# Patient Record
Sex: FEMALE | Race: WHITE | NOT HISPANIC OR LATINO
[De-identification: names, ages, dates, MRNs, and addresses within clinical notes are randomized per-mention and may not be internally consistent; named-entity substitution may affect disease eponyms.]

---

## 2017-01-09 ENCOUNTER — RESULT REVIEW (OUTPATIENT)
Age: 77
End: 2017-01-09

## 2017-01-10 ENCOUNTER — OUTPATIENT (OUTPATIENT)
Dept: OUTPATIENT SERVICES | Facility: HOSPITAL | Age: 77
LOS: 1 days | End: 2017-01-10
Payer: MEDICARE

## 2017-01-10 ENCOUNTER — APPOINTMENT (OUTPATIENT)
Dept: CT IMAGING | Facility: IMAGING CENTER | Age: 77
End: 2017-01-10

## 2017-01-10 ENCOUNTER — APPOINTMENT (OUTPATIENT)
Dept: HEMATOLOGY ONCOLOGY | Facility: CLINIC | Age: 77
End: 2017-01-10

## 2017-01-10 ENCOUNTER — OUTPATIENT (OUTPATIENT)
Dept: OUTPATIENT SERVICES | Facility: HOSPITAL | Age: 77
LOS: 1 days | Discharge: HOME | End: 2017-01-10

## 2017-01-10 ENCOUNTER — LABORATORY RESULT (OUTPATIENT)
Age: 77
End: 2017-01-10

## 2017-01-10 DIAGNOSIS — Z96.652 PRESENCE OF LEFT ARTIFICIAL KNEE JOINT: Chronic | ICD-10-CM

## 2017-01-10 DIAGNOSIS — Z98.89 OTHER SPECIFIED POSTPROCEDURAL STATES: Chronic | ICD-10-CM

## 2017-01-10 DIAGNOSIS — C17.0 MALIGNANT NEOPLASM OF DUODENUM: ICD-10-CM

## 2017-01-10 LAB
HCT VFR BLD CALC: 39.2 % — SIGNIFICANT CHANGE UP (ref 34.5–45)
HGB BLD-MCNC: 13.4 G/DL — SIGNIFICANT CHANGE UP (ref 11.5–15.5)
MCHC RBC-ENTMCNC: 31.9 PG — SIGNIFICANT CHANGE UP (ref 27–34)
MCHC RBC-ENTMCNC: 34.1 GM/DL — SIGNIFICANT CHANGE UP (ref 32–36)
MCV RBC AUTO: 93.3 FL — SIGNIFICANT CHANGE UP (ref 80–100)
PLATELET # BLD AUTO: 282 K/UL — SIGNIFICANT CHANGE UP (ref 150–400)
RBC # BLD: 4.2 M/UL — SIGNIFICANT CHANGE UP (ref 3.8–5.2)
RBC # FLD: 12.7 % — SIGNIFICANT CHANGE UP (ref 10.3–14.5)
WBC # BLD: 6.6 K/UL — SIGNIFICANT CHANGE UP (ref 3.8–10.5)
WBC # FLD AUTO: 6.6 K/UL — SIGNIFICANT CHANGE UP (ref 3.8–10.5)

## 2017-01-10 PROCEDURE — 82565 ASSAY OF CREATININE: CPT

## 2017-01-10 PROCEDURE — 74177 CT ABD & PELVIS W/CONTRAST: CPT

## 2017-01-20 ENCOUNTER — FORM ENCOUNTER (OUTPATIENT)
Age: 77
End: 2017-01-20

## 2017-01-21 ENCOUNTER — OUTPATIENT (OUTPATIENT)
Dept: OUTPATIENT SERVICES | Facility: HOSPITAL | Age: 77
LOS: 1 days | End: 2017-01-21
Payer: MEDICARE

## 2017-01-21 ENCOUNTER — APPOINTMENT (OUTPATIENT)
Dept: CT IMAGING | Facility: IMAGING CENTER | Age: 77
End: 2017-01-21

## 2017-01-21 DIAGNOSIS — Z96.652 PRESENCE OF LEFT ARTIFICIAL KNEE JOINT: Chronic | ICD-10-CM

## 2017-01-21 DIAGNOSIS — I26.99 OTHER PULMONARY EMBOLISM WITHOUT ACUTE COR PULMONALE: ICD-10-CM

## 2017-01-21 DIAGNOSIS — Z98.89 OTHER SPECIFIED POSTPROCEDURAL STATES: Chronic | ICD-10-CM

## 2017-01-21 DIAGNOSIS — C17.9 MALIGNANT NEOPLASM OF SMALL INTESTINE, UNSPECIFIED: ICD-10-CM

## 2017-01-21 PROCEDURE — 71275 CT ANGIOGRAPHY CHEST: CPT

## 2017-01-23 ENCOUNTER — APPOINTMENT (OUTPATIENT)
Dept: HEMATOLOGY ONCOLOGY | Facility: CLINIC | Age: 77
End: 2017-01-23

## 2017-01-23 ENCOUNTER — APPOINTMENT (OUTPATIENT)
Dept: SURGICAL ONCOLOGY | Facility: CLINIC | Age: 77
End: 2017-01-23

## 2017-01-23 VITALS
SYSTOLIC BLOOD PRESSURE: 135 MMHG | DIASTOLIC BLOOD PRESSURE: 80 MMHG | WEIGHT: 185 LBS | BODY MASS INDEX: 34.93 KG/M2 | HEIGHT: 61 IN | HEART RATE: 66 BPM

## 2017-01-23 VITALS
TEMPERATURE: 97.8 F | HEART RATE: 66 BPM | DIASTOLIC BLOOD PRESSURE: 80 MMHG | OXYGEN SATURATION: 100 % | RESPIRATION RATE: 16 BRPM | BODY MASS INDEX: 34.99 KG/M2 | SYSTOLIC BLOOD PRESSURE: 135 MMHG | WEIGHT: 185.19 LBS

## 2017-01-23 DIAGNOSIS — Z97.3 PRESENCE OF SPECTACLES AND CONTACT LENSES: ICD-10-CM

## 2017-03-16 ENCOUNTER — APPOINTMENT (OUTPATIENT)
Dept: ULTRASOUND IMAGING | Facility: IMAGING CENTER | Age: 77
End: 2017-03-16

## 2017-03-16 ENCOUNTER — OUTPATIENT (OUTPATIENT)
Dept: OUTPATIENT SERVICES | Facility: HOSPITAL | Age: 77
LOS: 1 days | Discharge: HOME | End: 2017-03-16

## 2017-03-16 ENCOUNTER — APPOINTMENT (OUTPATIENT)
Dept: HEMATOLOGY ONCOLOGY | Facility: CLINIC | Age: 77
End: 2017-03-16

## 2017-03-16 ENCOUNTER — OUTPATIENT (OUTPATIENT)
Dept: OUTPATIENT SERVICES | Facility: HOSPITAL | Age: 77
LOS: 1 days | End: 2017-03-16
Payer: MEDICARE

## 2017-03-16 VITALS
SYSTOLIC BLOOD PRESSURE: 132 MMHG | HEART RATE: 64 BPM | OXYGEN SATURATION: 97 % | WEIGHT: 187.39 LBS | RESPIRATION RATE: 16 BRPM | TEMPERATURE: 98 F | BODY MASS INDEX: 35.41 KG/M2 | DIASTOLIC BLOOD PRESSURE: 80 MMHG

## 2017-03-16 DIAGNOSIS — R06.02 SHORTNESS OF BREATH: ICD-10-CM

## 2017-03-16 DIAGNOSIS — Z98.89 OTHER SPECIFIED POSTPROCEDURAL STATES: Chronic | ICD-10-CM

## 2017-03-16 DIAGNOSIS — Z96.652 PRESENCE OF LEFT ARTIFICIAL KNEE JOINT: Chronic | ICD-10-CM

## 2017-03-16 DIAGNOSIS — E78.00 PURE HYPERCHOLESTEROLEMIA, UNSPECIFIED: ICD-10-CM

## 2017-03-16 DIAGNOSIS — I82.409 ACUTE EMBOLISM AND THROMBOSIS OF UNSPECIFIED DEEP VEINS OF UNSPECIFIED LOWER EXTREMITY: ICD-10-CM

## 2017-03-16 DIAGNOSIS — C17.9 MALIGNANT NEOPLASM OF SMALL INTESTINE, UNSPECIFIED: ICD-10-CM

## 2017-03-16 DIAGNOSIS — C17.0 MALIGNANT NEOPLASM OF DUODENUM: ICD-10-CM

## 2017-03-16 PROCEDURE — 93970 EXTREMITY STUDY: CPT

## 2017-04-06 ENCOUNTER — OUTPATIENT (OUTPATIENT)
Dept: OUTPATIENT SERVICES | Facility: HOSPITAL | Age: 77
LOS: 1 days | End: 2017-04-06
Payer: MEDICARE

## 2017-04-06 ENCOUNTER — APPOINTMENT (OUTPATIENT)
Dept: CV DIAGNOSITCS | Facility: HOSPITAL | Age: 77
End: 2017-04-06

## 2017-04-06 DIAGNOSIS — C17.9 MALIGNANT NEOPLASM OF SMALL INTESTINE, UNSPECIFIED: ICD-10-CM

## 2017-04-06 DIAGNOSIS — Z98.89 OTHER SPECIFIED POSTPROCEDURAL STATES: Chronic | ICD-10-CM

## 2017-04-06 DIAGNOSIS — Z96.652 PRESENCE OF LEFT ARTIFICIAL KNEE JOINT: Chronic | ICD-10-CM

## 2017-04-06 PROCEDURE — 93306 TTE W/DOPPLER COMPLETE: CPT | Mod: 26

## 2017-04-06 PROCEDURE — 93306 TTE W/DOPPLER COMPLETE: CPT

## 2017-04-06 PROCEDURE — 93356 MYOCRD STRAIN IMG SPCKL TRCK: CPT

## 2017-04-06 PROCEDURE — 0399T: CPT

## 2017-04-20 ENCOUNTER — OUTPATIENT (OUTPATIENT)
Dept: OUTPATIENT SERVICES | Facility: HOSPITAL | Age: 77
LOS: 1 days | Discharge: HOME | End: 2017-04-20

## 2017-04-20 DIAGNOSIS — Z98.89 OTHER SPECIFIED POSTPROCEDURAL STATES: Chronic | ICD-10-CM

## 2017-04-20 DIAGNOSIS — C17.0 MALIGNANT NEOPLASM OF DUODENUM: ICD-10-CM

## 2017-04-20 DIAGNOSIS — Z96.652 PRESENCE OF LEFT ARTIFICIAL KNEE JOINT: Chronic | ICD-10-CM

## 2017-04-23 ENCOUNTER — RESULT REVIEW (OUTPATIENT)
Age: 77
End: 2017-04-23

## 2017-04-24 ENCOUNTER — APPOINTMENT (OUTPATIENT)
Dept: HEMATOLOGY ONCOLOGY | Facility: CLINIC | Age: 77
End: 2017-04-24

## 2017-04-24 VITALS
OXYGEN SATURATION: 99 % | BODY MASS INDEX: 36.03 KG/M2 | TEMPERATURE: 97.7 F | SYSTOLIC BLOOD PRESSURE: 160 MMHG | WEIGHT: 190.7 LBS | HEART RATE: 75 BPM | DIASTOLIC BLOOD PRESSURE: 90 MMHG | RESPIRATION RATE: 16 BRPM

## 2017-04-24 DIAGNOSIS — I82.409 ACUTE EMBOLISM AND THROMBOSIS OF UNSPECIFIED DEEP VEINS OF UNSPECIFIED LOWER EXTREMITY: ICD-10-CM

## 2017-04-24 LAB
BASOPHILS # BLD AUTO: 0 K/UL — SIGNIFICANT CHANGE UP (ref 0–0.2)
BASOPHILS NFR BLD AUTO: 0 % — SIGNIFICANT CHANGE UP (ref 0–2)
EOSINOPHIL # BLD AUTO: 0.1 K/UL — SIGNIFICANT CHANGE UP (ref 0–0.5)
EOSINOPHIL NFR BLD AUTO: 2 % — SIGNIFICANT CHANGE UP (ref 0–6)
HCT VFR BLD CALC: 36.9 % — SIGNIFICANT CHANGE UP (ref 34.5–45)
HGB BLD-MCNC: 13 G/DL — SIGNIFICANT CHANGE UP (ref 11.5–15.5)
LYMPHOCYTES # BLD AUTO: 1.7 K/UL — SIGNIFICANT CHANGE UP (ref 1–3.3)
LYMPHOCYTES # BLD AUTO: 23.9 % — SIGNIFICANT CHANGE UP (ref 13–44)
MCHC RBC-ENTMCNC: 32.4 PG — SIGNIFICANT CHANGE UP (ref 27–34)
MCHC RBC-ENTMCNC: 35.4 G/DL — SIGNIFICANT CHANGE UP (ref 32–36)
MCV RBC AUTO: 91.6 FL — SIGNIFICANT CHANGE UP (ref 80–100)
MONOCYTES # BLD AUTO: 0.5 K/UL — SIGNIFICANT CHANGE UP (ref 0–0.9)
MONOCYTES NFR BLD AUTO: 7.1 % — SIGNIFICANT CHANGE UP (ref 2–14)
NEUTROPHILS # BLD AUTO: 4.7 K/UL — SIGNIFICANT CHANGE UP (ref 1.8–7.4)
NEUTROPHILS NFR BLD AUTO: 66.9 % — SIGNIFICANT CHANGE UP (ref 43–77)
PLATELET # BLD AUTO: 222 K/UL — SIGNIFICANT CHANGE UP (ref 150–400)
RBC # BLD: 4.02 M/UL — SIGNIFICANT CHANGE UP (ref 3.8–5.2)
RBC # FLD: 12.5 % — SIGNIFICANT CHANGE UP (ref 10.3–14.5)
WBC # BLD: 7 K/UL — SIGNIFICANT CHANGE UP (ref 3.8–10.5)
WBC # FLD AUTO: 7 K/UL — SIGNIFICANT CHANGE UP (ref 3.8–10.5)

## 2017-05-15 ENCOUNTER — APPOINTMENT (OUTPATIENT)
Dept: SURGICAL ONCOLOGY | Facility: CLINIC | Age: 77
End: 2017-05-15
Payer: MEDICARE

## 2017-05-15 VITALS
HEIGHT: 61 IN | BODY MASS INDEX: 36.82 KG/M2 | SYSTOLIC BLOOD PRESSURE: 170 MMHG | DIASTOLIC BLOOD PRESSURE: 90 MMHG | WEIGHT: 195 LBS

## 2017-05-15 PROCEDURE — 99214 OFFICE O/P EST MOD 30 MIN: CPT

## 2017-06-26 ENCOUNTER — OTHER (OUTPATIENT)
Age: 77
End: 2017-06-26

## 2017-07-04 ENCOUNTER — FORM ENCOUNTER (OUTPATIENT)
Age: 77
End: 2017-07-04

## 2017-07-05 ENCOUNTER — APPOINTMENT (OUTPATIENT)
Dept: CT IMAGING | Facility: IMAGING CENTER | Age: 77
End: 2017-07-05
Payer: MEDICARE

## 2017-07-05 ENCOUNTER — OUTPATIENT (OUTPATIENT)
Dept: OUTPATIENT SERVICES | Facility: HOSPITAL | Age: 77
LOS: 1 days | End: 2017-07-05
Payer: MEDICARE

## 2017-07-05 DIAGNOSIS — Z98.89 OTHER SPECIFIED POSTPROCEDURAL STATES: Chronic | ICD-10-CM

## 2017-07-05 DIAGNOSIS — C17.9 MALIGNANT NEOPLASM OF SMALL INTESTINE, UNSPECIFIED: ICD-10-CM

## 2017-07-05 DIAGNOSIS — Z96.652 PRESENCE OF LEFT ARTIFICIAL KNEE JOINT: Chronic | ICD-10-CM

## 2017-07-05 PROCEDURE — 74177 CT ABD & PELVIS W/CONTRAST: CPT

## 2017-07-05 PROCEDURE — 82565 ASSAY OF CREATININE: CPT

## 2017-07-05 PROCEDURE — 74177 CT ABD & PELVIS W/CONTRAST: CPT | Mod: 26

## 2017-07-05 PROCEDURE — 71260 CT THORAX DX C+: CPT | Mod: 26

## 2017-07-05 PROCEDURE — 71260 CT THORAX DX C+: CPT

## 2017-07-10 ENCOUNTER — OUTPATIENT (OUTPATIENT)
Dept: OUTPATIENT SERVICES | Facility: HOSPITAL | Age: 77
LOS: 1 days | Discharge: HOME | End: 2017-07-10

## 2017-07-10 DIAGNOSIS — Z98.89 OTHER SPECIFIED POSTPROCEDURAL STATES: Chronic | ICD-10-CM

## 2017-07-10 DIAGNOSIS — Z96.652 PRESENCE OF LEFT ARTIFICIAL KNEE JOINT: Chronic | ICD-10-CM

## 2017-07-10 DIAGNOSIS — C17.0 MALIGNANT NEOPLASM OF DUODENUM: ICD-10-CM

## 2017-07-11 ENCOUNTER — RESULT REVIEW (OUTPATIENT)
Age: 77
End: 2017-07-11

## 2017-07-11 ENCOUNTER — APPOINTMENT (OUTPATIENT)
Dept: HEMATOLOGY ONCOLOGY | Facility: CLINIC | Age: 77
End: 2017-07-11

## 2017-07-11 VITALS
SYSTOLIC BLOOD PRESSURE: 158 MMHG | DIASTOLIC BLOOD PRESSURE: 88 MMHG | TEMPERATURE: 97.8 F | OXYGEN SATURATION: 97 % | RESPIRATION RATE: 16 BRPM | HEART RATE: 67 BPM

## 2017-07-11 LAB
BASOPHILS # BLD AUTO: 0 K/UL — SIGNIFICANT CHANGE UP (ref 0–0.2)
BASOPHILS NFR BLD AUTO: 0.7 % — SIGNIFICANT CHANGE UP (ref 0–2)
EOSINOPHIL # BLD AUTO: 0.2 K/UL — SIGNIFICANT CHANGE UP (ref 0–0.5)
EOSINOPHIL NFR BLD AUTO: 2.5 % — SIGNIFICANT CHANGE UP (ref 0–6)
HCT VFR BLD CALC: 39.1 % — SIGNIFICANT CHANGE UP (ref 34.5–45)
HGB BLD-MCNC: 13.8 G/DL — SIGNIFICANT CHANGE UP (ref 11.5–15.5)
LYMPHOCYTES # BLD AUTO: 2 K/UL — SIGNIFICANT CHANGE UP (ref 1–3.3)
LYMPHOCYTES # BLD AUTO: 32.1 % — SIGNIFICANT CHANGE UP (ref 13–44)
MCHC RBC-ENTMCNC: 33 PG — SIGNIFICANT CHANGE UP (ref 27–34)
MCHC RBC-ENTMCNC: 35.2 G/DL — SIGNIFICANT CHANGE UP (ref 32–36)
MCV RBC AUTO: 93.7 FL — SIGNIFICANT CHANGE UP (ref 80–100)
MONOCYTES # BLD AUTO: 0.6 K/UL — SIGNIFICANT CHANGE UP (ref 0–0.9)
MONOCYTES NFR BLD AUTO: 8.9 % — SIGNIFICANT CHANGE UP (ref 2–14)
NEUTROPHILS # BLD AUTO: 3.6 K/UL — SIGNIFICANT CHANGE UP (ref 1.8–7.4)
NEUTROPHILS NFR BLD AUTO: 55.9 % — SIGNIFICANT CHANGE UP (ref 43–77)
PLATELET # BLD AUTO: 239 K/UL — SIGNIFICANT CHANGE UP (ref 150–400)
RBC # BLD: 4.17 M/UL — SIGNIFICANT CHANGE UP (ref 3.8–5.2)
RBC # FLD: 12.5 % — SIGNIFICANT CHANGE UP (ref 10.3–14.5)
WBC # BLD: 6.4 K/UL — SIGNIFICANT CHANGE UP (ref 3.8–10.5)
WBC # FLD AUTO: 6.4 K/UL — SIGNIFICANT CHANGE UP (ref 3.8–10.5)

## 2017-09-11 ENCOUNTER — APPOINTMENT (OUTPATIENT)
Dept: SURGICAL ONCOLOGY | Facility: CLINIC | Age: 77
End: 2017-09-11
Payer: MEDICARE

## 2017-09-11 VITALS — DIASTOLIC BLOOD PRESSURE: 92 MMHG | SYSTOLIC BLOOD PRESSURE: 190 MMHG

## 2017-09-11 VITALS — OXYGEN SATURATION: 96 % | RESPIRATION RATE: 17 BRPM | HEART RATE: 77 BPM

## 2017-09-11 PROCEDURE — 99214 OFFICE O/P EST MOD 30 MIN: CPT

## 2017-12-28 ENCOUNTER — OUTPATIENT (OUTPATIENT)
Dept: OUTPATIENT SERVICES | Facility: HOSPITAL | Age: 77
LOS: 1 days | Discharge: HOME | End: 2017-12-28

## 2017-12-28 DIAGNOSIS — Z98.89 OTHER SPECIFIED POSTPROCEDURAL STATES: Chronic | ICD-10-CM

## 2017-12-28 DIAGNOSIS — C17.0 MALIGNANT NEOPLASM OF DUODENUM: ICD-10-CM

## 2017-12-28 DIAGNOSIS — E78.00 PURE HYPERCHOLESTEROLEMIA, UNSPECIFIED: ICD-10-CM

## 2017-12-28 DIAGNOSIS — Z96.652 PRESENCE OF LEFT ARTIFICIAL KNEE JOINT: Chronic | ICD-10-CM

## 2018-01-08 ENCOUNTER — APPOINTMENT (OUTPATIENT)
Dept: HEMATOLOGY ONCOLOGY | Facility: CLINIC | Age: 78
End: 2018-01-08
Payer: MEDICARE

## 2018-01-08 ENCOUNTER — RESULT REVIEW (OUTPATIENT)
Age: 78
End: 2018-01-08

## 2018-01-08 VITALS
BODY MASS INDEX: 37.7 KG/M2 | DIASTOLIC BLOOD PRESSURE: 82 MMHG | OXYGEN SATURATION: 99 % | RESPIRATION RATE: 16 BRPM | HEART RATE: 74 BPM | TEMPERATURE: 96.1 F | SYSTOLIC BLOOD PRESSURE: 158 MMHG | WEIGHT: 199.52 LBS

## 2018-01-08 PROCEDURE — 99214 OFFICE O/P EST MOD 30 MIN: CPT

## 2018-03-19 ENCOUNTER — APPOINTMENT (OUTPATIENT)
Dept: SURGICAL ONCOLOGY | Facility: CLINIC | Age: 78
End: 2018-03-19
Payer: MEDICARE

## 2018-03-19 VITALS
OXYGEN SATURATION: 97 % | SYSTOLIC BLOOD PRESSURE: 196 MMHG | HEART RATE: 76 BPM | BODY MASS INDEX: 38.68 KG/M2 | HEIGHT: 60 IN | DIASTOLIC BLOOD PRESSURE: 80 MMHG | WEIGHT: 197 LBS

## 2018-03-19 PROCEDURE — 99214 OFFICE O/P EST MOD 30 MIN: CPT

## 2018-03-29 ENCOUNTER — OUTPATIENT (OUTPATIENT)
Dept: OUTPATIENT SERVICES | Facility: HOSPITAL | Age: 78
LOS: 1 days | Discharge: HOME | End: 2018-03-29

## 2018-03-29 DIAGNOSIS — E78.00 PURE HYPERCHOLESTEROLEMIA, UNSPECIFIED: ICD-10-CM

## 2018-03-29 DIAGNOSIS — Z98.89 OTHER SPECIFIED POSTPROCEDURAL STATES: Chronic | ICD-10-CM

## 2018-03-29 DIAGNOSIS — C17.0 MALIGNANT NEOPLASM OF DUODENUM: ICD-10-CM

## 2018-03-29 DIAGNOSIS — Z96.652 PRESENCE OF LEFT ARTIFICIAL KNEE JOINT: Chronic | ICD-10-CM

## 2018-03-30 ENCOUNTER — OTHER (OUTPATIENT)
Age: 78
End: 2018-03-30

## 2018-04-02 ENCOUNTER — APPOINTMENT (OUTPATIENT)
Dept: CT IMAGING | Facility: IMAGING CENTER | Age: 78
End: 2018-04-02
Payer: MEDICARE

## 2018-04-02 ENCOUNTER — OUTPATIENT (OUTPATIENT)
Dept: OUTPATIENT SERVICES | Facility: HOSPITAL | Age: 78
LOS: 1 days | End: 2018-04-02
Payer: MEDICARE

## 2018-04-02 ENCOUNTER — APPOINTMENT (OUTPATIENT)
Dept: HEMATOLOGY ONCOLOGY | Facility: CLINIC | Age: 78
End: 2018-04-02

## 2018-04-02 ENCOUNTER — RESULT REVIEW (OUTPATIENT)
Age: 78
End: 2018-04-02

## 2018-04-02 DIAGNOSIS — Z98.89 OTHER SPECIFIED POSTPROCEDURAL STATES: Chronic | ICD-10-CM

## 2018-04-02 DIAGNOSIS — C17.9 MALIGNANT NEOPLASM OF SMALL INTESTINE, UNSPECIFIED: ICD-10-CM

## 2018-04-02 DIAGNOSIS — Z96.652 PRESENCE OF LEFT ARTIFICIAL KNEE JOINT: Chronic | ICD-10-CM

## 2018-04-02 LAB
HCT VFR BLD CALC: 35.6 % — SIGNIFICANT CHANGE UP (ref 34.5–45)
HGB BLD-MCNC: 12.5 G/DL — SIGNIFICANT CHANGE UP (ref 11.5–15.5)
MCHC RBC-ENTMCNC: 31.9 PG — SIGNIFICANT CHANGE UP (ref 27–34)
MCHC RBC-ENTMCNC: 35.2 G/DL — SIGNIFICANT CHANGE UP (ref 32–36)
MCV RBC AUTO: 90.5 FL — SIGNIFICANT CHANGE UP (ref 80–100)
PLATELET # BLD AUTO: 214 K/UL — SIGNIFICANT CHANGE UP (ref 150–400)
RBC # BLD: 3.93 M/UL — SIGNIFICANT CHANGE UP (ref 3.8–5.2)
RBC # FLD: 12.8 % — SIGNIFICANT CHANGE UP (ref 10.3–14.5)
WBC # BLD: 6.8 K/UL — SIGNIFICANT CHANGE UP (ref 3.8–10.5)
WBC # FLD AUTO: 6.8 K/UL — SIGNIFICANT CHANGE UP (ref 3.8–10.5)

## 2018-04-02 PROCEDURE — 74177 CT ABD & PELVIS W/CONTRAST: CPT

## 2018-04-02 PROCEDURE — 74177 CT ABD & PELVIS W/CONTRAST: CPT | Mod: 26

## 2018-04-02 PROCEDURE — 82565 ASSAY OF CREATININE: CPT

## 2018-04-03 LAB
ALBUMIN SERPL ELPH-MCNC: 4.2 G/DL
ALP BLD-CCNC: 78 U/L
ALT SERPL-CCNC: 20 U/L
ANION GAP SERPL CALC-SCNC: 11 MMOL/L
AST SERPL-CCNC: 21 U/L
BILIRUB SERPL-MCNC: 0.4 MG/DL
BUN SERPL-MCNC: 20 MG/DL
CALCIUM SERPL-MCNC: 9.6 MG/DL
CEA SERPL-MCNC: 1.4 NG/ML
CHLORIDE SERPL-SCNC: 102 MMOL/L
CO2 SERPL-SCNC: 27 MMOL/L
CREAT SERPL-MCNC: 0.59 MG/DL
GLUCOSE SERPL-MCNC: 85 MG/DL
POTASSIUM SERPL-SCNC: 4.5 MMOL/L
PROT SERPL-MCNC: 6.7 G/DL
SODIUM SERPL-SCNC: 140 MMOL/L

## 2018-04-10 ENCOUNTER — CHART COPY (OUTPATIENT)
Age: 78
End: 2018-04-10

## 2018-07-09 ENCOUNTER — OUTPATIENT (OUTPATIENT)
Dept: OUTPATIENT SERVICES | Facility: HOSPITAL | Age: 78
LOS: 1 days | End: 2018-07-09
Payer: MEDICARE

## 2018-07-09 ENCOUNTER — APPOINTMENT (OUTPATIENT)
Dept: CT IMAGING | Facility: IMAGING CENTER | Age: 78
End: 2018-07-09
Payer: MEDICARE

## 2018-07-09 DIAGNOSIS — Z98.89 OTHER SPECIFIED POSTPROCEDURAL STATES: Chronic | ICD-10-CM

## 2018-07-09 DIAGNOSIS — Z96.652 PRESENCE OF LEFT ARTIFICIAL KNEE JOINT: Chronic | ICD-10-CM

## 2018-07-09 DIAGNOSIS — C17.9 MALIGNANT NEOPLASM OF SMALL INTESTINE, UNSPECIFIED: ICD-10-CM

## 2018-07-09 PROCEDURE — 74177 CT ABD & PELVIS W/CONTRAST: CPT

## 2018-07-09 PROCEDURE — 74177 CT ABD & PELVIS W/CONTRAST: CPT | Mod: 26

## 2018-07-09 PROCEDURE — 82565 ASSAY OF CREATININE: CPT

## 2018-07-13 ENCOUNTER — OUTPATIENT (OUTPATIENT)
Dept: OUTPATIENT SERVICES | Facility: HOSPITAL | Age: 78
LOS: 1 days | Discharge: HOME | End: 2018-07-13

## 2018-07-13 DIAGNOSIS — Z98.89 OTHER SPECIFIED POSTPROCEDURAL STATES: Chronic | ICD-10-CM

## 2018-07-13 DIAGNOSIS — E78.00 PURE HYPERCHOLESTEROLEMIA, UNSPECIFIED: ICD-10-CM

## 2018-07-13 DIAGNOSIS — Z96.652 PRESENCE OF LEFT ARTIFICIAL KNEE JOINT: Chronic | ICD-10-CM

## 2018-07-13 DIAGNOSIS — C17.0 MALIGNANT NEOPLASM OF DUODENUM: ICD-10-CM

## 2018-07-17 ENCOUNTER — APPOINTMENT (OUTPATIENT)
Dept: HEMATOLOGY ONCOLOGY | Facility: CLINIC | Age: 78
End: 2018-07-17
Payer: MEDICARE

## 2018-07-17 ENCOUNTER — RESULT REVIEW (OUTPATIENT)
Age: 78
End: 2018-07-17

## 2018-07-17 ENCOUNTER — OTHER (OUTPATIENT)
Age: 78
End: 2018-07-17

## 2018-07-17 VITALS
BODY MASS INDEX: 37.63 KG/M2 | RESPIRATION RATE: 16 BRPM | TEMPERATURE: 97.4 F | DIASTOLIC BLOOD PRESSURE: 90 MMHG | SYSTOLIC BLOOD PRESSURE: 170 MMHG | HEART RATE: 76 BPM | OXYGEN SATURATION: 99 % | WEIGHT: 192.66 LBS

## 2018-07-17 LAB
BASOPHILS # BLD AUTO: 0 K/UL — SIGNIFICANT CHANGE UP (ref 0–0.2)
BASOPHILS NFR BLD AUTO: 0.2 % — SIGNIFICANT CHANGE UP (ref 0–2)
EOSINOPHIL # BLD AUTO: 0.1 K/UL — SIGNIFICANT CHANGE UP (ref 0–0.5)
EOSINOPHIL NFR BLD AUTO: 1.9 % — SIGNIFICANT CHANGE UP (ref 0–6)
HCT VFR BLD CALC: 38.1 % — SIGNIFICANT CHANGE UP (ref 34.5–45)
HGB BLD-MCNC: 13 G/DL — SIGNIFICANT CHANGE UP (ref 11.5–15.5)
LYMPHOCYTES # BLD AUTO: 1.6 K/UL — SIGNIFICANT CHANGE UP (ref 1–3.3)
LYMPHOCYTES # BLD AUTO: 26.4 % — SIGNIFICANT CHANGE UP (ref 13–44)
MCHC RBC-ENTMCNC: 31.4 PG — SIGNIFICANT CHANGE UP (ref 27–34)
MCHC RBC-ENTMCNC: 34.2 G/DL — SIGNIFICANT CHANGE UP (ref 32–36)
MCV RBC AUTO: 91.9 FL — SIGNIFICANT CHANGE UP (ref 80–100)
MONOCYTES # BLD AUTO: 0.5 K/UL — SIGNIFICANT CHANGE UP (ref 0–0.9)
MONOCYTES NFR BLD AUTO: 7.7 % — SIGNIFICANT CHANGE UP (ref 2–14)
NEUTROPHILS # BLD AUTO: 3.9 K/UL — SIGNIFICANT CHANGE UP (ref 1.8–7.4)
NEUTROPHILS NFR BLD AUTO: 63.8 % — SIGNIFICANT CHANGE UP (ref 43–77)
PLATELET # BLD AUTO: 238 K/UL — SIGNIFICANT CHANGE UP (ref 150–400)
RBC # BLD: 4.14 M/UL — SIGNIFICANT CHANGE UP (ref 3.8–5.2)
RBC # FLD: 12.8 % — SIGNIFICANT CHANGE UP (ref 10.3–14.5)
WBC # BLD: 6.1 K/UL — SIGNIFICANT CHANGE UP (ref 3.8–10.5)
WBC # FLD AUTO: 6.1 K/UL — SIGNIFICANT CHANGE UP (ref 3.8–10.5)

## 2018-07-17 PROCEDURE — 99214 OFFICE O/P EST MOD 30 MIN: CPT

## 2018-07-18 LAB
ALBUMIN SERPL ELPH-MCNC: 4.2 G/DL
ALP BLD-CCNC: 56 U/L
ALT SERPL-CCNC: 19 U/L
ANION GAP SERPL CALC-SCNC: 13 MMOL/L
AST SERPL-CCNC: 18 U/L
BILIRUB SERPL-MCNC: 0.4 MG/DL
BUN SERPL-MCNC: 18 MG/DL
CALCIUM SERPL-MCNC: 9.4 MG/DL
CEA SERPL-MCNC: 1.7 NG/ML
CHLORIDE SERPL-SCNC: 102 MMOL/L
CO2 SERPL-SCNC: 27 MMOL/L
CREAT SERPL-MCNC: 0.64 MG/DL
GLUCOSE SERPL-MCNC: 83 MG/DL
POTASSIUM SERPL-SCNC: 4.4 MMOL/L
PROT SERPL-MCNC: 6.8 G/DL
SODIUM SERPL-SCNC: 142 MMOL/L

## 2018-10-01 ENCOUNTER — APPOINTMENT (OUTPATIENT)
Dept: SURGICAL ONCOLOGY | Facility: CLINIC | Age: 78
End: 2018-10-01
Payer: MEDICARE

## 2018-10-01 VITALS
HEIGHT: 60 IN | WEIGHT: 198 LBS | SYSTOLIC BLOOD PRESSURE: 170 MMHG | BODY MASS INDEX: 38.87 KG/M2 | OXYGEN SATURATION: 96 % | RESPIRATION RATE: 16 BRPM | HEART RATE: 72 BPM | DIASTOLIC BLOOD PRESSURE: 82 MMHG

## 2018-10-01 PROCEDURE — 99214 OFFICE O/P EST MOD 30 MIN: CPT

## 2019-01-04 LAB
ALBUMIN SERPL ELPH-MCNC: 4.1 G/DL
ALBUMIN SERPL ELPH-MCNC: 4.5 G/DL
ALBUMIN SERPL ELPH-MCNC: 4.6 G/DL
ALBUMIN SERPL ELPH-MCNC: 4.6 G/DL
ALP BLD-CCNC: 69 U/L
ALP BLD-CCNC: 76 U/L
ALP BLD-CCNC: 81 U/L
ALP BLD-CCNC: 81 U/L
ALT SERPL-CCNC: 17 U/L
ALT SERPL-CCNC: 19 U/L
ALT SERPL-CCNC: 20 U/L
ALT SERPL-CCNC: 25 U/L
ANION GAP SERPL CALC-SCNC: 12 MMOL/L
ANION GAP SERPL CALC-SCNC: 14 MMOL/L
ANION GAP SERPL CALC-SCNC: 14 MMOL/L
ANION GAP SERPL CALC-SCNC: 16 MMOL/L
AST SERPL-CCNC: 18 U/L
AST SERPL-CCNC: 19 U/L
AST SERPL-CCNC: 20 U/L
AST SERPL-CCNC: 21 U/L
BILIRUB SERPL-MCNC: 0.3 MG/DL
BILIRUB SERPL-MCNC: 0.3 MG/DL
BILIRUB SERPL-MCNC: 0.4 MG/DL
BILIRUB SERPL-MCNC: 0.6 MG/DL
BUN SERPL-MCNC: 15 MG/DL
BUN SERPL-MCNC: 16 MG/DL
BUN SERPL-MCNC: 21 MG/DL
BUN SERPL-MCNC: 22 MG/DL
CALCIUM SERPL-MCNC: 10.1 MG/DL
CALCIUM SERPL-MCNC: 10.2 MG/DL
CALCIUM SERPL-MCNC: 9.5 MG/DL
CALCIUM SERPL-MCNC: 9.9 MG/DL
CEA SERPL-MCNC: 1.4 NG/ML
CEA SERPL-MCNC: 1.4 NG/ML
CEA SERPL-MCNC: 1.6 NG/ML
CEA SERPL-MCNC: 1.6 NG/ML
CHLORIDE SERPL-SCNC: 101 MMOL/L
CHLORIDE SERPL-SCNC: 102 MMOL/L
CO2 SERPL-SCNC: 28 MMOL/L
CO2 SERPL-SCNC: 29 MMOL/L
CO2 SERPL-SCNC: 29 MMOL/L
CO2 SERPL-SCNC: 30 MMOL/L
CREAT SERPL-MCNC: 0.58 MG/DL
CREAT SERPL-MCNC: 0.63 MG/DL
CREAT SERPL-MCNC: 0.69 MG/DL
CREAT SERPL-MCNC: 0.72 MG/DL
GLUCOSE SERPL-MCNC: 79 MG/DL
GLUCOSE SERPL-MCNC: 81 MG/DL
GLUCOSE SERPL-MCNC: 89 MG/DL
GLUCOSE SERPL-MCNC: 91 MG/DL
POTASSIUM SERPL-SCNC: 4.2 MMOL/L
POTASSIUM SERPL-SCNC: 4.4 MMOL/L
POTASSIUM SERPL-SCNC: 5 MMOL/L
POTASSIUM SERPL-SCNC: 5.1 MMOL/L
PROT SERPL-MCNC: 6.5 G/DL
PROT SERPL-MCNC: 7.1 G/DL
PROT SERPL-MCNC: 7.2 G/DL
PROT SERPL-MCNC: 7.2 G/DL
SODIUM SERPL-SCNC: 143 MMOL/L
SODIUM SERPL-SCNC: 144 MMOL/L
SODIUM SERPL-SCNC: 145 MMOL/L
SODIUM SERPL-SCNC: 145 MMOL/L

## 2019-01-10 ENCOUNTER — OUTPATIENT (OUTPATIENT)
Dept: OUTPATIENT SERVICES | Facility: HOSPITAL | Age: 79
LOS: 1 days | Discharge: HOME | End: 2019-01-10

## 2019-01-10 DIAGNOSIS — Z98.89 OTHER SPECIFIED POSTPROCEDURAL STATES: Chronic | ICD-10-CM

## 2019-01-10 DIAGNOSIS — Z96.652 PRESENCE OF LEFT ARTIFICIAL KNEE JOINT: Chronic | ICD-10-CM

## 2019-01-10 DIAGNOSIS — C17.0 MALIGNANT NEOPLASM OF DUODENUM: ICD-10-CM

## 2019-01-14 ENCOUNTER — APPOINTMENT (OUTPATIENT)
Dept: HEMATOLOGY ONCOLOGY | Facility: CLINIC | Age: 79
End: 2019-01-14
Payer: MEDICARE

## 2019-01-14 ENCOUNTER — RESULT REVIEW (OUTPATIENT)
Age: 79
End: 2019-01-14

## 2019-01-14 VITALS
OXYGEN SATURATION: 94 % | DIASTOLIC BLOOD PRESSURE: 85 MMHG | BODY MASS INDEX: 38.67 KG/M2 | SYSTOLIC BLOOD PRESSURE: 157 MMHG | TEMPERATURE: 98.1 F | WEIGHT: 198 LBS | HEART RATE: 68 BPM | RESPIRATION RATE: 16 BRPM

## 2019-01-14 LAB
ALBUMIN SERPL ELPH-MCNC: 4.4 G/DL
ALP BLD-CCNC: 77 U/L
ALT SERPL-CCNC: 15 U/L
ANION GAP SERPL CALC-SCNC: 11 MMOL/L
AST SERPL-CCNC: 19 U/L
BASOPHILS # BLD AUTO: 0 K/UL — SIGNIFICANT CHANGE UP (ref 0–0.2)
BASOPHILS NFR BLD AUTO: 0.2 % — SIGNIFICANT CHANGE UP (ref 0–2)
BILIRUB SERPL-MCNC: 0.4 MG/DL
BUN SERPL-MCNC: 21 MG/DL
CALCIUM SERPL-MCNC: 9.4 MG/DL
CEA SERPL-MCNC: 1.8 NG/ML
CHLORIDE SERPL-SCNC: 103 MMOL/L
CO2 SERPL-SCNC: 30 MMOL/L
CREAT SERPL-MCNC: 0.76 MG/DL
EOSINOPHIL # BLD AUTO: 0.2 K/UL — SIGNIFICANT CHANGE UP (ref 0–0.5)
EOSINOPHIL NFR BLD AUTO: 1.8 % — SIGNIFICANT CHANGE UP (ref 0–6)
GLUCOSE SERPL-MCNC: 104 MG/DL
HCT VFR BLD CALC: 39 % — SIGNIFICANT CHANGE UP (ref 34.5–45)
HGB BLD-MCNC: 13.1 G/DL — SIGNIFICANT CHANGE UP (ref 11.5–15.5)
LYMPHOCYTES # BLD AUTO: 2.1 K/UL — SIGNIFICANT CHANGE UP (ref 1–3.3)
LYMPHOCYTES # BLD AUTO: 22.6 % — SIGNIFICANT CHANGE UP (ref 13–44)
MCHC RBC-ENTMCNC: 30.5 PG — SIGNIFICANT CHANGE UP (ref 27–34)
MCHC RBC-ENTMCNC: 33.5 G/DL — SIGNIFICANT CHANGE UP (ref 32–36)
MCV RBC AUTO: 91 FL — SIGNIFICANT CHANGE UP (ref 80–100)
MONOCYTES # BLD AUTO: 0.8 K/UL — SIGNIFICANT CHANGE UP (ref 0–0.9)
MONOCYTES NFR BLD AUTO: 8.7 % — SIGNIFICANT CHANGE UP (ref 2–14)
NEUTROPHILS # BLD AUTO: 6.1 K/UL — SIGNIFICANT CHANGE UP (ref 1.8–7.4)
NEUTROPHILS NFR BLD AUTO: 66.8 % — SIGNIFICANT CHANGE UP (ref 43–77)
PLATELET # BLD AUTO: 285 K/UL — SIGNIFICANT CHANGE UP (ref 150–400)
POTASSIUM SERPL-SCNC: 4.8 MMOL/L
PROT SERPL-MCNC: 6.9 G/DL
RBC # BLD: 4.29 M/UL — SIGNIFICANT CHANGE UP (ref 3.8–5.2)
RBC # FLD: 12.9 % — SIGNIFICANT CHANGE UP (ref 10.3–14.5)
SODIUM SERPL-SCNC: 144 MMOL/L
WBC # BLD: 9.1 K/UL — SIGNIFICANT CHANGE UP (ref 3.8–10.5)
WBC # FLD AUTO: 9.1 K/UL — SIGNIFICANT CHANGE UP (ref 3.8–10.5)

## 2019-01-14 PROCEDURE — 99214 OFFICE O/P EST MOD 30 MIN: CPT

## 2019-01-14 NOTE — ASSESSMENT
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 Pt to continue on followup imaf=ging and last scan 3 months ago showed no recurrence. Pt had inc abdominal node which is receding. Pt to followup with surgery Dr. Rios.Pt to RTO in 6 months or sooner if needed.

## 2019-01-14 NOTE — REVIEW OF SYSTEMS
[Negative] : Allergic/Immunologic [FreeTextEntry6] : saw pulmonologist and takes spray and has SOB. [FreeTextEntry7] : has LUQ pressure pain  [FreeTextEntry8] : has treated UTI.

## 2019-01-14 NOTE — HISTORY OF PRESENT ILLNESS
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 History dates back in 2015 when she noted the onset of abdominal pain and gastric reflux symptoms. She also noted weight loss is however a 6 month. She was also found to be anemic and underwent upper endoscopy which revealed a large friable duodenal mass 3.8 cm. The patient underwent pancreatoduodenectomy on November 20. She subsequently healed left hand did undergo rehabilitation treatment. She is doing well but does have occasional abdominal discomfort and pain. [de-identified] : Pt had hematuria and pt went to ER. Pt diagnosed with UTI and pt antibiotic x 7 days. Pt still has burning and will repeat Urine culture off cephalexin.

## 2019-04-08 ENCOUNTER — APPOINTMENT (OUTPATIENT)
Dept: SURGICAL ONCOLOGY | Facility: CLINIC | Age: 79
End: 2019-04-08
Payer: MEDICARE

## 2019-04-08 VITALS
OXYGEN SATURATION: 96 % | HEART RATE: 80 BPM | WEIGHT: 193 LBS | DIASTOLIC BLOOD PRESSURE: 104 MMHG | SYSTOLIC BLOOD PRESSURE: 188 MMHG | BODY MASS INDEX: 37.89 KG/M2 | HEIGHT: 60 IN

## 2019-04-08 PROCEDURE — 99214 OFFICE O/P EST MOD 30 MIN: CPT

## 2019-04-08 NOTE — HISTORY OF PRESENT ILLNESS
[de-identified] : Yandy is an 78 year old female presents for a 6 month colon cancer follow up visit. \par \par Past Hx:\par Duodenal CA with positive nodes; s/p Whipple Procedure in 11/2015; completed Xeloda regimen in 7/2016 under the supervision of Dr. Ventura ([facility]).   \par \par CT Abdomen/Pelvis 4/2/18 - Postsurgical changes. Suggestion of early cirrhotic changes. Nonspecific perigastric and mesenteric lymph nodes which are more prominent as compared to prior study. PET or f/u CT in 3 months recommended.\par \par F/U CT A/P 7/9/18 - Interval decrease in size of previously noted small bowel mesenteric and perigastric lymph node. Unchanged hepatic lesions. No new hepatic lesions identified. 2.3 cm right adrenal nodule remains unchanged since 2015. \par \par Bloodwork 1/2019: \par CEA: 1.8 ng/mL\par LFTs WNL\par CBC WNL\par \par Endoscopy by Dr. Jha on 2/14/18 which was without any signs of carcinoma.  \par \par Patient states she had an endoscopy/colonoscopy with Dr. Velarde on 3/15/19. Will obtain the report. The patient states that 2 polyps were removed and there were no suspicious findings on endoscopy.  Patient states she was told she no longer requires colonoscopies however she should have yearly endoscopies. \par \par Today she is with c/o occasional right flank/right hip discomfort. Denies abdominal pain, nausea, vomiting, changes in bowel habits. Good appetite.  She currently has an URI and was recently started on antibiotics as per her PCP. \par \par Internist: Dr. SEBAS Vazquez ([place], [place])\par GI: Dr. Velarde

## 2019-04-08 NOTE — PHYSICAL EXAM
[Normal] : supple, no neck mass and thyroid not enlarged [Normal Supraclavicular Lymph Nodes] : normal supraclavicular lymph nodes [Normal Groin Lymph Nodes] : normal groin lymph nodes [Normal] : oriented to person, place and time, with appropriate affect [de-identified] : Healed abdominal incisions but no masses. [FreeTextEntry1] : Deferred

## 2019-04-08 NOTE — ASSESSMENT
[FreeTextEntry1] : IMP:\par JULIET - hx of duodenal carcinoma s/p Whipple 11/2015\par Decreasing perigastric and mesenteric adenopathy on CT\par \par \par Plan:\par RTO q 6 months\par Bloodwork and CT scans per Dr. Haskins\par Obtain EGD/Colonoscopy report from 3/15/19\par Yearly EGD\par \par \par \par \par \par

## 2019-07-11 ENCOUNTER — OUTPATIENT (OUTPATIENT)
Dept: OUTPATIENT SERVICES | Facility: HOSPITAL | Age: 79
LOS: 1 days | Discharge: HOME | End: 2019-07-11

## 2019-07-11 DIAGNOSIS — C17.0 MALIGNANT NEOPLASM OF DUODENUM: ICD-10-CM

## 2019-07-11 DIAGNOSIS — E78.00 PURE HYPERCHOLESTEROLEMIA, UNSPECIFIED: ICD-10-CM

## 2019-07-11 DIAGNOSIS — Z98.89 OTHER SPECIFIED POSTPROCEDURAL STATES: Chronic | ICD-10-CM

## 2019-07-11 DIAGNOSIS — Z96.652 PRESENCE OF LEFT ARTIFICIAL KNEE JOINT: Chronic | ICD-10-CM

## 2019-07-15 ENCOUNTER — RESULT REVIEW (OUTPATIENT)
Age: 79
End: 2019-07-15

## 2019-07-15 ENCOUNTER — APPOINTMENT (OUTPATIENT)
Dept: HEMATOLOGY ONCOLOGY | Facility: CLINIC | Age: 79
End: 2019-07-15
Payer: MEDICARE

## 2019-07-15 VITALS
TEMPERATURE: 98.1 F | SYSTOLIC BLOOD PRESSURE: 90 MMHG | RESPIRATION RATE: 14 BRPM | BODY MASS INDEX: 38.58 KG/M2 | HEART RATE: 68 BPM | DIASTOLIC BLOOD PRESSURE: 60 MMHG | OXYGEN SATURATION: 97 % | WEIGHT: 197.53 LBS

## 2019-07-15 LAB
BASOPHILS # BLD AUTO: 0 K/UL — SIGNIFICANT CHANGE UP (ref 0–0.2)
BASOPHILS NFR BLD AUTO: 0.1 % — SIGNIFICANT CHANGE UP (ref 0–2)
EOSINOPHIL # BLD AUTO: 0.1 K/UL — SIGNIFICANT CHANGE UP (ref 0–0.5)
EOSINOPHIL NFR BLD AUTO: 2 % — SIGNIFICANT CHANGE UP (ref 0–6)
HCT VFR BLD CALC: 41.7 % — SIGNIFICANT CHANGE UP (ref 34.5–45)
HGB BLD-MCNC: 13.4 G/DL — SIGNIFICANT CHANGE UP (ref 11.5–15.5)
LYMPHOCYTES # BLD AUTO: 1.6 K/UL — SIGNIFICANT CHANGE UP (ref 1–3.3)
LYMPHOCYTES # BLD AUTO: 21.5 % — SIGNIFICANT CHANGE UP (ref 13–44)
MCHC RBC-ENTMCNC: 29.9 PG — SIGNIFICANT CHANGE UP (ref 27–34)
MCHC RBC-ENTMCNC: 32.1 G/DL — SIGNIFICANT CHANGE UP (ref 32–36)
MCV RBC AUTO: 93.3 FL — SIGNIFICANT CHANGE UP (ref 80–100)
MONOCYTES # BLD AUTO: 0.7 K/UL — SIGNIFICANT CHANGE UP (ref 0–0.9)
MONOCYTES NFR BLD AUTO: 8.9 % — SIGNIFICANT CHANGE UP (ref 2–14)
NEUTROPHILS # BLD AUTO: 5 K/UL — SIGNIFICANT CHANGE UP (ref 1.8–7.4)
NEUTROPHILS NFR BLD AUTO: 67.5 % — SIGNIFICANT CHANGE UP (ref 43–77)
PLATELET # BLD AUTO: 238 K/UL — SIGNIFICANT CHANGE UP (ref 150–400)
RBC # BLD: 4.47 M/UL — SIGNIFICANT CHANGE UP (ref 3.8–5.2)
RBC # FLD: 14 % — SIGNIFICANT CHANGE UP (ref 10.3–14.5)
WBC # BLD: 7.5 K/UL — SIGNIFICANT CHANGE UP (ref 3.8–10.5)
WBC # FLD AUTO: 7.5 K/UL — SIGNIFICANT CHANGE UP (ref 3.8–10.5)

## 2019-07-15 PROCEDURE — 99214 OFFICE O/P EST MOD 30 MIN: CPT

## 2019-07-15 NOTE — ASSESSMENT
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 Pt to continue on followup imaf=ging and last scan 3 months ago showed no recurrence. Pt had inc abdominal node which is receding. Pt to followup with surgery Dr. Rios. Pt to RTO in 6 months or sooner if needed.

## 2019-07-15 NOTE — HISTORY OF PRESENT ILLNESS
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 History dates back in 2015 when she noted the onset of abdominal pain and gastric reflux symptoms. She also noted weight loss is however a 6 month. She was also found to be anemic and underwent upper endoscopy which revealed a large friable duodenal mass 3.8 cm. The patient underwent pancreatoduodenectomy on November 20. She subsequently healed left hand did undergo rehabilitation treatment. She is doing well but does have occasional abdominal discomfort and pain. [de-identified] : Patient is here for f/u. She feels okay except on an off discomfort to left upper abdomen. She had a repeat Endoscopy/colonoscopy on 03/15/2019 with one benign polyp removed. Denies any constipation, diarrhea, melena or bloody stools.

## 2019-07-17 LAB
ALBUMIN SERPL ELPH-MCNC: 4.4 G/DL
ALP BLD-CCNC: 72 U/L
ALT SERPL-CCNC: 15 U/L
ANION GAP SERPL CALC-SCNC: 17 MMOL/L
AST SERPL-CCNC: 16 U/L
BILIRUB SERPL-MCNC: 0.4 MG/DL
BUN SERPL-MCNC: 16 MG/DL
CALCIUM SERPL-MCNC: 9.6 MG/DL
CEA SERPL-MCNC: 1.7 NG/ML
CHLORIDE SERPL-SCNC: 104 MMOL/L
CO2 SERPL-SCNC: 24 MMOL/L
CREAT SERPL-MCNC: 0.65 MG/DL
GLUCOSE SERPL-MCNC: 93 MG/DL
POTASSIUM SERPL-SCNC: 4.9 MMOL/L
PROT SERPL-MCNC: 6.8 G/DL
SODIUM SERPL-SCNC: 145 MMOL/L

## 2019-07-31 ENCOUNTER — FORM ENCOUNTER (OUTPATIENT)
Age: 79
End: 2019-07-31

## 2019-08-01 ENCOUNTER — APPOINTMENT (OUTPATIENT)
Dept: MAMMOGRAPHY | Facility: IMAGING CENTER | Age: 79
End: 2019-08-01
Payer: MEDICARE

## 2019-08-01 ENCOUNTER — OUTPATIENT (OUTPATIENT)
Dept: OUTPATIENT SERVICES | Facility: HOSPITAL | Age: 79
LOS: 1 days | End: 2019-08-01
Payer: MEDICARE

## 2019-08-01 ENCOUNTER — APPOINTMENT (OUTPATIENT)
Dept: CT IMAGING | Facility: IMAGING CENTER | Age: 79
End: 2019-08-01
Payer: MEDICARE

## 2019-08-01 DIAGNOSIS — Z98.89 OTHER SPECIFIED POSTPROCEDURAL STATES: Chronic | ICD-10-CM

## 2019-08-01 DIAGNOSIS — C17.9 MALIGNANT NEOPLASM OF SMALL INTESTINE, UNSPECIFIED: ICD-10-CM

## 2019-08-01 DIAGNOSIS — Z96.652 PRESENCE OF LEFT ARTIFICIAL KNEE JOINT: Chronic | ICD-10-CM

## 2019-08-01 PROCEDURE — 77067 SCR MAMMO BI INCL CAD: CPT

## 2019-08-01 PROCEDURE — 74177 CT ABD & PELVIS W/CONTRAST: CPT | Mod: 26

## 2019-08-01 PROCEDURE — 77067 SCR MAMMO BI INCL CAD: CPT | Mod: 26

## 2019-08-01 PROCEDURE — 77063 BREAST TOMOSYNTHESIS BI: CPT | Mod: 26

## 2019-08-01 PROCEDURE — 71260 CT THORAX DX C+: CPT | Mod: 26

## 2019-08-01 PROCEDURE — 74177 CT ABD & PELVIS W/CONTRAST: CPT

## 2019-08-01 PROCEDURE — 71260 CT THORAX DX C+: CPT

## 2019-08-01 PROCEDURE — 77063 BREAST TOMOSYNTHESIS BI: CPT

## 2019-08-08 ENCOUNTER — FORM ENCOUNTER (OUTPATIENT)
Age: 79
End: 2019-08-08

## 2019-08-09 ENCOUNTER — OUTPATIENT (OUTPATIENT)
Dept: OUTPATIENT SERVICES | Facility: HOSPITAL | Age: 79
LOS: 1 days | End: 2019-08-09
Payer: MEDICARE

## 2019-08-09 ENCOUNTER — APPOINTMENT (OUTPATIENT)
Dept: ULTRASOUND IMAGING | Facility: IMAGING CENTER | Age: 79
End: 2019-08-09
Payer: MEDICARE

## 2019-08-09 DIAGNOSIS — Z98.89 OTHER SPECIFIED POSTPROCEDURAL STATES: Chronic | ICD-10-CM

## 2019-08-09 DIAGNOSIS — Z00.8 ENCOUNTER FOR OTHER GENERAL EXAMINATION: ICD-10-CM

## 2019-08-09 DIAGNOSIS — Z96.652 PRESENCE OF LEFT ARTIFICIAL KNEE JOINT: Chronic | ICD-10-CM

## 2019-08-09 PROCEDURE — 76642 ULTRASOUND BREAST LIMITED: CPT | Mod: 26,RT

## 2019-08-09 PROCEDURE — 76642 ULTRASOUND BREAST LIMITED: CPT

## 2019-10-28 ENCOUNTER — APPOINTMENT (OUTPATIENT)
Dept: SURGICAL ONCOLOGY | Facility: CLINIC | Age: 79
End: 2019-10-28
Payer: MEDICARE

## 2019-10-28 VITALS
SYSTOLIC BLOOD PRESSURE: 122 MMHG | TEMPERATURE: 98 F | HEART RATE: 68 BPM | RESPIRATION RATE: 16 BRPM | HEIGHT: 60 IN | DIASTOLIC BLOOD PRESSURE: 78 MMHG | BODY MASS INDEX: 38.68 KG/M2 | WEIGHT: 197 LBS

## 2019-10-28 PROCEDURE — 99214 OFFICE O/P EST MOD 30 MIN: CPT

## 2019-10-28 NOTE — PHYSICAL EXAM
[Normal] : supple, no neck mass and thyroid not enlarged [Normal Supraclavicular Lymph Nodes] : normal supraclavicular lymph nodes [Normal Groin Lymph Nodes] : normal groin lymph nodes [Normal] : oriented to person, place and time, with appropriate affect [de-identified] : Healed abdominal incisions but no masses. [FreeTextEntry1] : Deferred [de-identified] : swelling right lower leg worse than left

## 2019-10-28 NOTE — ASSESSMENT
[FreeTextEntry1] : IMP:\par JULIET - hx of duodenal carcinoma s/p Whipple 11/2015\par Stable mesenteric adenopathy on CT\par Swollen right leg\par \par \par Plan:\par RTO q 6 months\par Left humerous xray today\par Venous duplex now\par Bloodwork and CT scans per Dr. Haskins\par EGD 3/2020 (yearly EGD's)\par \par \par \par \par

## 2019-11-01 ENCOUNTER — FORM ENCOUNTER (OUTPATIENT)
Age: 79
End: 2019-11-01

## 2019-11-02 ENCOUNTER — APPOINTMENT (OUTPATIENT)
Dept: ULTRASOUND IMAGING | Facility: IMAGING CENTER | Age: 79
End: 2019-11-02
Payer: MEDICARE

## 2019-11-02 ENCOUNTER — OUTPATIENT (OUTPATIENT)
Dept: OUTPATIENT SERVICES | Facility: HOSPITAL | Age: 79
LOS: 1 days | End: 2019-11-02
Payer: MEDICARE

## 2019-11-02 ENCOUNTER — APPOINTMENT (OUTPATIENT)
Dept: RADIOLOGY | Facility: IMAGING CENTER | Age: 79
End: 2019-11-02
Payer: MEDICARE

## 2019-11-02 DIAGNOSIS — Z96.652 PRESENCE OF LEFT ARTIFICIAL KNEE JOINT: Chronic | ICD-10-CM

## 2019-11-02 DIAGNOSIS — Z98.89 OTHER SPECIFIED POSTPROCEDURAL STATES: Chronic | ICD-10-CM

## 2019-11-02 DIAGNOSIS — C17.9 MALIGNANT NEOPLASM OF SMALL INTESTINE, UNSPECIFIED: ICD-10-CM

## 2019-11-02 DIAGNOSIS — I82.409 ACUTE EMBOLISM AND THROMBOSIS OF UNSPECIFIED DEEP VEINS OF UNSPECIFIED LOWER EXTREMITY: ICD-10-CM

## 2019-11-02 PROCEDURE — 93970 EXTREMITY STUDY: CPT | Mod: 26

## 2019-11-02 PROCEDURE — 73060 X-RAY EXAM OF HUMERUS: CPT

## 2019-11-02 PROCEDURE — 93970 EXTREMITY STUDY: CPT

## 2019-11-02 PROCEDURE — 73060 X-RAY EXAM OF HUMERUS: CPT | Mod: 26,LT

## 2020-01-11 ENCOUNTER — OUTPATIENT (OUTPATIENT)
Dept: OUTPATIENT SERVICES | Facility: HOSPITAL | Age: 80
LOS: 1 days | Discharge: HOME | End: 2020-01-11

## 2020-01-11 DIAGNOSIS — Z98.89 OTHER SPECIFIED POSTPROCEDURAL STATES: Chronic | ICD-10-CM

## 2020-01-11 DIAGNOSIS — C17.0 MALIGNANT NEOPLASM OF DUODENUM: ICD-10-CM

## 2020-01-11 DIAGNOSIS — Z96.652 PRESENCE OF LEFT ARTIFICIAL KNEE JOINT: Chronic | ICD-10-CM

## 2020-01-11 DIAGNOSIS — E78.1 PURE HYPERGLYCERIDEMIA: ICD-10-CM

## 2020-01-21 ENCOUNTER — RESULT REVIEW (OUTPATIENT)
Age: 80
End: 2020-01-21

## 2020-01-21 ENCOUNTER — APPOINTMENT (OUTPATIENT)
Dept: HEMATOLOGY ONCOLOGY | Facility: CLINIC | Age: 80
End: 2020-01-21
Payer: MEDICARE

## 2020-01-21 VITALS
RESPIRATION RATE: 16 BRPM | SYSTOLIC BLOOD PRESSURE: 161 MMHG | BODY MASS INDEX: 38.83 KG/M2 | OXYGEN SATURATION: 95 % | WEIGHT: 198.83 LBS | HEART RATE: 64 BPM | TEMPERATURE: 97.9 F | DIASTOLIC BLOOD PRESSURE: 84 MMHG

## 2020-01-21 DIAGNOSIS — M62.89 OTHER SPECIFIED DISORDERS OF MUSCLE: ICD-10-CM

## 2020-01-21 LAB
BASOPHILS # BLD AUTO: 0 K/UL — SIGNIFICANT CHANGE UP (ref 0–0.2)
BASOPHILS NFR BLD AUTO: 0.5 % — SIGNIFICANT CHANGE UP (ref 0–2)
EOSINOPHIL # BLD AUTO: 0.1 K/UL — SIGNIFICANT CHANGE UP (ref 0–0.5)
EOSINOPHIL NFR BLD AUTO: 1.7 % — SIGNIFICANT CHANGE UP (ref 0–6)
HCT VFR BLD CALC: 40.2 % — SIGNIFICANT CHANGE UP (ref 34.5–45)
HGB BLD-MCNC: 13.6 G/DL — SIGNIFICANT CHANGE UP (ref 11.5–15.5)
LYMPHOCYTES # BLD AUTO: 2 K/UL — SIGNIFICANT CHANGE UP (ref 1–3.3)
LYMPHOCYTES # BLD AUTO: 23.5 % — SIGNIFICANT CHANGE UP (ref 13–44)
MCHC RBC-ENTMCNC: 31.4 PG — SIGNIFICANT CHANGE UP (ref 27–34)
MCHC RBC-ENTMCNC: 33.8 G/DL — SIGNIFICANT CHANGE UP (ref 32–36)
MCV RBC AUTO: 92.8 FL — SIGNIFICANT CHANGE UP (ref 80–100)
MONOCYTES # BLD AUTO: 0.7 K/UL — SIGNIFICANT CHANGE UP (ref 0–0.9)
MONOCYTES NFR BLD AUTO: 8.5 % — SIGNIFICANT CHANGE UP (ref 2–14)
NEUTROPHILS # BLD AUTO: 5.6 K/UL — SIGNIFICANT CHANGE UP (ref 1.8–7.4)
NEUTROPHILS NFR BLD AUTO: 65.8 % — SIGNIFICANT CHANGE UP (ref 43–77)
PLATELET # BLD AUTO: 261 K/UL — SIGNIFICANT CHANGE UP (ref 150–400)
RBC # BLD: 4.33 M/UL — SIGNIFICANT CHANGE UP (ref 3.8–5.2)
RBC # FLD: 12.8 % — SIGNIFICANT CHANGE UP (ref 10.3–14.5)
WBC # BLD: 8.5 K/UL — SIGNIFICANT CHANGE UP (ref 3.8–10.5)
WBC # FLD AUTO: 8.5 K/UL — SIGNIFICANT CHANGE UP (ref 3.8–10.5)

## 2020-01-21 PROCEDURE — 99214 OFFICE O/P EST MOD 30 MIN: CPT

## 2020-01-21 NOTE — PHYSICAL EXAM
[Restricted in physically strenuous activity but ambulatory and able to carry out work of a light or sedentary nature] : Status 1- Restricted in physically strenuous activity but ambulatory and able to carry out work of a light or sedentary nature, e.g., light house work, office work [Normal] : grossly intact [de-identified] : prominent left biceps muscle, ?cyst, lipoma

## 2020-01-21 NOTE — HISTORY OF PRESENT ILLNESS
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 History dates back in 2015 when she noted the onset of abdominal pain and gastric reflux symptoms. She also noted weight loss is however a 6 month. She was also found to be anemic and underwent upper endoscopy which revealed a large friable duodenal mass 3.8 cm. The patient underwent pancreatoduodenectomy on November 20. She subsequently healed well and  did undergo rehabilitation treatment. She is doing well but does have occasional abdominal discomfort and pain. [de-identified] : Since the last visit the pt has had LUQ abdomen on and off lasting 5 minutes described as tightening pain. Pt fell x 4 going upstairs and pt had electric stairs put in. pt has lumpin left forearm.

## 2020-01-21 NOTE — REVIEW OF SYSTEMS
[SOB on Exertion] : shortness of breath during exertion [Negative] : Allergic/Immunologic [FreeTextEntry6] : pt saw Pulmonary for SOB,pt on inhaler [FreeTextEntry7] : hand colonoscopyas endoscopy  [FreeTextEntry9] : has low back pain.

## 2020-01-26 LAB
ALBUMIN SERPL ELPH-MCNC: 4.2 G/DL
ALP BLD-CCNC: 87 U/L
ALT SERPL-CCNC: 19 U/L
ANION GAP SERPL CALC-SCNC: 13 MMOL/L
AST SERPL-CCNC: 20 U/L
BILIRUB SERPL-MCNC: 0.3 MG/DL
BUN SERPL-MCNC: 29 MG/DL
CALCIUM SERPL-MCNC: 9.8 MG/DL
CANCER AG19-9 SERPL-ACNC: 10 U/ML
CEA SERPL-MCNC: 1.8 NG/ML
CHLORIDE SERPL-SCNC: 100 MMOL/L
CO2 SERPL-SCNC: 29 MMOL/L
CREAT SERPL-MCNC: 0.8 MG/DL
GLUCOSE SERPL-MCNC: 91 MG/DL
POTASSIUM SERPL-SCNC: 4.5 MMOL/L
PROT SERPL-MCNC: 6.7 G/DL
SODIUM SERPL-SCNC: 142 MMOL/L

## 2020-01-30 ENCOUNTER — FORM ENCOUNTER (OUTPATIENT)
Age: 80
End: 2020-01-30

## 2020-01-31 ENCOUNTER — OUTPATIENT (OUTPATIENT)
Dept: OUTPATIENT SERVICES | Facility: HOSPITAL | Age: 80
LOS: 1 days | End: 2020-01-31
Payer: MEDICARE

## 2020-01-31 ENCOUNTER — APPOINTMENT (OUTPATIENT)
Dept: ULTRASOUND IMAGING | Facility: IMAGING CENTER | Age: 80
End: 2020-01-31
Payer: MEDICARE

## 2020-01-31 DIAGNOSIS — Z96.652 PRESENCE OF LEFT ARTIFICIAL KNEE JOINT: Chronic | ICD-10-CM

## 2020-01-31 DIAGNOSIS — C17.9 MALIGNANT NEOPLASM OF SMALL INTESTINE, UNSPECIFIED: ICD-10-CM

## 2020-01-31 DIAGNOSIS — Z98.89 OTHER SPECIFIED POSTPROCEDURAL STATES: Chronic | ICD-10-CM

## 2020-01-31 DIAGNOSIS — M62.89 OTHER SPECIFIED DISORDERS OF MUSCLE: ICD-10-CM

## 2020-01-31 PROCEDURE — 76882 US LMTD JT/FCL EVL NVASC XTR: CPT | Mod: 26,LT

## 2020-01-31 PROCEDURE — 76882 US LMTD JT/FCL EVL NVASC XTR: CPT

## 2020-05-04 ENCOUNTER — APPOINTMENT (OUTPATIENT)
Dept: SURGICAL ONCOLOGY | Facility: CLINIC | Age: 80
End: 2020-05-04

## 2020-07-16 ENCOUNTER — OUTPATIENT (OUTPATIENT)
Dept: OUTPATIENT SERVICES | Facility: HOSPITAL | Age: 80
LOS: 1 days | Discharge: HOME | End: 2020-07-16

## 2020-07-16 DIAGNOSIS — Z98.89 OTHER SPECIFIED POSTPROCEDURAL STATES: Chronic | ICD-10-CM

## 2020-07-16 DIAGNOSIS — Z96.652 PRESENCE OF LEFT ARTIFICIAL KNEE JOINT: Chronic | ICD-10-CM

## 2020-07-16 DIAGNOSIS — C17.0 MALIGNANT NEOPLASM OF DUODENUM: ICD-10-CM

## 2020-07-20 ENCOUNTER — APPOINTMENT (OUTPATIENT)
Dept: HEMATOLOGY ONCOLOGY | Facility: CLINIC | Age: 80
End: 2020-07-20

## 2020-07-21 ENCOUNTER — APPOINTMENT (OUTPATIENT)
Dept: HEMATOLOGY ONCOLOGY | Facility: CLINIC | Age: 80
End: 2020-07-21
Payer: MEDICARE

## 2020-07-21 PROCEDURE — 99442: CPT | Mod: 95

## 2020-07-21 NOTE — PHYSICAL EXAM
[Restricted in physically strenuous activity but ambulatory and able to carry out work of a light or sedentary nature] : Status 1- Restricted in physically strenuous activity but ambulatory and able to carry out work of a light or sedentary nature, e.g., light house work, office work [Normal] : affect appropriate [de-identified] : prominent left biceps muscle, ?cyst, lipoma

## 2020-07-21 NOTE — ASSESSMENT
[Palliative Care Plan] : not applicable at this time [Curative] : Goals of care discussed with patient: Curative [FreeTextEntry1] :  Pt will continue surveillance for small intestinal cancer. Pt is due to repeat Endoscopy with GI MD, f/u. Ordered repeat labs, f/u.  Ordered repeat CT scans due in Aug 2020, f/u. Pt to continue with Medical, GI and GYN examinations and followup. Pt to RTO in 6 months or sooner if needed.

## 2020-07-21 NOTE — HISTORY OF PRESENT ILLNESS
[Home] : at home, [unfilled] , at the time of the visit. 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 History dates back in 2015 when she noted the onset of abdominal pain and gastric reflux symptoms. She also noted weight loss is however a 6 month. She was also found to be anemic and underwent upper endoscopy which revealed a large friable duodenal mass 3.8 cm. The patient underwent pancreatoduodenectomy on November 20. She subsequently healed well and  did undergo rehabilitation treatment. She is doing well but does have occasional abdominal discomfort and pain. [de-identified] : Patient has agreed to telephone visit. She states that she feels fine. Denies any pain to stomach. She is due to repeat her endoscopy soon. Denies any weight loss, vomiting or nausea. Appetite is fine. The swelling to her left forearm has improved and soften. Pt has ankle swelling and she was given water pill by her PCP. A venous doppler of lower extremities was done and is negative for DVT. \par

## 2020-07-21 NOTE — REVIEW OF SYSTEMS
[SOB on Exertion] : shortness of breath during exertion [Negative] : Allergic/Immunologic [FreeTextEntry7] : hand colonoscopyas endoscopy  [FreeTextEntry9] : has low back pain. [FreeTextEntry6] : pt saw Pulmonary for SOB,pt on inhaler

## 2020-07-27 ENCOUNTER — APPOINTMENT (OUTPATIENT)
Dept: SURGICAL ONCOLOGY | Facility: CLINIC | Age: 80
End: 2020-07-27
Payer: MEDICARE

## 2020-07-27 VITALS
HEART RATE: 79 BPM | BODY MASS INDEX: 38.28 KG/M2 | OXYGEN SATURATION: 96 % | WEIGHT: 195 LBS | HEIGHT: 60 IN | RESPIRATION RATE: 16 BRPM

## 2020-07-27 PROCEDURE — 99214 OFFICE O/P EST MOD 30 MIN: CPT

## 2020-07-27 NOTE — ASSESSMENT
[FreeTextEntry1] : IMP:\par JULIET - hx of duodenal carcinoma s/p Whipple 11/2015\par CT C/A/P  8/2/19- No evidence of abdominopelvic disease\par \par Plan:\par RTO q 6 months\par Bloodwork and CT scans per Dr. Haskins\par EGD overdue from March 2020 (yearly EGD's)\par \par \par \par \par

## 2020-07-27 NOTE — HISTORY OF PRESENT ILLNESS
[de-identified] : Yandy is an 79 year old female presents for a duodenal  cancer follow up visit. \par \par Past Hx:\par Duodenal CA with positive nodes; s/p Whipple Procedure in 11/2015; completed Xeloda regimen in 7/2016 under the supervision of Dr. Ventura ([facility]).   \par \par F/U CT C/A/P 8/2/19- No evidence of abdominopelvic disease\par \par Bloodwork 1/2020: \par CEA: 1.8 ng/mL\par LFTs WNL\par CA 19-9: 10 U/mL\par \par Endoscopy by Dr. Jha on 2/14/18 which was without any signs of carcinoma.  \par \par Patient states she had an endoscopy/colonoscopy with Dr. Velarde on 3/15/19.  Patient states she was told she no longer requires colonoscopies however she should have yearly endoscopies. \par \par EGD 3/2019- Moderate gastritis, mild reflux esophagitis, s/p gastrojejunostomy, small hiatal hernia.  \par \par Denies abdominal pain, nausea, vomiting, changes in bowel habits. Good appetite. \par \par Internist: Dr. SEBAS Vazquez ([place], [place])\par GI: Dr. Velarde

## 2020-07-27 NOTE — PHYSICAL EXAM
[Normal] : supple, no neck mass and thyroid not enlarged [Normal Supraclavicular Lymph Nodes] : normal supraclavicular lymph nodes [Normal Groin Lymph Nodes] : normal groin lymph nodes [Normal] : oriented to person, place and time, with appropriate affect [de-identified] : Healed abdominal incisions but no masses. [FreeTextEntry1] : Deferred

## 2020-08-04 ENCOUNTER — OUTPATIENT (OUTPATIENT)
Dept: OUTPATIENT SERVICES | Facility: HOSPITAL | Age: 80
LOS: 1 days | End: 2020-08-04
Payer: MEDICARE

## 2020-08-04 ENCOUNTER — APPOINTMENT (OUTPATIENT)
Dept: CT IMAGING | Facility: IMAGING CENTER | Age: 80
End: 2020-08-04
Payer: MEDICARE

## 2020-08-04 ENCOUNTER — RESULT REVIEW (OUTPATIENT)
Age: 80
End: 2020-08-04

## 2020-08-04 ENCOUNTER — APPOINTMENT (OUTPATIENT)
Dept: HEMATOLOGY ONCOLOGY | Facility: CLINIC | Age: 80
End: 2020-08-04

## 2020-08-04 DIAGNOSIS — Z98.89 OTHER SPECIFIED POSTPROCEDURAL STATES: Chronic | ICD-10-CM

## 2020-08-04 DIAGNOSIS — Z96.652 PRESENCE OF LEFT ARTIFICIAL KNEE JOINT: Chronic | ICD-10-CM

## 2020-08-04 DIAGNOSIS — I71.2 THORACIC AORTIC ANEURYSM, WITHOUT RUPTURE: ICD-10-CM

## 2020-08-04 LAB
BASOPHILS # BLD AUTO: 0.02 K/UL — SIGNIFICANT CHANGE UP (ref 0–0.2)
BASOPHILS NFR BLD AUTO: 0.3 % — SIGNIFICANT CHANGE UP (ref 0–2)
EOSINOPHIL # BLD AUTO: 0.1 K/UL — SIGNIFICANT CHANGE UP (ref 0–0.5)
EOSINOPHIL NFR BLD AUTO: 1.5 % — SIGNIFICANT CHANGE UP (ref 0–6)
HCT VFR BLD CALC: 36.1 % — SIGNIFICANT CHANGE UP (ref 34.5–45)
HGB BLD-MCNC: 11.8 G/DL — SIGNIFICANT CHANGE UP (ref 11.5–15.5)
IMM GRANULOCYTES NFR BLD AUTO: 0.5 % — SIGNIFICANT CHANGE UP (ref 0–1.5)
LYMPHOCYTES # BLD AUTO: 1.26 K/UL — SIGNIFICANT CHANGE UP (ref 1–3.3)
LYMPHOCYTES # BLD AUTO: 19.4 % — SIGNIFICANT CHANGE UP (ref 13–44)
MCHC RBC-ENTMCNC: 28 PG — SIGNIFICANT CHANGE UP (ref 27–34)
MCHC RBC-ENTMCNC: 32.7 GM/DL — SIGNIFICANT CHANGE UP (ref 32–36)
MCV RBC AUTO: 85.5 FL — SIGNIFICANT CHANGE UP (ref 80–100)
MONOCYTES # BLD AUTO: 0.52 K/UL — SIGNIFICANT CHANGE UP (ref 0–0.9)
MONOCYTES NFR BLD AUTO: 8 % — SIGNIFICANT CHANGE UP (ref 2–14)
NEUTROPHILS # BLD AUTO: 4.56 K/UL — SIGNIFICANT CHANGE UP (ref 1.8–7.4)
NEUTROPHILS NFR BLD AUTO: 70.3 % — SIGNIFICANT CHANGE UP (ref 43–77)
NRBC # BLD: 0 /100 WBCS — SIGNIFICANT CHANGE UP (ref 0–0)
PLATELET # BLD AUTO: 216 K/UL — SIGNIFICANT CHANGE UP (ref 150–400)
RBC # BLD: 4.22 M/UL — SIGNIFICANT CHANGE UP (ref 3.8–5.2)
RBC # FLD: 17.3 % — HIGH (ref 10.3–14.5)
WBC # BLD: 6.49 K/UL — SIGNIFICANT CHANGE UP (ref 3.8–10.5)
WBC # FLD AUTO: 6.49 K/UL — SIGNIFICANT CHANGE UP (ref 3.8–10.5)

## 2020-08-04 PROCEDURE — 82565 ASSAY OF CREATININE: CPT

## 2020-08-04 PROCEDURE — 71260 CT THORAX DX C+: CPT | Mod: 26

## 2020-08-04 PROCEDURE — 74177 CT ABD & PELVIS W/CONTRAST: CPT | Mod: 26

## 2020-08-04 PROCEDURE — 71260 CT THORAX DX C+: CPT

## 2020-08-04 PROCEDURE — 74177 CT ABD & PELVIS W/CONTRAST: CPT

## 2020-08-11 LAB
ALBUMIN SERPL ELPH-MCNC: 4.3 G/DL
ALP BLD-CCNC: 84 U/L
ALT SERPL-CCNC: 14 U/L
ANION GAP SERPL CALC-SCNC: 13 MMOL/L
AST SERPL-CCNC: 17 U/L
BILIRUB SERPL-MCNC: 0.4 MG/DL
BUN SERPL-MCNC: 19 MG/DL
CALCIUM SERPL-MCNC: 9.8 MG/DL
CEA SERPL-MCNC: 1.8 NG/ML
CHLORIDE SERPL-SCNC: 101 MMOL/L
CO2 SERPL-SCNC: 29 MMOL/L
CREAT SERPL-MCNC: 0.69 MG/DL
GLUCOSE SERPL-MCNC: 100 MG/DL
MAGNESIUM SERPL-MCNC: 2.1 MG/DL
POTASSIUM SERPL-SCNC: 4.1 MMOL/L
PROT SERPL-MCNC: 6.6 G/DL
SODIUM SERPL-SCNC: 144 MMOL/L

## 2020-08-13 ENCOUNTER — APPOINTMENT (OUTPATIENT)
Dept: HEMATOLOGY ONCOLOGY | Facility: CLINIC | Age: 80
End: 2020-08-13
Payer: MEDICARE

## 2020-08-13 PROCEDURE — 99441: CPT | Mod: 95

## 2021-01-22 ENCOUNTER — OUTPATIENT (OUTPATIENT)
Dept: OUTPATIENT SERVICES | Facility: HOSPITAL | Age: 81
LOS: 1 days | Discharge: HOME | End: 2021-01-22

## 2021-01-22 DIAGNOSIS — Z98.89 OTHER SPECIFIED POSTPROCEDURAL STATES: Chronic | ICD-10-CM

## 2021-01-22 DIAGNOSIS — C17.0 MALIGNANT NEOPLASM OF DUODENUM: ICD-10-CM

## 2021-01-22 DIAGNOSIS — Z96.652 PRESENCE OF LEFT ARTIFICIAL KNEE JOINT: Chronic | ICD-10-CM

## 2021-01-25 ENCOUNTER — APPOINTMENT (OUTPATIENT)
Dept: HEMATOLOGY ONCOLOGY | Facility: CLINIC | Age: 81
End: 2021-01-25
Payer: MEDICARE

## 2021-01-25 PROCEDURE — 99443: CPT | Mod: 95

## 2021-01-25 NOTE — REASON FOR VISIT
[Home] : at home, [unfilled] , at the time of the visit. [Medical Office: ([facility])___] : at the medical office located in

## 2021-01-25 NOTE — HISTORY OF PRESENT ILLNESS
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 History dates back in 2015 when she noted the onset of abdominal pain and gastric reflux symptoms. She also noted weight loss is however a 6 month. She was also found to be anemic and underwent upper endoscopy which revealed a large friable duodenal mass 3.8 cm. The patient underwent pancreatoduodenectomy on November 20. She subsequently healed well and  did undergo rehabilitation treatment. She is doing well but does have occasional abdominal discomfort and pain. [de-identified] : Since the last visit the pt remains well and has no symptoms indicating recurrent disease.Pt has 1 week of abdominal pain and inc gas and now resolved.

## 2021-01-25 NOTE — REVIEW OF SYSTEMS
[Fatigue] : fatigue [Joint Stiffness] : joint stiffness [Negative] : Allergic/Immunologic [FreeTextEntry3] : s [FreeTextEntry4] : sinus congestion [FreeTextEntry7] : occ heartburn on therapy [FreeTextEntry8] : has dropped bladder and has urinary incontinence

## 2021-01-25 NOTE — ASSESSMENT
[FreeTextEntry1] : Pt to continue on surveoillance for her stage III SB ca a/p xeloda chemo. pt to do mammograms and sonogram Pt is 5 yrs since diagnosis and will do last Ct scan in 8/21. Pt to do /medical, GI, GYN followup and testing. Pt to RTo in 6 months or sooner if needed. [Curative] : Goals of care discussed with patient: Curative [Palliative Care Plan] : not applicable at this time

## 2021-02-22 ENCOUNTER — APPOINTMENT (OUTPATIENT)
Dept: HEMATOLOGY ONCOLOGY | Facility: CLINIC | Age: 81
End: 2021-02-22

## 2021-03-22 ENCOUNTER — OUTPATIENT (OUTPATIENT)
Dept: OUTPATIENT SERVICES | Facility: HOSPITAL | Age: 81
LOS: 1 days | Discharge: HOME | End: 2021-03-22

## 2021-03-22 DIAGNOSIS — C17.0 MALIGNANT NEOPLASM OF DUODENUM: ICD-10-CM

## 2021-03-22 DIAGNOSIS — Z98.89 OTHER SPECIFIED POSTPROCEDURAL STATES: Chronic | ICD-10-CM

## 2021-03-22 DIAGNOSIS — Z96.652 PRESENCE OF LEFT ARTIFICIAL KNEE JOINT: Chronic | ICD-10-CM

## 2021-03-26 ENCOUNTER — APPOINTMENT (OUTPATIENT)
Dept: HEMATOLOGY ONCOLOGY | Facility: CLINIC | Age: 81
End: 2021-03-26

## 2021-04-21 ENCOUNTER — RESULT REVIEW (OUTPATIENT)
Age: 81
End: 2021-04-21

## 2021-04-21 ENCOUNTER — APPOINTMENT (OUTPATIENT)
Dept: HEMATOLOGY ONCOLOGY | Facility: CLINIC | Age: 81
End: 2021-04-21
Payer: MEDICARE

## 2021-04-21 VITALS
OXYGEN SATURATION: 95 % | RESPIRATION RATE: 16 BRPM | DIASTOLIC BLOOD PRESSURE: 78 MMHG | WEIGHT: 191.8 LBS | HEART RATE: 76 BPM | BODY MASS INDEX: 37.66 KG/M2 | TEMPERATURE: 97.3 F | HEIGHT: 60 IN | SYSTOLIC BLOOD PRESSURE: 223 MMHG

## 2021-04-21 LAB
BASOPHILS # BLD AUTO: 0.02 K/UL — SIGNIFICANT CHANGE UP (ref 0–0.2)
BASOPHILS NFR BLD AUTO: 0.3 % — SIGNIFICANT CHANGE UP (ref 0–2)
EOSINOPHIL # BLD AUTO: 0.14 K/UL — SIGNIFICANT CHANGE UP (ref 0–0.5)
EOSINOPHIL NFR BLD AUTO: 1.9 % — SIGNIFICANT CHANGE UP (ref 0–6)
HCT VFR BLD CALC: 38.4 % — SIGNIFICANT CHANGE UP (ref 34.5–45)
HGB BLD-MCNC: 12.7 G/DL — SIGNIFICANT CHANGE UP (ref 11.5–15.5)
IMM GRANULOCYTES NFR BLD AUTO: 0.4 % — SIGNIFICANT CHANGE UP (ref 0–1.5)
LYMPHOCYTES # BLD AUTO: 1.55 K/UL — SIGNIFICANT CHANGE UP (ref 1–3.3)
LYMPHOCYTES # BLD AUTO: 21 % — SIGNIFICANT CHANGE UP (ref 13–44)
MCHC RBC-ENTMCNC: 30.7 PG — SIGNIFICANT CHANGE UP (ref 27–34)
MCHC RBC-ENTMCNC: 33.1 G/DL — SIGNIFICANT CHANGE UP (ref 32–36)
MCV RBC AUTO: 92.8 FL — SIGNIFICANT CHANGE UP (ref 80–100)
MONOCYTES # BLD AUTO: 0.57 K/UL — SIGNIFICANT CHANGE UP (ref 0–0.9)
MONOCYTES NFR BLD AUTO: 7.7 % — SIGNIFICANT CHANGE UP (ref 2–14)
NEUTROPHILS # BLD AUTO: 5.06 K/UL — SIGNIFICANT CHANGE UP (ref 1.8–7.4)
NEUTROPHILS NFR BLD AUTO: 68.7 % — SIGNIFICANT CHANGE UP (ref 43–77)
NRBC # BLD: 0 /100 WBCS — SIGNIFICANT CHANGE UP (ref 0–0)
PLATELET # BLD AUTO: 221 K/UL — SIGNIFICANT CHANGE UP (ref 150–400)
RBC # BLD: 4.14 M/UL — SIGNIFICANT CHANGE UP (ref 3.8–5.2)
RBC # FLD: 14 % — SIGNIFICANT CHANGE UP (ref 10.3–14.5)
WBC # BLD: 7.37 K/UL — SIGNIFICANT CHANGE UP (ref 3.8–10.5)
WBC # FLD AUTO: 7.37 K/UL — SIGNIFICANT CHANGE UP (ref 3.8–10.5)

## 2021-04-21 PROCEDURE — 99214 OFFICE O/P EST MOD 30 MIN: CPT

## 2021-04-21 NOTE — HISTORY OF PRESENT ILLNESS
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 History dates back in 2015 when she noted the onset of abdominal pain and gastric reflux symptoms. She also noted weight loss is however a 6 month. She was also found to be anemic and underwent upper endoscopy which revealed a large friable duodenal mass 3.8 cm. The patient underwent pancreatoduodenectomy on November 20. She subsequently healed well and  did undergo rehabilitation treatment. She is doing well but does have occasional abdominal discomfort and pain. [de-identified] : Since the last visit the pt has developed upper abdominal pain with no nausea or emesis. Pt is 6 yrs since rx

## 2021-04-21 NOTE — ASSESSMENT
[Curative] : Goals of care discussed with patient: Curative [Palliative Care Plan] : not applicable at this time [FreeTextEntry1] : Pt to do CT abdomen pelvis for  upper abdominal pain r/ recurrence vs ulcer. Pt to see GI for upper endoscopy. Pt to do doppler for right leg DVT and has inc leg swelling. Pt to continue Medical followup and . Pt to RTO in year or soner if needed.

## 2021-04-21 NOTE — PHYSICAL EXAM
[Restricted in physically strenuous activity but ambulatory and able to carry out work of a light or sedentary nature] : Status 1- Restricted in physically strenuous activity but ambulatory and able to carry out work of a light or sedentary nature, e.g., light house work, office work [Normal] : affect appropriate [de-identified] : has 3 cm left bicep lipoma post US

## 2021-04-26 LAB
ALBUMIN SERPL ELPH-MCNC: 4.5 G/DL
ALP BLD-CCNC: 68 U/L
ALT SERPL-CCNC: 13 U/L
ANION GAP SERPL CALC-SCNC: 9 MMOL/L
AST SERPL-CCNC: 16 U/L
BILIRUB SERPL-MCNC: 0.4 MG/DL
BUN SERPL-MCNC: 20 MG/DL
CALCIUM SERPL-MCNC: 10.2 MG/DL
CANCER AG19-9 SERPL-ACNC: 7 U/ML
CEA SERPL-MCNC: 1.6 NG/ML
CHLORIDE SERPL-SCNC: 101 MMOL/L
CO2 SERPL-SCNC: 32 MMOL/L
CREAT SERPL-MCNC: 0.78 MG/DL
GLUCOSE SERPL-MCNC: 97 MG/DL
POTASSIUM SERPL-SCNC: 5.3 MMOL/L
PROT SERPL-MCNC: 7 G/DL
SODIUM SERPL-SCNC: 141 MMOL/L

## 2021-04-28 ENCOUNTER — RESULT REVIEW (OUTPATIENT)
Age: 81
End: 2021-04-28

## 2021-04-30 ENCOUNTER — RESULT REVIEW (OUTPATIENT)
Age: 81
End: 2021-04-30

## 2021-04-30 ENCOUNTER — OUTPATIENT (OUTPATIENT)
Dept: OUTPATIENT SERVICES | Facility: HOSPITAL | Age: 81
LOS: 1 days | End: 2021-04-30
Payer: MEDICARE

## 2021-04-30 ENCOUNTER — APPOINTMENT (OUTPATIENT)
Dept: MAMMOGRAPHY | Facility: IMAGING CENTER | Age: 81
End: 2021-04-30
Payer: MEDICARE

## 2021-04-30 ENCOUNTER — APPOINTMENT (OUTPATIENT)
Dept: ULTRASOUND IMAGING | Facility: IMAGING CENTER | Age: 81
End: 2021-04-30
Payer: MEDICARE

## 2021-04-30 DIAGNOSIS — Z96.652 PRESENCE OF LEFT ARTIFICIAL KNEE JOINT: Chronic | ICD-10-CM

## 2021-04-30 DIAGNOSIS — Z98.89 OTHER SPECIFIED POSTPROCEDURAL STATES: Chronic | ICD-10-CM

## 2021-04-30 DIAGNOSIS — Z00.8 ENCOUNTER FOR OTHER GENERAL EXAMINATION: ICD-10-CM

## 2021-04-30 PROCEDURE — 77063 BREAST TOMOSYNTHESIS BI: CPT | Mod: 26

## 2021-04-30 PROCEDURE — 76641 ULTRASOUND BREAST COMPLETE: CPT

## 2021-04-30 PROCEDURE — 77067 SCR MAMMO BI INCL CAD: CPT | Mod: 26

## 2021-04-30 PROCEDURE — 77067 SCR MAMMO BI INCL CAD: CPT

## 2021-04-30 PROCEDURE — 76641 ULTRASOUND BREAST COMPLETE: CPT | Mod: 26,50

## 2021-04-30 PROCEDURE — 77063 BREAST TOMOSYNTHESIS BI: CPT

## 2021-05-03 ENCOUNTER — OUTPATIENT (OUTPATIENT)
Dept: OUTPATIENT SERVICES | Facility: HOSPITAL | Age: 81
LOS: 1 days | End: 2021-05-03
Payer: MEDICARE

## 2021-05-03 ENCOUNTER — APPOINTMENT (OUTPATIENT)
Dept: ULTRASOUND IMAGING | Facility: IMAGING CENTER | Age: 81
End: 2021-05-03
Payer: MEDICARE

## 2021-05-03 ENCOUNTER — APPOINTMENT (OUTPATIENT)
Dept: CT IMAGING | Facility: IMAGING CENTER | Age: 81
End: 2021-05-03
Payer: MEDICARE

## 2021-05-03 DIAGNOSIS — C17.9 MALIGNANT NEOPLASM OF SMALL INTESTINE, UNSPECIFIED: ICD-10-CM

## 2021-05-03 DIAGNOSIS — Z96.652 PRESENCE OF LEFT ARTIFICIAL KNEE JOINT: Chronic | ICD-10-CM

## 2021-05-03 DIAGNOSIS — I82.409 ACUTE EMBOLISM AND THROMBOSIS OF UNSPECIFIED DEEP VEINS OF UNSPECIFIED LOWER EXTREMITY: ICD-10-CM

## 2021-05-03 DIAGNOSIS — Z98.89 OTHER SPECIFIED POSTPROCEDURAL STATES: Chronic | ICD-10-CM

## 2021-05-03 PROCEDURE — 76882 US LMTD JT/FCL EVL NVASC XTR: CPT | Mod: 26,LT

## 2021-05-03 PROCEDURE — G1004: CPT

## 2021-05-03 PROCEDURE — 93971 EXTREMITY STUDY: CPT | Mod: 26,RT

## 2021-05-03 PROCEDURE — 76882 US LMTD JT/FCL EVL NVASC XTR: CPT

## 2021-05-03 PROCEDURE — 74177 CT ABD & PELVIS W/CONTRAST: CPT

## 2021-05-03 PROCEDURE — 74177 CT ABD & PELVIS W/CONTRAST: CPT | Mod: 26,MG

## 2021-05-03 PROCEDURE — 93971 EXTREMITY STUDY: CPT

## 2021-05-05 ENCOUNTER — OUTPATIENT (OUTPATIENT)
Dept: OUTPATIENT SERVICES | Facility: HOSPITAL | Age: 81
LOS: 1 days | End: 2021-05-05
Payer: MEDICARE

## 2021-05-05 ENCOUNTER — RESULT REVIEW (OUTPATIENT)
Age: 81
End: 2021-05-05

## 2021-05-05 ENCOUNTER — APPOINTMENT (OUTPATIENT)
Dept: MAMMOGRAPHY | Facility: IMAGING CENTER | Age: 81
End: 2021-05-05
Payer: MEDICARE

## 2021-05-05 ENCOUNTER — APPOINTMENT (OUTPATIENT)
Dept: MAMMOGRAPHY | Facility: IMAGING CENTER | Age: 81
End: 2021-05-05

## 2021-05-05 DIAGNOSIS — Z96.652 PRESENCE OF LEFT ARTIFICIAL KNEE JOINT: Chronic | ICD-10-CM

## 2021-05-05 DIAGNOSIS — Z98.89 OTHER SPECIFIED POSTPROCEDURAL STATES: Chronic | ICD-10-CM

## 2021-05-05 DIAGNOSIS — Z00.8 ENCOUNTER FOR OTHER GENERAL EXAMINATION: ICD-10-CM

## 2021-05-05 PROCEDURE — 88305 TISSUE EXAM BY PATHOLOGIST: CPT | Mod: 26

## 2021-05-05 PROCEDURE — 88305 TISSUE EXAM BY PATHOLOGIST: CPT

## 2021-05-05 PROCEDURE — 19081 BX BREAST 1ST LESION STRTCTC: CPT | Mod: LT

## 2021-05-05 PROCEDURE — 77065 DX MAMMO INCL CAD UNI: CPT | Mod: 26,LT

## 2021-05-05 PROCEDURE — 77065 DX MAMMO INCL CAD UNI: CPT | Mod: 26,LT,77

## 2021-05-05 PROCEDURE — G0279: CPT | Mod: 26

## 2021-05-05 PROCEDURE — G0279: CPT

## 2021-05-05 PROCEDURE — 77065 DX MAMMO INCL CAD UNI: CPT

## 2021-05-05 PROCEDURE — 19081 BX BREAST 1ST LESION STRTCTC: CPT

## 2021-07-15 ENCOUNTER — OUTPATIENT (OUTPATIENT)
Dept: OUTPATIENT SERVICES | Facility: HOSPITAL | Age: 81
LOS: 1 days | Discharge: HOME | End: 2021-07-15

## 2021-07-15 DIAGNOSIS — Z96.652 PRESENCE OF LEFT ARTIFICIAL KNEE JOINT: Chronic | ICD-10-CM

## 2021-07-15 DIAGNOSIS — C17.0 MALIGNANT NEOPLASM OF DUODENUM: ICD-10-CM

## 2021-07-15 DIAGNOSIS — Z98.89 OTHER SPECIFIED POSTPROCEDURAL STATES: Chronic | ICD-10-CM

## 2021-07-16 ENCOUNTER — OUTPATIENT (OUTPATIENT)
Dept: OUTPATIENT SERVICES | Facility: HOSPITAL | Age: 81
LOS: 1 days | Discharge: HOME | End: 2021-07-16

## 2021-07-16 DIAGNOSIS — Z98.89 OTHER SPECIFIED POSTPROCEDURAL STATES: Chronic | ICD-10-CM

## 2021-07-16 DIAGNOSIS — C17.0 MALIGNANT NEOPLASM OF DUODENUM: ICD-10-CM

## 2021-07-16 DIAGNOSIS — Z96.652 PRESENCE OF LEFT ARTIFICIAL KNEE JOINT: Chronic | ICD-10-CM

## 2021-07-19 ENCOUNTER — APPOINTMENT (OUTPATIENT)
Dept: HEMATOLOGY ONCOLOGY | Facility: CLINIC | Age: 81
End: 2021-07-19

## 2021-08-04 ENCOUNTER — APPOINTMENT (OUTPATIENT)
Dept: PULMONOLOGY | Facility: CLINIC | Age: 81
End: 2021-08-04
Payer: MEDICARE

## 2021-08-04 ENCOUNTER — APPOINTMENT (OUTPATIENT)
Dept: PULMONOLOGY | Facility: CLINIC | Age: 81
End: 2021-08-04

## 2021-08-04 DIAGNOSIS — U07.1 COVID-19: ICD-10-CM

## 2021-08-04 DIAGNOSIS — C17.9 MALIGNANT NEOPLASM OF SMALL INTESTINE, UNSPECIFIED: ICD-10-CM

## 2021-08-04 DIAGNOSIS — I82.409 ACUTE EMBOLISM AND THROMBOSIS OF UNSPECIFIED DEEP VEINS OF UNSPECIFIED LOWER EXTREMITY: ICD-10-CM

## 2021-08-04 PROCEDURE — 99213 OFFICE O/P EST LOW 20 MIN: CPT | Mod: CS,95

## 2021-08-04 NOTE — ASSESSMENT
[FreeTextEntry1] : In summary I had a lengthy discussion with both daughters regarding the care of their mother at the nursing facility.  The facility is obtaining a monoclonal antibody for the patient.  I have stressed that there is no need to transfer the patient to a hospital facility at this time unless their mother becomes hypoxic or with worsening respiratory distress.  Fortunately in view of the patient's underlying pulmonary emboli and debilitated state with subsequent need for CPAP at night she is though at high risk for rapid cardiac and respiratory decompensation.  The patient will follow-up after she is discharged from her rehab facility

## 2021-08-04 NOTE — REASON FOR VISIT
[Follow-Up - From Hospitalization] : a follow-up visit after a recent hospitalization [Pulmonary Embolism] : pulmonary embolism [Shortness of Breath] : shortness of breath

## 2021-08-04 NOTE — HISTORY OF PRESENT ILLNESS
[TextBox_4] : Patient is an 80-year-old female with past medical history significant for adenocarcinoma of the small intestine, hypertension, deep vein thrombosis, bilateral pulmonary emboli who was recently discharged from the hospital on CPAP and is currently residing in a rehab facility.  While in the facility the patient converted to Covid positivity.  A telemedicine follow-up was set up between myself and her 2 daughters.  The patient apparently is confused at the rehab facility but is afebrile and not hypoxic

## 2021-09-08 ENCOUNTER — APPOINTMENT (OUTPATIENT)
Dept: PULMONOLOGY | Facility: CLINIC | Age: 81
End: 2021-09-08
Payer: MEDICARE

## 2021-09-08 VITALS
RESPIRATION RATE: 14 BRPM | DIASTOLIC BLOOD PRESSURE: 63 MMHG | HEIGHT: 60 IN | SYSTOLIC BLOOD PRESSURE: 139 MMHG | HEART RATE: 36 BPM | BODY MASS INDEX: 36.12 KG/M2 | OXYGEN SATURATION: 95 % | TEMPERATURE: 97.5 F | WEIGHT: 184 LBS

## 2021-09-08 DIAGNOSIS — R06.02 SHORTNESS OF BREATH: ICD-10-CM

## 2021-09-08 DIAGNOSIS — I26.99 OTHER PULMONARY EMBOLISM W/OUT ACUTE COR PULMONALE: ICD-10-CM

## 2021-09-08 DIAGNOSIS — Z80.0 FAMILY HISTORY OF MALIGNANT NEOPLASM OF DIGESTIVE ORGANS: ICD-10-CM

## 2021-09-08 DIAGNOSIS — G47.30 SLEEP APNEA, UNSPECIFIED: ICD-10-CM

## 2021-09-08 DIAGNOSIS — Z87.891 PERSONAL HISTORY OF NICOTINE DEPENDENCE: ICD-10-CM

## 2021-09-08 DIAGNOSIS — Z92.3 PERSONAL HISTORY OF IRRADIATION: ICD-10-CM

## 2021-09-08 DIAGNOSIS — C17.0 MALIGNANT NEOPLASM OF DUODENUM: ICD-10-CM

## 2021-09-08 DIAGNOSIS — Z82.49 FAMILY HISTORY OF ISCHEMIC HEART DISEASE AND OTHER DISEASES OF THE CIRCULATORY SYSTEM: ICD-10-CM

## 2021-09-08 DIAGNOSIS — U09.9 POST COVID-19 CONDITION, UNSPECIFIED: ICD-10-CM

## 2021-09-08 PROCEDURE — 99214 OFFICE O/P EST MOD 30 MIN: CPT

## 2021-09-08 NOTE — HISTORY OF PRESENT ILLNESS
[Former] : former [Never] : never [TextBox_4] : Patient is an 80-year-old female with past medical history significant for obstructive sleep apnea, history of COVID-19 pneumonia, bilateral pulmonary emboli currently on anticoagulation who presents today for follow-up. The patient complains of occasional cough and fatigue. She also complains of occasional dizziness. She currently denies fevers chills chest pain weight loss or hemoptysis

## 2021-09-08 NOTE — REASON FOR VISIT
[Follow-Up] : a follow-up visit [Sleep Apnea] : sleep apnea [Cough] : cough [Pulmonary Embolism] : pulmonary embolism [Shortness of Breath] : shortness of breath [Spouse] : spouse [Family Member] : family member

## 2021-09-08 NOTE — ASSESSMENT
[FreeTextEntry1] : In summary the patient is an 80-year-old female with multiple medical problems including history of pulmonary emboli currently on anticoagulation, obstructive sleep apnea, that is post COVID-19 who presents today for follow-up. Patient's physical exam is significant for good air entry bilaterally. I had a lengthy discussion with the patient and her family. A home sleep monitor has been ordered. The patient is instructed to continue her current therapy and to follow-up in 3 months

## 2021-10-05 ENCOUNTER — APPOINTMENT (OUTPATIENT)
Dept: PULMONOLOGY | Facility: CLINIC | Age: 81
End: 2021-10-05

## 2021-10-06 PROBLEM — U09.9 POST-COVID-19 SYNDROME: Status: ACTIVE | Noted: 2021-09-08

## 2022-05-22 ENCOUNTER — INPATIENT (INPATIENT)
Facility: HOSPITAL | Age: 82
LOS: 8 days | Discharge: HOME | DRG: 177 | End: 2022-05-31
Attending: INTERNAL MEDICINE | Admitting: INTERNAL MEDICINE
Payer: MEDICARE

## 2022-05-22 VITALS
HEART RATE: 80 BPM | OXYGEN SATURATION: 99 % | WEIGHT: 175.05 LBS | SYSTOLIC BLOOD PRESSURE: 201 MMHG | DIASTOLIC BLOOD PRESSURE: 143 MMHG | RESPIRATION RATE: 18 BRPM | HEIGHT: 60 IN

## 2022-05-22 DIAGNOSIS — J96.01 ACUTE RESPIRATORY FAILURE WITH HYPOXIA: ICD-10-CM

## 2022-05-22 DIAGNOSIS — Z98.89 OTHER SPECIFIED POSTPROCEDURAL STATES: Chronic | ICD-10-CM

## 2022-05-22 DIAGNOSIS — Z96.652 PRESENCE OF LEFT ARTIFICIAL KNEE JOINT: Chronic | ICD-10-CM

## 2022-05-22 LAB
ALBUMIN SERPL ELPH-MCNC: 3 G/DL — LOW (ref 3.3–5)
ALBUMIN SERPL ELPH-MCNC: 3.5 G/DL — SIGNIFICANT CHANGE UP (ref 3.3–5)
ALP SERPL-CCNC: 39 U/L — LOW (ref 40–120)
ALP SERPL-CCNC: 59 U/L — SIGNIFICANT CHANGE UP (ref 40–120)
ALT FLD-CCNC: 104 U/L — HIGH (ref 12–78)
ALT FLD-CCNC: 39 U/L — SIGNIFICANT CHANGE UP (ref 12–78)
ANION GAP SERPL CALC-SCNC: 10 MMOL/L — SIGNIFICANT CHANGE UP (ref 5–17)
APPEARANCE UR: CLEAR — SIGNIFICANT CHANGE UP
APTT BLD: 27.7 SEC — SIGNIFICANT CHANGE UP (ref 27.5–35.5)
AST SERPL-CCNC: 146 U/L — HIGH (ref 15–37)
AST SERPL-CCNC: 64 U/L — HIGH (ref 15–37)
BACTERIA # UR AUTO: NEGATIVE — SIGNIFICANT CHANGE UP
BASE EXCESS BLDA CALC-SCNC: 6.8 MMOL/L — HIGH (ref -2–3)
BASE EXCESS BLDV CALC-SCNC: -2.2 MMOL/L — LOW (ref -2–3)
BASOPHILS # BLD AUTO: 0.03 K/UL — SIGNIFICANT CHANGE UP (ref 0–0.2)
BASOPHILS NFR BLD AUTO: 0.2 % — SIGNIFICANT CHANGE UP (ref 0–2)
BILIRUB DIRECT SERPL-MCNC: 0.2 MG/DL — SIGNIFICANT CHANGE UP (ref 0–0.3)
BILIRUB INDIRECT FLD-MCNC: 0.3 MG/DL — SIGNIFICANT CHANGE UP (ref 0.2–1)
BILIRUB SERPL-MCNC: 0.5 MG/DL — SIGNIFICANT CHANGE UP (ref 0.2–1.2)
BILIRUB SERPL-MCNC: 0.5 MG/DL — SIGNIFICANT CHANGE UP (ref 0.2–1.2)
BILIRUB UR-MCNC: NEGATIVE — SIGNIFICANT CHANGE UP
BLOOD GAS COMMENTS ARTERIAL: SIGNIFICANT CHANGE UP
BLOOD GAS COMMENTS, VENOUS: SIGNIFICANT CHANGE UP
BUN SERPL-MCNC: 32 MG/DL — HIGH (ref 7–23)
CALCIUM SERPL-MCNC: 8.9 MG/DL — SIGNIFICANT CHANGE UP (ref 8.5–10.1)
CHLORIDE SERPL-SCNC: 102 MMOL/L — SIGNIFICANT CHANGE UP (ref 96–108)
CK SERPL-CCNC: 50 U/L — SIGNIFICANT CHANGE UP (ref 26–192)
CO2 SERPL-SCNC: 26 MMOL/L — SIGNIFICANT CHANGE UP (ref 22–31)
COLOR SPEC: YELLOW — SIGNIFICANT CHANGE UP
CREAT SERPL-MCNC: 1.3 MG/DL — SIGNIFICANT CHANGE UP (ref 0.5–1.3)
CREAT SERPL-MCNC: 1.5 MG/DL — HIGH (ref 0.5–1.3)
DIFF PNL FLD: ABNORMAL
EGFR: 34 ML/MIN/1.73M2 — LOW
EGFR: 41 ML/MIN/1.73M2 — LOW
EOSINOPHIL # BLD AUTO: 0.11 K/UL — SIGNIFICANT CHANGE UP (ref 0–0.5)
EOSINOPHIL NFR BLD AUTO: 0.8 % — SIGNIFICANT CHANGE UP (ref 0–6)
EPI CELLS # UR: SIGNIFICANT CHANGE UP
GAS PNL BLDA: SIGNIFICANT CHANGE UP
GAS PNL BLDV: SIGNIFICANT CHANGE UP
GLUCOSE SERPL-MCNC: 182 MG/DL — HIGH (ref 70–99)
GLUCOSE UR QL: NEGATIVE — SIGNIFICANT CHANGE UP
HCO3 BLDA-SCNC: 31 MMOL/L — HIGH (ref 21–28)
HCO3 BLDV-SCNC: 26 MMOL/L — SIGNIFICANT CHANGE UP (ref 22–29)
HCT VFR BLD CALC: 35.7 % — SIGNIFICANT CHANGE UP (ref 34.5–45)
HGB BLD-MCNC: 11 G/DL — LOW (ref 11.5–15.5)
HOROWITZ INDEX BLDA+IHG-RTO: 90 — SIGNIFICANT CHANGE UP
HYALINE CASTS # UR AUTO: NEGATIVE — SIGNIFICANT CHANGE UP
IMM GRANULOCYTES NFR BLD AUTO: 0.8 % — SIGNIFICANT CHANGE UP (ref 0–1.5)
INR BLD: 1.23 RATIO — HIGH (ref 0.88–1.16)
INR BLD: 1.4 RATIO — HIGH (ref 0.88–1.16)
KETONES UR-MCNC: NEGATIVE — SIGNIFICANT CHANGE UP
LACTATE SERPL-SCNC: 2.5 MMOL/L — HIGH (ref 0.7–2)
LACTATE SERPL-SCNC: 3.3 MMOL/L — HIGH (ref 0.7–2)
LEUKOCYTE ESTERASE UR-ACNC: ABNORMAL
LYMPHOCYTES # BLD AUTO: 24.7 % — SIGNIFICANT CHANGE UP (ref 13–44)
LYMPHOCYTES # BLD AUTO: 3.39 K/UL — HIGH (ref 1–3.3)
MCHC RBC-ENTMCNC: 30.8 GM/DL — LOW (ref 32–36)
MCHC RBC-ENTMCNC: 31.2 PG — SIGNIFICANT CHANGE UP (ref 27–34)
MCV RBC AUTO: 101.1 FL — HIGH (ref 80–100)
MONOCYTES # BLD AUTO: 0.81 K/UL — SIGNIFICANT CHANGE UP (ref 0–0.9)
MONOCYTES NFR BLD AUTO: 5.9 % — SIGNIFICANT CHANGE UP (ref 2–14)
NEUTROPHILS # BLD AUTO: 9.3 K/UL — HIGH (ref 1.8–7.4)
NEUTROPHILS NFR BLD AUTO: 67.6 % — SIGNIFICANT CHANGE UP (ref 43–77)
NITRITE UR-MCNC: NEGATIVE — SIGNIFICANT CHANGE UP
NRBC # BLD: 0 /100 WBCS — SIGNIFICANT CHANGE UP (ref 0–0)
NT-PROBNP SERPL-SCNC: 3328 PG/ML — HIGH (ref 0–450)
PCO2 BLDA: 43 MMHG — HIGH (ref 32–35)
PCO2 BLDV: 72 MMHG — HIGH (ref 39–42)
PH BLDA: 7.46 — HIGH (ref 7.35–7.45)
PH BLDV: 7.17 — LOW (ref 7.32–7.43)
PH UR: 5 — SIGNIFICANT CHANGE UP (ref 5–8)
PLATELET # BLD AUTO: 308 K/UL — SIGNIFICANT CHANGE UP (ref 150–400)
PO2 BLDA: 235 MMHG — HIGH (ref 83–108)
PO2 BLDV: 69 MMHG — HIGH (ref 25–45)
POTASSIUM SERPL-MCNC: 4.2 MMOL/L — SIGNIFICANT CHANGE UP (ref 3.5–5.3)
POTASSIUM SERPL-SCNC: 4.2 MMOL/L — SIGNIFICANT CHANGE UP (ref 3.5–5.3)
PROT SERPL-MCNC: 6.3 G/DL — SIGNIFICANT CHANGE UP (ref 6–8.3)
PROT SERPL-MCNC: 7.7 G/DL — SIGNIFICANT CHANGE UP (ref 6–8.3)
PROT UR-MCNC: 30 MG/DL
PROTHROM AB SERPL-ACNC: 14.4 SEC — HIGH (ref 10.5–13.4)
PROTHROM AB SERPL-ACNC: 16.4 SEC — HIGH (ref 10.5–13.4)
RAPID RVP RESULT: DETECTED
RBC # BLD: 3.53 M/UL — LOW (ref 3.8–5.2)
RBC # FLD: 14.7 % — HIGH (ref 10.3–14.5)
RBC CASTS # UR COMP ASSIST: SIGNIFICANT CHANGE UP /HPF (ref 0–4)
SAO2 % BLDA: 98.4 % — HIGH (ref 94–98)
SAO2 % BLDV: 90 % — HIGH (ref 67–88)
SARS-COV-2 RNA SPEC QL NAA+PROBE: DETECTED
SODIUM SERPL-SCNC: 138 MMOL/L — SIGNIFICANT CHANGE UP (ref 135–145)
SP GR SPEC: 1.01 — SIGNIFICANT CHANGE UP (ref 1.01–1.02)
TROPONIN I, HIGH SENSITIVITY RESULT: 138.8 NG/L — HIGH
TROPONIN I, HIGH SENSITIVITY RESULT: 21.2 NG/L — SIGNIFICANT CHANGE UP
UROBILINOGEN FLD QL: NEGATIVE — SIGNIFICANT CHANGE UP
WBC # BLD: 13.75 K/UL — HIGH (ref 3.8–10.5)
WBC # FLD AUTO: 13.75 K/UL — HIGH (ref 3.8–10.5)
WBC UR QL: SIGNIFICANT CHANGE UP

## 2022-05-22 PROCEDURE — 93010 ELECTROCARDIOGRAM REPORT: CPT

## 2022-05-22 PROCEDURE — 99285 EMERGENCY DEPT VISIT HI MDM: CPT | Mod: CS

## 2022-05-22 RX ORDER — TIOTROPIUM BROMIDE 18 UG/1
1 CAPSULE ORAL; RESPIRATORY (INHALATION) DAILY
Refills: 0 | Status: DISCONTINUED | OUTPATIENT
Start: 2022-05-22 | End: 2022-05-31

## 2022-05-22 RX ORDER — CARVEDILOL PHOSPHATE 80 MG/1
25 CAPSULE, EXTENDED RELEASE ORAL
Refills: 0 | Status: DISCONTINUED | OUTPATIENT
Start: 2022-05-22 | End: 2022-05-22

## 2022-05-22 RX ORDER — LOSARTAN POTASSIUM 100 MG/1
100 TABLET, FILM COATED ORAL DAILY
Refills: 0 | Status: DISCONTINUED | OUTPATIENT
Start: 2022-05-22 | End: 2022-05-28

## 2022-05-22 RX ORDER — AMIODARONE HYDROCHLORIDE 400 MG/1
200 TABLET ORAL DAILY
Refills: 0 | Status: DISCONTINUED | OUTPATIENT
Start: 2022-05-22 | End: 2022-05-31

## 2022-05-22 RX ORDER — CARVEDILOL PHOSPHATE 80 MG/1
25 CAPSULE, EXTENDED RELEASE ORAL
Refills: 0 | Status: DISCONTINUED | OUTPATIENT
Start: 2022-05-22 | End: 2022-05-31

## 2022-05-22 RX ORDER — FENOFIBRATE,MICRONIZED 130 MG
145 CAPSULE ORAL DAILY
Refills: 0 | Status: DISCONTINUED | OUTPATIENT
Start: 2022-05-22 | End: 2022-05-31

## 2022-05-22 RX ORDER — APIXABAN 2.5 MG/1
2.5 TABLET, FILM COATED ORAL EVERY 12 HOURS
Refills: 0 | Status: DISCONTINUED | OUTPATIENT
Start: 2022-05-22 | End: 2022-05-23

## 2022-05-22 RX ORDER — REMDESIVIR 5 MG/ML
200 INJECTION INTRAVENOUS EVERY 24 HOURS
Refills: 0 | Status: COMPLETED | OUTPATIENT
Start: 2022-05-22 | End: 2022-05-22

## 2022-05-22 RX ORDER — ACETAMINOPHEN 500 MG
650 TABLET ORAL ONCE
Refills: 0 | Status: COMPLETED | OUTPATIENT
Start: 2022-05-22 | End: 2022-05-22

## 2022-05-22 RX ORDER — LANOLIN ALCOHOL/MO/W.PET/CERES
5 CREAM (GRAM) TOPICAL AT BEDTIME
Refills: 0 | Status: DISCONTINUED | OUTPATIENT
Start: 2022-05-22 | End: 2022-05-31

## 2022-05-22 RX ORDER — DEXAMETHASONE 0.5 MG/5ML
6 ELIXIR ORAL DAILY
Refills: 0 | Status: DISCONTINUED | OUTPATIENT
Start: 2022-05-22 | End: 2022-05-28

## 2022-05-22 RX ORDER — NITROGLYCERIN 6.5 MG
0.4 CAPSULE, EXTENDED RELEASE ORAL ONCE
Refills: 0 | Status: COMPLETED | OUTPATIENT
Start: 2022-05-22 | End: 2022-05-22

## 2022-05-22 RX ORDER — VANCOMYCIN HCL 1 G
1000 VIAL (EA) INTRAVENOUS ONCE
Refills: 0 | Status: COMPLETED | OUTPATIENT
Start: 2022-05-22 | End: 2022-05-22

## 2022-05-22 RX ORDER — REMDESIVIR 5 MG/ML
INJECTION INTRAVENOUS
Refills: 0 | Status: COMPLETED | OUTPATIENT
Start: 2022-05-22 | End: 2022-05-26

## 2022-05-22 RX ORDER — FUROSEMIDE 40 MG
60 TABLET ORAL ONCE
Refills: 0 | Status: COMPLETED | OUTPATIENT
Start: 2022-05-22 | End: 2022-05-22

## 2022-05-22 RX ORDER — PIPERACILLIN AND TAZOBACTAM 4; .5 G/20ML; G/20ML
3.38 INJECTION, POWDER, LYOPHILIZED, FOR SOLUTION INTRAVENOUS ONCE
Refills: 0 | Status: COMPLETED | OUTPATIENT
Start: 2022-05-22 | End: 2022-05-22

## 2022-05-22 RX ORDER — SODIUM CHLORIDE 9 MG/ML
1000 INJECTION, SOLUTION INTRAVENOUS
Refills: 0 | Status: DISCONTINUED | OUTPATIENT
Start: 2022-05-22 | End: 2022-05-23

## 2022-05-22 RX ORDER — FERROUS SULFATE 325(65) MG
325 TABLET ORAL DAILY
Refills: 0 | Status: DISCONTINUED | OUTPATIENT
Start: 2022-05-22 | End: 2022-05-31

## 2022-05-22 RX ORDER — PIPERACILLIN AND TAZOBACTAM 4; .5 G/20ML; G/20ML
3.38 INJECTION, POWDER, LYOPHILIZED, FOR SOLUTION INTRAVENOUS EVERY 8 HOURS
Refills: 0 | Status: COMPLETED | OUTPATIENT
Start: 2022-05-22 | End: 2022-05-23

## 2022-05-22 RX ORDER — ACETAMINOPHEN 500 MG
650 TABLET ORAL EVERY 6 HOURS
Refills: 0 | Status: DISCONTINUED | OUTPATIENT
Start: 2022-05-22 | End: 2022-05-31

## 2022-05-22 RX ORDER — REMDESIVIR 5 MG/ML
100 INJECTION INTRAVENOUS EVERY 24 HOURS
Refills: 0 | Status: COMPLETED | OUTPATIENT
Start: 2022-05-23 | End: 2022-05-26

## 2022-05-22 RX ADMIN — SODIUM CHLORIDE 60 MILLILITER(S): 9 INJECTION, SOLUTION INTRAVENOUS at 17:50

## 2022-05-22 RX ADMIN — PIPERACILLIN AND TAZOBACTAM 25 GRAM(S): 4; .5 INJECTION, POWDER, LYOPHILIZED, FOR SOLUTION INTRAVENOUS at 23:00

## 2022-05-22 RX ADMIN — PIPERACILLIN AND TAZOBACTAM 3.38 GRAM(S): 4; .5 INJECTION, POWDER, LYOPHILIZED, FOR SOLUTION INTRAVENOUS at 10:30

## 2022-05-22 RX ADMIN — Medication 650 MILLIGRAM(S): at 16:15

## 2022-05-22 RX ADMIN — PIPERACILLIN AND TAZOBACTAM 200 GRAM(S): 4; .5 INJECTION, POWDER, LYOPHILIZED, FOR SOLUTION INTRAVENOUS at 09:55

## 2022-05-22 RX ADMIN — CARVEDILOL PHOSPHATE 25 MILLIGRAM(S): 80 CAPSULE, EXTENDED RELEASE ORAL at 17:50

## 2022-05-22 RX ADMIN — REMDESIVIR 500 MILLIGRAM(S): 5 INJECTION INTRAVENOUS at 23:37

## 2022-05-22 RX ADMIN — Medication 5 MILLIGRAM(S): at 23:00

## 2022-05-22 RX ADMIN — Medication 250 MILLIGRAM(S): at 10:43

## 2022-05-22 RX ADMIN — Medication 60 MILLIGRAM(S): at 09:55

## 2022-05-22 RX ADMIN — Medication 0.4 MILLIGRAM(S): at 09:46

## 2022-05-22 RX ADMIN — Medication 650 MILLIGRAM(S): at 09:55

## 2022-05-22 RX ADMIN — SODIUM CHLORIDE 60 MILLILITER(S): 9 INJECTION, SOLUTION INTRAVENOUS at 23:00

## 2022-05-22 RX ADMIN — APIXABAN 2.5 MILLIGRAM(S): 2.5 TABLET, FILM COATED ORAL at 17:49

## 2022-05-22 NOTE — CONSULT NOTE ADULT - SUBJECTIVE AND OBJECTIVE BOX
[facility] [place]     Patient is a 81y old  Female who presents with a chief complaint of     HPI:      HPI:    81y Female for Cardiology Consult    PAST MEDICAL & SURGICAL HISTORY:      FAMILY HISTORY:      SOCIAL HISTORY:   Alcohol:  Smoking:    Allergies    fluoxetine (Unknown)  NIFEdipine (Unknown)    Intolerances        MEDICATIONS  (STANDING):  aMIOdarone    Tablet 200 milliGRAM(s) Oral daily  apixaban 2.5 milliGRAM(s) Oral every 12 hours  carvedilol Oral Tab/Cap - Peds 25 milliGRAM(s) Oral two times a day  fenofibrate Tablet 145 milliGRAM(s) Oral daily  ferrous    sulfate 325 milliGRAM(s) Oral daily  lactated ringers. 1000 milliLiter(s) (60 mL/Hr) IV Continuous <Continuous>  losartan 100 milliGRAM(s) Oral daily  melatonin 5 milliGRAM(s) Oral at bedtime  tiotropium 18 MICROgram(s) Capsule 1 Capsule(s) Inhalation daily    MEDICATIONS  (PRN):  acetaminophen     Tablet .. 650 milliGRAM(s) Oral every 6 hours PRN Temp greater or equal to 38C (100.4F)      REVIEW OF SYSTEMS:  CONSTITUTIONAL: No fever, weight loss, chills, shakes, or fat  RESPIRATORY: No cough, wheezing, hemoptysis, or shortness of breath  CARDIOVASCULAR: No chest pain, dyspnea, palpitations, dizziness, syncope, paroxysmal nocturnal dyspnea, orthopnea, or arm or leg swelling  GASTROINTESTINAL: No abdominal  or epigastric pain, nausea, vomiting, hematemesis, diarrhea, constipation, melena or bright red bloo  NEUROLOGICAL: No headaches, memory loss, slurred speech, limb weakness, loss of strength, numbness, or tremors  SKIN: No itching, burning, rashes, or lesions  ENDOCRINE: No heat or cold intolerance, or hair loss  MUSCULOSKELETAL: No joint pain or swelling, muscle, back, or extremity pain  HEME/LYMPH: No easy bruising or bleeding gums  ALLERY AND IMMUNOLOGIC: No hives or rash.    Vital Signs Last 24 Hrs  T(C): 38.4 (22 May 2022 09:43), Max: 38.4 (22 May 2022 09:43)  T(F): 101.1 (22 May 2022 09:43), Max: 101.1 (22 May 2022 09:43)  HR: 80 (22 May 2022 13:13) (66 - 84)  BP: 179/84 (22 May 2022 10:16) (179/84 - 204/96)  BP(mean): --  RR: 29 (22 May 2022 10:16) (18 - 32)  SpO2: 100% (22 May 2022 13:13) (96% - 100%)    PHYSICAL EXAM:  HEAD:  Atraumatic, Normocephalic  EYES: EOMI, PERRLA, conjunctiva and sclera clear  NECK: Supple and normal thyroid.  No JVD or carotid bruit.   HEART: S1, S2 regular , 1/6 soft ejection systolic murmur in mitral area , no thrill and no gallops .  PULMONARY: Bilateral vesicular breathing , few scattered ronchi and few scattered rales are present .  ABDOMEN: Soft nontender and positive bowl sounds   EXTREMITIES:  No clubbing, cyanosis, or pedal  edema  NEUROLOGICAL: AAOX3 , no focal deficit .    LABS:                        11.0   13.75 )-----------( 308      ( 22 May 2022 09:43 )             35.7         138  |  102  |  32<H>  ----------------------------<  182<H>  4.2   |  26  |  1.50<H>    Ca    8.9      22 May 2022 09:43    TPro  7.7  /  Alb  3.5  /  TBili  0.5  /  DBili  x   /  AST  64<H>  /  ALT  39  /  AlkPhos  59      CARDIAC MARKERS ( 22 May 2022 09:43 )  x     / x     / 50 U/L / x     / x          PT/INR - ( 22 May 2022 09:43 )   PT: 14.4 sec;   INR: 1.23 ratio         PTT - ( 22 May 2022 09:43 )  PTT:27.7 sec  Urinalysis Basic - ( 22 May 2022 11:13 )    Color: Yellow / Appearance: Clear / S.010 / pH: x  Gluc: x / Ketone: Negative  / Bili: Negative / Urobili: Negative   Blood: x / Protein: 30 mg/dL / Nitrite: Negative   Leuk Esterase: Small / RBC: 0-2 /HPF / WBC 3-5   Sq Epi: x / Non Sq Epi: Occasional / Bacteria: Negative      BNPSerum Pro-Brain Natriuretic Peptide: 3328 pg/mL ( @ 09:43)        EKG:  ECHO:  IMAGING:    Assessment and Plan :     Will continue to follow during hospital course and give further recommendations as needed . Thanks for your referral . if any questions please contact me at office ([phone] [phone])  MIKAL ROSE MD [place] [address]  [address]  OFFICE : [phone]  CELL : [phone]  CARDIOLOGY CONSULT :   Patient is a 81y old  Female who presents with a chief complaint of   Chief Complaint: shortness of breath.    · Chief Complaint: The patient is a 81y Female complaining of SOB  · Unable to Obtain: Severe Illness/Injury  · HPI Objective Statement: 80 yo F w/ hx HTN, CHF, COPD, afib on eliquis p/w found with SOB since ~ 7am today at [facility]. NO known pain. EMS was called and found pt cyanotic and O2 56% on RA. EMS states 82% on 100% nrb. Pt on CPAP by EMS with sats in mid 90s. No known fever/chills. PT reported been off lasix over past couple days. No other acute co or changes.    HIV:     HPI:      HPI:    81y Female for Cardiology Consult    PAST MEDICAL & SURGICAL HISTORY:      FAMILY HISTORY:      SOCIAL HISTORY:   Alcohol:  Smoking:    Allergies    fluoxetine (Unknown)  NIFEdipine (Unknown)    Intolerances        MEDICATIONS  (STANDING):  aMIOdarone    Tablet 200 milliGRAM(s) Oral daily  apixaban 2.5 milliGRAM(s) Oral every 12 hours  carvedilol Oral Tab/Cap - Peds 25 milliGRAM(s) Oral two times a day  fenofibrate Tablet 145 milliGRAM(s) Oral daily  ferrous    sulfate 325 milliGRAM(s) Oral daily  lactated ringers. 1000 milliLiter(s) (60 mL/Hr) IV Continuous <Continuous>  losartan 100 milliGRAM(s) Oral daily  melatonin 5 milliGRAM(s) Oral at bedtime  tiotropium 18 MICROgram(s) Capsule 1 Capsule(s) Inhalation daily    MEDICATIONS  (PRN):  acetaminophen     Tablet .. 650 milliGRAM(s) Oral every 6 hours PRN Temp greater or equal to 38C (100.4F)      REVIEW OF SYSTEMS:  CONSTITUTIONAL: No fever, weight loss, chills, shakes, or fat  RESPIRATORY: No cough, wheezing, hemoptysis, or shortness of breath  CARDIOVASCULAR: No chest pain, dyspnea, palpitations, dizziness, syncope, paroxysmal nocturnal dyspnea, orthopnea, or arm or leg swelling  GASTROINTESTINAL: No abdominal  or epigastric pain, nausea, vomiting, hematemesis, diarrhea, constipation, melena or bright red bloo  NEUROLOGICAL: No headaches, memory loss, slurred speech, limb weakness, loss of strength, numbness, or tremors  SKIN: No itching, burning, rashes, or lesions  ENDOCRINE: No heat or cold intolerance, or hair loss  MUSCULOSKELETAL: No joint pain or swelling, muscle, back, or extremity pain  HEME/LYMPH: No easy bruising or bleeding gums  ALLERY AND IMMUNOLOGIC: No hives or rash.    Vital Signs Last 24 Hrs  T(C): 38.4 (22 May 2022 09:43), Max: 38.4 (22 May 2022 09:43)  T(F): 101.1 (22 May 2022 09:43), Max: 101.1 (22 May 2022 09:43)  HR: 80 (22 May 2022 13:13) (66 - 84)  BP: 179/84 (22 May 2022 10:16) (179/84 - 204/96)  BP(mean): --  RR: 29 (22 May 2022 10:16) (18 - 32)  SpO2: 100% (22 May 2022 13:13) (96% - 100%)    PHYSICAL EXAM:  HEAD:  Atraumatic, Normocephalic  EYES: EOMI, PERRLA, conjunctiva and sclera clear  NECK: Supple and normal thyroid.  No JVD or carotid bruit.   HEART: S1, S2 regular , 1/6 soft ejection systolic murmur in mitral area , no thrill and no gallops .  PULMONARY: Bilateral vesicular breathing , few scattered ronchi and few scattered rales are present .  ABDOMEN: Soft nontender and positive bowl sounds   EXTREMITIES:  No clubbing, cyanosis, or pedal  edema  NEUROLOGICAL: AAOX3 , no focal deficit .    LABS:                        11.0   13.75 )-----------( 308      ( 22 May 2022 09:43 )             35.7     05-    138  |  102  |  32<H>  ----------------------------<  182<H>  4.2   |  26  |  1.50<H>    Ca    8.9      22 May 2022 09:43    TPro  7.7  /  Alb  3.5  /  TBili  0.5  /  DBili  x   /  AST  64<H>  /  ALT  39  /  AlkPhos  59      CARDIAC MARKERS ( 22 May 2022 09:43 )  x     / x     / 50 U/L / x     / x          PT/INR - ( 22 May 2022 09:43 )   PT: 14.4 sec;   INR: 1.23 ratio         PTT - ( 22 May 2022 09:43 )  PTT:27.7 sec  Urinalysis Basic - ( 22 May 2022 11:13 )    Color: Yellow / Appearance: Clear / S.010 / pH: x  Gluc: x / Ketone: Negative  / Bili: Negative / Urobili: Negative   Blood: x / Protein: 30 mg/dL / Nitrite: Negative   Leuk Esterase: Small / RBC: 0-2 /HPF / WBC 3-5   Sq Epi: x / Non Sq Epi: Occasional / Bacteria: Negative      BNPSerum Pro-Brain Natriuretic Peptide: 3328 pg/mL ( @ 09:43)        Assessment and Plan :   full consult dictated .  81 years old female with H/O CHF , hypertension , paroxysmal atrial fibrillation has SOB cough and wheezing and on Bi-pap and has pneumonia and also COVID positive and also has CHF . Continue I/V antibiotics and also will add I/V Lasix . Continue coreg and Eliquis .  Will continue to follow during hospital course and give further recommendations as needed . Thanks for your referral . if any questions please contact me at office ([phone] cell [phone])  MIKAL ROSE MD [place] [address]  [address]  OFFICE : [phone]  CELL : [phone]  CARDIOLOGY CONSULT :   Patient is a 81y old  Female who presents with a chief complaint of   Chief Complaint: shortness of breath.    · Chief Complaint: The patient is a 81y Female complaining of SOB  · Unable to Obtain: Severe Illness/Injury  · HPI Objective Statement: 82 yo F w/ hx HTN, CHF, COPD, afib on eliquis p/w found with SOB since ~ 7am today at [facility]. NO known pain. EMS was called and found pt cyanotic and O2 56% on RA. EMS states 82% on 100% nrb. Pt on CPAP by EMS with sats in mid 90s. No known fever/chills. PT reported been off lasix over past couple days. No other acute co or changes.    HIV:     HPI:      HPI:    81y Female for Cardiology Consult    PAST MEDICAL & SURGICAL HISTORY:      FAMILY HISTORY:      SOCIAL HISTORY:   Alcohol:  Smoking:    Allergies    fluoxetine (Unknown)  NIFEdipine (Unknown)    Intolerances        MEDICATIONS  (STANDING):  aMIOdarone    Tablet 200 milliGRAM(s) Oral daily  apixaban 2.5 milliGRAM(s) Oral every 12 hours  carvedilol Oral Tab/Cap - Peds 25 milliGRAM(s) Oral two times a day  fenofibrate Tablet 145 milliGRAM(s) Oral daily  ferrous    sulfate 325 milliGRAM(s) Oral daily  lactated ringers. 1000 milliLiter(s) (60 mL/Hr) IV Continuous <Continuous>  losartan 100 milliGRAM(s) Oral daily  melatonin 5 milliGRAM(s) Oral at bedtime  tiotropium 18 MICROgram(s) Capsule 1 Capsule(s) Inhalation daily    MEDICATIONS  (PRN):  acetaminophen     Tablet .. 650 milliGRAM(s) Oral every 6 hours PRN Temp greater or equal to 38C (100.4F)      REVIEW OF SYSTEMS:  CONSTITUTIONAL: No fever, weight loss, chills, shakes, or fat  RESPIRATORY: No cough, wheezing, hemoptysis, or shortness of breath  CARDIOVASCULAR: No chest pain, dyspnea, palpitations, dizziness, syncope, paroxysmal nocturnal dyspnea, orthopnea, or arm or leg swelling  GASTROINTESTINAL: No abdominal  or epigastric pain, nausea, vomiting, hematemesis, diarrhea, constipation, melena or bright red bloo  NEUROLOGICAL: No headaches, memory loss, slurred speech, limb weakness, loss of strength, numbness, or tremors  SKIN: No itching, burning, rashes, or lesions  ENDOCRINE: No heat or cold intolerance, or hair loss  MUSCULOSKELETAL: No joint pain or swelling, muscle, back, or extremity pain  HEME/LYMPH: No easy bruising or bleeding gums  ALLERY AND IMMUNOLOGIC: No hives or rash.    Vital Signs Last 24 Hrs  T(C): 38.4 (22 May 2022 09:43), Max: 38.4 (22 May 2022 09:43)  T(F): 101.1 (22 May 2022 09:43), Max: 101.1 (22 May 2022 09:43)  HR: 80 (22 May 2022 13:13) (66 - 84)  BP: 179/84 (22 May 2022 10:16) (179/84 - 204/96)  BP(mean): --  RR: 29 (22 May 2022 10:16) (18 - 32)  SpO2: 100% (22 May 2022 13:13) (96% - 100%)    PHYSICAL EXAM:  HEAD:  Atraumatic, Normocephalic  EYES: EOMI, PERRLA, conjunctiva and sclera clear  NECK: Supple and normal thyroid.  No JVD or carotid bruit.   HEART: S1, S2 regular , 1/6 soft ejection systolic murmur in mitral area , no thrill and no gallops .  PULMONARY: Bilateral vesicular breathing , few scattered ronchi and few scattered rales are present .  ABDOMEN: Soft nontender and positive bowl sounds   EXTREMITIES:  No clubbing, cyanosis, or pedal  edema  NEUROLOGICAL: AAOX3 , no focal deficit .    LABS:                        11.0   13.75 )-----------( 308      ( 22 May 2022 09:43 )             35.7     05-    138  |  102  |  32<H>  ----------------------------<  182<H>  4.2   |  26  |  1.50<H>    Ca    8.9      22 May 2022 09:43    TPro  7.7  /  Alb  3.5  /  TBili  0.5  /  DBili  x   /  AST  64<H>  /  ALT  39  /  AlkPhos  59      CARDIAC MARKERS ( 22 May 2022 09:43 )  x     / x     / 50 U/L / x     / x          PT/INR - ( 22 May 2022 09:43 )   PT: 14.4 sec;   INR: 1.23 ratio         PTT - ( 22 May 2022 09:43 )  PTT:27.7 sec  Urinalysis Basic - ( 22 May 2022 11:13 )    Color: Yellow / Appearance: Clear / S.010 / pH: x  Gluc: x / Ketone: Negative  / Bili: Negative / Urobili: Negative   Blood: x / Protein: 30 mg/dL / Nitrite: Negative   Leuk Esterase: Small / RBC: 0-2 /HPF / WBC 3-5   Sq Epi: x / Non Sq Epi: Occasional / Bacteria: Negative      BNPSerum Pro-Brain Natriuretic Peptide: 3328 pg/mL ( @ 09:43)        Assessment and Plan :   full consult dictated .  81 years old female with H/O CHF , hypertension , paroxysmal atrial fibrillation has SOB cough and wheezing and on Bi-pap and has pneumonia and also COVID positive and also has CHF . Continue I/V antibiotics and also will add I/V Lasix . Continue coreg and Eliquis . Elevated Troponin I are most likely secondary to demand ischemia and not ACS or NON-ST [facility] as primary event . However will repeat Troponin I and see trending of Troponin I levels .  Will continue to follow during hospital course and give further recommendations as needed . Thanks for your referral . if any questions please contact me at office ([phone] cell [phone])

## 2022-05-22 NOTE — ED ADULT NURSE NOTE - OBJECTIVE STATEMENT
Presents to ER with SOB, has been off her diuretics as she recently had a stress test.   Immediately placed on BIPAP, Presents to ER with SOB, has been off her diuretics as she recently had a stress test.   Immediately placed on BIPAP,  Pt has a large wound to right outer leg. Daughter at bedside stating she received wound from [facility].  Skin otherwise intact.

## 2022-05-22 NOTE — CONSULT NOTE ADULT - SUBJECTIVE AND OBJECTIVE BOX
Patient is a 81y old  Female who presents with a chief complaint of SOB (22 May 2022 16:06)      The patient is an 81 year old female with a PMH of COVID 19 in 2011, CHF, RLE ulcer, CKD and CHF who was brought to the ED from [facility] for SOB and hypoxia. She was vaccinated with 3 doses of Moderna vaccine in the past but did not get the second booster dose.       PAST MEDICAL & SURGICAL HISTORY:      Allergies    fluoxetine (Unknown)  NIFEdipine (Unknown)    Intolerances        REVIEW OF SYSTEMS:  All systems below were reviewed and are negative [  ]  HEENT:  ID:  Pulmonary:  Cardiac:  GI:  Renal:  Musculoskeletal:  All other systems above were reviewed and are negative   [  ]    HOME MEDICATIONS:    MEDICATIONS  (STANDING):  aMIOdarone    Tablet 200 milliGRAM(s) Oral daily  apixaban 2.5 milliGRAM(s) Oral every 12 hours  carvedilol 25 milliGRAM(s) Oral two times a day  dexAMETHasone  Injectable 6 milliGRAM(s) IV Push daily  fenofibrate Tablet 145 milliGRAM(s) Oral daily  ferrous    sulfate 325 milliGRAM(s) Oral daily  lactated ringers. 1000 milliLiter(s) (60 mL/Hr) IV Continuous <Continuous>  losartan 100 milliGRAM(s) Oral daily  melatonin 5 milliGRAM(s) Oral at bedtime  remdesivir  IVPB   IV Intermittent   tiotropium 18 MICROgram(s) Capsule 1 Capsule(s) Inhalation daily    MEDICATIONS  (PRN):  acetaminophen     Tablet .. 650 milliGRAM(s) Oral every 6 hours PRN Temp greater or equal to 38C (100.4F)      Vital Signs Last 24 Hrs  T(C): 37.7 (22 May 2022 18:22), Max: 38.4 (22 May 2022 09:43)  T(F): 99.9 (22 May 2022 18:22), Max: 101.1 (22 May 2022 09:43)  HR: 69 (22 May 2022 18:22) (60 - 84)  BP: 160/66 (22 May 2022 18:22) (154/77 - 204/96)  BP(mean): --  RR: 25 (22 May 2022 18:22) (18 - 32)  SpO2: 100% (22 May 2022 18:22) (96% - 100%)    PHYSICAL EXAM:  HEENT: NC/AT  Neck: Soft  Lungs: Coarse Bs bilaterally, on BIBAP  Heart: RRR, no murmurs.  Abdomen: Soft, no tenderness.   Genital/ Rectal: No hale   Extremities: RLE ulcer with surrounding moderate erythema and swelling,   Neurologic: Awake  Vascular:    I&O's Summary      LABORATORY:                          11.0   13.75 )-----------( 308      ( 22 May 2022 09:43 )             35.7           05-22    138  |  102  |  32<H>  ----------------------------<  182<H>  4.2   |  26  |  1.50<H>    Ca    8.9      22 May 2022 09:43    TPro  7.7  /  Alb  3.5  /  TBili  0.5  /  DBili  x   /  AST  64<H>  /  ALT  39  /  AlkPhos  59  05-22      Rapid Respiratory Viral Panel Result        05-22 @ 10:04  Rapid RVP Detected  Coronovirus --  Adenovirus --  Bordetella Pertussis --  Chlamydia Pneumonia --  Entero/Rhinovirus--  HKU1 Coronovirus --  HMPV Coronovirus --  Influenza A --  Influenza AH1 --  Influenza AH1 2009 --  Influenza AH3 --  Influenza B --  Mycoplasma Pneumoniae --  NL63 Coronovirus --  OC43 Coronovirus --  Parainfluenza 1 --  Parainfluenza 2 --  Parainfluenza 3 --  Parainfluenza 4 --  Resp Syncytial Virus --      LABORATORY:    CBC Full  -  ( 22 May 2022 09:43 )  WBC Count : 13.75 K/uL  RBC Count : 3.53 M/uL  Hemoglobin : 11.0 g/dL  Hematocrit : 35.7 %  Platelet Count - Automated : 308 K/uL  Mean Cell Volume : 101.1 fl  Mean Cell Hemoglobin : 31.2 pg  Mean Cell Hemoglobin Concentration : 30.8 gm/dL  Auto Neutrophil # : 9.30 K/uL  Auto Lymphocyte # : 3.39 K/uL  Auto Monocyte # : 0.81 K/uL  Auto Eosinophil # : 0.11 K/uL  Auto Basophil # : 0.03 K/uL  Auto Neutrophil % : 67.6 %  Auto Lymphocyte % : 24.7 %  Auto Monocyte % : 5.9 %  Auto Eosinophil % : 0.8 %  Auto Basophil % : 0.2 %          05-22    138  |  102  |  32<H>  ----------------------------<  182<H>  4.2   |  26  |  1.50<H>    Ca    8.9      22 May 2022 09:43    TPro  7.7  /  Alb  3.5  /  TBili  0.5  /  DBili  x   /  AST  64<H>  /  ALT  39  /  AlkPhos  59  05-22      Assessment and Plan:    1. Pneumonia of lower lobes: aspiration vs COVID 19 pneumonia  2. Respiratory failure with hypoxia.  3. RLE cellulitis  4, RLE chronic ulcer.  5. CKD.    . The patient has respiratory failure and will need treatments for COVID 19 pneumonia.   . Add IV Dexamethasone 6 mg iv daily  . Add REmdesivir for 5 days.Monitor LFTS and renal function.  . Add IV Zosyn for possible aspiration pneumonia and RLE cellulitis.  . NPO. SLP evaluation for dysphagia.    Thank you                                   Patient is a 81y old  Female who presents with a chief complaint of SOB (22 May 2022 16:06)      The patient is an 81 year old female with a PMH of COVID 19 in 2011, CHF, RLE ulcer, CKD, COPDm, Afib on Eliquis and CHF who was brought to the ED from [facility] for SOB and hypoxia. She was vaccinated with 3 doses of Moderna vaccine in the past but did not get the second booster dose. In the ED she was hypoxic with O2 sat 50% and was placed on BIBAP. CXR was suggestive of CHF and RLL infiltrate. COVID 19 PCR were positive. The patient had RLE ulcer for many months and she was treated by wound care specialists. She was om oral antibiotics for redness of RLE for a few days with no improvements. The patient reportedly did not take her oral diuretics for a few days. She was afebrile in the ED but had high WBC of 13K. She was given IV Vancomycin and Zosyn.         PAST MEDICAL & SURGICAL HISTORY: As in HPI.     Allergies    fluoxetine (Unknown)  NIFEdipine (Unknown)    Intolerances        REVIEW OF SYSTEMS:  No vomiting or diarrhea  No hematuria.      HOME MEDICATIONS:    MEDICATIONS  (STANDING):  aMIOdarone    Tablet 200 milliGRAM(s) Oral daily  apixaban 2.5 milliGRAM(s) Oral every 12 hours  carvedilol 25 milliGRAM(s) Oral two times a day  dexAMETHasone  Injectable 6 milliGRAM(s) IV Push daily  fenofibrate Tablet 145 milliGRAM(s) Oral daily  ferrous    sulfate 325 milliGRAM(s) Oral daily  lactated ringers. 1000 milliLiter(s) (60 mL/Hr) IV Continuous <Continuous>  losartan 100 milliGRAM(s) Oral daily  melatonin 5 milliGRAM(s) Oral at bedtime  remdesivir  IVPB   IV Intermittent   tiotropium 18 MICROgram(s) Capsule 1 Capsule(s) Inhalation daily    MEDICATIONS  (PRN):  acetaminophen     Tablet .. 650 milliGRAM(s) Oral every 6 hours PRN Temp greater or equal to 38C (100.4F)      Vital Signs Last 24 Hrs  T(C): 37.7 (22 May 2022 18:22), Max: 38.4 (22 May 2022 09:43)  T(F): 99.9 (22 May 2022 18:22), Max: 101.1 (22 May 2022 09:43)  HR: 69 (22 May 2022 18:22) (60 - 84)  BP: 160/66 (22 May 2022 18:22) (154/77 - 204/96)  BP(mean): --  RR: 25 (22 May 2022 18:22) (18 - 32)  SpO2: 100% (22 May 2022 18:22) (96% - 100%)    PHYSICAL EXAM:  HEENT: NC/AT  Neck: Soft  Lungs: Coarse Bs bilaterally, on BIBAP  Heart: RRR, no murmurs.  Abdomen: Soft, no tenderness.   Genital/ Rectal: No hale   Extremities: RLE ulcer with surrounding moderate erythema and swelling,   Neurologic: Awake  Vascular:    I&O's Summary      LABORATORY:                          11.0   13.75 )-----------( 308      ( 22 May 2022 09:43 )             35.7           05-22    138  |  102  |  32<H>  ----------------------------<  182<H>  4.2   |  26  |  1.50<H>    Ca    8.9      22 May 2022 09:43    TPro  7.7  /  Alb  3.5  /  TBili  0.5  /  DBili  x   /  AST  64<H>  /  ALT  39  /  AlkPhos  59  05-22      Rapid Respiratory Viral Panel Result        05-22 @ 10:04  Rapid RVP Detected  Coronovirus --  Adenovirus --  Bordetella Pertussis --  Chlamydia Pneumonia --  Entero/Rhinovirus--  HKU1 Coronovirus --  HMPV Coronovirus --  Influenza A --  Influenza AH1 --  Influenza AH1 2009 --  Influenza AH3 --  Influenza B --  Mycoplasma Pneumoniae --  NL63 Coronovirus --  OC43 Coronovirus --  Parainfluenza 1 --  Parainfluenza 2 --  Parainfluenza 3 --  Parainfluenza 4 --  Resp Syncytial Virus --      LABORATORY:    CBC Full  -  ( 22 May 2022 09:43 )  WBC Count : 13.75 K/uL  RBC Count : 3.53 M/uL  Hemoglobin : 11.0 g/dL  Hematocrit : 35.7 %  Platelet Count - Automated : 308 K/uL  Mean Cell Volume : 101.1 fl  Mean Cell Hemoglobin : 31.2 pg  Mean Cell Hemoglobin Concentration : 30.8 gm/dL  Auto Neutrophil # : 9.30 K/uL  Auto Lymphocyte # : 3.39 K/uL  Auto Monocyte # : 0.81 K/uL  Auto Eosinophil # : 0.11 K/uL  Auto Basophil # : 0.03 K/uL  Auto Neutrophil % : 67.6 %  Auto Lymphocyte % : 24.7 %  Auto Monocyte % : 5.9 %  Auto Eosinophil % : 0.8 %  Auto Basophil % : 0.2 %          05-22    138  |  102  |  32<H>  ----------------------------<  182<H>  4.2   |  26  |  1.50<H>    Ca    8.9      22 May 2022 09:43    TPro  7.7  /  Alb  3.5  /  TBili  0.5  /  DBili  x   /  AST  64<H>  /  ALT  39  /  AlkPhos  59  05-22      Assessment and Plan:    1. Pneumonia of lower lobes: aspiration vs COVID 19 pneumonia  2. Respiratory failure with hypoxia.  3. RLE cellulitis  4, RLE chronic ulcer.  5. CKD.    . The patient has respiratory failure and will need treatments for COVID 19 pneumonia.   . Add IV Dexamethasone 6 mg iv daily  . Add Remdesivir for 5 days.Monitor LFTS and renal function.  . Add IV Zosyn for possible aspiration pneumonia and RLE cellulitis.  . NPO. SLP evaluation for dysphagia.    Thank you

## 2022-05-22 NOTE — H&P ADULT - ASSESSMENT
The patient is an 81 year old female with a PMH of COVID 19 in 2011, CHF, RLE ulcer, CKD and CHF who was brought to the ED from [facility] for SOB and hypoxia. She was vaccinated with 3 doses of Moderna vaccine in the past but did not get the second booster dose.    w/ hx HTN, CHF, COPD, afib on eliquis p/w found with SOB since ~ 7am today at [facility]. NO known pain. EMS was called and found pt cyanotic and O2 56% on RA. EMS states 82% on 100% nrb. Pt on CPAP by EMS with sats in mid 90s. No known fever/chills. PT reported been off lasix over past couple days. No other acute co or changes    < from: Xray Chest 1 View- PORTABLE-Urgent (05.22.22 @ 09:54) >    ACC: [id number] EXAM:  XR CHEST PORTABLE URGENT 1V                          PROCEDURE DATE:  05/22/2022          INTERPRETATION:  CLINICAL INDICATION: 81 years  Female with SOB, CHF.    COMPARISON: None    The patient's chin obscures the left lung apex.    AP view of the chest demonstrates a large right lower lobe infiltrate.   There is mild pulmonary vascular congestion. There is no pleural   effusion. There is no pneumothorax.        Admit to telemetry unit for monitoring,send 3 sets of cardiac ensymes to rule out coronary event,obtain ECHO to evaluate LVEF,cardiology consult,continue current managmnet,O2 supply,anticoagulation plan as per cardiology consult    The heart is normal in size. Left-sided bipolar pacemaker. There is no   mediastinal or hilar mass.    Mild thoracic degenerative changes and osteopenia are present.    IMPRESSION:    Large right lower lobe infiltrates superimposed on pulmonary vascular   congestion. Pacemaker.    --- End of Report ---            PAUL MONROY MD; Attending Radiologist    < end of copied text >  < from: 12 Lead ECG (05.22.22 @ 09:28) >    Ventricular Rate 82 BPM    Atrial Rate 82 BPM    P-R Interval 208 ms    QRS Duration 162 ms    Q-T Interval 424 ms    QTC Calculation(Bazett) 495 ms    P Axis 77 degrees    R Axis 93 degrees    T Axis 24 degrees    Diagnosis Line Atrial-sensed ventricular-paced rhythm  Abnormal ECG  No previous ECGs available  Confirmed by dea Lopez ([id number]) on 5/22/2022 3:22:34 PM    < end of copied text >  sitivity (05.22.22 @ 13:48)   Troponin I, High Sensitivity Result: 138.8: Serial measurements of high sensitivity troponin I (hs TnI) showing rapid   upward or downward changes suggest acute myocardial injury. Please note   that a sustained elevation of hsTnI can be caused by renal disease,   chronic heart failure, sepsis, pulmonary embolism and other clinical   conditions. ng/L       Historical Values  Troponin I, High Sensitivity (05.22.22 @ 13:48)   Troponin I, High Sensitivity Result: 138.8: Serial measurements of high sensitivity troponin I (hs TnI) showing rapid   upward or downward changes suggest acute myocardial injury. Please note   that a sustained elevation of hsTnI can be caused by renal disease,   chronic heart failure, sepsis, pulmonary embolism and other clinical   conditions. ng/L   Troponin I, High Sensitivity (05.22.22 @ 09:43)   Troponin I, High Sensitivity Result: 21.2: Serial measurements of high sensitivity troponin I (hs TnI) showing rapid   upward or downward changes suggest acute myocardial injury. Please note   that a sustained elevation of hsTnI can be caused by renal disease,   chronic heart failure, sepsis, pulmonary embolism and other clinical   conditions. ng/L     80 yo F w/ hx HTN, CHF, COPD, afib on eliquis p/w found with SOB since ~ [facility]. NO known pain. EMS was called and found pt cyanotic and O2 56% on RA. EMS states 82% on 100% nrb. Pt on CPAP by EMS with sats in mid 90s. No known fever/chills. PT reported been off lasix over past couple days. No other acute co or changes Admitted  to telemetry unit for monitoring , send 3 sets of cardiac enzymes to rule out acute coronary event, obtain ECHO to evaluate LVEF, cardiology consult  ,continue current management, O2 supply, anticoagulation plan as per cardiology consult Admit to monitored unit for cardiac monitoring, obtain echo to evaluate LVEF, intravenous diuresis as per card consult , monitor ins/outs, monitor renal profile and electrolytes closely ,send 3 sets of enzymes, O2 supply, serial chest xrays, monitor weights and oral intake of fluids, nutritionist consult Palliative care consult requested ,to discuss advance directives and complete MOLST       [facility], RLE ulcer, CKD and CHF who was brought to the ED from [facility] for SOB and hypoxia. She was vaccinated with 3 doses of Moderna vaccine in the past but did not get the second booster dose.    w/ hx HTN, CHF, COPD, afib on eliquis p/w found with SOB since ~ 7am today at SNF. NO known pain. EMS was called and found pt cyanotic and O2 56% on RA. EMS states 82% on 100% nrb. Pt on CPAP by EMS with sats in mid 90s. No known fever/chills. PT reported been off lasix over past couple days. No other acute co or changes    < from: Xray Chest 1 View- PORTABLE-Urgent (05.22.22 @ 09:54) >    ACC: [id number] EXAM:  XR CHEST PORTABLE URGENT 1V                          PROCEDURE DATE:  05/22/2022          INTERPRETATION:  CLINICAL INDICATION: 81 years  Female with SOB, CHF.    COMPARISON: None    The patient's chin obscures the left lung apex.    AP view of the chest demonstrates a large right lower lobe infiltrate.   There is mild pulmonary vascular congestion. There is no pleural   effusion. There is no pneumothorax.        Admit to telemetry unit for monitoring,send 3 sets of cardiac ensymes to rule out coronary event,obtain ECHO to evaluate LVEF,cardiology consult,continue current managmnet,O2 supply,anticoagulation plan as per cardiology consult    The heart is normal in size. Left-sided bipolar pacemaker. There is no   mediastinal or hilar mass.    Mild thoracic degenerative changes and osteopenia are present.    IMPRESSION:    Large right lower lobe infiltrates superimposed on pulmonary vascular   congestion. Pacemaker.    --- End of Report ---            PAUL MONROY MD; Attending Radiologist    < end of copied text >  < from: 12 Lead ECG (05.22.22 @ 09:28) >    Ventricular Rate 82 BPM    Atrial Rate 82 BPM    P-R Interval 208 ms    QRS Duration 162 ms    Q-T Interval 424 ms    QTC Calculation(Bazett) 495 ms    P Axis 77 degrees    R Axis 93 degrees    T Axis 24 degrees    Diagnosis Line Atrial-sensed ventricular-paced rhythm  Abnormal ECG  No previous ECGs available  Confirmed by dea Lopez ([id number]) on 5/22/2022 3:22:34 PM    < end of copied text >  sitivity (05.22.22 @ 13:48)   Troponin I, High Sensitivity Result: 138.8: Serial measurements of high sensitivity troponin I (hs TnI) showing rapid   upward or downward changes suggest acute myocardial injury. Please note   that a sustained elevation of hsTnI can be caused by renal disease,   chronic heart failure, sepsis, pulmonary embolism and other clinical   conditions. ng/L       Historical Values  Troponin I, High Sensitivity (05.22.22 @ 13:48)   Troponin I, High Sensitivity Result: 138.8: Serial measurements of high sensitivity troponin I (hs TnI) showing rapid   upward or downward changes suggest acute myocardial injury. Please note   that a sustained elevation of hsTnI can be caused by renal disease,   chronic heart failure, sepsis, pulmonary embolism and other clinical   conditions. ng/L   Troponin I, High Sensitivity (05.22.22 @ 09:43)   Troponin I, High Sensitivity Result: 21.2: Serial measurements of high sensitivity troponin I (hs TnI) showing rapid   upward or downward changes suggest acute myocardial injury. Please note   that a sustained elevation of hsTnI can be caused by renal disease,   chronic heart failure, sepsis, pulmonary embolism and other clinical   conditions. ng/L

## 2022-05-22 NOTE — ED ADULT NURSE REASSESSMENT NOTE - NS ED NURSE REASSESS COMMENT FT1
Resting comfortably, remains on BIPAP all day.  VSS. Respirations no longer tachypneic.  Maintaining SPO2 >99%.  Spoke to daughter who is requesting pt to receive monoclonial antibodies. Call placed to Dr Allen to order antibodies, stating he will contact Dr Garcia and they will discuss if pt qualifies or not.

## 2022-05-22 NOTE — ED PROVIDER NOTE - PROGRESS NOTE DETAILS
Jordi Allen, will consult, accepts admit to [facility] tele Dw DR NAPOLEON Allen, will consult, accepts admit to [facility] (Andrew), tele Pt with moderate improvement, tolerating bipap well. Will admit.

## 2022-05-22 NOTE — ED PROVIDER NOTE - CARE PLAN
1 Principal Discharge DX:	Acute respiratory failure with hypoxia  Secondary Diagnosis:	Acute CHF  Secondary Diagnosis:	Febrile illness

## 2022-05-22 NOTE — CONSULT NOTE ADULT - SUBJECTIVE AND OBJECTIVE BOX
Date/Time Patient Seen:  		  Referring MD:   Data Reviewed	       Patient is a 81y old  Female who presents with a chief complaint of     Subjective/HPI   Child Abuse Assessment (patients less than 13 yrs):  Chief Complaint: shortness of breath.    · Chief Complaint: The patient is a 81y Female complaining of SOB  · Unable to Obtain: Severe Illness/Injury  · HPI Objective Statement: 82 yo F w/ hx HTN, CHF, COPD, afib on eliquis p/w found with SOB since ~ 7am today at [facility]. NO known pain. EMS was called and found pt cyanotic and O2 56% on RA. EMS states 82% on 100% nrb. Pt on CPAP by EMS with sats in mid 90s. No known fever/chills. PT reported been off lasix over past couple days. No other acute co or changes.    PAST MEDICAL & SURGICAL HISTORY:        Medication list         MEDICATIONS  (STANDING):    MEDICATIONS  (PRN):         Vitals log        ICU Vital Signs Last 24 Hrs  T(C): 38.4 (22 May 2022 09:43), Max: 38.4 (22 May 2022 09:43)  T(F): 101.1 (22 May 2022 09:43), Max: 101.1 (22 May 2022 09:43)  HR: 80 (22 May 2022 13:13) (66 - 84)  BP: 179/84 (22 May 2022 10:16) (179/84 - 204/96)  BP(mean): --  ABP: --  ABP(mean): --  RR: 29 (22 May 2022 10:16) (18 - 32)  SpO2: 100% (22 May 2022 13:13) (96% - 100%)           Input and Output:  I&O's Detail      Lab Data                        11.0   13.75 )-----------( 308      ( 22 May 2022 09:43 )             35.7     05-22    138  |  102  |  32<H>  ----------------------------<  182<H>  4.2   |  26  |  1.50<H>    Ca    8.9      22 May 2022 09:43    TPro  7.7  /  Alb  3.5  /  TBili  0.5  /  DBili  x   /  AST  64<H>  /  ALT  39  /  AlkPhos  59  05-22    ABG - ( 22 May 2022 13:05 )  pH, Arterial: 7.46  pH, Blood: x     /  pCO2: 43    /  pO2: 235   / HCO3: 31    / Base Excess: 6.8   /  SaO2: 98.4              CARDIAC MARKERS ( 22 May 2022 09:43 )  x     / x     / 50 U/L / x     / x            Review of Systems	  resp distress      Objective     Physical Examination    on NIPPV  heart s1s2  lung dec BS      Pertinent Lab findings & Imaging      Danielson:  NO   Adequate UO     I&O's Detail           Discussed with:     Cultures:	        Radiology      ACC: [id number] EXAM:  XR CHEST PORTABLE URGENT 1V                          PROCEDURE DATE:  05/22/2022          INTERPRETATION:  CLINICAL INDICATION: 81 years  Female with SOB, CHF.    COMPARISON: None    The patient's chin obscures the left lung apex.    AP view of the chest demonstrates a large right lower lobe infiltrate.   There is mild pulmonary vascular congestion. There is no pleural   effusion. There is no pneumothorax.    The heart is normal in size. Left-sided bipolar pacemaker. There is no   mediastinal or hilar mass.    Mild thoracic degenerative changes and osteopenia are present.    IMPRESSION:    Large right lower lobe infiltrates superimposed on pulmonary vascular   congestion. Pacemaker.    --- End of Report ---            PAUL MONROY MD; Attending Radiologist  This document has been electronically signed. May 22 2022 10:05AM

## 2022-05-22 NOTE — H&P ADULT - HISTORY OF PRESENT ILLNESS
The patient is an 81 year old female with a PMH of COVID 19 in 2011, CHF, RLE ulcer, CKD and CHF who was brought to the ED from [facility] for SOB and hypoxia. She was vaccinated with 3 doses of Moderna vaccine in the past but did not get the second booster dose.    w/ hx HTN, CHF, COPD, afib on eliquis p/w found with SOB since ~ 7am today at [facility]. NO known pain. EMS was called and found pt cyanotic and O2 56% on RA. EMS states 82% on 100% nrb. Pt on CPAP by EMS with sats in mid 90s. No known fever/chills. PT reported been off lasix over past couple days. No other acute co or changes    < from: Xray Chest 1 View- PORTABLE-Urgent (05.22.22 @ 09:54) >    ACC: [id number] EXAM:  XR CHEST PORTABLE URGENT 1V                          PROCEDURE DATE:  05/22/2022          INTERPRETATION:  CLINICAL INDICATION: 81 years  Female with SOB, CHF.    COMPARISON: None    The patient's chin obscures the left lung apex.    AP view of the chest demonstrates a large right lower lobe infiltrate.   There is mild pulmonary vascular congestion. There is no pleural   effusion. There is no pneumothorax.    The heart is normal in size. Left-sided bipolar pacemaker. There is no   mediastinal or hilar mass.    Mild thoracic degenerative changes and osteopenia are present.    IMPRESSION:    Large right lower lobe infiltrates superimposed on pulmonary vascular   congestion. Pacemaker.    --- End of Report ---            PAUL MONROY MD; Attending Radiologist    < end of copied text >  < from: 12 Lead ECG (05.22.22 @ 09:28) >    Ventricular Rate 82 BPM    Atrial Rate 82 BPM    P-R Interval 208 ms    QRS Duration 162 ms    Q-T Interval 424 ms    QTC Calculation(Bazett) 495 ms    P Axis 77 degrees    R Axis 93 degrees    T Axis 24 degrees    Diagnosis Line Atrial-sensed ventricular-paced rhythm  Abnormal ECG  No previous ECGs available  Confirmed by dea Lopez ([id number]) on 5/22/2022 3:22:34 PM    < end of copied text >  sitivity (05.22.22 @ 13:48)   Troponin I, High Sensitivity Result: 138.8: Serial measurements of high sensitivity troponin I (hs TnI) showing rapid   upward or downward changes suggest acute myocardial injury. Please note   that a sustained elevation of hsTnI can be caused by renal disease,   chronic heart failure, sepsis, pulmonary embolism and other clinical   conditions. ng/L       Historical Values  Troponin I, High Sensitivity (05.22.22 @ 13:48)   Troponin I, High Sensitivity Result: 138.8: Serial measurements of high sensitivity troponin I (hs TnI) showing rapid   upward or downward changes suggest acute myocardial injury. Please note   that a sustained elevation of hsTnI can be caused by renal disease,   chronic heart failure, sepsis, pulmonary embolism and other clinical   conditions. ng/L   Troponin I, High Sensitivity (05.22.22 @ 09:43)   Troponin I, High Sensitivity Result: 21.2: Serial measurements of high sensitivity troponin I (hs TnI) showing rapid   upward or downward changes suggest acute myocardial injury. Please note   that a sustained elevation of hsTnI can be caused by renal disease,   chronic heart failure, sepsis, pulmonary embolism and other clinical   conditions. ng/L  82 yo F w/ hx HTN, CHF, COPD, afib on eliquis p/w found with SOB since ~ [facility] at [facility]. NO known pain. EMS was called and found pt cyanotic and O2 56% on RA. EMS states 82% on 100% nrb. Pt on CPAP by EMS with sats in mid 90s. No known fever/chills. PT reported been off lasix over past couple days. No other acute co or changes Admitted  to telemetry unit for monitoring , send 3 sets of cardiac enzymes to rule out acute coronary event, obtain ECHO to evaluate LVEF, cardiology consult  ,continue current management, O2 supply, anticoagulation plan as per cardiology consult Admit to monitored unit for cardiac monitoring, obtain echo to evaluate LVEF, intravenous diuresis as per card consult , monitor ins/outs, monitor renal profile and electrolytes closely ,send 3 sets of enzymes, O2 supply, serial chest xrays, monitor weights and oral intake of fluids, nutritionist consult Palliative care consult requested ,to discuss advance directives and complete MOLST       TPMH of COVID 19 in 2011, CHF, RLE ulcer, CKD and CHF who was brought to the ED from [facility] for SOB and hypoxia. She was vaccinated with 3 doses of Moderna vaccine in the past but did not get the second booster dose.    w/ hx HTN, CHF, COPD, afib on eliquis p/w found with SOB since ~ 7am today at SNF. NO known pain. EMS was called and found pt cyanotic and O2 56% on RA. EMS states 82% on 100% nrb. Pt on CPAP by EMS with sats in mid 90s. No known fever/chills. PT reported been off lasix over past couple days. No other acute co or changes          The patient is an 81 year old female with a PMH of COVID 19 in 2011, CHF, RLE ulcer, CKD and CHF who was brought to the ED from [facility] for SOB and hypoxia. She was vaccinated with 3 doses of Moderna vaccine in the past but did not get the second booster dose.    w/ hx HTN, CHF, COPD, afib on eliquis p/w found with SOB since ~ 7am today at SNF. NO known pain. EMS was called and found pt cyanotic and O2 56% on RA. EMS states 82% on 100% nrb. Pt on CPAP by EMS with sats in mid 90s. No known fever/chills. PT reported been off lasix over past couple days. No other acute co or changes    < from: Xray Chest 1 View- PORTABLE-Urgent (05.22.22 @ 09:54) >    ACC: [id number] EXAM:  XR CHEST PORTABLE URGENT 1V                          PROCEDURE DATE:  05/22/2022          INTERPRETATION:  CLINICAL INDICATION: 81 years  Female with SOB, CHF.    COMPARISON: None    The patient's chin obscures the left lung apex.    AP view of the chest demonstrates a large right lower lobe infiltrate.   There is mild pulmonary vascular congestion. There is no pleural   effusion. There is no pneumothorax.    The heart is normal in size. Left-sided bipolar pacemaker. There is no   mediastinal or hilar mass.    Mild thoracic degenerative changes and osteopenia are present.    IMPRESSION:    Large right lower lobe infiltrates superimposed on pulmonary vascular   congestion. Pacemaker.    --- End of Report ---            PAUL MONROY MD; Attending Radiologist    < end of copied text >  < from: 12 Lead ECG (05.22.22 @ 09:28) >    Ventricular Rate 82 BPM    Atrial Rate 82 BPM    P-R Interval 208 ms    QRS Duration 162 ms    Q-T Interval 424 ms    QTC Calculation(Bazett) 495 ms    P Axis 77 degrees    R Axis 93 degrees    T Axis 24 degrees    Diagnosis Line Atrial-sensed ventricular-paced rhythm  Abnormal ECG  No previous ECGs available  Confirmed by dea Lopez ([id number]) on 5/22/2022 3:22:34 PM    < end of copied text >  sitivity (05.22.22 @ 13:48)   Troponin I, High Sensitivity Result: 138.8: Serial measurements of high sensitivity troponin I (hs TnI) showing rapid   upward or downward changes suggest acute myocardial injury. Please note   that a sustained elevation of hsTnI can be caused by renal disease,   chronic heart failure, sepsis, pulmonary embolism and other clinical   conditions. ng/L       Historical Values  Troponin I, High Sensitivity (05.22.22 @ 13:48)   Troponin I, High Sensitivity Result: 138.8: Serial measurements of high sensitivity troponin I (hs TnI) showing rapid   upward or downward changes suggest acute myocardial injury. Please note   that a sustained elevation of hsTnI can be caused by renal disease,   chronic heart failure, sepsis, pulmonary embolism and other clinical   conditions. ng/L   Troponin I, High Sensitivity (05.22.22 @ 09:43)   Troponin I, High Sensitivity Result: 21.2: Serial measurements of high sensitivity troponin I (hs TnI) showing rapid   upward or downward changes suggest acute myocardial injury. Please note   that a sustained elevation of hsTnI can be caused by renal disease,   chronic heart failure, sepsis, pulmonary embolism and other clinical   conditions. ng/L

## 2022-05-22 NOTE — CONSULT NOTE ADULT - SUBJECTIVE AND OBJECTIVE BOX
JAZ BAIG    PL[facility] APER 22    Allergies    fluoxetine (Unknown)  NIFEdipine (Unknown)    Intolerances        PAST MEDICAL & SURGICAL HISTORY:      FAMILY HISTORY:      Home Medications:  acetaminophen 325 mg oral tablet: 1 tab(s) orally every 6 hours, As Needed for pain (22 May 2022 10:28)  amiodarone 200 mg oral tablet: 1 tab(s) orally once a day (22 May 2022 10:28)  amLODIPine 5 mg oral tablet: 1 tab(s) orally once a day (22 May 2022 10:28)  ascorbic acid 500 mg oral tablet: 1 tab(s) orally once a day (22 May 2022 10:28)  carvedilol 25 mg oral tablet: 1 tab(s) orally 2 times a day (22 May 2022 10:28)  Eliquis 5 mg oral tablet: 1 tab(s) orally 2 times a day (22 May 2022 10:28)  ferrous sulfate 325 mg (65 mg elemental iron) oral tablet: 1 tab(s) orally once a day (22 May 2022 10:28)  folic acid 1 mg oral tablet: 1 tab(s) orally once a day (22 May 2022 10:28)  hydroCHLOROthiazide 50 mg oral tablet: 1 tab(s) orally once a day (22 May 2022 10:28)  losartan 25 mg oral tablet: 1 tab(s) orally once a day (also takes 50mg losartan, unclear of timing) (22 May 2022 10:28)  losartan 50 mg oral tablet: 1 tab(s) orally once a day (also takes losartan 25mg, unclear on timing) (22 May 2022 10:28)  Melatonin 5 mg oral tablet: 1 tab(s) orally once a day (at bedtime) (22 May 2022 10:28)  Multiple Vitamins oral tablet: 1 tab(s) orally once a day (22 May 2022 10:28)  potassium chloride 10 mEq oral tablet, extended release: 1 tab(s) orally once a day (22 May 2022 10:28)  pravastatin 40 mg oral tablet: 1 tab(s) orally once a day (22 May 2022 10:28)  Santyl 250 units/g topical ointment: Apply topically to affected area once a day (ulcer) (22 May 2022 10:28)  sertraline 25 mg oral tablet: 1 tab(s) orally once a day (22 May 2022 10:28)  torsemide 20 mg oral tablet: 1 tab(s) orally once a day (22 May 2022 10:28)  TriCor 145 mg oral tablet: 1 tab(s) orally once a day (22 May 2022 10:28)      MEDICATIONS  (STANDING):    MEDICATIONS  (PRN):              Vital Signs Last 24 Hrs  T(C): 38.4 (22 May 2022 09:43), Max: 38.4 (22 May 2022 09:43)  T(F): 101.1 (22 May 2022 09:43), Max: 101.1 (22 May 2022 09:43)  HR: 66 (22 May 2022 10:16) (66 - 84)  BP: 179/84 (22 May 2022 10:16) (179/84 - 204/96)  BP(mean): --  RR: 29 (22 May 2022 10:16) (18 - 32)  SpO2: 99% (22 May 2022 10:16) (96% - 99%)              LABS:                        11.0   13.75 )-----------( 308      ( 22 May 2022 09:43 )             35.7     05-    138  |  102  |  32<H>  ----------------------------<  182<H>  4.2   |  26  |  1.50<H>    Ca    8.9      22 May 2022 09:43    TPro  7.7  /  Alb  3.5  /  TBili  0.5  /  DBili  x   /  AST  64<H>  /  ALT  39  /  AlkPhos  59  05    PT/INR - ( 22 May 2022 09:43 )   PT: 14.4 sec;   INR: 1.23 ratio         PTT - ( 22 May 2022 09:43 )  PTT:27.7 sec  Urinalysis Basic - ( 22 May 2022 11:13 )    Color: Yellow / Appearance: Clear / S.010 / pH: x  Gluc: x / Ketone: Negative  / Bili: Negative / Urobili: Negative   Blood: x / Protein: 30 mg/dL / Nitrite: Negative   Leuk Esterase: Small / RBC: 0-2 /HPF / WBC 3-5   Sq Epi: x / Non Sq Epi: Occasional / Bacteria: Negative            WBC:  WBC Count: 13.75 K/uL (05-22 @ 09:43)      MICROBIOLOGY:  RECENT CULTURES:        CARDIAC MARKERS ( 22 May 2022 09:43 )  x     / x     / 50 U/L / x     / x            PT/INR - ( 22 May 2022 09:43 )   PT: 14.4 sec;   INR: 1.23 ratio         PTT - ( 22 May 2022 09:43 )  PTT:27.7 sec    Sodium:  Sodium, Serum: 138 mmol/L ( @ 09:43)      1.50 mg/dL :43      Hemoglobin:  Hemoglobin: 11.0 g/dL ( @ 09:43)      Platelets: Platelet Count - Automated: 308 K/uL ( @ 09:43)      LIVER FUNCTIONS - ( 22 May 2022 09:43 )  Alb: 3.5 g/dL / Pro: 7.7 g/dL / ALK PHOS: 59 U/L / ALT: 39 U/L / AST: 64 U/L / GGT: x             Urinalysis Basic - ( 22 May 2022 11:13 )    Color: Yellow / Appearance: Clear / S.010 / pH: x  Gluc: x / Ketone: Negative  / Bili: Negative / Urobili: Negative   Blood: x / Protein: 30 mg/dL / Nitrite: Negative   Leuk Esterase: Small / RBC: 0-2 /HPF / WBC 3-5   Sq Epi: x / Non Sq Epi: Occasional / Bacteria: Negative        RADIOLOGY & ADDITIONAL STUDIES:      MICROBIOLOGY:  RECENT CULTURES:

## 2022-05-22 NOTE — H&P ADULT - REASON FOR ADMISSION
82 yo F w/ hx HTN, CHF, COPD, afib on eliquis p/w found with SOB since ~ 7am today at [facility]F. NO known pain. EMS was called and found pt cyanotic and O2 56% on RA. EMS states 82% on 100% nrb. Pt on CPAP by EMS with sats in mid 90s. No known fever/chills. PT reported been off lasix over past couple days. No other acute co or changes Admitted  to telemetry unit for monitoring , send 3 sets of cardiac enzymes to rule out acute coronary event, obtain ECHO to evaluate LVEF, cardiology consult  ,continue current management, O2 supply, anticoagulation plan as per cardiology consult Admit to monitored unit for cardiac monitoring, obtain echo to evaluate LVEF, intravenous diuresis as per card consult , monitor ins/outs, monitor renal profile and electrolytes closely ,send 3 sets of enzymes, O2 supply, serial chest xrays, monitor weights and oral intake of fluids, nutritionist consult Palliative care consult requested ,to discuss advance directives and complete MOLST       TPMH of COVID 19 in 2011, CHF, RLE ulcer, CKD and CHF who was brought to the ED from [facility] for SOB and hypoxia. She was vaccinated with 3 doses of Moderna vaccine in the past but did not get the second booster dose.    w/ hx HTN, CHF, COPD, afib on eliquis p/w found with SOB since ~ 7am today at SNF. NO known pain. EMS was called and found pt cyanotic and O2 56% on RA. EMS states 82% on 100% nrb. Pt on CPAP by EMS with sats in mid 90s. No known fever/chills. PT reported been off lasix over past couple days. No other acute co or changes

## 2022-05-22 NOTE — H&P ADULT - NSHPADDITIONALINFOADULT_GEN_ALL_CORE
82 yo F w/ hx HTN, CHF, COPD, afib on eliquis p/w found with SOB since ~ 7am today at SNF. NO known pain. EMS was called and found pt cyanotic and O2 56% on RA. EMS states 82% on 100% nrb. Pt on CPAP by EMS with sats in mid 90s. No known fever/chills. PT reported been off lasix over past couple days. No other acute co or changes Admitted  to telemetry unit for monitoring , send 3 sets of cardiac enzymes to rule out acute coronary event, obtain ECHO to evaluate LVEF, cardiology consult  ,continue current management, O2 supply, anticoagulation plan as per cardiology consult Admit to monitored unit for cardiac monitoring, obtain echo to evaluate LVEF, intravenous diuresis as per card consult , monitor ins/outs, monitor renal profile and electrolytes closely ,send 3 sets of enzymes, O2 supply, serial chest xrays, monitor weights and oral intake of fluids, nutritionist consult Palliative care consult requested ,to discuss advance directives and complete MOLST       TPMH of COVID 19 in 2011, CHF, RLE ulcer, CKD and CHF who was brought to the ED from [facility] for SOB and hypoxia. She was vaccinated with 3 doses of Moderna vaccine in the past but did not get the second booster dose.    w/ hx HTN, CHF, COPD, afib on eliquis p/w found with SOB since ~ 7am today at SNF. NO known pain. EMS was called and found pt cyanotic and O2 56% on RA. EMS states 82% on 100% nrb. Pt on CPAP by EMS with sats in mid 90s. No known fever/chills. PT reported been off lasix over past couple days. No other acute co or changes

## 2022-05-22 NOTE — ED PROVIDER NOTE - ENMT, MLM
Airway patent, Nasal mucosa clear. Mouth with normal mucosa. Throat has no vesicles, no oropharyngeal exudates and uvula is midline. MM Moist. neck supple. no other acute co

## 2022-05-22 NOTE — CONSULT NOTE ADULT - ASSESSMENT
80 yo F w/ hx HTN, CHF, COPD, afib on eliquis p/w found with SOB  copd  CHF  AF  Obesity  OP  OA  Covid    on BIPAP  GOC discussion  assist with needs  Isolation for covid  monitor VS and HD and Sat  cvs rx regimen

## 2022-05-22 NOTE — ED ADULT TRIAGE NOTE - CHIEF COMPLAINT QUOTE
Pt BIB EMS on cpap for respiratory distress, per EMS found sitting on couch o2 sat in 50s, tachypneic and labored breathing. Pt on "water pill" which has not been taken x 2 days.

## 2022-05-22 NOTE — ED PROVIDER NOTE - OBJECTIVE STATEMENT
80 yo F w/ hx HTN, CHF, COPD, afib on eliquis p/w found with SOB since ~ 7am today at SNF. NO known pain. EMS was called and found pt cyanotic and O2 56% on RA. EMS states 82% on 100% nrb. Pt on CPAP by EMS with sats in mid 90s. No known fever/chills. PT reported been off lasix over past couple days. No other acute co or changes.

## 2022-05-22 NOTE — H&P ADULT - NSHPLABSRESULTS_GEN_ALL_CORE
11.0   13.75 )-----------( 308      ( 22 May 2022 09:43 )             35.7       CBC Full  -  ( 22 May 2022 09:43 )  WBC Count : 13.75 K/uL  RBC Count : 3.53 M/uL  Hemoglobin : 11.0 g/dL  Hematocrit : 35.7 %  Platelet Count - Automated : 308 K/uL  Mean Cell Volume : 101.1 fl  Mean Cell Hemoglobin : 31.2 pg  Mean Cell Hemoglobin Concentration : 30.8 gm/dL  Auto Neutrophil # : 9.30 K/uL  Auto Lymphocyte # : 3.39 K/uL  Auto Monocyte # : 0.81 K/uL  Auto Eosinophil # : 0.11 K/uL  Auto Basophil # : 0.03 K/uL  Auto Neutrophil % : 67.6 %  Auto Lymphocyte % : 24.7 %  Auto Monocyte % : 5.9 %  Auto Eosinophil % : 0.8 %  Auto Basophil % : 0.2 %          138  |  102  |  32<H>  ----------------------------<  182<H>  4.2   |  26  |  1.50<H>    Ca    8.9      22 May 2022 09:43    TPro  7.7  /  Alb  3.5  /  TBili  0.5  /  DBili  x   /  AST  64<H>  /  ALT  39  /  AlkPhos  59  05-22      CAPILLARY BLOOD GLUCOSE          Vital Signs Last 24 Hrs  T(C): 38.4 (22 May 2022 09:43), Max: 38.4 (22 May 2022 09:43)  T(F): 101.1 (22 May 2022 09:43), Max: 101.1 (22 May 2022 09:43)  HR: 80 (22 May 2022 13:13) (66 - 84)  BP: 179/84 (22 May 2022 10:16) (179/84 - 204/96)  BP(mean): --  RR: 29 (22 May 2022 10:16) (18 - 32)  SpO2: 100% (22 May 2022 13:13) (96% - 100%)    Urinalysis Basic - ( 22 May 2022 11:13 )    Color: Yellow / Appearance: Clear / S.010 / pH: x  Gluc: x / Ketone: Negative  / Bili: Negative / Urobili: Negative   Blood: x / Protein: 30 mg/dL / Nitrite: Negative   Leuk Esterase: Small / RBC: 0-2 /HPF / WBC 3-5   Sq Epi: x / Non Sq Epi: Occasional / Bacteria: Negative        PT/INR - ( 22 May 2022 09:43 )   PT: 14.4 sec;   INR: 1.23 ratio         PTT - ( 22 May 2022 09:43 )  PTT:27.7 sec

## 2022-05-22 NOTE — H&P ADULT - NSHPSOCIALHISTORY_GEN_ALL_CORE
Social History:    Marital Status:   Occupation:   Lives with:     Substance Use :  Tobacco Usage:  (   ) never smoked   (   ) former smoker   (   ) current smoker  (     ) pack year  (        ) last tobacco use date  Alcohol Usage:      Health Management     For female:   Last Mammo:   Last Pap:     For male:  Last prostate exam:          [  ] date:            (  ) findings      Immunization Hx:   (  ) flu shot                               (     ) date   (  ) pneumonia shot               (     ) date  (  ) tetanus                               (     ) date     (     ) Advanced Directives: (     ) declined   [  ] health care proxy Social History:    Marital Status:   Occupation:   Lives with:     Substance Use : none   Tobacco Usage:  (   ) never smoked   (   ) former smoker   (  no ) current smoker  (     ) pack year  (        ) last tobacco use date  Alcohol Usage: none       Health Management     For female:   Last Mammo:   Last Pap:     For male:  Last prostate exam:          [  ] date:            (  ) findings      Immunization Hx:   (  ) flu shot                               (     ) date   (  ) pneumonia shot               (     ) date  (  ) tetanus                               (     ) date     (     ) Advanced Directives: (     ) declined   [  ] health care proxy

## 2022-05-22 NOTE — CONSULT NOTE ADULT - ASSESSMENT
Initial evaluation/Pulmonary Critical Care consultation requested on 2022  by Dr MEADE  from Dr Garcia   Patient examined chart reviewed    HOSPITAL ADMISSION   PATIENT CAME  FROM (if information available)      REVIEW OF SYMPTOMS      Able to give (reliable) ROS  NO     PHYSICAL EXAM    HEENT Unremarkable  atraumatic   RESP Fair air entry EXP prolonged    Harsh breath sound Resp distres mild   CARDIAC S1 S2 No S3     NO JVD    ABDOMEN SOFT BS PRESENT NOT DISTENDED No hepatosplenomegaly   PEDAL EDEMA present No calf tenderness  NO rash       PATIENT PRESENTATION.  82 yo F w/ hx HTN, CHF, COPD, afib on eliquis p/w found with SOB since ~ 7am today at SNF. NO known pain. EMS was called and found pt cyanotic and O2 56% on RA. EMS states 82% on 100% nrb. Pt on CPAP by EMS with sats in mid 90s. No known fever/chills. PT reported been off lasix over past couple days. No other acute co or changes.  Pulmonary consulted 2022                              AGE/SEX.   81 f    DOA.  2022     CC .  2022 Pt BIB EMS on cpap for respiratory distress, per EMS found sitting on couch o2 sat in 50s, tachypneic and labored breathing. Pt on "water pill" which has not been taken x 2 days.  2022 SHORTNESS OF BREATH    ER MEDS GIVEN ALREADY 2022 12:09 PM  Lasix 60 zosyn vanco     PMH .  pmh Hypertension  pmh CHF  pmh COPD  pmh A fib on eliquis  pmh     NOTABLE HOME MEDS.  amiodarone 200 mg  amLODIPine 5   carvedilol 25 . 2  Eliquis 5 . 2     MAIN ISSUES .  SHORTNESS OF BREATH.  ACUTE RESP FAILURE poa 2022   COVID (+) 2022.  PNEUMONIA. 2022 T 101  HYPERTENSION. 2022    CHF.  A fib.   ANTICOAGULATION.   COPD.   KELSEY.      COVID/ICU/CODE STATUS.                       COVID  STATUS.    2022 covid (+) 2022        ICU STAY. none  GOC.  2022 full code    BEST PRACTICE ISSUES.                                                  HEAD OF BED ELEVATION. Yes  DVT PROPHYLAXIS.    2022 apixaba 2.5x2 af   MG PROPHYLAXIS.                                                                                        DIET.   2022 npo             VITALS/PO/IO/VENT/DRIPS.     2022 101 79 180/80  2022 14//70       GENERAL ISSUES .                                       ALLERGY.     fluoxetine nifedipine                        WEIGHT.         2022 79                           BMI.                  2022 34            PROBLEM DATA/ASSESSMENT RECOMMENDATIONS (A/R).    HEMODYNAMICS.   Monitor and optimize bp   Target MAP to martina  65 (+)    RESP.   Monitor and optimize  po   Target po to remain 90-95%    ABG.  BPAP 90%/ 746/43/235    PMH/PSH PROBLEMS.  Management continued/modified as indicated      SHORTNESS OF BREATH.  D/D COVID CHF Pneumonia     LACTICEMIA.  la 2022 la 2.5   Possibly sec chf v sepsis     ACUTE RESP FAILURE poa 2022 Placed on bpap   A/R wean as tolerated     HYPERTENSION.   2022      CAD.  Tr 2022 Tr 138  Cardio eval     CHF.  bnp 2022 3328   CXR 2022 Large rll infiltr superimposed on pvc pacemaker  A/R check echo         A fib.   2022 carvedilol 25 . 2  2022 Eliquis 5 . 2   Home meds continued     ANTICOAGULATION.   2022 Eliquis 5 . 2     COPD.   2022 spiriva     KELSEY.  Cr 2022 Cr 1.5  Monitor       TIME SPENT   Over 55 minutes aggregate care time spent on encounter; activities included   direct patient care, counseling and/or coordinating care reviewing notes, lab data/ imaging , discussion with multidisciplinary team/ patient  /family and explaining in detail risks, benefits, alternatives  of the recommendations     SAFIA SEBASTIAN AGE 81 f  1940    DOA 2022 ATTENDING DR LUISA MEADE

## 2022-05-23 DIAGNOSIS — U07.1 COVID-19: ICD-10-CM

## 2022-05-23 DIAGNOSIS — I48.91 UNSPECIFIED ATRIAL FIBRILLATION: ICD-10-CM

## 2022-05-23 DIAGNOSIS — I50.9 HEART FAILURE, UNSPECIFIED: ICD-10-CM

## 2022-05-23 DIAGNOSIS — J69.0 PNEUMONITIS DUE TO INHALATION OF FOOD AND VOMIT: ICD-10-CM

## 2022-05-23 DIAGNOSIS — J44.9 CHRONIC OBSTRUCTIVE PULMONARY DISEASE, UNSPECIFIED: ICD-10-CM

## 2022-05-23 DIAGNOSIS — R50.9 FEVER, UNSPECIFIED: ICD-10-CM

## 2022-05-23 DIAGNOSIS — E87.6 HYPOKALEMIA: ICD-10-CM

## 2022-05-23 DIAGNOSIS — Z29.9 ENCOUNTER FOR PROPHYLACTIC MEASURES, UNSPECIFIED: ICD-10-CM

## 2022-05-23 DIAGNOSIS — I10 ESSENTIAL (PRIMARY) HYPERTENSION: ICD-10-CM

## 2022-05-23 DIAGNOSIS — J96.01 ACUTE RESPIRATORY FAILURE WITH HYPOXIA: ICD-10-CM

## 2022-05-23 DIAGNOSIS — L03.90 CELLULITIS, UNSPECIFIED: ICD-10-CM

## 2022-05-23 LAB
ALBUMIN SERPL ELPH-MCNC: 2.8 G/DL — LOW (ref 3.3–5)
ALBUMIN SERPL ELPH-MCNC: 2.9 G/DL — LOW (ref 3.3–5)
ALP SERPL-CCNC: 39 U/L — LOW (ref 40–120)
ALP SERPL-CCNC: 40 U/L — SIGNIFICANT CHANGE UP (ref 40–120)
ALT FLD-CCNC: 136 U/L — HIGH (ref 12–78)
ALT FLD-CCNC: 139 U/L — HIGH (ref 12–78)
ANION GAP SERPL CALC-SCNC: 10 MMOL/L — SIGNIFICANT CHANGE UP (ref 5–17)
AST SERPL-CCNC: 188 U/L — HIGH (ref 15–37)
AST SERPL-CCNC: 188 U/L — HIGH (ref 15–37)
BILIRUB DIRECT SERPL-MCNC: 0.2 MG/DL — SIGNIFICANT CHANGE UP (ref 0–0.3)
BILIRUB INDIRECT FLD-MCNC: 0.3 MG/DL — SIGNIFICANT CHANGE UP (ref 0.2–1)
BILIRUB SERPL-MCNC: 0.5 MG/DL — SIGNIFICANT CHANGE UP (ref 0.2–1.2)
BILIRUB SERPL-MCNC: 0.5 MG/DL — SIGNIFICANT CHANGE UP (ref 0.2–1.2)
BUN SERPL-MCNC: 27 MG/DL — HIGH (ref 7–23)
CALCIUM SERPL-MCNC: 8.4 MG/DL — LOW (ref 8.5–10.1)
CHLORIDE SERPL-SCNC: 102 MMOL/L — SIGNIFICANT CHANGE UP (ref 96–108)
CHOLEST SERPL-MCNC: 112 MG/DL — SIGNIFICANT CHANGE UP
CO2 SERPL-SCNC: 30 MMOL/L — SIGNIFICANT CHANGE UP (ref 22–31)
CREAT SERPL-MCNC: 1.1 MG/DL — SIGNIFICANT CHANGE UP (ref 0.5–1.3)
CRP SERPL-MCNC: 91 MG/L — HIGH
D DIMER BLD IA.RAPID-MCNC: 494 NG/ML DDU — HIGH
EGFR: 50 ML/MIN/1.73M2 — LOW
GLUCOSE SERPL-MCNC: 87 MG/DL — SIGNIFICANT CHANGE UP (ref 70–99)
HCT VFR BLD CALC: 27.6 % — LOW (ref 34.5–45)
HDLC SERPL-MCNC: 46 MG/DL — LOW
HGB BLD-MCNC: 8.8 G/DL — LOW (ref 11.5–15.5)
INR BLD: 1.5 RATIO — HIGH (ref 0.88–1.16)
LACTATE SERPL-SCNC: 0.7 MMOL/L — SIGNIFICANT CHANGE UP (ref 0.7–2)
LIPID PNL WITH DIRECT LDL SERPL: 53 MG/DL — SIGNIFICANT CHANGE UP
MCHC RBC-ENTMCNC: 31.9 GM/DL — LOW (ref 32–36)
MCHC RBC-ENTMCNC: 32 PG — SIGNIFICANT CHANGE UP (ref 27–34)
MCV RBC AUTO: 100.4 FL — HIGH (ref 80–100)
MRSA PCR RESULT.: SIGNIFICANT CHANGE UP
NON HDL CHOLESTEROL: 66 MG/DL — SIGNIFICANT CHANGE UP
NRBC # BLD: 0 /100 WBCS — SIGNIFICANT CHANGE UP (ref 0–0)
PHOSPHATE SERPL-MCNC: 3.5 MG/DL — SIGNIFICANT CHANGE UP (ref 2.5–4.5)
PLATELET # BLD AUTO: 157 K/UL — SIGNIFICANT CHANGE UP (ref 150–400)
POTASSIUM SERPL-MCNC: 3.1 MMOL/L — LOW (ref 3.5–5.3)
POTASSIUM SERPL-SCNC: 3.1 MMOL/L — LOW (ref 3.5–5.3)
PROCALCITONIN SERPL-MCNC: 4.91 NG/ML — HIGH (ref 0–0.04)
PROT SERPL-MCNC: 6.3 G/DL — SIGNIFICANT CHANGE UP (ref 6–8.3)
PROT SERPL-MCNC: 6.4 G/DL — SIGNIFICANT CHANGE UP (ref 6–8.3)
PROTHROM AB SERPL-ACNC: 17.6 SEC — HIGH (ref 10.5–13.4)
RBC # BLD: 2.75 M/UL — LOW (ref 3.8–5.2)
RBC # FLD: 14.6 % — HIGH (ref 10.3–14.5)
S AUREUS DNA NOSE QL NAA+PROBE: SIGNIFICANT CHANGE UP
SODIUM SERPL-SCNC: 142 MMOL/L — SIGNIFICANT CHANGE UP (ref 135–145)
TRIGL SERPL-MCNC: 67 MG/DL — SIGNIFICANT CHANGE UP
TROPONIN I, HIGH SENSITIVITY RESULT: 32.4 NG/L — SIGNIFICANT CHANGE UP
TROPONIN I, HIGH SENSITIVITY RESULT: 45.2 NG/L — SIGNIFICANT CHANGE UP
TROPONIN I, HIGH SENSITIVITY RESULT: 47.1 NG/L — SIGNIFICANT CHANGE UP
TSH SERPL-MCNC: 0.44 UIU/ML — SIGNIFICANT CHANGE UP (ref 0.36–3.74)
WBC # BLD: 4.94 K/UL — SIGNIFICANT CHANGE UP (ref 3.8–10.5)
WBC # FLD AUTO: 4.94 K/UL — SIGNIFICANT CHANGE UP (ref 3.8–10.5)

## 2022-05-23 PROCEDURE — 93010 ELECTROCARDIOGRAM REPORT: CPT

## 2022-05-23 RX ORDER — ONDANSETRON 8 MG/1
4 TABLET, FILM COATED ORAL EVERY 8 HOURS
Refills: 0 | Status: DISCONTINUED | OUTPATIENT
Start: 2022-05-23 | End: 2022-05-31

## 2022-05-23 RX ORDER — SODIUM CHLORIDE 9 MG/ML
1000 INJECTION, SOLUTION INTRAVENOUS
Refills: 0 | Status: DISCONTINUED | OUTPATIENT
Start: 2022-05-23 | End: 2022-05-25

## 2022-05-23 RX ORDER — CEFTRIAXONE 500 MG/1
1000 INJECTION, POWDER, FOR SOLUTION INTRAMUSCULAR; INTRAVENOUS EVERY 24 HOURS
Refills: 0 | Status: DISCONTINUED | OUTPATIENT
Start: 2022-05-24 | End: 2022-05-26

## 2022-05-23 RX ORDER — POTASSIUM CHLORIDE 20 MEQ
20 PACKET (EA) ORAL
Refills: 0 | Status: DISCONTINUED | OUTPATIENT
Start: 2022-05-24 | End: 2022-05-28

## 2022-05-23 RX ORDER — APIXABAN 2.5 MG/1
5 TABLET, FILM COATED ORAL EVERY 12 HOURS
Refills: 0 | Status: DISCONTINUED | OUTPATIENT
Start: 2022-05-23 | End: 2022-05-29

## 2022-05-23 RX ORDER — FUROSEMIDE 40 MG
40 TABLET ORAL DAILY
Refills: 0 | Status: DISCONTINUED | OUTPATIENT
Start: 2022-05-23 | End: 2022-05-26

## 2022-05-23 RX ORDER — POTASSIUM CHLORIDE 20 MEQ
40 PACKET (EA) ORAL ONCE
Refills: 0 | Status: COMPLETED | OUTPATIENT
Start: 2022-05-23 | End: 2022-05-23

## 2022-05-23 RX ADMIN — LOSARTAN POTASSIUM 100 MILLIGRAM(S): 100 TABLET, FILM COATED ORAL at 05:35

## 2022-05-23 RX ADMIN — Medication 40 MILLIEQUIVALENT(S): at 19:44

## 2022-05-23 RX ADMIN — Medication 6 MILLIGRAM(S): at 05:35

## 2022-05-23 RX ADMIN — SODIUM CHLORIDE 35 MILLILITER(S): 9 INJECTION, SOLUTION INTRAVENOUS at 19:45

## 2022-05-23 RX ADMIN — SODIUM CHLORIDE 60 MILLILITER(S): 9 INJECTION, SOLUTION INTRAVENOUS at 11:33

## 2022-05-23 RX ADMIN — PIPERACILLIN AND TAZOBACTAM 25 GRAM(S): 4; .5 INJECTION, POWDER, LYOPHILIZED, FOR SOLUTION INTRAVENOUS at 05:35

## 2022-05-23 RX ADMIN — APIXABAN 2.5 MILLIGRAM(S): 2.5 TABLET, FILM COATED ORAL at 05:35

## 2022-05-23 RX ADMIN — Medication 5 MILLIGRAM(S): at 22:33

## 2022-05-23 RX ADMIN — PIPERACILLIN AND TAZOBACTAM 25 GRAM(S): 4; .5 INJECTION, POWDER, LYOPHILIZED, FOR SOLUTION INTRAVENOUS at 22:25

## 2022-05-23 RX ADMIN — Medication 40 MILLIGRAM(S): at 06:52

## 2022-05-23 RX ADMIN — Medication 325 MILLIGRAM(S): at 11:33

## 2022-05-23 RX ADMIN — REMDESIVIR 500 MILLIGRAM(S): 5 INJECTION INTRAVENOUS at 22:24

## 2022-05-23 RX ADMIN — CARVEDILOL PHOSPHATE 25 MILLIGRAM(S): 80 CAPSULE, EXTENDED RELEASE ORAL at 17:29

## 2022-05-23 RX ADMIN — AMIODARONE HYDROCHLORIDE 200 MILLIGRAM(S): 400 TABLET ORAL at 05:35

## 2022-05-23 RX ADMIN — Medication 145 MILLIGRAM(S): at 11:34

## 2022-05-23 RX ADMIN — APIXABAN 5 MILLIGRAM(S): 2.5 TABLET, FILM COATED ORAL at 17:29

## 2022-05-23 RX ADMIN — PIPERACILLIN AND TAZOBACTAM 25 GRAM(S): 4; .5 INJECTION, POWDER, LYOPHILIZED, FOR SOLUTION INTRAVENOUS at 15:45

## 2022-05-23 RX ADMIN — TIOTROPIUM BROMIDE 1 CAPSULE(S): 18 CAPSULE ORAL; RESPIRATORY (INHALATION) at 06:52

## 2022-05-23 RX ADMIN — CARVEDILOL PHOSPHATE 25 MILLIGRAM(S): 80 CAPSULE, EXTENDED RELEASE ORAL at 05:35

## 2022-05-23 NOTE — PROGRESS NOTE ADULT - ASSESSMENT
HPI:  80 yo F w/ hx HTN, CHF, COPD, afib on eliquis p/w found with SOB since ~ 7am today at SNF. NO known pain. EMS was called and found pt cyanotic and O2 56% on RA. EMS states 82% on 100% nrb. Pt on CPAP by EMS with sats in mid 90s. No known fever/chills. PT reported been off lasix over past couple days. No other acute co or changes Admitted  to telemetry unit for monitoring , send 3 sets of cardiac enzymes to rule out acute coronary event, obtain ECHO to evaluate LVEF, cardiology consult  ,continue current management, O2 supply, anticoagulation plan as per cardiology consult Admit to monitored unit for cardiac monitoring, obtain echo to evaluate LVEF, intravenous diuresis as per card consult , monitor ins/outs, monitor renal profile and electrolytes closely ,send 3 sets of enzymes, O2 supply, serial chest xrays, monitor weights and oral intake of fluids, nutritionist consult Palliative care consult requested ,to discuss advance directives and complete MOLST       TPMH of COVID 19 in 2011, CHF, RLE ulcer, CKD and CHF who was brought to the ED from [facility] for SOB and hypoxia. She was vaccinated with 3 doses of Moderna vaccine in the past but did not get the second booster dose.    w/ hx HTN, CHF, COPD, afib on eliquis p/w found with SOB since ~ 7am today at SNF. NO known pain. EMS was called and found pt cyanotic and O2 56% on RA. EMS states 82% on 100% nrb. Pt on CPAP by EMS with sats in mid 90s. No known fever/chills. PT reported been off lasix over past couple days. No other acute co or changes          The patient is an 81 year old female with a PMH of COVID 19 in 2011, CHF, RLE ulcer, CKD and CHF who was brought to the ED from [facility] for SOB and hypoxia. She was vaccinated with 3 doses of Moderna vaccine in the past but did not get the second booster dose.    w/ hx HTN, CHF, COPD, afib on eliquis p/w found with SOB since ~ 7am today at SNF. NO known pain. EMS was called and found pt cyanotic and O2 56% on RA. EMS states 82% on 100% nrb. Pt on CPAP by EMS with sats in mid 90s. No known fever/chills. PT reported been off lasix over past couple days. No other acute co or changes    < from: Xray Chest 1 View- PORTABLE-Urgent (05.22.22 @ 09:54) >    ACC: [id number] EXAM:  XR CHEST PORTABLE URGENT 1V                          PROCEDURE DATE:  05/22/2022          INTERPRETATION:  CLINICAL INDICATION: 81 years  Female with SOB, CHF.    COMPARISON: None    The patient's chin obscures the left lung apex.    AP view of the chest demonstrates a large right lower lobe infiltrate.   There is mild pulmonary vascular congestion. There is no pleural   effusion. There is no pneumothorax.    The heart is normal in size. Left-sided bipolar pacemaker. There is no   mediastinal or hilar mass.    Mild thoracic degenerative changes and osteopenia are present.    IMPRESSION:    Large right lower lobe infiltrates superimposed on pulmonary vascular   congestion. Pacemaker.    --- End of Report ---            PAUL MONROY MD; Attending Radiologist    < end of copied text >  < from: 12 Lead ECG (05.22.22 @ 09:28) >    Ventricular Rate 82 BPM    Atrial Rate 82 BPM    P-R Interval 208 ms    QRS Duration 162 ms    Q-T Interval 424 ms    QTC Calculation(Bazett) 495 ms    P Axis 77 degrees    R Axis 93 degrees    T Axis 24 degrees    Diagnosis Line Atrial-sensed ventricular-paced rhythm  Abnormal ECG  No previous ECGs available  Confirmed by dea Lopez ([id number]) on 5/22/2022 3:22:34 PM    < end of copied text >  sitivity (05.22.22 @ 13:48)   Troponin I, High Sensitivity Result: 138.8: Serial measurements of high sensitivity troponin I (hs TnI) showing rapid   upward or downward changes suggest acute myocardial injury. Please note   that a sustained elevation of hsTnI can be caused by renal disease,   chronic heart failure, sepsis, pulmonary embolism and other clinical   conditions. ng/L       Historical Values  Troponin I, High Sensitivity (05.22.22 @ 13:48)   Troponin I, High Sensitivity Result: 138.8: Serial measurements of high sensitivity troponin I (hs TnI) showing rapid   upward or downward changes suggest acute myocardial injury. Please note   that a sustained elevation of hsTnI can be caused by renal disease,   chronic heart failure, sepsis, pulmonary embolism and other clinical   conditions. ng/L   Troponin I, High Sensitivity (05.22.22 @ 09:43)   Troponin I, High Sensitivity Result: 21.2: Serial measurements of high sensitivity troponin I (hs TnI) showing rapid   upward or downward changes suggest acute myocardial injury. Please note   that a sustained elevation of hsTnI can be caused by renal disease,   chronic heart failure, sepsis, pulmonary embolism and other clinical   conditions. ng/L  (22 May 2022 12:13)   82 yo F w/ hx HTN, CHF, COPD, afib on eliquis p/w found with SOB since ~ 7am today at SNF. NO known pain. EMS was called and found pt cyanotic and O2 56% on RA. EMS states 82% on 100% nrb. Pt on CPAP by EMS with sats in mid 90s. No known fever/chills. PT reported been off lasix over past couple days. No other acute co or changes Admitted  to telemetry unit for monitoring , send 3 sets of cardiac enzymes to rule out acute coronary event, obtain ECHO to evaluate LVEF, cardiology consult  ,continue current management, O2 supply, anticoagulation plan as per cardiology consult Admit to monitored unit for cardiac monitoring, obtain echo to evaluate LVEF, intravenous diuresis as per card consult , monitor ins/outs, monitor renal profile and electrolytes closely ,send 3 sets of enzymes, O2 supply, serial chest xrays, monitor weights and oral intake of fluids, nutritionist consult Palliative care consult requested ,to discuss advance directives and complete MOLST     ACUTE RENAL FAILURE:   Serum creatinine is  improved   There is no progression . No uremic symptoms  No evidence of anemia .  Fluid status stable.  Will continue to avoid nephrotoxic drugs.  Patient remains asymptomatic.   Continue current therapy.  hold  diuretic.  hold   ACE inhibitor.  hold   ARB.  Additional evaluation:   ECG,    echocardiogram,     CXR,  will obtained recent   renal ultrasound to evalaute kidney size and possible stones ,

## 2022-05-23 NOTE — INPATIENT CERTIFICATION FOR MEDICARE PATIENTS - IN ORDER TO MEET MEDICARE REQUIREMENTS.
In order to meet Medicare requirements, the clinical documentation must support the information cited in the admission order.  Please be sure to provide detailed and clear documentation about the following in the admitting note/history and physical:
Principal Discharge DX:	Chest pain, unspecified type

## 2022-05-23 NOTE — PROGRESS NOTE ADULT - SUBJECTIVE AND OBJECTIVE BOX
Interval History:    CENTRAL LINE:   [  ] YES       [  ] NO  WILLIS:                 [  ] YES       [  ] NO         REVIEW OF SYSTEMS:  All Systems below were reviewed and are negative [  ]  HEENT:  ID:  Pulmonary:  Cardiac:  GI:  Renal:  Musculoskeletal:  All other systems above were reviewed and are negative   [  ]      MEDICATIONS  (STANDING):  aMIOdarone    Tablet 200 milliGRAM(s) Oral daily  apixaban 5 milliGRAM(s) Oral every 12 hours  carvedilol 25 milliGRAM(s) Oral two times a day  dexAMETHasone  Injectable 6 milliGRAM(s) IV Push daily  dextrose 5% + sodium chloride 0.45%. 1000 milliLiter(s) (35 mL/Hr) IV Continuous <Continuous>  fenofibrate Tablet 145 milliGRAM(s) Oral daily  ferrous    sulfate 325 milliGRAM(s) Oral daily  furosemide   Injectable 40 milliGRAM(s) IV Push daily  losartan 100 milliGRAM(s) Oral daily  melatonin 5 milliGRAM(s) Oral at bedtime  piperacillin/tazobactam IVPB.. 3.375 Gram(s) IV Intermittent every 8 hours  potassium chloride    Tablet ER 40 milliEquivalent(s) Oral once  remdesivir  IVPB 100 milliGRAM(s) IV Intermittent every 24 hours  remdesivir  IVPB   IV Intermittent   tiotropium 18 MICROgram(s) Capsule 1 Capsule(s) Inhalation daily    MEDICATIONS  (PRN):  acetaminophen     Tablet .. 650 milliGRAM(s) Oral every 6 hours PRN Temp greater or equal to 38C (100.4F)  aluminum hydroxide/magnesium hydroxide/simethicone Suspension 30 milliLiter(s) Oral every 4 hours PRN Dyspepsia  ondansetron Injectable 4 milliGRAM(s) IV Push every 8 hours PRN Nausea and/or Vomiting      Vital Signs Last 24 Hrs  T(C): 36.6 (23 May 2022 12:35), Max: 36.8 (23 May 2022 05:30)  T(F): 97.9 (23 May 2022 12:35), Max: 98.2 (23 May 2022 05:30)  HR: 61 (23 May 2022 12:35) (60 - 70)  BP: 167/70 (23 May 2022 12:35) (129/69 - 170/81)  BP(mean): --  RR: 19 (23 May 2022 12:35) (18 - 20)  SpO2: 98% (23 May 2022 12:35) (98% - 100%)    I&O's Summary    22 May 2022 07:01  -  23 May 2022 07:00  --------------------------------------------------------  IN: 600 mL / OUT: 0 mL / NET: 600 mL    23 May 2022 07:01  -  23 May 2022 19:13  --------------------------------------------------------  IN: 0 mL / OUT: 1200 mL / NET: -1200 mL        PHYSICAL EXAM:  HEENT: NC/AT; PERRLA  Neck: Soft; no tenderness  Lungs: CTA bilaterally; no wheezing.   Heart:  Abdomen:  Genital/ Rectal:  Extremities:  Neurologic:  Vascular:      LABORATORY:    CBC Full  -  ( 23 May 2022 08:41 )  WBC Count : 4.94 K/uL  RBC Count : 2.75 M/uL  Hemoglobin : 8.8 g/dL  Hematocrit : 27.6 %  Platelet Count - Automated : 157 K/uL  Mean Cell Volume : 100.4 fl  Mean Cell Hemoglobin : 32.0 pg  Mean Cell Hemoglobin Concentration : 31.9 gm/dL  Auto Neutrophil # : x  Auto Lymphocyte # : x  Auto Monocyte # : x  Auto Eosinophil # : x  Auto Basophil # : x  Auto Neutrophil % : x  Auto Lymphocyte % : x  Auto Monocyte % : x  Auto Eosinophil % : x  Auto Basophil % : x      ESR:                   05-23 @ 11:51  --    C-Reactive Protein:     05-23 @ 11:51  91    Procalcitonin:           05-23 @ 11:51   --  ESR:                   05-23 @ 08:41  --    C-Reactive Protein:     05-23 @ 08:41  --    Procalcitonin:           05-23 @ 08:41   4.91      05-23    142  |  102  |  27<H>  ----------------------------<  87  3.1<L>   |  30  |  1.10    Ca    8.4<L>      23 May 2022 08:41  Phos  3.5     05-23    TPro  6.4  /  Alb  2.8<L>  /  TBili  0.5  /  DBili  0.2  /  AST  188<H>  /  ALT  136<H>  /  AlkPhos  40  05-23      Rapid Respiratory Viral Panel Result        05-22 @ 10:04  Rapid RVP Detected  Coronovirus --  Adenovirus --  Bordetella Pertussis --  Chlamydia Pneumonia --  Entero/Rhinovirus--  HKU1 Coronovirus --  HMPV Coronovirus --  Influenza A --  Influenza AH1 --  Influenza AH1 2009 --  Influenza AH3 --  Influenza B --  Mycoplasma Pneumoniae --  NL63 Coronovirus --  OC43 Coronovirus --  Parainfluenza 1 --  Parainfluenza 2 --  Parainfluenza 3 --  Parainfluenza 4 --  Resp Syncytial Virus --      Assessment and Plan:          Emir Tomas MD   [phone].    No fevers      MEDICATIONS  (STANDING):  aMIOdarone    Tablet 200 milliGRAM(s) Oral daily  apixaban 5 milliGRAM(s) Oral every 12 hours  carvedilol 25 milliGRAM(s) Oral two times a day  dexAMETHasone  Injectable 6 milliGRAM(s) IV Push daily  dextrose 5% + sodium chloride 0.45%. 1000 milliLiter(s) (35 mL/Hr) IV Continuous <Continuous>  fenofibrate Tablet 145 milliGRAM(s) Oral daily  ferrous    sulfate 325 milliGRAM(s) Oral daily  furosemide   Injectable 40 milliGRAM(s) IV Push daily  losartan 100 milliGRAM(s) Oral daily  melatonin 5 milliGRAM(s) Oral at bedtime  piperacillin/tazobactam IVPB.. 3.375 Gram(s) IV Intermittent every 8 hours  potassium chloride    Tablet ER 40 milliEquivalent(s) Oral once  remdesivir  IVPB 100 milliGRAM(s) IV Intermittent every 24 hours  remdesivir  IVPB   IV Intermittent   tiotropium 18 MICROgram(s) Capsule 1 Capsule(s) Inhalation daily    MEDICATIONS  (PRN):  acetaminophen     Tablet .. 650 milliGRAM(s) Oral every 6 hours PRN Temp greater or equal to 38C (100.4F)  aluminum hydroxide/magnesium hydroxide/simethicone Suspension 30 milliLiter(s) Oral every 4 hours PRN Dyspepsia  ondansetron Injectable 4 milliGRAM(s) IV Push every 8 hours PRN Nausea and/or Vomiting      Vital Signs Last 24 Hrs  T(C): 36.6 (23 May 2022 12:35), Max: 36.8 (23 May 2022 05:30)  T(F): 97.9 (23 May 2022 12:35), Max: 98.2 (23 May 2022 05:30)  HR: 61 (23 May 2022 12:35) (60 - 70)  BP: 167/70 (23 May 2022 12:35) (129/69 - 170/81)  BP(mean): --  RR: 19 (23 May 2022 12:35) (18 - 20)  SpO2: 98% (23 May 2022 12:35) (98% - 100%)    I&O's Summary    22 May 2022 07:01  -  23 May 2022 07:00  --------------------------------------------------------  IN: 600 mL / OUT: 0 mL / NET: 600 mL    23 May 2022 07:01  -  23 May 2022 19:13  --------------------------------------------------------  IN: 0 mL / OUT: 1200 mL / NET: -1200 mL        PHYSICAL EXAM:  HEENT: NC/AT; PERRLA  Neck: Soft; no tenderness  Lungs: CTA bilaterally; no wheezing.   Heart:  Abdomen:  Genital/ Rectal:  Extremities:  Neurologic:  Vascular:      LABORATORY:    CBC Full  -  ( 23 May 2022 08:41 )  WBC Count : 4.94 K/uL  RBC Count : 2.75 M/uL  Hemoglobin : 8.8 g/dL  Hematocrit : 27.6 %  Platelet Count - Automated : 157 K/uL  Mean Cell Volume : 100.4 fl  Mean Cell Hemoglobin : 32.0 pg  Mean Cell Hemoglobin Concentration : 31.9 gm/dL  Auto Neutrophil # : x  Auto Lymphocyte # : x  Auto Monocyte # : x  Auto Eosinophil # : x  Auto Basophil # : x  Auto Neutrophil % : x  Auto Lymphocyte % : x  Auto Monocyte % : x  Auto Eosinophil % : x  Auto Basophil % : x      ESR:                   05-23 @ 11:51  --    C-Reactive Protein:     05-23 @ 11:51  91    Procalcitonin:           05-23 @ 11:51   --  ESR:                   05-23 @ 08:41  --    C-Reactive Protein:     05-23 @ 08:41  --    Procalcitonin:           05-23 @ 08:41   4.91      05-23    142  |  102  |  27<H>  ----------------------------<  87  3.1<L>   |  30  |  1.10    Ca    8.4<L>      23 May 2022 08:41  Phos  3.5     05-23    TPro  6.4  /  Alb  2.8<L>  /  TBili  0.5  /  DBili  0.2  /  AST  188<H>  /  ALT  136<H>  /  AlkPhos  40  05-23      Assessment and Plan:    1. Pneumonia of lower lobes: aspiration vs COVID 19 pneumonia  2. Respiratory failure with hypoxia.  3. RLE cellulitis  4, RLE chronic ulcer.  5. CKD.    . The patient has respiratory failure and will need treatments for COVID 19 pneumonia.   . Add IV Dexamethasone 6 mg iv daily  . Add Remdesivir for 5 days.Monitor LFTS and renal function.  . Change IV Zosyn to IV Rocephin and po Doxy  . NPO. SLP evaluation for dysphagia.        Emir Tomas MD   [phone].    No fevers  Still on BIBAP        MEDICATIONS  (STANDING):  aMIOdarone    Tablet 200 milliGRAM(s) Oral daily  apixaban 5 milliGRAM(s) Oral every 12 hours  carvedilol 25 milliGRAM(s) Oral two times a day  dexAMETHasone  Injectable 6 milliGRAM(s) IV Push daily  dextrose 5% + sodium chloride 0.45%. 1000 milliLiter(s) (35 mL/Hr) IV Continuous <Continuous>  fenofibrate Tablet 145 milliGRAM(s) Oral daily  ferrous    sulfate 325 milliGRAM(s) Oral daily  furosemide   Injectable 40 milliGRAM(s) IV Push daily  losartan 100 milliGRAM(s) Oral daily  melatonin 5 milliGRAM(s) Oral at bedtime  piperacillin/tazobactam IVPB.. 3.375 Gram(s) IV Intermittent every 8 hours  potassium chloride    Tablet ER 40 milliEquivalent(s) Oral once  remdesivir  IVPB 100 milliGRAM(s) IV Intermittent every 24 hours  remdesivir  IVPB   IV Intermittent   tiotropium 18 MICROgram(s) Capsule 1 Capsule(s) Inhalation daily    MEDICATIONS  (PRN):  acetaminophen     Tablet .. 650 milliGRAM(s) Oral every 6 hours PRN Temp greater or equal to 38C (100.4F)  aluminum hydroxide/magnesium hydroxide/simethicone Suspension 30 milliLiter(s) Oral every 4 hours PRN Dyspepsia  ondansetron Injectable 4 milliGRAM(s) IV Push every 8 hours PRN Nausea and/or Vomiting      Vital Signs Last 24 Hrs  T(C): 36.6 (23 May 2022 12:35), Max: 36.8 (23 May 2022 05:30)  T(F): 97.9 (23 May 2022 12:35), Max: 98.2 (23 May 2022 05:30)  HR: 61 (23 May 2022 12:35) (60 - 70)  BP: 167/70 (23 May 2022 12:35) (129/69 - 170/81)  BP(mean): --  RR: 19 (23 May 2022 12:35) (18 - 20)  SpO2: 98% (23 May 2022 12:35) (98% - 100%)    I&O's Summary    22 May 2022 07:01  -  23 May 2022 07:00  --------------------------------------------------------  IN: 600 mL / OUT: 0 mL / NET: 600 mL    23 May 2022 07:01  -  23 May 2022 19:13  --------------------------------------------------------  IN: 0 mL / OUT: 1200 mL / NET: -1200 mL        PHYSICAL EXAM:  HEENT: NC/AT; PERRLA  Neck: Soft; no tenderness  Lungs: Coarse BS  bilaterally; no wheezing.   Heart: RRR, no murmurs  Abdomen: Soft, no tendernes.  Genital/ Rectal: No hale,.   Extremities: RLE wound with decreased swelling and erythema.   Neurologic: Confused.       LABORATORY:    CBC Full  -  ( 23 May 2022 08:41 )  WBC Count : 4.94 K/uL  RBC Count : 2.75 M/uL  Hemoglobin : 8.8 g/dL  Hematocrit : 27.6 %  Platelet Count - Automated : 157 K/uL  Mean Cell Volume : 100.4 fl  Mean Cell Hemoglobin : 32.0 pg  Mean Cell Hemoglobin Concentration : 31.9 gm/dL  Auto Neutrophil # : x  Auto Lymphocyte # : x  Auto Monocyte # : x  Auto Eosinophil # : x  Auto Basophil # : x  Auto Neutrophil % : x  Auto Lymphocyte % : x  Auto Monocyte % : x  Auto Eosinophil % : x  Auto Basophil % : x      ESR:                   05-23 @ 11:51  --    C-Reactive Protein:     05-23 @ 11:51  91    Procalcitonin:           05-23 @ 11:51   --  ESR:                   05-23 @ 08:41  --    C-Reactive Protein:     05-23 @ 08:41  --    Procalcitonin:           05-23 @ 08:41   4.91      05-23    142  |  102  |  27<H>  ----------------------------<  87  3.1<L>   |  30  |  1.10    Ca    8.4<L>      23 May 2022 08:41  Phos  3.5     05-23    TPro  6.4  /  Alb  2.8<L>  /  TBili  0.5  /  DBili  0.2  /  AST  188<H>  /  ALT  136<H>  /  AlkPhos  40  05-23      Assessment and Plan:    1. Pneumonia of lower lobes: aspiration vs COVID 19 pneumonia  2. Respiratory failure with hypoxia.  3. RLE cellulitis  4, RLE chronic ulcer.  5. CKD.    . The patient has respiratory failure and will need treatments for COVID 19 pneumonia.   . Continue IV Dexamethasone 6 mg iv daily  . Continue  Remdesivir for 5 days.Monitor LFTS and renal function.  . Change IV Zosyn to IV Rocephin and po Doxy  . Aspiration precautions. SLP evaluation for dysphagia.        Emir Tomas MD   [phone].

## 2022-05-23 NOTE — PROGRESS NOTE ADULT - ASSESSMENT
80 yo F w/ hx HTN, CHF, COPD, afib on eliquis p/w found with SOB  copd  CHF  AF  Obesity  OP  OA  Covid    s/p BIPAP  GOC discussion  assist with needs  Isolation for covid, Remdesivir -   monitor VS and HD and Sat  [facility] rx regimen   82 yo F w/ hx HTN, CHF, COPD, afib on eliquis p/w found with SOB  copd  CHF  AF  Obesity  OP  OA  Covid    s/p BIPAP  GOC discussion - Lela Lyndon - HCP - Daughter - pt is DNR DNI - MOLST updated and signed -   assist with needs  Isolation for covid, Remdesivir -   monitor VS and HD and Sat  [facility]vs rx regimen

## 2022-05-23 NOTE — SWALLOW BEDSIDE ASSESSMENT ADULT - COMMENTS
Chart reviewed, order received for swallow eval.  Attempted to complete, however, pt currently on venti mask, therefore, unable to complete at this time.  Discussed with charlette Smiley to reconsult when pt able to be weaned and tolerating O2 via nasal cannula.  No further follow up at this time.  Dr. Meyer notified.

## 2022-05-23 NOTE — PROGRESS NOTE ADULT - SUBJECTIVE AND OBJECTIVE BOX
JAZ BAIG    \[facility]\"" TELE 311 W1    Allergies    fluoxetine (Unknown)  NIFEdipine (Unknown)    Intolerances        PAST MEDICAL & SURGICAL HISTORY:      FAMILY HISTORY:      Home Medications:  acetaminophen 325 mg oral tablet: 2 tab(s) orally every 6 hours, As Needed (22 May 2022 14:46)  amiodarone 200 mg oral tablet: 1 tab(s) orally once a day (22 May 2022 14:46)  ascorbic acid 500 mg oral tablet: 1 tab(s) orally once a day (22 May 2022 14:46)  Breo Ellipta 100 mcg-25 mcg/inh inhalation powder: 1 puff(s) inhaled once a day (22 May 2022 14:46)  carvedilol 25 mg oral tablet: 1 tab(s) orally 2 times a day (22 May 2022 14:46)  cephalexin 500 mg oral tablet: 500 milligram(s) orally 2 times a day (22 May 2022 14:46)  Eliquis 5 mg oral tablet: 1 tab(s) orally 2 times a day (22 May 2022 14:46)  ferrous sulfate 325 mg (65 mg elemental iron) oral tablet: 1 tab(s) orally once a day (22 May 2022 14:46)  folic acid 1 mg oral tablet: 1 tab(s) orally once a day (22 May 2022 14:46)  losartan 100 mg oral tablet: 1 tab(s) orally once a day (22 May 2022 14:46)  Melatonin 5 mg oral tablet: 1 tab(s) orally once a day (at bedtime) (22 May 2022 14:46)  Multiple Vitamins oral tablet: 1 tab(s) orally once a day (22 May 2022 14:46)  potassium chloride 10 mEq oral tablet, extended release: 1 tab(s) orally once a day (22 May 2022 14:46)  pravastatin 40 mg oral tablet: 1 tab(s) orally once a day (22 May 2022 14:46)  sertraline 25 mg oral tablet: 1 tab(s) orally once a day (22 May 2022 14:46)  torsemide 20 mg oral tablet: 1 tab(s) orally once a day on tues, th, sat, sun (22 May 2022 14:46)  TriCor 145 mg oral tablet: 1 tab(s) orally once a day (22 May 2022 14:46)      MEDICATIONS  (STANDING):  aMIOdarone    Tablet 200 milliGRAM(s) Oral daily  apixaban 2.5 milliGRAM(s) Oral every 12 hours  carvedilol 25 milliGRAM(s) Oral two times a day  dexAMETHasone  Injectable 6 milliGRAM(s) IV Push daily  fenofibrate Tablet 145 milliGRAM(s) Oral daily  ferrous    sulfate 325 milliGRAM(s) Oral daily  furosemide   Injectable 40 milliGRAM(s) IV Push daily  lactated ringers. 1000 milliLiter(s) (60 mL/Hr) IV Continuous <Continuous>  losartan 100 milliGRAM(s) Oral daily  melatonin 5 milliGRAM(s) Oral at bedtime  piperacillin/tazobactam IVPB.. 3.375 Gram(s) IV Intermittent every 8 hours  remdesivir  IVPB 100 milliGRAM(s) IV Intermittent every 24 hours  remdesivir  IVPB   IV Intermittent   tiotropium 18 MICROgram(s) Capsule 1 Capsule(s) Inhalation daily    MEDICATIONS  (PRN):  acetaminophen     Tablet .. 650 milliGRAM(s) Oral every 6 hours PRN Temp greater or equal to 38C (100.4F)      Diet, NPO:   Except Medications (22 @ 16:00) [Active]          Vital Signs Last 24 Hrs  T(C): 36.8 (23 May 2022 05:30), Max: 37.8 (22 May 2022 16:15)  T(F): 98.2 (23 May 2022 05:30), Max: 100 (22 May 2022 16:15)  HR: 70 (23 May 2022 06:50) (60 - 80)  BP: 129/69 (23 May 2022 06:50) (129/69 - 170/81)  BP(mean): --  RR: 18 (23 May 2022 05:30) (18 - 25)  SpO2: 98% (23 May 2022 05:30) (98% - 100%)      22 @ 07:01  -  22 @ 07:00  --------------------------------------------------------  IN: 600 mL / OUT: 0 mL / NET: 600 mL              LABS:                        8.8    4.94  )-----------( x        ( 23 May 2022 08:41 )             27.6         142  |  102  |  27<H>  ----------------------------<  87  3.1<L>   |  30  |  1.10    Ca    8.4<L>      23 May 2022 08:41  Phos  3.5         TPro  6.4  /  Alb  2.8<L>  /  TBili  0.5  /  DBili  0.2  /  AST  188<H>  /  ALT  136<H>  /  AlkPhos  40      PT/INR - ( 23 May 2022 08:41 )   PT: 17.6 sec;   INR: 1.50 ratio         PTT - ( 22 May 2022 09:43 )  PTT:27.7 sec  Urinalysis Basic - ( 22 May 2022 11:13 )    Color: Yellow / Appearance: Clear / S.010 / pH: x  Gluc: x / Ketone: Negative  / Bili: Negative / Urobili: Negative   Blood: x / Protein: 30 mg/dL / Nitrite: Negative   Leuk Esterase: Small / RBC: 0-2 /HPF / WBC 3-5   Sq Epi: x / Non Sq Epi: Occasional / Bacteria: Negative        ABG - ( 22 May 2022 13:05 )  pH, Arterial: 7.46  pH, Blood: x     /  pCO2: 43    /  pO2: 235   / HCO3: 31    / Base Excess: 6.8   /  SaO2: 98.4                WBC:  WBC Count: 4.94 K/uL ( @ 08:41)  WBC Count: 13.75 K/uL ( @ 09:43)      MICROBIOLOGY:  RECENT CULTURES:        CARDIAC MARKERS ( 22 May 2022 09:43 )  x     / x     / 50 U/L / x     / x            PT/INR - ( 23 May 2022 08:41 )   PT: 17.6 sec;   INR: 1.50 ratio         PTT - ( 22 May 2022 09:43 )  PTT:27.7 sec    Sodium:  Sodium, Serum: 142 mmol/L ( @ 08:41)  Sodium, Serum: 138 mmol/L ( @ 09:43)      1.10 mg/dL  @ 08:41  1.30 mg/dL  @ 20:52  1.50 mg/dL  @ 09:43      Hemoglobin:  Hemoglobin: 8.8 g/dL ( @ 08:41)  Hemoglobin: 11.0 g/dL ( @ 09:43)      Platelets: Platelet Count - Automated: 308 K/uL ( @ 09:43)      LIVER FUNCTIONS - ( 23 May 2022 08:41 )  Alb: 2.8 g/dL / Pro: 6.4 g/dL / ALK PHOS: 40 U/L / ALT: 136 U/L / AST: 188 U/L / GGT: x             Urinalysis Basic - ( 22 May 2022 11:13 )    Color: Yellow / Appearance: Clear / S.010 / pH: x  Gluc: x / Ketone: Negative  / Bili: Negative / Urobili: Negative   Blood: x / Protein: 30 mg/dL / Nitrite: Negative   Leuk Esterase: Small / RBC: 0-2 /HPF / WBC 3-5   Sq Epi: x / Non Sq Epi: Occasional / Bacteria: Negative        RADIOLOGY & ADDITIONAL STUDIES:      MICROBIOLOGY:  RECENT CULTURES:

## 2022-05-23 NOTE — PROGRESS NOTE ADULT - SUBJECTIVE AND OBJECTIVE BOX
Date/Time Patient Seen:  		  Referring MD:   Data Reviewed	       Patient is a 81y old  Female who presents with a chief complaint of SOB (22 May 2022 16:06)      Subjective/HPI     PAST MEDICAL & SURGICAL HISTORY:        Medication list         MEDICATIONS  (STANDING):  aMIOdarone    Tablet 200 milliGRAM(s) Oral daily  apixaban 2.5 milliGRAM(s) Oral every 12 hours  carvedilol 25 milliGRAM(s) Oral two times a day  dexAMETHasone  Injectable 6 milliGRAM(s) IV Push daily  fenofibrate Tablet 145 milliGRAM(s) Oral daily  ferrous    sulfate 325 milliGRAM(s) Oral daily  lactated ringers. 1000 milliLiter(s) (60 mL/Hr) IV Continuous <Continuous>  losartan 100 milliGRAM(s) Oral daily  melatonin 5 milliGRAM(s) Oral at bedtime  piperacillin/tazobactam IVPB.. 3.375 Gram(s) IV Intermittent every 8 hours  remdesivir  IVPB 100 milliGRAM(s) IV Intermittent every 24 hours  remdesivir  IVPB   IV Intermittent   tiotropium 18 MICROgram(s) Capsule 1 Capsule(s) Inhalation daily    MEDICATIONS  (PRN):  acetaminophen     Tablet .. 650 milliGRAM(s) Oral every 6 hours PRN Temp greater or equal to 38C (100.4F)         Vitals log        ICU Vital Signs Last 24 Hrs  T(C): 36.8 (23 May 2022 05:30), Max: 38.4 (22 May 2022 09:43)  T(F): 98.2 (23 May 2022 05:30), Max: 101.1 (22 May 2022 09:43)  HR: 65 (23 May 2022 05:30) (60 - 84)  BP: 170/81 (23 May 2022 05:30) (154/77 - 204/96)  BP(mean): --  ABP: --  ABP(mean): --  RR: 18 (23 May 2022 05:30) (18 - 32)  SpO2: 98% (23 May 2022 05:30) (96% - 100%)           Input and Output:  I&O's Detail      Lab Data                        11.0   13.75 )-----------( 308      ( 22 May 2022 09:43 )             35.7     05-22    x   |  x   |  x   ----------------------------<  x   x    |  x   |  1.30    Ca    8.9      22 May 2022 09:43    TPro  6.3  /  Alb  3.0<L>  /  TBili  0.5  /  DBili  0.2  /  AST  146<H>  /  ALT  104<H>  /  AlkPhos  39<L>  05-22    ABG - ( 22 May 2022 13:05 )  pH, Arterial: 7.46  pH, Blood: x     /  pCO2: 43    /  pO2: 235   / HCO3: 31    / Base Excess: 6.8   /  SaO2: 98.4              CARDIAC MARKERS ( 22 May 2022 09:43 )  x     / x     / 50 U/L / x     / x            Review of Systems	      Objective     Physical Examination    heart s1s2  lung dec BS  abd soft      Pertinent Lab findings & Imaging      Jagjit:  NO   Adequate UO     I&O's Detail           Discussed with:     Cultures:	        Radiology

## 2022-05-23 NOTE — PROGRESS NOTE ADULT - SUBJECTIVE AND OBJECTIVE BOX
PROGRESS NOTE/IM INITIAL EVALUATION   Patient is a 81y old  Female who presents with a chief complaint of SOB (22 May 2022 16:06)  Chart and available morning labs /imaging are reviewed electronically , urgent issues addressed . More information  is being added upon completion of rounds , when more information is collected and management discussed with consultants , medical staff and social service/case management on the floor     OVERNIGHT  Patient is resting in a bed comfortably .Confused ,poor mentation .No distress noted   Remains on vent mask ,denies complains Remains NPO  HPI:  The patient is an 81 year old female with a PMH of COVID 19 in , CHF, RLE ulcer, CKD and CHF who was brought to the ED from [facility] for SOB and hypoxia. She was vaccinated with 3 doses of Moderna vaccine in the past but did not get the second booster dose.    w/ hx HTN, CHF, COPD, afib on eliquis p/w found with SOB since ~ 7am today at [facility]. NO known pain. EMS was called and found pt cyanotic and O2 56% on RA. EMS states 82% on 100% nrb. Pt on CPAP by EMS with sats in mid 90s. No known fever/chills. PT reported been off lasix over past couple days. No other acute co or changes    < from: Xray Chest 1 View- PORTABLE-Urgent (22 @ 09:54) >    ACC: [id number] EXAM:  XR CHEST PORTABLE URGENT 1V                          PROCEDURE DATE:  2022          INTERPRETATION:  CLINICAL INDICATION: 81 years  Female with SOB, CHF.    COMPARISON: None    The patient's chin obscures the left lung apex.    AP view of the chest demonstrates a large right lower lobe infiltrate.   There is mild pulmonary vascular congestion. There is no pleural   effusion. There is no pneumothorax.    The heart is normal in size. Left-sided bipolar pacemaker. There is no   mediastinal or hilar mass.    Mild thoracic degenerative changes and osteopenia are present.    IMPRESSION:    Large right lower lobe infiltrates superimposed on pulmonary vascular   congestion. Pacemaker.    --- End of Report ---            PAUL MONROY MD; Attending Radiologist    < end of copied text >  < from: 12 Lead ECG (22 @ 09:28) >    Ventricular Rate 82 BPM    Atrial Rate 82 BPM    P-R Interval 208 ms    QRS Duration 162 ms    Q-T Interval 424 ms    QTC Calculation(Bazett) 495 ms    P Axis 77 degrees    R Axis 93 degrees    T Axis 24 degrees    Diagnosis Line Atrial-sensed ventricular-paced rhythm  Abnormal ECG  No previous ECGs available  Confirmed by dea Lopez ([id number]) on 2022 3:22:34 PM    < end of copied text >  sitivity (22 @ 13:48)   Troponin I, High Sensitivity Result: 138.8: Serial measurements of high sensitivity troponin I (hs TnI) showing rapid   upward or downward changes suggest acute myocardial injury. Please note   that a sustained elevation of hsTnI can be caused by renal disease,   chronic heart failure, sepsis, pulmonary embolism and other clinical   conditions. ng/L       Historical Values  Troponin I, High Sensitivity (22 @ 13:48)   Troponin I, High Sensitivity Result: 138.8: Serial measurements of high sensitivity troponin I (hs TnI) showing rapid   upward or downward changes suggest acute myocardial injury. Please note   that a sustained elevation of hsTnI can be caused by renal disease,   chronic heart failure, sepsis, pulmonary embolism and other clinical   conditions. ng/L   Troponin I, High Sensitivity (22 @ 09:43)   Troponin I, High Sensitivity Result: 21.2: Serial measurements of high sensitivity troponin I (hs TnI) showing rapid   upward or downward changes suggest acute myocardial injury. Please note   that a sustained elevation of hsTnI can be caused by renal disease,   chronic heart failure, sepsis, pulmonary embolism and other clinical   conditions. ng/L  (22 May 2022 12:13)    PAST MEDICAL & SURGICAL HISTORY:      MEDICATIONS  (STANDING):  aMIOdarone    Tablet 200 milliGRAM(s) Oral daily  apixaban 5 milliGRAM(s) Oral every 12 hours  carvedilol 25 milliGRAM(s) Oral two times a day  dexAMETHasone  Injectable 6 milliGRAM(s) IV Push daily  dextrose 5% + sodium chloride 0.45%. 1000 milliLiter(s) (35 mL/Hr) IV Continuous <Continuous>  fenofibrate Tablet 145 milliGRAM(s) Oral daily  ferrous    sulfate 325 milliGRAM(s) Oral daily  furosemide   Injectable 40 milliGRAM(s) IV Push daily  losartan 100 milliGRAM(s) Oral daily  melatonin 5 milliGRAM(s) Oral at bedtime  piperacillin/tazobactam IVPB.. 3.375 Gram(s) IV Intermittent every 8 hours  potassium chloride    Tablet ER 40 milliEquivalent(s) Oral once  remdesivir  IVPB 100 milliGRAM(s) IV Intermittent every 24 hours  remdesivir  IVPB   IV Intermittent   tiotropium 18 MICROgram(s) Capsule 1 Capsule(s) Inhalation daily    MEDICATIONS  (PRN):  acetaminophen     Tablet .. 650 milliGRAM(s) Oral every 6 hours PRN Temp greater or equal to 38C (100.4F)  aluminum hydroxide/magnesium hydroxide/simethicone Suspension 30 milliLiter(s) Oral every 4 hours PRN Dyspepsia  ondansetron Injectable 4 milliGRAM(s) IV Push every 8 hours PRN Nausea and/or Vomiting      OBJECTIVE    T(C): 36.6 (22 @ 12:35), Max: 36.8 (22 @ 05:30)  HR: 61 (22 @ 12:35) (60 - 70)  BP: 167/70 (22 @ 12:35) (129/69 - 170/81)  RR: 19 (22 @ 12:35) (18 - 20)  SpO2: 98% (22 @ 12:35) (98% - 100%)  Wt(kg): --  I&O's Summary    22 May 2022 07:  -  23 May 2022 07:00  --------------------------------------------------------  IN: 600 mL / OUT: 0 mL / NET: 600 mL    23 May 2022 07:  -  23 May 2022 18:53  --------------------------------------------------------  IN: 0 mL / OUT: 1200 mL / NET: -1200 mL          REVIEW OF SYSTEMS:  CONSTITUTIONAL: No fever, weight loss, or fatigue  EYES: No eye pain, visual disturbances, or discharge  ENMT:   No sinus or throat pain  NECK: No pain or stiffness  RESPIRATORY: No cough, wheezing, chills or hemoptysis; No shortness of breath  CARDIOVASCULAR: No chest pain, palpitations, dizziness, or leg swelling  GASTROINTESTINAL: No abdominal pain. No nausea, vomiting; No diarrhea or constipation. No melena or hematochezia.  GENITOURINARY: No dysuria, frequency, hematuria, or incontinence  NEUROLOGICAL: No headaches, memory loss, loss of strength, numbness, or tremors  SKIN: No itching, burning, rashes, or lesions   MUSCULOSKELETAL: No joint pain or swelling; No muscle, back, or extremity pain    PHYSICAL EXAM: remains on vent mask   Appearance: NAD. VS past 24 hrs -as above   HEENT:   Moist oral mucosa. Conjunctiva clear b/l.   Neck : supple  Respiratory: Lungs CTAB.  Gastrointestinal:  Soft, nontender. No rebound. No rigidity. BS present	  Cardiovascular: RRR ,S1S2 present  Neurologic: Non-focal. Moving all extremities.  Extremities: No edema. No erythema. No calf tenderness.  Skin: No rashes, No ecchymoses, No cyanosis.	  wounds ,skin lesions-See skin assesment flow sheet   LABS:                        8.8    4.94  )-----------( 157      ( 23 May 2022 08:41 )             27.6         142  |  102  |  27<H>  ----------------------------<  87  3.1<L>   |  30  |  1.10    Ca    8.4<L>      23 May 2022 08:41  Phos  3.5         TPro  6.4  /  Alb  2.8<L>  /  TBili  0.5  /  DBili  0.2  /  AST  188<H>  /  ALT  136<H>  /  AlkPhos  40      CAPILLARY BLOOD GLUCOSE        PT/INR - ( 23 May 2022 08:41 )   PT: 17.6 sec;   INR: 1.50 ratio         PTT - ( 22 May 2022 09:43 )  PTT:27.7 sec  Urinalysis Basic - ( 22 May 2022 11:13 )    Color: Yellow / Appearance: Clear / S.010 / pH: x  Gluc: x / Ketone: Negative  / Bili: Negative / Urobili: Negative   Blood: x / Protein: 30 mg/dL / Nitrite: Negative   Leuk Esterase: Small / RBC: 0-2 /HPF / WBC 3-5   Sq Epi: x / Non Sq Epi: Occasional / Bacteria: Negative        Culture - Urine (collected 22 May 2022 16:30)  Source: Clean Catch Clean Catch (Midstream)  Preliminary Report (23 May 2022 16:36):    10,000 - 49,000 CFU/mL Gram Negative Rods    <10,000 CFU/ml Normal Urogenital benson present    Culture - Blood (collected 22 May 2022 16:28)  Source: .Blood Blood-Peripheral  Preliminary Report (23 May 2022 17:01):    No growth to date.    Culture - Blood (collected 22 May 2022 16:28)  Source: .Blood Blood-Peripheral  Preliminary Report (23 May 2022 17:01):    No growth to date.      RADIOLOGY & ADDITIONAL TESTS:< from: Xray Chest 1 View- PORTABLE-Urgent (22 @ 09:54) >  here is mild pulmonary vascular congestion. There is no pleural   effusion. There is no pneumothorax.    The heart is normal in size. Left-sided bipolar pacemaker. There is no   mediastinal or hilar mass.    Mild thoracic degenerative changes and osteopenia are present.    IMPRESSION:    Large right lower lobe infiltrates superimposed on pulmonary vascular   congestion. Pacemaker.    < end of copied text >     reviewed elctronically  65minutes spent on this visit, 50% visit time spent in care co-ordination with other attendings and counselling patient ,writing admission orders ( see complete and current orders and order section) ,requesting necessary consults ,informing family about status & plan of care .I have discussed care plan with [facility] /[facility] wellness/admitting /nursing   department ,outpatient PCP , hospital consultants , ER physician & med staff .

## 2022-05-23 NOTE — PROGRESS NOTE ADULT - ASSESSMENT
REVIEW OF SYMPTOMS      Able to give ROS  Yes     RELIABLE +/-   CONSTITUTIONAL Weakness Yes  Chills No   ENDOCRINE  No heat or cold intolerance    ALLERGY No hives  Sore throat No stridor  RESP Coughing blood no  Shortness of breath YES   NEURO No Headache  Confusion Pain neck No   CARDIAC No Chest pain No Palpitations   GI  Pain abdomen NO   Vomiting NO     NOTABLE FINDINGS    PHYSICAL EXAM    HEENT Unremarkable  atraumatic   RESP Fair air entry EXP prolonged    Harsh breath sound Resp distres mild   CARDIAC S1 S2 No S3     NO JVD    ABDOMEN SOFT BS PRESENT NOT DISTENDED No hepatosplenomegaly PEDAL EDEMA present No calf tenderness  NO rash     AGE/SEX.   81 f    DOA.  2022     CC .  2022 Pt BIB EMS on cpap for respiratory distress, per EMS found sitting on couch o2 sat in 50s, tachypneic and labored breathing. Pt on "water pill" which has not been taken x 2 days.  2022 SHORTNESS OF BREATH    ER MEDS GIVEN ALREADY 2022 12:09 PM  Lasix 60 zosyn vanco     PMH .  pmh Hypertension  pmh CHF  pmh COPD  pmh A fib on eliquis  pmh     NOTABLE HOME MEDS.  amiodarone 200 mg  amLODIPine 5   carvedilol 25 . 2  Eliquis 5 . 2       MAIN ISSUES .  SHORTNESS OF BREATH.  ACUTE RESP FAILURE poa 2022   COVID (+) 2022.  PNEUMONIA. 2022 T 101  HYPERTENSION. 2022    TROPONINEMIA.   CHF.  A fib.   ANTICOAGULATION.   COPD.   KELSEY.    COVID/ICU/CODE STATUS.                       COVID  STATUS.    2022 covid (+) 2022        ICU STAY. none  GOC.  2022 dnr  2022 full code    BEST PRACTICE ISSUES.                                                  HEAD OF BED ELEVATION. Yes  DVT PROPHYLAXIS.    2022 apixaba 2.5x2 af   MG PROPHYLAXIS.                                                                                        DIET.   2022 npo             VITALS/PO/IO/VENT/DRIPS.     2022 afeb 65 170/80      PROBLEM DATA/ASSESSMENT RECOMMENDATIONS (A/R).    HEMODYNAMICS.   Monitor and optimize bp   Target MAP to martina  65 (+)    RESP.   Monitor and optimize  po   Target po to remain 90-95%    ABG.  BPAP 14/5/90%/14 746/43/235    PMH/PSH PROBLEMS.  Management continued/modified as indicated    OXYGEN REQUIREMENTS.  2022 vm 50% po 98%  Will need home oxygen at discharge if ra rest or ra exercise PO drops below 88%      COVID.  Scv2 2022 (+)  CXR 2022 Large rll infiltr superimposed on pvc pacemaker  2022  Mary Mayes    dexa Dr Mayes   monitor inflamm and thrombotic markrs     INFECTION SOURCE DD.   INFECTION A/R.   Aspiran pneum   Continue zosyn   ID Dr Mayes on case     INFECTION CUMMINGS.  2022 T 101  W -2022 w 13 - 4.9   ua 2022 w 3-5  pr 2022 pr 4.9  CXR 2022 Large rll infiltr superimposed on pvc pacemaker  MICROBIO.  mrsa  (-)    ABIO.  2022 zosyn           COVID.  Scv2 2022 (+)  CXR 2022 Large rll infiltr superimposed on pvc pacemaker  2022  Mary Mayes    dexa Dr Mayes   monitor inflamm and thrombotic markrs     INFECTION SOURCE DD.   INFECTION A/R.   Aspiran pneum   Continue zosyn   ID Dr Mayes on case     INFECTION CUMMINGS.  2022 T 101  W -2022 w 13 - 4.9   ua 2022 w 3-5  pr 2022 pr 4.9  CXR 2022 Large rll infiltr superimposed on pvc pacemaker  MICROBIO.  mrsa  (-)    ABIO.  2022 zosyn         SHORTNESS OF BREATH.  D/D COVID CHF Pneumonia     LACTICEMIA.  la -2022 la 2.5 - .7   Possibly sec chf v sepsis     ACUTE RESP FAILURE poa 2022 Placed on bpap   A/R wean as tolerated     RO VTE.  Ddim 2022 dd 494  2022 apixaba 2.5x2 af     HYPERTENSION.   2022      CAD.  Tr 2022 Tr 138  Cardio eval     CHF.  bnp 2022 3328   CXR 2022 Large rll infiltr superimposed on pvc pacemaker  2022 lasix 40 Dr Baltazar   losartan 100   A/R check echo     A fib.   2022 carvedilol 25 . 2   amiodarine 200  2022 Eliquis 5 . 2   Home meds continued     ANTICOAGULATION.   2022 Eliquis 5 . 2     ANEMIA.  Hb 2022 Hb 8.8     COPD.   2022 spiriva     KELSEY.  Cr -2022 Cr 1.5- 1.1  Monitor     ELEVATED LFTS.  LFTS 2022   AP 40          IV fl.  2022 lr 60      TIME SPENT   Over 25 minutes aggregate care time spent on encounter; activities included   direct patient care, counseling and/or coordinating care reviewing notes, lab data/ imaging , discussion with multidisciplinary team/ patient  /family and explaining in detail risks, benefits, alternatives  of the recommendations     SAFIA SEBASTIAN AGE 81 f  1940    DOA 2022 ATTENDING DR LUISA MEADE

## 2022-05-23 NOTE — PATIENT PROFILE ADULT - FALL HARM RISK - HARM RISK INTERVENTIONS

## 2022-05-23 NOTE — PROGRESS NOTE ADULT - CONVERSATION DETAILS
Lela Haney - [facility]  Daughter  MOLST on record  pt is DNR DNI  MOLST updated - signed - reviewed

## 2022-05-23 NOTE — PROGRESS NOTE ADULT - ASSESSMENT
80 yo F w/ hx HTN, CHF, COPD, afib on eliquis p/w found with SOB since ~ [facility]. NO known pain. EMS was called and found pt cyanotic and O2 56% on RA. EMS states 82% on 100% nrb. Pt on CPAP by EMS with sats in mid 90s. No known fever/chills. PT reported been off lasix over past couple days. No other acute co or changes Admitted  to telemetry unit for monitoring , send 3 sets of cardiac enzymes to rule out acute coronary event, obtain ECHO to evaluate LVEF, cardiology consult  ,continue current management, O2 supply, anticoagulation plan as per cardiology consult Admit to monitored unit for cardiac monitoring, obtain echo to evaluate LVEF, intravenous diuresis as per card consult , monitor ins/outs, monitor renal profile and electrolytes closely ,send 3 sets of enzymes, O2 supply, serial chest xrays, monitor weights and oral intake of fluids, nutritionist consult Palliative care consult requested ,to discuss advance directives and complete MOLST

## 2022-05-23 NOTE — PROGRESS NOTE ADULT - SUBJECTIVE AND OBJECTIVE BOX
Patient is a 81y Female whom presented to the hospital with     PAST MEDICAL & SURGICAL HISTORY:      MEDICATIONS  (STANDING):  aMIOdarone    Tablet 200 milliGRAM(s) Oral daily  apixaban 5 milliGRAM(s) Oral every 12 hours  carvedilol 25 milliGRAM(s) Oral two times a day  cefTRIAXone   IVPB 1000 milliGRAM(s) IV Intermittent every 24 hours  dexAMETHasone  Injectable 6 milliGRAM(s) IV Push daily  dextrose 5% + sodium chloride 0.45%. 1000 milliLiter(s) (35 mL/Hr) IV Continuous <Continuous>  doxycycline hyclate Capsule 100 milliGRAM(s) Oral every 12 hours  fenofibrate Tablet 145 milliGRAM(s) Oral daily  ferrous    sulfate 325 milliGRAM(s) Oral daily  furosemide   Injectable 40 milliGRAM(s) IV Push daily  losartan 100 milliGRAM(s) Oral daily  melatonin 5 milliGRAM(s) Oral at bedtime  potassium chloride    Tablet ER 20 milliEquivalent(s) Oral two times a day  remdesivir  IVPB 100 milliGRAM(s) IV Intermittent every 24 hours  remdesivir  IVPB   IV Intermittent   tiotropium 18 MICROgram(s) Capsule 1 Capsule(s) Inhalation daily      Allergies    fluoxetine (Unknown)  NIFEdipine (Unknown)    Intolerances        SOCIAL HISTORY:  Denies ETOh,Smoking,     FAMILY HISTORY:      REVIEW OF SYSTEMS:    CONSTITUTIONAL: No weakness, fevers or chills  EYES/ENT: No visual changes;  no throat pain   NECK: No pain or stiffness  RESPIRATORY: No cough, wheezing, hemoptysis; No shortness of breath  CARDIOVASCULAR: No chest pain or palpitations  GASTROINTESTINAL: No abdominal or epigastric pain. No nausea, vomiting,     No diarrhea or constipation. No melena   GENITOURINARY: No dysuria, frequency or hematuria  NEUROLOGICAL: No numbness or weakness  SKIN: dry      VITAL:  T(C): , Max: 36.8 (22 @ 05:30)  T(F): , Max: 98.2 (22 @ 05:30)  HR: 62 (22 @ 19:20)  BP: 162/69 (22 @ 19:20)  BP(mean): --  RR: 18 (22 @ 19:20)  SpO2: 99% (22 @ 19:20)  Wt(kg): --    I and O's:     @ 07: @ 07:00  --------------------------------------------------------  IN: 600 mL / OUT: 0 mL / NET: 600 mL     @ 07: @ 00:09  --------------------------------------------------------  IN: 0 mL / OUT: 1200 mL / NET: -1200 mL        Weight (kg): 79.2 ( @ 05:30)    PHYSICAL EXAM:    Constitutional: NAD  HEENT: conjunctive   clear   Neck:  No JVD  Respiratory: CTAB  Cardiovascular: S1 and S2  Gastrointestinal: BS+, soft, NT/ND  Extremities: No peripheral edema  Neurological: A/O x 3, no focal deficits  Psychiatric: Normal mood, normal affect  : No Danielson  Skin: No rashes  Access: Not applicable    LABS:                        8.8    4.94  )-----------( 157      ( 23 May 2022 08:41 )             27.6         142  |  102  |  27<H>  ----------------------------<  87  3.1<L>   |  30  |  1.10    Ca    8.4<L>      23 May 2022 08:41  Phos  3.5         TPro  6.4  /  Alb  2.8<L>  /  TBili  0.5  /  DBili  0.2  /  AST  188<H>  /  ALT  136<H>  /  AlkPhos  40        Urine Studies:  Urinalysis Basic - ( 22 May 2022 11:13 )    Color: Yellow / Appearance: Clear / S.010 / pH: x  Gluc: x / Ketone: Negative  / Bili: Negative / Urobili: Negative   Blood: x / Protein: 30 mg/dL / Nitrite: Negative   Leuk Esterase: Small / RBC: 0-2 /HPF / WBC 3-5   Sq Epi: x / Non Sq Epi: Occasional / Bacteria: Negative            RADIOLOGY & ADDITIONAL STUDIES:                   Patient is a 81y Female whom presented to the hospital with ckd and petra     PAST MEDICAL & SURGICAL HISTORY:      MEDICATIONS  (STANDING):  aMIOdarone    Tablet 200 milliGRAM(s) Oral daily  apixaban 5 milliGRAM(s) Oral every 12 hours  carvedilol 25 milliGRAM(s) Oral two times a day  cefTRIAXone   IVPB 1000 milliGRAM(s) IV Intermittent every 24 hours  dexAMETHasone  Injectable 6 milliGRAM(s) IV Push daily  dextrose 5% + sodium chloride 0.45%. 1000 milliLiter(s) (35 mL/Hr) IV Continuous <Continuous>  doxycycline hyclate Capsule 100 milliGRAM(s) Oral every 12 hours  fenofibrate Tablet 145 milliGRAM(s) Oral daily  ferrous    sulfate 325 milliGRAM(s) Oral daily  furosemide   Injectable 40 milliGRAM(s) IV Push daily  losartan 100 milliGRAM(s) Oral daily  melatonin 5 milliGRAM(s) Oral at bedtime  potassium chloride    Tablet ER 20 milliEquivalent(s) Oral two times a day  remdesivir  IVPB 100 milliGRAM(s) IV Intermittent every 24 hours  remdesivir  IVPB   IV Intermittent   tiotropium 18 MICROgram(s) Capsule 1 Capsule(s) Inhalation daily      Allergies    fluoxetine (Unknown)  NIFEdipine (Unknown)    Intolerances        SOCIAL HISTORY:  Denies ETOh,Smoking,     FAMILY HISTORY:      REVIEW OF SYSTEMS:    CONSTITUTIONAL: No weakness, fevers or chills  RESPIRATORY: No cough, wheezing, hemoptysis; No shortness of breath  CARDIOVASCULAR: No chest pain or palpitations  GASTROINTESTINAL: No abdominal or epigastric pain. No nausea, vomiting,        VITAL:  T(C): , Max: 36.8 (22 @ 05:30)  T(F): , Max: 98.2 (22 @ 05:30)  HR: 62 (22 @ 19:20)  BP: 162/69 (22 @ 19:20)  BP(mean): --  RR: 18 (22 @ 19:20)  SpO2: 99% (22 @ 19:20)  Wt(kg): --    I and O's:     @ 07:01  -   @ 07:00  --------------------------------------------------------  IN: 600 mL / OUT: 0 mL / NET: 600 mL     @ 07:01  -   @ 00:09  --------------------------------------------------------  IN: 0 mL / OUT: 1200 mL / NET: -1200 mL        Weight (kg): 79.2 ( @ 05:30)    PHYSICAL EXAM:    Constitutional: NAD  HEENT: conjunctive   clear   Neck:  No JVD  Respiratory: CTAB  Cardiovascular: S1 and S2  Gastrointestinal: BS+, soft, NT/ND  Extremities: No peripheral edema    LABS:                        8.8    4.94  )-----------( 157      ( 23 May 2022 08:41 )             27.6         142  |  102  |  27<H>  ----------------------------<  87  3.1<L>   |  30  |  1.10    Ca    8.4<L>      23 May 2022 08:41  Phos  3.5         TPro  6.4  /  Alb  2.8<L>  /  TBili  0.5  /  DBili  0.2  /  AST  188<H>  /  ALT  136<H>  /  AlkPhos  40        Urine Studies:  Urinalysis Basic - ( 22 May 2022 11:13 )    Color: Yellow / Appearance: Clear / S.010 / pH: x  Gluc: x / Ketone: Negative  / Bili: Negative / Urobili: Negative   Blood: x / Protein: 30 mg/dL / Nitrite: Negative   Leuk Esterase: Small / RBC: 0-2 /HPF / WBC 3-5   Sq Epi: x / Non Sq Epi: Occasional / Bacteria: Negative            RADIOLOGY & ADDITIONAL STUDIES:

## 2022-05-24 LAB
-  AMIKACIN: SIGNIFICANT CHANGE UP
-  AMOXICILLIN/CLAVULANIC ACID: SIGNIFICANT CHANGE UP
-  AMPICILLIN/SULBACTAM: SIGNIFICANT CHANGE UP
-  AMPICILLIN: SIGNIFICANT CHANGE UP
-  AZTREONAM: SIGNIFICANT CHANGE UP
-  CEFAZOLIN: SIGNIFICANT CHANGE UP
-  CEFEPIME: SIGNIFICANT CHANGE UP
-  CEFOXITIN: SIGNIFICANT CHANGE UP
-  CEFTRIAXONE: SIGNIFICANT CHANGE UP
-  CIPROFLOXACIN: SIGNIFICANT CHANGE UP
-  ERTAPENEM: SIGNIFICANT CHANGE UP
-  GENTAMICIN: SIGNIFICANT CHANGE UP
-  IMIPENEM: SIGNIFICANT CHANGE UP
-  LEVOFLOXACIN: SIGNIFICANT CHANGE UP
-  MEROPENEM: SIGNIFICANT CHANGE UP
-  NITROFURANTOIN: SIGNIFICANT CHANGE UP
-  PIPERACILLIN/TAZOBACTAM: SIGNIFICANT CHANGE UP
-  TIGECYCLINE: SIGNIFICANT CHANGE UP
-  TOBRAMYCIN: SIGNIFICANT CHANGE UP
-  TRIMETHOPRIM/SULFAMETHOXAZOLE: SIGNIFICANT CHANGE UP
ALBUMIN SERPL ELPH-MCNC: 2.7 G/DL — LOW (ref 3.3–5)
ALBUMIN SERPL ELPH-MCNC: 2.7 G/DL — LOW (ref 3.3–5)
ALP SERPL-CCNC: 31 U/L — LOW (ref 40–120)
ALP SERPL-CCNC: 32 U/L — LOW (ref 40–120)
ALT FLD-CCNC: 101 U/L — HIGH (ref 12–78)
ALT FLD-CCNC: 105 U/L — HIGH (ref 12–78)
ANION GAP SERPL CALC-SCNC: 4 MMOL/L — LOW (ref 5–17)
AST SERPL-CCNC: 90 U/L — HIGH (ref 15–37)
AST SERPL-CCNC: 91 U/L — HIGH (ref 15–37)
BASOPHILS # BLD AUTO: 0 K/UL — SIGNIFICANT CHANGE UP (ref 0–0.2)
BASOPHILS NFR BLD AUTO: 0 % — SIGNIFICANT CHANGE UP (ref 0–2)
BILIRUB DIRECT SERPL-MCNC: 0.1 MG/DL — SIGNIFICANT CHANGE UP (ref 0–0.3)
BILIRUB INDIRECT FLD-MCNC: 0.2 MG/DL — SIGNIFICANT CHANGE UP (ref 0.2–1)
BILIRUB SERPL-MCNC: 0.3 MG/DL — SIGNIFICANT CHANGE UP (ref 0.2–1.2)
BILIRUB SERPL-MCNC: 0.3 MG/DL — SIGNIFICANT CHANGE UP (ref 0.2–1.2)
BUN SERPL-MCNC: 28 MG/DL — HIGH (ref 7–23)
CALCIUM SERPL-MCNC: 8.7 MG/DL — SIGNIFICANT CHANGE UP (ref 8.5–10.1)
CHLORIDE SERPL-SCNC: 104 MMOL/L — SIGNIFICANT CHANGE UP (ref 96–108)
CO2 SERPL-SCNC: 37 MMOL/L — HIGH (ref 22–31)
CREAT SERPL-MCNC: 1 MG/DL — SIGNIFICANT CHANGE UP (ref 0.5–1.3)
CULTURE RESULTS: SIGNIFICANT CHANGE UP
EGFR: 56 ML/MIN/1.73M2 — LOW
EOSINOPHIL # BLD AUTO: 0.02 K/UL — SIGNIFICANT CHANGE UP (ref 0–0.5)
EOSINOPHIL NFR BLD AUTO: 0.7 % — SIGNIFICANT CHANGE UP (ref 0–6)
GLUCOSE SERPL-MCNC: 114 MG/DL — HIGH (ref 70–99)
HCT VFR BLD CALC: 26.6 % — LOW (ref 34.5–45)
HGB BLD-MCNC: 8.2 G/DL — LOW (ref 11.5–15.5)
IMM GRANULOCYTES NFR BLD AUTO: 0.7 % — SIGNIFICANT CHANGE UP (ref 0–1.5)
INR BLD: 1.78 RATIO — HIGH (ref 0.88–1.16)
LYMPHOCYTES # BLD AUTO: 0.45 K/UL — LOW (ref 1–3.3)
LYMPHOCYTES # BLD AUTO: 16.2 % — SIGNIFICANT CHANGE UP (ref 13–44)
MAGNESIUM SERPL-MCNC: 2.1 MG/DL — SIGNIFICANT CHANGE UP (ref 1.6–2.6)
MCHC RBC-ENTMCNC: 30.8 GM/DL — LOW (ref 32–36)
MCHC RBC-ENTMCNC: 31.3 PG — SIGNIFICANT CHANGE UP (ref 27–34)
MCV RBC AUTO: 101.5 FL — HIGH (ref 80–100)
METHOD TYPE: SIGNIFICANT CHANGE UP
MONOCYTES # BLD AUTO: 0.19 K/UL — SIGNIFICANT CHANGE UP (ref 0–0.9)
MONOCYTES NFR BLD AUTO: 6.9 % — SIGNIFICANT CHANGE UP (ref 2–14)
NEUTROPHILS # BLD AUTO: 2.09 K/UL — SIGNIFICANT CHANGE UP (ref 1.8–7.4)
NEUTROPHILS NFR BLD AUTO: 75.5 % — SIGNIFICANT CHANGE UP (ref 43–77)
NRBC # BLD: 0 /100 WBCS — SIGNIFICANT CHANGE UP (ref 0–0)
NT-PROBNP SERPL-SCNC: 2229 PG/ML — HIGH (ref 0–450)
ORGANISM # SPEC MICROSCOPIC CNT: SIGNIFICANT CHANGE UP
ORGANISM # SPEC MICROSCOPIC CNT: SIGNIFICANT CHANGE UP
PHOSPHATE SERPL-MCNC: 2.7 MG/DL — SIGNIFICANT CHANGE UP (ref 2.5–4.5)
PLATELET # BLD AUTO: 154 K/UL — SIGNIFICANT CHANGE UP (ref 150–400)
POTASSIUM SERPL-MCNC: 3.2 MMOL/L — LOW (ref 3.5–5.3)
POTASSIUM SERPL-SCNC: 3.2 MMOL/L — LOW (ref 3.5–5.3)
PROT SERPL-MCNC: 6 G/DL — SIGNIFICANT CHANGE UP (ref 6–8.3)
PROT SERPL-MCNC: 6 G/DL — SIGNIFICANT CHANGE UP (ref 6–8.3)
PROTHROM AB SERPL-ACNC: 20.9 SEC — HIGH (ref 10.5–13.4)
RBC # BLD: 2.62 M/UL — LOW (ref 3.8–5.2)
RBC # FLD: 14.5 % — SIGNIFICANT CHANGE UP (ref 10.3–14.5)
SODIUM SERPL-SCNC: 145 MMOL/L — SIGNIFICANT CHANGE UP (ref 135–145)
SPECIMEN SOURCE: SIGNIFICANT CHANGE UP
WBC # BLD: 2.77 K/UL — LOW (ref 3.8–10.5)
WBC # FLD AUTO: 2.77 K/UL — LOW (ref 3.8–10.5)

## 2022-05-24 PROCEDURE — 71045 X-RAY EXAM CHEST 1 VIEW: CPT | Mod: 26

## 2022-05-24 RX ORDER — LACTOBACILLUS ACIDOPHILUS 100MM CELL
1 CAPSULE ORAL
Refills: 0 | Status: DISCONTINUED | OUTPATIENT
Start: 2022-05-24 | End: 2022-05-31

## 2022-05-24 RX ORDER — POTASSIUM CHLORIDE 20 MEQ
20 PACKET (EA) ORAL ONCE
Refills: 0 | Status: COMPLETED | OUTPATIENT
Start: 2022-05-24 | End: 2022-05-24

## 2022-05-24 RX ADMIN — TIOTROPIUM BROMIDE 1 CAPSULE(S): 18 CAPSULE ORAL; RESPIRATORY (INHALATION) at 06:49

## 2022-05-24 RX ADMIN — Medication 325 MILLIGRAM(S): at 11:13

## 2022-05-24 RX ADMIN — CARVEDILOL PHOSPHATE 25 MILLIGRAM(S): 80 CAPSULE, EXTENDED RELEASE ORAL at 17:04

## 2022-05-24 RX ADMIN — APIXABAN 5 MILLIGRAM(S): 2.5 TABLET, FILM COATED ORAL at 17:04

## 2022-05-24 RX ADMIN — LOSARTAN POTASSIUM 100 MILLIGRAM(S): 100 TABLET, FILM COATED ORAL at 06:29

## 2022-05-24 RX ADMIN — Medication 5 MILLIGRAM(S): at 23:17

## 2022-05-24 RX ADMIN — REMDESIVIR 500 MILLIGRAM(S): 5 INJECTION INTRAVENOUS at 23:17

## 2022-05-24 RX ADMIN — CEFTRIAXONE 100 MILLIGRAM(S): 500 INJECTION, POWDER, FOR SOLUTION INTRAMUSCULAR; INTRAVENOUS at 06:26

## 2022-05-24 RX ADMIN — Medication 6 MILLIGRAM(S): at 06:28

## 2022-05-24 RX ADMIN — Medication 145 MILLIGRAM(S): at 11:13

## 2022-05-24 RX ADMIN — Medication 1 TABLET(S): at 17:08

## 2022-05-24 RX ADMIN — APIXABAN 5 MILLIGRAM(S): 2.5 TABLET, FILM COATED ORAL at 06:29

## 2022-05-24 RX ADMIN — Medication 40 MILLIGRAM(S): at 06:28

## 2022-05-24 RX ADMIN — Medication 20 MILLIEQUIVALENT(S): at 06:33

## 2022-05-24 RX ADMIN — Medication 20 MILLIEQUIVALENT(S): at 17:05

## 2022-05-24 RX ADMIN — AMIODARONE HYDROCHLORIDE 200 MILLIGRAM(S): 400 TABLET ORAL at 06:29

## 2022-05-24 RX ADMIN — Medication 20 MILLIEQUIVALENT(S): at 12:50

## 2022-05-24 RX ADMIN — CARVEDILOL PHOSPHATE 25 MILLIGRAM(S): 80 CAPSULE, EXTENDED RELEASE ORAL at 06:29

## 2022-05-24 RX ADMIN — Medication 100 MILLIGRAM(S): at 06:33

## 2022-05-24 RX ADMIN — Medication 100 MILLIGRAM(S): at 17:05

## 2022-05-24 NOTE — PROGRESS NOTE ADULT - ASSESSMENT
80 yo F w/ hx HTN, CHF, COPD, afib on eliquis p/w found with SOB  copd  CHF  AF  Obesity  OP  OA  Covid    on abx  on remdesivir  on vm o2 support    s/p BIPAP  GOC discussion - Lelagilmer Haney - HCP - Daughter - pt is DNR DNI - MOLST updated and signed -   assist with needs  Isolation for covid, Remdesivir -   monitor VS and HD and Sat  [facility] rx regimen

## 2022-05-24 NOTE — DIETITIAN INITIAL EVALUATION ADULT - ORAL INTAKE PTA/DIET HISTORY
patient seen in room sleeping soundly with venit mask . unable to awaken several attempts at calling patients name. patient from [facility]  regular diet followed PTA per transfer papers. patient continues NPO with speech unable to assess with Ventimask   no food allergies noted  attempted to call family no answer on phone at this time

## 2022-05-24 NOTE — PROGRESS NOTE ADULT - SUBJECTIVE AND OBJECTIVE BOX
Date/Time Patient Seen:  		  Referring MD:   Data Reviewed	       Patient is a 81y old  Female who presents with a chief complaint of 82 yo F w/ hx HTN, CHF, COPD, afib on eliquis p/w found with SOB since ~ [facility]am [facility] at [facility]. NO known pain. EMS was called and found pt cyanotic and O2 56% on RA. EMS states 82% on 100% nrb. Pt on CPAP by EMS with sats in mid 90s. No known fever/chills. PT reported been off lasix over past couple days. No other acute co or changes Admitted  to telemetry unit for monitoring , send 3 sets of cardiac enzymes to rule out acute coronary event, obtain ECHO to evaluate LVEF, cardiology consult  ,continue current management, O2 supply, anticoagulation plan as per cardiology consult Admit to monitored unit for cardiac monitoring, obtain echo to evaluate LVEF, intravenous diuresis as per card consult , monitor ins/outs, monitor renal profile and electrolytes closely ,send 3 sets of enzymes, O2 supply, serial chest xrays, monitor weights and oral intake of fluids, nutritionist consult Palliative care consult requested ,to discuss advance directives and complete MOLST       TPMH of COVID 19 in 2011, CHF, RLE ulcer, CKD and CHF who was brought to the ED from [facility] for SOB and hypoxia. She was vaccinated with 3 doses of Moderna vaccine in the past but did not get the second booster dose.    w/ hx HTN, CHF, COPD, afib on eliquis p/w found with SOB since ~ 7am today at SNF. NO known pain. EMS was called and found pt cyanotic and O2 56% on RA. EMS states 82% on 100% nrb. Pt on CPAP by EMS with sats in mid 90s. No known fever/chills. PT reported been off lasix over past couple days. No other acute co or changes (23 May 2022 17:08)      Subjective/HPI     PAST MEDICAL & SURGICAL HISTORY:        Medication list         MEDICATIONS  (STANDING):  aMIOdarone    Tablet 200 milliGRAM(s) Oral daily  apixaban 5 milliGRAM(s) Oral every 12 hours  carvedilol 25 milliGRAM(s) Oral two times a day  cefTRIAXone   IVPB 1000 milliGRAM(s) IV Intermittent every 24 hours  dexAMETHasone  Injectable 6 milliGRAM(s) IV Push daily  dextrose 5% + sodium chloride 0.45%. 1000 milliLiter(s) (35 mL/Hr) IV Continuous <Continuous>  doxycycline hyclate Capsule 100 milliGRAM(s) Oral every 12 hours  fenofibrate Tablet 145 milliGRAM(s) Oral daily  ferrous    sulfate 325 milliGRAM(s) Oral daily  furosemide   Injectable 40 milliGRAM(s) IV Push daily  losartan 100 milliGRAM(s) Oral daily  melatonin 5 milliGRAM(s) Oral at bedtime  potassium chloride    Tablet ER 20 milliEquivalent(s) Oral two times a day  remdesivir  IVPB 100 milliGRAM(s) IV Intermittent every 24 hours  remdesivir  IVPB   IV Intermittent   tiotropium 18 MICROgram(s) Capsule 1 Capsule(s) Inhalation daily    MEDICATIONS  (PRN):  acetaminophen     Tablet .. 650 milliGRAM(s) Oral every 6 hours PRN Temp greater or equal to 38C (100.4F)  aluminum hydroxide/magnesium hydroxide/simethicone Suspension 30 milliLiter(s) Oral every 4 hours PRN Dyspepsia  ondansetron Injectable 4 milliGRAM(s) IV Push every 8 hours PRN Nausea and/or Vomiting         Vitals log        ICU Vital Signs Last 24 Hrs  T(C): 36.2 (23 May 2022 19:20), Max: 36.6 (23 May 2022 12:35)  T(F): 97.2 (23 May 2022 19:20), Max: 97.9 (23 May 2022 12:35)  HR: 62 (23 May 2022 19:20) (61 - 70)  BP: 162/69 (23 May 2022 19:20) (129/69 - 167/70)  BP(mean): --  ABP: --  ABP(mean): --  RR: 18 (23 May 2022 19:20) (18 - 19)  SpO2: 99% (23 May 2022 19:20) (98% - 99%)           Input and Output:  I&O's Detail    22 May 2022 07:01  -  23 May 2022 07:00  --------------------------------------------------------  IN:    Lactated Ringers: 600 mL  Total IN: 600 mL    OUT:  Total OUT: 0 mL    Total NET: 600 mL      23 May 2022 07:01  -  24 May 2022 06:10  --------------------------------------------------------  IN:    dextrose 5% + sodium chloride 0.45%: 320 mL  Total IN: 320 mL    OUT:    Voided (mL): 2000 mL  Total OUT: 2000 mL    Total NET: -1680 mL          Lab Data                        8.8    4.94  )-----------( 157      ( 23 May 2022 08:41 )             27.6     05-23    142  |  102  |  27<H>  ----------------------------<  87  3.1<L>   |  30  |  1.10    Ca    8.4<L>      23 May 2022 08:41  Phos  3.5     05-23    TPro  6.4  /  Alb  2.8<L>  /  TBili  0.5  /  DBili  0.2  /  AST  188<H>  /  ALT  136<H>  /  AlkPhos  40  05-23    ABG - ( 22 May 2022 13:05 )  pH, Arterial: 7.46  pH, Blood: x     /  pCO2: 43    /  pO2: 235   / HCO3: 31    / Base Excess: 6.8   /  SaO2: 98.4              CARDIAC MARKERS ( 22 May 2022 09:43 )  x     / x     / 50 U/L / x     / x            Review of Systems	      Objective     Physical Examination    heart s1s2  lung dec BS  abd soft      Pertinent Lab findings & Imaging      Jagjit:  NO   Adequate UO     I&O's Detail    22 May 2022 07:01  -  23 May 2022 07:00  --------------------------------------------------------  IN:    Lactated Ringers: 600 mL  Total IN: 600 mL    OUT:  Total OUT: 0 mL    Total NET: 600 mL      23 May 2022 07:01  -  24 May 2022 06:10  --------------------------------------------------------  IN:    dextrose 5% + sodium chloride 0.45%: 320 mL  Total IN: 320 mL    OUT:    Voided (mL): 2000 mL  Total OUT: 2000 mL    Total NET: -1680 mL               Discussed with:     Cultures:	        Radiology

## 2022-05-24 NOTE — DIETITIAN INITIAL EVALUATION ADULT - PERTINENT MEDS FT
MEDICATIONS  (STANDING):  aMIOdarone    Tablet 200 milliGRAM(s) Oral daily  apixaban 5 milliGRAM(s) Oral every 12 hours  carvedilol 25 milliGRAM(s) Oral two times a day  cefTRIAXone   IVPB 1000 milliGRAM(s) IV Intermittent every 24 hours  dexAMETHasone  Injectable 6 milliGRAM(s) IV Push daily  dextrose 5% + sodium chloride 0.45%. 1000 milliLiter(s) (35 mL/Hr) IV Continuous <Continuous>  doxycycline hyclate Capsule 100 milliGRAM(s) Oral every 12 hours  fenofibrate Tablet 145 milliGRAM(s) Oral daily  ferrous    sulfate 325 milliGRAM(s) Oral daily  furosemide   Injectable 40 milliGRAM(s) IV Push daily  losartan 100 milliGRAM(s) Oral daily  melatonin 5 milliGRAM(s) Oral at bedtime  potassium chloride    Tablet ER 20 milliEquivalent(s) Oral two times a day  remdesivir  IVPB 100 milliGRAM(s) IV Intermittent every 24 hours  remdesivir  IVPB   IV Intermittent   tiotropium 18 MICROgram(s) Capsule 1 Capsule(s) Inhalation daily    MEDICATIONS  (PRN):  acetaminophen     Tablet .. 650 milliGRAM(s) Oral every 6 hours PRN Temp greater or equal to 38C (100.4F)  aluminum hydroxide/magnesium hydroxide/simethicone Suspension 30 milliLiter(s) Oral every 4 hours PRN Dyspepsia  ondansetron Injectable 4 milliGRAM(s) IV Push every 8 hours PRN Nausea and/or Vomiting

## 2022-05-24 NOTE — DIETITIAN INITIAL EVALUATION ADULT - OTHER INFO
The patient is an 81 year old female with a PMH of COVID 19 in 2011, CHF, RLE ulcer, CKD and CHF who was brought to the ED from [facility] for SOB and hypoxia.  weight this admit 174# weight per transfer papers 163# patient appears well nourished  MOLST DNR/DNI  patient on FeSO4 MVI KCL tricor VIt C PTA

## 2022-05-24 NOTE — DIETITIAN INITIAL EVALUATION ADULT - PERTINENT LABORATORY DATA
05-24    145  |  104  |  28<H>  ----------------------------<  114<H>  3.2<L>   |  37<H>  |  1.00    Ca    8.7      24 May 2022 08:40  Phos  2.7     05-24  Mg     2.1     05-24    TPro  6.0  /  Alb  2.7<L>  /  TBili  0.3  /  DBili  0.1  /  AST  90<H>  /  ALT  101<H>  /  AlkPhos  32<L>  05-24

## 2022-05-24 NOTE — DIETITIAN INITIAL EVALUATION ADULT - WEIGHT FOR BMI (LBS)
691

## 2022-05-24 NOTE — PROGRESS NOTE ADULT - SUBJECTIVE AND OBJECTIVE BOX
Patient is a 81y Female whom presented to the hospital with     PAST MEDICAL & SURGICAL HISTORY:      MEDICATIONS  (STANDING):  aMIOdarone    Tablet 200 milliGRAM(s) Oral daily  apixaban 5 milliGRAM(s) Oral every 12 hours  carvedilol 25 milliGRAM(s) Oral two times a day  cefTRIAXone   IVPB 1000 milliGRAM(s) IV Intermittent every 24 hours  dexAMETHasone  Injectable 6 milliGRAM(s) IV Push daily  dextrose 5% + sodium chloride 0.45%. 1000 milliLiter(s) (35 mL/Hr) IV Continuous <Continuous>  doxycycline hyclate Capsule 100 milliGRAM(s) Oral every 12 hours  fenofibrate Tablet 145 milliGRAM(s) Oral daily  ferrous    sulfate 325 milliGRAM(s) Oral daily  furosemide   Injectable 40 milliGRAM(s) IV Push daily  losartan 100 milliGRAM(s) Oral daily  melatonin 5 milliGRAM(s) Oral at bedtime  potassium chloride    Tablet ER 20 milliEquivalent(s) Oral two times a day  remdesivir  IVPB 100 milliGRAM(s) IV Intermittent every 24 hours  remdesivir  IVPB   IV Intermittent   tiotropium 18 MICROgram(s) Capsule 1 Capsule(s) Inhalation daily      Allergies    fluoxetine (Unknown)  NIFEdipine (Unknown)    Intolerances        SOCIAL HISTORY:  Denies ETOh,Smoking,     FAMILY HISTORY:      REVIEW OF SYSTEMS:    CONSTITUTIONAL: No weakness, fevers or chills  NECK: No pain or stiffness  RESPIRATORY: No cough, wheezing, hemoptysis; No shortness of breath  CARDIOVASCULAR: No chest pain or palpitations  GASTROINTESTINAL: No abdominal or epigastric pain. No nausea, vomiting,     No diarrhea or constipation. No melena   SKIN: dry      VITAL:  T(C): , Max: 36.8 (05-23-22 @ 05:30)  T(F): , Max: 98.2 (05-23-22 @ 05:30)  HR: 62 (05-23-22 @ 19:20)  BP: 162/69 (05-23-22 @ 19:20)  BP(mean): --  RR: 18 (05-23-22 @ 19:20)  SpO2: 99% (05-23-22 @ 19:20)  Wt(kg): --    I and O's:    05-22 @ 07:01  -  05-23 @ 07:00  --------------------------------------------------------  IN: 600 mL / OUT: 0 mL / NET: 600 mL    05-23 @ 07:01  -  05-24 @ 00:09  --------------------------------------------------------  IN: 0 mL / OUT: 1200 mL / NET: -1200 mL        Weight (kg): 79.2 (05-23 @ 05:30)    PHYSICAL EXAM:    Constitutional: NAD  HEENT: conjunctive   clear   Neck:  No JVD  Respiratory: CTAB  Cardiovascular: S1 and S2  Gastrointestinal: BS+, soft, NT/ND  Extremities: No peripheral edema                          8.2    2.77  )-----------( 154      ( 24 May 2022 08:40 )             26.6       CBC Full  -  ( 24 May 2022 08:40 )  WBC Count : 2.77 K/uL  RBC Count : 2.62 M/uL  Hemoglobin : 8.2 g/dL  Hematocrit : 26.6 %  Platelet Count - Automated : 154 K/uL  Mean Cell Volume : 101.5 fl  Mean Cell Hemoglobin : 31.3 pg  Mean Cell Hemoglobin Concentration : 30.8 gm/dL  Auto Neutrophil # : 2.09 K/uL  Auto Lymphocyte # : 0.45 K/uL  Auto Monocyte # : 0.19 K/uL  Auto Eosinophil # : 0.02 K/uL  Auto Basophil # : 0.00 K/uL  Auto Neutrophil % : 75.5 %  Auto Lymphocyte % : 16.2 %  Auto Monocyte % : 6.9 %  Auto Eosinophil % : 0.7 %  Auto Basophil % : 0.0 %      05-24    145  |  104  |  28<H>  ----------------------------<  114<H>  3.2<L>   |  37<H>  |  1.00    Ca    8.7      24 May 2022 08:40  Phos  2.7     05-24  Mg     2.1     05-24    TPro  6.0  /  Alb  2.7<L>  /  TBili  0.3  /  DBili  0.1  /  AST  90<H>  /  ALT  101<H>  /  AlkPhos  32<L>  05-24      CAPILLARY BLOOD GLUCOSE          Vital Signs Last 24 Hrs  T(C): 36.7 (24 May 2022 16:50), Max: 36.7 (24 May 2022 16:50)  T(F): 98 (24 May 2022 16:50), Max: 98 (24 May 2022 16:50)  HR: 68 (24 May 2022 16:50) (60 - 68)  BP: 168/78 (24 May 2022 16:50) (162/69 - 174/89)  BP(mean): --  RR: 18 (24 May 2022 16:50) (18 - 18)  SpO2: 98% (24 May 2022 16:50) (98% - 99%)        PT/INR - ( 24 May 2022 08:40 )   PT: 20.9 sec;   INR: 1.78 ratio              Patient is a 81y Female whom presented to the hospital with ckd and petra     PAST MEDICAL & SURGICAL HISTORY:      MEDICATIONS  (STANDING):  aMIOdarone    Tablet 200 milliGRAM(s) Oral daily  apixaban 5 milliGRAM(s) Oral every 12 hours  carvedilol 25 milliGRAM(s) Oral two times a day  cefTRIAXone   IVPB 1000 milliGRAM(s) IV Intermittent every 24 hours  dexAMETHasone  Injectable 6 milliGRAM(s) IV Push daily  dextrose 5% + sodium chloride 0.45%. 1000 milliLiter(s) (35 mL/Hr) IV Continuous <Continuous>  doxycycline hyclate Capsule 100 milliGRAM(s) Oral every 12 hours  fenofibrate Tablet 145 milliGRAM(s) Oral daily  ferrous    sulfate 325 milliGRAM(s) Oral daily  furosemide   Injectable 40 milliGRAM(s) IV Push daily  losartan 100 milliGRAM(s) Oral daily  melatonin 5 milliGRAM(s) Oral at bedtime  potassium chloride    Tablet ER 20 milliEquivalent(s) Oral two times a day  remdesivir  IVPB 100 milliGRAM(s) IV Intermittent every 24 hours  remdesivir  IVPB   IV Intermittent   tiotropium 18 MICROgram(s) Capsule 1 Capsule(s) Inhalation daily      Allergies    fluoxetine (Unknown)  NIFEdipine (Unknown)    Intolerances        SOCIAL HISTORY:  Denies ETOh,Smoking,     FAMILY HISTORY:      REVIEW OF SYSTEMS:    CONSTITUTIONAL: No weakness, fevers or chills  NECK: No pain or stiffness  RESPIRATORY: No cough, wheezing, hemoptysis; No shortness of breath  CARDIOVASCULAR: No chest pain or palpitations  GASTROINTESTINAL: No abdominal or epigastric pain. No nausea, vomiting,     No diarrhea or constipation. No melena   SKIN: dry      VITAL:  T(C): , Max: 36.8 (05-23-22 @ 05:30)  T(F): , Max: 98.2 (05-23-22 @ 05:30)  HR: 62 (05-23-22 @ 19:20)  BP: 162/69 (05-23-22 @ 19:20)  BP(mean): --  RR: 18 (05-23-22 @ 19:20)  SpO2: 99% (05-23-22 @ 19:20)  Wt(kg): --    I and O's:    05-22 @ 07:01  -  05-23 @ 07:00  --------------------------------------------------------  IN: 600 mL / OUT: 0 mL / NET: 600 mL    05-23 @ 07:01  -  05-24 @ 00:09  --------------------------------------------------------  IN: 0 mL / OUT: 1200 mL / NET: -1200 mL        Weight (kg): 79.2 (05-23 @ 05:30)    PHYSICAL EXAM:    Constitutional: NAD  HEENT: conjunctive   clear   Neck:  No JVD  Respiratory: CTAB  Cardiovascular: S1 and S2  Gastrointestinal: BS+, soft, NT/ND  Extremities: No peripheral edema                          8.2    2.77  )-----------( 154      ( 24 May 2022 08:40 )             26.6       CBC Full  -  ( 24 May 2022 08:40 )  WBC Count : 2.77 K/uL  RBC Count : 2.62 M/uL  Hemoglobin : 8.2 g/dL  Hematocrit : 26.6 %  Platelet Count - Automated : 154 K/uL  Mean Cell Volume : 101.5 fl  Mean Cell Hemoglobin : 31.3 pg  Mean Cell Hemoglobin Concentration : 30.8 gm/dL  Auto Neutrophil # : 2.09 K/uL  Auto Lymphocyte # : 0.45 K/uL  Auto Monocyte # : 0.19 K/uL  Auto Eosinophil # : 0.02 K/uL  Auto Basophil # : 0.00 K/uL  Auto Neutrophil % : 75.5 %  Auto Lymphocyte % : 16.2 %  Auto Monocyte % : 6.9 %  Auto Eosinophil % : 0.7 %  Auto Basophil % : 0.0 %      05-24    145  |  104  |  28<H>  ----------------------------<  114<H>  3.2<L>   |  37<H>  |  1.00    Ca    8.7      24 May 2022 08:40  Phos  2.7     05-24  Mg     2.1     05-24    TPro  6.0  /  Alb  2.7<L>  /  TBili  0.3  /  DBili  0.1  /  AST  90<H>  /  ALT  101<H>  /  AlkPhos  32<L>  05-24      CAPILLARY BLOOD GLUCOSE          Vital Signs Last 24 Hrs  T(C): 36.7 (24 May 2022 16:50), Max: 36.7 (24 May 2022 16:50)  T(F): 98 (24 May 2022 16:50), Max: 98 (24 May 2022 16:50)  HR: 68 (24 May 2022 16:50) (60 - 68)  BP: 168/78 (24 May 2022 16:50) (162/69 - 174/89)  BP(mean): --  RR: 18 (24 May 2022 16:50) (18 - 18)  SpO2: 98% (24 May 2022 16:50) (98% - 99%)        PT/INR - ( 24 May 2022 08:40 )   PT: 20.9 sec;   INR: 1.78 ratio

## 2022-05-24 NOTE — PROGRESS NOTE ADULT - ASSESSMENT
HPI:  82 yo F w/ hx HTN, CHF, COPD, afib on eliquis p/w found with SOB since ~ 7am today at SNF. NO known pain. EMS was called and found pt cyanotic and O2 56% on RA. EMS states 82% on 100% nrb. Pt on CPAP by EMS with sats in mid 90s. No known fever/chills. PT reported been off lasix over past couple days. No other acute co or changes Admitted  to telemetry unit for monitoring , send 3 sets of cardiac enzymes to rule out acute coronary event, obtain ECHO to evaluate LVEF, cardiology consult  ,continue current management, O2 supply, anticoagulation plan as per cardiology consult Admit to monitored unit for cardiac monitoring, obtain echo to evaluate LVEF, intravenous diuresis as per card consult , monitor ins/outs, monitor renal profile and electrolytes closely ,send 3 sets of enzymes, O2 supply, serial chest xrays, monitor weights and oral intake of fluids, nutritionist consult Palliative care consult requested ,to discuss advance directives and complete MOLST       TPMH of COVID 19 in 2011, CHF, RLE ulcer, CKD and CHF who was brought to the ED from [facility] for SOB and hypoxia. She was vaccinated with 3 doses of Moderna vaccine in the past but did not get the second booster dose.    w/ hx HTN, CHF, COPD, afib on eliquis p/w found with SOB since ~ 7am today at SNF. NO known pain. EMS was called and found pt cyanotic and O2 56% on RA. EMS states 82% on 100% nrb. Pt on CPAP by EMS with sats in mid 90s. No known fever/chills. PT reported been off lasix over past couple days. No other acute co or changes          The patient is an 81 year old female with a PMH of COVID 19 in 2011, CHF, RLE ulcer, CKD and CHF who was brought to the ED from [facility] for SOB and hypoxia. She was vaccinated with 3 doses of Moderna vaccine in the past but did not get the second booster dose.    w/ hx HTN, CHF, COPD, afib on eliquis p/w found with SOB since ~ 7am today at SNF. NO known pain. EMS was called and found pt cyanotic and O2 56% on RA. EMS states 82% on 100% nrb. Pt on CPAP by EMS with sats in mid 90s. No known fever/chills. PT reported been off lasix over past couple days. No other acute co or changes    < from: Xray Chest 1 View- PORTABLE-Urgent (05.22.22 @ 09:54) >    ACC: [id number] EXAM:  XR CHEST PORTABLE URGENT 1V                          PROCEDURE DATE:  05/22/2022          INTERPRETATION:  CLINICAL INDICATION: 81 years  Female with SOB, CHF.    COMPARISON: None    The patient's chin obscures the left lung apex.    AP view of the chest demonstrates a large right lower lobe infiltrate.   There is mild pulmonary vascular congestion. There is no pleural   effusion. There is no pneumothorax.    The heart is normal in size. Left-sided bipolar pacemaker. There is no   mediastinal or hilar mass.    Mild thoracic degenerative changes and osteopenia are present.    IMPRESSION:    Large right lower lobe infiltrates superimposed on pulmonary vascular   congestion. Pacemaker.    --- End of Report ---            PAUL MONROY MD; Attending Radiologist    < end of copied text >  < from: 12 Lead ECG (05.22.22 @ 09:28) >    Ventricular Rate 82 BPM    Atrial Rate 82 BPM    P-R Interval 208 ms    QRS Duration 162 ms    Q-T Interval 424 ms    QTC Calculation(Bazett) 495 ms    P Axis 77 degrees    R Axis 93 degrees    T Axis 24 degrees    Diagnosis Line Atrial-sensed ventricular-paced rhythm  Abnormal ECG  No previous ECGs available  Confirmed by dea Lopez ([id number]) on 5/22/2022 3:22:34 PM    < end of copied text >  sitivity (05.22.22 @ 13:48)   Troponin I, High Sensitivity Result: 138.8: Serial measurements of high sensitivity troponin I (hs TnI) showing rapid   upward or downward changes suggest acute myocardial injury. Please note   that a sustained elevation of hsTnI can be caused by renal disease,   chronic heart failure, sepsis, pulmonary embolism and other clinical   conditions. ng/L       Historical Values  Troponin I, High Sensitivity (05.22.22 @ 13:48)   Troponin I, High Sensitivity Result: 138.8: Serial measurements of high sensitivity troponin I (hs TnI) showing rapid   upward or downward changes suggest acute myocardial injury. Please note   that a sustained elevation of hsTnI can be caused by renal disease,   chronic heart failure, sepsis, pulmonary embolism and other clinical   conditions. ng/L   Troponin I, High Sensitivity (05.22.22 @ 09:43)   Troponin I, High Sensitivity Result: 21.2: Serial measurements of high sensitivity troponin I (hs TnI) showing rapid   upward or downward changes suggest acute myocardial injury. Please note   that a sustained elevation of hsTnI can be caused by renal disease,   chronic heart failure, sepsis, pulmonary embolism and other clinical   conditions. ng/L  (22 May 2022 12:13)   82 yo F w/ hx HTN, CHF, COPD, afib on eliquis p/w found with SOB since ~ 7am today at SNF. NO known pain. EMS was called and found pt cyanotic and O2 56% on RA. EMS states 82% on 100% nrb. Pt on CPAP by EMS with sats in mid 90s. No known fever/chills. PT reported been off lasix over past couple days. No other acute co or changes Admitted  to telemetry unit for monitoring , send 3 sets of cardiac enzymes to rule out acute coronary event, obtain ECHO to evaluate LVEF, cardiology consult  ,continue current management, O2 supply, anticoagulation plan as per cardiology consult Admit to monitored unit for cardiac monitoring, obtain echo to evaluate LVEF, intravenous diuresis as per card consult , monitor ins/outs, monitor renal profile and electrolytes closely ,send 3 sets of enzymes, O2 supply, serial chest xrays, monitor weights and oral intake of fluids, nutritionist consult Palliative care consult requested ,to discuss advance directives and complete MOLST     ACUTE RENAL FAILURE:   Serum creatinine is  improved   There is no progression . No uremic symptoms  No evidence of anemia .  Fluid status stable.  Will continue to avoid nephrotoxic drugs.  Patient remains asymptomatic.   Continue current therapy.  hold  diuretic.  hold   ACE inhibitor.  hold   ARB.  Additional evaluation:   ECG,    echocardiogram,     CXR,  will obtained recent   renal ultrasound to evalaute kidney size and possible stones ,

## 2022-05-24 NOTE — SWALLOW BEDSIDE ASSESSMENT ADULT - COMMENTS
Consult received and chart reviewed. Attempted to see patient for initial assessment of swallow function this PM; however, patient is currently on supplemental O2 via venti mask. As per discussion with Dr. Meyer this , MD reported the patient is planned to be weaned to NC today, pending pulmonology clearance. Discussed with RN who reported that patient continues to require venti mask this PM and has not yet been seen by pulmonology. Called out and left message for Dr. Meyer to please reconsult this department once patient is tolerating NC or room air (1-2 hours s/p weaning) and can therefore safely participate in PO trials for swallow assessment. Consult received and chart reviewed. Attempted to see patient for initial assessment of swallow function this PM; however, patient is currently on supplemental O2 via venti mask. As per discussion with Dr. Meyer this AM, MD reported the patient is planned to be weaned to NC today, pending pulmonology clearance. Discussed with RN who reported that patient continues to require venti mask this PM and has not yet been seen by pulmonology. Called out and left message for Dr. Meyer to consider oral nutrition/hydration/medicaiton is contraindicated d/t current respiratory status and consider short term means of non-oral nutrition/hydration/medication and RD services to ensure adequate daily caloric nutritional intake.  Please reconsult this department once patient is tolerating NC or room air (1-2 hours s/p weaning) and can therefore safely participate in PO trials for swallow assessment.

## 2022-05-24 NOTE — PROGRESS NOTE ADULT - SUBJECTIVE AND OBJECTIVE BOX
Interval History:    CENTRAL LINE:   [  ] YES       [  ] NO  WILLIS:                 [  ] YES       [  ] NO         REVIEW OF SYSTEMS:  All Systems below were reviewed and are negative [  ]  HEENT:  ID:  Pulmonary:  Cardiac:  GI:  Renal:  Musculoskeletal:  All other systems above were reviewed and are negative   [  ]      MEDICATIONS  (STANDING):  aMIOdarone    Tablet 200 milliGRAM(s) Oral daily  apixaban 5 milliGRAM(s) Oral every 12 hours  carvedilol 25 milliGRAM(s) Oral two times a day  cefTRIAXone   IVPB 1000 milliGRAM(s) IV Intermittent every 24 hours  dexAMETHasone  Injectable 6 milliGRAM(s) IV Push daily  dextrose 5% + sodium chloride 0.45%. 1000 milliLiter(s) (35 mL/Hr) IV Continuous <Continuous>  doxycycline hyclate Capsule 100 milliGRAM(s) Oral every 12 hours  fenofibrate Tablet 145 milliGRAM(s) Oral daily  ferrous    sulfate 325 milliGRAM(s) Oral daily  furosemide   Injectable 40 milliGRAM(s) IV Push daily  lactobacillus acidophilus 1 Tablet(s) Oral two times a day  losartan 100 milliGRAM(s) Oral daily  melatonin 5 milliGRAM(s) Oral at bedtime  potassium chloride    Tablet ER 20 milliEquivalent(s) Oral two times a day  remdesivir  IVPB 100 milliGRAM(s) IV Intermittent every 24 hours  remdesivir  IVPB   IV Intermittent   tiotropium 18 MICROgram(s) Capsule 1 Capsule(s) Inhalation daily    MEDICATIONS  (PRN):  acetaminophen     Tablet .. 650 milliGRAM(s) Oral every 6 hours PRN Temp greater or equal to 38C (100.4F)  aluminum hydroxide/magnesium hydroxide/simethicone Suspension 30 milliLiter(s) Oral every 4 hours PRN Dyspepsia  ondansetron Injectable 4 milliGRAM(s) IV Push every 8 hours PRN Nausea and/or Vomiting      Vital Signs Last 24 Hrs  T(C): 36.7 (24 May 2022 16:50), Max: 36.7 (24 May 2022 16:50)  T(F): 98 (24 May 2022 16:50), Max: 98 (24 May 2022 16:50)  HR: 68 (24 May 2022 16:50) (60 - 68)  BP: 168/78 (24 May 2022 16:50) (162/69 - 174/89)  BP(mean): --  RR: 18 (24 May 2022 16:50) (18 - 18)  SpO2: 98% (24 May 2022 16:50) (98% - 99%)    I&O's Summary    23 May 2022 07:01  -  24 May 2022 07:00  --------------------------------------------------------  IN: 420 mL / OUT: 2000 mL / NET: -1580 mL    24 May 2022 07:01  -  24 May 2022 19:12  --------------------------------------------------------  IN: 245 mL / OUT: 1200 mL / NET: -955 mL        PHYSICAL EXAM:  HEENT: NC/AT; PERRLA  Neck: Soft; no tenderness  Lungs: CTA bilaterally; no wheezing.   Heart:  Abdomen:  Genital/ Rectal:  Extremities:  Neurologic:  Vascular:      LABORATORY:    CBC Full  -  ( 24 May 2022 08:40 )  WBC Count : 2.77 K/uL  RBC Count : 2.62 M/uL  Hemoglobin : 8.2 g/dL  Hematocrit : 26.6 %  Platelet Count - Automated : 154 K/uL  Mean Cell Volume : 101.5 fl  Mean Cell Hemoglobin : 31.3 pg  Mean Cell Hemoglobin Concentration : 30.8 gm/dL  Auto Neutrophil # : 2.09 K/uL  Auto Lymphocyte # : 0.45 K/uL  Auto Monocyte # : 0.19 K/uL  Auto Eosinophil # : 0.02 K/uL  Auto Basophil # : 0.00 K/uL  Auto Neutrophil % : 75.5 %  Auto Lymphocyte % : 16.2 %  Auto Monocyte % : 6.9 %  Auto Eosinophil % : 0.7 %  Auto Basophil % : 0.0 %      ESR:                   05-23 @ 11:51  --    C-Reactive Protein:     05-23 @ 11:51  91    Procalcitonin:           05-23 @ 11:51   --  ESR:                   05-23 @ 08:41  --    C-Reactive Protein:     05-23 @ 08:41  --    Procalcitonin:           05-23 @ 08:41   4.91      05-24    145  |  104  |  28<H>  ----------------------------<  114<H>  3.2<L>   |  37<H>  |  1.00    Ca    8.7      24 May 2022 08:40  Phos  2.7     05-24  Mg     2.1     05-24    TPro  6.0  /  Alb  2.7<L>  /  TBili  0.3  /  DBili  0.1  /  AST  90<H>  /  ALT  101<H>  /  AlkPhos  32<L>  05-24      Rapid Respiratory Viral Panel Result        05-22 @ 10:04  Rapid RVP Detected  Coronovirus --  Adenovirus --  Bordetella Pertussis --  Chlamydia Pneumonia --  Entero/Rhinovirus--  HKU1 Coronovirus --  HMPV Coronovirus --  Influenza A --  Influenza AH1 --  Influenza AH1 2009 --  Influenza AH3 --  Influenza B --  Mycoplasma Pneumoniae --  NL63 Coronovirus --  OC43 Coronovirus --  Parainfluenza 1 --  Parainfluenza 2 --  Parainfluenza 3 --  Parainfluenza 4 --  Resp Syncytial Virus --      Assessment and Plan:          Emir Tomas MD   [phone].    No fevers.    MEDICATIONS  (STANDING):  aMIOdarone    Tablet 200 milliGRAM(s) Oral daily  apixaban 5 milliGRAM(s) Oral every 12 hours  carvedilol 25 milliGRAM(s) Oral two times a day  cefTRIAXone   IVPB 1000 milliGRAM(s) IV Intermittent every 24 hours  dexAMETHasone  Injectable 6 milliGRAM(s) IV Push daily  dextrose 5% + sodium chloride 0.45%. 1000 milliLiter(s) (35 mL/Hr) IV Continuous <Continuous>  doxycycline hyclate Capsule 100 milliGRAM(s) Oral every 12 hours  fenofibrate Tablet 145 milliGRAM(s) Oral daily  ferrous    sulfate 325 milliGRAM(s) Oral daily  furosemide   Injectable 40 milliGRAM(s) IV Push daily  lactobacillus acidophilus 1 Tablet(s) Oral two times a day  losartan 100 milliGRAM(s) Oral daily  melatonin 5 milliGRAM(s) Oral at bedtime  potassium chloride    Tablet ER 20 milliEquivalent(s) Oral two times a day  remdesivir  IVPB 100 milliGRAM(s) IV Intermittent every 24 hours  remdesivir  IVPB   IV Intermittent   tiotropium 18 MICROgram(s) Capsule 1 Capsule(s) Inhalation daily    MEDICATIONS  (PRN):  acetaminophen     Tablet .. 650 milliGRAM(s) Oral every 6 hours PRN Temp greater or equal to 38C (100.4F)  aluminum hydroxide/magnesium hydroxide/simethicone Suspension 30 milliLiter(s) Oral every 4 hours PRN Dyspepsia  ondansetron Injectable 4 milliGRAM(s) IV Push every 8 hours PRN Nausea and/or Vomiting      Vital Signs Last 24 Hrs  T(C): 36.7 (24 May 2022 16:50), Max: 36.7 (24 May 2022 16:50)  T(F): 98 (24 May 2022 16:50), Max: 98 (24 May 2022 16:50)  HR: 68 (24 May 2022 16:50) (60 - 68)  BP: 168/78 (24 May 2022 16:50) (162/69 - 174/89)  BP(mean): --  RR: 18 (24 May 2022 16:50) (18 - 18)  SpO2: 98% (24 May 2022 16:50) (98% - 99%)    I&O's Summary    23 May 2022 07:01  -  24 May 2022 07:00  --------------------------------------------------------  IN: 420 mL / OUT: 2000 mL / NET: -1580 mL    24 May 2022 07:01  -  24 May 2022 19:12  --------------------------------------------------------  IN: 245 mL / OUT: 1200 mL / NET: -955 mL        PHYSICAL EXAM:  HEENT: NC/AT; PERRLA  Neck: Soft; no tenderness  Lungs: CTA bilaterally; no wheezing.   Heart:  Abdomen:  Genital/ Rectal:  Extremities:  Neurologic:  Vascular:      LABORATORY:    CBC Full  -  ( 24 May 2022 08:40 )  WBC Count : 2.77 K/uL  RBC Count : 2.62 M/uL  Hemoglobin : 8.2 g/dL  Hematocrit : 26.6 %  Platelet Count - Automated : 154 K/uL  Mean Cell Volume : 101.5 fl  Mean Cell Hemoglobin : 31.3 pg  Mean Cell Hemoglobin Concentration : 30.8 gm/dL  Auto Neutrophil # : 2.09 K/uL  Auto Lymphocyte # : 0.45 K/uL  Auto Monocyte # : 0.19 K/uL  Auto Eosinophil # : 0.02 K/uL  Auto Basophil # : 0.00 K/uL  Auto Neutrophil % : 75.5 %  Auto Lymphocyte % : 16.2 %  Auto Monocyte % : 6.9 %  Auto Eosinophil % : 0.7 %  Auto Basophil % : 0.0 %      ESR:                   05-23 @ 11:51  --    C-Reactive Protein:     05-23 @ 11:51  91    Procalcitonin:           05-23 @ 11:51   --  ESR:                   05-23 @ 08:41  --    C-Reactive Protein:     05-23 @ 08:41  --    Procalcitonin:           05-23 @ 08:41   4.91      05-24    145  |  104  |  28<H>  ----------------------------<  114<H>  3.2<L>   |  37<H>  |  1.00    Ca    8.7      24 May 2022 08:40  Phos  2.7     05-24  Mg     2.1     05-24    TPro  6.0  /  Alb  2.7<L>  /  TBili  0.3  /  DBili  0.1  /  AST  90<H>  /  ALT  101<H>  /  AlkPhos  32<L>  05-24      Assessment and Plan:    1. Pneumonia of lower lobes: aspiration vs COVID 19 pneumonia  2. Respiratory failure with hypoxia.  3. RLE cellulitis  4, RLE chronic ulcer.  5. CKD.    . The patient has respiratory failure and will need treatments for COVID 19 pneumonia.   . Continue IV Dexamethasone 6 mg iv daily  . Continue  Remdesivir for 5 days.Monitor LFTS and renal function.  . Change IV Zosyn to IV Rocephin and po Doxy  . Aspiration precautions. SLP evaluation for dysphagia.        Emir Tomas MD   [phone].    No fevers.  SOB is better. Off BIBAP and now on O2 NC.        MEDICATIONS  (STANDING):  aMIOdarone    Tablet 200 milliGRAM(s) Oral daily  apixaban 5 milliGRAM(s) Oral every 12 hours  carvedilol 25 milliGRAM(s) Oral two times a day  cefTRIAXone   IVPB 1000 milliGRAM(s) IV Intermittent every 24 hours  dexAMETHasone  Injectable 6 milliGRAM(s) IV Push daily  dextrose 5% + sodium chloride 0.45%. 1000 milliLiter(s) (35 mL/Hr) IV Continuous <Continuous>  doxycycline hyclate Capsule 100 milliGRAM(s) Oral every 12 hours  fenofibrate Tablet 145 milliGRAM(s) Oral daily  ferrous    sulfate 325 milliGRAM(s) Oral daily  furosemide   Injectable 40 milliGRAM(s) IV Push daily  lactobacillus acidophilus 1 Tablet(s) Oral two times a day  losartan 100 milliGRAM(s) Oral daily  melatonin 5 milliGRAM(s) Oral at bedtime  potassium chloride    Tablet ER 20 milliEquivalent(s) Oral two times a day  remdesivir  IVPB 100 milliGRAM(s) IV Intermittent every 24 hours  remdesivir  IVPB   IV Intermittent   tiotropium 18 MICROgram(s) Capsule 1 Capsule(s) Inhalation daily    MEDICATIONS  (PRN):  acetaminophen     Tablet .. 650 milliGRAM(s) Oral every 6 hours PRN Temp greater or equal to 38C (100.4F)  aluminum hydroxide/magnesium hydroxide/simethicone Suspension 30 milliLiter(s) Oral every 4 hours PRN Dyspepsia  ondansetron Injectable 4 milliGRAM(s) IV Push every 8 hours PRN Nausea and/or Vomiting      Vital Signs Last 24 Hrs  T(C): 36.7 (24 May 2022 16:50), Max: 36.7 (24 May 2022 16:50)  T(F): 98 (24 May 2022 16:50), Max: 98 (24 May 2022 16:50)  HR: 68 (24 May 2022 16:50) (60 - 68)  BP: 168/78 (24 May 2022 16:50) (162/69 - 174/89)  BP(mean): --  RR: 18 (24 May 2022 16:50) (18 - 18)  SpO2: 98% (24 May 2022 16:50) (98% - 99%)    I&O's Summary    23 May 2022 07:01  -  24 May 2022 07:00  --------------------------------------------------------  IN: 420 mL / OUT: 2000 mL / NET: -1580 mL    24 May 2022 07:01  -  24 May 2022 19:12  --------------------------------------------------------  IN: 245 mL / OUT: 1200 mL / NET: -955 mL        PHYSICAL EXAM:  HEENT: NC/AT; PERRLA  Neck: Soft; no tenderness  Lungs: CTA bilaterally; no wheezing.   Heart: RRR, no murmurs.   Abdomen: Soft, no tenderness.  Genital/ Rectal: No hale.   Extremities: RLE ulcer with decreased swelling and erythema.   Neurologic: Awake.      LABORATORY:    CBC Full  -  ( 24 May 2022 08:40 )  WBC Count : 2.77 K/uL  RBC Count : 2.62 M/uL  Hemoglobin : 8.2 g/dL  Hematocrit : 26.6 %  Platelet Count - Automated : 154 K/uL  Mean Cell Volume : 101.5 fl  Mean Cell Hemoglobin : 31.3 pg  Mean Cell Hemoglobin Concentration : 30.8 gm/dL  Auto Neutrophil # : 2.09 K/uL  Auto Lymphocyte # : 0.45 K/uL  Auto Monocyte # : 0.19 K/uL  Auto Eosinophil # : 0.02 K/uL  Auto Basophil # : 0.00 K/uL  Auto Neutrophil % : 75.5 %  Auto Lymphocyte % : 16.2 %  Auto Monocyte % : 6.9 %  Auto Eosinophil % : 0.7 %  Auto Basophil % : 0.0 %      ESR:                   05-23 @ 11:51  --    C-Reactive Protein:     05-23 @ 11:51  91    Procalcitonin:           05-23 @ 11:51   --  ESR:                   05-23 @ 08:41  --    C-Reactive Protein:     05-23 @ 08:41  --    Procalcitonin:           05-23 @ 08:41   4.91      05-24    145  |  104  |  28<H>  ----------------------------<  114<H>  3.2<L>   |  37<H>  |  1.00    Ca    8.7      24 May 2022 08:40  Phos  2.7     05-24  Mg     2.1     05-24    TPro  6.0  /  Alb  2.7<L>  /  TBili  0.3  /  DBili  0.1  /  AST  90<H>  /  ALT  101<H>  /  AlkPhos  32<L>  05-24      Assessment and Plan:    1. Pneumonia of lower lobes: aspiration vs COVID 19 pneumonia  2. Respiratory failure with hypoxia.  3. RLE cellulitis  4, RLE chronic ulcer.  5. CKD.    . The patient has respiratory failure and will need treatments for COVID 19 pneumonia.   . SOB and hypoxia have improved.  . Continue IV Dexamethasone 6 mg iv daily for 10 days  . Continue  Remdesivir for 3 more  days.Monitor LFTS and renal function.  . Continue  IV Rocephin and po Doxy.  . RLE cellulitis is improving.   . Aspiration precautions. SLP evaluation for dysphagia.        Emir Tomas MD   [phone].

## 2022-05-24 NOTE — PROGRESS NOTE ADULT - SUBJECTIVE AND OBJECTIVE BOX
PROGRESS NOTE  Patient is a 81y old  Female who presents with a chief complaint of Acute respiratory failure with hypoxia     (24 May 2022 10:54)    Chart and available morning labs /imaging are reviewed electronically , urgent issues addressed . More information  is being added upon completion of rounds , when more information is collected and management discussed with consultants , medical staff and social service/case management on the floor   OVERNIGHT  No new issues reported by medical staff . All above noted Patient is resting in a bed comfortably .Confused ,poor mentation .No distress noted [place] [facility] comsplamerry Spoke to Dr Garcia -caro taper oxygen to 4liters ,keep O2 SAT above 90 %  Spoke to daughter Lela on a phone update is given   HPI:  82 yo F w/ hx HTN, CHF, COPD, afib on eliquis p/w found with SOB since ~ 7am today at [facility]. NO known pain. EMS was called and found pt cyanotic and O2 56% on RA. EMS states 82% on 100% nrb. Pt on CPAP by EMS with sats in mid 90s. No known fever/chills. PT reported been off lasix over past couple days. No other acute co or changes Admitted  to telemetry unit for monitoring , send 3 sets of cardiac enzymes to rule out acute coronary event, obtain ECHO to evaluate LVEF, cardiology consult  ,continue current management, O2 supply, anticoagulation plan as per cardiology consult Admit to monitored unit for cardiac monitoring, obtain echo to evaluate LVEF, intravenous diuresis as per card consult , monitor ins/outs, monitor renal profile and electrolytes closely ,send 3 sets of enzymes, O2 supply, serial chest xrays, monitor weights and oral intake of fluids, nutritionist consult Palliative care consult requested ,to discuss advance directives and complete MOLST       TPMH of COVID 19 in 2011, CHF, RLE ulcer, CKD and CHF who was brought to the ED from [facility] for SOB and hypoxia. She was vaccinated with 3 doses of Moderna vaccine in the past but did not get the second booster dose.    w/ hx HTN, CHF, COPD, afib on eliquis p/w found with SOB since ~ 7am today at [facility]. NO known pain. EMS was called and found pt cyanotic and O2 56% on RA. EMS states 82% on 100% nrb. Pt on CPAP by EMS with sats in mid 90s. No known fever/chills. PT reported been off lasix over past couple days. No other acute co or changes          The patient is an 81 year old female with a PMH of COVID 19 in 2011, CHF, RLE ulcer, CKD and CHF who was brought to the ED from [facility] for SOB and hypoxia. She was vaccinated with 3 doses of Moderna vaccine in the past but did not get the second booster dose.    w/ hx HTN, CHF, COPD, afib on eliquis p/w found with SOB since ~ 7am today at [facility]. NO known pain. EMS was called and found pt cyanotic and O2 56% on RA. EMS states 82% on 100% nrb. Pt on CPAP by EMS with sats in mid 90s. No known fever/chills. PT reported been off lasix over past couple days. No other acute co or changes    < from: Xray Chest 1 View- PORTABLE-Urgent (05.22.22 @ 09:54) >    ACC: [id number] EXAM:  XR CHEST PORTABLE URGENT 1V                          PROCEDURE DATE:  05/22/2022          INTERPRETATION:  CLINICAL INDICATION: 81 years  Female with SOB, CHF.    COMPARISON: None    The patient's chin obscures the left lung apex.    AP view of the chest demonstrates a large right lower lobe infiltrate.   There is mild pulmonary vascular congestion. There is no pleural   effusion. There is no pneumothorax.    The heart is normal in size. Left-sided bipolar pacemaker. There is no   mediastinal or hilar mass.    Mild thoracic degenerative changes and osteopenia are present.    IMPRESSION:    Large right lower lobe infiltrates superimposed on pulmonary vascular   congestion. Pacemaker.    --- End of Report ---            PAUL MONROY MD; Attending Radiologist    < end of copied text >  < from: 12 Lead ECG (05.22.22 @ 09:28) >    Ventricular Rate 82 BPM    Atrial Rate 82 BPM    P-R Interval 208 ms    QRS Duration 162 ms    Q-T Interval 424 ms    QTC Calculation(Bazett) 495 ms    P Axis 77 degrees    R Axis 93 degrees    T Axis 24 degrees    Diagnosis Line Atrial-sensed ventricular-paced rhythm  Abnormal ECG  No previous ECGs available  Confirmed by dea Lopez ([id number]) on 5/22/2022 3:22:34 PM    < end of copied text >  sitivity (05.22.22 @ 13:48)   Troponin I, High Sensitivity Result: 138.8: Serial measurements of high sensitivity troponin I (hs TnI) showing rapid   upward or downward changes suggest acute myocardial injury. Please note   that a sustained elevation of hsTnI can be caused by renal disease,   chronic heart failure, sepsis, pulmonary embolism and other clinical   conditions. ng/L       Historical Values  Troponin I, High Sensitivity (05.22.22 @ 13:48)   Troponin I, High Sensitivity Result: 138.8: Serial measurements of high sensitivity troponin I (hs TnI) showing rapid   upward or downward changes suggest acute myocardial injury. Please note   that a sustained elevation of hsTnI can be caused by renal disease,   chronic heart failure, sepsis, pulmonary embolism and other clinical   conditions. ng/L   Troponin I, High Sensitivity (05.22.22 @ 09:43)   Troponin I, High Sensitivity Result: 21.2: Serial measurements of high sensitivity troponin I (hs TnI) showing rapid   upward or downward changes suggest acute myocardial injury. Please note   that a sustained elevation of hsTnI can be caused by renal disease,   chronic heart failure, sepsis, pulmonary embolism and other clinical   conditions. ng/L  (22 May 2022 12:13)    PAST MEDICAL & SURGICAL HISTORY:      MEDICATIONS  (STANDING):  aMIOdarone    Tablet 200 milliGRAM(s) Oral daily  apixaban 5 milliGRAM(s) Oral every 12 hours  carvedilol 25 milliGRAM(s) Oral two times a day  cefTRIAXone   IVPB 1000 milliGRAM(s) IV Intermittent every 24 hours  dexAMETHasone  Injectable 6 milliGRAM(s) IV Push daily  dextrose 5% + sodium chloride 0.45%. 1000 milliLiter(s) (35 mL/Hr) IV Continuous <Continuous>  doxycycline hyclate Capsule 100 milliGRAM(s) Oral every 12 hours  fenofibrate Tablet 145 milliGRAM(s) Oral daily  ferrous    sulfate 325 milliGRAM(s) Oral daily  furosemide   Injectable 40 milliGRAM(s) IV Push daily  lactobacillus acidophilus 1 Tablet(s) Oral two times a day  losartan 100 milliGRAM(s) Oral daily  melatonin 5 milliGRAM(s) Oral at bedtime  potassium chloride    Tablet ER 20 milliEquivalent(s) Oral two times a day  remdesivir  IVPB 100 milliGRAM(s) IV Intermittent every 24 hours  remdesivir  IVPB   IV Intermittent   tiotropium 18 MICROgram(s) Capsule 1 Capsule(s) Inhalation daily    MEDICATIONS  (PRN):  acetaminophen     Tablet .. 650 milliGRAM(s) Oral every 6 hours PRN Temp greater or equal to 38C (100.4F)  aluminum hydroxide/magnesium hydroxide/simethicone Suspension 30 milliLiter(s) Oral every 4 hours PRN Dyspepsia  ondansetron Injectable 4 milliGRAM(s) IV Push every 8 hours PRN Nausea and/or Vomiting      OBJECTIVE    T(C): 36.3 (05-24-22 @ 06:21), Max: 36.3 (05-24-22 @ 06:21)  HR: 60 (05-24-22 @ 06:21) (60 - 62)  BP: 174/89 (05-24-22 @ 06:21) (162/69 - 174/89)  RR: 18 (05-24-22 @ 06:21) (18 - 18)  SpO2: 99% (05-24-22 @ 06:21) (99% - 99%)  Wt(kg): --  I&O's Summary    23 May 2022 07:01  -  24 May 2022 07:00  --------------------------------------------------------  IN: 420 mL / OUT: 2000 mL / NET: -1580 mL    24 May 2022 07:01  -  24 May 2022 15:06  --------------------------------------------------------  IN: 245 mL / OUT: 1200 mL / NET: -955 mL          REVIEW OF SYSTEMS:  CONSTITUTIONAL: No fever, weight loss, or fatigue  EYES: No eye pain, visual disturbances, or discharge  ENMT:   No sinus or throat pain  NECK: No pain or stiffness  RESPIRATORY: No cough, wheezing, chills or hemoptysis; No shortness of breath  CARDIOVASCULAR: No chest pain, palpitations, dizziness, or leg swelling  GASTROINTESTINAL: No abdominal pain. No nausea, vomiting; No diarrhea or constipation. No melena or hematochezia.  GENITOURINARY: No dysuria, frequency, hematuria, or incontinence  NEUROLOGICAL: No headaches, memory loss, loss of strength, numbness, or tremors  SKIN: No itching, burning, rashes, or lesions   MUSCULOSKELETAL: No joint pain or swelling; No muscle, back, or extremity pain    PHYSICAL EXAM:  Appearance: NAD. VS past 24 hrs -as above   HEENT:   Moist oral mucosa. Conjunctiva clear b/l.   Neck : supple  Respiratory: Lungs CTAB.  Gastrointestinal:  Soft, nontender. No rebound. No rigidity. BS present	  Cardiovascular: RRR ,S1S2 present  Neurologic: Non-focal. Moving all extremities.  Extremities: No edema. No erythema. No calf tenderness.  Skin: No rashes, No ecchymoses, No cyanosis.	  wounds ,skin lesions-See skin assesment flow sheet   LABS:                        8.2    2.77  )-----------( 154      ( 24 May 2022 08:40 )             26.6     05-24    145  |  104  |  28<H>  ----------------------------<  114<H>  3.2<L>   |  37<H>  |  1.00    Ca    8.7      24 May 2022 08:40  Phos  2.7     05-24  Mg     2.1     05-24    TPro  6.0  /  Alb  2.7<L>  /  TBili  0.3  /  DBili  0.1  /  AST  90<H>  /  ALT  101<H>  /  AlkPhos  32<L>  05-24    CAPILLARY BLOOD GLUCOSE        PT/INR - ( 24 May 2022 08:40 )   PT: 20.9 sec;   INR: 1.78 ratio               Culture - Urine (collected 22 May 2022 16:30)  Source: Clean Catch Clean Catch (Midstream)  Final Report (24 May 2022 11:47):    10,000 - 49,000 CFU/mL Enterobacter cloacae complex    <10,000 CFU/ml Normal Urogenital benson present  Organism: Enterobacter cloacae complex (24 May 2022 11:47)  Organism: Enterobacter cloacae complex (24 May 2022 11:47)    Culture - Blood (collected 22 May 2022 16:28)  Source: .Blood Blood-Peripheral  Preliminary Report (23 May 2022 17:01):    No growth to date.    Culture - Blood (collected 22 May 2022 16:28)  Source: .Blood Blood-Peripheral  Preliminary Report (23 May 2022 17:01):    No growth to date.      RADIOLOGY & ADDITIONAL TESTS:   reviewed elctronically  ASSESSMENT/PLAN: 	    25 minutes aggregate time was spent on this visit, 50% visit time spent in care co-ordination with other attendings and counselling patient .I have discussed care plan with patient / HCP/family member daughter Lela  ,who expressed understanding of problems treatment and their effect and side effects, alternatives in details. I have asked if they have any questions and concerns and appropriately addressed them to best of my ability. Advance care planning was discussed , pallitaive care issues ,CMO ,hospice levels of care were discussed in details , forms ,advance directives were reviewed .All questions were answered to the best of my knowledge .Additional 25 min spent.

## 2022-05-25 LAB
ALBUMIN SERPL ELPH-MCNC: 2.8 G/DL — LOW (ref 3.3–5)
ALP SERPL-CCNC: 35 U/L — LOW (ref 40–120)
ALT FLD-CCNC: 83 U/L — HIGH (ref 12–78)
ANION GAP SERPL CALC-SCNC: 3 MMOL/L — LOW (ref 5–17)
AST SERPL-CCNC: 60 U/L — HIGH (ref 15–37)
BILIRUB DIRECT SERPL-MCNC: 0.2 MG/DL — SIGNIFICANT CHANGE UP (ref 0–0.3)
BILIRUB INDIRECT FLD-MCNC: 0.1 MG/DL — LOW (ref 0.2–1)
BILIRUB SERPL-MCNC: 0.3 MG/DL — SIGNIFICANT CHANGE UP (ref 0.2–1.2)
BUN SERPL-MCNC: 25 MG/DL — HIGH (ref 7–23)
CALCIUM SERPL-MCNC: 9.1 MG/DL — SIGNIFICANT CHANGE UP (ref 8.5–10.1)
CHLORIDE SERPL-SCNC: 101 MMOL/L — SIGNIFICANT CHANGE UP (ref 96–108)
CO2 SERPL-SCNC: 38 MMOL/L — HIGH (ref 22–31)
CREAT SERPL-MCNC: 0.94 MG/DL — SIGNIFICANT CHANGE UP (ref 0.5–1.3)
CRP SERPL-MCNC: 42 MG/L — HIGH
D DIMER BLD IA.RAPID-MCNC: 305 NG/ML DDU — HIGH
EGFR: 60 ML/MIN/1.73M2 — SIGNIFICANT CHANGE UP
FERRITIN SERPL-MCNC: 144 NG/ML — SIGNIFICANT CHANGE UP (ref 15–150)
GLUCOSE SERPL-MCNC: 94 MG/DL — SIGNIFICANT CHANGE UP (ref 70–99)
HCT VFR BLD CALC: 28.6 % — LOW (ref 34.5–45)
HGB BLD-MCNC: 8.9 G/DL — LOW (ref 11.5–15.5)
INR BLD: 1.57 RATIO — HIGH (ref 0.88–1.16)
MAGNESIUM SERPL-MCNC: 2.1 MG/DL — SIGNIFICANT CHANGE UP (ref 1.6–2.6)
MCHC RBC-ENTMCNC: 31.1 GM/DL — LOW (ref 32–36)
MCHC RBC-ENTMCNC: 31.8 PG — SIGNIFICANT CHANGE UP (ref 27–34)
MCV RBC AUTO: 102.1 FL — HIGH (ref 80–100)
NRBC # BLD: 0 /100 WBCS — SIGNIFICANT CHANGE UP (ref 0–0)
NT-PROBNP SERPL-SCNC: 3073 PG/ML — HIGH (ref 0–450)
PHOSPHATE SERPL-MCNC: 1.6 MG/DL — LOW (ref 2.5–4.5)
PLATELET # BLD AUTO: 176 K/UL — SIGNIFICANT CHANGE UP (ref 150–400)
POTASSIUM SERPL-MCNC: 4 MMOL/L — SIGNIFICANT CHANGE UP (ref 3.5–5.3)
POTASSIUM SERPL-SCNC: 4 MMOL/L — SIGNIFICANT CHANGE UP (ref 3.5–5.3)
PROCALCITONIN SERPL-MCNC: 2.08 NG/ML — HIGH (ref 0–0.04)
PROT SERPL-MCNC: 6.2 G/DL — SIGNIFICANT CHANGE UP (ref 6–8.3)
PROTHROM AB SERPL-ACNC: 18.5 SEC — HIGH (ref 10.5–13.4)
RBC # BLD: 2.8 M/UL — LOW (ref 3.8–5.2)
RBC # FLD: 14.2 % — SIGNIFICANT CHANGE UP (ref 10.3–14.5)
SODIUM SERPL-SCNC: 142 MMOL/L — SIGNIFICANT CHANGE UP (ref 135–145)
WBC # BLD: 3.36 K/UL — LOW (ref 3.8–10.5)
WBC # FLD AUTO: 3.36 K/UL — LOW (ref 3.8–10.5)

## 2022-05-25 PROCEDURE — 71250 CT THORAX DX C-: CPT | Mod: 26

## 2022-05-25 RX ORDER — HYDRALAZINE HCL 50 MG
50 TABLET ORAL EVERY 6 HOURS
Refills: 0 | Status: DISCONTINUED | OUTPATIENT
Start: 2022-05-25 | End: 2022-05-26

## 2022-05-25 RX ORDER — LOSARTAN POTASSIUM 100 MG/1
1 TABLET, FILM COATED ORAL
Qty: 0 | Refills: 0 | DISCHARGE

## 2022-05-25 RX ORDER — AMLODIPINE BESYLATE 2.5 MG/1
1 TABLET ORAL
Qty: 0 | Refills: 0 | DISCHARGE

## 2022-05-25 RX ORDER — COLLAGENASE CLOSTRIDIUM HIST. 250 UNIT/G
1 OINTMENT (GRAM) TOPICAL
Qty: 0 | Refills: 0 | DISCHARGE

## 2022-05-25 RX ORDER — ACETAMINOPHEN 500 MG
1 TABLET ORAL
Qty: 0 | Refills: 0 | DISCHARGE

## 2022-05-25 RX ORDER — HYDRALAZINE HCL 50 MG
10 TABLET ORAL
Refills: 0 | Status: DISCONTINUED | OUTPATIENT
Start: 2022-05-25 | End: 2022-05-25

## 2022-05-25 RX ADMIN — LOSARTAN POTASSIUM 100 MILLIGRAM(S): 100 TABLET, FILM COATED ORAL at 06:11

## 2022-05-25 RX ADMIN — Medication 50 MILLIGRAM(S): at 18:16

## 2022-05-25 RX ADMIN — Medication 1 TABLET(S): at 06:10

## 2022-05-25 RX ADMIN — REMDESIVIR 500 MILLIGRAM(S): 5 INJECTION INTRAVENOUS at 22:46

## 2022-05-25 RX ADMIN — Medication 100 MILLIGRAM(S): at 06:11

## 2022-05-25 RX ADMIN — Medication 20 MILLIEQUIVALENT(S): at 17:29

## 2022-05-25 RX ADMIN — Medication 325 MILLIGRAM(S): at 11:12

## 2022-05-25 RX ADMIN — Medication 5 MILLIGRAM(S): at 22:46

## 2022-05-25 RX ADMIN — Medication 6 MILLIGRAM(S): at 06:09

## 2022-05-25 RX ADMIN — TIOTROPIUM BROMIDE 1 CAPSULE(S): 18 CAPSULE ORAL; RESPIRATORY (INHALATION) at 06:45

## 2022-05-25 RX ADMIN — CARVEDILOL PHOSPHATE 25 MILLIGRAM(S): 80 CAPSULE, EXTENDED RELEASE ORAL at 06:10

## 2022-05-25 RX ADMIN — AMIODARONE HYDROCHLORIDE 200 MILLIGRAM(S): 400 TABLET ORAL at 06:11

## 2022-05-25 RX ADMIN — Medication 1 TABLET(S): at 17:28

## 2022-05-25 RX ADMIN — Medication 62.5 MILLIMOLE(S): at 17:29

## 2022-05-25 RX ADMIN — Medication 40 MILLIGRAM(S): at 06:11

## 2022-05-25 RX ADMIN — Medication 20 MILLIEQUIVALENT(S): at 06:17

## 2022-05-25 RX ADMIN — Medication 100 MILLIGRAM(S): at 17:28

## 2022-05-25 RX ADMIN — CARVEDILOL PHOSPHATE 25 MILLIGRAM(S): 80 CAPSULE, EXTENDED RELEASE ORAL at 17:29

## 2022-05-25 RX ADMIN — Medication 145 MILLIGRAM(S): at 11:12

## 2022-05-25 RX ADMIN — CEFTRIAXONE 100 MILLIGRAM(S): 500 INJECTION, POWDER, FOR SOLUTION INTRAMUSCULAR; INTRAVENOUS at 06:11

## 2022-05-25 RX ADMIN — APIXABAN 5 MILLIGRAM(S): 2.5 TABLET, FILM COATED ORAL at 17:29

## 2022-05-25 RX ADMIN — APIXABAN 5 MILLIGRAM(S): 2.5 TABLET, FILM COATED ORAL at 06:11

## 2022-05-25 NOTE — PROGRESS NOTE ADULT - SUBJECTIVE AND OBJECTIVE BOX
PROGRESS NOTE  Patient is a 81y old  Female who presents with a chief complaint of 82 yo F w/ hx HTN, CHF, COPD, afib on eliquis p/w found with SOB since ~ 7am today at [facility]. NO known pain. EMS was called and found pt cyanotic and O2 56% on RA. EMS states 82% on 100% nrb. Pt on CPAP by EMS with sats in mid 90s. No known fever/chills. PT reported been off lasix over past couple days. No other acute co or changes Admitted  to telemetry unit for monitoring , send 3 sets of cardiac enzymes to rule out acute coronary event, obtain ECHO to evaluate LVEF, cardiology consult  ,continue current management, O2 supply, anticoagulation plan as per cardiology consult Admit to monitored unit for cardiac monitoring, obtain echo to evaluate LVEF, intravenous diuresis as per card consult , monitor ins/outs, monitor renal profile and electrolytes closely ,send 3 sets of enzymes, O2 supply, serial chest xrays, monitor weights and oral intake of fluids, nutritionist consult Palliative care consult requested ,to discuss advance directives and complete MOLST       [facility] of COVID 19 in 2011, CHF, RLE ulcer, CKD and CHF who was brought to the ED from [facility] for SOB and hypoxia. She was vaccinated with 3 doses of Moderna vaccine in the past but did not get the second booster dose.    w/ hx HTN, CHF, COPD, afib on eliquis p/w found with SOB since ~ 7am today at [facility]. NO known pain. EMS was called and found pt cyanotic and O2 56% on RA. EMS states 82% on 100% nrb. Pt on CPAP by EMS with sats in mid 90s. No known fever/chills. PT reported been off lasix over past couple days. No other acute co or changes (25 May 2022 05:53)    Chart and available morning labs /imaging are reviewed electronically , urgent issues addressed . More information  is being added upon completion of rounds , when more information is collected and management discussed with consultants , medical staff and social service/case management on the floor   OVERNIGHT    No new issues reported by medical staff . All above noted Patient is resting in a bed comfortably . .No distress noted Koi R>L Feels better ,on 4 L NC O2   HPI:  82 yo F w/ hx HTN, CHF, COPD, afib on eliquis p/w found with SOB since ~ 7am today at [facility]. NO known pain. EMS was called and found pt cyanotic and O2 56% on RA. EMS states 82% on 100% nrb. Pt on CPAP by EMS with sats in mid 90s. No known fever/chills. PT reported been off lasix over past couple days. No other acute co or changes Admitted  to telemetry unit for monitoring , send 3 sets of cardiac enzymes to rule out acute coronary event, obtain ECHO to evaluate LVEF, cardiology consult  ,continue current management, O2 supply, anticoagulation plan as per cardiology consult Admit to monitored unit for cardiac monitoring, obtain echo to evaluate LVEF, intravenous diuresis as per card consult , monitor ins/outs, monitor renal profile and electrolytes closely ,send 3 sets of enzymes, O2 supply, serial chest xrays, monitor weights and oral intake of fluids, nutritionist consult Palliative care consult requested ,to discuss advance directives and complete MOLST       [facility], [facility]F, RLE ulcer, CKD and CHF who was brought to the ED from [facility] for SOB and hypoxia. She was vaccinated with 3 doses of Moderna vaccine in the past but did not get the second booster dose.    w/ hx HTN, CHF, COPD, afib on eliquis p/w found with SOB since ~ 7am today at [facility]. NO known pain. EMS was called and found pt cyanotic and O2 56% on RA. EMS states 82% on 100% nrb. Pt on CPAP by EMS with sats in mid 90s. No known fever/chills. PT reported been off lasix over past couple days. No other acute co or changes          The patient is an 81 year old female with a PMH of COVID 19 in 2011, CHF, RLE ulcer, CKD and CHF who was brought to the ED from [facility] for SOB and hypoxia. She was vaccinated with 3 doses of Moderna vaccine in the past but did not get the second booster dose.    w/ hx HTN, CHF, COPD, afib on eliquis p/w found with SOB since ~ 7am today at [facility]. NO known pain. EMS was called and found pt cyanotic and O2 56% on RA. EMS states 82% on 100% nrb. Pt on CPAP by EMS with sats in mid 90s. No known fever/chills. PT reported been off lasix over past couple days. No other acute co or changes    < from: Xray Chest 1 View- PORTABLE-Urgent (05.22.22 @ 09:54) >    ACC: [id number] EXAM:  XR CHEST PORTABLE URGENT 1V                          PROCEDURE DATE:  05/22/2022          INTERPRETATION:  CLINICAL INDICATION: 81 years  Female with SOB, CHF.    COMPARISON: None    The patient's chin obscures the left lung apex.    AP view of the chest demonstrates a large right lower lobe infiltrate.   There is mild pulmonary vascular congestion. There is no pleural   effusion. There is no pneumothorax.    The heart is normal in size. Left-sided bipolar pacemaker. There is no   mediastinal or hilar mass.    Mild thoracic degenerative changes and osteopenia are present.    IMPRESSION:    Large right lower lobe infiltrates superimposed on pulmonary vascular   congestion. Pacemaker.    --- End of Report ---            PAUL MONROY MD; Attending Radiologist    < end of copied text >  < from: 12 Lead ECG (05.22.22 @ 09:28) >    Ventricular Rate 82 BPM    Atrial Rate 82 BPM    P-R Interval 208 ms    QRS Duration 162 ms    Q-T Interval 424 ms    QTC Calculation(Bazett) 495 ms    P Axis 77 degrees    R Axis 93 degrees    T Axis 24 degrees    Diagnosis Line Atrial-sensed ventricular-paced rhythm  Abnormal ECG  No previous ECGs available  Confirmed by dea Lopez ([id number]) on 5/22/2022 3:22:34 PM    < end of copied text >  sitivity (05.22.22 @ 13:48)   Troponin I, High Sensitivity Result: 138.8: Serial measurements of high sensitivity troponin I (hs TnI) showing rapid   upward or downward changes suggest acute myocardial injury. Please note   that a sustained elevation of hsTnI can be caused by renal disease,   chronic heart failure, sepsis, pulmonary embolism and other clinical   conditions. ng/L       Historical Values  Troponin I, High Sensitivity (05.22.22 @ 13:48)   Troponin I, High Sensitivity Result: 138.8: Serial measurements of high sensitivity troponin I (hs TnI) showing rapid   upward or downward changes suggest acute myocardial injury. Please note   that a sustained elevation of hsTnI can be caused by renal disease,   chronic heart failure, sepsis, pulmonary embolism and other clinical   conditions. ng/L   Troponin I, High Sensitivity (05.22.22 @ 09:43)   Troponin I, High Sensitivity Result: 21.2: Serial measurements of high sensitivity troponin I (hs TnI) showing rapid   upward or downward changes suggest acute myocardial injury. Please note   that a sustained elevation of hsTnI can be caused by renal disease,   chronic heart failure, sepsis, pulmonary embolism and other clinical   conditions. ng/L  (22 May 2022 12:13)    PAST MEDICAL & SURGICAL HISTORY:  HTN (hypertension)      HLD (hyperlipidemia)      Anemia  newly diagnosed      DVT (deep venous thrombosis)  2010 was on coumadin for 6 months      Pneumonia  years ago      OA (osteoarthritis)      Status post total left knee replacement  2007      History of cholecystectomy  open , many years ago      H/O hernia repair  years ago          MEDICATIONS  (STANDING):  aMIOdarone    Tablet 200 milliGRAM(s) Oral daily  apixaban 5 milliGRAM(s) Oral every 12 hours  carvedilol 25 milliGRAM(s) Oral two times a day  cefTRIAXone   IVPB 1000 milliGRAM(s) IV Intermittent every 24 hours  dexAMETHasone  Injectable 6 milliGRAM(s) IV Push daily  doxycycline hyclate Capsule 100 milliGRAM(s) Oral every 12 hours  fenofibrate Tablet 145 milliGRAM(s) Oral daily  ferrous    sulfate 325 milliGRAM(s) Oral daily  furosemide   Injectable 40 milliGRAM(s) IV Push daily  lactobacillus acidophilus 1 Tablet(s) Oral two times a day  losartan 100 milliGRAM(s) Oral daily  Medihoney 1 Application(s) 1 Application(s) Topical daily  melatonin 5 milliGRAM(s) Oral at bedtime  potassium chloride    Tablet ER 20 milliEquivalent(s) Oral two times a day  remdesivir  IVPB 100 milliGRAM(s) IV Intermittent every 24 hours  remdesivir  IVPB   IV Intermittent   tiotropium 18 MICROgram(s) Capsule 1 Capsule(s) Inhalation daily    MEDICATIONS  (PRN):  acetaminophen     Tablet .. 650 milliGRAM(s) Oral every 6 hours PRN Temp greater or equal to 38C (100.4F)  aluminum hydroxide/magnesium hydroxide/simethicone Suspension 30 milliLiter(s) Oral every 4 hours PRN Dyspepsia  hydrALAZINE 50 milliGRAM(s) Oral every 6 hours PRN Systolic/Diastolic >160/100  ondansetron Injectable 4 milliGRAM(s) IV Push every 8 hours PRN Nausea and/or Vomiting      OBJECTIVE    T(C): 36.7 (05-25-22 @ 18:00), Max: 36.8 (05-24-22 @ 19:58)  HR: 65 (05-25-22 @ 18:00) (60 - 65)  BP: 194/77 (05-25-22 @ 18:00) (157/63 - 196/76)  RR: 19 (05-25-22 @ 18:00) (17 - 19)  SpO2: 97% (05-25-22 @ 18:00) (94% - 99%)  Wt(kg): --  I&O's Summary    24 May 2022 07:01  -  25 May 2022 07:00  --------------------------------------------------------  IN: 245 mL / OUT: 1800 mL / NET: -1555 mL          REVIEW OF SYSTEMS:  CONSTITUTIONAL: No fever, weight loss, or fatigue  EYES: No eye pain, visual disturbances, or discharge  ENMT:   No sinus or throat pain  NECK: No pain or stiffness  RESPIRATORY: No cough, wheezing, chills or hemoptysis; No shortness of breath  CARDIOVASCULAR: No chest pain, palpitations, dizziness, or leg swelling  GASTROINTESTINAL: No abdominal pain. No nausea, vomiting; No diarrhea or constipation. No melena or hematochezia.  GENITOURINARY: No dysuria, frequency, hematuria, or incontinence  NEUROLOGICAL: No headaches, memory loss, loss of strength, numbness, or tremors  SKIN: No itching, burning, rashes, or lesions   MUSCULOSKELETAL: No joint pain or swelling; No muscle, back, or extremity pain    PHYSICAL EXAM:  Appearance: NAD. VS past 24 hrs -as above   HEENT:   Moist oral mucosa. Conjunctiva clear b/l.   Neck : supple  Respiratory: Lungs CTAB.  Gastrointestinal:  Soft, nontender. No rebound. No rigidity. BS present	  Cardiovascular: RRR ,S1S2 present  Neurologic: Non-focal. Moving all extremities.  Extremities: No edema. No erythema. No calf tenderness.  Skin: No rashes, No ecchymoses, No cyanosis.	  wounds ,skin lesions-See skin assesment flow sheet   LABS:                        8.9    3.36  )-----------( 176      ( 25 May 2022 07:46 )             28.6     05-25    142  |  101  |  25<H>  ----------------------------<  94  4.0   |  38<H>  |  0.94    Ca    9.1      25 May 2022 07:46  Phos  1.6     05-25  Mg     2.1     05-25    TPro  6.2  /  Alb  2.8<L>  /  TBili  0.3  /  DBili  0.2  /  AST  60<H>  /  ALT  83<H>  /  AlkPhos  35<L>  05-25    CAPILLARY BLOOD GLUCOSE        PT/INR - ( 25 May 2022 07:46 )   PT: 18.5 sec;   INR: 1.57 ratio               Culture - Urine (collected 22 May 2022 16:30)  Source: Clean Catch Clean Catch (Midstream)  Final Report (24 May 2022 11:47):    10,000 - 49,000 CFU/mL Enterobacter cloacae complex    <10,000 CFU/ml Normal Urogenital benson present  Organism: Enterobacter cloacae complex (24 May 2022 11:47)  Organism: Enterobacter cloacae complex (24 May 2022 11:47)    Culture - Blood (collected 22 May 2022 16:28)  Source: .Blood Blood-Peripheral  Preliminary Report (23 May 2022 17:01):    No growth to date.    Culture - Blood (collected 22 May 2022 16:28)  Source: .Blood Blood-Peripheral  Preliminary Report (23 May 2022 17:01):    No growth to date.  RADIOLOGY & ADDITIONAL TESTS:   reviewed elctronically	  25 minutes aggregate time was spent on this visit, 50% visit time spent in care co-ordination with other attendings and counselling patient .I have discussed care plan with patient / HCP/family member ,who expressed understanding of problems treatment and their effect and side effects, alternatives in details. I have asked if they have any questions and concerns and appropriately addressed them to best of my ability. Left a message for family

## 2022-05-25 NOTE — PHYSICAL THERAPY INITIAL EVALUATION ADULT - GENERAL OBSERVATIONS, REHAB EVAL
Patient was received and left (patient deferred out of bed to chair secondary to diarrhea) supine with head of bed elevated, NC, external female catheter, tele box and all lines in place and call bell in reach.

## 2022-05-25 NOTE — PROGRESS NOTE ADULT - SUBJECTIVE AND OBJECTIVE BOX
Date/Time Patient Seen:  		  Referring MD:   Data Reviewed	       Patient is a 81y old  Female who presents with a chief complaint of 80 yo F w/ hx HTN, CHF, COPD, afib on eliquis p/w [facility] with [facility]B since [facility] NO known pain. EMS was called and found pt cyanotic and O2 56% on RA. EMS states 82% on 100% nrb. Pt on CPAP by EMS with sats in mid 90s. No known fever/chills. PT reported been off lasix over past couple days. No other acute co or changes Admitted  to telemetry unit for monitoring , send 3 sets of cardiac enzymes to rule out acute coronary event, obtain ECHO to evaluate LVEF, cardiology consult  ,continue current management, O2 supply, anticoagulation plan as per cardiology consult Admit to monitored unit for cardiac monitoring, obtain echo to evaluate LVEF, intravenous diuresis as per card consult , monitor ins/outs, monitor renal profile and electrolytes closely ,send 3 sets of enzymes, O2 supply, serial chest xrays, monitor weights and oral intake of fluids, nutritionist consult Palliative care consult requested ,to discuss advance directives and complete MOLST       [facility] of COVID 19 in 2011, CHF, RLE ulcer, CKD and CHF who was brought to the ED from [facility] for SOB and hypoxia. She was vaccinated with 3 doses of Moderna vaccine in the past but did not get the second booster dose.    w/ hx HTN, CHF, COPD, afib on eliquis p/w found with SOB since ~ [facility]. NO known pain. EMS was called and found pt cyanotic and O2 56% on RA. EMS states 82% on 100% nrb. Pt on CPAP by EMS with sats in mid 90s. No known fever/chills. PT reported been off lasix over past couple days. No other acute co or changes (24 May 2022 12:15)      Subjective/HPI     PAST MEDICAL & SURGICAL HISTORY:        Medication list         MEDICATIONS  (STANDING):  aMIOdarone    Tablet 200 milliGRAM(s) Oral daily  apixaban 5 milliGRAM(s) Oral every 12 hours  carvedilol 25 milliGRAM(s) Oral two times a day  cefTRIAXone   IVPB 1000 milliGRAM(s) IV Intermittent every 24 hours  dexAMETHasone  Injectable 6 milliGRAM(s) IV Push daily  dextrose 5% + sodium chloride 0.45%. 1000 milliLiter(s) (35 mL/Hr) IV Continuous <Continuous>  doxycycline hyclate Capsule 100 milliGRAM(s) Oral every 12 hours  fenofibrate Tablet 145 milliGRAM(s) Oral daily  ferrous    sulfate 325 milliGRAM(s) Oral daily  furosemide   Injectable 40 milliGRAM(s) IV Push daily  lactobacillus acidophilus 1 Tablet(s) Oral two times a day  losartan 100 milliGRAM(s) Oral daily  Medihoney 1 Application(s) 1 Application(s) Topical daily  melatonin 5 milliGRAM(s) Oral at bedtime  potassium chloride    Tablet ER 20 milliEquivalent(s) Oral two times a day  remdesivir  IVPB 100 milliGRAM(s) IV Intermittent every 24 hours  remdesivir  IVPB   IV Intermittent   tiotropium 18 MICROgram(s) Capsule 1 Capsule(s) Inhalation daily    MEDICATIONS  (PRN):  acetaminophen     Tablet .. 650 milliGRAM(s) Oral every 6 hours PRN Temp greater or equal to 38C (100.4F)  aluminum hydroxide/magnesium hydroxide/simethicone Suspension 30 milliLiter(s) Oral every 4 hours PRN Dyspepsia  ondansetron Injectable 4 milliGRAM(s) IV Push every 8 hours PRN Nausea and/or Vomiting         Vitals log        ICU Vital Signs Last 24 Hrs  T(C): 36.8 (24 May 2022 19:58), Max: 36.8 (24 May 2022 19:58)  T(F): 98.3 (24 May 2022 19:58), Max: 98.3 (24 May 2022 19:58)  HR: 60 (25 May 2022 00:47) (60 - 68)  BP: 157/63 (24 May 2022 19:58) (157/63 - 174/89)  BP(mean): --  ABP: --  ABP(mean): --  RR: 18 (24 May 2022 19:58) (18 - 18)  SpO2: 94% (25 May 2022 00:47) (94% - 99%)           Input and Output:  I&O's Detail    23 May 2022 07:01  -  24 May 2022 07:00  --------------------------------------------------------  IN:    dextrose 5% + sodium chloride 0.45%: 420 mL  Total IN: 420 mL    OUT:    Voided (mL): 2000 mL  Total OUT: 2000 mL    Total NET: -1580 mL      24 May 2022 07:01  -  25 May 2022 05:53  --------------------------------------------------------  IN:    dextrose 5% + sodium chloride 0.45%: 245 mL  Total IN: 245 mL    OUT:    Voided (mL): 1200 mL  Total OUT: 1200 mL    Total NET: -955 mL          Lab Data                        8.2    2.77  )-----------( 154      ( 24 May 2022 08:40 )             26.6     05-24    145  |  104  |  28<H>  ----------------------------<  114<H>  3.2<L>   |  37<H>  |  1.00    Ca    8.7      24 May 2022 08:40  Phos  2.7     05-24  Mg     2.1     05-24    TPro  6.0  /  Alb  2.7<L>  /  TBili  0.3  /  DBili  0.1  /  AST  90<H>  /  ALT  101<H>  /  AlkPhos  32<L>  05-24            Review of Systems	      Objective     Physical Examination    heart s1s2  lung dc BS  abd soft      Pertinent Lab findings & Imaging      Jagjit:  NO   Adequate UO     I&O's Detail    23 May 2022 07:01  -  24 May 2022 07:00  --------------------------------------------------------  IN:    dextrose 5% + sodium chloride 0.45%: 420 mL  Total IN: 420 mL    OUT:    Voided (mL): 2000 mL  Total OUT: 2000 mL    Total NET: -1580 mL      24 May 2022 07:01  -  25 May 2022 05:53  --------------------------------------------------------  IN:    dextrose 5% + sodium chloride 0.45%: 245 mL  Total IN: 245 mL    OUT:    Voided (mL): 1200 mL  Total OUT: 1200 mL    Total NET: -955 mL               Discussed with:     Cultures:	        Radiology

## 2022-05-25 NOTE — PROGRESS NOTE ADULT - ASSESSMENT
80 yo F w/ hx HTN, CHF, COPD, afib on eliquis p/w found with SOB since ~ [facility] at [facility]. NO known pain. EMS was called and found pt cyanotic and O2 56% on RA. EMS states 82% on 100% nrb. Pt on CPAP by EMS with sats in mid 90s. No known fever/chills. PT reported been off lasix over past couple days. No other acute co or changes Admitted  to telemetry unit for monitoring , send 3 sets of cardiac enzymes to rule out acute coronary event, obtain ECHO to evaluate LVEF, cardiology consult  ,continue current management, O2 supply, anticoagulation plan as per cardiology consult Admit to monitored unit for cardiac monitoring, obtain echo to evaluate LVEF, intravenous diuresis as per card consult , monitor ins/outs, monitor renal profile and electrolytes closely ,send 3 sets of enzymes, O2 supply, serial chest xrays, monitor weights and oral intake of fluids, nutritionist consult Palliative care consult requested ,to discuss advance directives and complete MOLST     ACUTE RENAL FAILURE:   Serum creatinine is  improved   There is no progression . No uremic symptoms  No evidence of anemia .  Fluid status stable.  Will continue to avoid nephrotoxic drugs.  Patient remains asymptomatic.   Continue current therapy.  losartan 100 milliGRAM(s) Oral daily    BP monitoring,continue current antihypertensive meds, low salt diet,followup with PMD in 1-2 weeks  aMIOdarone    Tablet 200 milliGRAM(s) Oral daily

## 2022-05-25 NOTE — PHYSICAL THERAPY INITIAL EVALUATION ADULT - ASSISTIVE DEVICE FOR TRANSFER: STAND/SIT, REHAB EVAL
Palliative Medicine Office Visit Palliative Medicine Nurse Check In 
[phone]) Patient Name: Earline June YOB: 1953 Date of Office Visit: 4/5/21 Patient states: \" follow up \" From Check In Sheet (scanned in Media): 
Has a medical provider talked with you about cardiopulmonary resuscitation (CPR)? [] Yes   [x] No   [] Unable to obtain Nurse reminder to complete or update ACP FlowSheet: 
 
Is ACP on the Problem List?    [x] Yes    [] No 
IF ACP Document is ON FILE; Nurse to place ACP on Problem List  
 
Is there an ACP Note in Chart Review/Note? [x] Yes    [] No  
If NO: ALERT PROVIDER Advance Care Planning 3/10/2021 Patient's Healthcare Decision Maker is: Legal Next of Kin Confirm Advance Directive None Patient Would Like to Complete Advance Directive - Is there anything that we should know about you as a person in order to provide you the best care possible? Have you been to the ER, urgent care clinic since your last visit? [] Yes   [x] No   [] Unable to obtain Have you been hospitalized since your last visit? [] Yes   [x] No   [] Unable to obtain Have you seen or consulted any other health care providers outside of the [address] [place] [address] since your last visit? [] Yes   [x] No   [] Unable to obtain Functional status (describe):  
 
Assist with ADL's Last BM: yesterday  accessed (date): 4/5/21 Bottle review (for opioid pain medication): 
Medication 1: Tramadol 100mg Date filled:  
Directions:  1 every 6 prn # filled: # left: # pills taking per day: 2-3 day Last dose taken: 
 
Medication 2:  Not using Oxycodone Date filled:  
Directions:  
# filled: # left: # pills taking per day: 
Last dose taken: rolling walker

## 2022-05-25 NOTE — PROGRESS NOTE ADULT - SUBJECTIVE AND OBJECTIVE BOX
Patient is a 81y Female whom presented to the hospital with ckd and petra     PAST MEDICAL & SURGICAL HISTORY:      MEDICATIONS  (STANDING):  aMIOdarone    Tablet 200 milliGRAM(s) Oral daily  apixaban 5 milliGRAM(s) Oral every 12 hours  carvedilol 25 milliGRAM(s) Oral two times a day  cefTRIAXone   IVPB 1000 milliGRAM(s) IV Intermittent every 24 hours  dexAMETHasone  Injectable 6 milliGRAM(s) IV Push daily  dextrose 5% + sodium chloride 0.45%. 1000 milliLiter(s) (35 mL/Hr) IV Continuous <Continuous>  doxycycline hyclate Capsule 100 milliGRAM(s) Oral every 12 hours  fenofibrate Tablet 145 milliGRAM(s) Oral daily  ferrous    sulfate 325 milliGRAM(s) Oral daily  furosemide   Injectable 40 milliGRAM(s) IV Push daily  losartan 100 milliGRAM(s) Oral daily  melatonin 5 milliGRAM(s) Oral at bedtime  potassium chloride    Tablet ER 20 milliEquivalent(s) Oral two times a day  remdesivir  IVPB 100 milliGRAM(s) IV Intermittent every 24 hours  remdesivir  IVPB   IV Intermittent   tiotropium 18 MICROgram(s) Capsule 1 Capsule(s) Inhalation daily      Allergies    fluoxetine (Unknown)  NIFEdipine (Unknown)    Intolerances        SOCIAL HISTORY:  Denies ETOh,Smoking,     FAMILY HISTORY:      REVIEW OF SYSTEMS:    CONSTITUTIONAL: No weakness, fevers or chills  NECK: No pain or stiffness  RESPIRATORY: No cough, wheezing, hemoptysis; No shortness of breath  CARDIOVASCULAR: No chest pain or palpitations  GASTROINTESTINAL: No abdominal or epigastric pain. No nausea, vomiting,     No diarrhea or constipation. No melena   SKIN: dry                              8.9    3.36  )-----------( 176      ( 25 May 2022 07:46 )             28.6       CBC Full  -  ( 25 May 2022 07:46 )  WBC Count : 3.36 K/uL  RBC Count : 2.80 M/uL  Hemoglobin : 8.9 g/dL  Hematocrit : 28.6 %  Platelet Count - Automated : 176 K/uL  Mean Cell Volume : 102.1 fl  Mean Cell Hemoglobin : 31.8 pg  Mean Cell Hemoglobin Concentration : 31.1 gm/dL  Auto Neutrophil # : x  Auto Lymphocyte # : x  Auto Monocyte # : x  Auto Eosinophil # : x  Auto Basophil # : x  Auto Neutrophil % : x  Auto Lymphocyte % : x  Auto Monocyte % : x  Auto Eosinophil % : x  Auto Basophil % : x      05-25    142  |  101  |  25<H>  ----------------------------<  94  4.0   |  38<H>  |  0.94    Ca    9.1      25 May 2022 07:46  Phos  1.6     05-25  Mg     2.1     05-25    TPro  6.2  /  Alb  2.8<L>  /  TBili  0.3  /  DBili  0.2  /  AST  60<H>  /  ALT  83<H>  /  AlkPhos  35<L>  05-25      CAPILLARY BLOOD GLUCOSE          Vital Signs Last 24 Hrs  T(C): 36.7 (25 May 2022 18:00), Max: 36.8 (24 May 2022 19:58)  T(F): 98 (25 May 2022 18:00), Max: 98.3 (24 May 2022 19:58)  HR: 65 (25 May 2022 18:00) (60 - 65)  BP: 194/77 (25 May 2022 18:00) (157/63 - 196/76)  BP(mean): --  RR: 19 (25 May 2022 18:00) (17 - 19)  SpO2: 97% (25 May 2022 18:00) (94% - 99%)        PT/INR - ( 25 May 2022 07:46 )   PT: 18.5 sec;   INR: 1.57 ratio                       PHYSICAL EXAM:    Constitutional: NAD  HEENT: conjunctive   clear   Neck:  No JVD  Respiratory: CTAB  Cardiovascular: S1 and S2  Gastrointestinal: BS+, soft, NT/ND  Extremities: No peripheral edema

## 2022-05-25 NOTE — SWALLOW BEDSIDE ASSESSMENT ADULT - ADDITIONAL RECOMMENDATIONS
1. Consider diet advancement should patient get her dentures during this hospitalization  2.  Continue to monitor patient and reconsult SLP as indicated

## 2022-05-25 NOTE — SWALLOW BEDSIDE ASSESSMENT ADULT - COMMENTS
per Charting - Patient is a 81y old  Female who presents with a chief complaint of 80 yo F w/ hx HTN, CHF, COPD, afib on eliquis p/w found with SOB since ~ [facility]am today at [facility]. NO known pain. EMS was called and found pt cyanotic and O2 56% on RA. EMS states 82% on 100% nrb. Pt on CPAP by EMS with sats in mid 90s. No known fever/chills. PT reported been off lasix over past couple days. No other acute co or changes Admitted  to telemetry unit for monitoring , send 3 sets of cardiac enzymes to rule out acute coronary event, obtain ECHO to evaluate LVEF, cardiology consult  ,continue current management, O2 supply, anticoagulation plan as per cardiology consult Admit to monitored unit for cardiac monitoring, obtain echo to evaluate LVEF, intravenous diuresis as per card consult , monitor ins/outs, monitor renal profile and electrolytes closely ,send 3 sets of enzymes, O2 supply, serial chest xrays, monitor weights and oral intake of fluids, nutritionist consult  CXR 5/22 - Large right lower lobe infiltrates superimposed on pulmonary vascular congestion. Pacemaker.    Orders received and chart reviewed. Patient is AAOx3, cooperative for eval. She reports no difficulty swallowing and thinks she will continue to require minced/moist diet as she does not have denture ( is unable to bring denture as he is quarantined due to COVID exposure). RN reporting difficulty swallowing pills last night, patient states that it was a large pill and that when cut in half, she has no difficulty swallowing pills. Attempts made by this department to see patient on 5/23 and 5/24, however eval not completed as she was unable to be weaned from venti mask. Pt is now tolerating nasal cannula.

## 2022-05-25 NOTE — PROGRESS NOTE ADULT - ASSESSMENT
82 yo F w/ hx HTN, CHF, COPD, afib on eliquis p/w found with SOB  copd  CHF  AF  Obesity  OP  OA  Covid    on abx  on remdesivir  on vm o2 support  on ELIQUIS    s/p BIPAP  GOC discussion - Lela Haney - HCP - Daughter - pt is DNR DNI - MOLST updated and signed -   assist with needs  Isolation for covid, Remdesivir -   monitor VS and HD and Sat  [facility] rx regimen

## 2022-05-25 NOTE — PHYSICAL THERAPY INITIAL EVALUATION ADULT - MANUAL MUSCLE TESTING RESULTS, REHAB EVAL
Except bilateral shoulder flexion grossly less than or equal to 2-/5/no strength deficits were identified

## 2022-05-25 NOTE — PROGRESS NOTE ADULT - SUBJECTIVE AND OBJECTIVE BOX
[facility]       HPI:  80 yo F w/ hx HTN, CHF, COPD, afib on eliquis p/w found with SOB since ~ 7am today at SNF. NO known pain. EMS was called and found pt cyanotic and O2 56% on RA. EMS states 82% on 100% nrb. Pt on CPAP by EMS with sats in mid 90s. No known fever/chills. PT reported been off lasix over past couple days. No other acute co or changes Admitted  to telemetry unit for monitoring , send 3 sets of cardiac enzymes to rule out acute coronary event, obtain ECHO to evaluate LVEF, cardiology consult  ,continue current management, O2 supply, anticoagulation plan as per cardiology consult Admit to monitored unit for cardiac monitoring, obtain echo to evaluate LVEF, intravenous diuresis as per card consult , monitor ins/outs, monitor renal profile and electrolytes closely ,send 3 sets of enzymes, O2 supply, serial chest xrays, monitor weights and oral intake of fluids, nutritionist consult Palliative care consult requested ,to discuss advance directives and complete MOLST       TPMH of COVID 19 in 2011, CHF, RLE ulcer, CKD and CHF who was brought to the ED from [facility] for SOB and hypoxia. She was vaccinated with 3 doses of Moderna vaccine in the past but did not get the second booster dose.    w/ hx HTN, CHF, COPD, afib on eliquis p/w found with SOB since ~ 7am today at [facility]. NO known pain. EMS was called and found pt cyanotic and O2 56% on RA. EMS states 82% on 100% nrb. Pt on CPAP by EMS with sats in mid 90s. No known fever/chills. PT reported been off lasix over past couple days. No other acute co or changes          The patient is an 81 year old female with a PMH of COVID 19 in 2011, CHF, RLE ulcer, CKD and CHF who was brought to the ED from [facility] for SOB and hypoxia. She was vaccinated with 3 doses of Moderna vaccine in the past but did not get the second booster dose.    w/ hx HTN, CHF, COPD, afib on eliquis p/w found with SOB since ~ 7am today at [facility]. NO known pain. EMS was called and found pt cyanotic and O2 56% on RA. EMS states 82% on 100% nrb. Pt on CPAP by EMS with sats in mid 90s. No known fever/chills. PT reported been off lasix over past couple days. No other acute co or changes    < from: Xray Chest 1 View- PORTABLE-Urgent (05.22.22 @ 09:54) >    ACC: [id number] EXAM:  XR CHEST PORTABLE URGENT 1V                          PROCEDURE DATE:  05/22/2022          INTERPRETATION:  CLINICAL INDICATION: 81 years  Female with SOB, CHF.    COMPARISON: None    The patient's chin obscures the left lung apex.    AP view of the chest demonstrates a large right lower lobe infiltrate.   There is mild pulmonary vascular congestion. There is no pleural   effusion. There is no pneumothorax.    The heart is normal in size. Left-sided bipolar pacemaker. There is no   mediastinal or hilar mass.    Mild thoracic degenerative changes and osteopenia are present.    IMPRESSION:    Large right lower lobe infiltrates superimposed on pulmonary vascular   congestion. Pacemaker.    --- End of Report ---            PAUL MONROY MD; Attending Radiologist    < end of copied text >  < from: 12 Lead ECG (05.22.22 @ 09:28) >    Ventricular Rate 82 BPM    Atrial Rate 82 BPM    P-R Interval 208 ms    QRS Duration 162 ms    Q-T Interval 424 ms    QTC Calculation(Bazett) 495 ms    P Axis 77 degrees    R Axis 93 degrees    T Axis 24 degrees    Diagnosis Line Atrial-sensed ventricular-paced rhythm  Abnormal ECG  No previous ECGs available  Confirmed by dea Lopez ([id number]) on 5/22/2022 3:22:34 PM    < end of copied text >  sitivity (05.22.22 @ 13:48)   Troponin I, High Sensitivity Result: 138.8: Serial measurements of high sensitivity troponin I (hs TnI) showing rapid   upward or downward changes suggest acute myocardial injury. Please note   that a sustained elevation of hsTnI can be caused by renal disease,   chronic heart failure, sepsis, pulmonary embolism and other clinical   conditions. ng/L       Historical Values  Troponin I, High Sensitivity (05.22.22 @ 13:48)   Troponin I, High Sensitivity Result: 138.8: Serial measurements of high sensitivity troponin I (hs TnI) showing rapid   upward or downward changes suggest acute myocardial injury. Please note   that a sustained elevation of hsTnI can be caused by renal disease,   chronic heart failure, sepsis, pulmonary embolism and other clinical   conditions. ng/L   Troponin I, High Sensitivity (05.22.22 @ 09:43)   Troponin I, High Sensitivity Result: 21.2: Serial measurements of high sensitivity troponin I (hs TnI) showing rapid   upward or downward changes suggest acute myocardial injury. Please note   that a sustained elevation of hsTnI can be caused by renal disease,   chronic heart failure, sepsis, pulmonary embolism and other clinical   conditions. ng/L  (22 May 2022 12:13)        SUBJECTIVE:      ALLERGIES:  Allergies    fluoxetine (Unknown)  NIFEdipine (Unknown)  Procardia (Other)    Intolerances    oxycodone (Other)        MEDICATIONS  (STANDING):  aMIOdarone    Tablet 200 milliGRAM(s) Oral daily  apixaban 5 milliGRAM(s) Oral every 12 hours  carvedilol 25 milliGRAM(s) Oral two times a day  cefTRIAXone   IVPB 1000 milliGRAM(s) IV Intermittent every 24 hours  dexAMETHasone  Injectable 6 milliGRAM(s) IV Push daily  doxycycline hyclate Capsule 100 milliGRAM(s) Oral every 12 hours  fenofibrate Tablet 145 milliGRAM(s) Oral daily  ferrous    sulfate 325 milliGRAM(s) Oral daily  furosemide   Injectable 40 milliGRAM(s) IV Push daily  lactobacillus acidophilus 1 Tablet(s) Oral two times a day  losartan 100 milliGRAM(s) Oral daily  Medihoney 1 Application(s) 1 Application(s) Topical daily  melatonin 5 milliGRAM(s) Oral at bedtime  potassium chloride    Tablet ER 20 milliEquivalent(s) Oral two times a day  remdesivir  IVPB 100 milliGRAM(s) IV Intermittent every 24 hours  remdesivir  IVPB   IV Intermittent   tiotropium 18 MICROgram(s) Capsule 1 Capsule(s) Inhalation daily    MEDICATIONS  (PRN):  acetaminophen     Tablet .. 650 milliGRAM(s) Oral every 6 hours PRN Temp greater or equal to 38C (100.4F)  aluminum hydroxide/magnesium hydroxide/simethicone Suspension 30 milliLiter(s) Oral every 4 hours PRN Dyspepsia  hydrALAZINE 50 milliGRAM(s) Oral every 6 hours PRN Systolic/Diastolic >160/100  ondansetron Injectable 4 milliGRAM(s) IV Push every 8 hours PRN Nausea and/or Vomiting      REVIEW OF SYSTEMS:  CONSTITUTIONAL: No fever,  RESPIRATORY: No cough, wheezing, shortness of breath  CARDIOVASCULAR: No chest pain, dyspnea, palpitations, dizziness, syncope, paroxysmal nocturnal dyspnea, orthopnea, or arm or leg swelling  GASTROINTESTINAL: No abdominal  or epigastric pain, nausea, vomiting,  diarrhea  NEUROLOGICAL: No headaches,  loss of strength, numbness, or tremors    Vital Signs Last 24 Hrs  T(C): 36.8 (25 May 2022 19:27), Max: 36.8 (25 May 2022 19:27)  T(F): 98.2 (25 May 2022 19:27), Max: 98.2 (25 May 2022 19:27)  HR: 66 (25 May 2022 19:27) (60 - 66)  BP: 188/78 (25 May 2022 19:27) (177/75 - 196/76)  BP(mean): --  RR: 20 (25 May 2022 19:27) (17 - 20)  SpO2: 96% (25 May 2022 19:27) (94% - 98%)    PHYSICAL EXAM:  HEAD:  Atraumatic, Normocephalic  NECK: Supple and normal thyroid.  No JVD or carotid bruit.   HEART: S1, S2 regular , 1/6 soft ejection systolic murmur in mitral area , no thrill and no gallops .  PULMONARY: Bilateral vesicular breathing , few scattered ronchi and few scattered rales are present .  ABDOMEN: Soft nontender and positive bowl sounds   EXTREMITIES:  No clubbing, cyanosis, or pedal  edema  NEUROLOGICAL: AAOX3 , no focal deficit .    LABS:                        8.9    3.36  )-----------( 176      ( 25 May 2022 07:46 )             28.6     05-25    142  |  101  |  25<H>  ----------------------------<  94  4.0   |  38<H>  |  0.94    Ca    9.1      25 May 2022 07:46  Phos  1.6     05-25  Mg     2.1     05-25    TPro  6.2  /  Alb  2.8<L>  /  TBili  0.3  /  DBili  0.2  /  AST  60<H>  /  ALT  83<H>  /  AlkPhos  35<L>  05-25        PT/INR - ( 25 May 2022 07:46 )   PT: 18.5 sec;   INR: 1.57 ratio             BNPSerum Pro-Brain Natriuretic Peptide: 3073 pg/mL (05-25 @ 07:46)        EKG:  ECHO:  IMAGING:    Assessment/Plan    Will continue to follow during hospital course and give further recommendations as needed . Thanks for your referral . if any questions please contact me at office ([phone] [phone])  MIKAL ROSE MD [facility]   [address] [address]  [address]  OFFICE : [phone]  CELL : [phone]  CARDIOLOGY F/U  :       HPI:  80 yo F w/ hx HTN, CHF, COPD, afib on eliquis p/w found with SOB since ~ 7am today at [facility]. NO known pain. EMS was called and found pt cyanotic and O2 56% on RA. EMS states 82% on 100% nrb. Pt on CPAP by EMS with sats in mid 90s. No known fever/chills. PT reported been off lasix over past couple days. No other acute co or changes Admitted  to telemetry unit for monitoring , send 3 sets of cardiac enzymes to rule out acute coronary event, obtain ECHO to evaluate LVEF, cardiology consult  ,continue current management, O2 supply, anticoagulation plan as per cardiology consult Admit to monitored unit for cardiac monitoring, obtain echo to evaluate LVEF, intravenous diuresis as per card consult , monitor ins/outs, monitor renal profile and electrolytes closely ,send 3 sets of enzymes, O2 supply, serial chest xrays, monitor weights and oral intake of fluids, nutritionist consult Palliative care consult requested ,to discuss advance directives and complete MOLST         SUBJECTIVE: Patient feels SOB but better .      ALLERGIES:  Allergies    fluoxetine (Unknown)  NIFEdipine (Unknown)  Procardia (Other)    Intolerances    oxycodone (Other)        MEDICATIONS  (STANDING):  aMIOdarone    Tablet 200 milliGRAM(s) Oral daily  apixaban 5 milliGRAM(s) Oral every 12 hours  carvedilol 25 milliGRAM(s) Oral two times a day  cefTRIAXone   IVPB 1000 milliGRAM(s) IV Intermittent every 24 hours  dexAMETHasone  Injectable 6 milliGRAM(s) IV Push daily  doxycycline hyclate Capsule 100 milliGRAM(s) Oral every 12 hours  fenofibrate Tablet 145 milliGRAM(s) Oral daily  ferrous    sulfate 325 milliGRAM(s) Oral daily  furosemide   Injectable 40 milliGRAM(s) IV Push daily  lactobacillus acidophilus 1 Tablet(s) Oral two times a day  losartan 100 milliGRAM(s) Oral daily  Medihoney 1 Application(s) 1 Application(s) Topical daily  melatonin 5 milliGRAM(s) Oral at bedtime  potassium chloride    Tablet ER 20 milliEquivalent(s) Oral two times a day  remdesivir  IVPB 100 milliGRAM(s) IV Intermittent every 24 hours  remdesivir  IVPB   IV Intermittent   tiotropium 18 MICROgram(s) Capsule 1 Capsule(s) Inhalation daily    MEDICATIONS  (PRN):  acetaminophen     Tablet .. 650 milliGRAM(s) Oral every 6 hours PRN Temp greater or equal to 38C (100.4F)  aluminum hydroxide/magnesium hydroxide/simethicone Suspension 30 milliLiter(s) Oral every 4 hours PRN Dyspepsia  hydrALAZINE 50 milliGRAM(s) Oral every 6 hours PRN Systolic/Diastolic >160/100  ondansetron Injectable 4 milliGRAM(s) IV Push every 8 hours PRN Nausea and/or Vomiting      REVIEW OF SYSTEMS:  CONSTITUTIONAL: No fever,  RESPIRATORY: Patient has  cough, wheezing, shortness of breath  CARDIOVASCULAR: No chest pain,  palpitations, dizziness, syncope, paroxysmal nocturnal dyspnea,  or arm or leg swelling but has SOB   GASTROINTESTINAL: No abdominal  or epigastric pain, nausea, vomiting,  diarrhea  NEUROLOGICAL: No headaches,  loss of strength, numbness, or tremors  Skin : No itching .  Hematology : No active bleeding .  Endocrinology : No heat and cold intolerance .  Psychiatry : Patient is mild anxious .  Musculoskeletal : Patient has mild arthritis .  Genitourinary : No dysuria .    Vital Signs Last 24 Hrs  T(C): 36.8 (25 May 2022 19:27), Max: 36.8 (25 May 2022 19:27)  T(F): 98.2 (25 May 2022 19:27), Max: 98.2 (25 May 2022 19:27)  HR: 66 (25 May 2022 19:27) (60 - 66)  BP: 188/78 (25 May 2022 19:27) (177/75 - 196/76)  BP(mean): --  RR: 20 (25 May 2022 19:27) (17 - 20)  SpO2: 96% (25 May 2022 19:27) (94% - 98%)    PHYSICAL EXAM:  HEAD:  Atraumatic, Normocephalic  NECK: Supple and normal thyroid.  No JVD or carotid bruit.   HEART: S1, S2 regular , 1/6 soft ejection systolic murmur in mitral area , no thrill and no gallops .  PULMONARY: Bilateral vesicular breathing , few scattered rhonchi and few scattered rales are present .  ABDOMEN: Soft nontender and positive bowl sounds   EXTREMITIES:  No clubbing, cyanosis, or pedal  edema  NEUROLOGICAL: AAOX3 , no focal deficit .  Skin : No rashes .  Musculoskeletal : No joint swellings .    LABS:                        8.9    3.36  )-----------( 176      ( 25 May 2022 07:46 )             28.6     05-25    142  |  101  |  25<H>  ----------------------------<  94  4.0   |  38<H>  |  0.94    Ca    9.1      25 May 2022 07:46  Phos  1.6     05-25  Mg     2.1     05-25    TPro  6.2  /  Alb  2.8<L>  /  TBili  0.3  /  DBili  0.2  /  AST  60<H>  /  ALT  83<H>  /  AlkPhos  35<L>  05-25        PT/INR - ( 25 May 2022 07:46 )   PT: 18.5 sec;   INR: 1.57 ratio             BNPSerum Pro-Brain Natriuretic Peptide: 3073 pg/mL (05-25 @ 07:46)        Assessment/Plan  Patient has :   1) COVID pneumonia and S/P respiratory insufficiency and patient off Bi-pap now .  2) Hypertension and BP still elevated .  3) CHF and acute on chronic diastolic heart  failure .  4) Anemia   5) Paroxysmal atrial fibrillation and stable and in NSR .  Plan : 1) Start hydralazine for better control of BP 2) Increase Lasix 40 mg twice a day 3) Monitor hemoglobin and electrolytes 4) Rest of medications as such . 5) Prognosis guarded .  Will continue to follow during hospital course and give further recommendations as needed . Thanks for your referral . if any questions please contact me at office ([phone] cell [phone])

## 2022-05-26 DIAGNOSIS — T14.8XXA OTHER INJURY OF UNSPECIFIED BODY REGION, INITIAL ENCOUNTER: ICD-10-CM

## 2022-05-26 LAB
ALBUMIN SERPL ELPH-MCNC: 2.7 G/DL — LOW (ref 3.3–5)
ALP SERPL-CCNC: 35 U/L — LOW (ref 40–120)
ALT FLD-CCNC: 65 U/L — SIGNIFICANT CHANGE UP (ref 12–78)
ANION GAP SERPL CALC-SCNC: 5 MMOL/L — SIGNIFICANT CHANGE UP (ref 5–17)
AST SERPL-CCNC: 40 U/L — HIGH (ref 15–37)
BILIRUB DIRECT SERPL-MCNC: 0.1 MG/DL — SIGNIFICANT CHANGE UP (ref 0–0.3)
BILIRUB INDIRECT FLD-MCNC: 0.2 MG/DL — SIGNIFICANT CHANGE UP (ref 0.2–1)
BILIRUB SERPL-MCNC: 0.3 MG/DL — SIGNIFICANT CHANGE UP (ref 0.2–1.2)
BUN SERPL-MCNC: 20 MG/DL — SIGNIFICANT CHANGE UP (ref 7–23)
CALCIUM SERPL-MCNC: 8.9 MG/DL — SIGNIFICANT CHANGE UP (ref 8.5–10.1)
CHLORIDE SERPL-SCNC: 101 MMOL/L — SIGNIFICANT CHANGE UP (ref 96–108)
CO2 SERPL-SCNC: 38 MMOL/L — HIGH (ref 22–31)
CREAT SERPL-MCNC: 0.9 MG/DL — SIGNIFICANT CHANGE UP (ref 0.5–1.3)
EGFR: 63 ML/MIN/1.73M2 — SIGNIFICANT CHANGE UP
GLUCOSE SERPL-MCNC: 105 MG/DL — HIGH (ref 70–99)
HCT VFR BLD CALC: 28.9 % — LOW (ref 34.5–45)
HGB BLD-MCNC: 9.2 G/DL — LOW (ref 11.5–15.5)
INR BLD: 1.74 RATIO — HIGH (ref 0.88–1.16)
MAGNESIUM SERPL-MCNC: 2 MG/DL — SIGNIFICANT CHANGE UP (ref 1.6–2.6)
MCHC RBC-ENTMCNC: 31.8 GM/DL — LOW (ref 32–36)
MCHC RBC-ENTMCNC: 31.9 PG — SIGNIFICANT CHANGE UP (ref 27–34)
MCV RBC AUTO: 100.3 FL — HIGH (ref 80–100)
NRBC # BLD: 0 /100 WBCS — SIGNIFICANT CHANGE UP (ref 0–0)
PHOSPHATE SERPL-MCNC: 2.9 MG/DL — SIGNIFICANT CHANGE UP (ref 2.5–4.5)
PLATELET # BLD AUTO: 173 K/UL — SIGNIFICANT CHANGE UP (ref 150–400)
POTASSIUM SERPL-MCNC: 4.2 MMOL/L — SIGNIFICANT CHANGE UP (ref 3.5–5.3)
POTASSIUM SERPL-SCNC: 4.2 MMOL/L — SIGNIFICANT CHANGE UP (ref 3.5–5.3)
PROT SERPL-MCNC: 6.1 G/DL — SIGNIFICANT CHANGE UP (ref 6–8.3)
PROTHROM AB SERPL-ACNC: 20.5 SEC — HIGH (ref 10.5–13.4)
RBC # BLD: 2.88 M/UL — LOW (ref 3.8–5.2)
RBC # FLD: 14.3 % — SIGNIFICANT CHANGE UP (ref 10.3–14.5)
SARS-COV-2 RNA SPEC QL NAA+PROBE: DETECTED
SODIUM SERPL-SCNC: 144 MMOL/L — SIGNIFICANT CHANGE UP (ref 135–145)
TROPONIN I, HIGH SENSITIVITY RESULT: 15.7 NG/L — SIGNIFICANT CHANGE UP
WBC # BLD: 3.6 K/UL — LOW (ref 3.8–10.5)
WBC # FLD AUTO: 3.6 K/UL — LOW (ref 3.8–10.5)

## 2022-05-26 PROCEDURE — 99221 1ST HOSP IP/OBS SF/LOW 40: CPT

## 2022-05-26 RX ORDER — HYDRALAZINE HCL 50 MG
50 TABLET ORAL THREE TIMES A DAY
Refills: 0 | Status: DISCONTINUED | OUTPATIENT
Start: 2022-05-26 | End: 2022-05-31

## 2022-05-26 RX ORDER — FUROSEMIDE 40 MG
40 TABLET ORAL
Refills: 0 | Status: DISCONTINUED | OUTPATIENT
Start: 2022-05-26 | End: 2022-05-27

## 2022-05-26 RX ADMIN — REMDESIVIR 500 MILLIGRAM(S): 5 INJECTION INTRAVENOUS at 21:52

## 2022-05-26 RX ADMIN — Medication 20 MILLIEQUIVALENT(S): at 05:15

## 2022-05-26 RX ADMIN — Medication 6 MILLIGRAM(S): at 05:15

## 2022-05-26 RX ADMIN — LOSARTAN POTASSIUM 100 MILLIGRAM(S): 100 TABLET, FILM COATED ORAL at 05:15

## 2022-05-26 RX ADMIN — Medication 20 MILLIEQUIVALENT(S): at 17:28

## 2022-05-26 RX ADMIN — Medication 50 MILLIGRAM(S): at 09:26

## 2022-05-26 RX ADMIN — Medication 5 MILLIGRAM(S): at 21:51

## 2022-05-26 RX ADMIN — Medication 1 TABLET(S): at 05:15

## 2022-05-26 RX ADMIN — Medication 1 TABLET(S): at 17:28

## 2022-05-26 RX ADMIN — Medication 325 MILLIGRAM(S): at 08:25

## 2022-05-26 RX ADMIN — Medication 100 MILLIGRAM(S): at 05:14

## 2022-05-26 RX ADMIN — CEFTRIAXONE 100 MILLIGRAM(S): 500 INJECTION, POWDER, FOR SOLUTION INTRAMUSCULAR; INTRAVENOUS at 05:14

## 2022-05-26 RX ADMIN — CARVEDILOL PHOSPHATE 25 MILLIGRAM(S): 80 CAPSULE, EXTENDED RELEASE ORAL at 05:14

## 2022-05-26 RX ADMIN — AMIODARONE HYDROCHLORIDE 200 MILLIGRAM(S): 400 TABLET ORAL at 05:15

## 2022-05-26 RX ADMIN — Medication 0.2 MILLIGRAM(S): at 02:56

## 2022-05-26 RX ADMIN — Medication 145 MILLIGRAM(S): at 11:11

## 2022-05-26 RX ADMIN — Medication 50 MILLIGRAM(S): at 17:28

## 2022-05-26 RX ADMIN — Medication 40 MILLIGRAM(S): at 05:13

## 2022-05-26 RX ADMIN — APIXABAN 5 MILLIGRAM(S): 2.5 TABLET, FILM COATED ORAL at 17:28

## 2022-05-26 RX ADMIN — TIOTROPIUM BROMIDE 1 CAPSULE(S): 18 CAPSULE ORAL; RESPIRATORY (INHALATION) at 05:16

## 2022-05-26 RX ADMIN — Medication 50 MILLIGRAM(S): at 02:43

## 2022-05-26 RX ADMIN — APIXABAN 5 MILLIGRAM(S): 2.5 TABLET, FILM COATED ORAL at 05:14

## 2022-05-26 RX ADMIN — Medication 100 MILLIGRAM(S): at 17:28

## 2022-05-26 RX ADMIN — CARVEDILOL PHOSPHATE 25 MILLIGRAM(S): 80 CAPSULE, EXTENDED RELEASE ORAL at 17:28

## 2022-05-26 RX ADMIN — Medication 40 MILLIGRAM(S): at 11:11

## 2022-05-26 NOTE — CONSULT NOTE ADULT - ASSESSMENT
RN assessed patient to have stage two pressure injuries bilateral buttocks  RN to manage patients stage 2 pressure injuries with Cavilon daily, critic aid Q-shift, turn and reposition Q2H.  Patient is on a low air loss mattress  for the critically ill patient experiencing multiorgan failure, impaired tissue oxygenation, and perfusion can contribute to skin failure thus the development of a pressure related injury may be unavoidable    This pressure injury is community aquired NO/YES  At risk for altered tissue perfusion NO/YES  Impaired perfusion of peripheral tissue NO/YES  Continue  Nutrition (as tolerated)  Continue  Offloading   Continue Pericare  Apply cair boots at all times while in bed.   Provide skin checks and foot placement q8h.  Care as per medicine will follow w/ you  Follow up as outpatient at Wound Center   Findings and recommendations discussed with RN  MD   Thank you for this consult  Lola Medeiros NP, [facility] [phone] Patient presents with:   1. Right lower extremity 6 x 6 wound 2/2 unroofed bullae unknown length of time was being treated with honey daily periwound mild erythema.  Recommend:   -MARLA QOD  At risk for altered tissue perfusion /YES  Impaired perfusion of peripheral tissue /YES  Continue  Nutrition (as tolerated)  Continue  Offloading   Continue Pericare  Apply cair boots at all times while in bed.   Provide skin checks and foot placement q8h.  Care as per medicine will follow w/ you  Follow up as outpatient at Wound Center   Findings and recommendations discussed with SERA Meyer  Thank you for this consult  Lola Medeiros NP, [facility] [phone]

## 2022-05-26 NOTE — PROGRESS NOTE ADULT - SUBJECTIVE AND OBJECTIVE BOX
Date/Time Patient Seen:  		  Referring MD:   Data Reviewed	       Patient is a 81y old  Female who presents with a chief complaint of 80 yo F w/ hx HTN, CHF, COPD, afib on eliquis p/w found with SOB since ~ [facility]. NO known pain. EMS was called and found pt cyanotic and O2 56% on RA. EMS states 82% on 100% nrb. Pt on CPAP by EMS with sats in mid 90s. No known fever/chills. PT reported been off lasix over past couple days. No other acute co or changes Admitted  to telemetry unit for monitoring , send 3 sets of cardiac enzymes to rule out acute coronary event, obtain ECHO to evaluate LVEF, cardiology consult  ,continue current management, O2 supply, anticoagulation plan as per cardiology consult Admit to monitored unit for cardiac monitoring, obtain echo to evaluate LVEF, intravenous diuresis as per card consult , monitor ins/outs, monitor renal profile and electrolytes closely ,send 3 sets of enzymes, O2 supply, serial chest xrays, monitor weights and oral intake of fluids, nutritionist consult Palliative care consult requested ,to discuss advance directives and complete MOLST       TPMH of COVID 19 in 2011, CHF, RLE ulcer, CKD and CHF who was brought to the ED from [facility] for SOB and hypoxia. She was vaccinated with 3 doses of Moderna vaccine in the past but did not get the second booster dose.    w/ hx HTN, CHF, COPD, afib on eliquis p/w found with SOB since ~ 7am today at SNF. NO known pain. EMS was called and found pt cyanotic and O2 56% on RA. EMS states 82% on 100% nrb. Pt on CPAP by EMS with sats in mid 90s. No known fever/chills. PT reported been off lasix over past couple days. No other acute co or changes (25 May 2022 21:27)      Subjective/HPI     PAST MEDICAL & SURGICAL HISTORY:  HTN (hypertension)    HLD (hyperlipidemia)    Anemia  newly diagnosed    DVT (deep venous thrombosis)  2010 was on coumadin for 6 months    Pneumonia  years ago    OA (osteoarthritis)    Status post total left knee replacement  2007    History of cholecystectomy  open , many years ago    H/O hernia repair  years ago          Medication list         MEDICATIONS  (STANDING):  aMIOdarone    Tablet 200 milliGRAM(s) Oral daily  apixaban 5 milliGRAM(s) Oral every 12 hours  carvedilol 25 milliGRAM(s) Oral two times a day  cefTRIAXone   IVPB 1000 milliGRAM(s) IV Intermittent every 24 hours  dexAMETHasone  Injectable 6 milliGRAM(s) IV Push daily  doxycycline hyclate Capsule 100 milliGRAM(s) Oral every 12 hours  fenofibrate Tablet 145 milliGRAM(s) Oral daily  ferrous    sulfate 325 milliGRAM(s) Oral daily  furosemide   Injectable 40 milliGRAM(s) IV Push two times a day  hydrALAZINE 50 milliGRAM(s) Oral three times a day  lactobacillus acidophilus 1 Tablet(s) Oral two times a day  losartan 100 milliGRAM(s) Oral daily  Medihoney 1 Application(s) 1 Application(s) Topical daily  melatonin 5 milliGRAM(s) Oral at bedtime  potassium chloride    Tablet ER 20 milliEquivalent(s) Oral two times a day  remdesivir  IVPB 100 milliGRAM(s) IV Intermittent every 24 hours  remdesivir  IVPB   IV Intermittent   tiotropium 18 MICROgram(s) Capsule 1 Capsule(s) Inhalation daily    MEDICATIONS  (PRN):  acetaminophen     Tablet .. 650 milliGRAM(s) Oral every 6 hours PRN Temp greater or equal to 38C (100.4F)  aluminum hydroxide/magnesium hydroxide/simethicone Suspension 30 milliLiter(s) Oral every 4 hours PRN Dyspepsia  cloNIDine 0.2 milliGRAM(s) Oral every 6 hours PRN for systolic BP>180 or diastolic BP>100  ondansetron Injectable 4 milliGRAM(s) IV Push every 8 hours PRN Nausea and/or Vomiting         Vitals log        ICU Vital Signs Last 24 Hrs  T(C): 36.8 (26 May 2022 04:06), Max: 36.8 (25 May 2022 19:27)  T(F): 98.2 (26 May 2022 04:06), Max: 98.2 (25 May 2022 19:27)  HR: 61 (26 May 2022 04:06) (60 - 66)  BP: 144/75 (26 May 2022 04:06) (144/75 - 198/64)  BP(mean): --  ABP: --  ABP(mean): --  RR: 17 (26 May 2022 04:06) (17 - 20)  SpO2: 94% (26 May 2022 04:06) (93% - 98%)           Input and Output:  I&O's Detail    24 May 2022 07:01  -  25 May 2022 07:00  --------------------------------------------------------  IN:    dextrose 5% + sodium chloride 0.45%: 245 mL  Total IN: 245 mL    OUT:    Voided (mL): 1800 mL  Total OUT: 1800 mL    Total NET: -1555 mL      25 May 2022 07:01  -  26 May 2022 05:53  --------------------------------------------------------  IN:  Total IN: 0 mL    OUT:    Voided (mL): 500 mL  Total OUT: 500 mL    Total NET: -500 mL          Lab Data                        8.9    3.36  )-----------( 176      ( 25 May 2022 07:46 )             28.6     05-25    142  |  101  |  25<H>  ----------------------------<  94  4.0   |  38<H>  |  0.94    Ca    9.1      25 May 2022 07:46  Phos  1.6     05-25  Mg     2.1     05-25    TPro  6.2  /  Alb  2.8<L>  /  TBili  0.3  /  DBili  0.2  /  AST  60<H>  /  ALT  83<H>  /  AlkPhos  35<L>  05-25            Review of Systems	      Objective     Physical Examination    heart s1s2  lung dec BS  abd soft      Pertinent Lab findings & Imaging      Jagjit:  NO   Adequate UO     I&O's Detail    24 May 2022 07:01  -  25 May 2022 07:00  --------------------------------------------------------  IN:    dextrose 5% + sodium chloride 0.45%: 245 mL  Total IN: 245 mL    OUT:    Voided (mL): 1800 mL  Total OUT: 1800 mL    Total NET: -1555 mL      25 May 2022 07:01  -  26 May 2022 05:53  --------------------------------------------------------  IN:  Total IN: 0 mL    OUT:    Voided (mL): 500 mL  Total OUT: 500 mL    Total NET: -500 mL               Discussed with:     Cultures:	        Radiology

## 2022-05-26 NOTE — PROGRESS NOTE ADULT - ASSESSMENT
Problem/Plan - 1:  ·  Problem: Acute respiratory failure with hypoxia.   ·  Plan: oxygen supply as per pulmonologist ,serial abgs ,serial imaging ,tele monitoring.    Problem/Plan - 2:  ·  Problem: Acute febrile illness.   ·  Plan: septic workup ,ID cons ,tylenol prn.    Problem/Plan - 3:  ·  Problem: CHF exacerbation.   ·  Plan: Admit to monitored unit for cardiac monitoring, obtain echo to evaluate LVEF, intravenous diuresis as per card consult , monitor ins/outs, monitor renal profile and electrolytes closely ,send 3 sets of enzymes, O2 supply, serial chest xrays, monitor weights and oral intake of fluids, nutritionist consult.    Problem/Plan - 4:  ·  Problem: 2019 novel coronavirus disease (COVID-19).   ·  Plan: pulse oximetry ,started on Dexamethasone and remdesivir ,ID and pulm is following.    Problem/Plan - 5:  ·  Problem: Afib.   ·  Plan: Admitted  to telemetry unit for monitoring , send 3 sets of cardiac enzymes to rule out acute coronary event, obtain ECHO to evaluate LVEF, cardiology consult  ,continue current management, O2 supply, anticoagulation plan as per cardiology consult.    Problem/Plan - 6:  ·  Problem: Pneumonia, aspiration.   ·  Plan: aspiration precautions ,zosyn ,ID & PULM F/UP ,SLP eval.    Problem/Plan - 7:  ·  Problem: HTN (hypertension).   ·  Plan: continue home medications ,cardiology is following.    Problem/Plan - 8:  ·  Problem: Hypokalemia.   ·  Plan: replaced ,serial bmp.    Problem/Plan - 9:  ·  Problem: Cellulitis.   ·  Plan: RLE -as per ID CONS.    Problem/Plan - 10:  ·  Problem: COPD, moderate.   ·  Plan; as per pulmonologist ,continue nebulized bronchodilators.    Problem/Plan - 11:  ·  Problem: Prophylactic measure.   ·  Plan: Gastrointestinal stress ulcer prophylaxis and DVT prophylaxis administered.

## 2022-05-26 NOTE — PROGRESS NOTE ADULT - SUBJECTIVE AND OBJECTIVE BOX
Interval History:    CENTRAL LINE:   [  ] YES       [  ] NO  WILLIS:                 [  ] YES       [  ] NO         REVIEW OF SYSTEMS:  All Systems below were reviewed and are negative [  ]  HEENT:  ID:  Pulmonary:  Cardiac:  GI:  Renal:  Musculoskeletal:  All other systems above were reviewed and are negative   [  ]      MEDICATIONS  (STANDING):  aMIOdarone    Tablet 200 milliGRAM(s) Oral daily  apixaban 5 milliGRAM(s) Oral every 12 hours  carvedilol 25 milliGRAM(s) Oral two times a day  dexAMETHasone  Injectable 6 milliGRAM(s) IV Push daily  doxycycline hyclate Capsule 100 milliGRAM(s) Oral every 12 hours  fenofibrate Tablet 145 milliGRAM(s) Oral daily  ferrous    sulfate 325 milliGRAM(s) Oral daily  furosemide   Injectable 40 milliGRAM(s) IV Push two times a day  hydrALAZINE 50 milliGRAM(s) Oral three times a day  lactobacillus acidophilus 1 Tablet(s) Oral two times a day  losartan 100 milliGRAM(s) Oral daily  Medihoney 1 Application(s) 1 Application(s) Topical daily  melatonin 5 milliGRAM(s) Oral at bedtime  potassium chloride    Tablet ER 20 milliEquivalent(s) Oral two times a day  remdesivir  IVPB 100 milliGRAM(s) IV Intermittent every 24 hours  remdesivir  IVPB   IV Intermittent   tiotropium 18 MICROgram(s) Capsule 1 Capsule(s) Inhalation daily    MEDICATIONS  (PRN):  acetaminophen     Tablet .. 650 milliGRAM(s) Oral every 6 hours PRN Temp greater or equal to 38C (100.4F)  aluminum hydroxide/magnesium hydroxide/simethicone Suspension 30 milliLiter(s) Oral every 4 hours PRN Dyspepsia  cloNIDine 0.2 milliGRAM(s) Oral every 6 hours PRN for systolic BP>180 or diastolic BP>100  ondansetron Injectable 4 milliGRAM(s) IV Push every 8 hours PRN Nausea and/or Vomiting      Vital Signs Last 24 Hrs  T(C): 36.4 (26 May 2022 11:21), Max: 36.8 (26 May 2022 04:06)  T(F): 97.5 (26 May 2022 11:21), Max: 98.2 (26 May 2022 04:06)  HR: 60 (26 May 2022 11:21) (60 - 61)  BP: 144/67 (26 May 2022 11:21) (144/67 - 198/64)  BP(mean): --  RR: 20 (26 May 2022 11:21) (17 - 20)  SpO2: 97% (26 May 2022 11:21) (93% - 97%)    I&O's Summary    25 May 2022 07:01  -  26 May 2022 07:00  --------------------------------------------------------  IN: 0 mL / OUT: 650 mL / NET: -650 mL    26 May 2022 07:01  -  26 May 2022 19:55  --------------------------------------------------------  IN: 0 mL / OUT: 1200 mL / NET: -1200 mL        PHYSICAL EXAM:  HEENT: NC/AT; PERRLA  Neck: Soft; no tenderness  Lungs: CTA bilaterally; no wheezing.   Heart:  Abdomen:  Genital/ Rectal:  Extremities:  Neurologic:  Vascular:      LABORATORY:    CBC Full  -  ( 26 May 2022 07:31 )  WBC Count : 3.60 K/uL  RBC Count : 2.88 M/uL  Hemoglobin : 9.2 g/dL  Hematocrit : 28.9 %  Platelet Count - Automated : 173 K/uL  Mean Cell Volume : 100.3 fl  Mean Cell Hemoglobin : 31.9 pg  Mean Cell Hemoglobin Concentration : 31.8 gm/dL  Auto Neutrophil # : x  Auto Lymphocyte # : x  Auto Monocyte # : x  Auto Eosinophil # : x  Auto Basophil # : x  Auto Neutrophil % : x  Auto Lymphocyte % : x  Auto Monocyte % : x  Auto Eosinophil % : x  Auto Basophil % : x      ESR:                   05-25 @ 11:24  --    C-Reactive Protein:     05-25 @ 11:24  42    Procalcitonin:           05-25 @ 11:24   --  ESR:                   05-25 @ 07:46  --    C-Reactive Protein:     05-25 @ 07:46  --    Procalcitonin:           05-25 @ 07:46   2.08  ESR:                   05-23 @ 11:51  --    C-Reactive Protein:     05-23 @ 11:51  91    Procalcitonin:           05-23 @ 11:51   --  ESR:                   05-23 @ 08:41  --    C-Reactive Protein:     05-23 @ 08:41  --    Procalcitonin:           05-23 @ 08:41   4.91      05-26    144  |  101  |  20  ----------------------------<  105<H>  4.2   |  38<H>  |  0.90    Ca    8.9      26 May 2022 07:31  Phos  2.9     05-26  Mg     2.0     05-26    TPro  6.1  /  Alb  2.7<L>  /  TBili  0.3  /  DBili  0.1  /  AST  40<H>  /  ALT  65  /  AlkPhos  35<L>  05-26      Rapid Respiratory Viral Panel Result        05-22 @ 10:04  Rapid RVP Detected  Coronovirus --  Adenovirus --  Bordetella Pertussis --  Chlamydia Pneumonia --  Entero/Rhinovirus--  HKU1 Coronovirus --  HMPV Coronovirus --  Influenza A --  Influenza AH1 --  Influenza AH1 2009 --  Influenza AH3 --  Influenza B --  Mycoplasma Pneumoniae --  NL63 Coronovirus --  OC43 Coronovirus --  Parainfluenza 1 --  Parainfluenza 2 --  Parainfluenza 3 --  Parainfluenza 4 --  Resp Syncytial Virus --      Assessment and Plan:          Emir Tomas MD   [phone].  She is afebrile  Comfortable      MEDICATIONS  (STANDING):  aMIOdarone    Tablet 200 milliGRAM(s) Oral daily  apixaban 5 milliGRAM(s) Oral every 12 hours  carvedilol 25 milliGRAM(s) Oral two times a day  dexAMETHasone  Injectable 6 milliGRAM(s) IV Push daily  doxycycline hyclate Capsule 100 milliGRAM(s) Oral every 12 hours  fenofibrate Tablet 145 milliGRAM(s) Oral daily  ferrous    sulfate 325 milliGRAM(s) Oral daily  furosemide   Injectable 40 milliGRAM(s) IV Push two times a day  hydrALAZINE 50 milliGRAM(s) Oral three times a day  lactobacillus acidophilus 1 Tablet(s) Oral two times a day  losartan 100 milliGRAM(s) Oral daily  Medihoney 1 Application(s) 1 Application(s) Topical daily  melatonin 5 milliGRAM(s) Oral at bedtime  potassium chloride    Tablet ER 20 milliEquivalent(s) Oral two times a day  remdesivir  IVPB 100 milliGRAM(s) IV Intermittent every 24 hours  remdesivir  IVPB   IV Intermittent   tiotropium 18 MICROgram(s) Capsule 1 Capsule(s) Inhalation daily    MEDICATIONS  (PRN):  acetaminophen     Tablet .. 650 milliGRAM(s) Oral every 6 hours PRN Temp greater or equal to 38C (100.4F)  aluminum hydroxide/magnesium hydroxide/simethicone Suspension 30 milliLiter(s) Oral every 4 hours PRN Dyspepsia  cloNIDine 0.2 milliGRAM(s) Oral every 6 hours PRN for systolic BP>180 or diastolic BP>100  ondansetron Injectable 4 milliGRAM(s) IV Push every 8 hours PRN Nausea and/or Vomiting      Vital Signs Last 24 Hrs  T(C): 36.4 (26 May 2022 11:21), Max: 36.8 (26 May 2022 04:06)  T(F): 97.5 (26 May 2022 11:21), Max: 98.2 (26 May 2022 04:06)  HR: 60 (26 May 2022 11:21) (60 - 61)  BP: 144/67 (26 May 2022 11:21) (144/67 - 198/64)  BP(mean): --  RR: 20 (26 May 2022 11:21) (17 - 20)  SpO2: 97% (26 May 2022 11:21) (93% - 97%)    I&O's Summary    25 May 2022 07:01  -  26 May 2022 07:00  --------------------------------------------------------  IN: 0 mL / OUT: 650 mL / NET: -650 mL    26 May 2022 07:01  -  26 May 2022 19:55  --------------------------------------------------------  IN: 0 mL / OUT: 1200 mL / NET: -1200 mL        PHYSICAL EXAM:  HEENT: NC/AT; PERRLA  Neck: Soft; no tenderness  Lungs: Coarse BS bilaterally; no wheezing.   Heart: RRR, no murmurs.  Abdomen: Soft, no tenderness.   Genital/ Rectal: No hale   Extremities: Decreased erythema of RLE  Neurologic: Confused.      LABORATORY:    CBC Full  -  ( 26 May 2022 07:31 )  WBC Count : 3.60 K/uL  RBC Count : 2.88 M/uL  Hemoglobin : 9.2 g/dL  Hematocrit : 28.9 %  Platelet Count - Automated : 173 K/uL  Mean Cell Volume : 100.3 fl  Mean Cell Hemoglobin : 31.9 pg  Mean Cell Hemoglobin Concentration : 31.8 gm/dL  Auto Neutrophil # : x  Auto Lymphocyte # : x  Auto Monocyte # : x  Auto Eosinophil # : x  Auto Basophil # : x  Auto Neutrophil % : x  Auto Lymphocyte % : x  Auto Monocyte % : x  Auto Eosinophil % : x  Auto Basophil % : x      ESR:                   05-25 @ 11:24  --    C-Reactive Protein:     05-25 @ 11:24  42    Procalcitonin:           05-25 @ 11:24   --  ESR:                   05-25 @ 07:46  --    C-Reactive Protein:     05-25 @ 07:46  --    Procalcitonin:           05-25 @ 07:46   2.08  ESR:                   05-23 @ 11:51  --    C-Reactive Protein:     05-23 @ 11:51  91    Procalcitonin:           05-23 @ 11:51   --  ESR:                   05-23 @ 08:41  --    C-Reactive Protein:     05-23 @ 08:41  --    Procalcitonin:           05-23 @ 08:41   4.91      05-26    144  |  101  |  20  ----------------------------<  105<H>  4.2   |  38<H>  |  0.90    Ca    8.9      26 May 2022 07:31  Phos  2.9     05-26  Mg     2.0     05-26    TPro  6.1  /  Alb  2.7<L>  /  TBili  0.3  /  DBili  0.1  /  AST  40<H>  /  ALT  65  /  AlkPhos  35<L>  05-26    Assessment and Plan:    1. Pneumonia of lower lobes: aspiration vs COVID 19 pneumonia  2. Respiratory failure with hypoxia.  3. RLE cellulitis  4, RLE chronic ulcer.  5. CKD.    . SOB and hypoxia have improved.  . Continue IV Dexamethasone 6 mg daily for 5 more days. Can change to oral Dexa on discharge.   . Completed  Remdesivir today.,   . Discontinue  IV Rocephin. Continue po Doxycycline 100 mg bid for 3 more days.   . RLE cellulitis is improving.   . Aspiration precautions.  . Discharge planning.       Emir Tomas MD   [phone].

## 2022-05-26 NOTE — PROGRESS NOTE ADULT - SUBJECTIVE AND OBJECTIVE BOX
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 Last admit on 11/7/18 for LISBETH and polypharmacy toxicity. Now here with SOB and cough. CT chest with no obvious signs of consolidation or effusion, some inflammation of the bronchus intermedius. Respiratory culture gram stain remarkable for yeast, GNR and GPR. 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 Unclear if new respiratory culture will represent colonizing organisms or new infection. RVP was negative recommend to D/C oseltamivir. Recommend to consider bronchoscopy in setting of concern for new infection with benign CT chest. Thank you for the consult will follow progress.     Recommendations:   - Continue cefepime and vancomycin for now   - d/c oseltamivir           Patient is a 81y old  Female who presents with a chief complaint of 82 yo F w/ hx HTN, CHF, COPD, afib on eliquis p/w found with SOB since ~ 7am today at [facility] NO known pain. EMS was called and found pt cyanotic and O2 56% on RA. EMS states 82% on 100% nrb. Pt on CPAP by EMS with sats in mid 90s. No known fever/chills. PT reported been off lasix over past couple days. No other acute co or changes Admitted  to telemetry unit for monitoring , send 3 sets of cardiac enzymes to rule out acute coronary event, obtain ECHO to evaluate LVEF, cardiology consult  ,continue current management, O2 supply, anticoagulation plan as per cardiology consult Admit to monitored unit for cardiac monitoring, obtain echo to evaluate LVEF, intravenous diuresis as per card consult , monitor ins/outs, monitor renal profile and electrolytes closely ,send 3 sets of enzymes, O2 supply, serial chest xrays, monitor weights and oral intake of fluids, nutritionist consult Palliative care consult requested ,to discuss advance directives and complete MOLST       [email or web address] of COVID 19 in 2011, CHF, RLE ulcer, CKD and CHF who was brought to the ED from [facility] for SOB and hypoxia. She was vaccinated with 3 doses of Moderna vaccine in the past but did not get the second booster dose.    w/ hx HTN, CHF, COPD, afib on eliquis p/w found with SOB since ~ 7am today at [facility]. NO known pain. EMS was called and found pt cyanotic and O2 56% on RA. EMS states 82% on 100% nrb. Pt on CPAP by EMS with sats in mid 90s. No known fever/chills. PT reported been off lasix over past couple days. No other acute co or changes (26 May 2022 13:44)    Date of servie : 05-26-22 @ 15:11  INTERVAL HPI/OVERNIGHT EVENTS:  T(C): 36.4 (05-26-22 @ 11:21), Max: 36.8 (05-25-22 @ 19:27)  HR: 60 (05-26-22 @ 11:21) (60 - 66)  BP: 144/67 (05-26-22 @ 11:21) (144/67 - 198/64)  RR: 20 (05-26-22 @ 11:21) (17 - 20)  SpO2: 97% (05-26-22 @ 11:21) (93% - 97%)  Wt(kg): --  I&O's Summary    25 May 2022 07:01  -  26 May 2022 07:00  --------------------------------------------------------  IN: 0 mL / OUT: 650 mL / NET: -650 mL        LABS:                        9.2    3.60  )-----------( 173      ( 26 May 2022 07:31 )             28.9     05-26    144  |  101  |  20  ----------------------------<  105<H>  4.2   |  38<H>  |  0.90    Ca    8.9      26 May 2022 07:31  Phos  2.9     05-26  Mg     2.0     05-26    TPro  6.1  /  Alb  2.7<L>  /  TBili  0.3  /  DBili  0.1  /  AST  40<H>  /  ALT  65  /  AlkPhos  35<L>  05-26    PT/INR - ( 26 May 2022 07:31 )   PT: 20.5 sec;   INR: 1.74 ratio             CAPILLARY BLOOD GLUCOSE                MEDICATIONS  (STANDING):  aMIOdarone    Tablet 200 milliGRAM(s) Oral daily  apixaban 5 milliGRAM(s) Oral every 12 hours  carvedilol 25 milliGRAM(s) Oral two times a day  cefTRIAXone   IVPB 1000 milliGRAM(s) IV Intermittent every 24 hours  dexAMETHasone  Injectable 6 milliGRAM(s) IV Push daily  doxycycline hyclate Capsule 100 milliGRAM(s) Oral every 12 hours  fenofibrate Tablet 145 milliGRAM(s) Oral daily  ferrous    sulfate 325 milliGRAM(s) Oral daily  furosemide   Injectable 40 milliGRAM(s) IV Push two times a day  hydrALAZINE 50 milliGRAM(s) Oral three times a day  lactobacillus acidophilus 1 Tablet(s) Oral two times a day  losartan 100 milliGRAM(s) Oral daily  Medihoney 1 Application(s) 1 Application(s) Topical daily  melatonin 5 milliGRAM(s) Oral at bedtime  potassium chloride    Tablet ER 20 milliEquivalent(s) Oral two times a day  remdesivir  IVPB 100 milliGRAM(s) IV Intermittent every 24 hours  remdesivir  IVPB   IV Intermittent   tiotropium 18 MICROgram(s) Capsule 1 Capsule(s) Inhalation daily    MEDICATIONS  (PRN):  acetaminophen     Tablet .. 650 milliGRAM(s) Oral every 6 hours PRN Temp greater or equal to 38C (100.4F)  aluminum hydroxide/magnesium hydroxide/simethicone Suspension 30 milliLiter(s) Oral every 4 hours PRN Dyspepsia  cloNIDine 0.2 milliGRAM(s) Oral every 6 hours PRN for systolic BP>180 or diastolic BP>100  ondansetron Injectable 4 milliGRAM(s) IV Push every 8 hours PRN Nausea and/or Vomiting          PHYSICAL EXAM:  GENERAL: NAD, well-groomed, well-developed  HEAD:  Atraumatic, Normocephalic  CHEST/LUNG: Clear to percussion bilaterally; No rales, rhonchi, wheezing, or rubs  HEART: Regular rate and rhythm; No murmurs, rubs, or gallops  ABDOMEN: Soft, Nontender, Nondistended; Bowel sounds present  EXTREMITIES:  2+ Peripheral Pulses, No clubbing, cyanosis, or edema  LYMPH: No lymphadenopathy noted  SKIN: No rashes or lesions    Care Discussed with Consultants/Other Providers [ ] YES  [ ] NO

## 2022-05-26 NOTE — CONSULT NOTE ADULT - SUBJECTIVE AND OBJECTIVE BOX
NOTE IN PROGRESS  HPI:  80 yo F w/ hx HTN, CHF, COPD, afib on eliquis p/w found with SOB since ~ [facility]am today at [facility]. NO known pain. EMS was called and found pt cyanotic and O2 56% on RA. EMS states 82% on 100% nrb. Pt on CPAP by EMS with sats in mid 90s. No known fever/chills. PT reported been off lasix over past couple days. No other acute co or changes Admitted  to telemetry unit for monitoring ,    PAST MEDICAL & SURGICAL HISTORY:  HTN (hypertension)      HLD (hyperlipidemia)      Anemia  newly diagnosed      DVT (deep venous thrombosis)  2010 was on coumadin for 6 months      Pneumonia  years ago      OA (osteoarthritis)      Status post total left knee replacement  2007      History of cholecystectomy  open , many years ago      H/O hernia repair  years ago        REVIEW OF SYSTEMS  CONSTITUTIONAL: +weakness, no fevers or chills  HEENT: denies blurred visions, sore throat  SKIN: denies new lesions, rash  CARDIOVASCULAR: denies chest pain, palpitations  RESPIRATORY: +sputum production  GASTROINTESTINAL: denies nausea, vomiting, + diarrhea  all other ROS is negative unless indicated above        MEDICATIONS  (STANDING):  aMIOdarone    Tablet 200 milliGRAM(s) Oral daily  apixaban 5 milliGRAM(s) Oral every 12 hours  carvedilol 25 milliGRAM(s) Oral two times a day  cefTRIAXone   IVPB 1000 milliGRAM(s) IV Intermittent every 24 hours  dexAMETHasone  Injectable 6 milliGRAM(s) IV Push daily  doxycycline hyclate Capsule 100 milliGRAM(s) Oral every 12 hours  fenofibrate Tablet 145 milliGRAM(s) Oral daily  ferrous    sulfate 325 milliGRAM(s) Oral daily  furosemide   Injectable 40 milliGRAM(s) IV Push two times a day  hydrALAZINE 50 milliGRAM(s) Oral three times a day  lactobacillus acidophilus 1 Tablet(s) Oral two times a day  losartan 100 milliGRAM(s) Oral daily  Medihoney 1 Application(s) 1 Application(s) Topical daily  melatonin 5 milliGRAM(s) Oral at bedtime  potassium chloride    Tablet ER 20 milliEquivalent(s) Oral two times a day  remdesivir  IVPB 100 milliGRAM(s) IV Intermittent every 24 hours  remdesivir  IVPB   IV Intermittent   tiotropium 18 MICROgram(s) Capsule 1 Capsule(s) Inhalation daily    MEDICATIONS  (PRN):  acetaminophen     Tablet .. 650 milliGRAM(s) Oral every 6 hours PRN Temp greater or equal to 38C (100.4F)  aluminum hydroxide/magnesium hydroxide/simethicone Suspension 30 milliLiter(s) Oral every 4 hours PRN Dyspepsia  cloNIDine 0.2 milliGRAM(s) Oral every 6 hours PRN for systolic BP>180 or diastolic BP>100  ondansetron Injectable 4 milliGRAM(s) IV Push every 8 hours PRN Nausea and/or Vomiting      Allergies    fluoxetine (Unknown)  NIFEdipine (Unknown)  Procardia (Other)    Intolerances    oxycodone (Other)    SOCIAL HISTORY:    FAMILY HISTORY:  No pertinent family history in first degree relatives    Vital Signs Last 24 Hrs  T(C): 36.4 (26 May 2022 11:21), Max: 36.8 (25 May 2022 19:27)  T(F): 97.5 (26 May 2022 11:21), Max: 98.2 (25 May 2022 19:27)  HR: 60 (26 May 2022 11:21) (60 - 66)  BP: 144/67 (26 May 2022 11:21) (144/67 - 198/64)  BP(mean): --  RR: 20 (26 May 2022 11:21) (17 - 20)  SpO2: 97% (26 May 2022 11:21) (93% - 97%)    NAD /  A&Ox3/ Alert/ Obese  Total Care Sport/ Versa Care P500 bed                            9.2    3.60  )-----------( 173      ( 26 May 2022 07:31 )             28.9     05-26    144  |  101  |  20  ----------------------------<  105<H>  4.2   |  38<H>  |  0.90    Ca    8.9      26 May 2022 07:31  Phos  2.9     05-26  Mg     2.0     05-26    TPro  6.1  /  Alb  2.7<L>  /  TBili  0.3  /  DBili  0.1  /  AST  40<H>  /  ALT  65  /  AlkPhos  35<L>  05-26          PHYSICAL EXAM:    General: resting comfortably in bed; NAD  ENT: no nasal discharge; hearing intact    Gastrointestinal: soft, NT/ND; PEG in place, Right lower abdomen brownish discoloration from old hematoma?, fluid is felt in subcutaneous space, non tender, no erythema  Extremities: no clubbing or cyanosis; ++ Lower ext edema : no gross deformities, able to move all four extremities, +1 edema on right foot; erythema extends from dorsal portion of foot up towards anterior tibial region, 2 patches of proximal streaking noted - all areas marked for progression; right side dorsalis pedis + posterior tibial pulses dopplerable; faint palpable bilateral popliteal & LLE pulses; necrotic ulcerations on dorsum of the R 2nd toe and distal R 3rd toe  Dermatologic: skin warm, dry and intact; no rashes, wounds, or scars  Lymphatic: no submandibular or cervical LAD  Neurologic: awake, alert, follows Can not follow commands, weakened strength & sensation grossly intact/ paraesthesia  Musculoskeletal/Vascular:  ROM  No edema, warm, no calf tenderness, no hair growth  DP/PT  hemosiderin staining  no deformities/ contractures  DP & PT pulses non-palpable bilaterally 2/2 +1 lower extremity pitting edema, Capillary refill 3 seconds, no hair growth, skin temp warm b/l.  Skin:  moist w/ good turgor  frail,  ecchymosis w/o hematoma  serosanguinous drainage, erythema  No odor, erythema, increased warmth, tenderness, induration, fluctuance               HPI:  82 yo F w/ hx HTN, CHF, COPD, afib on eliquis p/w found with SOB since ~ 7am today at SNF. NO known pain. EMS was called and found pt cyanotic and O2 56% on RA. EMS states 82% on 100% nrb. Pt on CPAP by EMS with sats in mid 90s. No known fever/chills. PT reported been off lasix over past couple days. No other acute co or changes Admitted  to telemetry unit for monitoring ,    PAST MEDICAL & SURGICAL HISTORY:  HTN (hypertension)      HLD (hyperlipidemia)      Anemia  newly diagnosed      DVT (deep venous thrombosis)  2010 was on coumadin for 6 months      Pneumonia  years ago      OA (osteoarthritis)      Status post total left knee replacement  2007      History of cholecystectomy  open , many years ago      H/O hernia repair  years ago        REVIEW OF SYSTEMS  CONSTITUTIONAL: +weakness, no fevers or chills  HEENT: denies blurred visions, sore throat  SKIN: denies new lesions, rash  CARDIOVASCULAR: denies chest pain, palpitations  RESPIRATORY: +sputum production  GASTROINTESTINAL: denies nausea, vomiting, + diarrhea  all other ROS is negative unless indicated above        MEDICATIONS  (STANDING):  aMIOdarone    Tablet 200 milliGRAM(s) Oral daily  apixaban 5 milliGRAM(s) Oral every 12 hours  carvedilol 25 milliGRAM(s) Oral two times a day  cefTRIAXone   IVPB 1000 milliGRAM(s) IV Intermittent every 24 hours  dexAMETHasone  Injectable 6 milliGRAM(s) IV Push daily  doxycycline hyclate Capsule 100 milliGRAM(s) Oral every 12 hours  fenofibrate Tablet 145 milliGRAM(s) Oral daily  ferrous    sulfate 325 milliGRAM(s) Oral daily  furosemide   Injectable 40 milliGRAM(s) IV Push two times a day  hydrALAZINE 50 milliGRAM(s) Oral three times a day  lactobacillus acidophilus 1 Tablet(s) Oral two times a day  losartan 100 milliGRAM(s) Oral daily  Medihoney 1 Application(s) 1 Application(s) Topical daily  melatonin 5 milliGRAM(s) Oral at bedtime  potassium chloride    Tablet ER 20 milliEquivalent(s) Oral two times a day  remdesivir  IVPB 100 milliGRAM(s) IV Intermittent every 24 hours  remdesivir  IVPB   IV Intermittent   tiotropium 18 MICROgram(s) Capsule 1 Capsule(s) Inhalation daily    MEDICATIONS  (PRN):  acetaminophen     Tablet .. 650 milliGRAM(s) Oral every 6 hours PRN Temp greater or equal to 38C (100.4F)  aluminum hydroxide/magnesium hydroxide/simethicone Suspension 30 milliLiter(s) Oral every 4 hours PRN Dyspepsia  cloNIDine 0.2 milliGRAM(s) Oral every 6 hours PRN for systolic BP>180 or diastolic BP>100  ondansetron Injectable 4 milliGRAM(s) IV Push every 8 hours PRN Nausea and/or Vomiting      Allergies    fluoxetine (Unknown)  NIFEdipine (Unknown)  Procardia (Other)    Intolerances    oxycodone (Other)    SOCIAL HISTORY:    FAMILY HISTORY:  No pertinent family history in first degree relatives    Vital Signs Last 24 Hrs  T(C): 36.4 (26 May 2022 11:21), Max: 36.8 (25 May 2022 19:27)  T(F): 97.5 (26 May 2022 11:21), Max: 98.2 (25 May 2022 19:27)  HR: 60 (26 May 2022 11:21) (60 - 66)  BP: 144/67 (26 May 2022 11:21) (144/67 - 198/64)  BP(mean): --  RR: 20 (26 May 2022 11:21) (17 - 20)  SpO2: 97% (26 May 2022 11:21) (93% - 97%)    NAD /  A&Ox3/ Alert/ Obese  Total Care Sport/ Versa Care P500 bed                            9.2    3.60  )-----------( 173      ( 26 May 2022 07:31 )             28.9     05-26    144  |  101  |  20  ----------------------------<  105<H>  4.2   |  38<H>  |  0.90    Ca    8.9      26 May 2022 07:31  Phos  2.9     05-26  Mg     2.0     05-26    TPro  6.1  /  Alb  2.7<L>  /  TBili  0.3  /  DBili  0.1  /  AST  40<H>  /  ALT  65  /  AlkPhos  35<L>  05-26          PHYSICAL EXAM:    General: resting comfortably in bed; NAD  ENT: no nasal discharge; hearing intact  Extremities: no gross deformities, able to move all four extremities,  Dermatologic: skin warm, right lower extremity 6 x 6 wound  Neurologic: awake, alert, follows Can commands

## 2022-05-26 NOTE — PROGRESS NOTE ADULT - ASSESSMENT
82 yo F w/ hx HTN, CHF, COPD, afib on eliquis p/w found with SOB  copd  CHF  AF  Obesity  OP  OA  Covid    on abx  on remdesivir  on o2 support  on ELIQUIS  PO diet - SLP azeb noted    s/p BIPAP  GOC discussion - Lela Haney - HCP - Daughter - pt is DNR DNI - MOLST updated and signed -   assist with needs  Isolation for covid, Remdesivir -   monitor VS and HD and Sat  [facility] rx regimen

## 2022-05-26 NOTE — CONSULT NOTE ADULT - REASON FOR ADMISSION
80 yo F w/ hx HTN, CHF, COPD, afib on eliquis p/w found with SOB since ~ 7am today at SNF. NO known pain. EMS was called and found pt cyanotic and O2 56% on RA. EMS states 82% on 100% nrb. Pt on CPAP by EMS with sats in mid 90s. No known fever/chills. PT reported been off lasix over past couple days. No other acute co or changes Admitted  to telemetry unit for monitoring , send 3 sets of cardiac enzymes to rule out acute coronary event, obtain ECHO to evaluate LVEF, cardiology consult  ,continue current management, O2 supply, anticoagulation plan as per cardiology consult Admit to monitored unit for cardiac monitoring, obtain echo to evaluate LVEF, intravenous diuresis as per card consult , monitor ins/outs, monitor renal profile and electrolytes closely ,send 3 sets of enzymes, O2 supply, serial chest xrays, monitor weights and oral intake of fluids, nutritionist consult Palliative care consult requested ,to discuss advance directives and complete MOLST       TPMH of COVID 19 in 2011, CHF, RLE ulcer, CKD and CHF who was brought to the ED from [facility] for SOB and hypoxia. She was vaccinated with 3 doses of Moderna vaccine in the past but did not get the second booster dose.    w/ hx HTN, CHF, COPD, afib on eliquis p/w found with SOB since ~ 7am today at SNF. NO known pain. EMS was called and found pt cyanotic and O2 56% on RA. EMS states 82% on 100% nrb. Pt on CPAP by EMS with sats in mid 90s. No known fever/chills. PT reported been off lasix over past couple days. No other acute co or changes
SOB

## 2022-05-26 NOTE — PROGRESS NOTE ADULT - SUBJECTIVE AND OBJECTIVE BOX
Patient is a 81y Female whom presented to the hospital with ckd and petra     PAST MEDICAL & SURGICAL HISTORY:      MEDICATIONS  (STANDING):  aMIOdarone    Tablet 200 milliGRAM(s) Oral daily  apixaban 5 milliGRAM(s) Oral every 12 hours  carvedilol 25 milliGRAM(s) Oral two times a day  cefTRIAXone   IVPB 1000 milliGRAM(s) IV Intermittent every 24 hours  dexAMETHasone  Injectable 6 milliGRAM(s) IV Push daily  dextrose 5% + sodium chloride 0.45%. 1000 milliLiter(s) (35 mL/Hr) IV Continuous <Continuous>  doxycycline hyclate Capsule 100 milliGRAM(s) Oral every 12 hours  fenofibrate Tablet 145 milliGRAM(s) Oral daily  ferrous    sulfate 325 milliGRAM(s) Oral daily  furosemide   Injectable 40 milliGRAM(s) IV Push daily  losartan 100 milliGRAM(s) Oral daily  melatonin 5 milliGRAM(s) Oral at bedtime  potassium chloride    Tablet ER 20 milliEquivalent(s) Oral two times a day  remdesivir  IVPB 100 milliGRAM(s) IV Intermittent every 24 hours  remdesivir  IVPB   IV Intermittent   tiotropium 18 MICROgram(s) Capsule 1 Capsule(s) Inhalation daily      Allergies    fluoxetine (Unknown)  NIFEdipine (Unknown)    Intolerances        SOCIAL HISTORY:  Denies ETOh,Smoking,     FAMILY HISTORY:      REVIEW OF SYSTEMS:    CONSTITUTIONAL: No weakness, fevers or chills  NECK: No pain or stiffness  RESPIRATORY: No cough, wheezing, hemoptysis; No shortness of breath  CARDIOVASCULAR: No chest pain or palpitations  GASTROINTESTINAL: No abdominal or epigastric pain. No nausea, vomiting,     No diarrhea or constipation. No melena   SKIN: dry                                             9.2    3.60  )-----------( 173      ( 26 May 2022 07:31 )             28.9       CBC Full  -  ( 26 May 2022 07:31 )  WBC Count : 3.60 K/uL  RBC Count : 2.88 M/uL  Hemoglobin : 9.2 g/dL  Hematocrit : 28.9 %  Platelet Count - Automated : 173 K/uL  Mean Cell Volume : 100.3 fl  Mean Cell Hemoglobin : 31.9 pg  Mean Cell Hemoglobin Concentration : 31.8 gm/dL  Auto Neutrophil # : x  Auto Lymphocyte # : x  Auto Monocyte # : x  Auto Eosinophil # : x  Auto Basophil # : x  Auto Neutrophil % : x  Auto Lymphocyte % : x  Auto Monocyte % : x  Auto Eosinophil % : x  Auto Basophil % : x      05-26    144  |  101  |  20  ----------------------------<  105<H>  4.2   |  38<H>  |  0.90    Ca    8.9      26 May 2022 07:31  Phos  2.9     05-26  Mg     2.0     05-26    TPro  6.1  /  Alb  2.7<L>  /  TBili  0.3  /  DBili  0.1  /  AST  40<H>  /  ALT  65  /  AlkPhos  35<L>  05-26      CAPILLARY BLOOD GLUCOSE          Vital Signs Last 24 Hrs  T(C): 36.4 (26 May 2022 21:37), Max: 36.8 (26 May 2022 04:06)  T(F): 97.6 (26 May 2022 21:37), Max: 98.2 (26 May 2022 04:06)  HR: 60 (26 May 2022 21:37) (60 - 61)  BP: 133/59 (26 May 2022 21:37) (133/59 - 198/64)  BP(mean): --  RR: 19 (26 May 2022 21:37) (17 - 20)  SpO2: 98% (26 May 2022 21:37) (93% - 98%)        PT/INR - ( 26 May 2022 07:31 )   PT: 20.5 sec;   INR: 1.74 ratio                     PHYSICAL EXAM:    Constitutional: NAD  HEENT: conjunctive   clear   Neck:  No JVD  Respiratory: CTAB  Cardiovascular: S1 and S2  Gastrointestinal: BS+, soft, NT/ND  Extremities: No peripheral edema

## 2022-05-27 LAB
ALBUMIN SERPL ELPH-MCNC: 2.8 G/DL — LOW (ref 3.3–5)
ALP SERPL-CCNC: 38 U/L — LOW (ref 40–120)
ALT FLD-CCNC: 55 U/L — SIGNIFICANT CHANGE UP (ref 12–78)
AST SERPL-CCNC: 34 U/L — SIGNIFICANT CHANGE UP (ref 15–37)
BILIRUB DIRECT SERPL-MCNC: 0.1 MG/DL — SIGNIFICANT CHANGE UP (ref 0–0.3)
BILIRUB INDIRECT FLD-MCNC: 0.3 MG/DL — SIGNIFICANT CHANGE UP (ref 0.2–1)
BILIRUB SERPL-MCNC: 0.4 MG/DL — SIGNIFICANT CHANGE UP (ref 0.2–1.2)
CREAT SERPL-MCNC: 1 MG/DL — SIGNIFICANT CHANGE UP (ref 0.5–1.3)
CULTURE RESULTS: SIGNIFICANT CHANGE UP
CULTURE RESULTS: SIGNIFICANT CHANGE UP
EGFR: 56 ML/MIN/1.73M2 — LOW
INR BLD: 1.64 RATIO — HIGH (ref 0.88–1.16)
PROT SERPL-MCNC: 6.3 G/DL — SIGNIFICANT CHANGE UP (ref 6–8.3)
PROTHROM AB SERPL-ACNC: 19.3 SEC — HIGH (ref 10.5–13.4)
SPECIMEN SOURCE: SIGNIFICANT CHANGE UP
SPECIMEN SOURCE: SIGNIFICANT CHANGE UP

## 2022-05-27 RX ORDER — FUROSEMIDE 40 MG
40 TABLET ORAL DAILY
Refills: 0 | Status: DISCONTINUED | OUTPATIENT
Start: 2022-05-27 | End: 2022-05-28

## 2022-05-27 RX ADMIN — Medication 6 MILLIGRAM(S): at 05:30

## 2022-05-27 RX ADMIN — Medication 20 MILLIEQUIVALENT(S): at 05:29

## 2022-05-27 RX ADMIN — Medication 50 MILLIGRAM(S): at 10:38

## 2022-05-27 RX ADMIN — Medication 145 MILLIGRAM(S): at 10:57

## 2022-05-27 RX ADMIN — Medication 20 MILLIEQUIVALENT(S): at 17:19

## 2022-05-27 RX ADMIN — Medication 5 MILLIGRAM(S): at 22:46

## 2022-05-27 RX ADMIN — Medication 325 MILLIGRAM(S): at 10:57

## 2022-05-27 RX ADMIN — Medication 100 MILLIGRAM(S): at 05:29

## 2022-05-27 RX ADMIN — TIOTROPIUM BROMIDE 1 CAPSULE(S): 18 CAPSULE ORAL; RESPIRATORY (INHALATION) at 05:41

## 2022-05-27 RX ADMIN — LOSARTAN POTASSIUM 100 MILLIGRAM(S): 100 TABLET, FILM COATED ORAL at 05:29

## 2022-05-27 RX ADMIN — Medication 1 TABLET(S): at 17:17

## 2022-05-27 RX ADMIN — CARVEDILOL PHOSPHATE 25 MILLIGRAM(S): 80 CAPSULE, EXTENDED RELEASE ORAL at 05:29

## 2022-05-27 RX ADMIN — Medication 50 MILLIGRAM(S): at 00:11

## 2022-05-27 RX ADMIN — Medication 100 MILLIGRAM(S): at 17:18

## 2022-05-27 RX ADMIN — AMIODARONE HYDROCHLORIDE 200 MILLIGRAM(S): 400 TABLET ORAL at 05:30

## 2022-05-27 RX ADMIN — Medication 50 MILLIGRAM(S): at 17:18

## 2022-05-27 RX ADMIN — APIXABAN 5 MILLIGRAM(S): 2.5 TABLET, FILM COATED ORAL at 05:29

## 2022-05-27 RX ADMIN — APIXABAN 5 MILLIGRAM(S): 2.5 TABLET, FILM COATED ORAL at 17:18

## 2022-05-27 RX ADMIN — Medication 40 MILLIGRAM(S): at 13:52

## 2022-05-27 RX ADMIN — CARVEDILOL PHOSPHATE 25 MILLIGRAM(S): 80 CAPSULE, EXTENDED RELEASE ORAL at 17:17

## 2022-05-27 RX ADMIN — Medication 40 MILLIGRAM(S): at 05:30

## 2022-05-27 RX ADMIN — Medication 1 TABLET(S): at 05:29

## 2022-05-27 NOTE — PROGRESS NOTE ADULT - SUBJECTIVE AND OBJECTIVE BOX
Patient is a 81y Female whom presented to the hospital with ckd and petra     PAST MEDICAL & SURGICAL HISTORY:      MEDICATIONS  (STANDING):  aMIOdarone    Tablet 200 milliGRAM(s) Oral daily  apixaban 5 milliGRAM(s) Oral every 12 hours  carvedilol 25 milliGRAM(s) Oral two times a day  cefTRIAXone   IVPB 1000 milliGRAM(s) IV Intermittent every 24 hours  dexAMETHasone  Injectable 6 milliGRAM(s) IV Push daily  dextrose 5% + sodium chloride 0.45%. 1000 milliLiter(s) (35 mL/Hr) IV Continuous <Continuous>  doxycycline hyclate Capsule 100 milliGRAM(s) Oral every 12 hours  fenofibrate Tablet 145 milliGRAM(s) Oral daily  ferrous    sulfate 325 milliGRAM(s) Oral daily  furosemide   Injectable 40 milliGRAM(s) IV Push daily  losartan 100 milliGRAM(s) Oral daily  melatonin 5 milliGRAM(s) Oral at bedtime  potassium chloride    Tablet ER 20 milliEquivalent(s) Oral two times a day  remdesivir  IVPB 100 milliGRAM(s) IV Intermittent every 24 hours  remdesivir  IVPB   IV Intermittent   tiotropium 18 MICROgram(s) Capsule 1 Capsule(s) Inhalation daily      Allergies    fluoxetine (Unknown)  NIFEdipine (Unknown)    Intolerances        SOCIAL HISTORY:  Denies ETOh,Smoking,     FAMILY HISTORY:      REVIEW OF SYSTEMS:    CONSTITUTIONAL: No weakness, fevers or chills  NECK: No pain or stiffness  RESPIRATORY: No cough, wheezing, hemoptysis; No shortness of breath  CARDIOVASCULAR: No chest pain or palpitations  GASTROINTESTINAL: No abdominal or epigastric pain. No nausea, vomiting,     No diarrhea or constipation. No melena   SKIN: dry                                                               9.2    3.60  )-----------( 173      ( 26 May 2022 07:31 )             28.9       CBC Full  -  ( 26 May 2022 07:31 )  WBC Count : 3.60 K/uL  RBC Count : 2.88 M/uL  Hemoglobin : 9.2 g/dL  Hematocrit : 28.9 %  Platelet Count - Automated : 173 K/uL  Mean Cell Volume : 100.3 fl  Mean Cell Hemoglobin : 31.9 pg  Mean Cell Hemoglobin Concentration : 31.8 gm/dL  Auto Neutrophil # : x  Auto Lymphocyte # : x  Auto Monocyte # : x  Auto Eosinophil # : x  Auto Basophil # : x  Auto Neutrophil % : x  Auto Lymphocyte % : x  Auto Monocyte % : x  Auto Eosinophil % : x  Auto Basophil % : x      05-27    x   |  x   |  x   ----------------------------<  x   x    |  x   |  1.00    Ca    8.9      26 May 2022 07:31  Phos  2.9     05-26  Mg     2.0     05-26    TPro  6.3  /  Alb  2.8<L>  /  TBili  0.4  /  DBili  0.1  /  AST  34  /  ALT  55  /  AlkPhos  38<L>  05-27      CAPILLARY BLOOD GLUCOSE          Vital Signs Last 24 Hrs  T(C): 36.5 (27 May 2022 09:27), Max: 36.5 (27 May 2022 05:41)  T(F): 97.7 (27 May 2022 09:27), Max: 97.7 (27 May 2022 05:41)  HR: 60 (27 May 2022 09:27) (60 - 61)  BP: 147/77 (27 May 2022 17:28) (122/58 - 173/77)  BP(mean): --  RR: 18 (27 May 2022 14:01) (18 - 19)  SpO2: 93% (27 May 2022 14:01) (86% - 98%)        PT/INR - ( 27 May 2022 07:41 )   PT: 19.3 sec;   INR: 1.64 ratio             PHYSICAL EXAM:    Constitutional: NAD  HEENT: conjunctive   clear   Neck:  No JVD  Respiratory: CTAB  Cardiovascular: S1 and S2  Gastrointestinal: BS+, soft, NT/ND  Extremities: No peripheral edema

## 2022-05-27 NOTE — PROGRESS NOTE ADULT - ASSESSMENT
82 yo F w/ hx HTN, CHF, COPD, afib on eliquis p/w found with SOB since ~ 7am today at SNF. NO known pain. EMS was called and found pt cyanotic and O2 56% on RA. EMS states 82% on 100% nrb. Pt on CPAP by EMS with sats in mid 90s. No known fever/chills. PT reported been off lasix over past couple days. No other acute co or changes Admitted  to telemetry unit for monitoring , send 3 sets of cardiac enzymes to rule out acute coronary event, obtain ECHO to evaluate LVEF, cardiology consult  ,continue current management, O2 supply, anticoagulation plan as per cardiology consult Admit to monitored unit for cardiac monitoring, obtain echo to evaluate LVEF, intravenous diuresis as per card consult , monitor ins/outs, monitor renal profile and electrolytes closely ,send 3 sets of enzymes, O2 supply, serial chest xrays, monitor weights and oral intake of fluids, nutritionist consult Palliative care consult requested ,to discuss advance directives and complete MOLST

## 2022-05-27 NOTE — PROGRESS NOTE ADULT - SUBJECTIVE AND OBJECTIVE BOX
[facility]       HPI:  82 yo F w/ hx HTN, CHF, COPD, afib on eliquis p/w found with SOB since ~ [facility] NO known pain. EMS was called and found pt cyanotic and O2 56% on RA. EMS states 82% on 100% nrb. Pt on CPAP by EMS with sats in mid 90s. No known fever/chills. PT reported been off lasix over past couple days. No other acute co or changes Admitted  to telemetry unit for monitoring , send 3 sets of cardiac enzymes to rule out acute coronary event, obtain ECHO to evaluate LVEF, cardiology consult  ,continue current management, O2 supply, anticoagulation plan as per cardiology consult Admit to monitored unit for cardiac monitoring, obtain echo to evaluate LVEF, intravenous diuresis as per card consult , monitor ins/outs, monitor renal profile and electrolytes closely ,send 3 sets of enzymes, O2 supply, serial chest xrays, monitor weights and oral intake of fluids, nutritionist consult Palliative care consult requested ,to discuss advance directives and complete MOLST       TPMH of COVID 19 in 2011, CHF, RLE ulcer, CKD and CHF who was brought to the ED from [facility] for SOB and hypoxia. She was vaccinated with 3 doses of Moderna vaccine in the past but did not get the second booster dose.    w/ hx HTN, CHF, COPD, afib on eliquis p/w found with SOB since ~ 7am today at [facility]. NO known pain. EMS was called and found pt cyanotic and O2 56% on RA. EMS states 82% on 100% nrb. Pt on CPAP by EMS with sats in mid 90s. No known fever/chills. PT reported been off lasix over past couple days. No other acute co or changes          The patient is an 81 year old female with a PMH of COVID 19 in 2011, CHF, RLE ulcer, CKD and CHF who was brought to the ED from [facility] for SOB and hypoxia. She was vaccinated with 3 doses of Moderna vaccine in the past but did not get the second booster dose.    w/ hx HTN, CHF, COPD, afib on eliquis p/w found with SOB since ~ 7am today at [facility]. NO known pain. EMS was called and found pt cyanotic and O2 56% on RA. EMS states 82% on 100% nrb. Pt on CPAP by EMS with sats in mid 90s. No known fever/chills. PT reported been off lasix over past couple days. No other acute co or changes    < from: Xray Chest 1 View- PORTABLE-Urgent (05.22.22 @ 09:54) >    ACC: [id number] EXAM:  XR CHEST PORTABLE URGENT 1V                          PROCEDURE DATE:  05/22/2022          INTERPRETATION:  CLINICAL INDICATION: 81 years  Female with SOB, CHF.    COMPARISON: None    The patient's chin obscures the left lung apex.    AP view of the chest demonstrates a large right lower lobe infiltrate.   There is mild pulmonary vascular congestion. There is no pleural   effusion. There is no pneumothorax.    The heart is normal in size. Left-sided bipolar pacemaker. There is no   mediastinal or hilar mass.    Mild thoracic degenerative changes and osteopenia are present.    IMPRESSION:    Large right lower lobe infiltrates superimposed on pulmonary vascular   congestion. Pacemaker.    --- End of Report ---            PAUL MONROY MD; Attending Radiologist    < end of copied text >  < from: 12 Lead ECG (05.22.22 @ 09:28) >    Ventricular Rate 82 BPM    Atrial Rate 82 BPM    P-R Interval 208 ms    QRS Duration 162 ms    Q-T Interval 424 ms    QTC Calculation(Bazett) 495 ms    P Axis 77 degrees    R Axis 93 degrees    T Axis 24 degrees    Diagnosis Line Atrial-sensed ventricular-paced rhythm  Abnormal ECG  No previous ECGs available  Confirmed by dea Lopez ([id number]) on 5/22/2022 3:22:34 PM    < end of copied text >  sitivity (05.22.22 @ 13:48)   Troponin I, High Sensitivity Result: 138.8: Serial measurements of high sensitivity troponin I (hs TnI) showing rapid   upward or downward changes suggest acute myocardial injury. Please note   that a sustained elevation of hsTnI can be caused by renal disease,   chronic heart failure, sepsis, pulmonary embolism and other clinical   conditions. ng/L       Historical Values  Troponin I, High Sensitivity (05.22.22 @ 13:48)   Troponin I, High Sensitivity Result: 138.8: Serial measurements of high sensitivity troponin I (hs TnI) showing rapid   upward or downward changes suggest acute myocardial injury. Please note   that a sustained elevation of hsTnI can be caused by renal disease,   chronic heart failure, sepsis, pulmonary embolism and other clinical   conditions. ng/L   Troponin I, High Sensitivity (05.22.22 @ 09:43)   Troponin I, High Sensitivity Result: 21.2: Serial measurements of high sensitivity troponin I (hs TnI) showing rapid   upward or downward changes suggest acute myocardial injury. Please note   that a sustained elevation of hsTnI can be caused by renal disease,   chronic heart failure, sepsis, pulmonary embolism and other clinical   conditions. ng/L  (22 May 2022 12:13)        SUBJECTIVE:      ALLERGIES:  Allergies    fluoxetine (Unknown)  NIFEdipine (Unknown)  Procardia (Other)    Intolerances    oxycodone (Other)        MEDICATIONS  (STANDING):  aMIOdarone    Tablet 200 milliGRAM(s) Oral daily  apixaban 5 milliGRAM(s) Oral every 12 hours  carvedilol 25 milliGRAM(s) Oral two times a day  dexAMETHasone  Injectable 6 milliGRAM(s) IV Push daily  doxycycline hyclate Capsule 100 milliGRAM(s) Oral every 12 hours  fenofibrate Tablet 145 milliGRAM(s) Oral daily  ferrous    sulfate 325 milliGRAM(s) Oral daily  furosemide    Tablet 40 milliGRAM(s) Oral daily  hydrALAZINE 50 milliGRAM(s) Oral three times a day  lactobacillus acidophilus 1 Tablet(s) Oral two times a day  losartan 100 milliGRAM(s) Oral daily  Medihoney 1 Application(s) 1 Application(s) Topical daily  melatonin 5 milliGRAM(s) Oral at bedtime  potassium chloride    Tablet ER 20 milliEquivalent(s) Oral two times a day  tiotropium 18 MICROgram(s) Capsule 1 Capsule(s) Inhalation daily    MEDICATIONS  (PRN):  acetaminophen     Tablet .. 650 milliGRAM(s) Oral every 6 hours PRN Temp greater or equal to 38C (100.4F)  aluminum hydroxide/magnesium hydroxide/simethicone Suspension 30 milliLiter(s) Oral every 4 hours PRN Dyspepsia  cloNIDine 0.2 milliGRAM(s) Oral every 6 hours PRN for systolic BP>180 or diastolic BP>100  ondansetron Injectable 4 milliGRAM(s) IV Push every 8 hours PRN Nausea and/or Vomiting      REVIEW OF SYSTEMS:  CONSTITUTIONAL: No fever,  RESPIRATORY: No cough, wheezing, shortness of breath  CARDIOVASCULAR: No chest pain, dyspnea, palpitations, dizziness, syncope, paroxysmal nocturnal dyspnea, orthopnea, or arm or leg swelling  GASTROINTESTINAL: No abdominal  or epigastric pain, nausea, vomiting,  diarrhea  NEUROLOGICAL: No headaches,  loss of strength, numbness, or tremors    Vital Signs Last 24 Hrs  T(C): 36.8 (27 May 2022 20:42), Max: 36.8 (27 May 2022 20:42)  T(F): 98.3 (27 May 2022 20:42), Max: 98.3 (27 May 2022 20:42)  HR: 65 (27 May 2022 22:24) (60 - 65)  BP: 114/55 (27 May 2022 20:42) (114/55 - 160/67)  BP(mean): --  RR: 17 (27 May 2022 20:42) (17 - 18)  SpO2: 95% (27 May 2022 20:42) (86% - 97%)    PHYSICAL EXAM:  HEAD:  Atraumatic, Normocephalic  NECK: Supple and normal thyroid.  No JVD or carotid bruit.   HEART: S1, S2 regular , 1/6 soft ejection systolic murmur in mitral area , no thrill and no gallops .  PULMONARY: Bilateral vesicular breathing , few scattered ronchi and few scattered rales are present .  ABDOMEN: Soft nontender and positive bowl sounds   EXTREMITIES:  No clubbing, cyanosis, or pedal  edema  NEUROLOGICAL: AAOX3 , no focal deficit .    LABS:                        9.2    3.60  )-----------( 173      ( 26 May 2022 07:31 )             28.9     05-27    x   |  x   |  x   ----------------------------<  x   x    |  x   |  1.00    Ca    8.9      26 May 2022 07:31  Phos  2.9     05-26  Mg     2.0     05-26    TPro  6.3  /  Alb  2.8<L>  /  TBili  0.4  /  DBili  0.1  /  AST  34  /  ALT  55  /  AlkPhos  38<L>  05-27        PT/INR - ( 27 May 2022 07:41 )   PT: 19.3 sec;   INR: 1.64 ratio             BNP      EKG:  ECHO:  IMAGING:    Assessment/Plan    Will continue to follow during hospital course and give further recommendations as needed . Thanks for your referral . if any questions please contact me at office ([phone] [phone])  [facility]       HPI:  80 yo F w/ hx HTN, CHF, COPD, afib on eliquis p/w found with SOB since ~ [facility]. NO known pain. EMS was called and found pt cyanotic and O2 56% on RA. EMS states 82% on 100% nrb. Pt on CPAP by EMS with sats in mid 90s. No known fever/chills. PT reported been off lasix over past couple days. No other acute co or changes Admitted  to telemetry unit for monitoring , send 3 sets of cardiac enzymes to rule out acute coronary event, obtain ECHO to evaluate LVEF, cardiology consult  ,continue current management, O2 supply, anticoagulation plan as per cardiology consult Admit to monitored unit for cardiac monitoring, obtain echo to evaluate LVEF, intravenous diuresis as per card consult , monitor ins/outs, monitor renal profile and electrolytes closely ,send 3 sets of enzymes, O2 supply, serial chest xrays, monitor weights and oral intake of fluids, nutritionist consult Palliative care consult requested ,to discuss advance directives and complete MOLST       TPMH of COVID 19 in 2011, CHF, RLE ulcer, CKD and CHF who was brought to the ED from [facility] for SOB and hypoxia. She was vaccinated with 3 doses of Moderna vaccine in the past but did not get the second booster dose.    w/ hx HTN, CHF, COPD, afib on eliquis p/w found with SOB since ~ 7am today at [facility]. NO known pain. EMS was called and found pt cyanotic and O2 56% on RA. EMS states 82% on 100% nrb. Pt on CPAP by EMS with sats in mid 90s. No known fever/chills. PT reported been off lasix over past couple days. No other acute co or changes          The patient is an 81 year old female with a PMH of COVID 19 in 2011, CHF, RLE ulcer, CKD and CHF who was brought to the ED from [facility] for SOB and hypoxia. She was vaccinated with 3 doses of Moderna vaccine in the past but did not get the second booster dose.    w/ hx HTN, CHF, COPD, afib on eliquis p/w found with SOB since ~ 7am today at [facility]. NO known pain. EMS was called and found pt cyanotic and O2 56% on RA. EMS states 82% on 100% nrb. Pt on CPAP by EMS with sats in mid 90s. No known fever/chills. PT reported been off lasix over past couple days. No other acute co or changes    < from: Xray Chest 1 View- PORTABLE-Urgent (05.22.22 @ 09:54) >    ACC: [id number] EXAM:  XR CHEST PORTABLE URGENT 1V                          PROCEDURE DATE:  05/22/2022          INTERPRETATION:  CLINICAL INDICATION: 81 years  Female with SOB, CHF.    COMPARISON: None    The patient's chin obscures the left lung apex.    AP view of the chest demonstrates a large right lower lobe infiltrate.   There is mild pulmonary vascular congestion. There is no pleural   effusion. There is no pneumothorax.    The heart is normal in size. Left-sided bipolar pacemaker. There is no   mediastinal or hilar mass.    Mild thoracic degenerative changes and osteopenia are present.    IMPRESSION:    Large right lower lobe infiltrates superimposed on pulmonary vascular   congestion. Pacemaker.    --- End of Report ---            PAUL MONROY MD; Attending Radiologist    < end of copied text >  < from: 12 Lead ECG (05.22.22 @ 09:28) >    Ventricular Rate 82 BPM    Atrial Rate 82 BPM    P-R Interval 208 ms    QRS Duration 162 ms    Q-T Interval 424 ms    QTC Calculation(Bazett) 495 ms    P Axis 77 degrees    R Axis 93 degrees    T Axis 24 degrees    Diagnosis Line Atrial-sensed ventricular-paced rhythm  Abnormal ECG  No previous ECGs available  Confirmed by dea Lopez ([id number]) on 5/22/2022 3:22:34 PM    < end of copied text >  sitivity (05.22.22 @ 13:48)   Troponin I, High Sensitivity Result: 138.8: Serial measurements of high sensitivity troponin I (hs TnI) showing rapid   upward or downward changes suggest acute myocardial injury. Please note   that a sustained elevation of hsTnI can be caused by renal disease,   chronic heart failure, sepsis, pulmonary embolism and other clinical   conditions. ng/L       Historical Values  Troponin I, High Sensitivity (05.22.22 @ 13:48)   Troponin I, High Sensitivity Result: 138.8: Serial measurements of high sensitivity troponin I (hs TnI) showing rapid   upward or downward changes suggest acute myocardial injury. Please note   that a sustained elevation of hsTnI can be caused by renal disease,   chronic heart failure, sepsis, pulmonary embolism and other clinical   conditions. ng/L   Troponin I, High Sensitivity (05.22.22 @ 09:43)   Troponin I, High Sensitivity Result: 21.2: Serial measurements of high sensitivity troponin I (hs TnI) showing rapid   upward or downward changes suggest acute myocardial injury. Please note   that a sustained elevation of hsTnI can be caused by renal disease,   chronic heart failure, sepsis, pulmonary embolism and other clinical   conditions. ng/L  (22 May 2022 12:13)        SUBJECTIVE:      ALLERGIES:  Allergies    fluoxetine (Unknown)  NIFEdipine (Unknown)  Procardia (Other)    Intolerances    oxycodone (Other)        MEDICATIONS  (STANDING):  aMIOdarone    Tablet 200 milliGRAM(s) Oral daily  apixaban 5 milliGRAM(s) Oral every 12 hours  carvedilol 25 milliGRAM(s) Oral two times a day  dexAMETHasone  Injectable 6 milliGRAM(s) IV Push daily  doxycycline hyclate Capsule 100 milliGRAM(s) Oral every 12 hours  fenofibrate Tablet 145 milliGRAM(s) Oral daily  ferrous    sulfate 325 milliGRAM(s) Oral daily  furosemide    Tablet 40 milliGRAM(s) Oral daily  hydrALAZINE 50 milliGRAM(s) Oral three times a day  lactobacillus acidophilus 1 Tablet(s) Oral two times a day  losartan 100 milliGRAM(s) Oral daily  Medihoney 1 Application(s) 1 Application(s) Topical daily  melatonin 5 milliGRAM(s) Oral at bedtime  potassium chloride    Tablet ER 20 milliEquivalent(s) Oral two times a day  tiotropium 18 MICROgram(s) Capsule 1 Capsule(s) Inhalation daily    MEDICATIONS  (PRN):  acetaminophen     Tablet .. 650 milliGRAM(s) Oral every 6 hours PRN Temp greater or equal to 38C (100.4F)  aluminum hydroxide/magnesium hydroxide/simethicone Suspension 30 milliLiter(s) Oral every 4 hours PRN Dyspepsia  cloNIDine 0.2 milliGRAM(s) Oral every 6 hours PRN for systolic BP>180 or diastolic BP>100  ondansetron Injectable 4 milliGRAM(s) IV Push every 8 hours PRN Nausea and/or Vomiting      REVIEW OF SYSTEMS:  CONSTITUTIONAL: No fever,  RESPIRATORY: No cough, wheezing, shortness of breath  CARDIOVASCULAR: No chest pain, dyspnea, palpitations, dizziness, syncope, paroxysmal nocturnal dyspnea, orthopnea, or arm or leg swelling  GASTROINTESTINAL: No abdominal  or epigastric pain, nausea, vomiting,  diarrhea  NEUROLOGICAL: No headaches,  loss of strength, numbness, or tremors    Vital Signs Last 24 Hrs  T(C): 36.8 (27 May 2022 20:42), Max: 36.8 (27 May 2022 20:42)  T(F): 98.3 (27 May 2022 20:42), Max: 98.3 (27 May 2022 20:42)  HR: 65 (27 May 2022 22:24) (60 - 65)  BP: 114/55 (27 May 2022 20:42) (114/55 - 160/67)  BP(mean): --  RR: 17 (27 May 2022 20:42) (17 - 18)  SpO2: 95% (27 May 2022 20:42) (86% - 97%)    PHYSICAL EXAM:  HEAD:  Atraumatic, Normocephalic  NECK: Supple and normal thyroid.  No JVD or carotid bruit.   HEART: S1, S2 regular , 1/6 soft ejection systolic murmur in mitral area , no thrill and no gallops .  PULMONARY: Bilateral vesicular breathing , few scattered ronchi and few scattered rales are present .  ABDOMEN: Soft nontender and positive bowl sounds   EXTREMITIES:  No clubbing, cyanosis, or pedal  edema  NEUROLOGICAL: AAOX3 , no focal deficit .    LABS:                        9.2    3.60  )-----------( 173      ( 26 May 2022 07:31 )             28.9     05-27    x   |  x   |  x   ----------------------------<  x   x    |  x   |  1.00    Ca    8.9      26 May 2022 07:31  Phos  2.9     05-26  Mg     2.0     05-26    TPro  6.3  /  Alb  2.8<L>  /  TBili  0.4  /  DBili  0.1  /  AST  34  /  ALT  55  /  AlkPhos  38<L>  05-27        PT/INR - ( 27 May 2022 07:41 )   PT: 19.3 sec;   INR: 1.64 ratio             Assessment/Plan  Patient has :   1) COVID pneumonia and S/P respiratory insufficiency and patient off Bi-pap now .  2) Hypertension and BP still elevated .  3) CHF and acute on chronic diastolic heart  failure .  4) Anemia   5) Paroxysmal atrial fibrillation and stable and in NSR .  Plan : 1) Start hydralazine for better control of BP 2) Increase Lasix 40 mg twice a day 3) Monitor hemoglobin and electrolytes 4) Rest of medications as such . 5) Prognosis guarded .  Will continue to follow during hospital course and give further recommendations as needed . Thanks for your referral . if any questions please contact me at office ([phone] cell [phone])        [facility]       HPI:  80 yo F w/ hx HTN, CHF, COPD, afib on eliquis p/w found with SOB since ~ [facility]. NO known pain. EMS was called and found pt cyanotic and O2 56% on RA. EMS states 82% on 100% nrb. Pt on CPAP by EMS with sats in mid 90s. No known fever/chills. PT reported been off lasix over past couple days. No other acute co or changes Admitted  to telemetry unit for monitoring , send 3 sets of cardiac enzymes to rule out acute coronary event, obtain ECHO to evaluate LVEF, cardiology consult  ,continue current management, O2 supply, anticoagulation plan as per cardiology consult Admit to monitored unit for cardiac monitoring, obtain echo to evaluate LVEF, intravenous diuresis as per card consult , monitor ins/outs, monitor renal profile and electrolytes closely ,send 3 sets of enzymes, O2 supply, serial chest xrays, monitor weights and oral intake of fluids, nutritionist consult Palliative care consult requested ,to discuss advance directives and complete MOLST       TPMH of COVID 19 in 2011, CHF, RLE ulcer, CKD and CHF who was brought to the ED from [facility] for SOB and hypoxia. She was vaccinated with 3 doses of Moderna vaccine in the past but did not get the second booster dose.    w/ hx HTN, CHF, COPD, afib on eliquis p/w found with SOB since ~ 7am today at [facility]. NO known pain. EMS was called and found pt cyanotic and O2 56% on RA. EMS states 82% on 100% nrb. Pt on CPAP by EMS with sats in mid 90s. No known fever/chills. PT reported been off lasix over past couple days. No other acute co or changes          The patient is an 81 year old female with a PMH of COVID 19 in 2011, CHF, RLE ulcer, CKD and CHF who was brought to the ED from [facility] for SOB and hypoxia. She was vaccinated with 3 doses of Moderna vaccine in the past but did not get the second booster dose.    w/ hx HTN, CHF, COPD, afib on eliquis p/w found with SOB since ~ 7am today at [facility]. NO known pain. EMS was called and found pt cyanotic and O2 56% on RA. EMS states 82% on 100% nrb. Pt on CPAP by EMS with sats in mid 90s. No known fever/chills. PT reported been off lasix over past couple days. No other acute co or changes    < from: Xray Chest 1 View- PORTABLE-Urgent (05.22.22 @ 09:54) >    ACC: [id number] EXAM:  XR CHEST PORTABLE URGENT 1V                          PROCEDURE DATE:  05/22/2022          INTERPRETATION:  CLINICAL INDICATION: 81 years  Female with SOB, CHF.    COMPARISON: None    The patient's chin obscures the left lung apex.    AP view of the chest demonstrates a large right lower lobe infiltrate.   There is mild pulmonary vascular congestion. There is no pleural   effusion. There is no pneumothorax.    The heart is normal in size. Left-sided bipolar pacemaker. There is no   mediastinal or hilar mass.    Mild thoracic degenerative changes and osteopenia are present.    IMPRESSION:    Large right lower lobe infiltrates superimposed on pulmonary vascular   congestion. Pacemaker.    --- End of Report ---            PAUL MONROY MD; Attending Radiologist    < end of copied text >  < from: 12 Lead ECG (05.22.22 @ 09:28) >    Ventricular Rate 82 BPM    Atrial Rate 82 BPM    P-R Interval 208 ms    QRS Duration 162 ms    Q-T Interval 424 ms    QTC Calculation(Bazett) 495 ms    P Axis 77 degrees    R Axis 93 degrees    T Axis 24 degrees    Diagnosis Line Atrial-sensed ventricular-paced rhythm  Abnormal ECG  No previous ECGs available  Confirmed by dea Lopez ([id number]) on 5/22/2022 3:22:34 PM    < end of copied text >  sitivity (05.22.22 @ 13:48)   Troponin I, High Sensitivity Result: 138.8: Serial measurements of high sensitivity troponin I (hs TnI) showing rapid   upward or downward changes suggest acute myocardial injury. Please note   that a sustained elevation of hsTnI can be caused by renal disease,   chronic heart failure, sepsis, pulmonary embolism and other clinical   conditions. ng/L       Historical Values  Troponin I, High Sensitivity (05.22.22 @ 13:48)   Troponin I, High Sensitivity Result: 138.8: Serial measurements of high sensitivity troponin I (hs TnI) showing rapid   upward or downward changes suggest acute myocardial injury. Please note   that a sustained elevation of hsTnI can be caused by renal disease,   chronic heart failure, sepsis, pulmonary embolism and other clinical   conditions. ng/L   Troponin I, High Sensitivity (05.22.22 @ 09:43)   Troponin I, High Sensitivity Result: 21.2: Serial measurements of high sensitivity troponin I (hs TnI) showing rapid   upward or downward changes suggest acute myocardial injury. Please note   that a sustained elevation of hsTnI can be caused by renal disease,   chronic heart failure, sepsis, pulmonary embolism and other clinical   conditions. ng/L  (22 May 2022 12:13)        SUBJECTIVE:      ALLERGIES:  Allergies    fluoxetine (Unknown)  NIFEdipine (Unknown)  Procardia (Other)    Intolerances    oxycodone (Other)        MEDICATIONS  (STANDING):  aMIOdarone    Tablet 200 milliGRAM(s) Oral daily  apixaban 5 milliGRAM(s) Oral every 12 hours  carvedilol 25 milliGRAM(s) Oral two times a day  dexAMETHasone  Injectable 6 milliGRAM(s) IV Push daily  doxycycline hyclate Capsule 100 milliGRAM(s) Oral every 12 hours  fenofibrate Tablet 145 milliGRAM(s) Oral daily  ferrous    sulfate 325 milliGRAM(s) Oral daily  furosemide    Tablet 40 milliGRAM(s) Oral daily  hydrALAZINE 50 milliGRAM(s) Oral three times a day  lactobacillus acidophilus 1 Tablet(s) Oral two times a day  losartan 100 milliGRAM(s) Oral daily  Medihoney 1 Application(s) 1 Application(s) Topical daily  melatonin 5 milliGRAM(s) Oral at bedtime  potassium chloride    Tablet ER 20 milliEquivalent(s) Oral two times a day  tiotropium 18 MICROgram(s) Capsule 1 Capsule(s) Inhalation daily    MEDICATIONS  (PRN):  acetaminophen     Tablet .. 650 milliGRAM(s) Oral every 6 hours PRN Temp greater or equal to 38C (100.4F)  aluminum hydroxide/magnesium hydroxide/simethicone Suspension 30 milliLiter(s) Oral every 4 hours PRN Dyspepsia  cloNIDine 0.2 milliGRAM(s) Oral every 6 hours PRN for systolic BP>180 or diastolic BP>100  ondansetron Injectable 4 milliGRAM(s) IV Push every 8 hours PRN Nausea and/or Vomiting      REVIEW OF SYSTEMS:  CONSTITUTIONAL: No fever,  RESPIRATORY: Patient has  cough, wheezing, shortness of breath  CARDIOVASCULAR: No chest pain,  palpitations, dizziness, syncope, paroxysmal nocturnal dyspnea,  or arm or leg swelling but has SOB   GASTROINTESTINAL: No abdominal  or epigastric pain, nausea, vomiting,  diarrhea  NEUROLOGICAL: No headaches,  loss of strength, numbness, or tremors  Skin : No itching .  Hematology : No active bleeding .  Endocrinology : No heat and cold intolerance .  Psychiatry : Patient is mild anxious .  Musculoskeletal : Patient has mild arthritis .  Genitourinary : No dysuria .    Vital Signs Last 24 Hrs  T(C): 36.8 (27 May 2022 20:42), Max: 36.8 (27 May 2022 20:42)  T(F): 98.3 (27 May 2022 20:42), Max: 98.3 (27 May 2022 20:42)  HR: 65 (27 May 2022 22:24) (60 - 65)  BP: 114/55 (27 May 2022 20:42) (114/55 - 160/67)  BP(mean): --  RR: 17 (27 May 2022 20:42) (17 - 18)  SpO2: 95% (27 May 2022 20:42) (86% - 97%)    PHYSICAL EXAM:  HEAD:  Atraumatic, Normocephalic  NECK: Supple and normal thyroid.  No JVD or carotid bruit.   HEART: S1, S2 regular , 1/6 soft ejection systolic murmur in mitral area , no thrill and no gallops .  PULMONARY: Bilateral vesicular breathing , few scattered ronchi and few scattered rales are present .  ABDOMEN: Soft nontender and positive bowl sounds   EXTREMITIES:  No clubbing, cyanosis, or pedal  edema  NEUROLOGICAL: AAOX3 , no focal deficit .    LABS:                        9.2    3.60  )-----------( 173      ( 26 May 2022 07:31 )             28.9     05-27    x   |  x   |  x   ----------------------------<  x   x    |  x   |  1.00    Ca    8.9      26 May 2022 07:31  Phos  2.9     05-26  Mg     2.0     05-26    TPro  6.3  /  Alb  2.8<L>  /  TBili  0.4  /  DBili  0.1  /  AST  34  /  ALT  55  /  AlkPhos  38<L>  05-27        PT/INR - ( 27 May 2022 07:41 )   PT: 19.3 sec;   INR: 1.64 ratio             Assessment/Plan  Patient has :   1) COVID pneumonia and S/P respiratory insufficiency and patient off Bi-pap now .  2) Hypertension and BP still elevated .  3) CHF and acute on chronic diastolic heart  failure .  4) Anemia   5) Paroxysmal atrial fibrillation and stable and in NSR .  Plan : 1) Start hydralazine for better control of BP 2) Increase Lasix 40 mg twice a day 3) Monitor hemoglobin and electrolytes 4) Rest of medications as such . 5) Prognosis guarded .  Will continue to follow during hospital course and give further recommendations as needed . Thanks for your referral . if any questions please contact me at office ([phone] cell [phone])        MIKAL ROSE MD [facility]   [address] [address]  [address]  OFFICE : [phone]  CELL : [phone]  CARDIOLOGY F/U  :       HPI:  80 yo F w/ hx HTN, CHF, COPD, afib on eliquis p/w found with SOB since ~ 7am today at [facility]. NO known pain. EMS was called and found pt cyanotic and O2 56% on RA. EMS states 82% on 100% nrb. Pt on CPAP by EMS with sats in mid 90s. No known fever/chills. PT reported been off lasix over past couple days. No other acute co or changes Admitted  to telemetry unit for monitoring , send 3 sets of cardiac enzymes to rule out acute coronary event, obtain ECHO to evaluate LVEF, cardiology consult  ,continue current management, O2 supply, anticoagulation plan as per cardiology consult Admit to monitored unit for cardiac monitoring, obtain echo to evaluate LVEF, intravenous diuresis as per card consult , monitor ins/outs, monitor renal profile and electrolytes closely ,send 3 sets of enzymes, O2 supply, serial chest xrays, monitor weights and oral intake of fluids, nutritionist consult Palliative care consult requested ,to discuss advance directives and complete MOLST       TPMH of COVID 19 in 2011, CHF, RLE ulcer, CKD and CHF who was brought to the ED from [facility] for SOB and hypoxia. She was vaccinated with 3 doses of Moderna vaccine in the past but did not get the second booster dose.    w/ hx HTN, CHF, COPD, afib on eliquis p/w found with SOB since ~ 7am today at [facility]. NO known pain. EMS was called and found pt cyanotic and O2 56% on RA. EMS states 82% on 100% nrb. Pt on CPAP by EMS with sats in mid 90s. No known fever/chills. PT reported been off lasix over past couple days. No other acute co or changes          The patient is an 81 year old female with a PMH of COVID 19 in 2011, CHF, RLE ulcer, CKD and CHF who was brought to the ED from [facility] for SOB and hypoxia. She was vaccinated with 3 doses of Moderna vaccine in the past but did not get the second booster dose.    w/ hx HTN, CHF, COPD, afib on eliquis p/w found with SOB since ~ 7am today at [facility]. NO known pain. EMS was called and found pt cyanotic and O2 56% on RA. EMS states 82% on 100% nrb. Pt on CPAP by EMS with sats in mid 90s. No known fever/chills. PT reported been off lasix over past couple days. No other acute co or changes    < from: Xray Chest 1 View- PORTABLE-Urgent (05.22.22 @ 09:54) >    ACC: [id number] EXAM:  XR CHEST PORTABLE URGENT 1V                          PROCEDURE DATE:  05/22/2022          INTERPRETATION:  CLINICAL INDICATION: 81 years  Female with SOB, CHF.    COMPARISON: None    The patient's chin obscures the left lung apex.    AP view of the chest demonstrates a large right lower lobe infiltrate.   There is mild pulmonary vascular congestion. There is no pleural   effusion. There is no pneumothorax.    The heart is normal in size. Left-sided bipolar pacemaker. There is no   mediastinal or hilar mass.    Mild thoracic degenerative changes and osteopenia are present.    IMPRESSION:    Large right lower lobe infiltrates superimposed on pulmonary vascular   congestion. Pacemaker.    --- End of Report ---            PAUL MONROY MD; Attending Radiologist    < end of copied text >  < from: 12 Lead ECG (05.22.22 @ 09:28) >    Ventricular Rate 82 BPM    Atrial Rate 82 BPM    P-R Interval 208 ms    QRS Duration 162 ms    Q-T Interval 424 ms    QTC Calculation(Bazett) 495 ms    P Axis 77 degrees    R Axis 93 degrees    T Axis 24 degrees    Diagnosis Line Atrial-sensed ventricular-paced rhythm  Abnormal ECG  No previous ECGs available  Confirmed by dea Lopez ([id number]) on 5/22/2022 3:22:34 PM    < end of copied text >  sitivity (05.22.22 @ 13:48)   Troponin I, High Sensitivity Result: 138.8: Serial measurements of high sensitivity troponin I (hs TnI) showing rapid   upward or downward changes suggest acute myocardial injury. Please note   that a sustained elevation of hsTnI can be caused by renal disease,   chronic heart failure, sepsis, pulmonary embolism and other clinical   conditions. ng/L       Historical Values  Troponin I, High Sensitivity (05.22.22 @ 13:48)   Troponin I, High Sensitivity Result: 138.8: Serial measurements of high sensitivity troponin I (hs TnI) showing rapid   upward or downward changes suggest acute myocardial injury. Please note   that a sustained elevation of hsTnI can be caused by renal disease,   chronic heart failure, sepsis, pulmonary embolism and other clinical   conditions. ng/L   Troponin I, High Sensitivity (05.22.22 @ 09:43)   Troponin I, High Sensitivity Result: 21.2: Serial measurements of high sensitivity troponin I (hs TnI) showing rapid   upward or downward changes suggest acute myocardial injury. Please note   that a sustained elevation of hsTnI can be caused by renal disease,   chronic heart failure, sepsis, pulmonary embolism and other clinical   conditions. ng/L  (22 May 2022 12:13)        SUBJECTIVE: Patient feeling better .      ALLERGIES:  Allergies    fluoxetine (Unknown)  NIFEdipine (Unknown)  Procardia (Other)    Intolerances    oxycodone (Other)        MEDICATIONS  (STANDING):  aMIOdarone    Tablet 200 milliGRAM(s) Oral daily  apixaban 5 milliGRAM(s) Oral every 12 hours  carvedilol 25 milliGRAM(s) Oral two times a day  dexAMETHasone  Injectable 6 milliGRAM(s) IV Push daily  doxycycline hyclate Capsule 100 milliGRAM(s) Oral every 12 hours  fenofibrate Tablet 145 milliGRAM(s) Oral daily  ferrous    sulfate 325 milliGRAM(s) Oral daily  furosemide    Tablet 40 milliGRAM(s) Oral daily  hydrALAZINE 50 milliGRAM(s) Oral three times a day  lactobacillus acidophilus 1 Tablet(s) Oral two times a day  losartan 100 milliGRAM(s) Oral daily  Medihoney 1 Application(s) 1 Application(s) Topical daily  melatonin 5 milliGRAM(s) Oral at bedtime  potassium chloride    Tablet ER 20 milliEquivalent(s) Oral two times a day  tiotropium 18 MICROgram(s) Capsule 1 Capsule(s) Inhalation daily    MEDICATIONS  (PRN):  acetaminophen     Tablet .. 650 milliGRAM(s) Oral every 6 hours PRN Temp greater or equal to 38C (100.4F)  aluminum hydroxide/magnesium hydroxide/simethicone Suspension 30 milliLiter(s) Oral every 4 hours PRN Dyspepsia  cloNIDine 0.2 milliGRAM(s) Oral every 6 hours PRN for systolic BP>180 or diastolic BP>100  ondansetron Injectable 4 milliGRAM(s) IV Push every 8 hours PRN Nausea and/or Vomiting      REVIEW OF SYSTEMS:  CONSTITUTIONAL: No fever,  RESPIRATORY: Improvement in   cough, wheezing, shortness of breath  CARDIOVASCULAR: No chest pain,  palpitations, dizziness, syncope, paroxysmal nocturnal dyspnea,  or arm or leg swelling but has SOB   GASTROINTESTINAL: No abdominal  or epigastric pain, nausea, vomiting,  diarrhea  NEUROLOGICAL: No headaches,  loss of strength, numbness, or tremors  Skin : No itching .  Hematology : No active bleeding .  Endocrinology : No heat and cold intolerance .  Psychiatry : Patient is mild anxious .  Musculoskeletal : Patient has mild arthritis .  Genitourinary : No dysuria .    Vital Signs Last 24 Hrs  T(C): 36.8 (27 May 2022 20:42), Max: 36.8 (27 May 2022 20:42)  T(F): 98.3 (27 May 2022 20:42), Max: 98.3 (27 May 2022 20:42)  HR: 65 (27 May 2022 22:24) (60 - 65)  BP: 114/55 (27 May 2022 20:42) (114/55 - 160/67)  BP(mean): --  RR: 17 (27 May 2022 20:42) (17 - 18)  SpO2: 95% (27 May 2022 20:42) (86% - 97%)    PHYSICAL EXAM:  HEAD:  Atraumatic, Normocephalic  NECK: Supple and normal thyroid.  No JVD or carotid bruit.   HEART: S1, S2 regular , 1/6 soft ejection systolic murmur in mitral area , no thrill and no gallops .  PULMONARY: Bilateral vesicular breathing , few scattered rhonchi and few scattered rales are present .  ABDOMEN: Soft nontender and positive bowl sounds   EXTREMITIES:  No clubbing, cyanosis, or pedal  edema  NEUROLOGICAL: AAOX3 , no focal deficit .Skin : No rashes .  Musculoskeletal : No joint swellings .    LABS:                        9.2    3.60  )-----------( 173      ( 26 May 2022 07:31 )             28.9     05-27    x   |  x   |  x   ----------------------------<  x   x    |  x   |  1.00    Ca    8.9      26 May 2022 07:31  Phos  2.9     05-26  Mg     2.0     05-26    TPro  6.3  /  Alb  2.8<L>  /  TBili  0.4  /  DBili  0.1  /  AST  34  /  ALT  55  /  AlkPhos  38<L>  05-27        PT/INR - ( 27 May 2022 07:41 )   PT: 19.3 sec;   INR: 1.64 ratio             Assessment/Plan  Patient has :   1) COVID pneumonia and S/P respiratory insufficiency and patient off Bi-pap now .  2) Hypertension and BP better  .  3) CHF and acute on chronic diastolic heart  failure and improved  .  4) Anemia   5) Paroxysmal atrial fibrillation and stable and in NSR .  Plan : 1) Continue  hydralazine for better control of BP 2) Continue  Lasix 40 mg twice PO  a day 3) Monitor hemoglobin and electrolytes 4) Rest of medications as such . 5) Prognosis guarded .  Will continue to follow during hospital course and give further recommendations as needed . Thanks for your referral . if any questions please contact me at office ([phone] cell [phone])

## 2022-05-27 NOTE — PROGRESS NOTE ADULT - ASSESSMENT
82 yo F w/ hx HTN, CHF, COPD, afib on eliquis p/w found with SOB since ~ [facility]. NO known pain. EMS was called and found pt cyanotic and O2 56% on RA. EMS states 82% on 100% nrb. Pt on CPAP by EMS with sats in mid 90s. No known fever/chills. PT reported been off lasix over past couple days. No other acute co or changes Admitted  to telemetry unit for monitoring , send 3 sets of cardiac enzymes to rule out acute coronary event, obtain ECHO to evaluate LVEF, cardiology consult  ,continue current management, O2 supply, anticoagulation plan as per cardiology consult Admit to monitored unit for cardiac monitoring, obtain echo to evaluate LVEF, intravenous diuresis as per card consult , monitor ins/outs, monitor renal profile and electrolytes closely ,send 3 sets of enzymes, O2 supply, serial chest xrays, monitor weights and oral intake of fluids, nutritionist consult Palliative care consult requested ,to discuss advance directives and complete MOLST     ACUTE RENAL FAILURE:   Serum creatinine is  improved   There is no progression . No uremic symptoms  No evidence of anemia .  Fluid status stable.  Will continue to avoid nephrotoxic drugs.  Patient remains asymptomatic.   Continue current therapy.  losartan 100 milliGRAM(s) Oral daily    BP monitoring,continue current antihypertensive meds, low salt diet,followup with PMD in 1-2 weeks  aMIOdarone    Tablet 200 milliGRAM(s) Oral daily

## 2022-05-27 NOTE — PROGRESS NOTE ADULT - SUBJECTIVE AND OBJECTIVE BOX
Date/Time Patient Seen:  		  Referring MD:   Data Reviewed	       Patient is a 81y old  Female who presents with a chief complaint of 82 yo F w/ hx HTN, CHF, COPD, afib on eliquis p/w found with SOB since ~ 7am today at [facility]. NO known pain. EMS was called and found pt cyanotic and O2 56% on RA. EMS states 82% on 100% nrb. Pt on CPAP by EMS with sats in mid 90s. No known fever/chills. PT reported been off lasix over past couple days. No other acute co or changes Admitted  to telemetry unit for monitoring , send 3 sets of cardiac enzymes to rule out acute coronary event, obtain ECHO to evaluate LVEF, cardiology consult  ,continue current management, O2 supply, anticoagulation plan as per cardiology consult Admit to monitored unit for cardiac monitoring, obtain echo to evaluate LVEF, intravenous diuresis as per card consult , monitor ins/outs, monitor renal profile and electrolytes closely ,send 3 sets of enzymes, O2 supply, serial chest xrays, monitor weights and oral intake of fluids, nutritionist consult Palliative care consult requested ,to discuss advance directives and complete MOLST       TPMH of COVID 19 in 2011, CHF, RLE ulcer, CKD and CHF who was brought to the ED from [facility] for SOB and hypoxia. She was vaccinated with 3 doses of Moderna vaccine in the past but did not get the second booster dose.    w/ hx HTN, CHF, COPD, afib on eliquis p/w found with SOB since ~ 7am today at [facility]. NO known pain. EMS was called and found pt cyanotic and O2 56% on RA. EMS states 82% on 100% nrb. Pt on CPAP by EMS with sats in mid 90s. No known fever/chills. PT reported been off lasix over past couple days. No other acute co or changes (26 May 2022 19:55)      Subjective/HPI     PAST MEDICAL & SURGICAL HISTORY:  HTN (hypertension)    HLD (hyperlipidemia)    Anemia  newly diagnosed    DVT (deep venous thrombosis)  2010 was on coumadin for 6 months    Pneumonia  years ago    OA (osteoarthritis)    Status post total left knee replacement  2007    History of cholecystectomy  open , many years ago    H/O hernia repair  years ago          Medication list         MEDICATIONS  (STANDING):  aMIOdarone    Tablet 200 milliGRAM(s) Oral daily  apixaban 5 milliGRAM(s) Oral every 12 hours  carvedilol 25 milliGRAM(s) Oral two times a day  dexAMETHasone  Injectable 6 milliGRAM(s) IV Push daily  doxycycline hyclate Capsule 100 milliGRAM(s) Oral every 12 hours  fenofibrate Tablet 145 milliGRAM(s) Oral daily  ferrous    sulfate 325 milliGRAM(s) Oral daily  furosemide   Injectable 40 milliGRAM(s) IV Push two times a day  hydrALAZINE 50 milliGRAM(s) Oral three times a day  lactobacillus acidophilus 1 Tablet(s) Oral two times a day  losartan 100 milliGRAM(s) Oral daily  Medihoney 1 Application(s) 1 Application(s) Topical daily  melatonin 5 milliGRAM(s) Oral at bedtime  potassium chloride    Tablet ER 20 milliEquivalent(s) Oral two times a day  tiotropium 18 MICROgram(s) Capsule 1 Capsule(s) Inhalation daily    MEDICATIONS  (PRN):  acetaminophen     Tablet .. 650 milliGRAM(s) Oral every 6 hours PRN Temp greater or equal to 38C (100.4F)  aluminum hydroxide/magnesium hydroxide/simethicone Suspension 30 milliLiter(s) Oral every 4 hours PRN Dyspepsia  cloNIDine 0.2 milliGRAM(s) Oral every 6 hours PRN for systolic BP>180 or diastolic BP>100  ondansetron Injectable 4 milliGRAM(s) IV Push every 8 hours PRN Nausea and/or Vomiting         Vitals log        ICU Vital Signs Last 24 Hrs  T(C): 36.5 (27 May 2022 05:41), Max: 36.5 (27 May 2022 05:41)  T(F): 97.7 (27 May 2022 05:41), Max: 97.7 (27 May 2022 05:41)  HR: 60 (27 May 2022 05:41) (60 - 61)  BP: 160/67 (27 May 2022 05:41) (133/59 - 173/77)  BP(mean): --  ABP: --  ABP(mean): --  RR: 18 (27 May 2022 05:41) (18 - 20)  SpO2: 97% (27 May 2022 05:41) (94% - 98%)           Input and Output:  I&O's Detail    25 May 2022 07:01  -  26 May 2022 07:00  --------------------------------------------------------  IN:  Total IN: 0 mL    OUT:    Voided (mL): 650 mL  Total OUT: 650 mL    Total NET: -650 mL      26 May 2022 07:01  -  27 May 2022 06:08  --------------------------------------------------------  IN:  Total IN: 0 mL    OUT:    Voided (mL): 1700 mL  Total OUT: 1700 mL    Total NET: -1700 mL          Lab Data                        9.2    3.60  )-----------( 173      ( 26 May 2022 07:31 )             28.9     05-26    144  |  101  |  20  ----------------------------<  105<H>  4.2   |  38<H>  |  0.90    Ca    8.9      26 May 2022 07:31  Phos  2.9     05-26  Mg     2.0     05-26    TPro  6.1  /  Alb  2.7<L>  /  TBili  0.3  /  DBili  0.1  /  AST  40<H>  /  ALT  65  /  AlkPhos  35<L>  05-26            Review of Systems	      Objective     Physical Examination    heart s1s2  lung dec BS  abd soft      Pertinent Lab findings & Imaging      Jagjit:  NO   Adequate UO     I&O's Detail    25 May 2022 07:01  -  26 May 2022 07:00  --------------------------------------------------------  IN:  Total IN: 0 mL    OUT:    Voided (mL): 650 mL  Total OUT: 650 mL    Total NET: -650 mL      26 May 2022 07:01  -  27 May 2022 06:08  --------------------------------------------------------  IN:  Total IN: 0 mL    OUT:    Voided (mL): 1700 mL  Total OUT: 1700 mL    Total NET: -1700 mL               Discussed with:     Cultures:	        Radiology

## 2022-05-27 NOTE — PROGRESS NOTE ADULT - SUBJECTIVE AND OBJECTIVE BOX
PROGRESS NOTE  Patient is a 81y old  Female who presents with a chief complaint of 80 yo F w/ hx HTN, CHF, COPD, afib on eliquis p/w found with SOB since ~ 7am today at [facility]. NO known pain. EMS was called and found pt cyanotic and O2 56% on RA. EMS states 82% on 100% nrb. Pt on CPAP by EMS with sats in mid 90s. No known fever/chills. PT reported been off lasix over past couple days. No other acute co or changes Admitted  to telemetry unit for monitoring , send 3 sets of cardiac enzymes to rule out acute coronary event, obtain ECHO to evaluate LVEF, cardiology consult  ,continue current management, O2 supply, anticoagulation plan as per cardiology consult Admit to monitored unit for cardiac monitoring, obtain echo to evaluate LVEF, intravenous diuresis as per card consult , monitor ins/outs, monitor renal profile and electrolytes closely ,send 3 sets of enzymes, O2 supply, serial chest xrays, monitor weights and oral intake of fluids, nutritionist consult Palliative care consult requested ,to discuss advance directives and complete [facility]   Chart and available morning labs /imaging are reviewed electronically , urgent issues addressed . More information  is being added upon completion of rounds , when more information is collected and management discussed with consultants , medical staff and social service/case management on the floor     TPMH of COVID 19 in 2011, CHF, RLE ulcer, CKD and CHF who was brought to the ED from [facility] for SOB and hypoxia. She was vaccinated with 3 doses of Moderna vaccine in the past but did not get the second booster dose.    w/ hx HTN, CHF, COPD, afib on eliquis p/w found with SOB since ~ 7am today at [facility]. NO known pain. EMS was called and found pt cyanotic and O2 56% on RA. EMS states 82% on 100% nrb. Pt on CPAP by EMS with sats in mid 90s. No known fever/chills. PT reported been off lasix over past couple days. No other acute co or changes (27 May 2022 06:08)  Chart and available morning labs /imaging are reviewed electronically , urgent issues addressed . More information  is being added upon completion of rounds , when more information is collected and management discussed with consultants , medical staff and social service/case management on the floor     OVERNIGHT  No new issues reported by medical staff . All above noted Patient is resting in a bed comfortably ..No distress noted   Feels much better O2 SAT on RA was 86% this am ,then in 30 min reported up to 93% ,monitoring is in progress ,d/c planning is in progress D/c back to [facility] when oxygen supply is determined ,pulm Dr Callejas is contacted and asked to follow the patient and to advise on home oxygen supply.Spoke to the daughter Lela on a phone  HPI:  80 yo F w/ hx HTN, CHF, COPD, afib on eliquis p/w found with SOB since ~ 7am today at [facility]. NO known pain. EMS was called and found pt cyanotic and O2 56% on RA. EMS states 82% on 100% nrb. Pt on CPAP by EMS with sats in mid 90s. No known fever/chills. PT reported been off lasix over past couple days. No other acute co or changes Admitted  to telemetry unit for monitoring , send 3 sets of cardiac enzymes to rule out acute coronary event, obtain ECHO to evaluate LVEF, cardiology consult  ,continue current management, O2 supply, anticoagulation plan as per cardiology consult Admit to monitored unit for cardiac monitoring, obtain echo to evaluate LVEF, intravenous diuresis as per card consult , monitor ins/outs, monitor renal profile and electrolytes closely ,send 3 sets of enzymes, O2 supply, serial chest xrays, monitor weights and oral intake of fluids, nutritionist consult Palliative care consult requested ,to discuss advance directives and complete MOLST       TPMH of COVID 19 in 2011, CHF, RLE ulcer, CKD and CHF who was brought to the ED from [facility] for SOB and hypoxia. She was vaccinated with 3 doses of Moderna vaccine in the past but did not get the second booster dose.    w/ hx HTN, CHF, COPD, afib on eliquis p/w found with SOB since ~ 7am today at [facility]. NO known pain. EMS was called and found pt cyanotic and O2 56% on RA. EMS states 82% on 100% nrb. Pt on CPAP by EMS with sats in mid 90s. No known fever/chills. PT reported been off lasix over past couple days. No other acute co or changes          The patient is an 81 year old female with a PMH of COVID 19 in 2011, CHF, RLE ulcer, CKD and CHF who was brought to the ED from [facility] for SOB and hypoxia. She was vaccinated with 3 doses of Moderna vaccine in the past but did not get the second booster dose.    w/ hx HTN, CHF, COPD, afib on eliquis p/w found with SOB since ~ [facility]. NO known pain. EMS was called and found pt cyanotic and O2 56% on RA. EMS states 82% on 100% nrb. Pt on CPAP by EMS with sats in mid 90s. No known fever/chills. PT reported been off lasix over past couple days. No other acute co or changes    < from: Xray Chest 1 View- PORTABLE-Urgent (05.22.22 @ 09:54) >    ACC: [id number] EXAM:  XR CHEST PORTABLE URGENT 1V                          PROCEDURE DATE:  05/22/2022          INTERPRETATION:  CLINICAL INDICATION: 81 years  Female with SOB, CHF.    COMPARISON: None    The patient's chin obscures the left lung apex.    AP view of the chest demonstrates a large right lower lobe infiltrate.   There is mild pulmonary vascular congestion. There is no pleural   effusion. There is no pneumothorax.    The heart is normal in size. Left-sided bipolar pacemaker. There is no   mediastinal or hilar mass.    Mild thoracic degenerative changes and osteopenia are present.    IMPRESSION:    Large right lower lobe infiltrates superimposed on pulmonary vascular   congestion. Pacemaker.    --- End of Report ---            PAUL MONROY MD; Attending Radiologist    < end of copied text >  < from: 12 Lead ECG (05.22.22 @ 09:28) >    Ventricular Rate 82 BPM    Atrial Rate 82 BPM    P-R Interval 208 ms    QRS Duration 162 ms    Q-T Interval 424 ms    QTC Calculation(Bazett) 495 ms    P Axis 77 degrees    R Axis 93 degrees    T Axis 24 degrees    Diagnosis Line Atrial-sensed ventricular-paced rhythm  Abnormal ECG  No previous ECGs available  Confirmed by dea Lopez ([id number]) on 5/22/2022 3:22:34 PM    < end of copied text >  sitivity (05.22.22 @ 13:48)   Troponin I, High Sensitivity Result: 138.8: Serial measurements of high sensitivity troponin I (hs TnI) showing rapid   upward or downward changes suggest acute myocardial injury. Please note   that a sustained elevation of hsTnI can be caused by renal disease,   chronic heart failure, sepsis, pulmonary embolism and other clinical   conditions. ng/L       Historical Values  Troponin I, High Sensitivity (05.22.22 @ 13:48)   Troponin I, High Sensitivity Result: 138.8: Serial measurements of high sensitivity troponin I (hs TnI) showing rapid   upward or downward changes suggest acute myocardial injury. Please note   that a sustained elevation of hsTnI can be caused by renal disease,   chronic heart failure, sepsis, pulmonary embolism and other clinical   conditions. ng/L   Troponin I, High Sensitivity (05.22.22 @ 09:43)   Troponin I, High Sensitivity Result: 21.2: Serial measurements of high sensitivity troponin I (hs TnI) showing rapid   upward or downward changes suggest acute myocardial injury. Please note   that a sustained elevation of hsTnI can be caused by renal disease,   chronic heart failure, sepsis, pulmonary embolism and other clinical   conditions. ng/L  (22 May 2022 12:13)    PAST MEDICAL & SURGICAL HISTORY:  HTN (hypertension)      HLD (hyperlipidemia)      Anemia  newly diagnosed      DVT (deep venous thrombosis)  2010 was on coumadin for 6 months      Pneumonia  years ago      OA (osteoarthritis)      Status post total left knee replacement  2007      History of cholecystectomy  open , many years ago      H/O hernia repair  years ago          MEDICATIONS  (STANDING):  aMIOdarone    Tablet 200 milliGRAM(s) Oral daily  apixaban 5 milliGRAM(s) Oral every 12 hours  carvedilol 25 milliGRAM(s) Oral two times a day  dexAMETHasone  Injectable 6 milliGRAM(s) IV Push daily  doxycycline hyclate Capsule 100 milliGRAM(s) Oral every 12 hours  fenofibrate Tablet 145 milliGRAM(s) Oral daily  ferrous    sulfate 325 milliGRAM(s) Oral daily  furosemide    Tablet 40 milliGRAM(s) Oral daily  hydrALAZINE 50 milliGRAM(s) Oral three times a day  lactobacillus acidophilus 1 Tablet(s) Oral two times a day  losartan 100 milliGRAM(s) Oral daily  Medihoney 1 Application(s) 1 Application(s) Topical daily  melatonin 5 milliGRAM(s) Oral at bedtime  potassium chloride    Tablet ER 20 milliEquivalent(s) Oral two times a day  tiotropium 18 MICROgram(s) Capsule 1 Capsule(s) Inhalation daily    MEDICATIONS  (PRN):  acetaminophen     Tablet .. 650 milliGRAM(s) Oral every 6 hours PRN Temp greater or equal to 38C (100.4F)  aluminum hydroxide/magnesium hydroxide/simethicone Suspension 30 milliLiter(s) Oral every 4 hours PRN Dyspepsia  cloNIDine 0.2 milliGRAM(s) Oral every 6 hours PRN for systolic BP>180 or diastolic BP>100  ondansetron Injectable 4 milliGRAM(s) IV Push every 8 hours PRN Nausea and/or Vomiting      OBJECTIVE    T(C): 36.5 (05-27-22 @ 09:27), Max: 36.5 (05-27-22 @ 05:41)  HR: 60 (05-27-22 @ 09:27) (60 - 61)  BP: 147/77 (05-27-22 @ 17:28) (122/58 - 173/77)  RR: 18 (05-27-22 @ 14:01) (18 - 19)  SpO2: 93% (05-27-22 @ 14:01) (86% - 98%)  Wt(kg): --  I&O's Summary    26 May 2022 07:01  -  27 May 2022 07:00  --------------------------------------------------------  IN: 0 mL / OUT: 2400 mL / NET: -2400 mL    27 May 2022 07:01  -  27 May 2022 18:50  --------------------------------------------------------  IN: 550 mL / OUT: 1100 mL / NET: -550 mL          REVIEW OF SYSTEMS:  CONSTITUTIONAL: No fever, weight loss, or fatigue  EYES: No eye pain, visual disturbances, or discharge  ENMT:   No sinus or throat pain  NECK: No pain or stiffness  RESPIRATORY: No cough, wheezing, chills or hemoptysis; No shortness of breath  CARDIOVASCULAR: No chest pain, palpitations, dizziness, or leg swelling  GASTROINTESTINAL: No abdominal pain. No nausea, vomiting; No diarrhea or constipation. No melena or hematochezia.  GENITOURINARY: No dysuria, frequency, hematuria, or incontinence  NEUROLOGICAL: No headaches, memory loss, loss of strength, numbness, or tremors  SKIN: No itching, burning, rashes, or lesions   MUSCULOSKELETAL: No joint pain or swelling; No muscle, back, or extremity pain    PHYSICAL EXAM:  Appearance: NAD. VS past 24 hrs -as above   HEENT:   Moist oral mucosa. Conjunctiva clear b/l.   Neck : supple  Respiratory: Lungs CTAB.  Gastrointestinal:  Soft, nontender. No rebound. No rigidity. BS present	  Cardiovascular: RRR ,S1S2 present  Neurologic: Non-focal. Moving all extremities.  Extremities: No edema. No erythema. No calf tenderness.  Skin: No rashes, No ecchymoses, No cyanosis.	  wounds ,skin lesions-See skin assesment flow sheet   LABS:                        9.2    3.60  )-----------( 173      ( 26 May 2022 07:31 )             28.9     05-27    x   |  x   |  x   ----------------------------<  x   x    |  x   |  1.00    Ca    8.9      26 May 2022 07:31  Phos  2.9     05-26  Mg     2.0     05-26    TPro  6.3  /  Alb  2.8<L>  /  TBili  0.4  /  DBili  0.1  /  AST  34  /  ALT  55  /  AlkPhos  38<L>  05-27    CAPILLARY BLOOD GLUCOSE        PT/INR - ( 27 May 2022 07:41 )   PT: 19.3 sec;   INR: 1.64 ratio               Culture - Urine (collected 22 May 2022 16:30)  Source: Clean Catch Clean Catch (Midstream)  Final Report (24 May 2022 11:47):    10,000 - 49,000 CFU/mL Enterobacter cloacae complex    <10,000 CFU/ml Normal Urogenital benson present  Organism: Enterobacter cloacae complex (24 May 2022 11:47)  Organism: Enterobacter cloacae complex (24 May 2022 11:47)    Culture - Blood (collected 22 May 2022 16:28)  Source: .Blood Blood-Peripheral  Final Report (27 May 2022 17:01):    No Growth Final    Culture - Blood (collected 22 May 2022 16:28)  Source: .Blood Blood-Peripheral  Final Report (27 May 2022 17:01):    No Growth Final      RADIOLOGY & ADDITIONAL TESTS:   reviewed elctronically  ASSESSMENT/PLAN: 	  25 minutes aggregate time was spent on this visit, 50% visit time spent in care co-ordination with other attendings and counselling patient .I have discussed care plan with patient / HCP/family member ,who expressed understanding of problems treatment and their effect and side effects, alternatives in details. I have asked if they have any questions and concerns and appropriately addressed them to best of my ability.

## 2022-05-27 NOTE — PROGRESS NOTE ADULT - ASSESSMENT
82 yo F w/ hx HTN, CHF, COPD, afib on eliquis p/w found with SOB  copd  CHF  AF  Obesity  OP  OA  Covid    on abx - DOXY  s/p remdesivir  on o2 support  on ELIQUIS  PO diet - SLP eval noted  CM following  Wound care following    s/p BIPAP  GOC discussion - Lela Haney - HCP - Daughter - pt is DNR DNI - MOLST updated and signed -   assist with needs  Isolation for covid, Remdesivir -   monitor VS and HD and Sat  cvs rx regimen

## 2022-05-27 NOTE — PROGRESS NOTE ADULT - SUBJECTIVE AND OBJECTIVE BOX
Patient is a 81y old  Female who presents with a chief complaint of 82 yo F w/ hx HTN, CHF, COPD, afib on eliquis p/w found with SOB since ~ [facility]. NO known pain. EMS was called and found pt cyanotic and O2 56% on RA. EMS states 82% on 100% nrb. Pt on CPAP by EMS with sats in mid 90s. No known fever/chills. PT reported been off lasix over past couple days. No other acute co or changes Admitted  to telemetry unit for monitoring , send 3 sets of cardiac enzymes to rule out acute coronary event, obtain ECHO to evaluate LVEF, cardiology consult  ,continue current management, O2 supply, anticoagulation plan as per cardiology consult Admit to monitored unit for cardiac monitoring, obtain echo to evaluate LVEF, intravenous diuresis as per card consult , monitor ins/outs, monitor renal profile and electrolytes closely ,send 3 sets of enzymes, O2 supply, serial chest xrays, monitor weights and oral intake of fluids, nutritionist consult Palliative care consult requested ,to discuss advance directives and complete MOLST       [facility], RLE ulcer, CKD and CHF who was brought to the ED from [facility] for SOB and hypoxia. She was vaccinated with 3 doses of Moderna vaccine in the past but did not get the second booster dose.    w/ hx HTN, CHF, COPD, afib on eliquis p/w found with SOB since ~ 7am today at [facility]. NO known pain. EMS was called and found pt cyanotic and O2 56% on RA. EMS states 82% on 100% nrb. Pt on CPAP by EMS with sats in mid 90s. No known fever/chills. PT reported been off lasix over past couple days. No other acute co or changes (27 May 2022 09:49)      INTERVAL HPI/OVERNIGHT EVENTS:    admits to cough. claims to feel better    MEDICATIONS  (STANDING):  aMIOdarone    Tablet 200 milliGRAM(s) Oral daily  apixaban 5 milliGRAM(s) Oral every 12 hours  carvedilol 25 milliGRAM(s) Oral two times a day  dexAMETHasone  Injectable 6 milliGRAM(s) IV Push daily  doxycycline hyclate Capsule 100 milliGRAM(s) Oral every 12 hours  fenofibrate Tablet 145 milliGRAM(s) Oral daily  ferrous    sulfate 325 milliGRAM(s) Oral daily  furosemide    Tablet 40 milliGRAM(s) Oral daily  hydrALAZINE 50 milliGRAM(s) Oral three times a day  lactobacillus acidophilus 1 Tablet(s) Oral two times a day  losartan 100 milliGRAM(s) Oral daily  Medihoney 1 Application(s) 1 Application(s) Topical daily  melatonin 5 milliGRAM(s) Oral at bedtime  potassium chloride    Tablet ER 20 milliEquivalent(s) Oral two times a day  tiotropium 18 MICROgram(s) Capsule 1 Capsule(s) Inhalation daily      MEDICATIONS  (PRN):  acetaminophen     Tablet .. 650 milliGRAM(s) Oral every 6 hours PRN Temp greater or equal to 38C (100.4F)  aluminum hydroxide/magnesium hydroxide/simethicone Suspension 30 milliLiter(s) Oral every 4 hours PRN Dyspepsia  cloNIDine 0.2 milliGRAM(s) Oral every 6 hours PRN for systolic BP>180 or diastolic BP>100  ondansetron Injectable 4 milliGRAM(s) IV Push every 8 hours PRN Nausea and/or Vomiting      Allergies    fluoxetine (Unknown)  NIFEdipine (Unknown)  Procardia (Other)    Intolerances    oxycodone (Other)      PAST MEDICAL & SURGICAL HISTORY:  HTN (hypertension)      HLD (hyperlipidemia)      Anemia  newly diagnosed      DVT (deep venous thrombosis)  2010 was on coumadin for 6 months      Pneumonia  years ago      OA (osteoarthritis)      Status post total left knee replacement  2007      History of cholecystectomy  open , many years ago      H/O hernia repair  years ago          Vital Signs Last 24 Hrs  T(C): 36.5 (27 May 2022 09:27), Max: 36.5 (27 May 2022 05:41)  T(F): 97.7 (27 May 2022 09:27), Max: 97.7 (27 May 2022 05:41)  HR: 60 (27 May 2022 09:27) (60 - 61)  BP: 147/77 (27 May 2022 17:28) (122/58 - 173/77)  BP(mean): --  RR: 18 (27 May 2022 14:01) (18 - 19)  SpO2: 93% (27 May 2022 14:01) (86% - 98%)    PHYSICAL EXAMINATION:    GENERAL: The patient is awake and alert in no apparent distress.     HEENT: N/C, A/T    NECK: no JVD    LUNGS: Clear to auscultation without wheezing, rales or rhonchi; respirations unlabored    HEART: Regular rate and rhythm without murmur.    ABDOMEN: Soft, nontender, and nondistended.      EXTREMITIES: Without any cyanosis, clubbing, rash, lesions or edema.    NEUROLOGIC: Grossly intact.    SKIN: No ulceration or induration present.      LABS:                        9.2    3.60  )-----------( 173      ( 26 May 2022 07:31 )             28.9     05-27    x   |  x   |  x   ----------------------------<  x   x    |  x   |  1.00    Ca    8.9      26 May 2022 07:31  Phos  2.9     05-26  Mg     2.0     05-26    TPro  6.3  /  Alb  2.8<L>  /  TBili  0.4  /  DBili  0.1  /  AST  34  /  ALT  55  /  AlkPhos  38<L>  05-27    PT/INR - ( 27 May 2022 07:41 )   PT: 19.3 sec;   INR: 1.64 ratio                     Serum Pro-Brain Natriuretic Peptide: 3073 pg/mL (05-25-22 @ 07:46)      Procalcitonin, Serum: 2.08 ng/mL (05-25-22 @ 07:46)      Assessment:    COPD - stable  Acute Hypoxic respiratory failure  COVID-19 Positive  Resolving Bacterial Pneumonia  Hx DVT    Plan:    Complete course of Decadron + Remedesivir  Follow pulse oximetry  Spiriva inhaler  Anticoagulation with Eliquis  Albuterol PRN   Patient is a 81y old  Female who presents with a chief complaint of 80 yo F w/ hx HTN, CHF, COPD, afib on eliquis p/w found with SOB since ~ [facility]. NO known pain. EMS was called and found pt cyanotic and O2 56% on RA. EMS states 82% on 100% nrb. Pt on CPAP by EMS with sats in mid 90s. No known fever/chills. PT reported been off lasix over past couple days. No other acute co or changes Admitted  to telemetry unit for monitoring , send 3 sets of cardiac enzymes to rule out acute coronary event, obtain ECHO to evaluate LVEF, cardiology consult  ,continue current management, O2 supply, anticoagulation plan as per cardiology consult Admit to monitored unit for cardiac monitoring, obtain echo to evaluate LVEF, intravenous diuresis as per card consult , monitor ins/outs, monitor renal profile and electrolytes closely ,send 3 sets of enzymes, O2 supply, serial chest xrays, monitor weights and oral intake of fluids, nutritionist consult Palliative care consult requested ,to discuss advance directives and complete MOLST       [facility], RLE ulcer, CKD and CHF who was brought to the ED from [facility] for SOB and hypoxia. She was vaccinated with 3 doses of Moderna vaccine in the past but did not get the second booster dose.    w/ hx HTN, CHF, COPD, afib on eliquis p/w found with SOB since ~ 7am today at [facility]. NO known pain. EMS was called and found pt cyanotic and O2 56% on RA. EMS states 82% on 100% nrb. Pt on CPAP by EMS with sats in mid 90s. No known fever/chills. PT reported been off lasix over past couple days. No other acute co or changes (27 May 2022 09:49)      INTERVAL HPI/OVERNIGHT EVENTS:    admits to cough. claims to feel better    MEDICATIONS  (STANDING):  aMIOdarone    Tablet 200 milliGRAM(s) Oral daily  apixaban 5 milliGRAM(s) Oral every 12 hours  carvedilol 25 milliGRAM(s) Oral two times a day  dexAMETHasone  Injectable 6 milliGRAM(s) IV Push daily  doxycycline hyclate Capsule 100 milliGRAM(s) Oral every 12 hours  fenofibrate Tablet 145 milliGRAM(s) Oral daily  ferrous    sulfate 325 milliGRAM(s) Oral daily  furosemide    Tablet 40 milliGRAM(s) Oral daily  hydrALAZINE 50 milliGRAM(s) Oral three times a day  lactobacillus acidophilus 1 Tablet(s) Oral two times a day  losartan 100 milliGRAM(s) Oral daily  Medihoney 1 Application(s) 1 Application(s) Topical daily  melatonin 5 milliGRAM(s) Oral at bedtime  potassium chloride    Tablet ER 20 milliEquivalent(s) Oral two times a day  tiotropium 18 MICROgram(s) Capsule 1 Capsule(s) Inhalation daily      MEDICATIONS  (PRN):  acetaminophen     Tablet .. 650 milliGRAM(s) Oral every 6 hours PRN Temp greater or equal to 38C (100.4F)  aluminum hydroxide/magnesium hydroxide/simethicone Suspension 30 milliLiter(s) Oral every 4 hours PRN Dyspepsia  cloNIDine 0.2 milliGRAM(s) Oral every 6 hours PRN for systolic BP>180 or diastolic BP>100  ondansetron Injectable 4 milliGRAM(s) IV Push every 8 hours PRN Nausea and/or Vomiting      Allergies    fluoxetine (Unknown)  NIFEdipine (Unknown)  Procardia (Other)    Intolerances    oxycodone (Other)      PAST MEDICAL & SURGICAL HISTORY:  HTN (hypertension)      HLD (hyperlipidemia)      Anemia  newly diagnosed      DVT (deep venous thrombosis)  2010 was on coumadin for 6 months      Pneumonia  years ago      OA (osteoarthritis)      Status post total left knee replacement  2007      History of cholecystectomy  open , many years ago      H/O hernia repair  years ago          Vital Signs Last 24 Hrs  T(C): 36.5 (27 May 2022 09:27), Max: 36.5 (27 May 2022 05:41)  T(F): 97.7 (27 May 2022 09:27), Max: 97.7 (27 May 2022 05:41)  HR: 60 (27 May 2022 09:27) (60 - 61)  BP: 147/77 (27 May 2022 17:28) (122/58 - 173/77)  BP(mean): --  RR: 18 (27 May 2022 14:01) (18 - 19)  SpO2: 93% (27 May 2022 14:01) (86% - 98%)    PHYSICAL EXAMINATION:    GENERAL: The patient is awake and alert in no apparent distress.     HEENT: N/C, A/T    NECK: no JVD    LUNGS: Clear to auscultation without wheezing, rales or rhonchi; respirations unlabored    HEART: Regular rate and rhythm without murmur.    ABDOMEN: Soft, nontender, and nondistended.      EXTREMITIES: Without any cyanosis, clubbing, rash, lesions or edema.    NEUROLOGIC: Grossly intact.    SKIN: No ulceration or induration present.      LABS:                        9.2    3.60  )-----------( 173      ( 26 May 2022 07:31 )             28.9     05-27    x   |  x   |  x   ----------------------------<  x   x    |  x   |  1.00    Ca    8.9      26 May 2022 07:31  Phos  2.9     05-26  Mg     2.0     05-26    TPro  6.3  /  Alb  2.8<L>  /  TBili  0.4  /  DBili  0.1  /  AST  34  /  ALT  55  /  AlkPhos  38<L>  05-27    PT/INR - ( 27 May 2022 07:41 )   PT: 19.3 sec;   INR: 1.64 ratio                     Serum Pro-Brain Natriuretic Peptide: 3073 pg/mL (05-25-22 @ 07:46)      Procalcitonin, Serum: 2.08 ng/mL (05-25-22 @ 07:46)      Assessment:    COPD - stable  Acute Hypoxic respiratory failure  COVID-19 Positive  Resolving Bacterial Pneumonia  Hx DVT    Plan:    Complete course of Decadron + Remedesivir  Follow pulse oximetry  Spiriva inhaler  Anticoagulation with Eliquis  Albuterol PRN  home oxygen @ 2liters

## 2022-05-28 DIAGNOSIS — N17.9 ACUTE KIDNEY FAILURE, UNSPECIFIED: ICD-10-CM

## 2022-05-28 DIAGNOSIS — E87.6 HYPOKALEMIA: ICD-10-CM

## 2022-05-28 LAB
ALBUMIN SERPL ELPH-MCNC: 3.1 G/DL — LOW (ref 3.3–5)
ALP SERPL-CCNC: 41 U/L — SIGNIFICANT CHANGE UP (ref 40–120)
ALT FLD-CCNC: 49 U/L — SIGNIFICANT CHANGE UP (ref 12–78)
AST SERPL-CCNC: 30 U/L — SIGNIFICANT CHANGE UP (ref 15–37)
BILIRUB DIRECT SERPL-MCNC: 0.2 MG/DL — SIGNIFICANT CHANGE UP (ref 0–0.3)
BILIRUB INDIRECT FLD-MCNC: 0.2 MG/DL — SIGNIFICANT CHANGE UP (ref 0.2–1)
BILIRUB SERPL-MCNC: 0.4 MG/DL — SIGNIFICANT CHANGE UP (ref 0.2–1.2)
HCT VFR BLD CALC: 31.1 % — LOW (ref 34.5–45)
HGB BLD-MCNC: 10.2 G/DL — LOW (ref 11.5–15.5)
INR BLD: 1.87 RATIO — HIGH (ref 0.88–1.16)
MCHC RBC-ENTMCNC: 31.6 PG — SIGNIFICANT CHANGE UP (ref 27–34)
MCHC RBC-ENTMCNC: 32.8 GM/DL — SIGNIFICANT CHANGE UP (ref 32–36)
MCV RBC AUTO: 96.3 FL — SIGNIFICANT CHANGE UP (ref 80–100)
NRBC # BLD: 0 /100 WBCS — SIGNIFICANT CHANGE UP (ref 0–0)
PLATELET # BLD AUTO: 216 K/UL — SIGNIFICANT CHANGE UP (ref 150–400)
PROT SERPL-MCNC: 6.5 G/DL — SIGNIFICANT CHANGE UP (ref 6–8.3)
PROTHROM AB SERPL-ACNC: 22 SEC — HIGH (ref 10.5–13.4)
RBC # BLD: 3.23 M/UL — LOW (ref 3.8–5.2)
RBC # FLD: 14.1 % — SIGNIFICANT CHANGE UP (ref 10.3–14.5)
WBC # BLD: 4.65 K/UL — SIGNIFICANT CHANGE UP (ref 3.8–10.5)
WBC # FLD AUTO: 4.65 K/UL — SIGNIFICANT CHANGE UP (ref 3.8–10.5)

## 2022-05-28 RX ORDER — ZALEPLON 10 MG
5 CAPSULE ORAL AT BEDTIME
Refills: 0 | Status: DISCONTINUED | OUTPATIENT
Start: 2022-05-28 | End: 2022-05-29

## 2022-05-28 RX ORDER — FUROSEMIDE 40 MG
40 TABLET ORAL
Refills: 0 | Status: DISCONTINUED | OUTPATIENT
Start: 2022-05-28 | End: 2022-05-28

## 2022-05-28 RX ADMIN — Medication 40 MILLIGRAM(S): at 06:21

## 2022-05-28 RX ADMIN — Medication 20 MILLIEQUIVALENT(S): at 17:36

## 2022-05-28 RX ADMIN — CARVEDILOL PHOSPHATE 25 MILLIGRAM(S): 80 CAPSULE, EXTENDED RELEASE ORAL at 06:21

## 2022-05-28 RX ADMIN — AMIODARONE HYDROCHLORIDE 200 MILLIGRAM(S): 400 TABLET ORAL at 06:20

## 2022-05-28 RX ADMIN — LOSARTAN POTASSIUM 100 MILLIGRAM(S): 100 TABLET, FILM COATED ORAL at 06:20

## 2022-05-28 RX ADMIN — Medication 40 MILLIGRAM(S): at 13:00

## 2022-05-28 RX ADMIN — APIXABAN 5 MILLIGRAM(S): 2.5 TABLET, FILM COATED ORAL at 06:25

## 2022-05-28 RX ADMIN — Medication 6 MILLIGRAM(S): at 06:23

## 2022-05-28 RX ADMIN — APIXABAN 5 MILLIGRAM(S): 2.5 TABLET, FILM COATED ORAL at 17:36

## 2022-05-28 RX ADMIN — Medication 100 MILLIGRAM(S): at 06:19

## 2022-05-28 RX ADMIN — Medication 50 MILLIGRAM(S): at 17:36

## 2022-05-28 RX ADMIN — Medication 325 MILLIGRAM(S): at 12:49

## 2022-05-28 RX ADMIN — Medication 20 MILLIEQUIVALENT(S): at 06:21

## 2022-05-28 RX ADMIN — TIOTROPIUM BROMIDE 1 CAPSULE(S): 18 CAPSULE ORAL; RESPIRATORY (INHALATION) at 06:35

## 2022-05-28 RX ADMIN — Medication 100 MILLIGRAM(S): at 17:36

## 2022-05-28 RX ADMIN — Medication 1 TABLET(S): at 17:36

## 2022-05-28 RX ADMIN — CARVEDILOL PHOSPHATE 25 MILLIGRAM(S): 80 CAPSULE, EXTENDED RELEASE ORAL at 17:36

## 2022-05-28 RX ADMIN — Medication 1 TABLET(S): at 06:20

## 2022-05-28 RX ADMIN — Medication 145 MILLIGRAM(S): at 12:49

## 2022-05-28 RX ADMIN — Medication 50 MILLIGRAM(S): at 01:52

## 2022-05-28 RX ADMIN — Medication 5 MILLIGRAM(S): at 22:37

## 2022-05-28 NOTE — PROGRESS NOTE ADULT - ASSESSMENT
82 yo F w/ hx HTN, CHF, COPD, afib on eliquis p/w found with SOB  copd  CHF  AF  Obesity  OP  OA  Covid    on abx - DOXY  s/p remdesivir  on ELIQUIS  PO diet - SLP eval noted  CM following  Wound care following    s/p BIPAP  GOC discussion - Lela Haney - HCP - Daughter - pt is DNR DNI - MOLST updated and signed -   assist with needs  Isolation for covid, Remdesivir -   monitor VS and HD and Sat  cvs rx regimen

## 2022-05-28 NOTE — PROGRESS NOTE ADULT - SUBJECTIVE AND OBJECTIVE BOX
Patient is a 81y Female whom presented to the hospital with ckd and petra     PAST MEDICAL & SURGICAL HISTORY:      MEDICATIONS  (STANDING):  aMIOdarone    Tablet 200 milliGRAM(s) Oral daily  apixaban 5 milliGRAM(s) Oral every 12 hours  carvedilol 25 milliGRAM(s) Oral two times a day  cefTRIAXone   IVPB 1000 milliGRAM(s) IV Intermittent every 24 hours  dexAMETHasone  Injectable 6 milliGRAM(s) IV Push daily  dextrose 5% + sodium chloride 0.45%. 1000 milliLiter(s) (35 mL/Hr) IV Continuous <Continuous>  doxycycline hyclate Capsule 100 milliGRAM(s) Oral every 12 hours  fenofibrate Tablet 145 milliGRAM(s) Oral daily  ferrous    sulfate 325 milliGRAM(s) Oral daily  furosemide   Injectable 40 milliGRAM(s) IV Push daily  losartan 100 milliGRAM(s) Oral daily  melatonin 5 milliGRAM(s) Oral at bedtime  potassium chloride    Tablet ER 20 milliEquivalent(s) Oral two times a day  remdesivir  IVPB 100 milliGRAM(s) IV Intermittent every 24 hours  remdesivir  IVPB   IV Intermittent   tiotropium 18 MICROgram(s) Capsule 1 Capsule(s) Inhalation daily      Allergies    fluoxetine (Unknown)  NIFEdipine (Unknown)    Intolerances        SOCIAL HISTORY:  Denies ETOh,Smoking,     FAMILY HISTORY:      REVIEW OF SYSTEMS:    CONSTITUTIONAL: No weakness, fevers or chills  NECK: No pain or stiffness  RESPIRATORY: No cough, wheezing, hemoptysis; No shortness of breath  CARDIOVASCULAR: No chest pain or palpitations  GASTROINTESTINAL: No abdominal or epigastric pain. No nausea, vomiting,     No diarrhea or constipation. No melena   SKIN: dry                                       10.2   4.65  )-----------( 216      ( 28 May 2022 07:55 )             31.1       CBC Full  -  ( 28 May 2022 07:55 )  WBC Count : 4.65 K/uL  RBC Count : 3.23 M/uL  Hemoglobin : 10.2 g/dL  Hematocrit : 31.1 %  Platelet Count - Automated : 216 K/uL  Mean Cell Volume : 96.3 fl  Mean Cell Hemoglobin : 31.6 pg  Mean Cell Hemoglobin Concentration : 32.8 gm/dL  Auto Neutrophil # : x  Auto Lymphocyte # : x  Auto Monocyte # : x  Auto Eosinophil # : x  Auto Basophil # : x  Auto Neutrophil % : x  Auto Lymphocyte % : x  Auto Monocyte % : x  Auto Eosinophil % : x  Auto Basophil % : x      05-28    137  |  96  |  49<H>  ----------------------------<  95  3.9   |  33<H>  |  1.50<H>    Ca    9.5      28 May 2022 07:55    TPro  6.5  /  Alb  3.1<L>  /  TBili  0.4  /  DBili  0.2  /  AST  30  /  ALT  49  /  AlkPhos  41  05-28      CAPILLARY BLOOD GLUCOSE          Vital Signs Last 24 Hrs  T(C): 36.8 (28 May 2022 10:07), Max: 37.1 (28 May 2022 05:41)  T(F): 98.2 (28 May 2022 10:07), Max: 98.7 (28 May 2022 05:41)  HR: 60 (28 May 2022 10:07) (60 - 65)  BP: 130/70 (28 May 2022 13:10) (102/45 - 148/73)  BP(mean): --  RR: 18 (28 May 2022 10:07) (17 - 18)  SpO2: 90% (28 May 2022 10:07) (90% - 95%)        PT/INR - ( 28 May 2022 07:55 )   PT: 22.0 sec;   INR: 1.87 ratio                                                             PHYSICAL EXAM:    Constitutional: NAD  HEENT: conjunctive   clear   Neck:  No JVD  Respiratory: CTAB  Cardiovascular: S1 and S2  Gastrointestinal: BS+, soft, NT/ND  Extremities: No peripheral edema

## 2022-05-28 NOTE — PROGRESS NOTE ADULT - SUBJECTIVE AND OBJECTIVE BOX
PROGRESS NOTE  Patient is a 81y old  Female who presents with a chief complaint of 82 yo F w/ hx HTN, CHF, COPD, afib on eliquis p/w found with SOB since ~ 7am today at [facility]. NO known pain. EMS was called and found pt cyanotic and O2 56% on RA. EMS states 82% on 100% nrb. Pt on CPAP by EMS with sats in mid 90s. No known fever/chills. PT reported been off lasix over past couple days. No other acute co or changes Admitted  to telemetry unit for monitoring , send 3 sets of cardiac enzymes to rule out acute coronary event, obtain ECHO to evaluate LVEF, cardiology consult  ,continue current management, O2 supply, anticoagulation plan as per cardiology consult Admit to monitored unit for cardiac monitoring, obtain echo to evaluate LVEF, intravenous diuresis as per card consult , monitor ins/outs, monitor renal profile and electrolytes closely ,send 3 sets of enzymes, O2 supply, serial chest xrays, monitor weights and oral intake of fluids, nutritionist consult Palliative care consult requested ,to discuss advance directives and complete MOLST       [facility] RLE ulcer, CKD and CHF who was brought to the ED from [facility] for SOB and hypoxia. She was vaccinated with 3 doses of Moderna vaccine in the past but did not get the second booster dose.    w/ hx HTN, CHF, COPD, afib on eliquis p/w found with SOB since ~ 7am today at [facility]. NO known pain. EMS was called and found pt cyanotic and O2 56% on RA. EMS states 82% on 100% nrb. Pt on CPAP by EMS with sats in mid 90s. No known fever/chills. PT reported been off lasix over past couple days. No other acute co or changes (28 May 2022 07:41)      OVERNIGHT      HPI:  82 yo F w/ hx HTN, CHF, COPD, afib on eliquis p/w found with SOB since ~ 7am today at [facility]. NO known pain. EMS was called and found pt cyanotic and O2 56% on RA. EMS states 82% on 100% nrb. Pt on CPAP by EMS with sats in mid 90s. No known fever/chills. PT reported been off lasix over past couple days. No other acute co or changes Admitted  to telemetry unit for monitoring , send 3 sets of cardiac enzymes to rule out acute coronary event, obtain ECHO to evaluate LVEF, cardiology consult  ,continue current management, O2 supply, anticoagulation plan as per cardiology consult Admit to monitored unit for cardiac monitoring, obtain echo to evaluate LVEF, intravenous diuresis as per card consult , monitor ins/outs, monitor renal profile and electrolytes closely ,send 3 sets of enzymes, O2 supply, serial chest xrays, monitor weights and oral intake of fluids, nutritionist consult Palliative care consult requested ,to discuss advance directives and complete MOLST       [facility], RLE ulcer, CKD and CHF who was brought to the ED from [facility] for SOB and hypoxia. She was vaccinated with 3 doses of Moderna vaccine in the past but did not get the second booster dose.    w/ hx HTN, CHF, COPD, afib on eliquis p/w found with SOB since ~ 7am today at [facility]. NO known pain. EMS was called and found pt cyanotic and O2 56% on RA. EMS states 82% on 100% nrb. Pt on CPAP by EMS with sats in mid 90s. No known fever/chills. PT reported been off lasix over past couple days. No other acute co or changes          The patient is an 81 year old female with a PMH of COVID 19 in 2011, CHF, RLE ulcer, CKD and CHF who was brought to the ED from [facility] for SOB and hypoxia. She was vaccinated with 3 doses of Moderna vaccine in the past but did not get the second booster dose.    w/ hx HTN, CHF, COPD, afib on eliquis p/w found with SOB since ~ 7am today at [facility]. NO known pain. EMS was called and found pt cyanotic and O2 56% on RA. EMS states 82% on 100% nrb. Pt on CPAP by EMS with sats in mid 90s. No known fever/chills. PT reported been off lasix over past couple days. No other acute co or changes    < from: Xray Chest 1 View- PORTABLE-Urgent (05.22.22 @ 09:54) >    ACC: [id number] EXAM:  XR CHEST PORTABLE URGENT 1V                          PROCEDURE DATE:  05/22/2022          INTERPRETATION:  CLINICAL INDICATION: 81 years  Female with SOB, CHF.    COMPARISON: None    The patient's chin obscures the left lung apex.    AP view of the chest demonstrates a large right lower lobe infiltrate.   There is mild pulmonary vascular congestion. There is no pleural   effusion. There is no pneumothorax.    The heart is normal in size. Left-sided bipolar pacemaker. There is no   mediastinal or hilar mass.    Mild thoracic degenerative changes and osteopenia are present.    IMPRESSION:    Large right lower lobe infiltrates superimposed on pulmonary vascular   congestion. Pacemaker.    --- End of Report ---            PAUL MONROY MD; Attending Radiologist    < end of copied text >  < from: 12 Lead ECG (05.22.22 @ 09:28) >    Ventricular Rate 82 BPM    Atrial Rate 82 BPM    P-R Interval 208 ms    QRS Duration 162 ms    Q-T Interval 424 ms    QTC Calculation(Bazett) 495 ms    P Axis 77 degrees    R Axis 93 degrees    T Axis 24 degrees    Diagnosis Line Atrial-sensed ventricular-paced rhythm  Abnormal ECG  No previous ECGs available  Confirmed by dea Lopez ([id number]) on 5/22/2022 3:22:34 PM    < end of copied text >  sitivity (05.22.22 @ 13:48)   Troponin I, High Sensitivity Result: 138.8: Serial measurements of high sensitivity troponin I (hs TnI) showing rapid   upward or downward changes suggest acute myocardial injury. Please note   that a sustained elevation of hsTnI can be caused by renal disease,   chronic heart failure, sepsis, pulmonary embolism and other clinical   conditions. ng/L       Historical Values  Troponin I, High Sensitivity (05.22.22 @ 13:48)   Troponin I, High Sensitivity Result: 138.8: Serial measurements of high sensitivity troponin I (hs TnI) showing rapid   upward or downward changes suggest acute myocardial injury. Please note   that a sustained elevation of hsTnI can be caused by renal disease,   chronic heart failure, sepsis, pulmonary embolism and other clinical   conditions. ng/L   Troponin I, High Sensitivity (05.22.22 @ 09:43)   Troponin I, High Sensitivity Result: 21.2: Serial measurements of high sensitivity troponin I (hs TnI) showing rapid   upward or downward changes suggest acute myocardial injury. Please note   that a sustained elevation of hsTnI can be caused by renal disease,   chronic heart failure, sepsis, pulmonary embolism and other clinical   conditions. ng/L  (22 May 2022 12:13)    PAST MEDICAL & SURGICAL HISTORY:  HTN (hypertension)      HLD (hyperlipidemia)      Anemia  newly diagnosed      DVT (deep venous thrombosis)  2010 was on coumadin for 6 months      Pneumonia  years ago      OA (osteoarthritis)      Status post total left knee replacement  2007      History of cholecystectomy  open , many years ago      H/O hernia repair  years ago          MEDICATIONS  (STANDING):  aMIOdarone    Tablet 200 milliGRAM(s) Oral daily  apixaban 5 milliGRAM(s) Oral every 12 hours  carvedilol 25 milliGRAM(s) Oral two times a day  dexAMETHasone  Injectable 6 milliGRAM(s) IV Push daily  doxycycline hyclate Capsule 100 milliGRAM(s) Oral every 12 hours  fenofibrate Tablet 145 milliGRAM(s) Oral daily  ferrous    sulfate 325 milliGRAM(s) Oral daily  furosemide    Tablet 40 milliGRAM(s) Oral two times a day  hydrALAZINE 50 milliGRAM(s) Oral three times a day  lactobacillus acidophilus 1 Tablet(s) Oral two times a day  losartan 100 milliGRAM(s) Oral daily  Medihoney 1 Application(s) 1 Application(s) Topical daily  melatonin 5 milliGRAM(s) Oral at bedtime  potassium chloride    Tablet ER 20 milliEquivalent(s) Oral two times a day  tiotropium 18 MICROgram(s) Capsule 1 Capsule(s) Inhalation daily    MEDICATIONS  (PRN):  acetaminophen     Tablet .. 650 milliGRAM(s) Oral every 6 hours PRN Temp greater or equal to 38C (100.4F)  aluminum hydroxide/magnesium hydroxide/simethicone Suspension 30 milliLiter(s) Oral every 4 hours PRN Dyspepsia  cloNIDine 0.2 milliGRAM(s) Oral every 6 hours PRN for systolic BP>180 or diastolic BP>100  ondansetron Injectable 4 milliGRAM(s) IV Push every 8 hours PRN Nausea and/or Vomiting      OBJECTIVE    T(C): 37.1 (05-28-22 @ 05:41), Max: 37.1 (05-28-22 @ 05:41)  HR: 60 (05-28-22 @ 05:41) (60 - 65)  BP: 123/49 (05-28-22 @ 05:41) (114/55 - 148/73)  RR: 18 (05-28-22 @ 05:41) (17 - 18)  SpO2: 91% (05-28-22 @ 05:41) (86% - 95%)  Wt(kg): --  I&O's Summary    27 May 2022 07:01  -  28 May 2022 07:00  --------------------------------------------------------  IN: 550 mL / OUT: 1400 mL / NET: -850 mL          REVIEW OF SYSTEMS:  CONSTITUTIONAL: No fever, weight loss, or fatigue  EYES: No eye pain, visual disturbances, or discharge  ENMT:   No sinus or throat pain  NECK: No pain or stiffness  RESPIRATORY: No cough, wheezing, chills or hemoptysis; No shortness of breath  CARDIOVASCULAR: No chest pain, palpitations, dizziness, or leg swelling  GASTROINTESTINAL: No abdominal pain. No nausea, vomiting; No diarrhea or constipation. No melena or hematochezia.  GENITOURINARY: No dysuria, frequency, hematuria, or incontinence  NEUROLOGICAL: No headaches, memory loss, loss of strength, numbness, or tremors  SKIN: No itching, burning, rashes, or lesions   MUSCULOSKELETAL: No joint pain or swelling; No muscle, back, or extremity pain    PHYSICAL EXAM:  Appearance: NAD. VS past 24 hrs -as above   HEENT:   Moist oral mucosa. Conjunctiva clear b/l.   Neck : supple  Respiratory: Lungs CTAB.  Gastrointestinal:  Soft, nontender. No rebound. No rigidity. BS present	  Cardiovascular: RRR ,S1S2 present  Neurologic: Non-focal. Moving all extremities.  Extremities: No edema. No erythema. No calf tenderness.  Skin: No rashes, No ecchymoses, No cyanosis.	  wounds ,skin lesions-See skin assesment flow sheet   LABS:                        10.2   4.65  )-----------( 216      ( 28 May 2022 07:55 )             31.1     05-27    x   |  x   |  x   ----------------------------<  x   x    |  x   |  1.00      TPro  6.3  /  Alb  2.8<L>  /  TBili  0.4  /  DBili  0.1  /  AST  34  /  ALT  55  /  AlkPhos  38<L>  05-27    CAPILLARY BLOOD GLUCOSE        PT/INR - ( 28 May 2022 07:55 )   PT: 22.0 sec;   INR: 1.87 ratio               Culture - Urine (collected 22 May 2022 16:30)  Source: Clean Catch Clean Catch (Midstream)  Final Report (24 May 2022 11:47):    10,000 - 49,000 CFU/mL Enterobacter cloacae complex    <10,000 CFU/ml Normal Urogenital benson present  Organism: Enterobacter cloacae complex (24 May 2022 11:47)  Organism: Enterobacter cloacae complex (24 May 2022 11:47)    Culture - Blood (collected 22 May 2022 16:28)  Source: .Blood Blood-Peripheral  Final Report (27 May 2022 17:01):    No Growth Final    Culture - Blood (collected 22 May 2022 16:28)  Source: .Blood Blood-Peripheral  Final Report (27 May 2022 17:01):    No Growth Final      RADIOLOGY & ADDITIONAL TESTS:   reviewed elctronically  ASSESSMENT/PLAN:

## 2022-05-28 NOTE — PROGRESS NOTE ADULT - ASSESSMENT
82 yo F w/ hx HTN, CHF, COPD, afib on eliquis p/w found with SOB since ~ [facility]. NO known pain. EMS was called and found pt cyanotic and O2 56% on RA. EMS states 82% on 100% nrb. Pt on CPAP by EMS with sats in mid 90s. No known fever/chills. PT reported been off lasix over past couple days. No other acute co or changes Admitted  to telemetry unit for monitoring , send 3 sets of cardiac enzymes to rule out acute coronary event, obtain ECHO to evaluate LVEF, cardiology consult  ,continue current management, O2 supply, anticoagulation plan as per cardiology consult Admit to monitored unit for cardiac monitoring, obtain echo to evaluate LVEF, intravenous diuresis as per card consult , monitor ins/outs, monitor renal profile and electrolytes closely ,send 3 sets of enzymes, O2 supply, serial chest xrays, monitor weights and oral intake of fluids, nutritionist consult Palliative care consult requested ,to discuss advance directives and complete MOLST     ACUTE RENAL FAILURE:   Serum creatinine is  improved   There is no progression . No uremic symptoms  No evidence of anemia .  Fluid status stable.  Will continue to avoid nephrotoxic drugs.  Patient remains asymptomatic.   Continue current therapy.  losartan 100 milliGRAM(s) Oral daily    BP monitoring,continue current antihypertensive meds, low salt diet,followup with PMD in 1-2 weeks  aMIOdarone    Tablet 200 milliGRAM(s) Oral daily    fluid overload furosemide    Tablet 40 milliGRAM(s) Oral two times a day

## 2022-05-28 NOTE — PROGRESS NOTE ADULT - SUBJECTIVE AND OBJECTIVE BOX
PROGRESS NOTE  Patient is a 81y old  Female who presents with a chief complaint of 80 yo F w/ hx HTN, CHF, COPD, afib on eliquis p/w found with SOB since ~ [facility]. NO known pain. EMS was called and found pt cyanotic and O2 56% on RA. EMS states 82% on 100% nrb. Pt on CPAP by EMS with sats in mid 90s. No known fever/chills. PT reported been off lasix over past couple days. No other acute co or changes Admitted  to telemetry unit for monitoring , send 3 sets of cardiac enzymes to rule out acute coronary event, obtain ECHO to evaluate LVEF, cardiology consult  ,continue current management, O2 supply, anticoagulation plan as per cardiology consult Admit to monitored unit for cardiac monitoring, obtain echo to evaluate LVEF, intravenous diuresis as per card consult , monitor ins/outs, monitor renal profile and electrolytes closely ,send 3 sets of enzymes, O2 supply, serial chest xrays, monitor weights and oral intake of fluids, nutritionist consult Palliative care consult requested ,to discuss advance directives and complete [facility] (28 May 2022 14:48)  Chart and available morning labs /imaging are reviewed electronically , urgent issues addressed . More information  is being added upon completion of rounds , when more information is collected and management discussed with consultants , medical staff and social service/case management on the floor     OVERNIGHT  No new issues reported by medical staff . All above noted Patient is resting in a bed comfortably No distress noted   Feels much better , remains off oxygen overnight K is noted ,renal indices are up ,nephrologist and cardiologist are aware and lasix held until they make further recommendations   HPI:  80 yo F w/ hx HTN, CHF, COPD, afib on eliquis p/w found with SOB since ~ 7am today at [facility]. NO known pain. EMS was called and found pt cyanotic and O2 56% on RA. EMS states 82% on 100% nrb. Pt on CPAP by EMS with sats in mid 90s. No known fever/chills. PT reported been off lasix over past couple days. No other acute co or changes Admitted  to telemetry unit for monitoring , send 3 sets of cardiac enzymes to rule out acute coronary event, obtain ECHO to evaluate LVEF, cardiology consult  ,continue current management, O2 supply, anticoagulation plan as per cardiology consult Admit to monitored unit for cardiac monitoring, obtain echo to evaluate LVEF, intravenous diuresis as per card consult , monitor ins/outs, monitor renal profile and electrolytes closely ,send 3 sets of enzymes, O2 supply, serial chest xrays, monitor weights and oral intake of fluids, nutritionist consult Palliative care consult requested ,to discuss advance directives and complete MOLST       TPMH of COVID 19 in 2011, CHF, RLE ulcer, CKD and CHF who was brought to the ED from [facility] for SOB and hypoxia. She was vaccinated with 3 doses of Moderna vaccine in the past but did not get the second booster dose.    w/ hx HTN, CHF, COPD, afib on eliquis p/w found with SOB since ~ 7am today at [facility]. NO known pain. EMS was called and found pt cyanotic and O2 56% on RA. EMS states 82% on 100% nrb. Pt on CPAP by EMS with sats in mid 90s. No known fever/chills. PT reported been off lasix over past couple days. No other acute co or changes          The patient is an 81 year old female with a PMH of COVID 19 in 2011, CHF, RLE ulcer, CKD and CHF who was brought to the ED from [facility] for SOB and hypoxia. She was vaccinated with 3 doses of Moderna vaccine in the past but did not get the second booster dose.    w/ hx HTN, CHF, COPD, afib on eliquis p/w found with SOB since ~ 7am today at [facility]. NO known pain. EMS was called and found pt cyanotic and O2 56% on RA. EMS states 82% on 100% nrb. Pt on CPAP by EMS with sats in mid 90s. No known fever/chills. PT reported been off lasix over past couple days. No other acute co or changes    < from: Xray Chest 1 View- PORTABLE-Urgent (05.22.22 @ 09:54) >    ACC: [id number] EXAM:  XR CHEST PORTABLE URGENT 1V                          PROCEDURE DATE:  05/22/2022          INTERPRETATION:  CLINICAL INDICATION: 81 years  Female with SOB, CHF.    COMPARISON: None    The patient's chin obscures the left lung apex.    AP view of the chest demonstrates a large right lower lobe infiltrate.   There is mild pulmonary vascular congestion. There is no pleural   effusion. There is no pneumothorax.    The heart is normal in size. Left-sided bipolar pacemaker. There is no   mediastinal or hilar mass.    Mild thoracic degenerative changes and osteopenia are present.    IMPRESSION:    Large right lower lobe infiltrates superimposed on pulmonary vascular   congestion. Pacemaker.    --- End of Report ---            PAUL MONROY MD; Attending Radiologist    < end of copied text >  < from: 12 Lead ECG (05.22.22 @ 09:28) >    Ventricular Rate 82 BPM    Atrial Rate 82 BPM    P-R Interval 208 ms    QRS Duration 162 ms    Q-T Interval 424 ms    QTC Calculation(Bazett) 495 ms    P Axis 77 degrees    R Axis 93 degrees    T Axis 24 degrees    Diagnosis Line Atrial-sensed ventricular-paced rhythm  Abnormal ECG  No previous ECGs available  Confirmed by dea Lopez ([id number]) on 5/22/2022 3:22:34 PM    < end of copied text >  sitivity (05.22.22 @ 13:48)   Troponin I, High Sensitivity Result: 138.8: Serial measurements of high sensitivity troponin I (hs TnI) showing rapid   upward or downward changes suggest acute myocardial injury. Please note   that a sustained elevation of hsTnI can be caused by renal disease,   chronic heart failure, sepsis, pulmonary embolism and other clinical   conditions. ng/L       Historical Values  Troponin I, High Sensitivity (05.22.22 @ 13:48)   Troponin I, High Sensitivity Result: 138.8: Serial measurements of high sensitivity troponin I (hs TnI) showing rapid   upward or downward changes suggest acute myocardial injury. Please note   that a sustained elevation of hsTnI can be caused by renal disease,   chronic heart failure, sepsis, pulmonary embolism and other clinical   conditions. ng/L   Troponin I, High Sensitivity (05.22.22 @ 09:43)   Troponin I, High Sensitivity Result: 21.2: Serial measurements of high sensitivity troponin I (hs TnI) showing rapid   upward or downward changes suggest acute myocardial injury. Please note   that a sustained elevation of hsTnI can be caused by renal disease,   chronic heart failure, sepsis, pulmonary embolism and other clinical   conditions. ng/L  (22 May 2022 12:13)    PAST MEDICAL & SURGICAL HISTORY:  HTN (hypertension)      HLD (hyperlipidemia)      Anemia  newly diagnosed      DVT (deep venous thrombosis)  2010 was on coumadin for 6 months      Pneumonia  years ago      OA (osteoarthritis)      Status post total left knee replacement  2007      History of cholecystectomy  open , many years ago      H/O hernia repair  years ago          MEDICATIONS  (STANDING):  aMIOdarone    Tablet 200 milliGRAM(s) Oral daily  apixaban 5 milliGRAM(s) Oral every 12 hours  carvedilol 25 milliGRAM(s) Oral two times a day  dexAMETHasone  Injectable 6 milliGRAM(s) IV Push daily  doxycycline hyclate Capsule 100 milliGRAM(s) Oral every 12 hours  fenofibrate Tablet 145 milliGRAM(s) Oral daily  ferrous    sulfate 325 milliGRAM(s) Oral daily  hydrALAZINE 50 milliGRAM(s) Oral three times a day  lactobacillus acidophilus 1 Tablet(s) Oral two times a day  losartan 100 milliGRAM(s) Oral daily  Medihoney 1 Application(s) 1 Application(s) Topical daily  melatonin 5 milliGRAM(s) Oral at bedtime  potassium chloride    Tablet ER 20 milliEquivalent(s) Oral two times a day  tiotropium 18 MICROgram(s) Capsule 1 Capsule(s) Inhalation daily    MEDICATIONS  (PRN):  acetaminophen     Tablet .. 650 milliGRAM(s) Oral every 6 hours PRN Temp greater or equal to 38C (100.4F)  aluminum hydroxide/magnesium hydroxide/simethicone Suspension 30 milliLiter(s) Oral every 4 hours PRN Dyspepsia  cloNIDine 0.2 milliGRAM(s) Oral every 6 hours PRN for systolic BP>180 or diastolic BP>100  ondansetron Injectable 4 milliGRAM(s) IV Push every 8 hours PRN Nausea and/or Vomiting      OBJECTIVE    T(C): 36.8 (05-28-22 @ 10:07), Max: 37.1 (05-28-22 @ 05:41)  HR: 60 (05-28-22 @ 10:07) (60 - 65)  BP: 130/70 (05-28-22 @ 13:10) (102/45 - 148/73)  RR: 18 (05-28-22 @ 10:07) (17 - 18)  SpO2: 90% (05-28-22 @ 10:07) (90% - 95%)  Wt(kg): --  I&O's Summary    27 May 2022 07:01  -  28 May 2022 07:00  --------------------------------------------------------  IN: 550 mL / OUT: 1400 mL / NET: -850 mL          REVIEW OF SYSTEMS:  CONSTITUTIONAL: No fever, weight loss, or fatigue  Patient is  unable to provide any information/ROS  due to baseline mental status.     PHYSICAL EXAM:  Appearance: NAD. VS past 24 hrs -as above   HEENT:   Moist oral mucosa. Conjunctiva clear b/l.   Neck : supple  Respiratory: Lungs CTAB.  Gastrointestinal:  Soft, nontender. No rebound. No rigidity. BS present	  Cardiovascular: RRR ,S1S2 present  Neurologic: Non-focal. Moving all extremities.  Extremities: No edema. No erythema. No calf tenderness.  Skin: No rashes, No ecchymoses, No cyanosis.	  wounds ,skin lesions-See skin assesment flow sheet   LABS:                        10.2   4.65  )-----------( 216      ( 28 May 2022 07:55 )             31.1     05-28    137  |  96  |  49<H>  ----------------------------<  95  3.9   |  33<H>  |  1.50<H>    Ca    9.5      28 May 2022 07:55    TPro  6.5  /  Alb  3.1<L>  /  TBili  0.4  /  DBili  0.2  /  AST  30  /  ALT  49  /  AlkPhos  41  05-28    CAPILLARY BLOOD GLUCOSE        PT/INR - ( 28 May 2022 07:55 )   PT: 22.0 sec;   INR: 1.87 ratio               Culture - Urine (collected 22 May 2022 16:30)  Source: Clean Catch Clean Catch (Midstream)  Final Report (24 May 2022 11:47):    10,000 - 49,000 CFU/mL Enterobacter cloacae complex    <10,000 CFU/ml Normal Urogenital benson present  Organism: Enterobacter cloacae complex (24 May 2022 11:47)  Organism: Enterobacter cloacae complex (24 May 2022 11:47)    Culture - Blood (collected 22 May 2022 16:28)  Source: .Blood Blood-Peripheral  Final Report (27 May 2022 17:01):    No Growth Final    Culture - Blood (collected 22 May 2022 16:28)  Source: .Blood Blood-Peripheral  Final Report (27 May 2022 17:01):    No Growth Final      RADIOLOGY & ADDITIONAL TESTS:   reviewed elctronically  ASSESSMENT/PLAN: 	    25 minutes aggregate time was spent on this visit, 50% visit time spent in care co-ordination with other attendings and counselling patient .I have discussed care plan with patient / HCP/family member ,who expressed understanding of problems treatment and their effect and side effects, alternatives in details. I have asked if they have any questions and concerns and appropriately addressed them to best of my ability.

## 2022-05-28 NOTE — PROGRESS NOTE ADULT - SUBJECTIVE AND OBJECTIVE BOX
Patient is a 81y old  Female who presents with a chief complaint of 82 yo F w/ hx HTN, CHF, COPD, afib on eliquis p/w found with SOB since ~ [facility]am [facility] at [facility]. NO known pain. EMS was called and found pt cyanotic and O2 56% on RA. EMS states 82% on 100% nrb. Pt on CPAP by EMS with sats in mid 90s. No known fever/chills. PT reported been off lasix over past couple days. No other acute co or changes Admitted  to telemetry unit for monitoring , send 3 sets of cardiac enzymes to rule out acute coronary event, obtain ECHO to evaluate LVEF, cardiology consult  ,continue current management, O2 supply, anticoagulation plan as per cardiology consult Admit to monitored unit for cardiac monitoring, obtain echo to evaluate LVEF, intravenous diuresis as per card consult , monitor ins/outs, monitor renal profile and electrolytes closely ,send 3 sets of enzymes, O2 supply, serial chest xrays, monitor weights and oral intake of fluids, nutritionist consult Palliative care consult requested ,to discuss advance directives and complete MOLST       [facility], RLE ulcer, CKD and CHF who was brought to the ED from [facility] for SOB and hypoxia. She was vaccinated with 3 doses of Moderna vaccine in the past but did not get the second booster dose.    w/ hx HTN, CHF, COPD, afib on eliquis p/w found with SOB since ~ 7am today at [facility]. NO known pain. EMS was called and found pt cyanotic and O2 56% on RA. EMS states 82% on 100% nrb. Pt on CPAP by EMS with sats in mid 90s. No known fever/chills. PT reported been off lasix over past couple days. No other acute co or changes (28 May 2022 11:05)      INTERVAL HPI/OVERNIGHT EVENTS:    No respiratory distress    MEDICATIONS  (STANDING):  aMIOdarone    Tablet 200 milliGRAM(s) Oral daily  apixaban 5 milliGRAM(s) Oral every 12 hours  carvedilol 25 milliGRAM(s) Oral two times a day  dexAMETHasone  Injectable 6 milliGRAM(s) IV Push daily  doxycycline hyclate Capsule 100 milliGRAM(s) Oral every 12 hours  fenofibrate Tablet 145 milliGRAM(s) Oral daily  ferrous    sulfate 325 milliGRAM(s) Oral daily  furosemide    Tablet 40 milliGRAM(s) Oral two times a day  hydrALAZINE 50 milliGRAM(s) Oral three times a day  lactobacillus acidophilus 1 Tablet(s) Oral two times a day  losartan 100 milliGRAM(s) Oral daily  Medihoney 1 Application(s) 1 Application(s) Topical daily  melatonin 5 milliGRAM(s) Oral at bedtime  potassium chloride    Tablet ER 20 milliEquivalent(s) Oral two times a day  tiotropium 18 MICROgram(s) Capsule 1 Capsule(s) Inhalation daily      MEDICATIONS  (PRN):  acetaminophen     Tablet .. 650 milliGRAM(s) Oral every 6 hours PRN Temp greater or equal to 38C (100.4F)  aluminum hydroxide/magnesium hydroxide/simethicone Suspension 30 milliLiter(s) Oral every 4 hours PRN Dyspepsia  cloNIDine 0.2 milliGRAM(s) Oral every 6 hours PRN for systolic BP>180 or diastolic BP>100  ondansetron Injectable 4 milliGRAM(s) IV Push every 8 hours PRN Nausea and/or Vomiting      Allergies    fluoxetine (Unknown)  NIFEdipine (Unknown)  Procardia (Other)    Intolerances    oxycodone (Other)      PAST MEDICAL & SURGICAL HISTORY:  HTN (hypertension)      HLD (hyperlipidemia)      Anemia  newly diagnosed      DVT (deep venous thrombosis)  2010 was on coumadin for 6 months      Pneumonia  years ago      OA (osteoarthritis)      Status post total left knee replacement  2007      History of cholecystectomy  open , many years ago      H/O hernia repair  years ago          Vital Signs Last 24 Hrs  T(C): 36.8 (28 May 2022 10:07), Max: 37.1 (28 May 2022 05:41)  T(F): 98.2 (28 May 2022 10:07), Max: 98.7 (28 May 2022 05:41)  HR: 60 (28 May 2022 10:07) (60 - 65)  BP: 130/70 (28 May 2022 13:10) (102/45 - 148/73)  BP(mean): --  RR: 18 (28 May 2022 10:07) (17 - 18)  SpO2: 90% (28 May 2022 10:07) (90% - 95%)    PHYSICAL EXAMINATION:    GENERAL: The patient is awake and alert in no apparent distress.     HEENT: Head is normocephalic and atraumatic. Extraocular muscles are intact. Mucous membranes are moist.    NECK: no JVD    LUNGS: Clear to auscultation without wheezing, rales or rhonchi; respirations unlabored    HEART: Regular rate and rhythm without murmur.    ABDOMEN: Soft, nontender, and nondistended.      EXTREMITIES: Without any cyanosis, clubbing, rash, lesions or edema.    NEUROLOGIC: Grossly intact.    SKIN: No ulceration or induration present.      LABS:                        10.2   4.65  )-----------( 216      ( 28 May 2022 07:55 )             31.1     05-28    137  |  96  |  49<H>  ----------------------------<  95  3.9   |  33<H>  |  1.50<H>    Ca    9.5      28 May 2022 07:55    TPro  6.5  /  Alb  3.1<L>  /  TBili  0.4  /  DBili  0.2  /  AST  30  /  ALT  49  /  AlkPhos  41  05-28    PT/INR - ( 28 May 2022 07:55 )   PT: 22.0 sec;   INR: 1.87 ratio         Assessment:    Acute Hypoxic respiratory failure  Resolving COVID-19  COPD  Resolving Pneumonia    Plan:    Discharge planning  Home oxygen

## 2022-05-28 NOTE — PROGRESS NOTE ADULT - SUBJECTIVE AND OBJECTIVE BOX
Interval History:    CENTRAL LINE:   [  ] YES       [  ] NO  WILLIS:                 [  ] YES       [  ] NO         REVIEW OF SYSTEMS:  All Systems below were reviewed and are negative [  ]  HEENT:  ID:  Pulmonary:  Cardiac:  GI:  Renal:  Musculoskeletal:  All other systems above were reviewed and are negative   [  ]      MEDICATIONS  (STANDING):  aMIOdarone    Tablet 200 milliGRAM(s) Oral daily  apixaban 5 milliGRAM(s) Oral every 12 hours  carvedilol 25 milliGRAM(s) Oral two times a day  dexAMETHasone  Injectable 6 milliGRAM(s) IV Push daily  doxycycline hyclate Capsule 100 milliGRAM(s) Oral every 12 hours  fenofibrate Tablet 145 milliGRAM(s) Oral daily  ferrous    sulfate 325 milliGRAM(s) Oral daily  hydrALAZINE 50 milliGRAM(s) Oral three times a day  lactobacillus acidophilus 1 Tablet(s) Oral two times a day  losartan 100 milliGRAM(s) Oral daily  Medihoney 1 Application(s) 1 Application(s) Topical daily  melatonin 5 milliGRAM(s) Oral at bedtime  potassium chloride    Tablet ER 20 milliEquivalent(s) Oral two times a day  tiotropium 18 MICROgram(s) Capsule 1 Capsule(s) Inhalation daily    MEDICATIONS  (PRN):  acetaminophen     Tablet .. 650 milliGRAM(s) Oral every 6 hours PRN Temp greater or equal to 38C (100.4F)  aluminum hydroxide/magnesium hydroxide/simethicone Suspension 30 milliLiter(s) Oral every 4 hours PRN Dyspepsia  cloNIDine 0.2 milliGRAM(s) Oral every 6 hours PRN for systolic BP>180 or diastolic BP>100  ondansetron Injectable 4 milliGRAM(s) IV Push every 8 hours PRN Nausea and/or Vomiting      Vital Signs Last 24 Hrs  T(C): 36.7 (28 May 2022 16:57), Max: 37.1 (28 May 2022 05:41)  T(F): 98 (28 May 2022 16:57), Max: 98.7 (28 May 2022 05:41)  HR: 60 (28 May 2022 16:57) (60 - 65)  BP: 137/77 (28 May 2022 16:57) (102/45 - 148/73)  BP(mean): --  RR: 19 (28 May 2022 16:57) (17 - 19)  SpO2: 90% (28 May 2022 16:57) (90% - 95%)    I&O's Summary    27 May 2022 07:01  -  28 May 2022 07:00  --------------------------------------------------------  IN: 550 mL / OUT: 1400 mL / NET: -850 mL        PHYSICAL EXAM:  HEENT: NC/AT; PERRLA  Neck: Soft; no tenderness  Lungs: CTA bilaterally; no wheezing.   Heart:  Abdomen:  Genital/ Rectal:  Extremities:  Neurologic:  Vascular:      LABORATORY:    CBC Full  -  ( 28 May 2022 07:55 )  WBC Count : 4.65 K/uL  RBC Count : 3.23 M/uL  Hemoglobin : 10.2 g/dL  Hematocrit : 31.1 %  Platelet Count - Automated : 216 K/uL  Mean Cell Volume : 96.3 fl  Mean Cell Hemoglobin : 31.6 pg  Mean Cell Hemoglobin Concentration : 32.8 gm/dL  Auto Neutrophil # : x  Auto Lymphocyte # : x  Auto Monocyte # : x  Auto Eosinophil # : x  Auto Basophil # : x  Auto Neutrophil % : x  Auto Lymphocyte % : x  Auto Monocyte % : x  Auto Eosinophil % : x  Auto Basophil % : x      ESR:                   05-25 @ 11:24  --    C-Reactive Protein:     05-25 @ 11:24  42    Procalcitonin:           05-25 @ 11:24   --  ESR:                   05-25 @ 07:46  --    C-Reactive Protein:     05-25 @ 07:46  --    Procalcitonin:           05-25 @ 07:46   2.08  ESR:                   05-23 @ 11:51  --    C-Reactive Protein:     05-23 @ 11:51  91    Procalcitonin:           05-23 @ 11:51   --  ESR:                   05-23 @ 08:41  --    C-Reactive Protein:     05-23 @ 08:41  --    Procalcitonin:           05-23 @ 08:41   4.91      05-28    137  |  96  |  49<H>  ----------------------------<  95  3.9   |  33<H>  |  1.50<H>    Ca    9.5      28 May 2022 07:55    TPro  6.5  /  Alb  3.1<L>  /  TBili  0.4  /  DBili  0.2  /  AST  30  /  ALT  49  /  AlkPhos  41  05-28      Rapid Respiratory Viral Panel Result        05-22 @ 10:04  Rapid RVP Detected  Coronovirus --  Adenovirus --  Bordetella Pertussis --  Chlamydia Pneumonia --  Entero/Rhinovirus--  HKU1 Coronovirus --  HMPV Coronovirus --  Influenza A --  Influenza AH1 --  Influenza AH1 2009 --  Influenza AH3 --  Influenza B --  Mycoplasma Pneumoniae --  NL63 Coronovirus --  OC43 Coronovirus --  Parainfluenza 1 --  Parainfluenza 2 --  Parainfluenza 3 --  Parainfluenza 4 --  Resp Syncytial Virus --      Assessment and Plan:          Emir Tomas MD   [phone].    She is afebrile  On RA with no hypoxia.      MEDICATIONS  (STANDING):  aMIOdarone    Tablet 200 milliGRAM(s) Oral daily  apixaban 5 milliGRAM(s) Oral every 12 hours  carvedilol 25 milliGRAM(s) Oral two times a day  dexAMETHasone  Injectable 6 milliGRAM(s) IV Push daily  doxycycline hyclate Capsule 100 milliGRAM(s) Oral every 12 hours  fenofibrate Tablet 145 milliGRAM(s) Oral daily  ferrous    sulfate 325 milliGRAM(s) Oral daily  hydrALAZINE 50 milliGRAM(s) Oral three times a day  lactobacillus acidophilus 1 Tablet(s) Oral two times a day  losartan 100 milliGRAM(s) Oral daily  Medihoney 1 Application(s) 1 Application(s) Topical daily  melatonin 5 milliGRAM(s) Oral at bedtime  potassium chloride    Tablet ER 20 milliEquivalent(s) Oral two times a day  tiotropium 18 MICROgram(s) Capsule 1 Capsule(s) Inhalation daily    MEDICATIONS  (PRN):  acetaminophen     Tablet .. 650 milliGRAM(s) Oral every 6 hours PRN Temp greater or equal to 38C (100.4F)  aluminum hydroxide/magnesium hydroxide/simethicone Suspension 30 milliLiter(s) Oral every 4 hours PRN Dyspepsia  cloNIDine 0.2 milliGRAM(s) Oral every 6 hours PRN for systolic BP>180 or diastolic BP>100  ondansetron Injectable 4 milliGRAM(s) IV Push every 8 hours PRN Nausea and/or Vomiting      Vital Signs Last 24 Hrs  T(C): 36.7 (28 May 2022 16:57), Max: 37.1 (28 May 2022 05:41)  T(F): 98 (28 May 2022 16:57), Max: 98.7 (28 May 2022 05:41)  HR: 60 (28 May 2022 16:57) (60 - 65)  BP: 137/77 (28 May 2022 16:57) (102/45 - 148/73)  BP(mean): --  RR: 19 (28 May 2022 16:57) (17 - 19)  SpO2: 90% (28 May 2022 16:57) (90% - 95%)      PHYSICAL EXAM:  HEENT: NC/AT; PERRLA  Neck: Soft; no tenderness  Lungs: Coarse BS  bilaterally; no wheezing.   Heart: RRR, no murmurs.   Abdomen: Soft, no tenderness.   Genital/ Rectal: No hale   Extremities: RLE wound with decreased erythema and swelling.   Neurologic:  Awake.       LABORATORY:    CBC Full  -  ( 28 May 2022 07:55 )  WBC Count : 4.65 K/uL  RBC Count : 3.23 M/uL  Hemoglobin : 10.2 g/dL  Hematocrit : 31.1 %  Platelet Count - Automated : 216 K/uL  Mean Cell Volume : 96.3 fl  Mean Cell Hemoglobin : 31.6 pg  Mean Cell Hemoglobin Concentration : 32.8 gm/dL  Auto Neutrophil # : x  Auto Lymphocyte # : x  Auto Monocyte # : x  Auto Eosinophil # : x  Auto Basophil # : x  Auto Neutrophil % : x  Auto Lymphocyte % : x  Auto Monocyte % : x  Auto Eosinophil % : x  Auto Basophil % : x      ESR:                   05-25 @ 11:24  --    C-Reactive Protein:     05-25 @ 11:24  42    Procalcitonin:           05-25 @ 11:24   --  ESR:                   05-25 @ 07:46  --    C-Reactive Protein:     05-25 @ 07:46  --    Procalcitonin:           05-25 @ 07:46   2.08  ESR:                   05-23 @ 11:51  --    C-Reactive Protein:     05-23 @ 11:51  91    Procalcitonin:           05-23 @ 11:51   --  ESR:                   05-23 @ 08:41  --    C-Reactive Protein:     05-23 @ 08:41  --    Procalcitonin:           05-23 @ 08:41   4.91      05-28    137  |  96  |  49<H>  ----------------------------<  95  3.9   |  33<H>  |  1.50<H>    Ca    9.5      28 May 2022 07:55    TPro  6.5  /  Alb  3.1<L>  /  TBili  0.4  /  DBili  0.2  /  AST  30  /  ALT  49  /  AlkPhos  41  05-28      Assessment and Plan:    1. Pneumonia of lower lobes: aspiration vs COVID 19 pneumonia  2. Respiratory failure with hypoxia.  3. RLE cellulitis  4, RLE chronic ulcer.  5. CKD.    . Hypoxia resolved.   . Cdiscontinue IV Dexamethasone after today.   . Completed  Remdesivir IV for COVID 19.   . Continue po Doxycycline 100 mg bid for 2  more days for RLE cellulitis.   . RLE wound care daily.   . Aspiration precautions.  . Discharge planning.         Emir Tomas MD   [phone].

## 2022-05-28 NOTE — PROGRESS NOTE ADULT - SUBJECTIVE AND OBJECTIVE BOX
Date/Time Patient Seen:  		  Referring MD:   Data Reviewed	       Patient is a 81y old  Female who presents with a chief complaint of 80 yo F w/ hx HTN, CHF, COPD, afib on eliquis p/w found with SOB since ~ [facility]am today at [facility]F. NO known pain. EMS was called and found pt cyanotic and O2 56% on RA. EMS states 82% on 100% nrb. Pt on CPAP by EMS with sats in mid 90s. No known fever/chills. PT reported been off lasix over past couple days. No other acute co or changes Admitted  to telemetry unit for monitoring , send 3 sets of cardiac enzymes to rule out acute coronary event, obtain ECHO to evaluate LVEF, cardiology consult  ,continue current management, O2 supply, anticoagulation plan as per cardiology consult Admit to monitored unit for cardiac monitoring, obtain echo to evaluate LVEF, intravenous diuresis as per card consult , monitor ins/outs, monitor renal profile and electrolytes closely ,send 3 sets of enzymes, O2 supply, serial chest xrays, monitor weights and oral intake of fluids, nutritionist consult Palliative care consult requested ,to discuss advance directives and complete MOLST       [facility] of COVID 19 in 2011, CHF, RLE ulcer, CKD and CHF who was brought to the ED from [facility] for SOB and hypoxia. She was vaccinated with 3 doses of Moderna vaccine in the past but did not get the second booster dose.    w/ hx HTN, CHF, COPD, afib on eliquis p/w found with SOB since ~ 7am today at SNF. NO known pain. EMS was called and found pt cyanotic and O2 56% on RA. EMS states 82% on 100% nrb. Pt on CPAP by EMS with sats in mid 90s. No known fever/chills. PT reported been off lasix over past couple days. No other acute co or changes (27 May 2022 21:34)      Subjective/HPI     PAST MEDICAL & SURGICAL HISTORY:  HTN (hypertension)    HLD (hyperlipidemia)    Anemia  newly diagnosed    DVT (deep venous thrombosis)  2010 was on coumadin for 6 months    Pneumonia  years ago    OA (osteoarthritis)    Status post total left knee replacement  2007    History of cholecystectomy  open , many years ago    H/O hernia repair  years ago          Medication list         MEDICATIONS  (STANDING):  aMIOdarone    Tablet 200 milliGRAM(s) Oral daily  apixaban 5 milliGRAM(s) Oral every 12 hours  carvedilol 25 milliGRAM(s) Oral two times a day  dexAMETHasone  Injectable 6 milliGRAM(s) IV Push daily  doxycycline hyclate Capsule 100 milliGRAM(s) Oral every 12 hours  fenofibrate Tablet 145 milliGRAM(s) Oral daily  ferrous    sulfate 325 milliGRAM(s) Oral daily  furosemide    Tablet 40 milliGRAM(s) Oral two times a day  hydrALAZINE 50 milliGRAM(s) Oral three times a day  lactobacillus acidophilus 1 Tablet(s) Oral two times a day  losartan 100 milliGRAM(s) Oral daily  Medihoney 1 Application(s) 1 Application(s) Topical daily  melatonin 5 milliGRAM(s) Oral at bedtime  potassium chloride    Tablet ER 20 milliEquivalent(s) Oral two times a day  tiotropium 18 MICROgram(s) Capsule 1 Capsule(s) Inhalation daily    MEDICATIONS  (PRN):  acetaminophen     Tablet .. 650 milliGRAM(s) Oral every 6 hours PRN Temp greater or equal to 38C (100.4F)  aluminum hydroxide/magnesium hydroxide/simethicone Suspension 30 milliLiter(s) Oral every 4 hours PRN Dyspepsia  cloNIDine 0.2 milliGRAM(s) Oral every 6 hours PRN for systolic BP>180 or diastolic BP>100  ondansetron Injectable 4 milliGRAM(s) IV Push every 8 hours PRN Nausea and/or Vomiting         Vitals log        ICU Vital Signs Last 24 Hrs  T(C): 37.1 (28 May 2022 05:41), Max: 37.1 (28 May 2022 05:41)  T(F): 98.7 (28 May 2022 05:41), Max: 98.7 (28 May 2022 05:41)  HR: 60 (28 May 2022 05:41) (60 - 65)  BP: 123/49 (28 May 2022 05:41) (114/55 - 148/73)  BP(mean): --  ABP: --  ABP(mean): --  RR: 18 (28 May 2022 05:41) (17 - 18)  SpO2: 91% (28 May 2022 05:41) (86% - 95%)           Input and Output:  I&O's Detail    27 May 2022 07:01  -  28 May 2022 07:00  --------------------------------------------------------  IN:    Oral Fluid: 550 mL  Total IN: 550 mL    OUT:    Voided (mL): 1400 mL  Total OUT: 1400 mL    Total NET: -850 mL          Lab Data    05-27    x   |  x   |  x   ----------------------------<  x   x    |  x   |  1.00      TPro  6.3  /  Alb  2.8<L>  /  TBili  0.4  /  DBili  0.1  /  AST  34  /  ALT  55  /  AlkPhos  38<L>  05-27            Review of Systems	      Objective     Physical Examination  heart s1s2  lung dc BS        Pertinent Lab findings & Imaging      Jagjit:  NO   Adequate UO     I&O's Detail    27 May 2022 07:01  -  28 May 2022 07:00  --------------------------------------------------------  IN:    Oral Fluid: 550 mL  Total IN: 550 mL    OUT:    Voided (mL): 1400 mL  Total OUT: 1400 mL    Total NET: -850 mL               Discussed with:     Cultures:	        Radiology

## 2022-05-29 LAB
ALBUMIN SERPL ELPH-MCNC: 3 G/DL — LOW (ref 3.3–5)
ALP SERPL-CCNC: 43 U/L — SIGNIFICANT CHANGE UP (ref 40–120)
ALT FLD-CCNC: 42 U/L — SIGNIFICANT CHANGE UP (ref 12–78)
ANION GAP SERPL CALC-SCNC: 7 MMOL/L — SIGNIFICANT CHANGE UP (ref 5–17)
AST SERPL-CCNC: 27 U/L — SIGNIFICANT CHANGE UP (ref 15–37)
BILIRUB DIRECT SERPL-MCNC: 0.2 MG/DL — SIGNIFICANT CHANGE UP (ref 0–0.3)
BILIRUB INDIRECT FLD-MCNC: 0.3 MG/DL — SIGNIFICANT CHANGE UP (ref 0.2–1)
BILIRUB SERPL-MCNC: 0.5 MG/DL — SIGNIFICANT CHANGE UP (ref 0.2–1.2)
BUN SERPL-MCNC: 49 MG/DL — HIGH (ref 7–23)
CALCIUM SERPL-MCNC: 8.8 MG/DL — SIGNIFICANT CHANGE UP (ref 8.5–10.1)
CHLORIDE SERPL-SCNC: 96 MMOL/L — SIGNIFICANT CHANGE UP (ref 96–108)
CO2 SERPL-SCNC: 34 MMOL/L — HIGH (ref 22–31)
CREAT SERPL-MCNC: 1.6 MG/DL — HIGH (ref 0.5–1.3)
EGFR: 32 ML/MIN/1.73M2 — LOW
GLUCOSE SERPL-MCNC: 83 MG/DL — SIGNIFICANT CHANGE UP (ref 70–99)
HCT VFR BLD CALC: 31.2 % — LOW (ref 34.5–45)
HGB BLD-MCNC: 10 G/DL — LOW (ref 11.5–15.5)
INR BLD: 1.61 RATIO — HIGH (ref 0.88–1.16)
MAGNESIUM SERPL-MCNC: 2.1 MG/DL — SIGNIFICANT CHANGE UP (ref 1.6–2.6)
MCHC RBC-ENTMCNC: 30.3 PG — SIGNIFICANT CHANGE UP (ref 27–34)
MCHC RBC-ENTMCNC: 32.1 GM/DL — SIGNIFICANT CHANGE UP (ref 32–36)
MCV RBC AUTO: 94.5 FL — SIGNIFICANT CHANGE UP (ref 80–100)
NRBC # BLD: 0 /100 WBCS — SIGNIFICANT CHANGE UP (ref 0–0)
PHOSPHATE SERPL-MCNC: 3.3 MG/DL — SIGNIFICANT CHANGE UP (ref 2.5–4.5)
PLATELET # BLD AUTO: 235 K/UL — SIGNIFICANT CHANGE UP (ref 150–400)
POTASSIUM SERPL-MCNC: 3.6 MMOL/L — SIGNIFICANT CHANGE UP (ref 3.5–5.3)
POTASSIUM SERPL-SCNC: 3.6 MMOL/L — SIGNIFICANT CHANGE UP (ref 3.5–5.3)
PROT SERPL-MCNC: 6.4 G/DL — SIGNIFICANT CHANGE UP (ref 6–8.3)
PROTHROM AB SERPL-ACNC: 18.9 SEC — HIGH (ref 10.5–13.4)
RBC # BLD: 3.3 M/UL — LOW (ref 3.8–5.2)
RBC # FLD: 14.3 % — SIGNIFICANT CHANGE UP (ref 10.3–14.5)
SARS-COV-2 RNA SPEC QL NAA+PROBE: DETECTED
SODIUM SERPL-SCNC: 137 MMOL/L — SIGNIFICANT CHANGE UP (ref 135–145)
WBC # BLD: 6.43 K/UL — SIGNIFICANT CHANGE UP (ref 3.8–10.5)
WBC # FLD AUTO: 6.43 K/UL — SIGNIFICANT CHANGE UP (ref 3.8–10.5)

## 2022-05-29 RX ORDER — APIXABAN 2.5 MG/1
2.5 TABLET, FILM COATED ORAL EVERY 12 HOURS
Refills: 0 | Status: DISCONTINUED | OUTPATIENT
Start: 2022-05-29 | End: 2022-05-31

## 2022-05-29 RX ADMIN — Medication 50 MILLIGRAM(S): at 12:20

## 2022-05-29 RX ADMIN — Medication 1 TABLET(S): at 18:48

## 2022-05-29 RX ADMIN — Medication 145 MILLIGRAM(S): at 12:20

## 2022-05-29 RX ADMIN — Medication 100 MILLIGRAM(S): at 06:43

## 2022-05-29 RX ADMIN — CARVEDILOL PHOSPHATE 25 MILLIGRAM(S): 80 CAPSULE, EXTENDED RELEASE ORAL at 18:48

## 2022-05-29 RX ADMIN — APIXABAN 2.5 MILLIGRAM(S): 2.5 TABLET, FILM COATED ORAL at 18:49

## 2022-05-29 RX ADMIN — Medication 50 MILLIGRAM(S): at 18:47

## 2022-05-29 RX ADMIN — Medication 5 MILLIGRAM(S): at 23:15

## 2022-05-29 RX ADMIN — APIXABAN 5 MILLIGRAM(S): 2.5 TABLET, FILM COATED ORAL at 06:42

## 2022-05-29 RX ADMIN — Medication 325 MILLIGRAM(S): at 12:20

## 2022-05-29 RX ADMIN — TIOTROPIUM BROMIDE 1 CAPSULE(S): 18 CAPSULE ORAL; RESPIRATORY (INHALATION) at 06:43

## 2022-05-29 RX ADMIN — AMIODARONE HYDROCHLORIDE 200 MILLIGRAM(S): 400 TABLET ORAL at 06:43

## 2022-05-29 RX ADMIN — Medication 100 MILLIGRAM(S): at 18:48

## 2022-05-29 RX ADMIN — Medication 50 MILLIGRAM(S): at 02:21

## 2022-05-29 RX ADMIN — CARVEDILOL PHOSPHATE 25 MILLIGRAM(S): 80 CAPSULE, EXTENDED RELEASE ORAL at 06:42

## 2022-05-29 RX ADMIN — Medication 1 TABLET(S): at 06:42

## 2022-05-29 NOTE — SWALLOW BEDSIDE ASSESSMENT ADULT - SWALLOW EVAL: CURRENT DIET
NPO except medications, as per MD order
minced and moist with thin liquids, per MD fiore
minced/moist and thin liquids

## 2022-05-29 NOTE — PROGRESS NOTE ADULT - SUBJECTIVE AND OBJECTIVE BOX
Date/Time Patient Seen:  		  Referring MD:   Data Reviewed	       Patient is a 81y old  Female who presents with a chief complaint of 80 yo F w/ hx HTN, CHF, COPD, afib on eliquis p/w found with SOB since ~ 7am today at SNF. NO known pain. EMS was called and found pt cyanotic and O2 56% on RA. EMS states 82% on 100% nrb. Pt on CPAP by EMS with sats in mid 90s. No known fever/chills. PT reported been off lasix over past couple days. No other acute co or changes Admitted  to telemetry unit for monitoring , send 3 sets of cardiac enzymes to rule out acute coronary event, obtain ECHO to evaluate LVEF, cardiology consult  ,continue current management, O2 supply, anticoagulation plan as per cardiology consult Admit to monitored unit for cardiac monitoring, obtain echo to evaluate LVEF, intravenous diuresis as per card consult , monitor ins/outs, monitor renal profile and electrolytes closely ,send 3 sets of enzymes, O2 supply, serial chest xrays, monitor weights and oral intake of fluids, nutritionist consult Palliative care consult requested ,to discuss advance directives and complete MOLST       TPMH of COVID 19 in 2011, CHF, RLE ulcer, CKD and CHF who was brought to the ED from [facility] for SOB and hypoxia. She was vaccinated with 3 doses of Moderna vaccine in the past but did not get the second booster dose.    w/ hx HTN, CHF, COPD, afib on eliquis p/w found with SOB since ~ 7am today at SNF. NO known pain. EMS was called and found pt cyanotic and O2 56% on RA. EMS states 82% on 100% nrb. Pt on CPAP by EMS with sats in mid 90s. No known fever/chills. PT reported been off lasix over past couple days. No other acute co or changes (28 May 2022 19:05)      Subjective/HPI     PAST MEDICAL & SURGICAL HISTORY:  HTN (hypertension)    HLD (hyperlipidemia)    Anemia  newly diagnosed    DVT (deep venous thrombosis)  2010 was on coumadin for 6 months    Pneumonia  years ago    OA (osteoarthritis)    Status post total left knee replacement  2007    History of cholecystectomy  open , many years ago    H/O hernia repair  years ago          Medication list         MEDICATIONS  (STANDING):  aMIOdarone    Tablet 200 milliGRAM(s) Oral daily  apixaban 5 milliGRAM(s) Oral every 12 hours  carvedilol 25 milliGRAM(s) Oral two times a day  doxycycline hyclate Capsule 100 milliGRAM(s) Oral every 12 hours  fenofibrate Tablet 145 milliGRAM(s) Oral daily  ferrous    sulfate 325 milliGRAM(s) Oral daily  hydrALAZINE 50 milliGRAM(s) Oral three times a day  lactobacillus acidophilus 1 Tablet(s) Oral two times a day  Medihoney 1 Application(s) 1 Application(s) Topical daily  melatonin 5 milliGRAM(s) Oral at bedtime  tiotropium 18 MICROgram(s) Capsule 1 Capsule(s) Inhalation daily    MEDICATIONS  (PRN):  acetaminophen     Tablet .. 650 milliGRAM(s) Oral every 6 hours PRN Temp greater or equal to 38C (100.4F)  aluminum hydroxide/magnesium hydroxide/simethicone Suspension 30 milliLiter(s) Oral every 4 hours PRN Dyspepsia  cloNIDine 0.2 milliGRAM(s) Oral every 6 hours PRN for systolic BP>180 or diastolic BP>100  ondansetron Injectable 4 milliGRAM(s) IV Push every 8 hours PRN Nausea and/or Vomiting  zaleplon 5 milliGRAM(s) Oral at bedtime PRN Insomnia         Vitals log        ICU Vital Signs Last 24 Hrs  T(C): 36.5 (29 May 2022 02:21), Max: 36.8 (28 May 2022 10:07)  T(F): 97.7 (29 May 2022 02:21), Max: 98.2 (28 May 2022 10:07)  HR: 64 (29 May 2022 02:21) (60 - 64)  BP: 177/77 (29 May 2022 02:21) (102/45 - 177/77)  BP(mean): --  ABP: --  ABP(mean): --  RR: 18 (29 May 2022 02:21) (18 - 19)  SpO2: 93% (29 May 2022 02:21) (90% - 93%)           Input and Output:  I&O's Detail    27 May 2022 07:01  -  28 May 2022 07:00  --------------------------------------------------------  IN:    Oral Fluid: 550 mL  Total IN: 550 mL    OUT:    Voided (mL): 1400 mL  Total OUT: 1400 mL    Total NET: -850 mL      28 May 2022 07:01  -  29 May 2022 05:52  --------------------------------------------------------  IN:  Total IN: 0 mL    OUT:    Voided (mL): 1000 mL  Total OUT: 1000 mL    Total NET: -1000 mL          Lab Data                        10.2   4.65  )-----------( 216      ( 28 May 2022 07:55 )             31.1     05-28    137  |  96  |  49<H>  ----------------------------<  95  3.9   |  33<H>  |  1.50<H>    Ca    9.5      28 May 2022 07:55    TPro  6.5  /  Alb  3.1<L>  /  TBili  0.4  /  DBili  0.2  /  AST  30  /  ALT  49  /  AlkPhos  41  05-28            Review of Systems	      Objective     Physical Examination    heart s1s2  lung dec BS  abd soft      Pertinent Lab findings & Imaging      Jagjit:  NO   Adequate UO     I&O's Detail    27 May 2022 07:01  -  28 May 2022 07:00  --------------------------------------------------------  IN:    Oral Fluid: 550 mL  Total IN: 550 mL    OUT:    Voided (mL): 1400 mL  Total OUT: 1400 mL    Total NET: -850 mL      28 May 2022 07:01  -  29 May 2022 05:52  --------------------------------------------------------  IN:  Total IN: 0 mL    OUT:    Voided (mL): 1000 mL  Total OUT: 1000 mL    Total NET: -1000 mL               Discussed with:     Cultures:	        Radiology

## 2022-05-29 NOTE — PHARMACOTHERAPY INTERVENTION NOTE - COMMENTS
Patient currently ordered for Eliquis 2.5 mg BID for hx of afib. Discussed with cardio Dr. Allen and recommended adjusting dose back to home dose of 5 mg BID due to improving renal function. MD agreed and order updated.
Patient ordered for Zosyn for pna + cellulitis. Patient clinically improving, discussed with Dr. Tomas and suggested de-escalation to Rocephin. MD will review chart and adjust as necessary.   
Patient ordered for apixaban 5mg BID for afib. Patient meets criteria for reduced dosing [2/3 of the following: SCr >1.5, Age >80, Weight < 60kg]. Discussed with Dr. Lopez and suggested dose reduction to 2.5mg BID, MD agreed. Updated order to reflect change in therapy.

## 2022-05-29 NOTE — PROGRESS NOTE ADULT - SUBJECTIVE AND OBJECTIVE BOX
Patient is a 81y Female whom presented to the hospital with ckd and petra     PAST MEDICAL & SURGICAL HISTORY:      MEDICATIONS  (STANDING):  aMIOdarone    Tablet 200 milliGRAM(s) Oral daily  apixaban 5 milliGRAM(s) Oral every 12 hours  carvedilol 25 milliGRAM(s) Oral two times a day  cefTRIAXone   IVPB 1000 milliGRAM(s) IV Intermittent every 24 hours  dexAMETHasone  Injectable 6 milliGRAM(s) IV Push daily  dextrose 5% + sodium chloride 0.45%. 1000 milliLiter(s) (35 mL/Hr) IV Continuous <Continuous>  doxycycline hyclate Capsule 100 milliGRAM(s) Oral every 12 hours  fenofibrate Tablet 145 milliGRAM(s) Oral daily  ferrous    sulfate 325 milliGRAM(s) Oral daily  furosemide   Injectable 40 milliGRAM(s) IV Push daily  losartan 100 milliGRAM(s) Oral daily  melatonin 5 milliGRAM(s) Oral at bedtime  potassium chloride    Tablet ER 20 milliEquivalent(s) Oral two times a day  remdesivir  IVPB 100 milliGRAM(s) IV Intermittent every 24 hours  remdesivir  IVPB   IV Intermittent   tiotropium 18 MICROgram(s) Capsule 1 Capsule(s) Inhalation daily      Allergies    fluoxetine (Unknown)  NIFEdipine (Unknown)    Intolerances        SOCIAL HISTORY:  Denies ETOh,Smoking,     FAMILY HISTORY:      REVIEW OF SYSTEMS:    CONSTITUTIONAL: No weakness, fevers or chills  NECK: No pain or stiffness  RESPIRATORY: No cough, wheezing, hemoptysis; No shortness of breath  CARDIOVASCULAR: No chest pain or palpitations  GASTROINTESTINAL: No abdominal or epigastric pain. No nausea, vomiting,     No diarrhea or constipation. No melena   SKIN: dry                                                             10.0   6.43  )-----------( 235      ( 29 May 2022 07:08 )             31.2       CBC Full  -  ( 29 May 2022 07:08 )  WBC Count : 6.43 K/uL  RBC Count : 3.30 M/uL  Hemoglobin : 10.0 g/dL  Hematocrit : 31.2 %  Platelet Count - Automated : 235 K/uL  Mean Cell Volume : 94.5 fl  Mean Cell Hemoglobin : 30.3 pg  Mean Cell Hemoglobin Concentration : 32.1 gm/dL  Auto Neutrophil # : x  Auto Lymphocyte # : x  Auto Monocyte # : x  Auto Eosinophil # : x  Auto Basophil # : x  Auto Neutrophil % : x  Auto Lymphocyte % : x  Auto Monocyte % : x  Auto Eosinophil % : x  Auto Basophil % : x      05-29    137  |  96  |  49<H>  ----------------------------<  83  3.6   |  34<H>  |  1.60<H>    Ca    8.8      29 May 2022 07:08  Phos  3.3     05-29  Mg     2.1     05-29    TPro  6.4  /  Alb  3.0<L>  /  TBili  0.5  /  DBili  0.2  /  AST  27  /  ALT  42  /  AlkPhos  43  05-29      CAPILLARY BLOOD GLUCOSE          Vital Signs Last 24 Hrs  T(C): 36.3 (29 May 2022 10:04), Max: 36.8 (29 May 2022 05:59)  T(F): 97.3 (29 May 2022 10:04), Max: 98.2 (29 May 2022 05:59)  HR: 64 (29 May 2022 10:04) (60 - 64)  BP: 137/64 (29 May 2022 10:04) (130/70 - 177/77)  BP(mean): --  RR: 18 (29 May 2022 10:04) (18 - 19)  SpO2: 93% (29 May 2022 10:04) (90% - 93%)        PT/INR - ( 29 May 2022 07:08 )   PT: 18.9 sec;   INR: 1.61 ratio                                                           PHYSICAL EXAM:    Constitutional: NAD  HEENT: conjunctive   clear   Neck:  No JVD  Respiratory: CTAB  Cardiovascular: S1 and S2  Gastrointestinal: BS+, soft, NT/ND  Extremities: No peripheral edema

## 2022-05-29 NOTE — PROGRESS NOTE ADULT - SUBJECTIVE AND OBJECTIVE BOX
Patient is a 81y old  Female who presents with a chief complaint of 82 yo F w/ hx HTN, CHF, COPD, afib on eliquis p/w found with SOB since ~ 7am today at SNF. NO known pain. EMS was called and found pt cyanotic and O2 56% on RA. EMS states 82% on 100% nrb. Pt on CPAP by EMS with sats in mid 90s. No known fever/chills. PT reported been off lasix over past couple days. No other acute co or changes Admitted  to telemetry unit for monitoring , send 3 sets of cardiac enzymes to rule out acute coronary event, obtain ECHO to evaluate LVEF, cardiology consult  ,continue current management, O2 supply, anticoagulation plan as per cardiology consult Admit to monitored unit for cardiac monitoring, obtain echo to evaluate LVEF, intravenous diuresis as per card consult , monitor ins/outs, monitor renal profile and electrolytes closely ,send 3 sets of enzymes, O2 supply, serial chest xrays, monitor weights and oral intake of fluids, nutritionist consult Palliative care consult requested ,to discuss advance directives and complete MOLST       [facility] RLE [facility], CKD and CHF who was brought to the ED from [facility] for SOB and hypoxia. She was vaccinated with 3 doses of Moderna vaccine in the past but did not get the second booster dose.    w/ hx HTN, CHF, COPD, afib on eliquis p/w found with SOB since ~ 7am today at [facility]. NO known pain. EMS was called and found pt cyanotic and O2 56% on RA. EMS states 82% on 100% nrb. Pt on CPAP by EMS with sats in mid 90s. No known fever/chills. PT reported been off lasix over past couple days. No other acute co or changes (29 May 2022 11:11)      INTERVAL HPI/OVERNIGHT EVENTS:    Wants to be discharged    MEDICATIONS  (STANDING):  aMIOdarone    Tablet 200 milliGRAM(s) Oral daily  apixaban 2.5 milliGRAM(s) Oral every 12 hours  carvedilol 25 milliGRAM(s) Oral two times a day  doxycycline hyclate Capsule 100 milliGRAM(s) Oral every 12 hours  fenofibrate Tablet 145 milliGRAM(s) Oral daily  ferrous    sulfate 325 milliGRAM(s) Oral daily  hydrALAZINE 50 milliGRAM(s) Oral three times a day  lactobacillus acidophilus 1 Tablet(s) Oral two times a day  Medihoney 1 Application(s) 1 Application(s) Topical daily  melatonin 5 milliGRAM(s) Oral at bedtime  tiotropium 18 MICROgram(s) Capsule 1 Capsule(s) Inhalation daily      MEDICATIONS  (PRN):  acetaminophen     Tablet .. 650 milliGRAM(s) Oral every 6 hours PRN Temp greater or equal to 38C (100.4F)  aluminum hydroxide/magnesium hydroxide/simethicone Suspension 30 milliLiter(s) Oral every 4 hours PRN Dyspepsia  cloNIDine 0.2 milliGRAM(s) Oral every 6 hours PRN for systolic BP>180 or diastolic BP>100  ondansetron Injectable 4 milliGRAM(s) IV Push every 8 hours PRN Nausea and/or Vomiting  zaleplon 5 milliGRAM(s) Oral at bedtime PRN Insomnia      Allergies    fluoxetine (Unknown)  NIFEdipine (Unknown)  Procardia (Other)    Intolerances    oxycodone (Other)      PAST MEDICAL & SURGICAL HISTORY:  HTN (hypertension)      HLD (hyperlipidemia)      Anemia  newly diagnosed      DVT (deep venous thrombosis)  2010 was on coumadin for 6 months      Pneumonia  years ago      OA (osteoarthritis)      Status post total left knee replacement  2007      History of cholecystectomy  open , many years ago      H/O hernia repair  years ago          Vital Signs Last 24 Hrs  T(C): 36.3 (29 May 2022 10:04), Max: 36.8 (29 May 2022 05:59)  T(F): 97.3 (29 May 2022 10:04), Max: 98.2 (29 May 2022 05:59)  HR: 64 (29 May 2022 10:04) (60 - 64)  BP: 137/64 (29 May 2022 10:04) (135/79 - 177/77)  BP(mean): --  RR: 18 (29 May 2022 10:04) (18 - 19)  SpO2: 93% (29 May 2022 10:04) (90% - 93%)    PHYSICAL EXAMINATION:    GENERAL: The patient is awake and alert in no apparent distress.     HEENT: Head is normocephalic and atraumatic    NECK: No JVD    LUNGS: few rales both bases    HEART: S1S2 with no murmur    ABDOMEN: Soft, nontender, and nondistended.      EXTREMITIES: bilateral pedal edema    NEUROLOGIC: Grossly intact.    SKIN: No ulceration or induration present.      LABS:                        10.0   6.43  )-----------( 235      ( 29 May 2022 07:08 )             31.2     05-29    137  |  96  |  49<H>  ----------------------------<  83  3.6   |  34<H>  |  1.60<H>    Ca    8.8      29 May 2022 07:08  Phos  3.3     05-29  Mg     2.1     05-29    TPro  6.4  /  Alb  3.0<L>  /  TBili  0.5  /  DBili  0.2  /  AST  27  /  ALT  42  /  AlkPhos  43  05-29    PT/INR - ( 29 May 2022 07:08 )   PT: 18.9 sec;   INR: 1.61 ratio             Assessment:    Acute Hypoxic respiratory failure  COIVD-19 - resolved  Acute Kidney Injury  Pneumonia - resolved  COPD  Hx Pulmonary emboli/DVT    Plan:    Discharge planning  Spiriva inhaler  home oxygen  Anticoagulation with Eliquis

## 2022-05-29 NOTE — SWALLOW BEDSIDE ASSESSMENT ADULT - SLP PERTINENT HISTORY OF CURRENT PROBLEM
r/o dysphagia
Per charting, "82 yo F w/ hx HTN, CHF, COPD, afib on eliquis p/w found with SOB since ~ 7am today at SNF. NO known pain. EMS was called and found pt cyanotic and O2 56% on RA. EMS states 82% on 100% nrb. Pt on CPAP by EMS with sats in mid 90s. No known fever/chills. PT reported been off lasix over past couple days. No other acute co or changes Admitted  to telemetry unit for monitoring , send 3 sets of cardiac enzymes to rule out acute coronary event, obtain ECHO to evaluate LVEF, cardiology consult  ,continue current management, O2 supply, anticoagulation plan as per cardiology consult Admit to monitored unit for cardiac monitoring, obtain echo to evaluate LVEF, intravenous diuresis as per card consult , monitor ins/outs, monitor renal profile and electrolytes closely ,send 3 sets of enzymes, O2 supply, serial chest xrays, monitor weights and oral intake of fluids, nutritionist consult Palliative care consult requested ,to discuss advance directives and complete MOLST"

## 2022-05-29 NOTE — SWALLOW BEDSIDE ASSESSMENT ADULT - ASR SWALLOW RECOMMEND DIAG
If there is concern for aspiration component upon CT chest results, consider MBS to r/o silent aspiration
Not indicated at this time as the patient demonstrates oral stage deficits solely due to dentition status, no concern for pharyngeal dysphagia at this time

## 2022-05-29 NOTE — PROGRESS NOTE ADULT - ASSESSMENT
80 yo F w/ hx HTN, CHF, COPD, afib on eliquis p/w found with SOB since ~ 7am today at SNF. NO known pain. EMS was called and found pt cyanotic and O2 56% on RA. EMS states 82% on 100% nrb. Pt on CPAP by EMS with sats in mid 90s. No known fever/chills. PT reported been off lasix over past couple days. No other acute co or changes Admitted  to telemetry unit for monitoring , send 3 sets of cardiac enzymes to rule out acute coronary event, obtain ECHO to evaluate LVEF, cardiology consult  ,continue current management, O2 supply, anticoagulation plan as per cardiology consult Admit to monitored unit for cardiac monitoring, obtain echo to evaluate LVEF, intravenous diuresis as per card consult , monitor ins/outs, monitor renal profile and electrolytes closely ,send 3 sets of enzymes, O2 supply, serial chest xrays, monitor weights and oral intake of fluids, nutritionist consult Palliative care consult requested ,to discuss advance directives and complete MOLST     ACUTE RENAL FAILURE:   Serum creatinine is increased , cr is 1.6    There is no progression . No uremic symptoms  pos  evidence of anemia .  Fluid status stable.  Will continue to avoid nephrotoxic drugs.  Patient remains asymptomatic.   Continue current therapy.  gfr is decreased d/c losartan      BP monitoring,continue current antihypertensive meds, low salt diet,followup with PMD in 1-2 weeks  aMIOdarone    Tablet 200 milliGRAM(s) Oral daily  hydrALAZINE 50 milliGRAM(s) Oral three times a day    fluid overload gfr decreased d/c furosemide

## 2022-05-29 NOTE — PROGRESS NOTE ADULT - ASSESSMENT
82 yo F w/ hx HTN, CHF, COPD, afib on eliquis p/w found with SOB  copd  CHF  AF  Obesity  OP  OA  Covid    on ABX  on RA  VS noted  CM following  plan for KRISTA DC    s/p BIPAP  GOC discussion - Lela Haney - HCP - Daughter - pt is DNR DNI - MOLST updated and signed -   assist with needs  Isolation for covid, Remdesivir -   monitor VS and HD and Sat  cvs rx regimen

## 2022-05-29 NOTE — PROGRESS NOTE ADULT - SUBJECTIVE AND OBJECTIVE BOX
Patient is a 81y Female with a known history of :  Acute respiratory failure with hypoxia [J96.01]    Acute febrile illness [R50.9]    CHF exacerbation [I50.9]    Afib [I48.91]    HTN (hypertension) [I10]    COPD, moderate [J44.9]    Prophylactic measure [Z29.9]    Hypokalemia [E87.6]    2019 novel coronavirus disease (COVID-19) [U07.1]    Pneumonia, aspiration [J69.0]    Cellulitis [L03.90]    Open wound [T14.8XXA]    KELSEY (acute kidney injury) [N17.9]    Hypokalemia [E87.6]      HPI:  80 yo F w/ hx HTN, CHF, COPD, afib on eliquis p/w found with SOB since ~ 7am today at [facility]. NO known pain. EMS was called and found pt cyanotic and O2 56% on RA. EMS states 82% on 100% nrb. Pt on CPAP by EMS with sats in mid 90s. No known fever/chills. PT reported been off lasix over past couple days. No other acute co or changes Admitted  to telemetry unit for monitoring , send 3 sets of cardiac enzymes to rule out acute coronary event, obtain ECHO to evaluate LVEF, cardiology consult  ,continue current management, O2 supply, anticoagulation plan as per cardiology consult Admit to monitored unit for cardiac monitoring, obtain echo to evaluate LVEF, intravenous diuresis as per card consult , monitor ins/outs, monitor renal profile and electrolytes closely ,send 3 sets of enzymes, O2 supply, serial chest xrays, monitor weights and oral intake of fluids, nutritionist consult Palliative care consult requested ,to discuss advance directives and complete MOLST       [facility], [facility]F, RLE ulcer, CKD and CHF who was brought to the ED from [facility] for SOB and hypoxia. She was vaccinated with 3 doses of Moderna vaccine in the past but did not get the second booster dose.    w/ hx HTN, CHF, COPD, afib on eliquis p/w found with SOB since ~ 7am today at [facility]. NO known pain. EMS was called and found pt cyanotic and O2 56% on RA. EMS states 82% on 100% nrb. Pt on CPAP by EMS with sats in mid 90s. No known fever/chills. PT reported been off lasix over past couple days. No other acute co or changes          The patient is an 81 year old female with a PMH of COVID 19 in 2011, CHF, RLE ulcer, CKD and CHF who was brought to the ED from [facility] for SOB and hypoxia. She was vaccinated with 3 doses of Moderna vaccine in the past but did not get the second booster dose.    w/ hx HTN, CHF, COPD, afib on eliquis p/w found with SOB since ~ 7am today at [facility]. NO known pain. EMS was called and found pt cyanotic and O2 56% on RA. EMS states 82% on 100% nrb. Pt on CPAP by EMS with sats in mid 90s. No known fever/chills. PT reported been off lasix over past couple days. No other acute co or changes    < from: Xray Chest 1 View- PORTABLE-Urgent (05.22.22 @ 09:54) >    ACC: [id number] EXAM:  XR CHEST PORTABLE URGENT 1V                          PROCEDURE DATE:  05/22/2022          INTERPRETATION:  CLINICAL INDICATION: 81 years  Female with SOB, CHF.    COMPARISON: None    The patient's chin obscures the left lung apex.    AP view of the chest demonstrates a large right lower lobe infiltrate.   There is mild pulmonary vascular congestion. There is no pleural   effusion. There is no pneumothorax.    The heart is normal in size. Left-sided bipolar pacemaker. There is no   mediastinal or hilar mass.    Mild thoracic degenerative changes and osteopenia are present.    IMPRESSION:    Large right lower lobe infiltrates superimposed on pulmonary vascular   congestion. Pacemaker.    --- End of Report ---            PAUL MONROY MD; Attending Radiologist    < end of copied text >  < from: 12 Lead ECG (05.22.22 @ 09:28) >    Ventricular Rate 82 BPM    Atrial Rate 82 BPM    P-R Interval 208 ms    QRS Duration 162 ms    Q-T Interval 424 ms    QTC Calculation(Bazett) 495 ms    P Axis 77 degrees    R Axis 93 degrees    T Axis 24 degrees    Diagnosis Line Atrial-sensed ventricular-paced rhythm  Abnormal ECG  No previous ECGs available  Confirmed by dea Lopez ([id number]) on 5/22/2022 3:22:34 PM    < end of copied text >  sitivity (05.22.22 @ 13:48)   Troponin I, High Sensitivity Result: 138.8: Serial measurements of high sensitivity troponin I (hs TnI) showing rapid   upward or downward changes suggest acute myocardial injury. Please note   that a sustained elevation of hsTnI can be caused by renal disease,   chronic heart failure, sepsis, pulmonary embolism and other clinical   conditions. ng/L       Historical Values  Troponin I, High Sensitivity (05.22.22 @ 13:48)   Troponin I, High Sensitivity Result: 138.8: Serial measurements of high sensitivity troponin I (hs TnI) showing rapid   upward or downward changes suggest acute myocardial injury. Please note   that a sustained elevation of hsTnI can be caused by renal disease,   chronic heart failure, sepsis, pulmonary embolism and other clinical   conditions. ng/L   Troponin I, High Sensitivity (05.22.22 @ 09:43)   Troponin I, High Sensitivity Result: 21.2: Serial measurements of high sensitivity troponin I (hs TnI) showing rapid   upward or downward changes suggest acute myocardial injury. Please note   that a sustained elevation of hsTnI can be caused by renal disease,   chronic heart failure, sepsis, pulmonary embolism and other clinical   conditions. ng/L  (22 May 2022 12:13)      REVIEW OF SYSTEMS:    CONSTITUTIONAL: No fever, weight loss, or fatigue  EYES: No eye pain, visual disturbances, or discharge  ENMT:  No difficulty hearing, tinnitus, vertigo; No sinus or throat pain  NECK: No pain or stiffness  BREASTS: No pain, masses, or nipple discharge  RESPIRATORY: No cough, wheezing, chills or hemoptysis; No shortness of breath  CARDIOVASCULAR: No chest pain, palpitations, dizziness, or leg swelling  GASTROINTESTINAL: No abdominal or epigastric pain. No nausea, vomiting, or hematemesis; No diarrhea or constipation. No melena or hematochezia.  GENITOURINARY: No dysuria, frequency, hematuria, or incontinence  NEUROLOGICAL: No headaches, memory loss, loss of strength, numbness, or tremors  SKIN: No itching, burning, rashes, or lesions   LYMPH NODES: No enlarged glands  ENDOCRINE: No heat or cold intolerance; No hair loss  MUSCULOSKELETAL: No joint pain or swelling; No muscle, back, or extremity pain  PSYCHIATRIC: No depression, anxiety, mood swings, or difficulty sleeping  HEME/LYMPH: No easy bruising, or bleeding gums  ALLERGY AND IMMUNOLOGIC: No hives or eczema    MEDICATIONS  (STANDING):  aMIOdarone    Tablet 200 milliGRAM(s) Oral daily  apixaban 5 milliGRAM(s) Oral every 12 hours  carvedilol 25 milliGRAM(s) Oral two times a day  doxycycline hyclate Capsule 100 milliGRAM(s) Oral every 12 hours  fenofibrate Tablet 145 milliGRAM(s) Oral daily  ferrous    sulfate 325 milliGRAM(s) Oral daily  hydrALAZINE 50 milliGRAM(s) Oral three times a day  lactobacillus acidophilus 1 Tablet(s) Oral two times a day  Medihoney 1 Application(s) 1 Application(s) Topical daily  melatonin 5 milliGRAM(s) Oral at bedtime  tiotropium 18 MICROgram(s) Capsule 1 Capsule(s) Inhalation daily    MEDICATIONS  (PRN):  acetaminophen     Tablet .. 650 milliGRAM(s) Oral every 6 hours PRN Temp greater or equal to 38C (100.4F)  aluminum hydroxide/magnesium hydroxide/simethicone Suspension 30 milliLiter(s) Oral every 4 hours PRN Dyspepsia  cloNIDine 0.2 milliGRAM(s) Oral every 6 hours PRN for systolic BP>180 or diastolic BP>100  ondansetron Injectable 4 milliGRAM(s) IV Push every 8 hours PRN Nausea and/or Vomiting  zaleplon 5 milliGRAM(s) Oral at bedtime PRN Insomnia      ALLERGIES: fluoxetine (Unknown)  NIFEdipine (Unknown)  Procardia (Other)      FAMILY HISTORY:  No pertinent family history in first degree relatives        PHYSICAL EXAMINATION:  -----------------------------  T(C): 36.3 (05-29-22 @ 10:04), Max: 36.8 (05-29-22 @ 05:59)  HR: 64 (05-29-22 @ 10:04) (60 - 64)  BP: 137/64 (05-29-22 @ 10:04) (130/70 - 177/77)  RR: 18 (05-29-22 @ 10:04) (18 - 19)  SpO2: 93% (05-29-22 @ 10:04) (90% - 93%)  Wt(kg): --    05-28 @ 07:01  -  05-29 @ 07:00  --------------------------------------------------------  IN:  Total IN: 0 mL    OUT:    Voided (mL): 1050 mL  Total OUT: 1050 mL    Total NET: -1050 mL            VITALS  T(C): 36.3 (05-29-22 @ 10:04), Max: 36.8 (05-29-22 @ 05:59)  HR: 64 (05-29-22 @ 10:04) (60 - 64)  BP: 137/64 (05-29-22 @ 10:04) (130/70 - 177/77)  RR: 18 (05-29-22 @ 10:04) (18 - 19)  SpO2: 93% (05-29-22 @ 10:04) (90% - 93%)    Constitutional: well developed, normal appearance, well groomed, well nourished, no deformities and no acute distress.   Eyes: the conjunctiva exhibited no abnormalities and the eyelids demonstrated no xanthelasmas.   HEENT: normal oral mucosa, no oral pallor and no oral cyanosis.   Neck: normal jugular venous A waves present, normal jugular venous V waves present and no jugular venous gates A waves.   Pulmonary: no respiratory distress, normal respiratory rhythm and effort, no accessory muscle use and lungs were clear to auscultation bilaterally.   Cardiovascular: heart rate and rhythm were normal, normal S1 and S2 and no murmur, gallop, rub, heave or thrill are present.   Abdomen: soft, non-tender, no hepato-splenomegaly and no abdominal mass palpated.   Musculoskeletal: the gait could not be assessed..   Extremities: no clubbing of the fingernails, no localized cyanosis, no petechial hemorrhages and no ischemic changes.   Skin: normal skin color and pigmentation, no rash, no venous stasis, no skin lesions, no skin ulcer and no xanthoma was observed.   Psychiatric: oriented to person, place, and time, the affect was normal, the mood was normal and not feeling anxious.     LABS:   --------  05-29    137  |  96  |  49<H>  ----------------------------<  83  3.6   |  34<H>  |  1.60<H>    Ca    8.8      29 May 2022 07:08  Phos  3.3     05-29  Mg     2.1     05-29    TPro  6.4  /  Alb  3.0<L>  /  TBili  0.5  /  DBili  0.2  /  AST  27  /  ALT  42  /  AlkPhos  43  05-29                         10.0   6.43  )-----------( 235      ( 29 May 2022 07:08 )             31.2     PT/INR - ( 29 May 2022 07:08 )   PT: 18.9 sec;   INR: 1.61 ratio                     RADIOLOGY:  -----------------    ECG:     ECHO:

## 2022-05-29 NOTE — SWALLOW BEDSIDE ASSESSMENT ADULT - SWALLOW EVAL: DIAGNOSIS
Pt self-administered PO trials of thin liquids and regular solids. Pt p/w a functional oral phase marked by adequate retrieval and containment, timely mastication/manipulation and transfer, and adequate clearance post swallow. Pharyngeal phase marked by suspected timely swallow onset, +laryngeal elevation to palpation, and no overt s/sx of aspiration. Recommend solid advancement to regular solids with continuation of thin liquids +aspiration precautions. Continue to monitor respiratory status; if there is a decline in status, please hold PO and notify SLP. If there is concern for aspiration component upon CT chest results, consider MBS to r/o silent aspiration. Left message with Dr. Meyer.
1. Patient presents with functional oropharyngeal swallow for thin liquids, puree solids and minced/moist solids. Oral stage marked by adequate bolus acceptance and containment. Timely bolus prep/manipulation and posterior transport. Oral cavity is clear post swallow. Pharyngeal stage marked by appearance of timely swallow initiation, present hyolaryngeal movement per palpation and no clinical signs/symptoms of aspiration/penetration. 2. Pt demonstrates oral dysphagia (superimposed on by lack of dentition) for soft and bite sized solids marked by difficult/lengthy mastication and lingual coating post swallow. Pharyngeal phase WFL for this texture.

## 2022-05-30 LAB
ALBUMIN SERPL ELPH-MCNC: 3.1 G/DL — LOW (ref 3.3–5)
ALP SERPL-CCNC: 45 U/L — SIGNIFICANT CHANGE UP (ref 40–120)
ALT FLD-CCNC: 42 U/L — SIGNIFICANT CHANGE UP (ref 12–78)
ANION GAP SERPL CALC-SCNC: 7 MMOL/L — SIGNIFICANT CHANGE UP (ref 5–17)
AST SERPL-CCNC: 37 U/L — SIGNIFICANT CHANGE UP (ref 15–37)
BILIRUB DIRECT SERPL-MCNC: 0.2 MG/DL — SIGNIFICANT CHANGE UP (ref 0–0.3)
BILIRUB INDIRECT FLD-MCNC: 0.3 MG/DL — SIGNIFICANT CHANGE UP (ref 0.2–1)
BILIRUB SERPL-MCNC: 0.5 MG/DL — SIGNIFICANT CHANGE UP (ref 0.2–1.2)
BUN SERPL-MCNC: 52 MG/DL — HIGH (ref 7–23)
CALCIUM SERPL-MCNC: 9.2 MG/DL — SIGNIFICANT CHANGE UP (ref 8.5–10.1)
CHLORIDE SERPL-SCNC: 97 MMOL/L — SIGNIFICANT CHANGE UP (ref 96–108)
CO2 SERPL-SCNC: 36 MMOL/L — HIGH (ref 22–31)
CREAT SERPL-MCNC: 1.5 MG/DL — HIGH (ref 0.5–1.3)
EGFR: 34 ML/MIN/1.73M2 — LOW
GLUCOSE SERPL-MCNC: 82 MG/DL — SIGNIFICANT CHANGE UP (ref 70–99)
HCT VFR BLD CALC: 32.9 % — LOW (ref 34.5–45)
HGB BLD-MCNC: 10.8 G/DL — LOW (ref 11.5–15.5)
INR BLD: 1.51 RATIO — HIGH (ref 0.88–1.16)
MCHC RBC-ENTMCNC: 31.8 PG — SIGNIFICANT CHANGE UP (ref 27–34)
MCHC RBC-ENTMCNC: 32.8 GM/DL — SIGNIFICANT CHANGE UP (ref 32–36)
MCV RBC AUTO: 96.8 FL — SIGNIFICANT CHANGE UP (ref 80–100)
NRBC # BLD: 0 /100 WBCS — SIGNIFICANT CHANGE UP (ref 0–0)
PLATELET # BLD AUTO: 262 K/UL — SIGNIFICANT CHANGE UP (ref 150–400)
POTASSIUM SERPL-MCNC: 3.6 MMOL/L — SIGNIFICANT CHANGE UP (ref 3.5–5.3)
POTASSIUM SERPL-SCNC: 3.6 MMOL/L — SIGNIFICANT CHANGE UP (ref 3.5–5.3)
PROT SERPL-MCNC: 6.3 G/DL — SIGNIFICANT CHANGE UP (ref 6–8.3)
PROTHROM AB SERPL-ACNC: 17.7 SEC — HIGH (ref 10.5–13.4)
RBC # BLD: 3.4 M/UL — LOW (ref 3.8–5.2)
RBC # FLD: 14.2 % — SIGNIFICANT CHANGE UP (ref 10.3–14.5)
SODIUM SERPL-SCNC: 140 MMOL/L — SIGNIFICANT CHANGE UP (ref 135–145)
WBC # BLD: 5.6 K/UL — SIGNIFICANT CHANGE UP (ref 3.8–10.5)
WBC # FLD AUTO: 5.6 K/UL — SIGNIFICANT CHANGE UP (ref 3.8–10.5)

## 2022-05-30 RX ADMIN — Medication 5 MILLIGRAM(S): at 21:10

## 2022-05-30 RX ADMIN — AMIODARONE HYDROCHLORIDE 200 MILLIGRAM(S): 400 TABLET ORAL at 07:05

## 2022-05-30 RX ADMIN — Medication 325 MILLIGRAM(S): at 11:31

## 2022-05-30 RX ADMIN — Medication 100 MILLIGRAM(S): at 07:05

## 2022-05-30 RX ADMIN — Medication 1 TABLET(S): at 17:44

## 2022-05-30 RX ADMIN — APIXABAN 2.5 MILLIGRAM(S): 2.5 TABLET, FILM COATED ORAL at 17:45

## 2022-05-30 RX ADMIN — Medication 50 MILLIGRAM(S): at 09:57

## 2022-05-30 RX ADMIN — Medication 100 MILLIGRAM(S): at 17:44

## 2022-05-30 RX ADMIN — Medication 145 MILLIGRAM(S): at 11:30

## 2022-05-30 RX ADMIN — CARVEDILOL PHOSPHATE 25 MILLIGRAM(S): 80 CAPSULE, EXTENDED RELEASE ORAL at 07:06

## 2022-05-30 RX ADMIN — TIOTROPIUM BROMIDE 1 CAPSULE(S): 18 CAPSULE ORAL; RESPIRATORY (INHALATION) at 07:06

## 2022-05-30 RX ADMIN — Medication 50 MILLIGRAM(S): at 02:31

## 2022-05-30 RX ADMIN — Medication 1 TABLET(S): at 07:06

## 2022-05-30 RX ADMIN — APIXABAN 2.5 MILLIGRAM(S): 2.5 TABLET, FILM COATED ORAL at 07:06

## 2022-05-30 RX ADMIN — CARVEDILOL PHOSPHATE 25 MILLIGRAM(S): 80 CAPSULE, EXTENDED RELEASE ORAL at 17:45

## 2022-05-30 NOTE — PROGRESS NOTE ADULT - TIME BILLING
in direct care of patient , reviewing labs and other results and adjusting medications and in discussion with RN , PMD and other consultants .
as above
in direct care of patient , reviewing labs and other results and adjusting medications and in discussion with other consultants , RN and PMD
as above
65minutes spent on this visit, 50% visit time spent in care co-ordination with other attendings and counselling patient ,writing admission orders ( see complete and current orders and order section) ,requesting necessary consults ,informing family about status & plan of care .I have discussed care plan with [facility] wellness/admitting /nursing   department ,outpatient PCP , hospital consultants , ER physician & med staff .

## 2022-05-30 NOTE — PROGRESS NOTE ADULT - PROBLEM SELECTOR PLAN 11
Gastrointestinal stress ulcer prophylaxis and DVT prophylaxis administered

## 2022-05-30 NOTE — PROGRESS NOTE ADULT - ASSESSMENT
82 yo F w/ hx HTN, CHF, COPD, afib on eliquis p/w found with SOB since ~ [facility]. NO known pain. EMS was called and found pt cyanotic and O2 56% on RA. EMS states 82% on 100% nrb. Pt on CPAP by EMS with sats in mid 90s. No known fever/chills. PT reported been off lasix over past couple days. No other acute co or changes Admitted  to telemetry unit for monitoring , send 3 sets of cardiac enzymes to rule out acute coronary event, obtain ECHO to evaluate LVEF, cardiology consult  ,continue current management, O2 supply, anticoagulation plan as per cardiology consult Admit to monitored unit for cardiac monitoring, obtain echo to evaluate LVEF, intravenous diuresis as per card consult , monitor ins/outs, monitor renal profile and electrolytes closely ,send 3 sets of enzymes, O2 supply, serial chest xrays, monitor weights and oral intake of fluids, nutritionist consult Palliative care consult requested ,to discuss advance directives and complete MOLST     ACUTE RENAL FAILURE:   Serum creatinine is increased , cr is 1.6    There is no progression . No uremic symptoms  pos  evidence of anemia .  Fluid status stable.  Will continue to avoid nephrotoxic drugs.  Patient remains asymptomatic.   Continue current therapy.  gfr is decreased d/c losartan      BP monitoring,continue current antihypertensive meds, low salt diet,followup with PMD in 1-2 weeks  aMIOdarone    Tablet 200 milliGRAM(s) Oral daily  hydrALAZINE 50 milliGRAM(s) Oral three times a day    fluid overload gfr decreased d/c furosemide

## 2022-05-30 NOTE — PROGRESS NOTE ADULT - SUBJECTIVE AND OBJECTIVE BOX
Date/Time Patient Seen:  		  Referring MD:   Data Reviewed	       Patient is a 81y old  Female who presents with a chief complaint of 82 yo F w/ hx HTN, CHF, COPD, afib on eliquis p/w found with SOB since ~ [facility]am today at [facility] NO known pain. EMS was called and found pt cyanotic and O2 56% on RA. EMS states 82% on 100% nrb. Pt on CPAP by EMS with sats in mid 90s. No known fever/chills. PT reported been off lasix over past couple days. No other acute co or changes Admitted  to telemetry unit for monitoring , send 3 sets of cardiac enzymes to rule out acute coronary event, obtain ECHO to evaluate LVEF, cardiology consult  ,continue current management, O2 supply, anticoagulation plan as per cardiology consult Admit to monitored unit for cardiac monitoring, obtain echo to evaluate LVEF, intravenous diuresis as per card consult , monitor ins/outs, monitor renal profile and electrolytes closely ,send 3 sets of enzymes, O2 supply, serial chest xrays, monitor weights and oral intake of fluids, nutritionist consult Palliative care consult requested ,to discuss advance directives and complete MOLST       TPMH of COVID 19 in 2011, CHF, RLE ulcer, CKD and CHF who was brought to the ED from [facility] for SOB and hypoxia. She was vaccinated with 3 doses of Moderna vaccine in the past but did not get the second booster dose.    w/ hx HTN, CHF, COPD, afib on eliquis p/w found with SOB since ~ 7am today at [facility]. NO known pain. EMS was called and found pt cyanotic and O2 56% on RA. EMS states 82% on 100% nrb. Pt on CPAP by EMS with sats in mid 90s. No known fever/chills. PT reported been off lasix over past couple days. No other acute co or changes (29 May 2022 15:51)      Subjective/HPI     PAST MEDICAL & SURGICAL HISTORY:  HTN (hypertension)    HLD (hyperlipidemia)    Anemia  newly diagnosed    DVT (deep venous thrombosis)  2010 was on coumadin for 6 months    Pneumonia  years ago    OA (osteoarthritis)    Status post total left knee replacement  2007    History of cholecystectomy  open , many years ago    H/O hernia repair  years ago          Medication list         MEDICATIONS  (STANDING):  aMIOdarone    Tablet 200 milliGRAM(s) Oral daily  apixaban 2.5 milliGRAM(s) Oral every 12 hours  carvedilol 25 milliGRAM(s) Oral two times a day  doxycycline hyclate Capsule 100 milliGRAM(s) Oral every 12 hours  fenofibrate Tablet 145 milliGRAM(s) Oral daily  ferrous    sulfate 325 milliGRAM(s) Oral daily  hydrALAZINE 50 milliGRAM(s) Oral three times a day  lactobacillus acidophilus 1 Tablet(s) Oral two times a day  Medihoney 1 Application(s) 1 Application(s) Topical daily  melatonin 5 milliGRAM(s) Oral at bedtime  tiotropium 18 MICROgram(s) Capsule 1 Capsule(s) Inhalation daily    MEDICATIONS  (PRN):  acetaminophen     Tablet .. 650 milliGRAM(s) Oral every 6 hours PRN Temp greater or equal to 38C (100.4F)  aluminum hydroxide/magnesium hydroxide/simethicone Suspension 30 milliLiter(s) Oral every 4 hours PRN Dyspepsia  cloNIDine 0.2 milliGRAM(s) Oral every 6 hours PRN for systolic BP>180 or diastolic BP>100  ondansetron Injectable 4 milliGRAM(s) IV Push every 8 hours PRN Nausea and/or Vomiting  zaleplon 5 milliGRAM(s) Oral at bedtime PRN Insomnia         Vitals log        ICU Vital Signs Last 24 Hrs  T(C): 36.7 (30 May 2022 02:28), Max: 36.7 (30 May 2022 02:28)  T(F): 98.1 (30 May 2022 02:28), Max: 98.1 (30 May 2022 02:28)  HR: 61 (30 May 2022 02:28) (60 - 64)  BP: 151/70 (30 May 2022 02:28) (129/53 - 151/70)  BP(mean): --  ABP: --  ABP(mean): --  RR: 18 (30 May 2022 02:28) (18 - 19)  SpO2: 93% (30 May 2022 02:28) (90% - 93%)           Input and Output:  I&O's Detail    28 May 2022 07:01  -  29 May 2022 07:00  --------------------------------------------------------  IN:  Total IN: 0 mL    OUT:    Voided (mL): 1050 mL  Total OUT: 1050 mL    Total NET: -1050 mL      29 May 2022 07:01  -  30 May 2022 06:47  --------------------------------------------------------  IN:  Total IN: 0 mL    OUT:    Voided (mL): 2750 mL  Total OUT: 2750 mL    Total NET: -2750 mL          Lab Data                        10.0   6.43  )-----------( 235      ( 29 May 2022 07:08 )             31.2     05-29    137  |  96  |  49<H>  ----------------------------<  83  3.6   |  34<H>  |  1.60<H>    Ca    8.8      29 May 2022 07:08  Phos  3.3     05-29  Mg     2.1     05-29    TPro  6.4  /  Alb  3.0<L>  /  TBili  0.5  /  DBili  0.2  /  AST  27  /  ALT  42  /  AlkPhos  43  05-29            Review of Systems	      Objective     Physical Examination    heart s1s2  lung dec BS  abd soft      Pertinent Lab findings & Imaging      Jagjit:  NO   Adequate UO     I&O's Detail    28 May 2022 07:01  -  29 May 2022 07:00  --------------------------------------------------------  IN:  Total IN: 0 mL    OUT:    Voided (mL): 1050 mL  Total OUT: 1050 mL    Total NET: -1050 mL      29 May 2022 07:01  -  30 May 2022 06:47  --------------------------------------------------------  IN:  Total IN: 0 mL    OUT:    Voided (mL): 2750 mL  Total OUT: 2750 mL    Total NET: -2750 mL               Discussed with:     Cultures:	        Radiology

## 2022-05-30 NOTE — PROGRESS NOTE ADULT - SUBJECTIVE AND OBJECTIVE BOX
Patient is a 81y old  Female who presents with a chief complaint of 80 yo F w/ hx HTN, CHF, COPD, afib on eliquis p/w found with SOB since ~ 7am today at [facility]. NO known pain. EMS was called and found pt cyanotic and O2 56% on RA. EMS states 82% on 100% nrb. Pt on CPAP by EMS with sats in mid 90s. No known fever/chills. PT reported been off lasix over past couple days. No other acute co or changes Admitted  to telemetry unit for monitoring , send 3 sets of cardiac enzymes to rule out acute coronary event, obtain ECHO to evaluate LVEF, cardiology consult  ,continue current management, O2 supply, anticoagulation plan as per cardiology consult Admit to monitored unit for cardiac monitoring, obtain echo to evaluate LVEF, intravenous diuresis as per card consult , monitor ins/outs, monitor renal profile and electrolytes closely ,send 3 sets of enzymes, O2 supply, serial chest xrays, monitor weights and oral intake of fluids, nutritionist consult Palliative care consult requested ,to discuss advance directives and complete MOLST       [facility], RLE ulcer, CKD and CHF who was brought to the ED from [facility] for SOB and hypoxia. She was vaccinated with 3 doses of Moderna vaccine in the past but did not get the second booster dose.    w/ hx HTN, CHF, COPD, afib on eliquis p/w found with SOB since ~ 7am today at [facility]. NO known pain. EMS was called and found pt cyanotic and O2 56% on RA. EMS states 82% on 100% nrb. Pt on CPAP by EMS with sats in mid 90s. No known fever/chills. PT reported been off lasix over past couple days. No other acute co or changes (30 May 2022 12:13)      INTERVAL HPI/OVERNIGHT EVENTS:    Complains of leak from Danielson catheter. Admits to cough    MEDICATIONS  (STANDING):  aMIOdarone    Tablet 200 milliGRAM(s) Oral daily  apixaban 2.5 milliGRAM(s) Oral every 12 hours  carvedilol 25 milliGRAM(s) Oral two times a day  doxycycline hyclate Capsule 100 milliGRAM(s) Oral every 12 hours  fenofibrate Tablet 145 milliGRAM(s) Oral daily  ferrous    sulfate 325 milliGRAM(s) Oral daily  hydrALAZINE 50 milliGRAM(s) Oral three times a day  lactobacillus acidophilus 1 Tablet(s) Oral two times a day  Medihoney 1 Application(s) 1 Application(s) Topical daily  melatonin 5 milliGRAM(s) Oral at bedtime  tiotropium 18 MICROgram(s) Capsule 1 Capsule(s) Inhalation daily      MEDICATIONS  (PRN):  acetaminophen     Tablet .. 650 milliGRAM(s) Oral every 6 hours PRN Temp greater or equal to 38C (100.4F)  aluminum hydroxide/magnesium hydroxide/simethicone Suspension 30 milliLiter(s) Oral every 4 hours PRN Dyspepsia  cloNIDine 0.2 milliGRAM(s) Oral every 6 hours PRN for systolic BP>180 or diastolic BP>100  ondansetron Injectable 4 milliGRAM(s) IV Push every 8 hours PRN Nausea and/or Vomiting  zaleplon 5 milliGRAM(s) Oral at bedtime PRN Insomnia      Allergies    fluoxetine (Unknown)  NIFEdipine (Unknown)  Procardia (Other)    Intolerances    oxycodone (Other)      PAST MEDICAL & SURGICAL HISTORY:  HTN (hypertension)      HLD (hyperlipidemia)      Anemia  newly diagnosed      DVT (deep venous thrombosis)  2010 was on coumadin for 6 months      Pneumonia  years ago      OA (osteoarthritis)      Status post total left knee replacement  2007      History of cholecystectomy  open , many years ago      H/O hernia repair  years ago          Vital Signs Last 24 Hrs  T(C): 36.8 (30 May 2022 12:57), Max: 36.8 (30 May 2022 12:57)  T(F): 98.2 (30 May 2022 12:57), Max: 98.2 (30 May 2022 12:57)  HR: 62 (30 May 2022 12:57) (60 - 62)  BP: 112/62 (30 May 2022 12:57) (112/62 - 154/64)  BP(mean): --  RR: 18 (30 May 2022 12:57) (18 - 19)  SpO2: 94% (30 May 2022 12:57) (90% - 94%)    PHYSICAL EXAMINATION:    GENERAL: The patient is awake and alert in no apparent distress.     HEENT: Head is normocephalic and atraumatic    NECK: no JVD    LUNGS: Clear to auscultation without wheezing, rales or rhonchi; respirations unlabored    HEART: Regular rate and rhythm without murmur.    ABDOMEN: Soft, nontender, and nondistended.      EXTREMITIES: bilateral pedal edema    NEUROLOGIC: Grossly intact.    SKIN: No ulceration or induration present.      LABS:                        10.8   5.60  )-----------( 262      ( 30 May 2022 07:49 )             32.9     05-30    140  |  97  |  52<H>  ----------------------------<  82  3.6   |  36<H>  |  1.50<H>    Ca    9.2      30 May 2022 07:49  Phos  3.3     05-29  Mg     2.1     05-29    TPro  6.3  /  Alb  3.1<L>  /  TBili  0.5  /  DBili  0.2  /  AST  37  /  ALT  42  /  AlkPhos  45  05-30    PT/INR - ( 30 May 2022 07:49 )   PT: 17.7 sec;   INR: 1.51 ratio           Assessment:    Acute Hypoxic respiratory failure  Resolved COVID-19  COPD  PAF  Hx DVT    Plan:    Incentive spirometry  Discharge planning  Spiriva Inhaler  Anticoagulation with Eliquis

## 2022-05-30 NOTE — PROGRESS NOTE ADULT - SUBJECTIVE AND OBJECTIVE BOX
Patient is a 81y Female with a known history of :  Acute respiratory failure with hypoxia [J96.01]    Acute febrile illness [R50.9]    CHF exacerbation [I50.9]    Afib [I48.91]    HTN (hypertension) [I10]    COPD, moderate [J44.9]    Prophylactic measure [Z29.9]    Hypokalemia [E87.6]    2019 novel coronavirus disease (COVID-19) [U07.1]    Pneumonia, aspiration [J69.0]    Cellulitis [L03.90]    Open wound [T14.8XXA]    KELSEY (acute kidney injury) [N17.9]    Hypokalemia [E87.6]      HPI:  80 yo F w/ hx HTN, CHF, COPD, afib on eliquis p/w found with SOB since ~ 7am today at [facility]. NO known pain. EMS was called and found pt cyanotic and O2 56% on RA. EMS states 82% on 100% nrb. Pt on CPAP by EMS with sats in mid 90s. No known fever/chills. PT reported been off lasix over past couple days. No other acute co or changes Admitted  to telemetry unit for monitoring , send 3 sets of cardiac enzymes to rule out acute coronary event, obtain ECHO to evaluate LVEF, cardiology consult  ,continue current management, O2 supply, anticoagulation plan as per cardiology consult Admit to monitored unit for cardiac monitoring, obtain echo to evaluate LVEF, intravenous diuresis as per card consult , monitor ins/outs, monitor renal profile and electrolytes closely ,send 3 sets of enzymes, O2 supply, serial chest xrays, monitor weights and oral intake of fluids, nutritionist consult Palliative care consult requested ,to discuss advance directives and complete MOLST       [facility], RLE ulcer, CKD and CHF who was brought to the ED from [facility] for SOB and hypoxia. She was vaccinated with 3 doses of Moderna vaccine in the past but did not get the second booster dose.    w/ hx HTN, CHF, COPD, afib on eliquis p/w found with SOB since ~ 7am today at [facility]. NO known pain. EMS was called and found pt cyanotic and O2 56% on RA. EMS states 82% on 100% nrb. Pt on CPAP by EMS with sats in mid 90s. No known fever/chills. PT reported been off lasix over past couple days. No other acute co or changes          The patient is an 81 year old female with a PMH of COVID 19 in 2011, CHF, RLE ulcer, CKD and CHF who was brought to the ED from [facility] for SOB and hypoxia. She was vaccinated with 3 doses of Moderna vaccine in the past but did not get the second booster dose.    w/ hx HTN, CHF, COPD, afib on eliquis p/w found with SOB since ~ 7am today at [facility]. NO known pain. EMS was called and found pt cyanotic and O2 56% on RA. EMS states 82% on 100% nrb. Pt on CPAP by EMS with sats in mid 90s. No known fever/chills. PT reported been off lasix over past couple days. No other acute co or changes    < from: Xray Chest 1 View- PORTABLE-Urgent (05.22.22 @ 09:54) >    ACC: [id number] EXAM:  XR CHEST PORTABLE URGENT 1V                          PROCEDURE DATE:  05/22/2022          INTERPRETATION:  CLINICAL INDICATION: 81 years  Female with SOB, CHF.    COMPARISON: None    The patient's chin obscures the left lung apex.    AP view of the chest demonstrates a large right lower lobe infiltrate.   There is mild pulmonary vascular congestion. There is no pleural   effusion. There is no pneumothorax.    The heart is normal in size. Left-sided bipolar pacemaker. There is no   mediastinal or hilar mass.    Mild thoracic degenerative changes and osteopenia are present.    IMPRESSION:    Large right lower lobe infiltrates superimposed on pulmonary vascular   congestion. Pacemaker.    --- End of Report ---            PAUL MONROY MD; Attending Radiologist    < end of copied text >  < from: 12 Lead ECG (05.22.22 @ 09:28) >    Ventricular Rate 82 BPM    Atrial Rate 82 BPM    P-R Interval 208 ms    QRS Duration 162 ms    Q-T Interval 424 ms    QTC Calculation(Bazett) 495 ms    P Axis 77 degrees    R Axis 93 degrees    T Axis 24 degrees    Diagnosis Line Atrial-sensed ventricular-paced rhythm  Abnormal ECG  No previous ECGs available  Confirmed by dea Lopez ([id number]) on 5/22/2022 3:22:34 PM    < end of copied text >  sitivity (05.22.22 @ 13:48)   Troponin I, High Sensitivity Result: 138.8: Serial measurements of high sensitivity troponin I (hs TnI) showing rapid   upward or downward changes suggest acute myocardial injury. Please note   that a sustained elevation of hsTnI can be caused by renal disease,   chronic heart failure, sepsis, pulmonary embolism and other clinical   conditions. ng/L       Historical Values  Troponin I, High Sensitivity (05.22.22 @ 13:48)   Troponin I, High Sensitivity Result: 138.8: Serial measurements of high sensitivity troponin I (hs TnI) showing rapid   upward or downward changes suggest acute myocardial injury. Please note   that a sustained elevation of hsTnI can be caused by renal disease,   chronic heart failure, sepsis, pulmonary embolism and other clinical   conditions. ng/L   Troponin I, High Sensitivity (05.22.22 @ 09:43)   Troponin I, High Sensitivity Result: 21.2: Serial measurements of high sensitivity troponin I (hs TnI) showing rapid   upward or downward changes suggest acute myocardial injury. Please note   that a sustained elevation of hsTnI can be caused by renal disease,   chronic heart failure, sepsis, pulmonary embolism and other clinical   conditions. ng/L  (22 May 2022 12:13)      REVIEW OF SYSTEMS:    CONSTITUTIONAL: No fever, weight loss, or fatigue  EYES: No eye pain, visual disturbances, or discharge  ENMT:  No difficulty hearing, tinnitus, vertigo; No sinus or throat pain  NECK: No pain or stiffness  BREASTS: No pain, masses, or nipple discharge  RESPIRATORY: No cough, wheezing, chills or hemoptysis; No shortness of breath  CARDIOVASCULAR: No chest pain, palpitations, dizziness, or leg swelling  GASTROINTESTINAL: No abdominal or epigastric pain. No nausea, vomiting, or hematemesis; No diarrhea or constipation. No melena or hematochezia.  GENITOURINARY: No dysuria, frequency, hematuria, or incontinence  NEUROLOGICAL: No headaches, memory loss, loss of strength, numbness, or tremors  SKIN: No itching, burning, rashes, or lesions   LYMPH NODES: No enlarged glands  ENDOCRINE: No heat or cold intolerance; No hair loss  MUSCULOSKELETAL: No joint pain or swelling; No muscle, back, or extremity pain  PSYCHIATRIC: No depression, anxiety, mood swings, or difficulty sleeping  HEME/LYMPH: No easy bruising, or bleeding gums  ALLERGY AND IMMUNOLOGIC: No hives or eczema    MEDICATIONS  (STANDING):  aMIOdarone    Tablet 200 milliGRAM(s) Oral daily  apixaban 2.5 milliGRAM(s) Oral every 12 hours  carvedilol 25 milliGRAM(s) Oral two times a day  doxycycline hyclate Capsule 100 milliGRAM(s) Oral every 12 hours  fenofibrate Tablet 145 milliGRAM(s) Oral daily  ferrous    sulfate 325 milliGRAM(s) Oral daily  hydrALAZINE 50 milliGRAM(s) Oral three times a day  lactobacillus acidophilus 1 Tablet(s) Oral two times a day  Medihoney 1 Application(s) 1 Application(s) Topical daily  melatonin 5 milliGRAM(s) Oral at bedtime  tiotropium 18 MICROgram(s) Capsule 1 Capsule(s) Inhalation daily    MEDICATIONS  (PRN):  acetaminophen     Tablet .. 650 milliGRAM(s) Oral every 6 hours PRN Temp greater or equal to 38C (100.4F)  aluminum hydroxide/magnesium hydroxide/simethicone Suspension 30 milliLiter(s) Oral every 4 hours PRN Dyspepsia  cloNIDine 0.2 milliGRAM(s) Oral every 6 hours PRN for systolic BP>180 or diastolic BP>100  ondansetron Injectable 4 milliGRAM(s) IV Push every 8 hours PRN Nausea and/or Vomiting  zaleplon 5 milliGRAM(s) Oral at bedtime PRN Insomnia      ALLERGIES: fluoxetine (Unknown)  NIFEdipine (Unknown)  Procardia (Other)      FAMILY HISTORY:  No pertinent family history in first degree relatives        PHYSICAL EXAMINATION:  -----------------------------  T(C): 36.3 (05-30-22 @ 06:45), Max: 36.7 (05-30-22 @ 02:28)  HR: 60 (05-30-22 @ 06:45) (60 - 64)  BP: 154/64 (05-30-22 @ 06:45) (129/53 - 154/64)  RR: 18 (05-30-22 @ 06:45) (18 - 19)  SpO2: 93% (05-30-22 @ 06:45) (90% - 93%)  Wt(kg): --    05-29 @ 07:01  -  05-30 @ 07:00  --------------------------------------------------------  IN:  Total IN: 0 mL    OUT:    Voided (mL): 2750 mL  Total OUT: 2750 mL    Total NET: -2750 mL      05-30 @ 07:01  -  05-30 @ 09:38  --------------------------------------------------------  IN:  Total IN: 0 mL    OUT:    Voided (mL): 800 mL  Total OUT: 800 mL    Total NET: -800 mL            VITALS  T(C): 36.3 (05-30-22 @ 06:45), Max: 36.7 (05-30-22 @ 02:28)  HR: 60 (05-30-22 @ 06:45) (60 - 64)  BP: 154/64 (05-30-22 @ 06:45) (129/53 - 154/64)  RR: 18 (05-30-22 @ 06:45) (18 - 19)  SpO2: 93% (05-30-22 @ 06:45) (90% - 93%)    Constitutional: well developed, normal appearance, well groomed, well nourished, no deformities and no acute distress.   Eyes: the conjunctiva exhibited no abnormalities and the eyelids demonstrated no xanthelasmas.   HEENT: normal oral mucosa, no oral pallor and no oral cyanosis.   Neck: normal jugular venous A waves present, normal jugular venous V waves present and no jugular venous gates A waves.   Pulmonary: no respiratory distress, normal respiratory rhythm and effort, no accessory muscle use and lungs were clear to auscultation bilaterally.   Cardiovascular: heart rate and rhythm were normal, normal S1 and S2 and no murmur, gallop, rub, heave or thrill are present.   Abdomen: soft, non-tender, no hepato-splenomegaly and no abdominal mass palpated.   Musculoskeletal: the gait could not be assessed..   Extremities: no clubbing of the fingernails, no localized cyanosis, no petechial hemorrhages and no ischemic changes.   Skin: normal skin color and pigmentation, no rash, no venous stasis, no skin lesions, no skin ulcer and no xanthoma was observed.   Psychiatric: oriented to person, place, and time, the affect was normal, the mood was normal and not feeling anxious.     LABS:   --------  05-30    140  |  97  |  52<H>  ----------------------------<  82  3.6   |  36<H>  |  1.50<H>    Ca    9.2      30 May 2022 07:49  Phos  3.3     05-29  Mg     2.1     05-29    TPro  6.3  /  Alb  3.1<L>  /  TBili  0.5  /  DBili  0.2  /  AST  37  /  ALT  42  /  AlkPhos  45  05-30                         10.8   5.60  )-----------( 262      ( 30 May 2022 07:49 )             32.9     PT/INR - ( 30 May 2022 07:49 )   PT: 17.7 sec;   INR: 1.51 ratio                     RADIOLOGY:  -----------------    ECG:     ECHO:

## 2022-05-30 NOTE — PROGRESS NOTE ADULT - SUBJECTIVE AND OBJECTIVE BOX
Patient is a 81y Female whom presented to the hospital with ckd and petra     PAST MEDICAL & SURGICAL HISTORY:      MEDICATIONS  (STANDING):  aMIOdarone    Tablet 200 milliGRAM(s) Oral daily  apixaban 5 milliGRAM(s) Oral every 12 hours  carvedilol 25 milliGRAM(s) Oral two times a day  cefTRIAXone   IVPB 1000 milliGRAM(s) IV Intermittent every 24 hours  dexAMETHasone  Injectable 6 milliGRAM(s) IV Push daily  dextrose 5% + sodium chloride 0.45%. 1000 milliLiter(s) (35 mL/Hr) IV Continuous <Continuous>  doxycycline hyclate Capsule 100 milliGRAM(s) Oral every 12 hours  fenofibrate Tablet 145 milliGRAM(s) Oral daily  ferrous    sulfate 325 milliGRAM(s) Oral daily  furosemide   Injectable 40 milliGRAM(s) IV Push daily  losartan 100 milliGRAM(s) Oral daily  melatonin 5 milliGRAM(s) Oral at bedtime  potassium chloride    Tablet ER 20 milliEquivalent(s) Oral two times a day  remdesivir  IVPB 100 milliGRAM(s) IV Intermittent every 24 hours  remdesivir  IVPB   IV Intermittent   tiotropium 18 MICROgram(s) Capsule 1 Capsule(s) Inhalation daily      Allergies    fluoxetine (Unknown)  NIFEdipine (Unknown)    Intolerances        SOCIAL HISTORY:  Denies ETOh,Smoking,     FAMILY HISTORY:      REVIEW OF SYSTEMS:    CONSTITUTIONAL: No weakness, fevers or chills  NECK: No pain or stiffness  RESPIRATORY: No cough, wheezing, hemoptysis; No shortness of breath  CARDIOVASCULAR: No chest pain or palpitations  GASTROINTESTINAL: No abdominal or epigastric pain. No nausea, vomiting,     No diarrhea or constipation. No melena   SKIN: dry                                                                                   10.8   5.60  )-----------( 262      ( 30 May 2022 07:49 )             32.9       CBC Full  -  ( 30 May 2022 07:49 )  WBC Count : 5.60 K/uL  RBC Count : 3.40 M/uL  Hemoglobin : 10.8 g/dL  Hematocrit : 32.9 %  Platelet Count - Automated : 262 K/uL  Mean Cell Volume : 96.8 fl  Mean Cell Hemoglobin : 31.8 pg  Mean Cell Hemoglobin Concentration : 32.8 gm/dL  Auto Neutrophil # : x  Auto Lymphocyte # : x  Auto Monocyte # : x  Auto Eosinophil # : x  Auto Basophil # : x  Auto Neutrophil % : x  Auto Lymphocyte % : x  Auto Monocyte % : x  Auto Eosinophil % : x  Auto Basophil % : x      05-30    140  |  97  |  52<H>  ----------------------------<  82  3.6   |  36<H>  |  1.50<H>    Ca    9.2      30 May 2022 07:49  Phos  3.3     05-29  Mg     2.1     05-29    TPro  6.3  /  Alb  3.1<L>  /  TBili  0.5  /  DBili  0.2  /  AST  37  /  ALT  42  /  AlkPhos  45  05-30      CAPILLARY BLOOD GLUCOSE          Vital Signs Last 24 Hrs  T(C): 36.8 (30 May 2022 12:57), Max: 36.8 (30 May 2022 12:57)  T(F): 98.2 (30 May 2022 12:57), Max: 98.2 (30 May 2022 12:57)  HR: 62 (30 May 2022 12:57) (60 - 62)  BP: 112/62 (30 May 2022 12:57) (112/62 - 154/64)  BP(mean): --  RR: 18 (30 May 2022 12:57) (18 - 19)  SpO2: 94% (30 May 2022 12:57) (90% - 94%)        PT/INR - ( 30 May 2022 07:49 )   PT: 17.7 sec;   INR: 1.51 ratio                                                              PHYSICAL EXAM:    Constitutional: NAD  HEENT: conjunctive   clear   Neck:  No JVD  Respiratory: CTAB  Cardiovascular: S1 and S2  Gastrointestinal: BS+, soft, NT/ND  Extremities: No peripheral edema

## 2022-05-30 NOTE — CHART NOTE - NSCHARTNOTEFT_GEN_A_CORE
Impression:    COPD with exacerbation  COVID-19 infection with possible superimposed bacterial pneumonia  Acute Hypoxic respiratory failure  Hx Pulmonary emboli/DVT    Plan:    IV Remdesivir + IV Decadron  Doxycycline + rocephin for possible superimposed bacterial pneumonia  supplemental oxygen  Pulse oximetry  Cardiac monitoring  Anticoagulation
Assessment: 80 y/o female adm with acute respiratory failure with hypoxia, COVID 19. PMH COVID 19, HF, RLE ulcer, CKD.   Pt's appetite fair to good. SLP azeb noted on 5/29 which rx advancing diet to Regular with thin liquids. Last BM 5/27.    Factors impacting intake: [x ] none [ ] nausea  [ ] vomiting [ ] diarrhea [ ] constipation  [ ]chewing problems [ ] swallowing issues  [ ] other:     Diet Presciption: minced and moist  Intake: 50-75%    Current Weight: 5/23 174.6# 5/30 166.6#  1+ left/right foot   % Weight Change    Pertinent Medications: MEDICATIONS  (STANDING):  aMIOdarone    Tablet 200 milliGRAM(s) Oral daily  apixaban 2.5 milliGRAM(s) Oral every 12 hours  carvedilol 25 milliGRAM(s) Oral two times a day  doxycycline hyclate Capsule 100 milliGRAM(s) Oral every 12 hours  fenofibrate Tablet 145 milliGRAM(s) Oral daily  ferrous    sulfate 325 milliGRAM(s) Oral daily  hydrALAZINE 50 milliGRAM(s) Oral three times a day  lactobacillus acidophilus 1 Tablet(s) Oral two times a day  Medihoney 1 Application(s) 1 Application(s) Topical daily  melatonin 5 milliGRAM(s) Oral at bedtime  tiotropium 18 MICROgram(s) Capsule 1 Capsule(s) Inhalation daily    MEDICATIONS  (PRN):  acetaminophen     Tablet .. 650 milliGRAM(s) Oral every 6 hours PRN Temp greater or equal to 38C (100.4F)  aluminum hydroxide/magnesium hydroxide/simethicone Suspension 30 milliLiter(s) Oral every 4 hours PRN Dyspepsia  cloNIDine 0.2 milliGRAM(s) Oral every 6 hours PRN for systolic BP>180 or diastolic BP>100  ondansetron Injectable 4 milliGRAM(s) IV Push every 8 hours PRN Nausea and/or Vomiting  zaleplon 5 milliGRAM(s) Oral at bedtime PRN Insomnia    Pertinent Labs: 05-30 Na140 mmol/L Glu 82 mg/dL K+ 3.6 mmol/L Cr  1.50 mg/dL<H> BUN 52 mg/dL<H> 05-29 Phos 3.3 mg/dL 05-30 Alb 3.1 g/dL<L> 05-23 Chol 112 mg/dL LDL --    HDL 46 mg/dL<L> Trig 67 mg/dL     CAPILLARY BLOOD GLUCOSE        Skin: no pressure ulcers noted.     Estimated Needs:   [x ] no change since previous assessment  [ ] recalculated:     Previous Nutrition Diagnosis:   [ ] Inadequate Energy Intake [ ]Inadequate Oral Intake [ ] Excessive Energy Intake   [ ] Underweight [x ] Increased Nutrient Needs [ ] Overweight/Obesity   [ ] Altered GI Function [ ] Unintended Weight Loss [ ] Food & Nutrition Related Knowledge Deficit [ ] Malnutrition     Nutrition Diagnosis is [x ] ongoing  [ ] resolved [ ] not applicable     New Nutrition Diagnosis: [x ] not applicable       Interventions:   Recommend  [ x] Change Diet To: Regular with thin liquids.   [ ] Nutrition Supplement  [ ] Nutrition Support  [ ] Other:     Monitoring and Evaluation:   [x ] PO intake [ x ] Tolerance to diet prescription [ x ] weights [ x ] labs[ x ] follow up per protocol  [ ] other:

## 2022-05-31 ENCOUNTER — TRANSCRIPTION ENCOUNTER (OUTPATIENT)
Age: 82
End: 2022-05-31

## 2022-05-31 VITALS
HEART RATE: 70 BPM | TEMPERATURE: 98 F | OXYGEN SATURATION: 95 % | RESPIRATION RATE: 16 BRPM | DIASTOLIC BLOOD PRESSURE: 70 MMHG | SYSTOLIC BLOOD PRESSURE: 160 MMHG

## 2022-05-31 DIAGNOSIS — N17.9 ACUTE KIDNEY FAILURE, UNSPECIFIED: ICD-10-CM

## 2022-05-31 LAB
ALBUMIN SERPL ELPH-MCNC: 2.7 G/DL — LOW (ref 3.3–5)
ALP SERPL-CCNC: 43 U/L — SIGNIFICANT CHANGE UP (ref 40–120)
ALT FLD-CCNC: 36 U/L — SIGNIFICANT CHANGE UP (ref 12–78)
ANION GAP SERPL CALC-SCNC: 5 MMOL/L — SIGNIFICANT CHANGE UP (ref 5–17)
AST SERPL-CCNC: 27 U/L — SIGNIFICANT CHANGE UP (ref 15–37)
BILIRUB DIRECT SERPL-MCNC: 0.2 MG/DL — SIGNIFICANT CHANGE UP (ref 0–0.3)
BILIRUB INDIRECT FLD-MCNC: 0.2 MG/DL — SIGNIFICANT CHANGE UP (ref 0.2–1)
BILIRUB SERPL-MCNC: 0.4 MG/DL — SIGNIFICANT CHANGE UP (ref 0.2–1.2)
BUN SERPL-MCNC: 50 MG/DL — HIGH (ref 7–23)
CALCIUM SERPL-MCNC: 8.6 MG/DL — SIGNIFICANT CHANGE UP (ref 8.5–10.1)
CHLORIDE SERPL-SCNC: 99 MMOL/L — SIGNIFICANT CHANGE UP (ref 96–108)
CO2 SERPL-SCNC: 36 MMOL/L — HIGH (ref 22–31)
CREAT SERPL-MCNC: 1.4 MG/DL — HIGH (ref 0.5–1.3)
EGFR: 37 ML/MIN/1.73M2 — LOW
GLUCOSE SERPL-MCNC: 92 MG/DL — SIGNIFICANT CHANGE UP (ref 70–99)
HCT VFR BLD CALC: 29.3 % — LOW (ref 34.5–45)
HGB BLD-MCNC: 9.6 G/DL — LOW (ref 11.5–15.5)
INR BLD: 1.67 RATIO — HIGH (ref 0.88–1.16)
MCHC RBC-ENTMCNC: 31.9 PG — SIGNIFICANT CHANGE UP (ref 27–34)
MCHC RBC-ENTMCNC: 32.8 GM/DL — SIGNIFICANT CHANGE UP (ref 32–36)
MCV RBC AUTO: 97.3 FL — SIGNIFICANT CHANGE UP (ref 80–100)
NRBC # BLD: 0 /100 WBCS — SIGNIFICANT CHANGE UP (ref 0–0)
PLATELET # BLD AUTO: 239 K/UL — SIGNIFICANT CHANGE UP (ref 150–400)
POTASSIUM SERPL-MCNC: 3.4 MMOL/L — LOW (ref 3.5–5.3)
POTASSIUM SERPL-SCNC: 3.4 MMOL/L — LOW (ref 3.5–5.3)
PROT SERPL-MCNC: 5.6 G/DL — LOW (ref 6–8.3)
PROTHROM AB SERPL-ACNC: 19.6 SEC — HIGH (ref 10.5–13.4)
RBC # BLD: 3.01 M/UL — LOW (ref 3.8–5.2)
RBC # FLD: 14.2 % — SIGNIFICANT CHANGE UP (ref 10.3–14.5)
SODIUM SERPL-SCNC: 140 MMOL/L — SIGNIFICANT CHANGE UP (ref 135–145)
WBC # BLD: 4.89 K/UL — SIGNIFICANT CHANGE UP (ref 3.8–10.5)
WBC # FLD AUTO: 4.89 K/UL — SIGNIFICANT CHANGE UP (ref 3.8–10.5)

## 2022-05-31 PROCEDURE — 82565 ASSAY OF CREATININE: CPT

## 2022-05-31 PROCEDURE — 87641 MR-STAPH DNA AMP PROBE: CPT

## 2022-05-31 PROCEDURE — 85027 COMPLETE CBC AUTOMATED: CPT

## 2022-05-31 PROCEDURE — 80048 BASIC METABOLIC PNL TOTAL CA: CPT

## 2022-05-31 PROCEDURE — 80061 LIPID PANEL: CPT

## 2022-05-31 PROCEDURE — 83735 ASSAY OF MAGNESIUM: CPT

## 2022-05-31 PROCEDURE — 71045 X-RAY EXAM CHEST 1 VIEW: CPT

## 2022-05-31 PROCEDURE — 83605 ASSAY OF LACTIC ACID: CPT

## 2022-05-31 PROCEDURE — U0005: CPT

## 2022-05-31 PROCEDURE — U0003: CPT

## 2022-05-31 PROCEDURE — 87086 URINE CULTURE/COLONY COUNT: CPT

## 2022-05-31 PROCEDURE — 84443 ASSAY THYROID STIM HORMONE: CPT

## 2022-05-31 PROCEDURE — 71250 CT THORAX DX C-: CPT

## 2022-05-31 PROCEDURE — 93005 ELECTROCARDIOGRAM TRACING: CPT

## 2022-05-31 PROCEDURE — 85379 FIBRIN DEGRADATION QUANT: CPT

## 2022-05-31 PROCEDURE — 93306 TTE W/DOPPLER COMPLETE: CPT

## 2022-05-31 PROCEDURE — 82803 BLOOD GASES ANY COMBINATION: CPT

## 2022-05-31 PROCEDURE — 87640 STAPH A DNA AMP PROBE: CPT

## 2022-05-31 PROCEDURE — 85025 COMPLETE CBC W/AUTO DIFF WBC: CPT

## 2022-05-31 PROCEDURE — 87077 CULTURE AEROBIC IDENTIFY: CPT

## 2022-05-31 PROCEDURE — 36600 WITHDRAWAL OF ARTERIAL BLOOD: CPT

## 2022-05-31 PROCEDURE — 83880 ASSAY OF NATRIURETIC PEPTIDE: CPT

## 2022-05-31 PROCEDURE — 87186 SC STD MICRODIL/AGAR DIL: CPT

## 2022-05-31 PROCEDURE — 86140 C-REACTIVE PROTEIN: CPT

## 2022-05-31 PROCEDURE — 85730 THROMBOPLASTIN TIME PARTIAL: CPT

## 2022-05-31 PROCEDURE — 99285 EMERGENCY DEPT VISIT HI MDM: CPT | Mod: 25

## 2022-05-31 PROCEDURE — 97116 GAIT TRAINING THERAPY: CPT

## 2022-05-31 PROCEDURE — 96375 TX/PRO/DX INJ NEW DRUG ADDON: CPT

## 2022-05-31 PROCEDURE — 94760 N-INVAS EAR/PLS OXIMETRY 1: CPT

## 2022-05-31 PROCEDURE — 85610 PROTHROMBIN TIME: CPT

## 2022-05-31 PROCEDURE — 97161 PT EVAL LOW COMPLEX 20 MIN: CPT

## 2022-05-31 PROCEDURE — 92610 EVALUATE SWALLOWING FUNCTION: CPT

## 2022-05-31 PROCEDURE — 94660 CPAP INITIATION&MGMT: CPT

## 2022-05-31 PROCEDURE — 80053 COMPREHEN METABOLIC PANEL: CPT

## 2022-05-31 PROCEDURE — 96365 THER/PROPH/DIAG IV INF INIT: CPT

## 2022-05-31 PROCEDURE — 87070 CULTURE OTHR SPECIMN AEROBIC: CPT

## 2022-05-31 PROCEDURE — 0225U NFCT DS DNA&RNA 21 SARSCOV2: CPT

## 2022-05-31 PROCEDURE — 84145 PROCALCITONIN (PCT): CPT

## 2022-05-31 PROCEDURE — 94640 AIRWAY INHALATION TREATMENT: CPT

## 2022-05-31 PROCEDURE — 84484 ASSAY OF TROPONIN QUANT: CPT

## 2022-05-31 PROCEDURE — 82728 ASSAY OF FERRITIN: CPT

## 2022-05-31 PROCEDURE — 81001 URINALYSIS AUTO W/SCOPE: CPT

## 2022-05-31 PROCEDURE — 87040 BLOOD CULTURE FOR BACTERIA: CPT

## 2022-05-31 PROCEDURE — 82550 ASSAY OF CK (CPK): CPT

## 2022-05-31 PROCEDURE — 80076 HEPATIC FUNCTION PANEL: CPT

## 2022-05-31 PROCEDURE — 84100 ASSAY OF PHOSPHORUS: CPT

## 2022-05-31 PROCEDURE — 36415 COLL VENOUS BLD VENIPUNCTURE: CPT

## 2022-05-31 RX ORDER — FUROSEMIDE 40 MG
1 TABLET ORAL
Qty: 15 | Refills: 0
Start: 2022-05-31 | End: 2022-06-14

## 2022-05-31 RX ORDER — FERROUS SULFATE 325(65) MG
1 TABLET ORAL
Qty: 15 | Refills: 0
Start: 2022-05-31 | End: 2022-06-14

## 2022-05-31 RX ORDER — FLUTICASONE FUROATE AND VILANTEROL TRIFENATATE 100; 25 UG/1; UG/1
1 POWDER RESPIRATORY (INHALATION)
Qty: 1 | Refills: 0
Start: 2022-05-31 | End: 2022-06-14

## 2022-05-31 RX ORDER — LACTOBACILLUS ACIDOPHILUS 100MM CELL
2 CAPSULE ORAL
Qty: 30 | Refills: 0
Start: 2022-05-31 | End: 2022-06-14

## 2022-05-31 RX ORDER — APIXABAN 2.5 MG/1
1 TABLET, FILM COATED ORAL
Qty: 30 | Refills: 0
Start: 2022-05-31 | End: 2022-06-14

## 2022-05-31 RX ORDER — FUROSEMIDE 40 MG
1 TABLET ORAL
Qty: 0 | Refills: 0 | DISCHARGE

## 2022-05-31 RX ORDER — AMIODARONE HYDROCHLORIDE 400 MG/1
1 TABLET ORAL
Qty: 0 | Refills: 0 | DISCHARGE

## 2022-05-31 RX ORDER — ACETAMINOPHEN 500 MG
2 TABLET ORAL
Qty: 0 | Refills: 0 | DISCHARGE

## 2022-05-31 RX ORDER — ASCORBIC ACID 60 MG
1 TABLET,CHEWABLE ORAL
Qty: 0 | Refills: 0 | DISCHARGE

## 2022-05-31 RX ORDER — CEPHALEXIN 500 MG
500 CAPSULE ORAL
Qty: 0 | Refills: 0 | DISCHARGE

## 2022-05-31 RX ORDER — FOLIC ACID 0.8 MG
1 TABLET ORAL
Qty: 0 | Refills: 0 | DISCHARGE

## 2022-05-31 RX ORDER — FENOFIBRATE,MICRONIZED 130 MG
1 CAPSULE ORAL
Qty: 0 | Refills: 0 | DISCHARGE

## 2022-05-31 RX ORDER — CARVEDILOL PHOSPHATE 80 MG/1
1 CAPSULE, EXTENDED RELEASE ORAL
Qty: 30 | Refills: 0
Start: 2022-05-31 | End: 2022-06-14

## 2022-05-31 RX ORDER — LANOLIN ALCOHOL/MO/W.PET/CERES
1 CREAM (GRAM) TOPICAL
Qty: 0 | Refills: 0 | DISCHARGE

## 2022-05-31 RX ORDER — ACETAMINOPHEN 500 MG
2 TABLET ORAL
Qty: 40 | Refills: 0
Start: 2022-05-31 | End: 2022-06-04

## 2022-05-31 RX ORDER — SERTRALINE 25 MG/1
1 TABLET, FILM COATED ORAL
Qty: 15 | Refills: 0
Start: 2022-05-31 | End: 2022-06-14

## 2022-05-31 RX ORDER — CARVEDILOL PHOSPHATE 80 MG/1
1 CAPSULE, EXTENDED RELEASE ORAL
Qty: 0 | Refills: 0 | DISCHARGE

## 2022-05-31 RX ORDER — FENOFIBRATE,MICRONIZED 130 MG
1 CAPSULE ORAL
Qty: 15 | Refills: 0
Start: 2022-05-31 | End: 2022-06-14

## 2022-05-31 RX ORDER — FENOFIBRATE,MICRONIZED 130 MG
1 CAPSULE ORAL
Qty: 0 | Refills: 0 | DISCHARGE
Start: 2022-05-31

## 2022-05-31 RX ORDER — AMIODARONE HYDROCHLORIDE 400 MG/1
1 TABLET ORAL
Qty: 0 | Refills: 0 | DISCHARGE
Start: 2022-05-31

## 2022-05-31 RX ORDER — POTASSIUM CHLORIDE 20 MEQ
20 PACKET (EA) ORAL ONCE
Refills: 0 | Status: COMPLETED | OUTPATIENT
Start: 2022-05-31 | End: 2022-05-31

## 2022-05-31 RX ORDER — LACTOBACILLUS ACIDOPHILUS 100MM CELL
2 CAPSULE ORAL
Qty: 0 | Refills: 0 | DISCHARGE
Start: 2022-05-31

## 2022-05-31 RX ORDER — FLUTICASONE FUROATE AND VILANTEROL TRIFENATATE 100; 25 UG/1; UG/1
1 POWDER RESPIRATORY (INHALATION)
Qty: 0 | Refills: 0 | DISCHARGE

## 2022-05-31 RX ORDER — APIXABAN 2.5 MG/1
1 TABLET, FILM COATED ORAL
Qty: 0 | Refills: 0 | DISCHARGE

## 2022-05-31 RX ORDER — FOLIC ACID 0.8 MG
1 TABLET ORAL
Qty: 15 | Refills: 0
Start: 2022-05-31 | End: 2022-06-14

## 2022-05-31 RX ORDER — LANOLIN ALCOHOL/MO/W.PET/CERES
1 CREAM (GRAM) TOPICAL
Qty: 15 | Refills: 0
Start: 2022-05-31 | End: 2022-06-14

## 2022-05-31 RX ORDER — AMIODARONE HYDROCHLORIDE 400 MG/1
1 TABLET ORAL
Qty: 15 | Refills: 0
Start: 2022-05-31 | End: 2022-06-14

## 2022-05-31 RX ORDER — LOSARTAN POTASSIUM 100 MG/1
1 TABLET, FILM COATED ORAL
Qty: 0 | Refills: 0 | DISCHARGE

## 2022-05-31 RX ORDER — POTASSIUM CHLORIDE 20 MEQ
1 PACKET (EA) ORAL
Qty: 15 | Refills: 0
Start: 2022-05-31 | End: 2022-06-14

## 2022-05-31 RX ORDER — CARVEDILOL PHOSPHATE 80 MG/1
1 CAPSULE, EXTENDED RELEASE ORAL
Qty: 0 | Refills: 0 | DISCHARGE
Start: 2022-05-31

## 2022-05-31 RX ORDER — FERROUS SULFATE 325(65) MG
1 TABLET ORAL
Qty: 0 | Refills: 0 | DISCHARGE

## 2022-05-31 RX ORDER — POTASSIUM CHLORIDE 20 MEQ
1 PACKET (EA) ORAL
Qty: 0 | Refills: 0 | DISCHARGE

## 2022-05-31 RX ORDER — ASPIRIN/CALCIUM CARB/MAGNESIUM 324 MG
1 TABLET ORAL
Qty: 15 | Refills: 0
Start: 2022-05-31 | End: 2022-06-14

## 2022-05-31 RX ORDER — SERTRALINE 25 MG/1
1 TABLET, FILM COATED ORAL
Qty: 0 | Refills: 0 | DISCHARGE

## 2022-05-31 RX ADMIN — AMIODARONE HYDROCHLORIDE 200 MILLIGRAM(S): 400 TABLET ORAL at 05:23

## 2022-05-31 RX ADMIN — TIOTROPIUM BROMIDE 1 CAPSULE(S): 18 CAPSULE ORAL; RESPIRATORY (INHALATION) at 05:25

## 2022-05-31 RX ADMIN — Medication 1 TABLET(S): at 05:24

## 2022-05-31 RX ADMIN — APIXABAN 2.5 MILLIGRAM(S): 2.5 TABLET, FILM COATED ORAL at 05:23

## 2022-05-31 RX ADMIN — Medication 50 MILLIGRAM(S): at 10:33

## 2022-05-31 RX ADMIN — CARVEDILOL PHOSPHATE 25 MILLIGRAM(S): 80 CAPSULE, EXTENDED RELEASE ORAL at 05:23

## 2022-05-31 RX ADMIN — Medication 145 MILLIGRAM(S): at 11:44

## 2022-05-31 RX ADMIN — Medication 20 MILLIEQUIVALENT(S): at 11:44

## 2022-05-31 RX ADMIN — Medication 50 MILLIGRAM(S): at 00:49

## 2022-05-31 RX ADMIN — Medication 325 MILLIGRAM(S): at 11:45

## 2022-05-31 NOTE — DISCHARGE NOTE PROVIDER - NSDCFUADDINST_GEN_ALL_CORE_FT
Please followup BMP ( renal profile ) WITHIN ONE WEEK AFTER DISCHARGE ,  Lasix was held due to KELSEY  ,resumed 05/31 upon discharge .F/up with nephrologist within 1-2 weeks

## 2022-05-31 NOTE — DISCHARGE NOTE NURSING/CASE MANAGEMENT/SOCIAL WORK - PATIENT PORTAL LINK FT
You can access the FollowMyHealth Patient Portal offered by [facility] by registering at the following website: [email or web address]. By joining [facility]’s FollowMyHealth portal, you will also be able to view your health information using other applications (apps) compatible with our system.

## 2022-05-31 NOTE — DISCHARGE NOTE PROVIDER - PROVIDER TOKENS
PROVIDER:[TOKEN:[[id number]],FOLLOWUP:[1 week]],PROVIDER:[TOKEN:[[id number]],FOLLOWUP:[2 weeks]],PROVIDER:[TOKEN:[[id number]],FOLLOWUP:[1 week]],PROVIDER:[TOKEN:[[id number]],FOLLOWUP:[1 week]]

## 2022-05-31 NOTE — DISCHARGE NOTE NURSING/CASE MANAGEMENT/SOCIAL WORK - NSDCPEFALRISK_GEN_ALL_CORE
For information on Fall & Injury Prevention, visit: [email or web address] OR  [email or web address] OR  https://www.cdc.gov/steadi/patient.html

## 2022-05-31 NOTE — PROGRESS NOTE ADULT - PROBLEM SELECTOR PLAN 8
as per pulmonologist ,continue nebulized bronchodilators
continue home medications ,cardiology is following
replaced ,serial bmp
outpatient f/up with pulmonologist  Dr Calleajs ,continue nebulized bronchodilators
replaced ,serial bmp
continue home medications ,cardiology is following
replaced ,serial bmp
continue home medications ,cardiology is following

## 2022-05-31 NOTE — PROGRESS NOTE ADULT - PROBLEM SELECTOR PROBLEM 7
HTN (hypertension)
Cellulitis
Cellulitis
HTN (hypertension)
Hypokalemia
Hypokalemia
HTN (hypertension)
Hypokalemia

## 2022-05-31 NOTE — DISCHARGE NOTE PROVIDER - REASON FOR ADMISSION
82 yo F w/ hx HTN, CHF, COPD, afib on eliquis p/w found with SOB since ~ 7am today at [facility]. NO known pain. EMS was called and found pt cyanotic and O2 56% on RA. EMS states 82% on 100% nrb. Pt on CPAP by EMS with sats in mid 90s. No known fever/chills. PT reported been off lasix over past couple days. No other acute co or changes Admitted  to telemetry unit for monitoring , send 3 sets of cardiac enzymes to rule out acute coronary event, obtain ECHO to evaluate LVEF, cardiology consult  ,continue current management, O2 supply, anticoagulation plan as per cardiology consult Admit to monitored unit for cardiac monitoring, obtain echo to evaluate LVEF, intravenous diuresis as per card consult , monitor ins/outs, monitor renal profile and electrolytes closely ,send 3 sets of enzymes, O2 supply, serial chest xrays, monitor weights and oral intake of fluids, nutritionist consult Palliative care consult requested ,to discuss advance directives and complete MOLST       [facility] of COVID 19 in 2011, CHF, RLE ulcer, CKD and CHF who was brought to the ED from [facility] for SOB and hypoxia. She was vaccinated with 3 doses of Moderna vaccine in the past but did not get the second booster dose.    w/ hx HTN, CHF, COPD, afib on eliquis p/w found with SOB since ~ 7am today at SNF. NO known pain. EMS was called and found pt cyanotic and O2 56% on RA. EMS states 82% on 100% nrb. Pt on CPAP by EMS with sats in mid 90s. No known fever/chills. PT reported been off lasix over past couple days. No other acute co or changes

## 2022-05-31 NOTE — PROGRESS NOTE ADULT - PROBLEM SELECTOR PLAN 7
serial bmp ,nephrology is following
RLE -as per ID CONS
serial bmp ,nephrology is following
continue home medications ,cardiology is following
RLE -IMPROVED
continue home medications ,cardiology is following
continue home medications ,cardiology is following
serial bmp ,nephrology is following

## 2022-05-31 NOTE — PROGRESS NOTE ADULT - PROBLEM SELECTOR PROBLEM 10
COPD, moderate
Prophylactic measure
COPD, moderate
COPD, moderate

## 2022-05-31 NOTE — PROGRESS NOTE ADULT - PROBLEM SELECTOR PROBLEM 11
Prophylactic measure
KELSEY (acute kidney injury)
Prophylactic measure

## 2022-05-31 NOTE — DISCHARGE NOTE PROVIDER - NSDCCAREPROVSEEN_GEN_ALL_CORE_FT
Júnior, Laurent Allen, Kyle Medeiros, Lola Tomas, Emir Woods, Mohsen Schmuter, Shante Urbina, Ced Garcia, Robert

## 2022-05-31 NOTE — PROGRESS NOTE ADULT - SUBJECTIVE AND OBJECTIVE BOX
PROGRESS NOTE  Patient is a 81y old  Female who presents with a chief complaint of 82 yo F w/ hx HTN, CHF, COPD, afib on eliquis p/w found with SOB since ~ 7am today at [facility]. NO known pain. EMS was called and found pt cyanotic and O2 56% on RA. EMS states 82% on 100% nrb. Pt on CPAP by EMS with sats in mid 90s. No known fever/chills. PT reported been off lasix over past couple days. No other acute co or changes Admitted  to telemetry unit for monitoring , send 3 sets of cardiac enzymes to rule out acute coronary event, obtain ECHO to evaluate LVEF, cardiology consult  ,continue current management, O2 supply, anticoagulation plan as per cardiology consult Admit to monitored unit for cardiac monitoring, obtain echo to evaluate LVEF, intravenous diuresis as per card consult , monitor ins/outs, monitor renal profile and electrolytes closely ,send 3 sets of enzymes, O2 supply, serial chest xrays, monitor weights and oral intake of fluids, nutritionist consult Palliative care consult requested ,to discuss advance directives and complete MOLST       [facility], RLE ulcer, CKD and CHF who was brought to the ED from [facility] for SOB and hypoxia. She was vaccinated with 3 doses of Moderna vaccine in the past but did not get the second booster dose.    w/ hx HTN, CHF, COPD, afib on eliquis p/w found with SOB since ~ 7am today at [facility]. NO known pain. EMS was called and found pt cyanotic and O2 56% on RA. EMS states 82% on 100% nrb. Pt on CPAP by EMS with sats in mid 90s. No known fever/chills. PT reported been off lasix over past couple days. No other acute co or changes (31 May 2022 10:18)      OVERNIGHT      HPI:  82 yo F w/ hx HTN, CHF, COPD, afib on eliquis p/w found with SOB since ~ 7am today at [facility]. NO known pain. EMS was called and found pt cyanotic and O2 56% on RA. EMS states 82% on 100% nrb. Pt on CPAP by EMS with sats in mid 90s. No known fever/chills. PT reported been off lasix over past couple days. No other acute co or changes Admitted  to telemetry unit for monitoring , send 3 sets of cardiac enzymes to rule out acute coronary event, obtain ECHO to evaluate LVEF, cardiology consult  ,continue current management, O2 supply, anticoagulation plan as per cardiology consult Admit to monitored unit for cardiac monitoring, obtain echo to evaluate LVEF, intravenous diuresis as per card consult , monitor ins/outs, monitor renal profile and electrolytes closely ,send 3 sets of enzymes, O2 supply, serial chest xrays, monitor weights and oral intake of fluids, nutritionist consult Palliative care consult requested ,to discuss advance directives and complete MOLST       [facility], [facility], RLE ulcer, CKD and CHF who was brought to the ED from [facility] for SOB and hypoxia. She was vaccinated with 3 doses of Moderna vaccine in the past but did not get the second booster dose.    w/ hx HTN, CHF, COPD, afib on eliquis p/w found with SOB since ~ 7am today at [facility]. NO known pain. EMS was called and found pt cyanotic and O2 56% on RA. EMS states 82% on 100% nrb. Pt on CPAP by EMS with sats in mid 90s. No known fever/chills. PT reported been off lasix over past couple days. No other acute co or changes          The patient is an 81 year old female with a PMH of COVID 19 in 2011, CHF, RLE ulcer, CKD and CHF who was brought to the ED from [facility] for SOB and hypoxia. She was vaccinated with 3 doses of Moderna vaccine in the past but did not get the second booster dose.    w/ hx HTN, CHF, COPD, afib on eliquis p/w found with SOB since ~ 7am today at [facility]. NO known pain. EMS was called and found pt cyanotic and O2 56% on RA. EMS states 82% on 100% nrb. Pt on CPAP by EMS with sats in mid 90s. No known fever/chills. PT reported been off lasix over past couple days. No other acute co or changes    < from: Xray Chest 1 View- PORTABLE-Urgent (05.22.22 @ 09:54) >    ACC: [id number] EXAM:  XR CHEST PORTABLE URGENT 1V                          PROCEDURE DATE:  05/22/2022          INTERPRETATION:  CLINICAL INDICATION: 81 years  Female with SOB, CHF.    COMPARISON: None    The patient's chin obscures the left lung apex.    AP view of the chest demonstrates a large right lower lobe infiltrate.   There is mild pulmonary vascular congestion. There is no pleural   effusion. There is no pneumothorax.    The heart is normal in size. Left-sided bipolar pacemaker. There is no   mediastinal or hilar mass.    Mild thoracic degenerative changes and osteopenia are present.    IMPRESSION:    Large right lower lobe infiltrates superimposed on pulmonary vascular   congestion. Pacemaker.    --- End of Report ---            PAUL MONROY MD; Attending Radiologist    < end of copied text >  < from: 12 Lead ECG (05.22.22 @ 09:28) >    Ventricular Rate 82 BPM    Atrial Rate 82 BPM    P-R Interval 208 ms    QRS Duration 162 ms    Q-T Interval 424 ms    QTC Calculation(Bazett) 495 ms    P Axis 77 degrees    R Axis 93 degrees    T Axis 24 degrees    Diagnosis Line Atrial-sensed ventricular-paced rhythm  Abnormal ECG  No previous ECGs available  Confirmed by dea Lopez ([id number]) on 5/22/2022 3:22:34 PM    < end of copied text >  sitivity (05.22.22 @ 13:48)   Troponin I, High Sensitivity Result: 138.8: Serial measurements of high sensitivity troponin I (hs TnI) showing rapid   upward or downward changes suggest acute myocardial injury. Please note   that a sustained elevation of hsTnI can be caused by renal disease,   chronic heart failure, sepsis, pulmonary embolism and other clinical   conditions. ng/L       Historical Values  Troponin I, High Sensitivity (05.22.22 @ 13:48)   Troponin I, High Sensitivity Result: 138.8: Serial measurements of high sensitivity troponin I (hs TnI) showing rapid   upward or downward changes suggest acute myocardial injury. Please note   that a sustained elevation of hsTnI can be caused by renal disease,   chronic heart failure, sepsis, pulmonary embolism and other clinical   conditions. ng/L   Troponin I, High Sensitivity (05.22.22 @ 09:43)   Troponin I, High Sensitivity Result: 21.2: Serial measurements of high sensitivity troponin I (hs TnI) showing rapid   upward or downward changes suggest acute myocardial injury. Please note   that a sustained elevation of hsTnI can be caused by renal disease,   chronic heart failure, sepsis, pulmonary embolism and other clinical   conditions. ng/L  (22 May 2022 12:13)    PAST MEDICAL & SURGICAL HISTORY:  HTN (hypertension)      HLD (hyperlipidemia)      Anemia  newly diagnosed      DVT (deep venous thrombosis)  2010 was on coumadin for 6 months      Pneumonia  years ago      OA (osteoarthritis)      Status post total left knee replacement  2007      History of cholecystectomy  open , many years ago      H/O hernia repair  years ago          MEDICATIONS  (STANDING):  aMIOdarone    Tablet 200 milliGRAM(s) Oral daily  apixaban 2.5 milliGRAM(s) Oral every 12 hours  carvedilol 25 milliGRAM(s) Oral two times a day  fenofibrate Tablet 145 milliGRAM(s) Oral daily  ferrous    sulfate 325 milliGRAM(s) Oral daily  hydrALAZINE 50 milliGRAM(s) Oral three times a day  lactobacillus acidophilus 1 Tablet(s) Oral two times a day  Medihoney 1 Application(s) 1 Application(s) Topical daily  melatonin 5 milliGRAM(s) Oral at bedtime  potassium chloride    Tablet ER 20 milliEquivalent(s) Oral once  tiotropium 18 MICROgram(s) Capsule 1 Capsule(s) Inhalation daily    MEDICATIONS  (PRN):  acetaminophen     Tablet .. 650 milliGRAM(s) Oral every 6 hours PRN Temp greater or equal to 38C (100.4F)  aluminum hydroxide/magnesium hydroxide/simethicone Suspension 30 milliLiter(s) Oral every 4 hours PRN Dyspepsia  cloNIDine 0.2 milliGRAM(s) Oral every 6 hours PRN for systolic BP>180 or diastolic BP>100  ondansetron Injectable 4 milliGRAM(s) IV Push every 8 hours PRN Nausea and/or Vomiting      OBJECTIVE    T(C): 36.6 (05-31-22 @ 05:25), Max: 36.8 (05-30-22 @ 12:57)  HR: 60 (05-31-22 @ 05:25) (59 - 62)  BP: 127/63 (05-31-22 @ 05:25) (112/62 - 129/71)  RR: 17 (05-31-22 @ 05:25) (17 - 18)  SpO2: 93% (05-31-22 @ 05:25) (93% - 95%)  Wt(kg): --  I&O's Summary    30 May 2022 07:01  -  31 May 2022 07:00  --------------------------------------------------------  IN: 740 mL / OUT: 800 mL / NET: -60 mL          REVIEW OF SYSTEMS:  CONSTITUTIONAL: No fever, weight loss, or fatigue  EYES: No eye pain, visual disturbances, or discharge  ENMT:   No sinus or throat pain  NECK: No pain or stiffness  RESPIRATORY: No cough, wheezing, chills or hemoptysis; No shortness of breath  CARDIOVASCULAR: No chest pain, palpitations, dizziness, or leg swelling  GASTROINTESTINAL: No abdominal pain. No nausea, vomiting; No diarrhea or constipation. No melena or hematochezia.  GENITOURINARY: No dysuria, frequency, hematuria, or incontinence  NEUROLOGICAL: No headaches, memory loss, loss of strength, numbness, or tremors  SKIN: No itching, burning, rashes, or lesions   MUSCULOSKELETAL: No joint pain or swelling; No muscle, back, or extremity pain    PHYSICAL EXAM:  Appearance: NAD. VS past 24 hrs -as above   HEENT:   Moist oral mucosa. Conjunctiva clear b/l.   Neck : supple  Respiratory: Lungs CTAB.  Gastrointestinal:  Soft, nontender. No rebound. No rigidity. BS present	  Cardiovascular: RRR ,S1S2 present  Neurologic: Non-focal. Moving all extremities.  Extremities: No edema. No erythema. No calf tenderness.  Skin: No rashes, No ecchymoses, No cyanosis.	  wounds ,skin lesions-See skin assesment flow sheet   LABS:                        9.6    4.89  )-----------( 239      ( 31 May 2022 08:09 )             29.3     05-31    140  |  99  |  50<H>  ----------------------------<  92  3.4<L>   |  36<H>  |  1.40<H>    Ca    8.6      31 May 2022 08:09    TPro  5.6<L>  /  Alb  2.7<L>  /  TBili  0.4  /  DBili  0.2  /  AST  27  /  ALT  36  /  AlkPhos  43  05-31    CAPILLARY BLOOD GLUCOSE        PT/INR - ( 31 May 2022 08:09 )   PT: 19.6 sec;   INR: 1.67 ratio               Culture - Urine (collected 22 May 2022 16:30)  Source: Clean Catch Clean Catch (Midstream)  Final Report (24 May 2022 11:47):    10,000 - 49,000 CFU/mL Enterobacter cloacae complex    <10,000 CFU/ml Normal Urogenital benson present  Organism: Enterobacter cloacae complex (24 May 2022 11:47)  Organism: Enterobacter cloacae complex (24 May 2022 11:47)    Culture - Blood (collected 22 May 2022 16:28)  Source: .Blood Blood-Peripheral  Final Report (27 May 2022 17:01):    No Growth Final    Culture - Blood (collected 22 May 2022 16:28)  Source: .Blood Blood-Peripheral  Final Report (27 May 2022 17:01):    No Growth Final    RADIOLOGY & ADDITIONAL TESTS:< from: TTE Echo Complete w/o Contrast w/ Doppler (05.26.22 @ 11:51) >      INTERPRETATION:  INDICATION: Dyspnea    Impression:  1. This a very technically limited study.  2. There is normal left ventricular size and left ventricular systolic   function with ejection fraction of 55-60%.  3. There is mild left atrial enlargement.  4. There is normal left ventricular wall thickness.  5. There is diastolic dysfunction.  6. There is moderate to severe mitral annular calcification without   significant mitral valve stenosis.  7. There is mild mitral regurgitation.  8. There is aortic valve sclerosis without significant aortic valve   stenosis.  9. There is trivial tricuspid regurgitation.  10. There is no significant pericardial effusion.    Blood Pressure 155/74 mmHg         BSA 1.74 sq m    Dimensions:  LA 4.0 cm       Normal Values: 2.0 - 4.0 cm  Ao 2.9 cm        Normal Values: 2.0 - 3.8 cm  IVSd 1.0 cm       Normal Values: 0.6 - 1.2 cm  PWd 1.1 cmNormal Values: 0.6 - 1.1 cm  LVIDd 5.4 cm         Normal Values: 3.0 - 5.6 cm  LVIDs 3.8 cm         Normal Values: 1.8 - 4.0 cm    < end of copied text >     reviewed elctronically  ASSESSMENT/PLAN: Patient was seen and examined on a day of discharge . Plan of care , discharge medications and recommendations discussed with consultants and clearance for discharge obtained .Social service , case management  and medical staff are aware of plan. Family is notified. Discharge summary  is  prepared electronically-see separate document prepared by me .

## 2022-05-31 NOTE — PROGRESS NOTE ADULT - PROBLEM SELECTOR PLAN 4
pulse oximetry ,started on Dexamethasone and remdesivir ,ID and pulm is following
IMPROVED
pulse oximetry ,started on Dexamethasone and remdesivir ,ID and pulm is following

## 2022-05-31 NOTE — PROGRESS NOTE ADULT - PROBLEM SELECTOR PLAN 5
Admitted  to telemetry unit for monitoring , send 3 sets of cardiac enzymes to rule out acute coronary event, obtain ECHO to evaluate LVEF, cardiology consult  ,continue current management, O2 supply, anticoagulation plan as per cardiology consult
Admitted  to telemetry unit for monitoring , send 3 sets of cardiac enzymes to rule out acute coronary event, obtain ECHO to evaluate LVEF, cardiology consult  ,continue current management, O2 supply, anticoagulation plan as per cardiology consult
serial bmp ,nephrology is following ,further lasix dose as per Dr Woods
serial bmp ,nephrology is following ,further lasix dose as per Dr Woods
Admitted  to telemetry unit for monitoring , send 3 sets of cardiac enzymes to rule out acute coronary event, obtain ECHO to evaluate LVEF, cardiology consult  ,continue current management, O2 supply, anticoagulation plan as per cardiology consult
serial bmp ,nephrology is following ,further lasix dose as per Dr Woods

## 2022-05-31 NOTE — DISCHARGE NOTE PROVIDER - HOSPITAL COURSE
80 yo F w/ hx HTN, CHF, COPD, afib on eliquis p/w found with SOB since ~ 7am today at SNF. NO known pain. EMS was called and found pt cyanotic and O2 56% on RA. EMS states 82% on 100% nrb. Pt on CPAP by EMS with sats in mid 90s. No known fever/chills. PT reported been off lasix over past couple days. No other acute co or changes Admitted  to telemetry unit for monitoring , send 3 sets of cardiac enzymes to rule out acute coronary event, obtain ECHO to evaluate LVEF, cardiology consult  ,continue current management, O2 supply, anticoagulation plan as per cardiology consult Admit to monitored unit for cardiac monitoring, obtain echo to evaluate LVEF, intravenous diuresis as per card consult , monitor ins/outs, monitor renal profile and electrolytes closely ,send 3 sets of enzymes, O2 supply, serial chest xrays, monitor weights and oral intake of fluids, nutritionist consult Palliative care consult requested ,to discuss advance directives and complete MOLST     Problem/Plan - 1:  ·  Problem: Acute respiratory failure with hypoxia.   ·  Plan: oxygen supply as per pulmonologist ,serial abgs ,serial imaging ,tele monitoring.    Problem/Plan - 2:  ·  Problem: CHF exacerbation.   ·  Plan: Admit to monitored unit for cardiac monitoring, obtain echo to evaluate LVEF, intravenous diuresis as per card consult , monitor ins/outs, monitor renal profile and electrolytes closely ,send 3 sets of enzymes, O2 supply, serial chest xrays, monitor weights and oral intake of fluids, nutritionist consult.    Problem/Plan - 3:  ·  Problem: Pneumonia, aspiration.   ·  Plan: aspiration precautions ,zosyn ,ID & PULM F/UP ,SLP eval.    Problem/Plan - 4:  ·  Problem: 2019 novel coronavirus disease (COVID-19).   ·  Plan: pulse oximetry ,started on Dexamethasone and remdesivir ,ID and pulm is following.    Problem/Plan - 5:  ·  Problem: Afib.   ·  Plan: Admitted  to telemetry unit for monitoring , send 3 sets of cardiac enzymes to rule out acute coronary event, obtain ECHO to evaluate LVEF, cardiology consult  ,continue current management, O2 supply, anticoagulation plan as per cardiology consult.    Problem/Plan - 6:  ·  Problem: HTN (hypertension).   ·  Plan: continue home medications ,cardiology is following.    Problem/Plan - 7:  ·  Problem: Cellulitis.   ·  Plan: RLE -as per ID CONS.    Problem/Plan - 8:  ·  Problem: COPD, moderate.   ·  Plan: as per pulmonologist ,continue nebulized bronchodilators.    Problem/Plan - 9:  ·  Problem: Prophylactic measure.   ·  Plan: Gastrointestinal stress ulcer prophylaxis and DVT prophylaxis administered.

## 2022-05-31 NOTE — PROGRESS NOTE ADULT - SUBJECTIVE AND OBJECTIVE BOX
Patient is a 81y Female whom presented to the hospital with ckd and petra     PAST MEDICAL & SURGICAL HISTORY:      MEDICATIONS  (STANDING):  aMIOdarone    Tablet 200 milliGRAM(s) Oral daily  apixaban 5 milliGRAM(s) Oral every 12 hours  carvedilol 25 milliGRAM(s) Oral two times a day  cefTRIAXone   IVPB 1000 milliGRAM(s) IV Intermittent every 24 hours  dexAMETHasone  Injectable 6 milliGRAM(s) IV Push daily  dextrose 5% + sodium chloride 0.45%. 1000 milliLiter(s) (35 mL/Hr) IV Continuous <Continuous>  doxycycline hyclate Capsule 100 milliGRAM(s) Oral every 12 hours  fenofibrate Tablet 145 milliGRAM(s) Oral daily  ferrous    sulfate 325 milliGRAM(s) Oral daily  furosemide   Injectable 40 milliGRAM(s) IV Push daily  losartan 100 milliGRAM(s) Oral daily  melatonin 5 milliGRAM(s) Oral at bedtime  potassium chloride    Tablet ER 20 milliEquivalent(s) Oral two times a day  remdesivir  IVPB 100 milliGRAM(s) IV Intermittent every 24 hours  remdesivir  IVPB   IV Intermittent   tiotropium 18 MICROgram(s) Capsule 1 Capsule(s) Inhalation daily      Allergies    fluoxetine (Unknown)  NIFEdipine (Unknown)    Intolerances        SOCIAL HISTORY:  Denies ETOh,Smoking,     FAMILY HISTORY:      REVIEW OF SYSTEMS:    CONSTITUTIONAL: No weakness, fevers or chills  NECK: No pain or stiffness  RESPIRATORY: No cough, wheezing, hemoptysis; No shortness of breath  CARDIOVASCULAR: No chest pain or palpitations  GASTROINTESTINAL: No abdominal or epigastric pain. No nausea, vomiting,     No diarrhea or constipation. No melena   SKIN: dry                                                                                                         9.6    4.89  )-----------( 239      ( 31 May 2022 08:09 )             29.3       CBC Full  -  ( 31 May 2022 08:09 )  WBC Count : 4.89 K/uL  RBC Count : 3.01 M/uL  Hemoglobin : 9.6 g/dL  Hematocrit : 29.3 %  Platelet Count - Automated : 239 K/uL  Mean Cell Volume : 97.3 fl  Mean Cell Hemoglobin : 31.9 pg  Mean Cell Hemoglobin Concentration : 32.8 gm/dL  Auto Neutrophil # : x  Auto Lymphocyte # : x  Auto Monocyte # : x  Auto Eosinophil # : x  Auto Basophil # : x  Auto Neutrophil % : x  Auto Lymphocyte % : x  Auto Monocyte % : x  Auto Eosinophil % : x  Auto Basophil % : x      05-31    140  |  99  |  50<H>  ----------------------------<  92  3.4<L>   |  36<H>  |  1.40<H>    Ca    8.6      31 May 2022 08:09    TPro  5.6<L>  /  Alb  2.7<L>  /  TBili  0.4  /  DBili  0.2  /  AST  27  /  ALT  36  /  AlkPhos  43  05-31      CAPILLARY BLOOD GLUCOSE          Vital Signs Last 24 Hrs  T(C): 36.6 (31 May 2022 12:25), Max: 36.8 (31 May 2022 00:45)  T(F): 97.9 (31 May 2022 12:25), Max: 98.2 (31 May 2022 00:45)  HR: 70 (31 May 2022 12:25) (59 - 70)  BP: 160/70 (31 May 2022 12:25) (127/63 - 160/70)  BP(mean): --  RR: 16 (31 May 2022 12:25) (16 - 18)  SpO2: 95% (31 May 2022 12:25) (93% - 95%)        PT/INR - ( 31 May 2022 08:09 )   PT: 19.6 sec;   INR: 1.67 ratio             PHYSICAL EXAM:    Constitutional: NAD  HEENT: conjunctive   clear   Neck:  No JVD  Respiratory: CTAB  Cardiovascular: S1 and S2  Gastrointestinal: BS+, soft, NT/ND  Extremities: No peripheral edema

## 2022-05-31 NOTE — PROGRESS NOTE ADULT - NUTRITIONAL ASSESSMENT
MEDICATIONS  (STANDING):  aMIOdarone    Tablet 200 milliGRAM(s) Oral daily  apixaban 5 milliGRAM(s) Oral every 12 hours  carvedilol 25 milliGRAM(s) Oral two times a day  cefTRIAXone   IVPB 1000 milliGRAM(s) IV Intermittent every 24 hours  dexAMETHasone  Injectable 6 milliGRAM(s) IV Push daily  doxycycline hyclate Capsule 100 milliGRAM(s) Oral every 12 hours  fenofibrate Tablet 145 milliGRAM(s) Oral daily  ferrous    sulfate 325 milliGRAM(s) Oral daily  furosemide   Injectable 40 milliGRAM(s) IV Push daily  lactobacillus acidophilus 1 Tablet(s) Oral two times a day  losartan 100 milliGRAM(s) Oral daily  Medihoney 1 Application(s) 1 Application(s) Topical daily  melatonin 5 milliGRAM(s) Oral at bedtime  potassium chloride    Tablet ER 20 milliEquivalent(s) Oral two times a day  remdesivir  IVPB 100 milliGRAM(s) IV Intermittent every 24 hours  remdesivir  IVPB   IV Intermittent   tiotropium 18 MICROgram(s) Capsule 1 Capsule(s) Inhalation daily
MEDICATIONS  (STANDING):  aMIOdarone    Tablet 200 milliGRAM(s) Oral daily  apixaban 5 milliGRAM(s) Oral every 12 hours  carvedilol 25 milliGRAM(s) Oral two times a day  doxycycline hyclate Capsule 100 milliGRAM(s) Oral every 12 hours  fenofibrate Tablet 145 milliGRAM(s) Oral daily  ferrous    sulfate 325 milliGRAM(s) Oral daily  hydrALAZINE 50 milliGRAM(s) Oral three times a day  lactobacillus acidophilus 1 Tablet(s) Oral two times a day  Medihoney 1 Application(s) 1 Application(s) Topical daily  melatonin 5 milliGRAM(s) Oral at bedtime  tiotropium 18 MICROgram(s) Capsule 1 Capsule(s) Inhalation daily
MEDICATIONS  (STANDING):  aMIOdarone    Tablet 200 milliGRAM(s) Oral daily  apixaban 5 milliGRAM(s) Oral every 12 hours  carvedilol 25 milliGRAM(s) Oral two times a day  doxycycline hyclate Capsule 100 milliGRAM(s) Oral every 12 hours  fenofibrate Tablet 145 milliGRAM(s) Oral daily  ferrous    sulfate 325 milliGRAM(s) Oral daily  hydrALAZINE 50 milliGRAM(s) Oral three times a day  lactobacillus acidophilus 1 Tablet(s) Oral two times a day  Medihoney 1 Application(s) 1 Application(s) Topical daily  melatonin 5 milliGRAM(s) Oral at bedtime  tiotropium 18 MICROgram(s) Capsule 1 Capsule(s) Inhalation daily
MEDICATIONS  (STANDING):  aMIOdarone    Tablet 200 milliGRAM(s) Oral daily  apixaban 5 milliGRAM(s) Oral every 12 hours  carvedilol 25 milliGRAM(s) Oral two times a day  dexAMETHasone  Injectable 6 milliGRAM(s) IV Push daily  doxycycline hyclate Capsule 100 milliGRAM(s) Oral every 12 hours  fenofibrate Tablet 145 milliGRAM(s) Oral daily  ferrous    sulfate 325 milliGRAM(s) Oral daily  furosemide    Tablet 40 milliGRAM(s) Oral two times a day  hydrALAZINE 50 milliGRAM(s) Oral three times a day  lactobacillus acidophilus 1 Tablet(s) Oral two times a day  losartan 100 milliGRAM(s) Oral daily  Medihoney 1 Application(s) 1 Application(s) Topical daily  melatonin 5 milliGRAM(s) Oral at bedtime  potassium chloride    Tablet ER 20 milliEquivalent(s) Oral two times a day  tiotropium 18 MICROgram(s) Capsule 1 Capsule(s) Inhalation daily
MEDICATIONS  (STANDING):  aMIOdarone    Tablet 200 milliGRAM(s) Oral daily  apixaban 5 milliGRAM(s) Oral every 12 hours  carvedilol 25 milliGRAM(s) Oral two times a day  doxycycline hyclate Capsule 100 milliGRAM(s) Oral every 12 hours  fenofibrate Tablet 145 milliGRAM(s) Oral daily  ferrous    sulfate 325 milliGRAM(s) Oral daily  hydrALAZINE 50 milliGRAM(s) Oral three times a day  lactobacillus acidophilus 1 Tablet(s) Oral two times a day  Medihoney 1 Application(s) 1 Application(s) Topical daily  melatonin 5 milliGRAM(s) Oral at bedtime  tiotropium 18 MICROgram(s) Capsule 1 Capsule(s) Inhalation daily
MEDICATIONS  (STANDING):  aMIOdarone    Tablet 200 milliGRAM(s) Oral daily  apixaban 5 milliGRAM(s) Oral every 12 hours  carvedilol 25 milliGRAM(s) Oral two times a day  cefTRIAXone   IVPB 1000 milliGRAM(s) IV Intermittent every 24 hours  dexAMETHasone  Injectable 6 milliGRAM(s) IV Push daily  doxycycline hyclate Capsule 100 milliGRAM(s) Oral every 12 hours  fenofibrate Tablet 145 milliGRAM(s) Oral daily  ferrous    sulfate 325 milliGRAM(s) Oral daily  furosemide   Injectable 40 milliGRAM(s) IV Push daily  lactobacillus acidophilus 1 Tablet(s) Oral two times a day  losartan 100 milliGRAM(s) Oral daily  Medihoney 1 Application(s) 1 Application(s) Topical daily  melatonin 5 milliGRAM(s) Oral at bedtime  potassium chloride    Tablet ER 20 milliEquivalent(s) Oral two times a day  remdesivir  IVPB 100 milliGRAM(s) IV Intermittent every 24 hours  remdesivir  IVPB   IV Intermittent   tiotropium 18 MICROgram(s) Capsule 1 Capsule(s) Inhalation daily

## 2022-05-31 NOTE — PROGRESS NOTE ADULT - PROBLEM SELECTOR PROBLEM 6
Pneumonia, aspiration
Pneumonia, aspiration
HTN (hypertension)
Afib
HTN (hypertension)
Pneumonia, aspiration

## 2022-05-31 NOTE — PROGRESS NOTE ADULT - PROBLEM SELECTOR PROBLEM 8
Hypokalemia
Hypokalemia
COPD, moderate
Hypokalemia
HTN (hypertension)
COPD, moderate
HTN (hypertension)
HTN (hypertension)

## 2022-05-31 NOTE — DISCHARGE NOTE PROVIDER - CARE PROVIDER_API CALL
Te Morales)  [facility]  [address] [place] [address]  Phone: [phone]  Fax: [phone]  Follow Up Time: 1 week    Laurent Callejas)  Internal Medicine; Pulmonary Disease  [address] [place] [address]  Phone: [phone]  Fax: [phone]  Follow Up Time: 2 weeks    Kyle Allen)  Cardiology  [address] [place] [address], [address]  [place] [address]  Phone: [phone]  Fax: [phone]  Follow Up Time: 1 week    Pahlavan, Mohsen (MD)  Medicine  [address] [facility], [address] [place] [address]  Phone: [phone]  Fax: [phone]  Follow Up Time: 1 week

## 2022-05-31 NOTE — PROGRESS NOTE ADULT - PROBLEM SELECTOR PLAN 9
Gastrointestinal stress ulcer prophylaxis and DVT prophylaxis administered
RLE -as per ID CONS
improved ,monitor outpatient closely ,d/w  daughter Lela on a phone
RLE -as per ID CONS

## 2022-05-31 NOTE — DISCHARGE NOTE PROVIDER - NSDCCPCAREPLAN_GEN_ALL_CORE_FT
PRINCIPAL DISCHARGE DIAGNOSIS  Diagnosis: Acute respiratory failure with hypoxia  Assessment and Plan of Treatment:       SECONDARY DISCHARGE DIAGNOSES  Diagnosis: Pneumonia, aspiration  Assessment and Plan of Treatment:     Diagnosis: 2019 novel coronavirus disease (COVID-19)  Assessment and Plan of Treatment:     Diagnosis: CHF exacerbation  Assessment and Plan of Treatment:     Diagnosis: Afib  Assessment and Plan of Treatment:     Diagnosis: Hypokalemia  Assessment and Plan of Treatment:     Diagnosis: KELSEY (acute kidney injury)  Assessment and Plan of Treatment:     Diagnosis: Cellulitis  Assessment and Plan of Treatment:     Diagnosis: COPD, moderate  Assessment and Plan of Treatment:     Diagnosis: HTN (hypertension)  Assessment and Plan of Treatment:

## 2022-05-31 NOTE — PROGRESS NOTE ADULT - ASSESSMENT
80 yo F w/ hx HTN, CHF, COPD, afib on eliquis p/w found with SOB  copd  CHF  AF  Obesity  OP  OA  Covid    s/p ABX  VS noted  CM following  plan for KRISTA DC  Sonata added for Sleep Aid    s/p BIPAP  GOC discussion - Lela Haney - HCP - Daughter - pt is DNR DNI - MOLST updated and signed -   assist with needs  Isolation for covid, Remdesivir -   monitor VS and HD and Sat  [facility]vs rx regimen

## 2022-05-31 NOTE — PROGRESS NOTE ADULT - SUBJECTIVE AND OBJECTIVE BOX
Date/Time Patient Seen:  		  Referring MD:   Data Reviewed	       Patient is a 81y old  Female who presents with a chief complaint of 82 yo F w/ hx HTN, CHF, COPD, afib on eliquis p/w found with SOB since ~ 7am today at [facility]. NO known pain. EMS was called and found pt cyanotic and O2 56% on RA. EMS states 82% on 100% nrb. Pt on CPAP by EMS with sats in mid 90s. No known fever/chills. PT reported been off lasix over past couple days. No other acute co or changes Admitted  to telemetry unit for monitoring , send 3 sets of cardiac enzymes to rule out acute coronary event, obtain ECHO to evaluate LVEF, cardiology consult  ,continue current management, O2 supply, anticoagulation plan as per cardiology consult Admit to monitored unit for cardiac monitoring, obtain echo to evaluate LVEF, intravenous diuresis as per card consult , monitor ins/outs, monitor renal profile and electrolytes closely ,send 3 sets of enzymes, O2 supply, serial chest xrays, monitor weights and oral intake of fluids, nutritionist consult Palliative care consult requested ,to discuss advance directives and complete MOLST       TPMH of COVID 19 in 2011, CHF, RLE ulcer, CKD and CHF who was brought to the ED from [facility] for SOB and hypoxia. She was vaccinated with 3 doses of Moderna vaccine in the past but did not get the second booster dose.    w/ hx HTN, CHF, COPD, afib on eliquis p/w found with SOB since ~ 7am today at SNF. NO known pain. EMS was called and found pt cyanotic and O2 56% on RA. EMS states 82% on 100% nrb. Pt on CPAP by EMS with sats in mid 90s. No known fever/chills. PT reported been off lasix over past couple days. No other acute co or changes (30 May 2022 15:34)      Subjective/HPI     PAST MEDICAL & SURGICAL HISTORY:  HTN (hypertension)    HLD (hyperlipidemia)    Anemia  newly diagnosed    DVT (deep venous thrombosis)  2010 was on coumadin for 6 months    Pneumonia  years ago    OA (osteoarthritis)    Status post total left knee replacement  2007    History of cholecystectomy  open , many years ago    H/O hernia repair  years ago          Medication list         MEDICATIONS  (STANDING):  aMIOdarone    Tablet 200 milliGRAM(s) Oral daily  apixaban 2.5 milliGRAM(s) Oral every 12 hours  carvedilol 25 milliGRAM(s) Oral two times a day  fenofibrate Tablet 145 milliGRAM(s) Oral daily  ferrous    sulfate 325 milliGRAM(s) Oral daily  hydrALAZINE 50 milliGRAM(s) Oral three times a day  lactobacillus acidophilus 1 Tablet(s) Oral two times a day  Medihoney 1 Application(s) 1 Application(s) Topical daily  melatonin 5 milliGRAM(s) Oral at bedtime  tiotropium 18 MICROgram(s) Capsule 1 Capsule(s) Inhalation daily    MEDICATIONS  (PRN):  acetaminophen     Tablet .. 650 milliGRAM(s) Oral every 6 hours PRN Temp greater or equal to 38C (100.4F)  aluminum hydroxide/magnesium hydroxide/simethicone Suspension 30 milliLiter(s) Oral every 4 hours PRN Dyspepsia  cloNIDine 0.2 milliGRAM(s) Oral every 6 hours PRN for systolic BP>180 or diastolic BP>100  ondansetron Injectable 4 milliGRAM(s) IV Push every 8 hours PRN Nausea and/or Vomiting         Vitals log        ICU Vital Signs Last 24 Hrs  T(C): 36.6 (31 May 2022 05:25), Max: 36.8 (30 May 2022 12:57)  T(F): 97.8 (31 May 2022 05:25), Max: 98.2 (30 May 2022 12:57)  HR: 60 (31 May 2022 05:25) (59 - 62)  BP: 127/63 (31 May 2022 05:25) (112/62 - 154/64)  BP(mean): --  ABP: --  ABP(mean): --  RR: 17 (31 May 2022 05:25) (17 - 18)  SpO2: 93% (31 May 2022 05:25) (93% - 95%)           Input and Output:  I&O's Detail    29 May 2022 07:01  -  30 May 2022 07:00  --------------------------------------------------------  IN:  Total IN: 0 mL    OUT:    Voided (mL): 2750 mL  Total OUT: 2750 mL    Total NET: -2750 mL      30 May 2022 07:01  -  31 May 2022 06:15  --------------------------------------------------------  IN:    Oral Fluid: 740 mL  Total IN: 740 mL    OUT:    Voided (mL): 800 mL  Total OUT: 800 mL    Total NET: -60 mL          Lab Data                        10.8   5.60  )-----------( 262      ( 30 May 2022 07:49 )             32.9     05-30    140  |  97  |  52<H>  ----------------------------<  82  3.6   |  36<H>  |  1.50<H>    Ca    9.2      30 May 2022 07:49  Phos  3.3     05-29  Mg     2.1     05-29    TPro  6.3  /  Alb  3.1<L>  /  TBili  0.5  /  DBili  0.2  /  AST  37  /  ALT  42  /  AlkPhos  45  05-30            Review of Systems	      Objective     Physical Examination    heart s1s2  lung dec BS  abd soft      Pertinent Lab findings & Imaging      Jagjit:  NO   Adequate UO     I&O's Detail    29 May 2022 07:01  -  30 May 2022 07:00  --------------------------------------------------------  IN:  Total IN: 0 mL    OUT:    Voided (mL): 2750 mL  Total OUT: 2750 mL    Total NET: -2750 mL      30 May 2022 07:01  -  31 May 2022 06:15  --------------------------------------------------------  IN:    Oral Fluid: 740 mL  Total IN: 740 mL    OUT:    Voided (mL): 800 mL  Total OUT: 800 mL    Total NET: -60 mL               Discussed with:     Cultures:	        Radiology

## 2022-05-31 NOTE — PROGRESS NOTE ADULT - PROBLEM SELECTOR PLAN 3
aspiration precautions ,zosyn ,ID & PULM F/UP ,SLP eval
aspiration precautions ,zosyn ,ID & PULM F/UP ,SLP eval
Admit to monitored unit for cardiac monitoring, obtain echo to evaluate LVEF, intravenous diuresis as per card consult , monitor ins/outs, monitor renal profile and electrolytes closely ,send 3 sets of enzymes, O2 supply, serial chest xrays, monitor weights and oral intake of fluids, nutritionist consult
Admit to monitored unit for cardiac monitoring, obtain echo to evaluate LVEF, intravenous diuresis as per card consult , monitor ins/outs, monitor renal profile and electrolytes closely ,send 3 sets of enzymes, O2 supply, serial chest xrays, monitor weights and oral intake of fluids, nutritionist consult
aspiration precautions ,zosyn ,ID & PULM F/UP ,SLP eval
RESOLVED
Admit to monitored unit for cardiac monitoring, obtain echo to evaluate LVEF, intravenous diuresis as per card consult , monitor ins/outs, monitor renal profile and electrolytes closely ,send 3 sets of enzymes, O2 supply, serial chest xrays, monitor weights and oral intake of fluids, nutritionist consult
aspiration precautions ,zosyn ,ID & PULM F/UP ,SLP eval

## 2022-05-31 NOTE — PROGRESS NOTE ADULT - PROBLEM SELECTOR PROBLEM 9
Cellulitis
KELSEY (acute kidney injury)
Cellulitis
Prophylactic measure
Cellulitis
Cellulitis

## 2022-05-31 NOTE — PROGRESS NOTE ADULT - PROBLEM SELECTOR PROBLEM 2
CHF exacerbation
Acute febrile illness
CHF exacerbation
Acute febrile illness
CHF exacerbation
Acute febrile illness

## 2022-05-31 NOTE — PROGRESS NOTE ADULT - PROBLEM SELECTOR PROBLEM 3
Pneumonia, aspiration
CHF exacerbation
Pneumonia, aspiration
CHF exacerbation
CHF exacerbation

## 2022-05-31 NOTE — PROGRESS NOTE ADULT - PROVIDER SPECIALTY LIST ADULT
Infectious Disease
Nephrology
Palliative Care
Pulmonology
Pulmonology
Infectious Disease
Nephrology
Nephrology
Palliative Care
Pulmonology
Cardiology
Cardiology
Hospitalist
Internal Medicine
Nephrology
Palliative Care
Hospitalist
Nephrology
Palliative Care
Nephrology
Hospitalist
Internal Medicine

## 2022-05-31 NOTE — PROGRESS NOTE ADULT - PROBLEM SELECTOR PLAN 10
as per pulmonologist ,continue nebulized bronchodilators
Gastrointestinal stress ulcer prophylaxis and DVT prophylaxis administered
as per pulmonologist ,continue nebulized bronchodilators

## 2022-05-31 NOTE — PROGRESS NOTE ADULT - PROBLEM SELECTOR PROBLEM 5
Afib
KELSEY (acute kidney injury)
Afib

## 2022-05-31 NOTE — PROGRESS NOTE ADULT - PROBLEM SELECTOR PLAN 1
oxygen supply as per pulmonologist ,serial abgs ,serial imaging ,tele monitoring
RESOLVED
oxygen supply as per pulmonologist ,serial abgs ,serial imaging ,tele monitoring

## 2022-05-31 NOTE — PROGRESS NOTE ADULT - REASON FOR ADMISSION
80 yo F w/ hx HTN, CHF, COPD, afib on eliquis p/w found with SOB since ~ [facility] at [facility]. NO known pain. EMS was called and found pt cyanotic and O2 56% on RA. EMS states 82% on 100% nrb. Pt on CPAP by EMS with sats in mid 90s. No known fever/chills. PT reported been off lasix over past couple days. No other acute co or changes Admitted  to telemetry unit for monitoring , send 3 sets of cardiac enzymes to rule out acute coronary event, obtain ECHO to evaluate LVEF, cardiology consult  ,continue current management, O2 supply, anticoagulation plan as per cardiology consult Admit to monitored unit for cardiac monitoring, obtain echo to evaluate LVEF, intravenous diuresis as per card consult , monitor ins/outs, monitor renal profile and electrolytes closely ,send 3 sets of enzymes, O2 supply, serial chest xrays, monitor weights and oral intake of fluids, nutritionist consult Palliative care consult requested ,to discuss advance directives and complete MOLST       TPMH of COVID 19 in 2011, CHF, RLE ulcer, CKD and CHF who was brought to the ED from [facility] for SOB and hypoxia. She was vaccinated with 3 doses of Moderna vaccine in the past but did not get the second booster dose.    w/ hx HTN, CHF, COPD, afib on eliquis p/w found with SOB since ~ 7am today at SNF. NO known pain. EMS was called and found pt cyanotic and O2 56% on RA. EMS states 82% on 100% nrb. Pt on CPAP by EMS with sats in mid 90s. No known fever/chills. PT reported been off lasix over past couple days. No other acute co or changes
82 yo F w/ hx HTN, CHF, COPD, afib on eliquis p/w found with SOB since ~ 7am today at SNF. NO known pain. EMS was called and found pt cyanotic and O2 56% on RA. EMS states 82% on 100% nrb. Pt on CPAP by EMS with sats in mid 90s. No known fever/chills. PT reported been off lasix over past couple days. No other acute co or changes Admitted  to telemetry unit for monitoring , send 3 sets of cardiac enzymes to rule out acute coronary event, obtain ECHO to evaluate LVEF, cardiology consult  ,continue current management, O2 supply, anticoagulation plan as per cardiology consult Admit to monitored unit for cardiac monitoring, obtain echo to evaluate LVEF, intravenous diuresis as per card consult , monitor ins/outs, monitor renal profile and electrolytes closely ,send 3 sets of enzymes, O2 supply, serial chest xrays, monitor weights and oral intake of fluids, nutritionist consult Palliative care consult requested ,to discuss advance directives and complete MOLST       TPMH of COVID 19 in 2011, CHF, RLE ulcer, CKD and CHF who was brought to the ED from [facility] for SOB and hypoxia. She was vaccinated with 3 doses of Moderna vaccine in the past but did not get the second booster dose.    w/ hx HTN, CHF, COPD, afib on eliquis p/w found with SOB since ~ 7am today at SNF. NO known pain. EMS was called and found pt cyanotic and O2 56% on RA. EMS states 82% on 100% nrb. Pt on CPAP by EMS with sats in mid 90s. No known fever/chills. PT reported been off lasix over past couple days. No other acute co or changes
82 yo F w/ hx HTN, CHF, COPD, afib on eliquis p/w found with SOB since ~ 7am today at SNF. NO known pain. EMS was called and found pt cyanotic and O2 56% on RA. EMS states 82% on 100% nrb. Pt on CPAP by EMS with sats in mid 90s. No known fever/chills. PT reported been off lasix over past couple days. No other acute co or changes Admitted  to telemetry unit for monitoring , send 3 sets of cardiac enzymes to rule out acute coronary event, obtain ECHO to evaluate LVEF, cardiology consult  ,continue current management, O2 supply, anticoagulation plan as per cardiology consult Admit to monitored unit for cardiac monitoring, obtain echo to evaluate LVEF, intravenous diuresis as per card consult , monitor ins/outs, monitor renal profile and electrolytes closely ,send 3 sets of enzymes, O2 supply, serial chest xrays, monitor weights and oral intake of fluids, nutritionist consult Palliative care consult requested ,to discuss advance directives and complete MOLST       TPMH of COVID 19 in 2011, CHF, RLE ulcer, CKD and CHF who was brought to the ED from [facility] for SOB and hypoxia. She was vaccinated with 3 doses of Moderna vaccine in the past but did not get the second booster dose.    w/ hx HTN, CHF, COPD, afib on eliquis p/w found with SOB since ~ 7am today at SNF. NO known pain. EMS was called and found pt cyanotic and O2 56% on RA. EMS states 82% on 100% nrb. Pt on CPAP by EMS with sats in mid 90s. No known fever/chills. PT reported been off lasix over past couple days. No other acute co or changes
82 yo F w/ hx HTN, CHF, COPD, afib on eliquis p/w found with SOB since ~ 7am today at SNF. NO known pain. EMS was called and found pt cyanotic and O2 56% on RA. EMS states 82% on 100% nrb. Pt on CPAP by EMS with sats in mid 90s. No known fever/chills. PT reported been off lasix over past couple days. No other acute co or changes Admitted  to telemetry unit for monitoring , send 3 sets of cardiac enzymes to rule out acute coronary event, obtain ECHO to evaluate LVEF, cardiology consult  ,continue current management, O2 supply, anticoagulation plan as per cardiology consult Admit to monitored unit for cardiac monitoring, obtain echo to evaluate LVEF, intravenous diuresis as per card consult , monitor ins/outs, monitor renal profile and electrolytes closely ,send 3 sets of enzymes, O2 supply, serial chest xrays, monitor weights and oral intake of fluids, nutritionist consult Palliative care consult requested ,to discuss advance directives and complete MOLST       TPMH of COVID 19 in 2011, CHF, RLE ulcer, CKD and CHF who was brought to the ED from [facility] for SOB and hypoxia. She was vaccinated with 3 doses of Moderna vaccine in the past but did not get the second booster dose.    w/ hx HTN, CHF, COPD, afib on eliquis p/w found with SOB since ~ 7am today at SNF. NO known pain. EMS was called and found pt cyanotic and O2 56% on RA. EMS states 82% on 100% nrb. Pt on CPAP by EMS with sats in mid 90s. No known fever/chills. PT reported been off lasix over past couple days. No other acute co or changes
82 yo F w/ hx HTN, CHF, COPD, afib on eliquis p/w found with SOB since ~ 7am today at SNF. NO known pain. EMS was called and found pt cyanotic and O2 56% on RA. EMS states 82% on 100% nrb. Pt on CPAP by EMS with sats in mid 90s. No known fever/chills. PT reported been off lasix over past couple days. No other acute co or changes Admitted  to telemetry unit for monitoring , send 3 sets of cardiac enzymes to rule out acute coronary event, obtain ECHO to evaluate LVEF, cardiology consult  ,continue current management, O2 supply, anticoagulation plan as per cardiology consult Admit to monitored unit for cardiac monitoring, obtain echo to evaluate LVEF, intravenous diuresis as per card consult , monitor ins/outs, monitor renal profile and electrolytes closely ,send 3 sets of enzymes, O2 supply, serial chest xrays, monitor weights and oral intake of fluids, nutritionist consult Palliative care consult requested ,to discuss advance directives and complete MOLST       TPMH of COVID 19 in 2011, CHF, RLE ulcer, CKD and CHF who was brought to the ED from [facility] for SOB and hypoxia. She was vaccinated with 3 doses of Moderna vaccine in the past but did not get the second booster dose.    w/ hx HTN, CHF, COPD, afib on eliquis p/w found with SOB since ~ 7am today at SNF. NO known pain. EMS was called and found pt cyanotic and O2 56% on RA. EMS states 82% on 100% nrb. Pt on CPAP by EMS with sats in mid 90s. No known fever/chills. PT reported been off lasix over past couple days. No other acute co or changes
82 yo F w/ hx HTN, CHF, COPD, afib on eliquis p/w found with SOB since ~ 7am today at SNF. NO known pain. EMS was called and found pt cyanotic and O2 56% on RA. EMS states 82% on 100% nrb. Pt on CPAP by EMS with sats in mid 90s. No known fever/chills. PT reported been off lasix over past couple days. No other acute co or changes Admitted  to telemetry unit for monitoring , send 3 sets of cardiac enzymes to rule out acute coronary event, obtain ECHO to evaluate LVEF, cardiology consult  ,continue current management, O2 supply, anticoagulation plan as per cardiology consult Admit to monitored unit for cardiac monitoring, obtain echo to evaluate LVEF, intravenous diuresis as per card consult , monitor ins/outs, monitor renal profile and electrolytes closely ,send 3 sets of enzymes, O2 supply, serial chest xrays, monitor weights and oral intake of fluids, nutritionist consult Palliative care consult requested ,to discuss advance directives and complete MOLST       TPMH of COVID 19 in 2011, CHF, RLE ulcer, CKD and CHF who was brought to the ED from [facility] for SOB and hypoxia. She was vaccinated with 3 doses of Moderna vaccine in the past but did not get the second booster dose.    w/ hx HTN, CHF, COPD, afib on eliquis p/w found with SOB since ~ 7am today at SNF. NO known pain. EMS was called and found pt cyanotic and O2 56% on RA. EMS states 82% on 100% nrb. Pt on CPAP by EMS with sats in mid 90s. No known fever/chills. PT reported been off lasix over past couple days. No other acute co or changes
82 yo F w/ hx HTN, CHF, COPD, afib on eliquis p/w found with SOB since ~ 7am today at SNF. NO known pain. EMS was called and found pt cyanotic and O2 56% on RA. EMS states 82% on 100% nrb. Pt on CPAP by EMS with sats in mid 90s. No known fever/chills. PT reported been off lasix over past couple days. No other acute co or changes Admitted  to telemetry unit for monitoring , send 3 sets of cardiac enzymes to rule out acute coronary event, obtain ECHO to evaluate LVEF, cardiology consult  ,continue current management, O2 supply, anticoagulation plan as per cardiology consult Admit to monitored unit for cardiac monitoring, obtain echo to evaluate LVEF, intravenous diuresis as per card consult , monitor ins/outs, monitor renal profile and electrolytes closely ,send 3 sets of enzymes, O2 supply, serial chest xrays, monitor weights and oral intake of fluids, nutritionist consult Palliative care consult requested ,to discuss advance directives and complete MOLST       TPMH of COVID 19 in 2011, CHF, RLE ulcer, CKD and CHF who was brought to the ED from [facility] for SOB and hypoxia. She was vaccinated with 3 doses of Moderna vaccine in the past but did not get the second booster dose.    w/ hx HTN, CHF, COPD, afib on eliquis p/w found with SOB since ~ 7am today at SNF. NO known pain. EMS was called and found pt cyanotic and O2 56% on RA. EMS states 82% on 100% nrb. Pt on CPAP by EMS with sats in mid 90s. No known fever/chills. PT reported been off lasix over past couple days. No other acute co or changes
82 yo F w/ hx HTN, CHF, COPD, afib on eliquis p/w found with SOB since ~ 7am today at SNF. NO known pain. EMS was called and found pt cyanotic and O2 56% on RA. EMS states 82% on 100% nrb. Pt on CPAP by EMS with sats in mid 90s. No known fever/chills. PT reported been off lasix over past couple days. No other acute co or changes Admitted  to telemetry unit for monitoring , send 3 sets of cardiac enzymes to rule out acute coronary event, obtain ECHO to evaluate LVEF, cardiology consult  ,continue current management, O2 supply, anticoagulation plan as per cardiology consult Admit to monitored unit for cardiac monitoring, obtain echo to evaluate LVEF, intravenous diuresis as per card consult , monitor ins/outs, monitor renal profile and electrolytes closely ,send 3 sets of enzymes, O2 supply, serial chest xrays, monitor weights and oral intake of fluids, nutritionist consult Palliative care consult requested ,to discuss advance directives and complete MOLST       TPMH of COVID 19 in 2011, CHF, RLE ulcer, CKD and CHF who was brought to the ED from [facility] for SOB and hypoxia. She was vaccinated with 3 doses of Moderna vaccine in the past but did not get the second booster dose.    w/ hx HTN, CHF, COPD, afib on eliquis p/w found with SOB since ~ 7am today at SNF. NO known pain. EMS was called and found pt cyanotic and O2 56% on RA. EMS states 82% on 100% nrb. Pt on CPAP by EMS with sats in mid 90s. No known fever/chills. PT reported been off lasix over past couple days. No other acute co or changes
82 yo F w/ hx HTN, CHF, COPD, afib on eliquis p/w found with SOB since ~ 7am today at SNF. NO known pain. EMS was called and found pt cyanotic and O2 56% on RA. EMS states 82% on 100% nrb. Pt on CPAP by EMS with sats in mid 90s. No known fever/chills. PT reported been off lasix over past couple days. No other acute co or changes Admitted  to telemetry unit for monitoring , send 3 sets of cardiac enzymes to rule out acute coronary event, obtain ECHO to evaluate LVEF, cardiology consult  ,continue current management, O2 supply, anticoagulation plan as per cardiology consult Admit to monitored unit for cardiac monitoring, obtain echo to evaluate LVEF, intravenous diuresis as per card consult , monitor ins/outs, monitor renal profile and electrolytes closely ,send 3 sets of enzymes, O2 supply, serial chest xrays, monitor weights and oral intake of fluids, nutritionist consult Palliative care consult requested ,to discuss advance directives and complete MOLST       TPMH of COVID 19 in 2011, CHF, RLE ulcer, CKD and CHF who was brought to the ED from [facility] for SOB and hypoxia. She was vaccinated with 3 doses of Moderna vaccine in the past but did not get the second booster dose.    w/ hx HTN, CHF, COPD, afib on eliquis p/w found with SOB since ~ 7am today at SNF. NO known pain. EMS was called and found pt cyanotic and O2 56% on RA. EMS states 82% on 100% nrb. Pt on CPAP by EMS with sats in mid 90s. No known fever/chills. PT reported been off lasix over past couple days. No other acute co or changes
80 yo F w/ hx HTN, CHF, COPD, afib on eliquis p/w found with SOB since ~ 7am today at SNF. NO known pain. EMS was called and found pt cyanotic and O2 56% on RA. EMS states 82% on 100% nrb. Pt on CPAP by EMS with sats in mid 90s. No known fever/chills. PT reported been off lasix over past couple days. No other acute co or changes Admitted  to telemetry unit for monitoring , send 3 sets of cardiac enzymes to rule out acute coronary event, obtain ECHO to evaluate LVEF, cardiology consult  ,continue current management, O2 supply, anticoagulation plan as per cardiology consult Admit to monitored unit for cardiac monitoring, obtain echo to evaluate LVEF, intravenous diuresis as per card consult , monitor ins/outs, monitor renal profile and electrolytes closely ,send 3 sets of enzymes, O2 supply, serial chest xrays, monitor weights and oral intake of fluids, nutritionist consult Palliative care consult requested ,to discuss advance directives and complete MOLST
80 yo F w/ hx HTN, CHF, COPD, afib on eliquis p/w found with SOB since ~ 7am today at SNF. NO known pain. EMS was called and found pt cyanotic and O2 56% on RA. EMS states 82% on 100% nrb. Pt on CPAP by EMS with sats in mid 90s. No known fever/chills. PT reported been off lasix over past couple days. No other acute co or changes Admitted  to telemetry unit for monitoring , send 3 sets of cardiac enzymes to rule out acute coronary event, obtain ECHO to evaluate LVEF, cardiology consult  ,continue current management, O2 supply, anticoagulation plan as per cardiology consult Admit to monitored unit for cardiac monitoring, obtain echo to evaluate LVEF, intravenous diuresis as per card consult , monitor ins/outs, monitor renal profile and electrolytes closely ,send 3 sets of enzymes, O2 supply, serial chest xrays, monitor weights and oral intake of fluids, nutritionist consult Palliative care consult requested ,to discuss advance directives and complete MOLST       TPMH of COVID 19 in 2011, CHF, RLE ulcer, CKD and CHF who was brought to the ED from [facility] for SOB and hypoxia. She was vaccinated with 3 doses of Moderna vaccine in the past but did not get the second booster dose.    w/ hx HTN, CHF, COPD, afib on eliquis p/w found with SOB since ~ 7am today at SNF. NO known pain. EMS was called and found pt cyanotic and O2 56% on RA. EMS states 82% on 100% nrb. Pt on CPAP by EMS with sats in mid 90s. No known fever/chills. PT reported been off lasix over past couple days. No other acute co or changes
80 yo F w/ hx HTN, CHF, COPD, afib on eliquis p/w found with SOB since ~ 7am today at SNF. NO known pain. EMS was called and found pt cyanotic and O2 56% on RA. EMS states 82% on 100% nrb. Pt on CPAP by EMS with sats in mid 90s. No known fever/chills. PT reported been off lasix over past couple days. No other acute co or changes Admitted  to telemetry unit for monitoring , send 3 sets of cardiac enzymes to rule out acute coronary event, obtain ECHO to evaluate LVEF, cardiology consult  ,continue current management, O2 supply, anticoagulation plan as per cardiology consult Admit to monitored unit for cardiac monitoring, obtain echo to evaluate LVEF, intravenous diuresis as per card consult , monitor ins/outs, monitor renal profile and electrolytes closely ,send 3 sets of enzymes, O2 supply, serial chest xrays, monitor weights and oral intake of fluids, nutritionist consult Palliative care consult requested ,to discuss advance directives and complete MOLST       TPMH of COVID 19 in 2011, CHF, RLE ulcer, CKD and CHF who was brought to the ED from [facility] for SOB and hypoxia. She was vaccinated with 3 doses of Moderna vaccine in the past but did not get the second booster dose.    w/ hx HTN, CHF, COPD, afib on eliquis p/w found with SOB since ~ 7am today at SNF. NO known pain. EMS was called and found pt cyanotic and O2 56% on RA. EMS states 82% on 100% nrb. Pt on CPAP by EMS with sats in mid 90s. No known fever/chills. PT reported been off lasix over past couple days. No other acute co or changes
82 yo F w/ hx HTN, CHF, COPD, afib on eliquis p/w found with SOB since ~ 7am today at SNF. NO known pain. EMS was called and found pt cyanotic and O2 56% on RA. EMS states 82% on 100% nrb. Pt on CPAP by EMS with sats in mid 90s. No known fever/chills. PT reported been off lasix over past couple days. No other acute co or changes Admitted  to telemetry unit for monitoring , send 3 sets of cardiac enzymes to rule out acute coronary event, obtain ECHO to evaluate LVEF, cardiology consult  ,continue current management, O2 supply, anticoagulation plan as per cardiology consult Admit to monitored unit for cardiac monitoring, obtain echo to evaluate LVEF, intravenous diuresis as per card consult , monitor ins/outs, monitor renal profile and electrolytes closely ,send 3 sets of enzymes, O2 supply, serial chest xrays, monitor weights and oral intake of fluids, nutritionist consult Palliative care consult requested ,to discuss advance directives and complete MOLST       TPMH of COVID 19 in 2011, CHF, RLE ulcer, CKD and CHF who was brought to the ED from [facility] for SOB and hypoxia. She was vaccinated with 3 doses of Moderna vaccine in the past but did not get the second booster dose.    w/ hx HTN, CHF, COPD, afib on eliquis p/w found with SOB since ~ 7am today at SNF. NO known pain. EMS was called and found pt cyanotic and O2 56% on RA. EMS states 82% on 100% nrb. Pt on CPAP by EMS with sats in mid 90s. No known fever/chills. PT reported been off lasix over past couple days. No other acute co or changes
82 yo F w/ hx HTN, CHF, COPD, afib on eliquis p/w found with SOB since ~ 7am today at SNF. NO known pain. EMS was called and found pt cyanotic and O2 56% on RA. EMS states 82% on 100% nrb. Pt on CPAP by EMS with sats in mid 90s. No known fever/chills. PT reported been off lasix over past couple days. No other acute co or changes Admitted  to telemetry unit for monitoring , send 3 sets of cardiac enzymes to rule out acute coronary event, obtain ECHO to evaluate LVEF, cardiology consult  ,continue current management, O2 supply, anticoagulation plan as per cardiology consult Admit to monitored unit for cardiac monitoring, obtain echo to evaluate LVEF, intravenous diuresis as per card consult , monitor ins/outs, monitor renal profile and electrolytes closely ,send 3 sets of enzymes, O2 supply, serial chest xrays, monitor weights and oral intake of fluids, nutritionist consult Palliative care consult requested ,to discuss advance directives and complete MOLST       TPMH of COVID 19 in 2011, CHF, RLE ulcer, CKD and CHF who was brought to the ED from [facility] for SOB and hypoxia. She was vaccinated with 3 doses of Moderna vaccine in the past but did not get the second booster dose.    w/ hx HTN, CHF, COPD, afib on eliquis p/w found with SOB since ~ 7am today at SNF. NO known pain. EMS was called and found pt cyanotic and O2 56% on RA. EMS states 82% on 100% nrb. Pt on CPAP by EMS with sats in mid 90s. No known fever/chills. PT reported been off lasix over past couple days. No other acute co or changes
82 yo F w/ hx HTN, CHF, COPD, afib on eliquis p/w found with SOB since ~ 7am today at SNF. NO known pain. EMS was called and found pt cyanotic and O2 56% on RA. EMS states 82% on 100% nrb. Pt on CPAP by EMS with sats in mid 90s. No known fever/chills. PT reported been off lasix over past couple days. No other acute co or changes Admitted  to telemetry unit for monitoring , send 3 sets of cardiac enzymes to rule out acute coronary event, obtain ECHO to evaluate LVEF, cardiology consult  ,continue current management, O2 supply, anticoagulation plan as per cardiology consult Admit to monitored unit for cardiac monitoring, obtain echo to evaluate LVEF, intravenous diuresis as per card consult , monitor ins/outs, monitor renal profile and electrolytes closely ,send 3 sets of enzymes, O2 supply, serial chest xrays, monitor weights and oral intake of fluids, nutritionist consult Palliative care consult requested ,to discuss advance directives and complete MOLST       TPMH of COVID 19 in 2011, CHF, RLE ulcer, CKD and CHF who was brought to the ED from [facility] for SOB and hypoxia. She was vaccinated with 3 doses of Moderna vaccine in the past but did not get the second booster dose.    w/ hx HTN, CHF, COPD, afib on eliquis p/w found with SOB since ~ 7am today at SNF. NO known pain. EMS was called and found pt cyanotic and O2 56% on RA. EMS states 82% on 100% nrb. Pt on CPAP by EMS with sats in mid 90s. No known fever/chills. PT reported been off lasix over past couple days. No other acute co or changes
82 yo F w/ hx HTN, CHF, COPD, afib on eliquis p/w found with SOB since ~ 7am today at SNF. NO known pain. EMS was called and found pt cyanotic and O2 56% on RA. EMS states 82% on 100% nrb. Pt on CPAP by EMS with sats in mid 90s. No known fever/chills. PT reported been off lasix over past couple days. No other acute co or changes Admitted  to telemetry unit for monitoring , send 3 sets of cardiac enzymes to rule out acute coronary event, obtain ECHO to evaluate LVEF, cardiology consult  ,continue current management, O2 supply, anticoagulation plan as per cardiology consult Admit to monitored unit for cardiac monitoring, obtain echo to evaluate LVEF, intravenous diuresis as per card consult , monitor ins/outs, monitor renal profile and electrolytes closely ,send 3 sets of enzymes, O2 supply, serial chest xrays, monitor weights and oral intake of fluids, nutritionist consult Palliative care consult requested ,to discuss advance directives and complete MOLST       TPMH of COVID 19 in 2011, CHF, RLE ulcer, CKD and CHF who was brought to the ED from [facility] for SOB and hypoxia. She was vaccinated with 3 doses of Moderna vaccine in the past but did not get the second booster dose.    w/ hx HTN, CHF, COPD, afib on eliquis p/w found with SOB since ~ 7am today at SNF. NO known pain. EMS was called and found pt cyanotic and O2 56% on RA. EMS states 82% on 100% nrb. Pt on CPAP by EMS with sats in mid 90s. No known fever/chills. PT reported been off lasix over past couple days. No other acute co or changes
80 yo F w/ hx HTN, CHF, COPD, afib on eliquis p/w found with SOB since ~ [facility]am [facility] at [facility]. NO known pain. EMS was called and found pt cyanotic and O2 56% on RA. EMS states 82% on 100% nrb. Pt on CPAP by EMS with sats in mid 90s. No known fever/chills. PT reported been off lasix over past couple days. No other acute co or changes Admitted  to telemetry unit for monitoring , send 3 sets of cardiac enzymes to rule out acute coronary event, obtain ECHO to evaluate LVEF, cardiology consult  ,continue current management, O2 supply, anticoagulation plan as per cardiology consult Admit to monitored unit for cardiac monitoring, obtain echo to evaluate LVEF, intravenous diuresis as per card consult , monitor ins/outs, monitor renal profile and electrolytes closely ,send 3 sets of enzymes, O2 supply, serial chest xrays, monitor weights and oral intake of fluids, nutritionist consult Palliative care consult requested ,to discuss advance directives and complete MOLST       TPMH of COVID 19 in 2011, CHF, RLE ulcer, CKD and CHF who was brought to the ED from [facility] for SOB and hypoxia. She was vaccinated with 3 doses of Moderna vaccine in the past but did not get the second booster dose.    w/ hx HTN, CHF, COPD, afib on eliquis p/w found with SOB since ~ 7am today at SNF. NO known pain. EMS was called and found pt cyanotic and O2 56% on RA. EMS states 82% on 100% nrb. Pt on CPAP by EMS with sats in mid 90s. No known fever/chills. PT reported been off lasix over past couple days. No other acute co or changes
80 yo F w/ hx HTN, CHF, COPD, afib on eliquis p/w found with SOB since ~ 7am today at SNF. NO known pain. EMS was called and found pt cyanotic and O2 56% on RA. EMS states 82% on 100% nrb. Pt on CPAP by EMS with sats in mid 90s. No known fever/chills. PT reported been off lasix over past couple days. No other acute co or changes Admitted  to telemetry unit for monitoring , send 3 sets of cardiac enzymes to rule out acute coronary event, obtain ECHO to evaluate LVEF, cardiology consult  ,continue current management, O2 supply, anticoagulation plan as per cardiology consult Admit to monitored unit for cardiac monitoring, obtain echo to evaluate LVEF, intravenous diuresis as per card consult , monitor ins/outs, monitor renal profile and electrolytes closely ,send 3 sets of enzymes, O2 supply, serial chest xrays, monitor weights and oral intake of fluids, nutritionist consult Palliative care consult requested ,to discuss advance directives and complete MOLST       TPMH of COVID 19 in 2011, CHF, RLE ulcer, CKD and CHF who was brought to the ED from [facility] for SOB and hypoxia. She was vaccinated with 3 doses of Moderna vaccine in the past but did not get the second booster dose.    w/ hx HTN, CHF, COPD, afib on eliquis p/w found with SOB since ~ 7am today at SNF. NO known pain. EMS was called and found pt cyanotic and O2 56% on RA. EMS states 82% on 100% nrb. Pt on CPAP by EMS with sats in mid 90s. No known fever/chills. PT reported been off lasix over past couple days. No other acute co or changes
80 yo F w/ hx HTN, CHF, COPD, afib on eliquis p/w found with SOB since ~ 7am today at SNF. NO known pain. EMS was called and found pt cyanotic and O2 56% on RA. EMS states 82% on 100% nrb. Pt on CPAP by EMS with sats in mid 90s. No known fever/chills. PT reported been off lasix over past couple days. No other acute co or changes Admitted  to telemetry unit for monitoring , send 3 sets of cardiac enzymes to rule out acute coronary event, obtain ECHO to evaluate LVEF, cardiology consult  ,continue current management, O2 supply, anticoagulation plan as per cardiology consult Admit to monitored unit for cardiac monitoring, obtain echo to evaluate LVEF, intravenous diuresis as per card consult , monitor ins/outs, monitor renal profile and electrolytes closely ,send 3 sets of enzymes, O2 supply, serial chest xrays, monitor weights and oral intake of fluids, nutritionist consult Palliative care consult requested ,to discuss advance directives and complete MOLST       TPMH of COVID 19 in 2011, CHF, RLE ulcer, CKD and CHF who was brought to the ED from [facility] for SOB and hypoxia. She was vaccinated with 3 doses of Moderna vaccine in the past but did not get the second booster dose.    w/ hx HTN, CHF, COPD, afib on eliquis p/w found with SOB since ~ 7am today at SNF. NO known pain. EMS was called and found pt cyanotic and O2 56% on RA. EMS states 82% on 100% nrb. Pt on CPAP by EMS with sats in mid 90s. No known fever/chills. PT reported been off lasix over past couple days. No other acute co or changes
82 yo F w/ hx HTN, CHF, COPD, afib on eliquis p/w found with SOB since ~ 7am today at SNF. NO known pain. EMS was called and found pt cyanotic and O2 56% on RA. EMS states 82% on 100% nrb. Pt on CPAP by EMS with sats in mid 90s. No known fever/chills. PT reported been off lasix over past couple days. No other acute co or changes Admitted  to telemetry unit for monitoring , send 3 sets of cardiac enzymes to rule out acute coronary event, obtain ECHO to evaluate LVEF, cardiology consult  ,continue current management, O2 supply, anticoagulation plan as per cardiology consult Admit to monitored unit for cardiac monitoring, obtain echo to evaluate LVEF, intravenous diuresis as per card consult , monitor ins/outs, monitor renal profile and electrolytes closely ,send 3 sets of enzymes, O2 supply, serial chest xrays, monitor weights and oral intake of fluids, nutritionist consult Palliative care consult requested ,to discuss advance directives and complete MOLST       TPMH of COVID 19 in 2011, CHF, RLE ulcer, CKD and CHF who was brought to the ED from [facility] for SOB and hypoxia. She was vaccinated with 3 doses of Moderna vaccine in the past but did not get the second booster dose.    w/ hx HTN, CHF, COPD, afib on eliquis p/w found with SOB since ~ 7am today at SNF. NO known pain. EMS was called and found pt cyanotic and O2 56% on RA. EMS states 82% on 100% nrb. Pt on CPAP by EMS with sats in mid 90s. No known fever/chills. PT reported been off lasix over past couple days. No other acute co or changes
82 yo F w/ hx HTN, CHF, COPD, afib on eliquis p/w found with SOB since ~ 7am today at SNF. NO known pain. EMS was called and found pt cyanotic and O2 56% on RA. EMS states 82% on 100% nrb. Pt on CPAP by EMS with sats in mid 90s. No known fever/chills. PT reported been off lasix over past couple days. No other acute co or changes Admitted  to telemetry unit for monitoring , send 3 sets of cardiac enzymes to rule out acute coronary event, obtain ECHO to evaluate LVEF, cardiology consult  ,continue current management, O2 supply, anticoagulation plan as per cardiology consult Admit to monitored unit for cardiac monitoring, obtain echo to evaluate LVEF, intravenous diuresis as per card consult , monitor ins/outs, monitor renal profile and electrolytes closely ,send 3 sets of enzymes, O2 supply, serial chest xrays, monitor weights and oral intake of fluids, nutritionist consult Palliative care consult requested ,to discuss advance directives and complete MOLST       TPMH of COVID 19 in 2011, CHF, RLE ulcer, CKD and CHF who was brought to the ED from [facility] for SOB and hypoxia. She was vaccinated with 3 doses of Moderna vaccine in the past but did not get the second booster dose.    w/ hx HTN, CHF, COPD, afib on eliquis p/w found with SOB since ~ 7am today at SNF. NO known pain. EMS was called and found pt cyanotic and O2 56% on RA. EMS states 82% on 100% nrb. Pt on CPAP by EMS with sats in mid 90s. No known fever/chills. PT reported been off lasix over past couple days. No other acute co or changes
82 yo F w/ hx HTN, CHF, COPD, afib on eliquis p/w found with SOB since ~ 7am today at SNF. NO known pain. EMS was called and found pt cyanotic and O2 56% on RA. EMS states 82% on 100% nrb. Pt on CPAP by EMS with sats in mid 90s. No known fever/chills. PT reported been off lasix over past couple days. No other acute co or changes Admitted  to telemetry unit for monitoring , send 3 sets of cardiac enzymes to rule out acute coronary event, obtain ECHO to evaluate LVEF, cardiology consult  ,continue current management, O2 supply, anticoagulation plan as per cardiology consult Admit to monitored unit for cardiac monitoring, obtain echo to evaluate LVEF, intravenous diuresis as per card consult , monitor ins/outs, monitor renal profile and electrolytes closely ,send 3 sets of enzymes, O2 supply, serial chest xrays, monitor weights and oral intake of fluids, nutritionist consult Palliative care consult requested ,to discuss advance directives and complete MOLST       TPMH of COVID 19 in 2011, CHF, RLE ulcer, CKD and CHF who was brought to the ED from [facility] for SOB and hypoxia. She was vaccinated with 3 doses of Moderna vaccine in the past but did not get the second booster dose.    w/ hx HTN, CHF, COPD, afib on eliquis p/w found with SOB since ~ 7am today at SNF. NO known pain. EMS was called and found pt cyanotic and O2 56% on RA. EMS states 82% on 100% nrb. Pt on CPAP by EMS with sats in mid 90s. No known fever/chills. PT reported been off lasix over past couple days. No other acute co or changes
82 yo F w/ hx HTN, CHF, COPD, afib on eliquis p/w found with SOB since ~ 7am today at SNF. NO known pain. EMS was called and found pt cyanotic and O2 56% on RA. EMS states 82% on 100% nrb. Pt on CPAP by EMS with sats in mid 90s. No known fever/chills. PT reported been off lasix over past couple days. No other acute co or changes Admitted  to telemetry unit for monitoring , send 3 sets of cardiac enzymes to rule out acute coronary event, obtain ECHO to evaluate LVEF, cardiology consult  ,continue current management, O2 supply, anticoagulation plan as per cardiology consult Admit to monitored unit for cardiac monitoring, obtain echo to evaluate LVEF, intravenous diuresis as per card consult , monitor ins/outs, monitor renal profile and electrolytes closely ,send 3 sets of enzymes, O2 supply, serial chest xrays, monitor weights and oral intake of fluids, nutritionist consult Palliative care consult requested ,to discuss advance directives and complete MOLST       TPMH of COVID 19 in 2011, CHF, RLE ulcer, CKD and CHF who was brought to the ED from [facility] for SOB and hypoxia. She was vaccinated with 3 doses of Moderna vaccine in the past but did not get the second booster dose.    w/ hx HTN, CHF, COPD, afib on eliquis p/w found with SOB since ~ 7am today at SNF. NO known pain. EMS was called and found pt cyanotic and O2 56% on RA. EMS states 82% on 100% nrb. Pt on CPAP by EMS with sats in mid 90s. No known fever/chills. PT reported been off lasix over past couple days. No other acute co or changes
82 yo F w/ hx HTN, CHF, COPD, afib on eliquis p/w found with SOB since ~ 7am today at SNF. NO known pain. EMS was called and found pt cyanotic and O2 56% on RA. EMS states 82% on 100% nrb. Pt on CPAP by EMS with sats in mid 90s. No known fever/chills. PT reported been off lasix over past couple days. No other acute co or changes Admitted  to telemetry unit for monitoring , send 3 sets of cardiac enzymes to rule out acute coronary event, obtain ECHO to evaluate LVEF, cardiology consult  ,continue current management, O2 supply, anticoagulation plan as per cardiology consult Admit to monitored unit for cardiac monitoring, obtain echo to evaluate LVEF, intravenous diuresis as per card consult , monitor ins/outs, monitor renal profile and electrolytes closely ,send 3 sets of enzymes, O2 supply, serial chest xrays, monitor weights and oral intake of fluids, nutritionist consult Palliative care consult requested ,to discuss advance directives and complete MOLST       TPMH of COVID 19 in 2011, CHF, RLE ulcer, CKD and CHF who was brought to the ED from [facility] for SOB and hypoxia. She was vaccinated with 3 doses of Moderna vaccine in the past but did not get the second booster dose.    w/ hx HTN, CHF, COPD, afib on eliquis p/w found with SOB since ~ 7am today at SNF. NO known pain. EMS was called and found pt cyanotic and O2 56% on RA. EMS states 82% on 100% nrb. Pt on CPAP by EMS with sats in mid 90s. No known fever/chills. PT reported been off lasix over past couple days. No other acute co or changes
82 yo F w/ hx HTN, CHF, COPD, afib on eliquis p/w found with SOB since ~ 7am today at SNF. NO known pain. EMS was called and found pt cyanotic and O2 56% on RA. EMS states 82% on 100% nrb. Pt on CPAP by EMS with sats in mid 90s. No known fever/chills. PT reported been off lasix over past couple days. No other acute co or changes Admitted  to telemetry unit for monitoring , send 3 sets of cardiac enzymes to rule out acute coronary event, obtain ECHO to evaluate LVEF, cardiology consult  ,continue current management, O2 supply, anticoagulation plan as per cardiology consult Admit to monitored unit for cardiac monitoring, obtain echo to evaluate LVEF, intravenous diuresis as per card consult , monitor ins/outs, monitor renal profile and electrolytes closely ,send 3 sets of enzymes, O2 supply, serial chest xrays, monitor weights and oral intake of fluids, nutritionist consult Palliative care consult requested ,to discuss advance directives and complete MOLST       TPMH of COVID 19 in 2011, CHF, RLE ulcer, CKD and CHF who was brought to the ED from [facility] for SOB and hypoxia. She was vaccinated with 3 doses of Moderna vaccine in the past but did not get the second booster dose.    w/ hx HTN, CHF, COPD, afib on eliquis p/w found with SOB since ~ 7am today at SNF. NO known pain. EMS was called and found pt cyanotic and O2 56% on RA. EMS states 82% on 100% nrb. Pt on CPAP by EMS with sats in mid 90s. No known fever/chills. PT reported been off lasix over past couple days. No other acute co or changes
82 yo F w/ hx HTN, CHF, COPD, afib on eliquis p/w found with SOB since ~ 7am today at SNF. NO known pain. EMS was called and found pt cyanotic and O2 56% on RA. EMS states 82% on 100% nrb. Pt on CPAP by EMS with sats in mid 90s. No known fever/chills. PT reported been off lasix over past couple days. No other acute co or changes Admitted  to telemetry unit for monitoring , send 3 sets of cardiac enzymes to rule out acute coronary event, obtain ECHO to evaluate LVEF, cardiology consult  ,continue current management, O2 supply, anticoagulation plan as per cardiology consult Admit to monitored unit for cardiac monitoring, obtain echo to evaluate LVEF, intravenous diuresis as per card consult , monitor ins/outs, monitor renal profile and electrolytes closely ,send 3 sets of enzymes, O2 supply, serial chest xrays, monitor weights and oral intake of fluids, nutritionist consult Palliative care consult requested ,to discuss advance directives and complete MOLST       TPMH of COVID 19 in 2011, CHF, RLE ulcer, CKD and CHF who was brought to the ED from [facility] for SOB and hypoxia. She was vaccinated with 3 doses of Moderna vaccine in the past but did not get the second booster dose.    w/ hx HTN, CHF, COPD, afib on eliquis p/w found with SOB since ~ 7am today at SNF. NO known pain. EMS was called and found pt cyanotic and O2 56% on RA. EMS states 82% on 100% nrb. Pt on CPAP by EMS with sats in mid 90s. No known fever/chills. PT reported been off lasix over past couple days. No other acute co or changes
80 yo F w/ hx HTN, CHF, COPD, afib on eliquis p/w found with SOB since ~ 7am today at SNF. NO known pain. EMS was called and found pt cyanotic and O2 56% on RA. EMS states 82% on 100% nrb. Pt on CPAP by EMS with sats in mid 90s. No known fever/chills. PT reported been off lasix over past couple days. No other acute co or changes Admitted  to telemetry unit for monitoring , send 3 sets of cardiac enzymes to rule out acute coronary event, obtain ECHO to evaluate LVEF, cardiology consult  ,continue current management, O2 supply, anticoagulation plan as per cardiology consult Admit to monitored unit for cardiac monitoring, obtain echo to evaluate LVEF, intravenous diuresis as per card consult , monitor ins/outs, monitor renal profile and electrolytes closely ,send 3 sets of enzymes, O2 supply, serial chest xrays, monitor weights and oral intake of fluids, nutritionist consult Palliative care consult requested ,to discuss advance directives and complete MOLST       TPMH of COVID 19 in 2011, CHF, RLE ulcer, CKD and CHF who was brought to the ED from [facility] for SOB and hypoxia. She was vaccinated with 3 doses of Moderna vaccine in the past but did not get the second booster dose.    w/ hx HTN, CHF, COPD, afib on eliquis p/w found with SOB since ~ 7am today at SNF. NO known pain. EMS was called and found pt cyanotic and O2 56% on RA. EMS states 82% on 100% nrb. Pt on CPAP by EMS with sats in mid 90s. No known fever/chills. PT reported been off lasix over past couple days. No other acute co or changes
80 yo F w/ hx HTN, CHF, COPD, afib on eliquis p/w found with SOB since ~ 7am today at SNF. NO known pain. EMS was called and found pt cyanotic and O2 56% on RA. EMS states 82% on 100% nrb. Pt on CPAP by EMS with sats in mid 90s. No known fever/chills. PT reported been off lasix over past couple days. No other acute co or changes Admitted  to telemetry unit for monitoring , send 3 sets of cardiac enzymes to rule out acute coronary event, obtain ECHO to evaluate LVEF, cardiology consult  ,continue current management, O2 supply, anticoagulation plan as per cardiology consult Admit to monitored unit for cardiac monitoring, obtain echo to evaluate LVEF, intravenous diuresis as per card consult , monitor ins/outs, monitor renal profile and electrolytes closely ,send 3 sets of enzymes, O2 supply, serial chest xrays, monitor weights and oral intake of fluids, nutritionist consult Palliative care consult requested ,to discuss advance directives and complete MOLST       TPMH of COVID 19 in 2011, CHF, RLE ulcer, CKD and CHF who was brought to the ED from [facility] for SOB and hypoxia. She was vaccinated with 3 doses of Moderna vaccine in the past but did not get the second booster dose.    w/ hx HTN, CHF, COPD, afib on eliquis p/w found with SOB since ~ 7am today at SNF. NO known pain. EMS was called and found pt cyanotic and O2 56% on RA. EMS states 82% on 100% nrb. Pt on CPAP by EMS with sats in mid 90s. No known fever/chills. PT reported been off lasix over past couple days. No other acute co or changes
82 yo F w/ hx HTN, CHF, COPD, afib on eliquis p/w found with SOB since ~ 7am today at SNF. NO known pain. EMS was called and found pt cyanotic and O2 56% on RA. EMS states 82% on 100% nrb. Pt on CPAP by EMS with sats in mid 90s. No known fever/chills. PT reported been off lasix over past couple days. No other acute co or changes Admitted  to telemetry unit for monitoring , send 3 sets of cardiac enzymes to rule out acute coronary event, obtain ECHO to evaluate LVEF, cardiology consult  ,continue current management, O2 supply, anticoagulation plan as per cardiology consult Admit to monitored unit for cardiac monitoring, obtain echo to evaluate LVEF, intravenous diuresis as per card consult , monitor ins/outs, monitor renal profile and electrolytes closely ,send 3 sets of enzymes, O2 supply, serial chest xrays, monitor weights and oral intake of fluids, nutritionist consult Palliative care consult requested ,to discuss advance directives and complete MOLST       TPMH of COVID 19 in 2011, CHF, RLE ulcer, CKD and CHF who was brought to the ED from [facility] for SOB and hypoxia. She was vaccinated with 3 doses of Moderna vaccine in the past but did not get the second booster dose.    w/ hx HTN, CHF, COPD, afib on eliquis p/w found with SOB since ~ 7am today at SNF. NO known pain. EMS was called and found pt cyanotic and O2 56% on RA. EMS states 82% on 100% nrb. Pt on CPAP by EMS with sats in mid 90s. No known fever/chills. PT reported been off lasix over past couple days. No other acute co or changes
82 yo F w/ hx HTN, CHF, COPD, afib on eliquis p/w found with SOB since ~ 7am today at SNF. NO known pain. EMS was called and found pt cyanotic and O2 56% on RA. EMS states 82% on 100% nrb. Pt on CPAP by EMS with sats in mid 90s. No known fever/chills. PT reported been off lasix over past couple days. No other acute co or changes Admitted  to telemetry unit for monitoring , send 3 sets of cardiac enzymes to rule out acute coronary event, obtain ECHO to evaluate LVEF, cardiology consult  ,continue current management, O2 supply, anticoagulation plan as per cardiology consult Admit to monitored unit for cardiac monitoring, obtain echo to evaluate LVEF, intravenous diuresis as per card consult , monitor ins/outs, monitor renal profile and electrolytes closely ,send 3 sets of enzymes, O2 supply, serial chest xrays, monitor weights and oral intake of fluids, nutritionist consult Palliative care consult requested ,to discuss advance directives and complete MOLST       TPMH of COVID 19 in 2011, CHF, RLE ulcer, CKD and CHF who was brought to the ED from [facility] for SOB and hypoxia. She was vaccinated with 3 doses of Moderna vaccine in the past but did not get the second booster dose.    w/ hx HTN, CHF, COPD, afib on eliquis p/w found with SOB since ~ 7am today at SNF. NO known pain. EMS was called and found pt cyanotic and O2 56% on RA. EMS states 82% on 100% nrb. Pt on CPAP by EMS with sats in mid 90s. No known fever/chills. PT reported been off lasix over past couple days. No other acute co or changes
80 yo F w/ hx HTN, CHF, COPD, afib on eliquis p/w found with SOB since ~ 7am today at SNF. NO known pain. EMS was called and found pt cyanotic and O2 56% on RA. EMS states 82% on 100% nrb. Pt on CPAP by EMS with sats in mid 90s. No known fever/chills. PT reported been off lasix over past couple days. No other acute co or changes Admitted  to telemetry unit for monitoring , send 3 sets of cardiac enzymes to rule out acute coronary event, obtain ECHO to evaluate LVEF, cardiology consult  ,continue current management, O2 supply, anticoagulation plan as per cardiology consult Admit to monitored unit for cardiac monitoring, obtain echo to evaluate LVEF, intravenous diuresis as per card consult , monitor ins/outs, monitor renal profile and electrolytes closely ,send 3 sets of enzymes, O2 supply, serial chest xrays, monitor weights and oral intake of fluids, nutritionist consult Palliative care consult requested ,to discuss advance directives and complete MOLST       TPMH of COVID 19 in 2011, CHF, RLE ulcer, CKD and CHF who was brought to the ED from [facility] for SOB and hypoxia. She was vaccinated with 3 doses of Moderna vaccine in the past but did not get the second booster dose.    w/ hx HTN, CHF, COPD, afib on eliquis p/w found with SOB since ~ 7am today at SNF. NO known pain. EMS was called and found pt cyanotic and O2 56% on RA. EMS states 82% on 100% nrb. Pt on CPAP by EMS with sats in mid 90s. No known fever/chills. PT reported been off lasix over past couple days. No other acute co or changes
80 yo F w/ hx HTN, CHF, COPD, afib on eliquis p/w found with SOB since ~ 7am today at SNF. NO known pain. EMS was called and found pt cyanotic and O2 56% on RA. EMS states 82% on 100% nrb. Pt on CPAP by EMS with sats in mid 90s. No known fever/chills. PT reported been off lasix over past couple days. No other acute co or changes Admitted  to telemetry unit for monitoring , send 3 sets of cardiac enzymes to rule out acute coronary event, obtain ECHO to evaluate LVEF, cardiology consult  ,continue current management, O2 supply, anticoagulation plan as per cardiology consult Admit to monitored unit for cardiac monitoring, obtain echo to evaluate LVEF, intravenous diuresis as per card consult , monitor ins/outs, monitor renal profile and electrolytes closely ,send 3 sets of enzymes, O2 supply, serial chest xrays, monitor weights and oral intake of fluids, nutritionist consult Palliative care consult requested ,to discuss advance directives and complete MOLST       TPMH of COVID 19 in 2011, CHF, RLE ulcer, CKD and CHF who was brought to the ED from [facility] for SOB and hypoxia. She was vaccinated with 3 doses of Moderna vaccine in the past but did not get the second booster dose.    w/ hx HTN, CHF, COPD, afib on eliquis p/w found with SOB since ~ 7am today at SNF. NO known pain. EMS was called and found pt cyanotic and O2 56% on RA. EMS states 82% on 100% nrb. Pt on CPAP by EMS with sats in mid 90s. No known fever/chills. PT reported been off lasix over past couple days. No other acute co or changes
80 yo F w/ hx HTN, CHF, COPD, afib on eliquis p/w found with SOB since ~ 7am today at SNF. NO known pain. EMS was called and found pt cyanotic and O2 56% on RA. EMS states 82% on 100% nrb. Pt on CPAP by EMS with sats in mid 90s. No known fever/chills. PT reported been off lasix over past couple days. No other acute co or changes Admitted  to telemetry unit for monitoring , send 3 sets of cardiac enzymes to rule out acute coronary event, obtain ECHO to evaluate LVEF, cardiology consult  ,continue current management, O2 supply, anticoagulation plan as per cardiology consult Admit to monitored unit for cardiac monitoring, obtain echo to evaluate LVEF, intravenous diuresis as per card consult , monitor ins/outs, monitor renal profile and electrolytes closely ,send 3 sets of enzymes, O2 supply, serial chest xrays, monitor weights and oral intake of fluids, nutritionist consult Palliative care consult requested ,to discuss advance directives and complete MOLST       TPMH of COVID 19 in 2011, CHF, RLE ulcer, CKD and CHF who was brought to the ED from [facility] for SOB and hypoxia. She was vaccinated with 3 doses of Moderna vaccine in the past but did not get the second booster dose.    w/ hx HTN, CHF, COPD, afib on eliquis p/w found with SOB since ~ 7am today at SNF. NO known pain. EMS was called and found pt cyanotic and O2 56% on RA. EMS states 82% on 100% nrb. Pt on CPAP by EMS with sats in mid 90s. No known fever/chills. PT reported been off lasix over past couple days. No other acute co or changes
80 yo F w/ hx HTN, CHF, COPD, afib on eliquis p/w found with SOB since ~ 7am today at SNF. NO known pain. EMS was called and found pt cyanotic and O2 56% on RA. EMS states 82% on 100% nrb. Pt on CPAP by EMS with sats in mid 90s. No known fever/chills. PT reported been off lasix over past couple days. No other acute co or changes Admitted  to telemetry unit for monitoring , send 3 sets of cardiac enzymes to rule out acute coronary event, obtain ECHO to evaluate LVEF, cardiology consult  ,continue current management, O2 supply, anticoagulation plan as per cardiology consult Admit to monitored unit for cardiac monitoring, obtain echo to evaluate LVEF, intravenous diuresis as per card consult , monitor ins/outs, monitor renal profile and electrolytes closely ,send 3 sets of enzymes, O2 supply, serial chest xrays, monitor weights and oral intake of fluids, nutritionist consult Palliative care consult requested ,to discuss advance directives and complete MOLST       TPMH of COVID 19 in 2011, CHF, RLE ulcer, CKD and CHF who was brought to the ED from [facility] for SOB and hypoxia. She was vaccinated with 3 doses of Moderna vaccine in the past but did not get the second booster dose.    w/ hx HTN, CHF, COPD, afib on eliquis p/w found with SOB since ~ 7am today at SNF. NO known pain. EMS was called and found pt cyanotic and O2 56% on RA. EMS states 82% on 100% nrb. Pt on CPAP by EMS with sats in mid 90s. No known fever/chills. PT reported been off lasix over past couple days. No other acute co or changes
80 yo F w/ hx HTN, CHF, COPD, afib on eliquis p/w found with SOB since ~ 7am today at SNF. NO known pain. EMS was called and found pt cyanotic and O2 56% on RA. EMS states 82% on 100% nrb. Pt on CPAP by EMS with sats in mid 90s. No known fever/chills. PT reported been off lasix over past couple days. No other acute co or changes Admitted  to telemetry unit for monitoring , send 3 sets of cardiac enzymes to rule out acute coronary event, obtain ECHO to evaluate LVEF, cardiology consult  ,continue current management, O2 supply, anticoagulation plan as per cardiology consult Admit to monitored unit for cardiac monitoring, obtain echo to evaluate LVEF, intravenous diuresis as per card consult , monitor ins/outs, monitor renal profile and electrolytes closely ,send 3 sets of enzymes, O2 supply, serial chest xrays, monitor weights and oral intake of fluids, nutritionist consult Palliative care consult requested ,to discuss advance directives and complete MOLST       TPMH of COVID 19 in 2011, CHF, RLE ulcer, CKD and CHF who was brought to the ED from [facility] for SOB and hypoxia. She was vaccinated with 3 doses of Moderna vaccine in the past but did not get the second booster dose.    w/ hx HTN, CHF, COPD, afib on eliquis p/w found with SOB since ~ 7am today at SNF. NO known pain. EMS was called and found pt cyanotic and O2 56% on RA. EMS states 82% on 100% nrb. Pt on CPAP by EMS with sats in mid 90s. No known fever/chills. PT reported been off lasix over past couple days. No other acute co or changes
80 yo F w/ hx HTN, CHF, COPD, afib on eliquis p/w found with SOB since ~ 7am today at SNF. NO known pain. EMS was called and found pt cyanotic and O2 56% on RA. EMS states 82% on 100% nrb. Pt on CPAP by EMS with sats in mid 90s. No known fever/chills. PT reported been off lasix over past couple days. No other acute co or changes Admitted  to telemetry unit for monitoring , send 3 sets of cardiac enzymes to rule out acute coronary event, obtain ECHO to evaluate LVEF, cardiology consult  ,continue current management, O2 supply, anticoagulation plan as per cardiology consult Admit to monitored unit for cardiac monitoring, obtain echo to evaluate LVEF, intravenous diuresis as per card consult , monitor ins/outs, monitor renal profile and electrolytes closely ,send 3 sets of enzymes, O2 supply, serial chest xrays, monitor weights and oral intake of fluids, nutritionist consult Palliative care consult requested ,to discuss advance directives and complete MOLST       TPMH of COVID 19 in 2011, CHF, RLE ulcer, CKD and CHF who was brought to the ED from [facility] for SOB and hypoxia. She was vaccinated with 3 doses of Moderna vaccine in the past but did not get the second booster dose.    w/ hx HTN, CHF, COPD, afib on eliquis p/w found with SOB since ~ 7am today at SNF. NO known pain. EMS was called and found pt cyanotic and O2 56% on RA. EMS states 82% on 100% nrb. Pt on CPAP by EMS with sats in mid 90s. No known fever/chills. PT reported been off lasix over past couple days. No other acute co or changes

## 2022-05-31 NOTE — DISCHARGE NOTE PROVIDER - CARE PROVIDERS DIRECT ADDRESSES
,DirectAddress_Unknown,DirectAddress_Unknown,DirectAddress_Unknown,[email or web address]

## 2022-05-31 NOTE — PROGRESS NOTE ADULT - PROBLEM SELECTOR PLAN 2
Admit to monitored unit for cardiac monitoring, obtain echo to evaluate LVEF, intravenous diuresis as per card consult , monitor ins/outs, monitor renal profile and electrolytes closely ,send 3 sets of enzymes, O2 supply, serial chest xrays, monitor weights and oral intake of fluids, nutritionist consult
septic workup ,ID cons ,tylenol prn
septic workup ,ID cons ,tylenol prn
ACUTE ON CHRONIC DIASTOLIC DYSFUNCTION -IMPROVED
Admit to monitored unit for cardiac monitoring, obtain echo to evaluate LVEF, intravenous diuresis as per card consult , monitor ins/outs, monitor renal profile and electrolytes closely ,send 3 sets of enzymes, O2 supply, serial chest xrays, monitor weights and oral intake of fluids, nutritionist consult
septic workup ,ID cons ,tylenol prn
Admit to monitored unit for cardiac monitoring, obtain echo to evaluate LVEF, intravenous diuresis as per card consult , monitor ins/outs, monitor renal profile and electrolytes closely ,send 3 sets of enzymes, O2 supply, serial chest xrays, monitor weights and oral intake of fluids, nutritionist consult
Admit to monitored unit for cardiac monitoring, obtain echo to evaluate LVEF, intravenous diuresis as per card consult , monitor ins/outs, monitor renal profile and electrolytes closely ,send 3 sets of enzymes, O2 supply, serial chest xrays, monitor weights and oral intake of fluids, nutritionist consult

## 2022-05-31 NOTE — PROGRESS NOTE ADULT - PROBLEM SELECTOR PLAN 6
continue home medications ,cardiology is following
Admitted  to telemetry unit for monitoring , send 3 sets of cardiac enzymes to rule out acute coronary event, obtain ECHO to evaluate LVEF, cardiology consult  ,continue current management, O2 supply, anticoagulation plan as per cardiology consult
aspiration precautions ,zosyn ,ID & PULM F/UP ,SLP eval
Admitted  to telemetry unit for monitoring , send 3 sets of cardiac enzymes to rule out acute coronary event, obtain ECHO to evaluate LVEF, cardiology consult  ,continue current management, O2 supply, anticoagulation plan as per cardiology consult
aspiration precautions ,zosyn ,ID & PULM F/UP ,SLP eval
continue home medications ,cardiology is following
aspiration precautions ,zosyn ,ID & PULM F/UP ,SLP eval
Admitted  to telemetry unit for monitoring , send 3 sets of cardiac enzymes to rule out acute coronary event, obtain ECHO to evaluate LVEF, cardiology consult  ,continue current management, O2 supply, anticoagulation plan as per cardiology consult

## 2022-06-01 ENCOUNTER — TRANSCRIPTION ENCOUNTER (OUTPATIENT)
Age: 82
End: 2022-06-01

## 2022-06-03 ENCOUNTER — TRANSCRIPTION ENCOUNTER (OUTPATIENT)
Age: 82
End: 2022-06-03

## 2022-06-19 ENCOUNTER — EMERGENCY (EMERGENCY)
Facility: HOSPITAL | Age: 82
LOS: 1 days | Discharge: HOME | End: 2022-06-19
Attending: EMERGENCY MEDICINE | Admitting: EMERGENCY MEDICINE
Payer: MEDICARE

## 2022-06-19 VITALS
HEART RATE: 61 BPM | SYSTOLIC BLOOD PRESSURE: 146 MMHG | TEMPERATURE: 98 F | OXYGEN SATURATION: 96 % | DIASTOLIC BLOOD PRESSURE: 78 MMHG | RESPIRATION RATE: 18 BRPM

## 2022-06-19 VITALS
HEART RATE: 60 BPM | SYSTOLIC BLOOD PRESSURE: 199 MMHG | OXYGEN SATURATION: 96 % | RESPIRATION RATE: 18 BRPM | DIASTOLIC BLOOD PRESSURE: 83 MMHG | HEIGHT: 60 IN | WEIGHT: 175.05 LBS | TEMPERATURE: 98 F

## 2022-06-19 DIAGNOSIS — Z98.89 OTHER SPECIFIED POSTPROCEDURAL STATES: Chronic | ICD-10-CM

## 2022-06-19 DIAGNOSIS — Z96.652 PRESENCE OF LEFT ARTIFICIAL KNEE JOINT: Chronic | ICD-10-CM

## 2022-06-19 LAB
ALBUMIN SERPL ELPH-MCNC: 3.5 G/DL — SIGNIFICANT CHANGE UP (ref 3.3–5)
ALP SERPL-CCNC: 46 U/L — SIGNIFICANT CHANGE UP (ref 40–120)
ALT FLD-CCNC: 26 U/L — SIGNIFICANT CHANGE UP (ref 12–78)
ANION GAP SERPL CALC-SCNC: 4 MMOL/L — LOW (ref 5–17)
APTT BLD: 27.8 SEC — SIGNIFICANT CHANGE UP (ref 27.5–35.5)
AST SERPL-CCNC: 28 U/L — SIGNIFICANT CHANGE UP (ref 15–37)
BASOPHILS # BLD AUTO: 0.01 K/UL — SIGNIFICANT CHANGE UP (ref 0–0.2)
BASOPHILS NFR BLD AUTO: 0.1 % — SIGNIFICANT CHANGE UP (ref 0–2)
BILIRUB SERPL-MCNC: 0.3 MG/DL — SIGNIFICANT CHANGE UP (ref 0.2–1.2)
BUN SERPL-MCNC: 30 MG/DL — HIGH (ref 7–23)
CALCIUM SERPL-MCNC: 9 MG/DL — SIGNIFICANT CHANGE UP (ref 8.5–10.1)
CHLORIDE SERPL-SCNC: 105 MMOL/L — SIGNIFICANT CHANGE UP (ref 96–108)
CK MB CFR SERPL CALC: 1.5 NG/ML — SIGNIFICANT CHANGE UP (ref 0–3.6)
CO2 SERPL-SCNC: 32 MMOL/L — HIGH (ref 22–31)
CREAT SERPL-MCNC: 1 MG/DL — SIGNIFICANT CHANGE UP (ref 0.5–1.3)
EGFR: 56 ML/MIN/1.73M2 — LOW
EOSINOPHIL # BLD AUTO: 0.08 K/UL — SIGNIFICANT CHANGE UP (ref 0–0.5)
EOSINOPHIL NFR BLD AUTO: 1.2 % — SIGNIFICANT CHANGE UP (ref 0–6)
GLUCOSE SERPL-MCNC: 89 MG/DL — SIGNIFICANT CHANGE UP (ref 70–99)
HCT VFR BLD CALC: 31.6 % — LOW (ref 34.5–45)
HGB BLD-MCNC: 9.8 G/DL — LOW (ref 11.5–15.5)
IMM GRANULOCYTES NFR BLD AUTO: 0.9 % — SIGNIFICANT CHANGE UP (ref 0–1.5)
INR BLD: 1.22 RATIO — HIGH (ref 0.88–1.16)
LYMPHOCYTES # BLD AUTO: 0.65 K/UL — LOW (ref 1–3.3)
LYMPHOCYTES # BLD AUTO: 9.5 % — LOW (ref 13–44)
MCHC RBC-ENTMCNC: 31 GM/DL — LOW (ref 32–36)
MCHC RBC-ENTMCNC: 31.2 PG — SIGNIFICANT CHANGE UP (ref 27–34)
MCV RBC AUTO: 100.6 FL — HIGH (ref 80–100)
MONOCYTES # BLD AUTO: 0.55 K/UL — SIGNIFICANT CHANGE UP (ref 0–0.9)
MONOCYTES NFR BLD AUTO: 8.1 % — SIGNIFICANT CHANGE UP (ref 2–14)
NEUTROPHILS # BLD AUTO: 5.46 K/UL — SIGNIFICANT CHANGE UP (ref 1.8–7.4)
NEUTROPHILS NFR BLD AUTO: 80.2 % — HIGH (ref 43–77)
NRBC # BLD: 0 /100 WBCS — SIGNIFICANT CHANGE UP (ref 0–0)
NT-PROBNP SERPL-SCNC: 4308 PG/ML — HIGH (ref 0–450)
PLATELET # BLD AUTO: 216 K/UL — SIGNIFICANT CHANGE UP (ref 150–400)
POTASSIUM SERPL-MCNC: 4 MMOL/L — SIGNIFICANT CHANGE UP (ref 3.5–5.3)
POTASSIUM SERPL-SCNC: 4 MMOL/L — SIGNIFICANT CHANGE UP (ref 3.5–5.3)
PROT SERPL-MCNC: 7 G/DL — SIGNIFICANT CHANGE UP (ref 6–8.3)
PROTHROM AB SERPL-ACNC: 14.3 SEC — HIGH (ref 10.5–13.4)
RBC # BLD: 3.14 M/UL — LOW (ref 3.8–5.2)
RBC # FLD: 14.9 % — HIGH (ref 10.3–14.5)
SODIUM SERPL-SCNC: 141 MMOL/L — SIGNIFICANT CHANGE UP (ref 135–145)
TROPONIN I, HIGH SENSITIVITY RESULT: 22.8 NG/L — SIGNIFICANT CHANGE UP
WBC # BLD: 6.81 K/UL — SIGNIFICANT CHANGE UP (ref 3.8–10.5)
WBC # FLD AUTO: 6.81 K/UL — SIGNIFICANT CHANGE UP (ref 3.8–10.5)

## 2022-06-19 PROCEDURE — 85730 THROMBOPLASTIN TIME PARTIAL: CPT

## 2022-06-19 PROCEDURE — 84484 ASSAY OF TROPONIN QUANT: CPT

## 2022-06-19 PROCEDURE — 71045 X-RAY EXAM CHEST 1 VIEW: CPT

## 2022-06-19 PROCEDURE — 99285 EMERGENCY DEPT VISIT HI MDM: CPT | Mod: 25

## 2022-06-19 PROCEDURE — 99285 EMERGENCY DEPT VISIT HI MDM: CPT | Mod: FS,CS

## 2022-06-19 PROCEDURE — 83880 ASSAY OF NATRIURETIC PEPTIDE: CPT

## 2022-06-19 PROCEDURE — 85025 COMPLETE CBC W/AUTO DIFF WBC: CPT

## 2022-06-19 PROCEDURE — 93010 ELECTROCARDIOGRAM REPORT: CPT

## 2022-06-19 PROCEDURE — 82553 CREATINE MB FRACTION: CPT

## 2022-06-19 PROCEDURE — 80053 COMPREHEN METABOLIC PANEL: CPT

## 2022-06-19 PROCEDURE — 36415 COLL VENOUS BLD VENIPUNCTURE: CPT

## 2022-06-19 PROCEDURE — 85610 PROTHROMBIN TIME: CPT

## 2022-06-19 PROCEDURE — 96374 THER/PROPH/DIAG INJ IV PUSH: CPT

## 2022-06-19 PROCEDURE — 71045 X-RAY EXAM CHEST 1 VIEW: CPT | Mod: 26

## 2022-06-19 PROCEDURE — 93005 ELECTROCARDIOGRAM TRACING: CPT

## 2022-06-19 RX ORDER — FUROSEMIDE 40 MG
40 TABLET ORAL ONCE
Refills: 0 | Status: COMPLETED | OUTPATIENT
Start: 2022-06-19 | End: 2022-06-19

## 2022-06-19 RX ADMIN — Medication 40 MILLIGRAM(S): at 14:30

## 2022-06-19 NOTE — ED ADULT NURSE NOTE - ED CARDIAC RATE
vision    -------------------------------------------------------------------------------------------------------------------------------------------------------------    Cryotherapy/Liquid Nitrogen Therapy    Cryotherapy is used to treat warts, seborrheic keratoses, actinic keratoses ('precancers') and other benign and premalignant lesions.    Cryotherapy destroys tissue by direct freezing and thawing of skin lesions.  Larger lesions may require multiple treatments or longer freezing and thawing cycles.    Side effects include:           Mild to moderate pain                                             Redness around the treated area                                             Blisters                                             Temporary numbness                                             Mild scarring                                             Local pigment changes             Nail dystrophy (if cuticle is treated)    If you develop a blister, it is typically best to leave it alone if possible.  If it is a large blister, it is okay to pop it with a sterilized needle. Regardless, try to keep the blister roof intact as this acts as 'nature's Band-Aid' minimizing the risk of infection.  If you are prescribed Efudex cream, wait until the cryotherapy sites heal completely before starting the Efudex.  Call the clinic if you develop:  Bleeding  Open sores that do not heal within 1 week  Open sores that have draining pus, spreading redness, tenderness or warmth    If you have any concerns regarding the procedure or healing process, please contact [phone] for assistance.     normal

## 2022-06-19 NOTE — ED ADULT NURSE NOTE - CINV DISCH TEACH PARTICIP
M-Plasty Complex Repair Preamble Text (Leave Blank If You Do Not Want): Extensive wide undermining was performed. Patient/Family

## 2022-06-19 NOTE — ED PROVIDER NOTE - MUSCULOSKELETAL, MLM
Spine appears normal, range of motion is not limited, no muscle or joint tenderness +2 pitting edema le

## 2022-06-19 NOTE — ED ADULT NURSE NOTE - OBJECTIVE STATEMENT
pt reports her blood pressure was high today reports eating Chinese food last few days denies chest pain shortness of breath

## 2022-06-19 NOTE — ED PROVIDER NOTE - PATIENT PORTAL LINK FT
You can access the FollowMyHealth Patient Portal offered by [facility] by registering at the following website: [email or web address]. By joining [facility]’s FollowMyHealth portal, you will also be able to view your health information using other applications (apps) compatible with our system.

## 2022-06-19 NOTE — ED PROVIDER NOTE - ATTENDING APP SHARED VISIT CONTRIBUTION OF CARE
80 y/o F with hx of Afib on Eliquis, chf on Lasix with c/o LE edema after eating salty foods.      PE: well appearing, no distress  heart: irregular, regular rate  lungs clear  abd soft  LE: pitting edema    cardiac workup, IV Lasix, BP control

## 2022-06-19 NOTE — ED PROVIDER NOTE - WR ORDER STATUS 1
Resulted Dapsone Counseling: I discussed with the patient the risks of dapsone including but not limited to hemolytic anemia, agranulocytosis, rashes, methemoglobinemia, kidney failure, peripheral neuropathy, headaches, GI upset, and liver toxicity.  Patients who start dapsone require monitoring including baseline LFTs and weekly CBCs for the first month, then every month thereafter.  The patient verbalized understanding of the proper use and possible adverse effects of dapsone.  All of the patient's questions and concerns were addressed.

## 2022-06-19 NOTE — ED PROVIDER NOTE - OBJECTIVE STATEMENT
Pt is a 82 yo F w/ hx HTN, CHF, COPD, afib on eliquis recent admission for covid 19 sob and chf exacerbation [facility] weakness dizziness and sob with exertion. Pt denies any chest pain cough fever chills. Pt states she has been eating chinese food and admits to eating food with increased salt also noted leg swelling.

## 2022-06-19 NOTE — ED PROVIDER NOTE - CONSTITUTIONAL, MLM
normal... Well appearing, awake, alert, oriented to person, place, time/situation and in no apparent distress no tachypnea pt on room area

## 2022-06-19 NOTE — ED PROVIDER NOTE - NS ED ATTENDING STATEMENT MOD
This was a shared visit with the GRAHAM. I reviewed and verified the documentation and independently performed the documented:

## 2022-10-27 NOTE — SWALLOW BEDSIDE ASSESSMENT ADULT - COMMENTS
Consent Type: Consent 1 (Standard) Clinical swallow assessment completed 5/25, at which time pt was recommended minced and moist with thin liquids. MD placed order for swallow reassessment.  CT chest 5/25: "CT findings of pulmonary infection/inflammation compatible with many entities. Small bilateral pleural effusions." Pt's WBC is WFL, no fever.    Pt received sitting upright in chair, independently feeding herself lunch tray. Pt awake and cooperative, on room air. Pt was agreeable to assessment. Pt followed simple commands independently. Pt's vocal quality/intensity and speech production was WFL. Pt denied pain pre and post assessment.

## 2023-01-20 NOTE — ED PROVIDER NOTE - CADM POA URETHRAL CATHETER
No

## 2023-07-26 ENCOUNTER — TRANSCRIPTION ENCOUNTER (OUTPATIENT)
Age: 83
End: 2023-07-26

## 2023-07-26 ENCOUNTER — INPATIENT (INPATIENT)
Facility: HOSPITAL | Age: 83
LOS: 7 days | Discharge: HOME | DRG: 193 | End: 2023-08-03
Attending: INTERNAL MEDICINE | Admitting: INTERNAL MEDICINE
Payer: MEDICARE

## 2023-07-26 VITALS
OXYGEN SATURATION: 98 % | TEMPERATURE: 98 F | DIASTOLIC BLOOD PRESSURE: 83 MMHG | WEIGHT: 179.02 LBS | HEIGHT: 63 IN | RESPIRATION RATE: 18 BRPM | HEART RATE: 60 BPM | SYSTOLIC BLOOD PRESSURE: 143 MMHG

## 2023-07-26 DIAGNOSIS — D64.9 ANEMIA, UNSPECIFIED: ICD-10-CM

## 2023-07-26 DIAGNOSIS — Z29.9 ENCOUNTER FOR PROPHYLACTIC MEASURES, UNSPECIFIED: ICD-10-CM

## 2023-07-26 DIAGNOSIS — Z98.89 OTHER SPECIFIED POSTPROCEDURAL STATES: Chronic | ICD-10-CM

## 2023-07-26 DIAGNOSIS — F32.9 MAJOR DEPRESSIVE DISORDER, SINGLE EPISODE, UNSPECIFIED: ICD-10-CM

## 2023-07-26 DIAGNOSIS — Z90.49 ACQUIRED ABSENCE OF OTHER SPECIFIED PARTS OF DIGESTIVE TRACT: Chronic | ICD-10-CM

## 2023-07-26 DIAGNOSIS — I48.20 CHRONIC ATRIAL FIBRILLATION, UNSPECIFIED: ICD-10-CM

## 2023-07-26 DIAGNOSIS — J44.1 CHRONIC OBSTRUCTIVE PULMONARY DISEASE WITH (ACUTE) EXACERBATION: ICD-10-CM

## 2023-07-26 DIAGNOSIS — Z96.652 PRESENCE OF LEFT ARTIFICIAL KNEE JOINT: Chronic | ICD-10-CM

## 2023-07-26 DIAGNOSIS — I50.30 UNSPECIFIED DIASTOLIC (CONGESTIVE) HEART FAILURE: ICD-10-CM

## 2023-07-26 DIAGNOSIS — I10 ESSENTIAL (PRIMARY) HYPERTENSION: ICD-10-CM

## 2023-07-26 DIAGNOSIS — E78.5 HYPERLIPIDEMIA, UNSPECIFIED: ICD-10-CM

## 2023-07-26 DIAGNOSIS — L03.90 CELLULITIS, UNSPECIFIED: ICD-10-CM

## 2023-07-26 DIAGNOSIS — N18.9 CHRONIC KIDNEY DISEASE, UNSPECIFIED: ICD-10-CM

## 2023-07-26 LAB
ALBUMIN SERPL ELPH-MCNC: 3.2 G/DL — LOW (ref 3.3–5)
ALP SERPL-CCNC: 57 U/L — SIGNIFICANT CHANGE UP (ref 40–120)
ALT FLD-CCNC: 21 U/L — SIGNIFICANT CHANGE UP (ref 12–78)
ANION GAP SERPL CALC-SCNC: 7 MMOL/L — SIGNIFICANT CHANGE UP (ref 5–17)
APTT BLD: 34.3 SEC — SIGNIFICANT CHANGE UP (ref 24.5–35.6)
AST SERPL-CCNC: 23 U/L — SIGNIFICANT CHANGE UP (ref 15–37)
BASE EXCESS BLDV CALC-SCNC: 10.9 MMOL/L — HIGH (ref -2–3)
BASOPHILS # BLD AUTO: 0.01 K/UL — SIGNIFICANT CHANGE UP (ref 0–0.2)
BASOPHILS NFR BLD AUTO: 0.2 % — SIGNIFICANT CHANGE UP (ref 0–2)
BILIRUB SERPL-MCNC: 0.5 MG/DL — SIGNIFICANT CHANGE UP (ref 0.2–1.2)
BLOOD GAS COMMENTS, VENOUS: SIGNIFICANT CHANGE UP
BUN SERPL-MCNC: 27 MG/DL — HIGH (ref 7–23)
CALCIUM SERPL-MCNC: 8.8 MG/DL — SIGNIFICANT CHANGE UP (ref 8.5–10.1)
CHLORIDE SERPL-SCNC: 99 MMOL/L — SIGNIFICANT CHANGE UP (ref 96–108)
CO2 SERPL-SCNC: 33 MMOL/L — HIGH (ref 22–31)
CREAT SERPL-MCNC: 1.3 MG/DL — SIGNIFICANT CHANGE UP (ref 0.5–1.3)
EGFR: 41 ML/MIN/1.73M2 — LOW
EOSINOPHIL # BLD AUTO: 0.08 K/UL — SIGNIFICANT CHANGE UP (ref 0–0.5)
EOSINOPHIL NFR BLD AUTO: 1.3 % — SIGNIFICANT CHANGE UP (ref 0–6)
GAS PNL BLDV: SIGNIFICANT CHANGE UP
GLUCOSE SERPL-MCNC: 112 MG/DL — HIGH (ref 70–99)
HCO3 BLDV-SCNC: 36 MMOL/L — HIGH (ref 22–29)
HCT VFR BLD CALC: 30.9 % — LOW (ref 34.5–45)
HGB BLD-MCNC: 9.8 G/DL — LOW (ref 11.5–15.5)
IMM GRANULOCYTES NFR BLD AUTO: 0.5 % — SIGNIFICANT CHANGE UP (ref 0–0.9)
INR BLD: 1.59 RATIO — HIGH (ref 0.85–1.18)
LYMPHOCYTES # BLD AUTO: 0.57 K/UL — LOW (ref 1–3.3)
LYMPHOCYTES # BLD AUTO: 9.6 % — LOW (ref 13–44)
MAGNESIUM SERPL-MCNC: 2.2 MG/DL — SIGNIFICANT CHANGE UP (ref 1.6–2.6)
MCHC RBC-ENTMCNC: 30.8 PG — SIGNIFICANT CHANGE UP (ref 27–34)
MCHC RBC-ENTMCNC: 31.7 GM/DL — LOW (ref 32–36)
MCV RBC AUTO: 97.2 FL — SIGNIFICANT CHANGE UP (ref 80–100)
MONOCYTES # BLD AUTO: 0.92 K/UL — HIGH (ref 0–0.9)
MONOCYTES NFR BLD AUTO: 15.5 % — HIGH (ref 2–14)
NEUTROPHILS # BLD AUTO: 4.34 K/UL — SIGNIFICANT CHANGE UP (ref 1.8–7.4)
NEUTROPHILS NFR BLD AUTO: 72.9 % — SIGNIFICANT CHANGE UP (ref 43–77)
NRBC # BLD: 0 /100 WBCS — SIGNIFICANT CHANGE UP (ref 0–0)
NT-PROBNP SERPL-SCNC: 2690 PG/ML — HIGH (ref 0–450)
PCO2 BLDV: 56 MMHG — HIGH (ref 39–42)
PH BLDV: 7.41 — SIGNIFICANT CHANGE UP (ref 7.32–7.43)
PHOSPHATE SERPL-MCNC: 3.1 MG/DL — SIGNIFICANT CHANGE UP (ref 2.5–4.5)
PLATELET # BLD AUTO: 194 K/UL — SIGNIFICANT CHANGE UP (ref 150–400)
PO2 BLDV: 84 MMHG — HIGH (ref 25–45)
POTASSIUM SERPL-MCNC: 3.6 MMOL/L — SIGNIFICANT CHANGE UP (ref 3.5–5.3)
POTASSIUM SERPL-SCNC: 3.6 MMOL/L — SIGNIFICANT CHANGE UP (ref 3.5–5.3)
PROT SERPL-MCNC: 7 G/DL — SIGNIFICANT CHANGE UP (ref 6–8.3)
PROTHROM AB SERPL-ACNC: 18.3 SEC — HIGH (ref 9.5–13)
RAPID RVP RESULT: DETECTED
RBC # BLD: 3.18 M/UL — LOW (ref 3.8–5.2)
RBC # FLD: 14.4 % — SIGNIFICANT CHANGE UP (ref 10.3–14.5)
RV+EV RNA SPEC QL NAA+PROBE: DETECTED
SAO2 % BLDV: 97.2 % — HIGH (ref 67–88)
SARS-COV-2 RNA SPEC QL NAA+PROBE: SIGNIFICANT CHANGE UP
SODIUM SERPL-SCNC: 139 MMOL/L — SIGNIFICANT CHANGE UP (ref 135–145)
TROPONIN I, HIGH SENSITIVITY RESULT: 17.8 NG/L — SIGNIFICANT CHANGE UP
WBC # BLD: 5.95 K/UL — SIGNIFICANT CHANGE UP (ref 3.8–10.5)
WBC # FLD AUTO: 5.95 K/UL — SIGNIFICANT CHANGE UP (ref 3.8–10.5)

## 2023-07-26 PROCEDURE — 99223 1ST HOSP IP/OBS HIGH 75: CPT | Mod: GC

## 2023-07-26 PROCEDURE — 71045 X-RAY EXAM CHEST 1 VIEW: CPT | Mod: 26

## 2023-07-26 PROCEDURE — 93971 EXTREMITY STUDY: CPT | Mod: 26,RT

## 2023-07-26 PROCEDURE — 99285 EMERGENCY DEPT VISIT HI MDM: CPT

## 2023-07-26 RX ORDER — LANOLIN ALCOHOL/MO/W.PET/CERES
3 CREAM (GRAM) TOPICAL AT BEDTIME
Refills: 0 | Status: DISCONTINUED | OUTPATIENT
Start: 2023-07-26 | End: 2023-08-03

## 2023-07-26 RX ORDER — AZITHROMYCIN 500 MG/1
500 TABLET, FILM COATED ORAL DAILY
Refills: 0 | Status: DISCONTINUED | OUTPATIENT
Start: 2023-07-27 | End: 2023-07-27

## 2023-07-26 RX ORDER — AZITHROMYCIN 500 MG/1
500 TABLET, FILM COATED ORAL DAILY
Refills: 0 | Status: DISCONTINUED | OUTPATIENT
Start: 2023-07-26 | End: 2023-07-26

## 2023-07-26 RX ORDER — ASPIRIN/CALCIUM CARB/MAGNESIUM 324 MG
81 TABLET ORAL DAILY
Refills: 0 | Status: DISCONTINUED | OUTPATIENT
Start: 2023-07-26 | End: 2023-08-03

## 2023-07-26 RX ORDER — FUROSEMIDE 40 MG
20 TABLET ORAL ONCE
Refills: 0 | Status: COMPLETED | OUTPATIENT
Start: 2023-07-26 | End: 2023-07-26

## 2023-07-26 RX ORDER — CEFTRIAXONE 500 MG/1
1000 INJECTION, POWDER, FOR SOLUTION INTRAMUSCULAR; INTRAVENOUS ONCE
Refills: 0 | Status: COMPLETED | OUTPATIENT
Start: 2023-07-26 | End: 2023-07-26

## 2023-07-26 RX ORDER — APIXABAN 2.5 MG/1
2.5 TABLET, FILM COATED ORAL
Refills: 0 | Status: DISCONTINUED | OUTPATIENT
Start: 2023-07-27 | End: 2023-08-01

## 2023-07-26 RX ORDER — BUDESONIDE AND FORMOTEROL FUMARATE DIHYDRATE 160; 4.5 UG/1; UG/1
2 AEROSOL RESPIRATORY (INHALATION)
Refills: 0 | Status: DISCONTINUED | OUTPATIENT
Start: 2023-07-26 | End: 2023-08-03

## 2023-07-26 RX ORDER — AZITHROMYCIN 500 MG/1
500 TABLET, FILM COATED ORAL ONCE
Refills: 0 | Status: COMPLETED | OUTPATIENT
Start: 2023-07-26 | End: 2023-07-26

## 2023-07-26 RX ORDER — ATORVASTATIN CALCIUM 80 MG/1
10 TABLET, FILM COATED ORAL AT BEDTIME
Refills: 0 | Status: DISCONTINUED | OUTPATIENT
Start: 2023-07-26 | End: 2023-08-03

## 2023-07-26 RX ORDER — IPRATROPIUM/ALBUTEROL SULFATE 18-103MCG
3 AEROSOL WITH ADAPTER (GRAM) INHALATION ONCE
Refills: 0 | Status: COMPLETED | OUTPATIENT
Start: 2023-07-26 | End: 2023-07-26

## 2023-07-26 RX ORDER — AMIODARONE HYDROCHLORIDE 400 MG/1
200 TABLET ORAL DAILY
Refills: 0 | Status: DISCONTINUED | OUTPATIENT
Start: 2023-07-26 | End: 2023-08-03

## 2023-07-26 RX ORDER — FERROUS SULFATE 325(65) MG
325 TABLET ORAL DAILY
Refills: 0 | Status: DISCONTINUED | OUTPATIENT
Start: 2023-07-26 | End: 2023-08-03

## 2023-07-26 RX ORDER — FENOFIBRATE,MICRONIZED 130 MG
48 CAPSULE ORAL DAILY
Refills: 0 | Status: DISCONTINUED | OUTPATIENT
Start: 2023-07-26 | End: 2023-08-03

## 2023-07-26 RX ORDER — APIXABAN 2.5 MG/1
2.5 TABLET, FILM COATED ORAL ONCE
Refills: 0 | Status: COMPLETED | OUTPATIENT
Start: 2023-07-26 | End: 2023-07-26

## 2023-07-26 RX ORDER — IPRATROPIUM/ALBUTEROL SULFATE 18-103MCG
3 AEROSOL WITH ADAPTER (GRAM) INHALATION EVERY 6 HOURS
Refills: 0 | Status: DISCONTINUED | OUTPATIENT
Start: 2023-07-26 | End: 2023-07-31

## 2023-07-26 RX ORDER — CEFTRIAXONE 500 MG/1
1000 INJECTION, POWDER, FOR SOLUTION INTRAMUSCULAR; INTRAVENOUS EVERY 24 HOURS
Refills: 0 | Status: COMPLETED | OUTPATIENT
Start: 2023-07-27 | End: 2023-07-31

## 2023-07-26 RX ORDER — SERTRALINE 25 MG/1
25 TABLET, FILM COATED ORAL DAILY
Refills: 0 | Status: DISCONTINUED | OUTPATIENT
Start: 2023-07-26 | End: 2023-08-03

## 2023-07-26 RX ORDER — CARVEDILOL PHOSPHATE 80 MG/1
12.5 CAPSULE, EXTENDED RELEASE ORAL EVERY 12 HOURS
Refills: 0 | Status: DISCONTINUED | OUTPATIENT
Start: 2023-07-26 | End: 2023-08-03

## 2023-07-26 RX ORDER — MAGNESIUM GLYCINATE 100 MG
2 CAPSULE ORAL
Qty: 0 | Refills: 0 | DISCHARGE

## 2023-07-26 RX ADMIN — Medication 3 MILLILITER(S): at 13:08

## 2023-07-26 RX ADMIN — APIXABAN 2.5 MILLIGRAM(S): 2.5 TABLET, FILM COATED ORAL at 17:52

## 2023-07-26 RX ADMIN — CEFTRIAXONE 100 MILLIGRAM(S): 500 INJECTION, POWDER, FOR SOLUTION INTRAMUSCULAR; INTRAVENOUS at 14:32

## 2023-07-26 RX ADMIN — Medication 60 MILLIGRAM(S): at 13:03

## 2023-07-26 RX ADMIN — AZITHROMYCIN 255 MILLIGRAM(S): 500 TABLET, FILM COATED ORAL at 15:13

## 2023-07-26 RX ADMIN — Medication 20 MILLIGRAM(S): at 18:59

## 2023-07-26 RX ADMIN — CARVEDILOL PHOSPHATE 12.5 MILLIGRAM(S): 80 CAPSULE, EXTENDED RELEASE ORAL at 17:52

## 2023-07-26 NOTE — ED PROVIDER NOTE - NSICDXPASTSURGICALHX_GEN_ALL_CORE_FT
PAST SURGICAL HISTORY:  H/O hernia repair years ago    H/O resection of small bowel     History of cholecystectomy open , many years ago    Status post total left knee replacement 2007

## 2023-07-26 NOTE — ED ADULT NURSE NOTE - OBJECTIVE STATEMENT
pt from [place] with c/o SOB since sunday- reports + productive cough with gray sputum. hx COPD. does not wear home o2. arrive to ED on 2L NC o2 sat WNL. wheezing noted upon auscultation. c/o some accompanied sore throat and chest discomfort with coughing. EKG obtained and read by MD. med neb treatment in progress. told @ facility she has left lower lobe pneumonia

## 2023-07-26 NOTE — ED PROVIDER NOTE - OBJECTIVE STATEMENT
81 y/o F hx of COPD, Afib on Eliquis, HFpEF presenting with cough & shortness of breath.     Patient states she developed productive cough with 'grey' sputum two days ago with associated shortness of breath. States she underwent CXR concerning for pneumonia. No fevers/chills, chest pain. Notes some increasing LE swelling. Notes dry weight is approx 179 lbs. Does not use oxygen at her facility and has not been on steroids. 81 y/o F hx of COPD, Afib on Eliquis, HFpEF presenting with cough & shortness of breath.     Patient states she developed productive cough with 'grey' sputum two days ago with associated shortness of breath. States she underwent CXR concerning for pneumonia. No fevers/chills, chest pain. Notes some increasing LE swelling. Notes dry weight is approx 179 lbs. Does not use oxygen at her facility and has not been on steroids.    PCP: Dr. Grimm 81 y/o F hx of COPD, Afib on Eliquis, HFpEF presenting with cough & shortness of breath.     Patient states she developed productive cough with 'grey' sputum two days ago with associated shortness of breath. States she underwent CXR concerning for pneumonia. No fevers/chills, chest pain. Notes some increasing LE swelling. Notes dry weight is approx 179 lbs. Does not use oxygen at her facility and has not been on steroids.    PCP: Dr. Barbosa 83 y/o F hx of COPD, Afib on Eliquis, HFpEF presenting with cough & shortness of breath.     Patient states she developed productive cough with 'grey' sputum two days ago with associated shortness of breath. States she underwent CXR concerning for pneumonia. No fevers/chills, chest pain. Notes some increasing LE swelling. Notes dry weight is approx 179 lbs. Does not use oxygen at her facility and has not been on steroids. No abdominal pain/nausea/vomiting/dysuria.     PCP: Dr. Barbosa

## 2023-07-26 NOTE — H&P ADULT - NSHPPHYSICALEXAM_GEN_ALL_CORE
T(C): 36.6 (07-26-23 @ 11:42), Max: 36.6 (07-26-23 @ 11:42)  HR: 60 (07-26-23 @ 11:42) (60 - 60)  BP: 143/83 (07-26-23 @ 11:42) (143/83 - 143/83)  RR: 18 (07-26-23 @ 11:42) (18 - 18)  SpO2: 98% (07-26-23 @ 11:42) (98% - 98%)    GENERAL: patient appears well, no acute distress, appropriate, pleasant  EYES: sclera clear, no exudates  ENMT: oropharynx clear without erythema, no exudates, moist mucous membranes  NECK: supple, soft, no thyromegaly noted  LUNGS: good air entry bilaterally, clear to auscultation, symmetric breath sounds, no wheezing or rhonchi appreciated  HEART: soft S1/S2, regular rate and rhythm, no murmurs noted, no lower extremity edema  GASTROINTESTINAL: abdomen is soft, nontender, nondistended, normoactive bowel sounds, no palpable masses  INTEGUMENT: good skin turgor, no lesions noted  MUSCULOSKELETAL: no clubbing or cyanosis, no obvious deformity  NEUROLOGIC: awake, alert, oriented x3, good muscle tone in 4 extremities, no obvious sensory deficits  PSYCHIATRIC: mood is good, affect is congruent, linear and logical thought process  HEME/LYMPH: no palpable supraclavicular nodules, no obvious ecchymosis or petechiae T(C): 36.6 (07-26-23 @ 11:42), Max: 36.6 (07-26-23 @ 11:42)  HR: 60 (07-26-23 @ 11:42) (60 - 60)  BP: 143/83 (07-26-23 @ 11:42) (143/83 - 143/83)  RR: 18 (07-26-23 @ 11:42) (18 - 18)  SpO2: 98% (07-26-23 @ 11:42) (98% - 98%)    GENERAL: +NC in place, patient appears sick. no acute distress, appropriate.  EYES: sclera clear  ENMT: oropharynx clear without erythema, poor dentition, moist mucous membranes  NECK: supple, soft,  LUNGS: + expiratory rhonchi appreciated in the B/L posterior lung bases. mild crackles in L lung base.   HEART: + 2+ B/L LE pitting edema R>L. soft S1/S2,  no murmurs noted,   GASTROINTESTINAL: abdomen is soft, nontender, nondistended, normoactive bowel sounds.  INTEGUMENT: + Dark discolored wound appreciated over lateral surface of R leg. Nontender to palpation. No warmth or erythema appreciated. good skin turgor  MUSCULOSKELETAL: no clubbing or cyanosis, no obvious deformity  NEUROLOGIC: awake, alert, oriented x3, no obvious sensory deficits  PSYCHIATRIC: mood is good, affect is congruent, linear and logical thought process T(C): 36.6 (07-26-23 @ 11:42), Max: 36.6 (07-26-23 @ 11:42)  HR: 60 (07-26-23 @ 11:42) (60 - 60)  BP: 143/83 (07-26-23 @ 11:42) (143/83 - 143/83)  RR: 18 (07-26-23 @ 11:42) (18 - 18)  SpO2: 98% (07-26-23 @ 11:42) (98% - 98%)    GENERAL: +NC in place. no acute distress, appropriate.  EYES: sclera clear  ENMT: oropharynx clear without erythema, poor dentition, moist mucous membranes  NECK: supple, soft,  LUNGS: + expiratory rhonchi appreciated in the B/L posterior lung bases. mild crackles in L lung base.   HEART: + 2+ B/L LE pitting edema R>L. soft S1/S2,  no murmurs noted,   GASTROINTESTINAL: abdomen is soft, nontender, nondistended, normoactive bowel sounds.  INTEGUMENT: + Dark discolored wound appreciated over lateral surface of R leg. Nontender to palpation. No warmth or erythema appreciated. good skin turgor  MUSCULOSKELETAL: no clubbing or cyanosis, no obvious deformity  NEUROLOGIC: awake, alert, oriented x3, no obvious sensory deficits  PSYCHIATRIC: mood is good, affect is congruent, linear and logical thought process T(C): 36.6 (07-26-23 @ 11:42), Max: 36.6 (07-26-23 @ 11:42)  HR: 60 (07-26-23 @ 11:42) (60 - 60)  BP: 143/83 (07-26-23 @ 11:42) (143/83 - 143/83)  RR: 18 (07-26-23 @ 11:42) (18 - 18)  SpO2: 98% (07-26-23 @ 11:42) (98% - 98%)    GENERAL: +NC in place. no acute distress, appropriate.  EYES: sclera clear  ENMT: oropharynx clear without erythema, poor dentition, moist mucous membranes  NECK: supple, soft,  LUNGS: + expiratory rhonchi appreciated in the B/L posterior lung bases. mild crackles in L lung base.   HEART: trace B/L LE pitting edema R>L. soft S1/S2,  no murmurs noted,   GASTROINTESTINAL: abdomen is soft, nontender, nondistended, normoactive bowel sounds.  INTEGUMENT: + Dark discolored wound appreciated over lateral surface of R leg. Nontender to palpation. No warmth or erythema appreciated. good skin turgor  MUSCULOSKELETAL: no clubbing or cyanosis, no obvious deformity  NEUROLOGIC: awake, alert, oriented x3, no obvious sensory deficits  PSYCHIATRIC: mood is good, affect is congruent, linear and logical thought process

## 2023-07-26 NOTE — H&P ADULT - PROBLEM SELECTOR PLAN 1
Pt has Hx of COPD (not on home O2). Presented w/ SOB x3 days.  In ED given azithromycin 500mg x1, rocephin 1g x1, duoneb x1, solumedrol x1.  CXR -  persistent mild left base effusion with adjacent small infiltrate. Stable left base findings and pacemaker.   - on 3L NC sating at 98%, ween as tolerated    - continue duonebs, steroids (solumedrol 60mg), abx  - consult Pulm, f/u recs  - continue monitoring O2 sat Pt has Hx of COPD (not on home O2). Presented w/ SOB x3 days.  In ED given azithromycin 500mg x1, rocephin 1g x1, duoneb x1, solumedrol x1.  CXR -  persistent mild left base effusion with adjacent small infiltrate. Stable left base findings and pacemaker.   - on 3L NC sating at 98%, ween as tolerated    - continue duonebs, Prednisone, abx  -will continue ABX given infiltrate seen on CXR.  - consult Pulm, f/u recs  - continue monitoring O2 sat

## 2023-07-26 NOTE — H&P ADULT - NSHPSOCIALHISTORY_GEN_ALL_CORE
Tobacco:   EtOH:    Recreational drug use:   Lives with:   Ambulates:   ADLs: Tobacco: quit, unsure when. smoked ~20 years 1/2 ppd.  EtOH: denies.  Recreational drug use: denies.  Lives with:  at senior living facility  Ambulates: walker  ADLs: independent

## 2023-07-26 NOTE — ED ADULT TRIAGE NOTE - CHIEF COMPLAINT QUOTE
Patient BIBA from [place], for SOB x2 days, cough, generalized weakness. states has pacemaker

## 2023-07-26 NOTE — H&P ADULT - ASSESSMENT
81 y/o F pmhx of COPD (not on O2), Afib (on Eliquis), s/p pacemaker, HTN, CKD, HLD, Anemia, HFpEF, small bowel CA (s/p resection) presented from [facility] facility ([place]) with cough & shortness of breath x 3 days. Admitted for management of COPD exacerbation.

## 2023-07-26 NOTE — ED PROVIDER NOTE - CLINICAL SUMMARY MEDICAL DECISION MAKING FREE TEXT BOX
81 y/o F hx of COPD, Afib on Eliquis, HFpEF presenting with cough & shortness of breath.   Diffuse wheezing suspect COPD exacerbation 2/2 to pneumonia   Cover with CTX/azithromycin, IV methylprednisolone, bronchodilators  Compensated mild hypercapnia  LLE erythema cellulitis vs. venous stasis- covered with CTX as above.   Admitted to medical service for further management.   EKG without ischemic changes. No meaningful volume overload to suggest CHF exacerbation.

## 2023-07-26 NOTE — H&P ADULT - PROBLEM SELECTOR PLAN 3
GFR 41. Cr 1.3 (baseline ~1) on admission.   - avoid nephrotoxic meds  - consider nephro consult if Cr continues to increase

## 2023-07-26 NOTE — H&P ADULT - PROBLEM SELECTOR PLAN 2
Pt appears volume overloaded, with mild crackles in left lung base and 2+ pitting edema.  Last TTE (5/26/22) shows LVEF 55-60%  - repeat TTE  - hold home torsemide?  - cardio consult? Pt appears volume overloaded, with mild crackles in left lung base and 2+ pitting edema.  Last TTE (5/26/22) shows LVEF 55-60%  - repeat TTE  - hold home torsemide  -give IV lasix 20mg x1 today, monitor renal fxn and dose lasix accordingly in setting of her CKD  - cardio consult-Dr Kyle Allen

## 2023-07-26 NOTE — H&P ADULT - PROBLEM SELECTOR PLAN 4
Chronic. Pt on home eliquis.  - EKG on admission - 60bpm. Atrial-paced rhythm, QT/QTc 526/526. LBBB\  - continue amiodarone, eliquis, aspirin. Chronic. Pt on home eliquis.  - EKG on admission - 60bpm. Atrial-paced rhythm, QT/QTc 526/526. LBBB  - continue amiodarone, eliquis, aspirin.

## 2023-07-26 NOTE — ED PROVIDER NOTE - PHYSICAL EXAMINATION
GENERAL: no acute respiratory distress; on NC  HEAD:  Atraumatic, Normocephalic  EYES: EOMI, PERRLA, conjunctiva and sclera clear  ENT: MMM; oropharynx clear  NECK: Supple, No JVD  CHEST/LUNG: Diffuse wheezing b/l   HEART: Regular rate and rhythm; No murmurs, rubs, or gallops  ABDOMEN: Soft, Nontender, Nondistended; Bowel sounds present  EXTREMITIES:  2+ Peripheral Pulses, RLE edema with erythema  PSYCH: AAOx3  NEUROLOGY: no focal motor or sensory deficits. 5/5 muscle strength in all extremities.   SKIN: No rashes or lesions

## 2023-07-26 NOTE — ED PROVIDER NOTE - NSICDXPASTMEDICALHX_GEN_ALL_CORE_FT
PAST MEDICAL HISTORY:  Afib     Anemia newly diagnosed    Chronic kidney disease (CKD)     COPD (chronic obstructive pulmonary disease)     HLD (hyperlipidemia)     HTN (hypertension)     Major depression     OA (osteoarthritis)     Pacemaker     Pneumonia years ago    Small bowel cancer

## 2023-07-26 NOTE — H&P ADULT - NSICDXPASTMEDICALHX_GEN_ALL_CORE_FT
PAST MEDICAL HISTORY:  Afib     Anemia newly diagnosed    Chronic kidney disease (CKD)     HLD (hyperlipidemia)     HTN (hypertension)     Major depression     OA (osteoarthritis)     Pacemaker     Pneumonia years ago    Small bowel cancer

## 2023-07-26 NOTE — H&P ADULT - NSHPREVIEWOFSYSTEMS_GEN_ALL_CORE
CONSTITUTIONAL: denies fever, chills, fatigue, weakness  HEENT: denies blurred vision, sore throat  SKIN: denies new lesions, rash  CARDIOVASCULAR: denies chest pain, chest pressure, palpitations  RESPIRATORY: denies shortness of breath, sputum production  GASTROINTESTINAL: denies nausea, vomiting, diarrhea, abdominal pain  GENITOURINARY: denies dysuria, discharge  NEUROLOGICAL: denies numbness, headache, focal weakness  MUSCULOSKELETAL: denies new joint pain, muscle aches  HEMATOLOGIC: denies gross bleeding, bruising  LYMPHATICS: denies enlarged lymph nodes, extremity swelling  PSYCHIATRIC: denies recent changes in anxiety, depression  ENDOCRINOLOGIC: denies sweating, cold or heat intolerance CONSTITUTIONAL: admits chills. denies fever, chills, fatigue, weakness  HEENT: denies blurred vision, sore throat  SKIN: admits chronic skin rash on R leg.   CARDIOVASCULAR: denies chest pain, chest pressure, palpitations  RESPIRATORY: admits shortness of breath, grey-brown sputum production  GASTROINTESTINAL: denies nausea, vomiting, diarrhea, abdominal pain, melena, hematochezia  GENITOURINARY: denies dysuria.  NEUROLOGICAL: denies numbness, headache, focal weakness  MUSCULOSKELETAL: denies new joint pain, muscle aches  HEMATOLOGIC: denies gross bleeding, bruising  LYMPHATICS: admits B/L LE swelling. denies enlarged lymph nodes.  PSYCHIATRIC: admits depression. denies recent changes in anxiety  ENDOCRINOLOGIC: denies cold or heat intolerance

## 2023-07-26 NOTE — H&P ADULT - HISTORY OF PRESENT ILLNESS
83 y/o F pmhx of COPD (not on O2), Afib (on Eliquis), HFpEF presenting with cough & shortness of breath.   -grey sputum?  -CXR concerning for PNA  -LE swelling    ED Course:  Vitals: 60bpm, 97.8F, 143/83mmHg, 98% on 3L NC  Labs: Hb 9.8, GFR 41, BNP 2690  EKG:   Imaging:  CXR - Stable left base findings and pacemaker    In ED given azithromycin 500mg x1, rocephin 1g x1, duoneb x1, solumedrol x1.  81 y/o F pmhx of COPD (not on O2), Afib (on Eliquis), s/p pacemaker, HTN, CKD, HLD, Anemia, HFpEF, small bowel CA (s/p resection) presented from [facility] ([place]) with cough & shortness of breath x 3 days. Pt also  reports chills and productive cough w/ greyish-brown sputum that also began 3 days ago. She reports having an CXR done at the facility which revealed a "consolidation" on the left lung. She states she was started on an oral antibiotic (unsure) which she took yesterday. She notes progressively worsening SOB, cough since 3 days ago. Pt also notes increasing LE swelling R > L. Of note, Pt has a chronic R leg rash since recovering from cellulitis last year. Denies fevers, n/v/d, chest pain, abdominal pain, numbness/tingling.     ED Course:  Vitals: 60bpm, 97.8F, 143/83mmHg, 98% on 3L NC  Labs: Hb 9.8, GFR 41, BNP 2690  EKG: Atrial-paced rhythm, QT/QTc 526/526. LBBB  Imaging:  CXR -There is a persistent mild left base effusion with adjacent small infiltrate. Stable left base findings and pacemaker    In ED given azithromycin 500mg x1, rocephin 1g x1, duoneb x1, solumedrol x1.

## 2023-07-26 NOTE — H&P ADULT - ATTENDING COMMENTS
81 y/o F pmhx of COPD (not on O2), Afib (on Eliquis), s/p pacemaker, HTN, CKD, HLD, Anemia, HFpEF, small bowel CA (s/p resection) presented from [facility] ([place]) with cough & shortness of breath x 3 days. Admitted for management of COPD exacerbation.     HPI as above.     T(C): 36.6 (07-26-23 @ 11:42), Max: 36.6 (07-26-23 @ 11:42)  HR: 60 (07-26-23 @ 11:42) (60 - 60)  BP: 143/83 (07-26-23 @ 11:42) (143/83 - 143/83)  RR: 18 (07-26-23 @ 11:42) (18 - 18)  SpO2: 98% (07-26-23 @ 11:42) (98% - 98%)  Wt(kg): --    Physical Exam:   GENERAL: well-groomed, well-developed, NAD  HEENT: head NC/AT, conjunctiva & sclera clear; hearing grossly intact, moist mucous membranes  NECK: supple, no JVD  RESPIRATORY: wheezing b/l lung bases, no rhonchi  CARDIOVASCULAR: S1&S2, irreg irreg  ABDOMEN: soft, non-tender, non-distended, + Bowel sounds x4 quadrants, no guarding, rebound or rigidity  MUSCULOSKELETAL: b/l LE pitting edema right greater than left  LYMPH: no cervical lymphadenopathy  VASCULAR: Radial pulses 2+ bilaterally, no varicose veins   SKIN: warm and dry, color normal  NEUROLOGIC: Awake and alert, moving all extremities    Plan:   Continue steroids  monitor on pulse ox  concern for superimposed PNA given infiltrate seen on CXR  -continue Abx    DVT ppx: continue eliquis 83 y/o F pmhx of COPD (not on O2), Afib (on Eliquis), s/p pacemaker, HTN, CKD, HLD, Anemia, HFpEF, small bowel CA (s/p resection) presented from [facility] ([place]) with cough & shortness of breath x 3 days. Admitted for management of COPD exacerbation.     HPI as above.     T(C): 36.6 (07-26-23 @ 11:42), Max: 36.6 (07-26-23 @ 11:42)  HR: 60 (07-26-23 @ 11:42) (60 - 60)  BP: 143/83 (07-26-23 @ 11:42) (143/83 - 143/83)  RR: 18 (07-26-23 @ 11:42) (18 - 18)  SpO2: 98% (07-26-23 @ 11:42) (98% - 98%)  Wt(kg): --    Physical Exam:   GENERAL: well-groomed, well-developed, NAD  HEENT: head NC/AT, conjunctiva & sclera clear; hearing grossly intact, moist mucous membranes  NECK: supple, no JVD  RESPIRATORY: wheezing b/l lung bases, no rhonchi  CARDIOVASCULAR: S1&S2, irreg irreg  ABDOMEN: soft, non-tender, non-distended, + Bowel sounds x4 quadrants, no guarding, rebound or rigidity  MUSCULOSKELETAL: traceb/l LE pitting edema right greater than left  LYMPH: no cervical lymphadenopathy  VASCULAR: Radial pulses 2+ bilaterally, no varicose veins   SKIN: warm and dry, color normal  NEUROLOGIC: Awake and alert, moving all extremities    Plan:   Continue steroids  monitor on pulse ox  concern for superimposed PNA given infiltrate seen on CXR  -continue Abx    DVT ppx: continue eliquis

## 2023-07-26 NOTE — H&P ADULT - PROBLEM SELECTOR PLAN 7
H/H 9.8/30.9 on admission (appears to be at baseline)  - Pt has no active signs of bleeding.   - continue monitoring CBC

## 2023-07-26 NOTE — ED ADULT NURSE NOTE - NSFALLUNIVINTERV_ED_ALL_ED
Bed/Stretcher in lowest position, wheels locked, appropriate side rails in place/Call bell, personal items and telephone in reach/Instruct patient to call for assistance before getting out of bed/chair/stretcher/Non-slip footwear applied when patient is off stretcher/[place] to call system/Physically safe environment - no spills, clutter or unnecessary equipment/Purposeful proactive rounding/Room/bathroom lighting operational, light cord in reach

## 2023-07-27 LAB
A1C WITH ESTIMATED AVERAGE GLUCOSE RESULT: 5.2 % — SIGNIFICANT CHANGE UP (ref 4–5.6)
ALBUMIN SERPL ELPH-MCNC: 3.1 G/DL — LOW (ref 3.3–5)
ALP SERPL-CCNC: 59 U/L — SIGNIFICANT CHANGE UP (ref 40–120)
ALT FLD-CCNC: 24 U/L — SIGNIFICANT CHANGE UP (ref 12–78)
ANION GAP SERPL CALC-SCNC: 5 MMOL/L — SIGNIFICANT CHANGE UP (ref 5–17)
APTT BLD: 30.3 SEC — SIGNIFICANT CHANGE UP (ref 24.5–35.6)
AST SERPL-CCNC: 19 U/L — SIGNIFICANT CHANGE UP (ref 15–37)
BASOPHILS # BLD AUTO: 0 K/UL — SIGNIFICANT CHANGE UP (ref 0–0.2)
BASOPHILS NFR BLD AUTO: 0 % — SIGNIFICANT CHANGE UP (ref 0–2)
BILIRUB SERPL-MCNC: 0.4 MG/DL — SIGNIFICANT CHANGE UP (ref 0.2–1.2)
BUN SERPL-MCNC: 29 MG/DL — HIGH (ref 7–23)
CALCIUM SERPL-MCNC: 9 MG/DL — SIGNIFICANT CHANGE UP (ref 8.5–10.1)
CHLORIDE SERPL-SCNC: 100 MMOL/L — SIGNIFICANT CHANGE UP (ref 96–108)
CHOLEST SERPL-MCNC: 171 MG/DL — SIGNIFICANT CHANGE UP
CO2 SERPL-SCNC: 37 MMOL/L — HIGH (ref 22–31)
CREAT SERPL-MCNC: 1.2 MG/DL — SIGNIFICANT CHANGE UP (ref 0.5–1.3)
EGFR: 45 ML/MIN/1.73M2 — LOW
EOSINOPHIL # BLD AUTO: 0 K/UL — SIGNIFICANT CHANGE UP (ref 0–0.5)
EOSINOPHIL NFR BLD AUTO: 0 % — SIGNIFICANT CHANGE UP (ref 0–6)
ESTIMATED AVERAGE GLUCOSE: 103 MG/DL — SIGNIFICANT CHANGE UP (ref 68–114)
GLUCOSE SERPL-MCNC: 125 MG/DL — HIGH (ref 70–99)
HCT VFR BLD CALC: 31.8 % — LOW (ref 34.5–45)
HDLC SERPL-MCNC: 46 MG/DL — LOW
HGB BLD-MCNC: 10.2 G/DL — LOW (ref 11.5–15.5)
INR BLD: 1.35 RATIO — HIGH (ref 0.85–1.18)
LIPID PNL WITH DIRECT LDL SERPL: 107 MG/DL — HIGH
LYMPHOCYTES # BLD AUTO: 0.4 K/UL — LOW (ref 1–3.3)
LYMPHOCYTES # BLD AUTO: 9 % — LOW (ref 13–44)
MAGNESIUM SERPL-MCNC: 2.2 MG/DL — SIGNIFICANT CHANGE UP (ref 1.6–2.6)
MCHC RBC-ENTMCNC: 31.4 PG — SIGNIFICANT CHANGE UP (ref 27–34)
MCHC RBC-ENTMCNC: 32.1 GM/DL — SIGNIFICANT CHANGE UP (ref 32–36)
MCV RBC AUTO: 97.8 FL — SIGNIFICANT CHANGE UP (ref 80–100)
MONOCYTES # BLD AUTO: 0.44 K/UL — SIGNIFICANT CHANGE UP (ref 0–0.9)
MONOCYTES NFR BLD AUTO: 10 % — SIGNIFICANT CHANGE UP (ref 2–14)
NEUTROPHILS # BLD AUTO: 3.56 K/UL — SIGNIFICANT CHANGE UP (ref 1.8–7.4)
NEUTROPHILS NFR BLD AUTO: 81 % — HIGH (ref 43–77)
NON HDL CHOLESTEROL: 125 MG/DL — SIGNIFICANT CHANGE UP
NRBC # BLD: SIGNIFICANT CHANGE UP /100 WBCS (ref 0–0)
PHOSPHATE SERPL-MCNC: 4.1 MG/DL — SIGNIFICANT CHANGE UP (ref 2.5–4.5)
PLATELET # BLD AUTO: 200 K/UL — SIGNIFICANT CHANGE UP (ref 150–400)
POTASSIUM SERPL-MCNC: 3.5 MMOL/L — SIGNIFICANT CHANGE UP (ref 3.5–5.3)
POTASSIUM SERPL-SCNC: 3.5 MMOL/L — SIGNIFICANT CHANGE UP (ref 3.5–5.3)
PROT SERPL-MCNC: 7 G/DL — SIGNIFICANT CHANGE UP (ref 6–8.3)
PROTHROM AB SERPL-ACNC: 15.7 SEC — HIGH (ref 9.5–13)
RBC # BLD: 3.25 M/UL — LOW (ref 3.8–5.2)
RBC # FLD: 13.9 % — SIGNIFICANT CHANGE UP (ref 10.3–14.5)
SODIUM SERPL-SCNC: 142 MMOL/L — SIGNIFICANT CHANGE UP (ref 135–145)
TRIGL SERPL-MCNC: 99 MG/DL — SIGNIFICANT CHANGE UP
WBC # BLD: 4.4 K/UL — SIGNIFICANT CHANGE UP (ref 3.8–10.5)
WBC # FLD AUTO: 4.4 K/UL — SIGNIFICANT CHANGE UP (ref 3.8–10.5)

## 2023-07-27 PROCEDURE — 99497 ADVNCD CARE PLAN 30 MIN: CPT

## 2023-07-27 PROCEDURE — 93306 TTE W/DOPPLER COMPLETE: CPT | Mod: 26

## 2023-07-27 PROCEDURE — 71250 CT THORAX DX C-: CPT | Mod: 26

## 2023-07-27 PROCEDURE — 99233 SBSQ HOSP IP/OBS HIGH 50: CPT

## 2023-07-27 RX ORDER — NYSTATIN CREAM 100000 [USP'U]/G
1 CREAM TOPICAL
Refills: 0 | Status: DISCONTINUED | OUTPATIENT
Start: 2023-07-27 | End: 2023-08-03

## 2023-07-27 RX ORDER — HYDRALAZINE HCL 50 MG
25 TABLET ORAL ONCE
Refills: 0 | Status: COMPLETED | OUTPATIENT
Start: 2023-07-27 | End: 2023-07-27

## 2023-07-27 RX ADMIN — Medication 100 MILLIGRAM(S): at 17:33

## 2023-07-27 RX ADMIN — Medication 25 MILLIGRAM(S): at 04:36

## 2023-07-27 RX ADMIN — APIXABAN 2.5 MILLIGRAM(S): 2.5 TABLET, FILM COATED ORAL at 17:33

## 2023-07-27 RX ADMIN — SERTRALINE 25 MILLIGRAM(S): 25 TABLET, FILM COATED ORAL at 11:44

## 2023-07-27 RX ADMIN — Medication 48 MILLIGRAM(S): at 11:44

## 2023-07-27 RX ADMIN — BUDESONIDE AND FORMOTEROL FUMARATE DIHYDRATE 2 PUFF(S): 160; 4.5 AEROSOL RESPIRATORY (INHALATION) at 00:36

## 2023-07-27 RX ADMIN — CEFTRIAXONE 100 MILLIGRAM(S): 500 INJECTION, POWDER, FOR SOLUTION INTRAMUSCULAR; INTRAVENOUS at 15:37

## 2023-07-27 RX ADMIN — ATORVASTATIN CALCIUM 10 MILLIGRAM(S): 80 TABLET, FILM COATED ORAL at 00:36

## 2023-07-27 RX ADMIN — Medication 1 TABLET(S): at 11:44

## 2023-07-27 RX ADMIN — BUDESONIDE AND FORMOTEROL FUMARATE DIHYDRATE 2 PUFF(S): 160; 4.5 AEROSOL RESPIRATORY (INHALATION) at 06:14

## 2023-07-27 RX ADMIN — NYSTATIN CREAM 1 APPLICATION(S): 100000 CREAM TOPICAL at 06:16

## 2023-07-27 RX ADMIN — APIXABAN 2.5 MILLIGRAM(S): 2.5 TABLET, FILM COATED ORAL at 06:16

## 2023-07-27 RX ADMIN — CARVEDILOL PHOSPHATE 12.5 MILLIGRAM(S): 80 CAPSULE, EXTENDED RELEASE ORAL at 17:33

## 2023-07-27 RX ADMIN — CARVEDILOL PHOSPHATE 12.5 MILLIGRAM(S): 80 CAPSULE, EXTENDED RELEASE ORAL at 06:16

## 2023-07-27 RX ADMIN — Medication 50 MILLIGRAM(S): at 06:15

## 2023-07-27 RX ADMIN — NYSTATIN CREAM 1 APPLICATION(S): 100000 CREAM TOPICAL at 17:32

## 2023-07-27 RX ADMIN — AMIODARONE HYDROCHLORIDE 200 MILLIGRAM(S): 400 TABLET ORAL at 06:16

## 2023-07-27 RX ADMIN — Medication 81 MILLIGRAM(S): at 11:44

## 2023-07-27 RX ADMIN — Medication 325 MILLIGRAM(S): at 11:45

## 2023-07-27 RX ADMIN — ATORVASTATIN CALCIUM 10 MILLIGRAM(S): 80 TABLET, FILM COATED ORAL at 21:12

## 2023-07-27 NOTE — CARE COORDINATION ASSESSMENT. - NSDCPLANSERVICES_GEN_ALL_CORE
Left message and requested MD to return call to CM to request an order for PT eval/Anticipated Needs Unclear at Present

## 2023-07-27 NOTE — PROGRESS NOTE ADULT - ASSESSMENT
83 y/o F pmhx of COPD (not on O2), Afib (on Eliquis), s/p pacemaker, HTN, CKD, HLD, Anemia, HFpEF, small bowel CA (s/p resection) presented from [facility] ([place]) with cough & shortness of breath x 3 days. Admitted for management of COPD exacerbation.        Problem/Plan - 1:  ·  Problem: COPD exacerbation.  wuth pna  ·  Plan: Pt has Hx of COPD (not on home O2). Presented w/ SOB x3 days.  Irocephin and doxy for prolonged qt  id recs     Problem/Plan - 2:  ·  Problem: Heart failure with preserved left ventricular function (HFpEF).   ·  Plan: Pt appears volume overloaded, with mild crackles in left lung base and 2+ pitting edema.  Last TTE (5/26/22) shows LVEF 55-60%  - repeat TTE  - hold home torsemide  -give IV lasix 20mg x1 today, monitor renal fxn and dose lasix accordingly in setting of her CKD  - cardio consult-Dr Kyle Allen.     Problem/Plan - 3:  ·  Problem: CKD (chronic kidney disease).   ·  Plan: GFR 41. Cr 1.3 (baseline ~1) on admission.   - avoid nephrotoxic meds  - consider nephro consult if Cr continues to increase.     Problem/Plan - 4:  ·  Problem: Chronic atrial fibrillation.   ·  Plan: Chronic. Pt on home eliquis.  - EKG on admission - 60bpm. Atrial-paced rhythm, QT/QTc 526/526. LBBB  - continue amiodarone, eliquis, aspirin.     Problem/Plan - 5:  ·  Problem: HTN (hypertension).   ·  Plan: Chronic. BP on admission 143/83.  - continue home carvedilol w/ hold parameters.     Problem/Plan - 6:  ·  Problem: HLD (hyperlipidemia).   ·  Plan: Chronic.  - continue home fenofibrate and statin.  - f/u AM lipid panel.     Problem/Plan - 7:  ·  Problem: Anemia.   ·  Plan: H/H 9.8/30.9 on admission (appears to be at baseline)  - Pt has no active signs of bleeding.   - continue monitoring CBC.     Problem/Plan - 8:  ·  Problem: Major depression.   ·  Plan: Chronic.  - continue home sertraline.     Problem/Plan - 9:  ·  Problem: Need for prophylactic measure.   ·  Plan: DVT ppx - continue home eliquis 2.5mg BID.

## 2023-07-27 NOTE — CARE COORDINATION ASSESSMENT. - ASSESSMENT CONCERNS TO BE ADDRESSED
[facility]. [phone] F# [phone]. 3122 form in chart. All meds to [facility]. [facility]-preferred [facility].     Patient is identified as a CMS STAR patient. Transition care management program explained and yellow contact card has been provided.    CM discussed home care services for a visiting nurse. Home care choice list provided.    Patient is on 2LNC- will assess for home O2 requirements./care coordination/discharge planning

## 2023-07-27 NOTE — PATIENT PROFILE ADULT - FALL HARM RISK - HARM RISK INTERVENTIONS

## 2023-07-27 NOTE — CARE COORDINATION ASSESSMENT. - NSCAREPROVIDERS_GEN_ALL_CORE_FT
CARE PROVIDERS:  Accepting Physician: Jeremy Ohara  Administration: Alessio Cotter  Administration: Kristen Torres  Administration: Dora Meade  Administration: Alejandro Johnson  Administration: Carlos Nuñez  Admitting: Jeremy Ohara  Attending: Jeremy Ohara  Cardiology Technician: Priya Thurston  Case Management: Latoya Grimes  Consultant: Laurent Callejas  Covering Team: Mickey Nuñez  ED Attending: Alejandro Medellin  ED Nurse: Joaquín Ledesma  Infection Control: Kuldeep Sunita  Infection Control: Zink, Corinne  Nurse: Rachel Linares  Nurse: Tracy Rhoades  Nurse: Gina Rendon  Nurse: Umu Haider  Nurse: Liya Quiroz  Nurse: Lolly Rivas  Ordered: Marcella Tabor  Ordered: ADM, User  Ordered: Doctor, Unknown  Outpatient Provider: Laurent Callejas  Override: Gina Rendon  Override: Tracy Rhoades  Override: Umu Haider  Override: Liya Quiroz  Override: Lolly Rivas  PCA/Nursing Assistant: Audrey Bell  Primary Team: Meghan hOara  Primary Team: Yashira Fragoso  Registered Dietitian: Ester Coyne  : Elodia Gaspar  : Ashlyn Mas  : Savanna Minaya  Team: [facility] [employer] TCM, Team

## 2023-07-27 NOTE — CHART NOTE - NSCHARTNOTEFT_GEN_A_CORE
RN called for pt w/ HTN (180/74 automatic, 182/68 manual). Per RN, pt asymptomatic. Vitals reviewed. Ordered hydralazine 25mg PO x1 stat. Will receive routine carvedilol 12.5mg at 6am today. Continue to monitor, RN to call w/ any changes.

## 2023-07-27 NOTE — CASE MANAGEMENT PROGRESS NOTE - NSCMPROGRESSNOTE_GEN_ALL_CORE
CM received a return call from Andreas at [facility] [phone] who stated they can accept patient back on oxygen if needed. Andreas stated the 3122 form and COVID PCR are required prior to return. Per Andreas, they do not take patient's back on the weekend. CM remains available.

## 2023-07-27 NOTE — GOALS OF CARE CONVERSATION - ADVANCED CARE PLANNING - CONVERSATION DETAILS
\[facility]\""- Palliative care SW met with patient at bedside and spoke with her daughter Lela by phone as per patient request. Reviewed patient's medical and social history as well as events leading to patient's hospitalization. Writer discussed patient's current diagnosis (COPD exacerbation, HX of COPD (not on O2), Afib (on Eliquis), s/p pacemaker, HTN, CKD, HLD, Anemia, HFpEF, small bowel CA (s/p resection)), medical condition and management. As per daughter, patient has a HCP with daughter Lela as health care agent. As per chart, patient has a MOLST with DNR orders however entire document was not sent over by [facility]. Patient states her wishes are for DNR/DNI. Daughter confirmed that this information is correct. Writer recommended completion of a new MOLST form. Patient and daughter showed insight into patient's medical condition. New MOLST form completed with DNR/DNI orders in place. All questions answered.  Psychosocial support provided.

## 2023-07-27 NOTE — CARE COORDINATION ASSESSMENT. - PRO ARRIVE FROM
[facility] [phone]/assisted living facility

## 2023-07-27 NOTE — CARE COORDINATION ASSESSMENT. - NSPASTMEDSURGHISTORY_GEN_ALL_CORE_FT
PAST MEDICAL & SURGICAL HISTORY:  OA (osteoarthritis)      Pneumonia  years ago      Anemia  newly diagnosed      HLD (hyperlipidemia)      HTN (hypertension)      H/O hernia repair  years ago      History of cholecystectomy  open , many years ago      Status post total left knee replacement  2007      Chronic kidney disease (CKD)      Major depression      Pacemaker      Small bowel cancer      Afib      H/O resection of small bowel

## 2023-07-27 NOTE — PROGRESS NOTE ADULT - SUBJECTIVE AND OBJECTIVE BOX
Patient is a 82y old  Female who presents with a chief complaint of COPD exacerbation (26 Jul 2023 15:50)      SUBJECTIVE / OVERNIGHT EVENTS: pt seen and examined. no fever, no shob, NAD, no c/o pain, remains to have cough        Vital Signs Last 24 Hrs  T(C): 36.4 (27 Jul 2023 05:02), Max: 36.8 (26 Jul 2023 19:24)  T(F): 97.6 (27 Jul 2023 05:02), Max: 98.3 (27 Jul 2023 00:40)  HR: 60 (27 Jul 2023 05:02) (59 - 67)  BP: 180/74 (27 Jul 2023 05:02) (148/81 - 182/68)  BP(mean): --  RR: 18 (27 Jul 2023 05:02) (18 - 18)  SpO2: 94% (27 Jul 2023 05:02) (94% - 99%)    Parameters below as of 27 Jul 2023 05:02  Patient On (Oxygen Delivery Method): nasal cannula  O2 Flow (L/min): 2    I&O's Summary      PHYSICAL EXAM:  GENERAL: NAD  HEAD:  Atraumatic, Normocephalic  NECK: Supple  CHEST/LUNG: rhinchi bilateral  HEART: S1+S2  ABDOMEN: Soft, Nontender, Nondistended; Bowel sounds present  Neuro: no focal deficit  EXTREMITIES:  No edema  SKIN: No rashes or lesions    LABS:                        10.2   4.40  )-----------( 200      ( 27 Jul 2023 05:42 )             31.8     07-27    142  |  100  |  29<H>  ----------------------------<  125<H>  3.5   |  37<H>  |  1.20    Ca    9.0      27 Jul 2023 05:42  Phos  4.1     07-27  Mg     2.2     07-27    TPro  7.0  /  Alb  3.1<L>  /  TBili  0.4  /  DBili  x   /  AST  19  /  ALT  24  /  AlkPhos  59  07-27    PT/INR - ( 27 Jul 2023 05:42 )   PT: 15.7 sec;   INR: 1.35 ratio         PTT - ( 27 Jul 2023 05:42 )  PTT:30.3 sec  CAPILLARY BLOOD GLUCOSE            Urinalysis Basic - ( 27 Jul 2023 05:42 )    Color: x / Appearance: x / SG: x / pH: x  Gluc: 125 mg/dL / Ketone: x  / Bili: x / Urobili: x   Blood: x / Protein: x / Nitrite: x   Leuk Esterase: x / RBC: x / WBC x   Sq Epi: x / Non Sq Epi: x / Bacteria: x        RADIOLOGY & ADDITIONAL TESTS:    Imaging Personally Reviewed:  [x] YES  [ ] NO    Consultant(s) Notes Reviewed:  [x] YES  [ ] NO      MEDICATIONS  (STANDING):  aMIOdarone    Tablet 200 milliGRAM(s) Oral daily  apixaban 2.5 milliGRAM(s) Oral two times a day  aspirin  chewable 81 milliGRAM(s) Oral daily  atorvastatin 10 milliGRAM(s) Oral at bedtime  azithromycin   Tablet 500 milliGRAM(s) Oral daily  budesonide 160 MICROgram(s)/formoterol 4.5 MICROgram(s) Inhaler 2 Puff(s) Inhalation two times a day  carvedilol 12.5 milliGRAM(s) Oral every 12 hours  cefTRIAXone   IVPB 1000 milliGRAM(s) IV Intermittent every 24 hours  fenofibrate Tablet 48 milliGRAM(s) Oral daily  ferrous    sulfate 325 milliGRAM(s) Oral daily  multivitamin 1 Tablet(s) Oral daily  nystatin Powder 1 Application(s) Topical two times a day  predniSONE   Tablet 50 milliGRAM(s) Oral daily  sertraline 25 milliGRAM(s) Oral daily    MEDICATIONS  (PRN):  albuterol/ipratropium for Nebulization 3 milliLiter(s) Nebulizer every 6 hours PRN Shortness of Breath and/or Wheezing  aluminum hydroxide/magnesium hydroxide/simethicone Suspension 30 milliLiter(s) Oral every 4 hours PRN Dyspepsia  melatonin 3 milliGRAM(s) Oral at bedtime PRN Insomnia      Care Discussed with Consultants/Other Providers [x] YES  [ ] NO    HEALTH ISSUES - PROBLEM Dx:  COPD exacerbation    HTN (hypertension)    HLD (hyperlipidemia)    Anemia  newly diagnosed    Need for prophylactic measure    Heart failure with preserved left ventricular function (HFpEF)    CKD (chronic kidney disease)    Chronic atrial fibrillation    Major depression

## 2023-07-27 NOTE — CARE COORDINATION ASSESSMENT. - OTHER PERTINENT DISCHARGE PLANNING INFORMATION:
CM met with the patient at the bedside and explained role of CM and transition planning. Patient verbalized understanding. Per patient, she lives at [facility] since 10/10/22. Patient stated she ambulates with a RW PTA. Staff assist with ADL's as need and manages her medications. Denies [facility] services PTA. Denies home O2 PTA. CM provided direct contact/resource folder and remains available.   Left a message for [facility] requesting a return call to discuss requirements for return to facility.

## 2023-07-28 LAB
ANION GAP SERPL CALC-SCNC: 5 MMOL/L — SIGNIFICANT CHANGE UP (ref 5–17)
BUN SERPL-MCNC: 32 MG/DL — HIGH (ref 7–23)
CALCIUM SERPL-MCNC: 8.9 MG/DL — SIGNIFICANT CHANGE UP (ref 8.5–10.1)
CHLORIDE SERPL-SCNC: 103 MMOL/L — SIGNIFICANT CHANGE UP (ref 96–108)
CO2 SERPL-SCNC: 36 MMOL/L — HIGH (ref 22–31)
CREAT SERPL-MCNC: 1 MG/DL — SIGNIFICANT CHANGE UP (ref 0.5–1.3)
EGFR: 56 ML/MIN/1.73M2 — LOW
GLUCOSE SERPL-MCNC: 100 MG/DL — HIGH (ref 70–99)
HCT VFR BLD CALC: 30.1 % — LOW (ref 34.5–45)
HGB BLD-MCNC: 9.5 G/DL — LOW (ref 11.5–15.5)
MCHC RBC-ENTMCNC: 31.6 GM/DL — LOW (ref 32–36)
MCHC RBC-ENTMCNC: 31.6 PG — SIGNIFICANT CHANGE UP (ref 27–34)
MCV RBC AUTO: 100 FL — SIGNIFICANT CHANGE UP (ref 80–100)
NRBC # BLD: 0 /100 WBCS — SIGNIFICANT CHANGE UP (ref 0–0)
PLATELET # BLD AUTO: 221 K/UL — SIGNIFICANT CHANGE UP (ref 150–400)
POTASSIUM SERPL-MCNC: 3.5 MMOL/L — SIGNIFICANT CHANGE UP (ref 3.5–5.3)
POTASSIUM SERPL-SCNC: 3.5 MMOL/L — SIGNIFICANT CHANGE UP (ref 3.5–5.3)
RBC # BLD: 3.01 M/UL — LOW (ref 3.8–5.2)
RBC # FLD: 13.9 % — SIGNIFICANT CHANGE UP (ref 10.3–14.5)
SODIUM SERPL-SCNC: 144 MMOL/L — SIGNIFICANT CHANGE UP (ref 135–145)
WBC # BLD: 5.8 K/UL — SIGNIFICANT CHANGE UP (ref 3.8–10.5)
WBC # FLD AUTO: 5.8 K/UL — SIGNIFICANT CHANGE UP (ref 3.8–10.5)

## 2023-07-28 PROCEDURE — 99232 SBSQ HOSP IP/OBS MODERATE 35: CPT

## 2023-07-28 RX ADMIN — CEFTRIAXONE 100 MILLIGRAM(S): 500 INJECTION, POWDER, FOR SOLUTION INTRAMUSCULAR; INTRAVENOUS at 14:56

## 2023-07-28 RX ADMIN — Medication 48 MILLIGRAM(S): at 12:53

## 2023-07-28 RX ADMIN — NYSTATIN CREAM 1 APPLICATION(S): 100000 CREAM TOPICAL at 17:35

## 2023-07-28 RX ADMIN — CARVEDILOL PHOSPHATE 12.5 MILLIGRAM(S): 80 CAPSULE, EXTENDED RELEASE ORAL at 06:35

## 2023-07-28 RX ADMIN — BUDESONIDE AND FORMOTEROL FUMARATE DIHYDRATE 2 PUFF(S): 160; 4.5 AEROSOL RESPIRATORY (INHALATION) at 06:35

## 2023-07-28 RX ADMIN — CARVEDILOL PHOSPHATE 12.5 MILLIGRAM(S): 80 CAPSULE, EXTENDED RELEASE ORAL at 17:35

## 2023-07-28 RX ADMIN — ATORVASTATIN CALCIUM 10 MILLIGRAM(S): 80 TABLET, FILM COATED ORAL at 21:51

## 2023-07-28 RX ADMIN — Medication 1 TABLET(S): at 12:52

## 2023-07-28 RX ADMIN — AMIODARONE HYDROCHLORIDE 200 MILLIGRAM(S): 400 TABLET ORAL at 06:35

## 2023-07-28 RX ADMIN — Medication 81 MILLIGRAM(S): at 12:52

## 2023-07-28 RX ADMIN — Medication 100 MILLIGRAM(S): at 17:35

## 2023-07-28 RX ADMIN — NYSTATIN CREAM 1 APPLICATION(S): 100000 CREAM TOPICAL at 06:34

## 2023-07-28 RX ADMIN — APIXABAN 2.5 MILLIGRAM(S): 2.5 TABLET, FILM COATED ORAL at 17:35

## 2023-07-28 RX ADMIN — Medication 325 MILLIGRAM(S): at 12:54

## 2023-07-28 RX ADMIN — Medication 100 MILLIGRAM(S): at 06:35

## 2023-07-28 RX ADMIN — Medication 3 MILLILITER(S): at 15:19

## 2023-07-28 RX ADMIN — SERTRALINE 25 MILLIGRAM(S): 25 TABLET, FILM COATED ORAL at 12:52

## 2023-07-28 RX ADMIN — APIXABAN 2.5 MILLIGRAM(S): 2.5 TABLET, FILM COATED ORAL at 06:34

## 2023-07-28 RX ADMIN — BUDESONIDE AND FORMOTEROL FUMARATE DIHYDRATE 2 PUFF(S): 160; 4.5 AEROSOL RESPIRATORY (INHALATION) at 17:35

## 2023-07-28 RX ADMIN — Medication 3 MILLILITER(S): at 21:38

## 2023-07-28 RX ADMIN — Medication 100 MILLIGRAM(S): at 22:50

## 2023-07-28 RX ADMIN — Medication 50 MILLIGRAM(S): at 06:35

## 2023-07-28 NOTE — PROGRESS NOTE ADULT - ASSESSMENT
81 y/o F pmhx of COPD (not on O2), Afib (on Eliquis), s/p pacemaker, HTN, CKD, HLD, Anemia, HFpEF, small bowel CA (s/p resection) presented from [facility] ([place]) with cough & shortness of breath x 3 days. Admitted for management of COPD exacerbation.        Problem/Plan - 1:  ·  Problem: COPD exacerbation.  wuth pna  ·  Plan: Pt has Hx of COPD (not on home O2). Presented w/ SOB x3 days.  Irocephin and doxy for prolonged qt  id recs     Problem/Plan - 2:  ·  Problem: Heart failure with preserved left ventricular function (HFpEF).   ·  Plan: Pt appears volume overloaded, with mild crackles in left lung base and 2+ pitting edema.  Last TTE (5/26/22) shows LVEF 55-60%  - repeat TTE  - hold home torsemide  -give IV lasix 20mg x1 today, monitor renal fxn and dose lasix accordingly in setting of her CKD  - cardio consult-Dr Kyle Allen.     Problem/Plan - 3:  ·  Problem: CKD (chronic kidney disease).   ·  Plan: GFR 41. Cr 1.3 (baseline ~1) on admission.   - avoid nephrotoxic meds  - consider nephro consult if Cr continues to increase.     Problem/Plan - 4:  ·  Problem: Chronic atrial fibrillation.   ·  Plan: Chronic. Pt on home eliquis.  - EKG on admission - 60bpm. Atrial-paced rhythm, QT/QTc 526/526. LBBB  - continue amiodarone, eliquis, aspirin.     Problem/Plan - 5:  ·  Problem: HTN (hypertension).   ·  Plan: Chronic. BP on admission 143/83.  - continue home carvedilol w/ hold parameters.     Problem/Plan - 6:  ·  Problem: HLD (hyperlipidemia).   ·  Plan: Chronic.  - continue home fenofibrate and statin.  - f/u AM lipid panel.     Problem/Plan - 7:  ·  Problem: Anemia.   ·  Plan: H/H 9.8/30.9 on admission (appears to be at baseline)  - Pt has no active signs of bleeding.   - continue monitoring CBC.     Problem/Plan - 8:  ·  Problem: Major depression.   ·  Plan: Chronic.  - continue home sertraline.     Problem/Plan - 9:  ·  Problem: Need for prophylactic measure.   ·  Plan: DVT ppx - continue home eliquis 2.5mg BID.

## 2023-07-28 NOTE — PROGRESS NOTE ADULT - SUBJECTIVE AND OBJECTIVE BOX
Patient is a 82y old  Female who presents with a chief complaint of COPD exacerbation (27 Jul 2023 13:55)      SUBJECTIVE / OVERNIGHT EVENTS: pt seen and examined. no fever,  NAD, no c/o pain        Vital Signs Last 24 Hrs  T(C): 37 (28 Jul 2023 11:36), Max: 37 (28 Jul 2023 11:36)  T(F): 98.6 (28 Jul 2023 11:36), Max: 98.6 (28 Jul 2023 11:36)  HR: 64 (28 Jul 2023 17:31) (60 - 64)  BP: 157/74 (28 Jul 2023 17:31) (157/74 - 180/69)  BP(mean): --  RR: 20 (28 Jul 2023 11:36) (20 - 21)  SpO2: 95% (28 Jul 2023 15:19) (95% - 98%)    Parameters below as of 28 Jul 2023 15:19  Patient On (Oxygen Delivery Method): nasal cannula, 3L      I&O's Summary      PHYSICAL EXAM:  GENERAL: NAD  HEAD:  Atraumatic, Normocephalic  NECK: Supple  CHEST/LUNG: CTABL  HEART: S1+S2  ABDOMEN: Soft, Nontender, Nondistended; Bowel sounds present  Neuro: no focal deficit  EXTREMITIES:  No edema  SKIN: No rashes or lesions    LABS:                        9.5    5.80  )-----------( 221      ( 28 Jul 2023 09:41 )             30.1     07-28    144  |  103  |  32<H>  ----------------------------<  100<H>  3.5   |  36<H>  |  1.00    Ca    8.9      28 Jul 2023 09:41  Phos  4.1     07-27  Mg     2.2     07-27    TPro  7.0  /  Alb  3.1<L>  /  TBili  0.4  /  DBili  x   /  AST  19  /  ALT  24  /  AlkPhos  59  07-27    PT/INR - ( 27 Jul 2023 05:42 )   PT: 15.7 sec;   INR: 1.35 ratio         PTT - ( 27 Jul 2023 05:42 )  PTT:30.3 sec  CAPILLARY BLOOD GLUCOSE            Urinalysis Basic - ( 28 Jul 2023 09:41 )    Color: x / Appearance: x / SG: x / pH: x  Gluc: 100 mg/dL / Ketone: x  / Bili: x / Urobili: x   Blood: x / Protein: x / Nitrite: x   Leuk Esterase: x / RBC: x / WBC x   Sq Epi: x / Non Sq Epi: x / Bacteria: x        RADIOLOGY & ADDITIONAL TESTS:    Imaging Personally Reviewed:  [x] YES  [ ] NO    Consultant(s) Notes Reviewed:  [x] YES  [ ] NO      MEDICATIONS  (STANDING):  aMIOdarone    Tablet 200 milliGRAM(s) Oral daily  apixaban 2.5 milliGRAM(s) Oral two times a day  aspirin  chewable 81 milliGRAM(s) Oral daily  atorvastatin 10 milliGRAM(s) Oral at bedtime  budesonide 160 MICROgram(s)/formoterol 4.5 MICROgram(s) Inhaler 2 Puff(s) Inhalation two times a day  carvedilol 12.5 milliGRAM(s) Oral every 12 hours  cefTRIAXone   IVPB 1000 milliGRAM(s) IV Intermittent every 24 hours  doxycycline monohydrate Capsule 100 milliGRAM(s) Oral every 12 hours  fenofibrate Tablet 48 milliGRAM(s) Oral daily  ferrous    sulfate 325 milliGRAM(s) Oral daily  multivitamin 1 Tablet(s) Oral daily  nystatin Powder 1 Application(s) Topical two times a day  predniSONE   Tablet 50 milliGRAM(s) Oral daily  sertraline 25 milliGRAM(s) Oral daily    MEDICATIONS  (PRN):  albuterol/ipratropium for Nebulization 3 milliLiter(s) Nebulizer every 6 hours PRN Shortness of Breath and/or Wheezing  aluminum hydroxide/magnesium hydroxide/simethicone Suspension 30 milliLiter(s) Oral every 4 hours PRN Dyspepsia  melatonin 3 milliGRAM(s) Oral at bedtime PRN Insomnia      Care Discussed with Consultants/Other Providers [x] YES  [ ] NO    HEALTH ISSUES - PROBLEM Dx:  COPD exacerbation    HTN (hypertension)    HLD (hyperlipidemia)    Anemia  newly diagnosed    Need for prophylactic measure    Heart failure with preserved left ventricular function (HFpEF)    CKD (chronic kidney disease)    Chronic atrial fibrillation    Major depression

## 2023-07-29 LAB
ANION GAP SERPL CALC-SCNC: 6 MMOL/L — SIGNIFICANT CHANGE UP (ref 5–17)
BUN SERPL-MCNC: 37 MG/DL — HIGH (ref 7–23)
CALCIUM SERPL-MCNC: 8.6 MG/DL — SIGNIFICANT CHANGE UP (ref 8.5–10.1)
CHLORIDE SERPL-SCNC: 100 MMOL/L — SIGNIFICANT CHANGE UP (ref 96–108)
CO2 SERPL-SCNC: 36 MMOL/L — HIGH (ref 22–31)
CREAT SERPL-MCNC: 1.1 MG/DL — SIGNIFICANT CHANGE UP (ref 0.5–1.3)
EGFR: 50 ML/MIN/1.73M2 — LOW
GLUCOSE SERPL-MCNC: 100 MG/DL — HIGH (ref 70–99)
HCT VFR BLD CALC: 30.5 % — LOW (ref 34.5–45)
HGB BLD-MCNC: 9.6 G/DL — LOW (ref 11.5–15.5)
MCHC RBC-ENTMCNC: 31.3 PG — SIGNIFICANT CHANGE UP (ref 27–34)
MCHC RBC-ENTMCNC: 31.5 GM/DL — LOW (ref 32–36)
MCV RBC AUTO: 99.3 FL — SIGNIFICANT CHANGE UP (ref 80–100)
NRBC # BLD: 0 /100 WBCS — SIGNIFICANT CHANGE UP (ref 0–0)
PLATELET # BLD AUTO: 229 K/UL — SIGNIFICANT CHANGE UP (ref 150–400)
POTASSIUM SERPL-MCNC: 3.4 MMOL/L — LOW (ref 3.5–5.3)
POTASSIUM SERPL-SCNC: 3.4 MMOL/L — LOW (ref 3.5–5.3)
RBC # BLD: 3.07 M/UL — LOW (ref 3.8–5.2)
RBC # FLD: 13.9 % — SIGNIFICANT CHANGE UP (ref 10.3–14.5)
SODIUM SERPL-SCNC: 142 MMOL/L — SIGNIFICANT CHANGE UP (ref 135–145)
WBC # BLD: 6.42 K/UL — SIGNIFICANT CHANGE UP (ref 3.8–10.5)
WBC # FLD AUTO: 6.42 K/UL — SIGNIFICANT CHANGE UP (ref 3.8–10.5)

## 2023-07-29 PROCEDURE — 93306 TTE W/DOPPLER COMPLETE: CPT | Mod: 26

## 2023-07-29 PROCEDURE — 99232 SBSQ HOSP IP/OBS MODERATE 35: CPT

## 2023-07-29 RX ORDER — ACETAMINOPHEN 500 MG
650 TABLET ORAL DAILY
Refills: 0 | Status: DISCONTINUED | OUTPATIENT
Start: 2023-07-29 | End: 2023-08-03

## 2023-07-29 RX ADMIN — BUDESONIDE AND FORMOTEROL FUMARATE DIHYDRATE 2 PUFF(S): 160; 4.5 AEROSOL RESPIRATORY (INHALATION) at 05:21

## 2023-07-29 RX ADMIN — Medication 50 MILLIGRAM(S): at 05:21

## 2023-07-29 RX ADMIN — ATORVASTATIN CALCIUM 10 MILLIGRAM(S): 80 TABLET, FILM COATED ORAL at 21:13

## 2023-07-29 RX ADMIN — AMIODARONE HYDROCHLORIDE 200 MILLIGRAM(S): 400 TABLET ORAL at 05:21

## 2023-07-29 RX ADMIN — NYSTATIN CREAM 1 APPLICATION(S): 100000 CREAM TOPICAL at 17:09

## 2023-07-29 RX ADMIN — NYSTATIN CREAM 1 APPLICATION(S): 100000 CREAM TOPICAL at 05:29

## 2023-07-29 RX ADMIN — Medication 48 MILLIGRAM(S): at 11:46

## 2023-07-29 RX ADMIN — CEFTRIAXONE 100 MILLIGRAM(S): 500 INJECTION, POWDER, FOR SOLUTION INTRAMUSCULAR; INTRAVENOUS at 11:49

## 2023-07-29 RX ADMIN — APIXABAN 2.5 MILLIGRAM(S): 2.5 TABLET, FILM COATED ORAL at 17:09

## 2023-07-29 RX ADMIN — SERTRALINE 25 MILLIGRAM(S): 25 TABLET, FILM COATED ORAL at 11:45

## 2023-07-29 RX ADMIN — APIXABAN 2.5 MILLIGRAM(S): 2.5 TABLET, FILM COATED ORAL at 05:20

## 2023-07-29 RX ADMIN — Medication 100 MILLIGRAM(S): at 05:20

## 2023-07-29 RX ADMIN — Medication 650 MILLIGRAM(S): at 06:32

## 2023-07-29 RX ADMIN — Medication 1 TABLET(S): at 11:46

## 2023-07-29 RX ADMIN — BUDESONIDE AND FORMOTEROL FUMARATE DIHYDRATE 2 PUFF(S): 160; 4.5 AEROSOL RESPIRATORY (INHALATION) at 19:51

## 2023-07-29 RX ADMIN — CARVEDILOL PHOSPHATE 12.5 MILLIGRAM(S): 80 CAPSULE, EXTENDED RELEASE ORAL at 05:21

## 2023-07-29 RX ADMIN — Medication 100 MILLIGRAM(S): at 17:09

## 2023-07-29 RX ADMIN — CARVEDILOL PHOSPHATE 12.5 MILLIGRAM(S): 80 CAPSULE, EXTENDED RELEASE ORAL at 17:09

## 2023-07-29 RX ADMIN — Medication 325 MILLIGRAM(S): at 11:46

## 2023-07-29 RX ADMIN — Medication 81 MILLIGRAM(S): at 12:14

## 2023-07-29 NOTE — PROGRESS NOTE ADULT - SUBJECTIVE AND OBJECTIVE BOX
Patient is a 82y old  Female who presents with a chief complaint of COPD exacerbation (28 Jul 2023 17:38)      SUBJECTIVE / OVERNIGHT EVENTS: pt seen and examined. no fever, no shob, NAD, no c/o pain        Vital Signs Last 24 Hrs  T(C): 36.3 (29 Jul 2023 05:32), Max: 37.1 (28 Jul 2023 19:10)  T(F): 97.4 (29 Jul 2023 05:32), Max: 98.7 (28 Jul 2023 19:10)  HR: 60 (29 Jul 2023 05:32) (60 - 67)  BP: 176/83 (29 Jul 2023 05:32) (136/70 - 176/83)  BP(mean): --  RR: 19 (29 Jul 2023 05:32) (19 - 20)  SpO2: 99% (29 Jul 2023 05:32) (92% - 99%)    Parameters below as of 29 Jul 2023 05:32    O2 Flow (L/min): 2    I&O's Summary    28 Jul 2023 07:01  -  29 Jul 2023 07:00  --------------------------------------------------------  IN: 0 mL / OUT: 550 mL / NET: -550 mL        PHYSICAL EXAM:  GENERAL: NAD  HEAD:  Atraumatic, Normocephalic  NECK: Supple  CHEST/LUNG: CTABL  HEART: S1+S2  ABDOMEN: Soft, Nontender, Nondistended; Bowel sounds present  Neuro: no focal deficit    LABS:                        9.6    6.42  )-----------( 229      ( 29 Jul 2023 07:00 )             30.5     07-29    142  |  100  |  37<H>  ----------------------------<  100<H>  3.4<L>   |  36<H>  |  1.10    Ca    8.6      29 Jul 2023 07:00        CAPILLARY BLOOD GLUCOSE            Urinalysis Basic - ( 29 Jul 2023 07:00 )    Color: x / Appearance: x / SG: x / pH: x  Gluc: 100 mg/dL / Ketone: x  / Bili: x / Urobili: x   Blood: x / Protein: x / Nitrite: x   Leuk Esterase: x / RBC: x / WBC x   Sq Epi: x / Non Sq Epi: x / Bacteria: x        RADIOLOGY & ADDITIONAL TESTS:    Imaging Personally Reviewed:  [x] YES  [ ] NO    Consultant(s) Notes Reviewed:  [x] YES  [ ] NO      MEDICATIONS  (STANDING):  aMIOdarone    Tablet 200 milliGRAM(s) Oral daily  apixaban 2.5 milliGRAM(s) Oral two times a day  aspirin  chewable 81 milliGRAM(s) Oral daily  atorvastatin 10 milliGRAM(s) Oral at bedtime  budesonide 160 MICROgram(s)/formoterol 4.5 MICROgram(s) Inhaler 2 Puff(s) Inhalation two times a day  carvedilol 12.5 milliGRAM(s) Oral every 12 hours  cefTRIAXone   IVPB 1000 milliGRAM(s) IV Intermittent every 24 hours  doxycycline monohydrate Capsule 100 milliGRAM(s) Oral every 12 hours  fenofibrate Tablet 48 milliGRAM(s) Oral daily  ferrous    sulfate 325 milliGRAM(s) Oral daily  multivitamin 1 Tablet(s) Oral daily  nystatin Powder 1 Application(s) Topical two times a day  predniSONE   Tablet 50 milliGRAM(s) Oral daily  sertraline 25 milliGRAM(s) Oral daily    MEDICATIONS  (PRN):  acetaminophen     Tablet .. 650 milliGRAM(s) Oral daily PRN Moderate Pain (4 - 6)  albuterol/ipratropium for Nebulization 3 milliLiter(s) Nebulizer every 6 hours PRN Shortness of Breath and/or Wheezing  aluminum hydroxide/magnesium hydroxide/simethicone Suspension 30 milliLiter(s) Oral every 4 hours PRN Dyspepsia  melatonin 3 milliGRAM(s) Oral at bedtime PRN Insomnia      Care Discussed with Consultants/Other Providers [x] YES  [ ] NO    HEALTH ISSUES - PROBLEM Dx:  COPD exacerbation    HTN (hypertension)    HLD (hyperlipidemia)    Anemia  newly diagnosed    Need for prophylactic measure    Heart failure with preserved left ventricular function (HFpEF)    CKD (chronic kidney disease)    Chronic atrial fibrillation    Major depression

## 2023-07-29 NOTE — PROGRESS NOTE ADULT - ASSESSMENT
83 y/o F pmhx of COPD (not on O2), Afib (on Eliquis), s/p pacemaker, HTN, CKD, HLD, Anemia, HFpEF, small bowel CA (s/p resection) presented from [facility] ([place]) with cough & shortness of breath x 3 days. Admitted for management of COPD exacerbation.        Problem/Plan - 1:  ·  Problem: COPD exacerbation.  wuth pna  ·  Plan: Pt has Hx of COPD (not on home O2). Presented w/ SOB x3 days.  Irocephin and doxy for prolonged qt  id recs     Problem/Plan - 2:  ·  Problem: Heart failure with preserved left ventricular function (HFpEF).   ·  Plan: Pt appears volume overloaded, with mild crackles in left lung base and 2+ pitting edema.  Last TTE (5/26/22) shows LVEF 55-60%  - repeat TTE  - hold home torsemide  -give IV lasix 20mg x1 today, monitor renal fxn and dose lasix accordingly in setting of her CKD  - cardio consult-Dr Kyle Allen.     Problem/Plan - 3:  ·  Problem: CKD (chronic kidney disease).   ·  Plan: GFR 41. Cr 1.3 (baseline ~1) on admission.   - avoid nephrotoxic meds  - consider nephro consult if Cr continues to increase.     Problem/Plan - 4:  ·  Problem: Chronic atrial fibrillation.   ·  Plan: Chronic. Pt on home eliquis.  - EKG on admission - 60bpm. Atrial-paced rhythm, QT/QTc 526/526. LBBB  - continue amiodarone, eliquis, aspirin.     Problem/Plan - 5:  ·  Problem: HTN (hypertension).   ·  Plan: Chronic. BP on admission 143/83.  - continue home carvedilol w/ hold parameters.     Problem/Plan - 6:  ·  Problem: HLD (hyperlipidemia).   ·  Plan: Chronic.  - continue home fenofibrate and statin.  - f/u AM lipid panel.     Problem/Plan - 7:  ·  Problem: Anemia.   ·  Plan: H/H 9.8/30.9 on admission (appears to be at baseline)  - Pt has no active signs of bleeding.   - continue monitoring CBC.     Problem/Plan - 8:  ·  Problem: Major depression.   ·  Plan: Chronic.  - continue home sertraline.     Problem/Plan - 9:  ·  Problem: Need for prophylactic measure.   ·  Plan: DVT ppx - continue home eliquis 2.5mg BID.

## 2023-07-30 RX ORDER — HYDRALAZINE HCL 50 MG
25 TABLET ORAL ONCE
Refills: 0 | Status: COMPLETED | OUTPATIENT
Start: 2023-07-30 | End: 2023-07-30

## 2023-07-30 RX ORDER — LISINOPRIL 2.5 MG/1
5 TABLET ORAL DAILY
Refills: 0 | Status: DISCONTINUED | OUTPATIENT
Start: 2023-07-30 | End: 2023-07-30

## 2023-07-30 RX ADMIN — Medication 50 MILLIGRAM(S): at 05:32

## 2023-07-30 RX ADMIN — NYSTATIN CREAM 1 APPLICATION(S): 100000 CREAM TOPICAL at 05:33

## 2023-07-30 RX ADMIN — AMIODARONE HYDROCHLORIDE 200 MILLIGRAM(S): 400 TABLET ORAL at 05:32

## 2023-07-30 RX ADMIN — BUDESONIDE AND FORMOTEROL FUMARATE DIHYDRATE 2 PUFF(S): 160; 4.5 AEROSOL RESPIRATORY (INHALATION) at 06:08

## 2023-07-30 RX ADMIN — Medication 1 TABLET(S): at 11:10

## 2023-07-30 RX ADMIN — CARVEDILOL PHOSPHATE 12.5 MILLIGRAM(S): 80 CAPSULE, EXTENDED RELEASE ORAL at 17:03

## 2023-07-30 RX ADMIN — APIXABAN 2.5 MILLIGRAM(S): 2.5 TABLET, FILM COATED ORAL at 17:03

## 2023-07-30 RX ADMIN — Medication 100 MILLIGRAM(S): at 05:33

## 2023-07-30 RX ADMIN — Medication 25 MILLIGRAM(S): at 06:48

## 2023-07-30 RX ADMIN — Medication 25 MILLIGRAM(S): at 12:08

## 2023-07-30 RX ADMIN — APIXABAN 2.5 MILLIGRAM(S): 2.5 TABLET, FILM COATED ORAL at 05:33

## 2023-07-30 RX ADMIN — Medication 100 MILLIGRAM(S): at 17:03

## 2023-07-30 RX ADMIN — BUDESONIDE AND FORMOTEROL FUMARATE DIHYDRATE 2 PUFF(S): 160; 4.5 AEROSOL RESPIRATORY (INHALATION) at 20:31

## 2023-07-30 RX ADMIN — Medication 81 MILLIGRAM(S): at 11:10

## 2023-07-30 RX ADMIN — Medication 5 MILLIGRAM(S): at 20:34

## 2023-07-30 RX ADMIN — Medication 3 MILLILITER(S): at 14:03

## 2023-07-30 RX ADMIN — CARVEDILOL PHOSPHATE 12.5 MILLIGRAM(S): 80 CAPSULE, EXTENDED RELEASE ORAL at 05:33

## 2023-07-30 RX ADMIN — ATORVASTATIN CALCIUM 10 MILLIGRAM(S): 80 TABLET, FILM COATED ORAL at 21:19

## 2023-07-30 RX ADMIN — CEFTRIAXONE 100 MILLIGRAM(S): 500 INJECTION, POWDER, FOR SOLUTION INTRAMUSCULAR; INTRAVENOUS at 11:11

## 2023-07-30 RX ADMIN — Medication 325 MILLIGRAM(S): at 11:11

## 2023-07-30 RX ADMIN — Medication 48 MILLIGRAM(S): at 11:10

## 2023-07-30 RX ADMIN — SERTRALINE 25 MILLIGRAM(S): 25 TABLET, FILM COATED ORAL at 11:10

## 2023-07-30 RX ADMIN — NYSTATIN CREAM 1 APPLICATION(S): 100000 CREAM TOPICAL at 17:03

## 2023-07-30 NOTE — PROGRESS NOTE ADULT - ASSESSMENT
81 y/o F pmhx of COPD (not on O2), Afib (on Eliquis), s/p pacemaker, HTN, CKD, HLD, Anemia, HFpEF, small bowel CA (s/p resection) presented from [facility] ([place]) with cough & shortness of breath x 3 days. Admitted for management of COPD exacerbation.        Problem/Plan - 1:  ·  Problem: COPD exacerbation.  wuth pna  ·  Plan: Pt has Hx of COPD (not on home O2). Presented w/ SOB x3 days.  Irocephin and doxy for prolonged qt  id recs     Problem/Plan - 2:  ·  Problem: Heart failure with preserved left ventricular function (HFpEF). htn  ·  Plan: Pt appears volume overloaded, with mild crackles in left lung base and 2+ pitting edema.  Last TTE (5/26/22) shows LVEF 55-60%  - repeat TTE  - hold home torsemide  - cardio consult-Dr Kyle Allen.     Problem/Plan - 3:  ·  Problem: CKD (chronic kidney disease).   ·  Plan: GFR 41. Cr 1.3 (baseline ~1) on admission.   - avoid nephrotoxic meds       Problem/Plan - 4:  ·  Problem: Chronic atrial fibrillation.   ·  Plan: Chronic. Pt on home eliquis.  - EKG on admission - 60bpm. Atrial-paced rhythm, QT/QTc 526/526. LBBB  - continue amiodarone, eliquis, aspirin.     Problem/Plan - 5:  ·  Problem: HTN (hypertension).   ·  Plan: Chronic. BP on admission 143/83.  - continue home carvedilol w/ hold parameters.     Problem/Plan - 6:  ·  Problem: HLD (hyperlipidemia).   ·  Plan: Chronic.  - continue home fenofibrate and statin.  - f/u AM lipid panel.     Problem/Plan - 7:  ·  Problem: Anemia.   ·  Plan: H/H 9.8/30.9 on admission (appears to be at baseline)  - Pt has no active signs of bleeding.   - continue monitoring CBC.     Problem/Plan - 8:  ·  Problem: Major depression.   ·  Plan: Chronic.  - continue home sertraline.     Problem/Plan - 9:  ·  Problem: Need for prophylactic measure.   ·  Plan: DVT ppx - continue home eliquis 2.5mg BID.

## 2023-07-30 NOTE — PROGRESS NOTE ADULT - SUBJECTIVE AND OBJECTIVE BOX
Patient is a 82y old  Female who presents with a chief complaint of COPD exacerbation (29 Jul 2023 11:38)      SUBJECTIVE / OVERNIGHT EVENTS: pt seen and examined. no fever, no shob, NAD, no c/o pain, c/o wheezing os changed duonebs to q6        Vital Signs Last 24 Hrs  T(C): 36.5 (30 Jul 2023 11:01), Max: 36.8 (29 Jul 2023 21:53)  T(F): 97.7 (30 Jul 2023 11:01), Max: 98.2 (29 Jul 2023 21:53)  HR: 60 (30 Jul 2023 11:01) (60 - 60)  BP: 184/78 (30 Jul 2023 11:01) (168/74 - 185/80)  BP(mean): --  RR: 18 (30 Jul 2023 11:01) (18 - 19)  SpO2: 93% (30 Jul 2023 11:01) (93% - 97%)    Parameters below as of 30 Jul 2023 11:01  Patient On (Oxygen Delivery Method): nasal cannula      I&O's Summary    29 Jul 2023 07:01  -  30 Jul 2023 07:00  --------------------------------------------------------  IN: 0 mL / OUT: 550 mL / NET: -550 mL        PHYSICAL EXAM:  GENERAL: NAD  HEAD:  Atraumatic, Normocephalic  NECK: Supple  CHEST/LUNG: CTABL  HEART: S1+S2  ABDOMEN: Soft, Nontender, Nondistended; Bowel sounds present  Neuro: no focal deficit  EXTREMITIES:  No edema  SKIN: No rashes or lesions    LABS:                        9.6    6.42  )-----------( 229      ( 29 Jul 2023 07:00 )             30.5     07-29    142  |  100  |  37<H>  ----------------------------<  100<H>  3.4<L>   |  36<H>  |  1.10    Ca    8.6      29 Jul 2023 07:00        CAPILLARY BLOOD GLUCOSE            Urinalysis Basic - ( 29 Jul 2023 07:00 )    Color: x / Appearance: x / SG: x / pH: x  Gluc: 100 mg/dL / Ketone: x  / Bili: x / Urobili: x   Blood: x / Protein: x / Nitrite: x   Leuk Esterase: x / RBC: x / WBC x   Sq Epi: x / Non Sq Epi: x / Bacteria: x        RADIOLOGY & ADDITIONAL TESTS:    Imaging Personally Reviewed:  [x] YES  [ ] NO    Consultant(s) Notes Reviewed:  [x] YES  [ ] NO      MEDICATIONS  (STANDING):  aMIOdarone    Tablet 200 milliGRAM(s) Oral daily  apixaban 2.5 milliGRAM(s) Oral two times a day  aspirin  chewable 81 milliGRAM(s) Oral daily  atorvastatin 10 milliGRAM(s) Oral at bedtime  budesonide 160 MICROgram(s)/formoterol 4.5 MICROgram(s) Inhaler 2 Puff(s) Inhalation two times a day  carvedilol 12.5 milliGRAM(s) Oral every 12 hours  cefTRIAXone   IVPB 1000 milliGRAM(s) IV Intermittent every 24 hours  doxycycline monohydrate Capsule 100 milliGRAM(s) Oral every 12 hours  fenofibrate Tablet 48 milliGRAM(s) Oral daily  ferrous    sulfate 325 milliGRAM(s) Oral daily  hydrALAZINE 25 milliGRAM(s) Oral once  lisinopril 5 milliGRAM(s) Oral daily  multivitamin 1 Tablet(s) Oral daily  nystatin Powder 1 Application(s) Topical two times a day  predniSONE   Tablet 50 milliGRAM(s) Oral daily  sertraline 25 milliGRAM(s) Oral daily    MEDICATIONS  (PRN):  acetaminophen     Tablet .. 650 milliGRAM(s) Oral daily PRN Moderate Pain (4 - 6)  albuterol/ipratropium for Nebulization 3 milliLiter(s) Nebulizer every 6 hours PRN Shortness of Breath and/or Wheezing  aluminum hydroxide/magnesium hydroxide/simethicone Suspension 30 milliLiter(s) Oral every 4 hours PRN Dyspepsia  melatonin 3 milliGRAM(s) Oral at bedtime PRN Insomnia      Care Discussed with Consultants/Other Providers [x] YES  [ ] NO    HEALTH ISSUES - PROBLEM Dx:  COPD exacerbation    HTN (hypertension)    HLD (hyperlipidemia)    Anemia  newly diagnosed    Need for prophylactic measure    Heart failure with preserved left ventricular function (HFpEF)    CKD (chronic kidney disease)    Chronic atrial fibrillation    Major depression

## 2023-07-30 NOTE — CHART NOTE - NSCHARTNOTEFT_GEN_A_CORE
Called by RN for /80  - patient asx on coreg 12.5mg BID for BP  - would give additional 12.5mg coreg however HR has been borderline at 60bpm   - for now will give hydralazine 25mg PO x 1   - if patient remains persistently hypertensive after, would consider starting additional anti hypertensive

## 2023-07-31 LAB
ANION GAP SERPL CALC-SCNC: 3 MMOL/L — LOW (ref 5–17)
BUN SERPL-MCNC: 32 MG/DL — HIGH (ref 7–23)
CALCIUM SERPL-MCNC: 8.6 MG/DL — SIGNIFICANT CHANGE UP (ref 8.5–10.1)
CHLORIDE SERPL-SCNC: 102 MMOL/L — SIGNIFICANT CHANGE UP (ref 96–108)
CO2 SERPL-SCNC: 40 MMOL/L — HIGH (ref 22–31)
CREAT SERPL-MCNC: 0.99 MG/DL — SIGNIFICANT CHANGE UP (ref 0.5–1.3)
EGFR: 56 ML/MIN/1.73M2 — LOW
GLUCOSE SERPL-MCNC: 91 MG/DL — SIGNIFICANT CHANGE UP (ref 70–99)
HCT VFR BLD CALC: 31.8 % — LOW (ref 34.5–45)
HGB BLD-MCNC: 10 G/DL — LOW (ref 11.5–15.5)
MCHC RBC-ENTMCNC: 31.2 PG — SIGNIFICANT CHANGE UP (ref 27–34)
MCHC RBC-ENTMCNC: 31.4 GM/DL — LOW (ref 32–36)
MCV RBC AUTO: 99.1 FL — SIGNIFICANT CHANGE UP (ref 80–100)
NRBC # BLD: 0 /100 WBCS — SIGNIFICANT CHANGE UP (ref 0–0)
PLATELET # BLD AUTO: 233 K/UL — SIGNIFICANT CHANGE UP (ref 150–400)
POTASSIUM SERPL-MCNC: 3.4 MMOL/L — LOW (ref 3.5–5.3)
POTASSIUM SERPL-SCNC: 3.4 MMOL/L — LOW (ref 3.5–5.3)
RBC # BLD: 3.21 M/UL — LOW (ref 3.8–5.2)
RBC # FLD: 13.8 % — SIGNIFICANT CHANGE UP (ref 10.3–14.5)
SODIUM SERPL-SCNC: 145 MMOL/L — SIGNIFICANT CHANGE UP (ref 135–145)
WBC # BLD: 7.93 K/UL — SIGNIFICANT CHANGE UP (ref 3.8–10.5)
WBC # FLD AUTO: 7.93 K/UL — SIGNIFICANT CHANGE UP (ref 3.8–10.5)

## 2023-07-31 PROCEDURE — 99233 SBSQ HOSP IP/OBS HIGH 50: CPT | Mod: GC

## 2023-07-31 RX ORDER — ACETYLCYSTEINE 200 MG/ML
4 VIAL (ML) MISCELLANEOUS
Refills: 0 | Status: DISCONTINUED | OUTPATIENT
Start: 2023-07-31 | End: 2023-08-03

## 2023-07-31 RX ORDER — POTASSIUM CHLORIDE 20 MEQ
40 PACKET (EA) ORAL ONCE
Refills: 0 | Status: COMPLETED | OUTPATIENT
Start: 2023-07-31 | End: 2023-07-31

## 2023-07-31 RX ORDER — AMLODIPINE BESYLATE 2.5 MG/1
5 TABLET ORAL DAILY
Refills: 0 | Status: DISCONTINUED | OUTPATIENT
Start: 2023-07-31 | End: 2023-08-02

## 2023-07-31 RX ORDER — IPRATROPIUM/ALBUTEROL SULFATE 18-103MCG
3 AEROSOL WITH ADAPTER (GRAM) INHALATION EVERY 6 HOURS
Refills: 0 | Status: DISCONTINUED | OUTPATIENT
Start: 2023-07-31 | End: 2023-08-03

## 2023-07-31 RX ADMIN — Medication 3 MILLILITER(S): at 19:28

## 2023-07-31 RX ADMIN — Medication 81 MILLIGRAM(S): at 11:17

## 2023-07-31 RX ADMIN — Medication 1 TABLET(S): at 11:18

## 2023-07-31 RX ADMIN — Medication 48 MILLIGRAM(S): at 11:17

## 2023-07-31 RX ADMIN — Medication 3 MILLILITER(S): at 11:37

## 2023-07-31 RX ADMIN — CARVEDILOL PHOSPHATE 12.5 MILLIGRAM(S): 80 CAPSULE, EXTENDED RELEASE ORAL at 05:45

## 2023-07-31 RX ADMIN — BUDESONIDE AND FORMOTEROL FUMARATE DIHYDRATE 2 PUFF(S): 160; 4.5 AEROSOL RESPIRATORY (INHALATION) at 05:47

## 2023-07-31 RX ADMIN — NYSTATIN CREAM 1 APPLICATION(S): 100000 CREAM TOPICAL at 05:50

## 2023-07-31 RX ADMIN — CARVEDILOL PHOSPHATE 12.5 MILLIGRAM(S): 80 CAPSULE, EXTENDED RELEASE ORAL at 17:11

## 2023-07-31 RX ADMIN — Medication 325 MILLIGRAM(S): at 11:17

## 2023-07-31 RX ADMIN — CEFTRIAXONE 100 MILLIGRAM(S): 500 INJECTION, POWDER, FOR SOLUTION INTRAMUSCULAR; INTRAVENOUS at 11:23

## 2023-07-31 RX ADMIN — SERTRALINE 25 MILLIGRAM(S): 25 TABLET, FILM COATED ORAL at 11:17

## 2023-07-31 RX ADMIN — BUDESONIDE AND FORMOTEROL FUMARATE DIHYDRATE 2 PUFF(S): 160; 4.5 AEROSOL RESPIRATORY (INHALATION) at 19:31

## 2023-07-31 RX ADMIN — AMIODARONE HYDROCHLORIDE 200 MILLIGRAM(S): 400 TABLET ORAL at 05:44

## 2023-07-31 RX ADMIN — Medication 100 MILLIGRAM(S): at 05:44

## 2023-07-31 RX ADMIN — APIXABAN 2.5 MILLIGRAM(S): 2.5 TABLET, FILM COATED ORAL at 05:46

## 2023-07-31 RX ADMIN — Medication 4 MILLILITER(S): at 19:37

## 2023-07-31 RX ADMIN — NYSTATIN CREAM 1 APPLICATION(S): 100000 CREAM TOPICAL at 17:12

## 2023-07-31 RX ADMIN — Medication 50 MILLIGRAM(S): at 05:44

## 2023-07-31 RX ADMIN — Medication 5 MILLIGRAM(S): at 05:44

## 2023-07-31 RX ADMIN — ATORVASTATIN CALCIUM 10 MILLIGRAM(S): 80 TABLET, FILM COATED ORAL at 22:07

## 2023-07-31 RX ADMIN — Medication 40 MILLIEQUIVALENT(S): at 11:29

## 2023-07-31 RX ADMIN — APIXABAN 2.5 MILLIGRAM(S): 2.5 TABLET, FILM COATED ORAL at 17:10

## 2023-07-31 NOTE — PROGRESS NOTE ADULT - ASSESSMENT
83 y/o F pmhx of COPD (not on O2), Afib (on Eliquis), s/p pacemaker, HTN, CKD, HLD, Anemia, HFpEF, small bowel CA (s/p resection) presented from [facility] ([place]) with cough & shortness of breath x 3 days. Admitted for management of COPD exacerbation.        Problem/Plan - 1:       Problem/Plan - 2:  ·  Problem: Heart failure with preserved left ventricular function (HFpEF). htn  ·  Plan: Pt appears volume overloaded, with mild crackles in left lung base and 2+ pitting edema.  Last TTE (5/26/22) shows LVEF 55-60%  - repeat TTE  - hold home torsemide  - cardio consult-Dr Kyle Allen.     Problem/Plan - 3:  ·  Problem: CKD (chronic kidney disease).   ·  Plan: GFR 41. Cr 1.3 (baseline ~1) on admission.   - avoid nephrotoxic meds       Problem/Plan - 4:  ·  Problem: Chronic atrial fibrillation.   ·  Plan: Chronic. Pt on home eliquis.  - EKG on admission - 60bpm. Atrial-paced rhythm, QT/QTc 526/526. LBBB  - continue amiodarone, eliquis, aspirin.     Problem/Plan - 5:  ·  Problem: HTN (hypertension).   ·  Plan: Chronic. BP on admission 143/83.  - continue home carvedilol w/ hold parameters.     Problem/Plan - 6:  ·  Problem: HLD (hyperlipidemia).   ·  Plan: Chronic.  - continue home fenofibrate and statin.  - f/u AM lipid panel.     Problem/Plan - 7:  ·  Problem: Anemia.   ·  Plan: H/H 9.8/30.9 on admission (appears to be at baseline)  - Pt has no active signs of bleeding.   - continue monitoring CBC.     Problem/Plan - 8:  ·  Problem: Major depression.   ·  Plan: Chronic.  - continue home sertraline.     Problem/Plan - 9:  ·  Problem: Need for prophylactic measure.   ·  Plan: DVT ppx - continue home eliquis 2.5mg BID.     81 y/o F pmhx of COPD (not on O2), Afib (on Eliquis), s/p pacemaker, HTN, CKD, HLD, Anemia, HFpEF, small bowel CA (s/p resection) presented from [facility] facility ([place]) with cough & shortness of breath x 3 days. Admitted for management of COPD exacerbation.

## 2023-07-31 NOTE — PROGRESS NOTE ADULT - PROBLEM SELECTOR PLAN 5
Chronic. BP on admission 143/83.  - continue home carvedilol w/ hold parameters.  - BP continues to be elevated. Adding amlodipine 5mg w/ hold parameters

## 2023-07-31 NOTE — PROGRESS NOTE ADULT - PROBLEM SELECTOR PLAN 7
H/H 9.8/30.9 on admission (appears to be at baseline)  - Pt has no active signs of bleeding.   - continue monitoring CBC.

## 2023-07-31 NOTE — PROGRESS NOTE ADULT - PROBLEM SELECTOR PLAN 4
Chronic. Pt on home eliquis (2.5mg, BID)  - EKG on admission - 60bpm. Atrial-paced rhythm, QT/QTc 526/526. LBBB  - continue amiodarone, eliquis, aspirin.

## 2023-07-31 NOTE — PROGRESS NOTE ADULT - PROBLEM SELECTOR PLAN 1
COPD exacerbation with pna  - Pt has Hx of COPD (not on home O2). Presented w/ SOB x3 days.  - completed 5 day course of ceftriaxone  id recs??? COPD exacerbation with pna  - Pt has Hx of COPD (not on home O2). Presented w/ SOB x3 days.  - completed 5 day course of ceftriaxone  - pulm consulted (Dr. Laurent Callejas) COPD exacerbation with pna  - Pt has Hx of COPD (not on home O2). Presented w/ SOB x3 days.  - completed 5 day course of ceftriaxone  - continue duonebs q6 standing  - added mucomyst 20% inhalation  - pulm consulted (Dr. Laurent Callejas)

## 2023-07-31 NOTE — PROGRESS NOTE ADULT - SUBJECTIVE AND OBJECTIVE BOX
Patient is a 82y old  Female who presents with a chief complaint of COPD exacerbation (30 Jul 2023 11:54)      Subjective:  INTERVAL HPI/OVERNIGHT EVENTS: Patient seen and examined at bedside. No overnight events occurred. Patient continues to have productive cough but states that she thinks it is a little better. Denies fevers, chills, headache, lightheadedness, chest pain, dyspnea, abdominal pain, n/v/d/c.    MEDICATIONS  (STANDING):  acetylcysteine 20%  Inhalation 4 milliLiter(s) Inhalation two times a day  albuterol/ipratropium for Nebulization 3 milliLiter(s) Nebulizer every 6 hours  aMIOdarone    Tablet 200 milliGRAM(s) Oral daily  amLODIPine   Tablet 5 milliGRAM(s) Oral daily  apixaban 2.5 milliGRAM(s) Oral two times a day  aspirin  chewable 81 milliGRAM(s) Oral daily  atorvastatin 10 milliGRAM(s) Oral at bedtime  budesonide 160 MICROgram(s)/formoterol 4.5 MICROgram(s) Inhaler 2 Puff(s) Inhalation two times a day  carvedilol 12.5 milliGRAM(s) Oral every 12 hours  fenofibrate Tablet 48 milliGRAM(s) Oral daily  ferrous    sulfate 325 milliGRAM(s) Oral daily  multivitamin 1 Tablet(s) Oral daily  nystatin Powder 1 Application(s) Topical two times a day  sertraline 25 milliGRAM(s) Oral daily  torsemide 5 milliGRAM(s) Oral daily    MEDICATIONS  (PRN):  acetaminophen     Tablet .. 650 milliGRAM(s) Oral daily PRN Moderate Pain (4 - 6)  aluminum hydroxide/magnesium hydroxide/simethicone Suspension 30 milliLiter(s) Oral every 4 hours PRN Dyspepsia  melatonin 3 milliGRAM(s) Oral at bedtime PRN Insomnia      Allergies    Procardia (Other)  NIFEdipine (Unknown)  fluoxetine (Unknown)    Intolerances    oxycodone (Other)      REVIEW OF SYSTEMS:  CONSTITUTIONAL: No fever or chills  HEENT:  No headache, no sore throat  RESPIRATORY: No wheezing, or shortness of breath. +cough  CARDIOVASCULAR: No chest pain, palpitations  GASTROINTESTINAL: No abd pain, nausea, vomiting, or diarrhea  GENITOURINARY: No dysuria, frequency, or hematuria  NEUROLOGICAL: no focal weakness or dizziness  MUSCULOSKELETAL: no myalgias     Objective:  Vital Signs Last 24 Hrs  T(C): 36.8 (31 Jul 2023 13:05), Max: 36.8 (31 Jul 2023 04:46)  T(F): 98.3 (31 Jul 2023 13:05), Max: 98.3 (31 Jul 2023 13:05)  HR: 60 (31 Jul 2023 13:05) (60 - 60)  BP: 150/57 (31 Jul 2023 13:05) (150/57 - 179/77)  BP(mean): --  RR: 18 (31 Jul 2023 13:05) (18 - 19)  SpO2: 94% (31 Jul 2023 13:05) (94% - 96%)    Parameters below as of 31 Jul 2023 13:05  Patient On (Oxygen Delivery Method): nasal cannula  O2 Flow (L/min): 2      GENERAL: NAD, lying in bed comfortably  HEAD:  Atraumatic, Normocephalic  EYES: EOMI, PERRL, conjunctiva and sclera clear  ENT: Moist mucous membranes  NECK: Supple, No JVD  CHEST/LUNG: B/l wheezing, R>L. Unlabored respirations  HEART: Regular rate and rhythm; No murmurs, rubs, or gallops  ABDOMEN: Bowel sounds present; Soft, Nontender, Nondistended. No hepatomegaly  EXTREMITIES:  2+ Peripheral Pulses. No clubbing, cyanosis. Mild b/l LE edema  NERVOUS SYSTEM:  Alert & Oriented X3, speech clear. No deficits   MSK: FROM all 4 extremities, full and equal strength  SKIN: warm, dry    LABS:                        10.0   7.93  )-----------( 233      ( 31 Jul 2023 07:12 )             31.8     CBC Full  -  ( 31 Jul 2023 07:12 )  WBC Count : 7.93 K/uL  Hemoglobin : 10.0 g/dL  Hematocrit : 31.8 %  Platelet Count - Automated : 233 K/uL  Mean Cell Volume : 99.1 fl  Mean Cell Hemoglobin : 31.2 pg  Mean Cell Hemoglobin Concentration : 31.4 gm/dL  Auto Neutrophil # : x  Auto Lymphocyte # : x  Auto Monocyte # : x  Auto Eosinophil # : x  Auto Basophil # : x  Auto Neutrophil % : x  Auto Lymphocyte % : x  Auto Monocyte % : x  Auto Eosinophil % : x  Auto Basophil % : x    31 Jul 2023 07:12    145    |  102    |  32     ----------------------------<  91     3.4     |  40     |  0.99     Ca    8.6        31 Jul 2023 07:12        Urinalysis Basic - ( 31 Jul 2023 07:12 )    Color: x / Appearance: x / SG: x / pH: x  Gluc: 91 mg/dL / Ketone: x  / Bili: x / Urobili: x   Blood: x / Protein: x / Nitrite: x   Leuk Esterase: x / RBC: x / WBC x   Sq Epi: x / Non Sq Epi: x / Bacteria: x      CAPILLARY BLOOD GLUCOSE       Patient is a 82y old  Female who presents with a chief complaint of COPD exacerbation (30 Jul 2023 11:54)      Subjective:  INTERVAL HPI/OVERNIGHT EVENTS: Patient seen and examined at bedside. No overnight events occurred. Patient continues to have productive cough but states that she thinks it is a little better. Denies fevers, chills, headache, lightheadedness, chest pain, dyspnea, abdominal pain,         REVIEW OF SYSTEMS:  CONSTITUTIONAL: No fever or chills  HEENT:  No headache, no sore throat  RESPIRATORY: No wheezing, or shortness of breath. +cough  CARDIOVASCULAR: No chest pain, palpitations  GASTROINTESTINAL: No abd pain, nausea, vomiting, or diarrhea  GENITOURINARY: No dysuria, frequency, or hematuria  NEUROLOGICAL: no focal weakness or dizziness  MUSCULOSKELETAL: no myalgias     Objective:  Vital Signs Last 24 Hrs  T(C): 36.8 (31 Jul 2023 13:05), Max: 36.8 (31 Jul 2023 04:46)  T(F): 98.3 (31 Jul 2023 13:05), Max: 98.3 (31 Jul 2023 13:05)  HR: 60 (31 Jul 2023 13:05) (60 - 60)  BP: 150/57 (31 Jul 2023 13:05) (150/57 - 179/77)  BP(mean): --  RR: 18 (31 Jul 2023 13:05) (18 - 19)  SpO2: 94% (31 Jul 2023 13:05) (94% - 96%)    Parameters below as of 31 Jul 2023 13:05  Patient On (Oxygen Delivery Method): nasal cannula  O2 Flow (L/min): 2      GENERAL: NAD, lying in bed comfortably  HEAD:  Atraumatic, Normocephalic  EYES: EOMI, PERRL, conjunctiva and sclera clear  ENT: Moist mucous membranes  NECK: Supple, No JVD  CHEST/LUNG: B/l wheezing, R>L. Unlabored respirations  HEART: Regular rate and rhythm; No murmurs,   no tachy   ABDOMEN: Bowel sounds present; Soft, Nontender, Nondistended.   EXTREMITIES:  2+ Peripheral Pulses. No clubbing, cyanosis. Mild b/l LE edema  NERVOUS SYSTEM:  Alert & Oriented X3, speech clear. No deficits   MSK: FROM all 4 extremities, full and equal strength  SKIN: warm, dry    MEDICATIONS  (STANDING):  acetylcysteine 20%  Inhalation 4 milliLiter(s) Inhalation two times a day  albuterol/ipratropium for Nebulization 3 milliLiter(s) Nebulizer every 6 hours  aMIOdarone    Tablet 200 milliGRAM(s) Oral daily  amLODIPine   Tablet 5 milliGRAM(s) Oral daily  apixaban 2.5 milliGRAM(s) Oral two times a day  aspirin  chewable 81 milliGRAM(s) Oral daily  atorvastatin 10 milliGRAM(s) Oral at bedtime  budesonide 160 MICROgram(s)/formoterol 4.5 MICROgram(s) Inhaler 2 Puff(s) Inhalation two times a day  carvedilol 12.5 milliGRAM(s) Oral every 12 hours  fenofibrate Tablet 48 milliGRAM(s) Oral daily  ferrous    sulfate 325 milliGRAM(s) Oral daily  multivitamin 1 Tablet(s) Oral daily  nystatin Powder 1 Application(s) Topical two times a day  sertraline 25 milliGRAM(s) Oral daily  torsemide 5 milliGRAM(s) Oral daily    MEDICATIONS  (PRN):  acetaminophen     Tablet .. 650 milliGRAM(s) Oral daily PRN Moderate Pain (4 - 6)  aluminum hydroxide/magnesium hydroxide/simethicone Suspension 30 milliLiter(s) Oral every 4 hours PRN Dyspepsia  melatonin 3 milliGRAM(s) Oral at bedtime PRN Insomnia      Allergies    Procardia (Other)  NIFEdipine (Unknown)  fluoxetine (Unknown)    Intolerances    oxycodone (Other)          LABS:                        10.0   7.93  )-----------( 233      ( 31 Jul 2023 07:12 )             31.8     CBC Full  -  ( 31 Jul 2023 07:12 )  WBC Count : 7.93 K/uL  Hemoglobin : 10.0 g/dL  Hematocrit : 31.8 %  Platelet Count - Automated : 233 K/uL  Mean Cell Volume : 99.1 fl  Mean Cell Hemoglobin : 31.2 pg  Mean Cell Hemoglobin Concentration : 31.4 gm/dL  Auto Neutrophil # : x  Auto Lymphocyte # : x  Auto Monocyte # : x  Auto Eosinophil # : x  Auto Basophil # : x  Auto Neutrophil % : x  Auto Lymphocyte % : x  Auto Monocyte % : x  Auto Eosinophil % : x  Auto Basophil % : x    31 Jul 2023 07:12    145    |  102    |  32     ----------------------------<  91     3.4     |  40     |  0.99     Ca    8.6        31 Jul 2023 07:12        Urinalysis Basic - ( 31 Jul 2023 07:12 )    Color: x / Appearance: x / SG: x / pH: x  Gluc: 91 mg/dL / Ketone: x  / Bili: x / Urobili: x   Blood: x / Protein: x / Nitrite: x   Leuk Esterase: x / RBC: x / WBC x   Sq Epi: x / Non Sq Epi: x / Bacteria: x      CAPILLARY BLOOD GLUCOSE

## 2023-08-01 LAB
ANION GAP SERPL CALC-SCNC: 4 MMOL/L — LOW (ref 5–17)
BUN SERPL-MCNC: 28 MG/DL — HIGH (ref 7–23)
CALCIUM SERPL-MCNC: 9 MG/DL — SIGNIFICANT CHANGE UP (ref 8.5–10.1)
CHLORIDE SERPL-SCNC: 101 MMOL/L — SIGNIFICANT CHANGE UP (ref 96–108)
CO2 SERPL-SCNC: 38 MMOL/L — HIGH (ref 22–31)
CREAT SERPL-MCNC: 0.92 MG/DL — SIGNIFICANT CHANGE UP (ref 0.5–1.3)
EGFR: 61 ML/MIN/1.73M2 — SIGNIFICANT CHANGE UP
GLUCOSE SERPL-MCNC: 145 MG/DL — HIGH (ref 70–99)
HCT VFR BLD CALC: 34.9 % — SIGNIFICANT CHANGE UP (ref 34.5–45)
HGB BLD-MCNC: 11 G/DL — LOW (ref 11.5–15.5)
MCHC RBC-ENTMCNC: 31.2 PG — SIGNIFICANT CHANGE UP (ref 27–34)
MCHC RBC-ENTMCNC: 31.5 GM/DL — LOW (ref 32–36)
MCV RBC AUTO: 98.9 FL — SIGNIFICANT CHANGE UP (ref 80–100)
NRBC # BLD: 0 /100 WBCS — SIGNIFICANT CHANGE UP (ref 0–0)
PLATELET # BLD AUTO: 253 K/UL — SIGNIFICANT CHANGE UP (ref 150–400)
POTASSIUM SERPL-MCNC: 4.1 MMOL/L — SIGNIFICANT CHANGE UP (ref 3.5–5.3)
POTASSIUM SERPL-SCNC: 4.1 MMOL/L — SIGNIFICANT CHANGE UP (ref 3.5–5.3)
RBC # BLD: 3.53 M/UL — LOW (ref 3.8–5.2)
RBC # FLD: 14.1 % — SIGNIFICANT CHANGE UP (ref 10.3–14.5)
SODIUM SERPL-SCNC: 143 MMOL/L — SIGNIFICANT CHANGE UP (ref 135–145)
WBC # BLD: 9.97 K/UL — SIGNIFICANT CHANGE UP (ref 3.8–10.5)
WBC # FLD AUTO: 9.97 K/UL — SIGNIFICANT CHANGE UP (ref 3.8–10.5)

## 2023-08-01 PROCEDURE — 99233 SBSQ HOSP IP/OBS HIGH 50: CPT | Mod: GC

## 2023-08-01 RX ORDER — APIXABAN 2.5 MG/1
5 TABLET, FILM COATED ORAL
Refills: 0 | Status: DISCONTINUED | OUTPATIENT
Start: 2023-08-01 | End: 2023-08-03

## 2023-08-01 RX ORDER — POTASSIUM CHLORIDE 20 MEQ
40 PACKET (EA) ORAL EVERY 4 HOURS
Refills: 0 | Status: COMPLETED | OUTPATIENT
Start: 2023-08-01 | End: 2023-08-01

## 2023-08-01 RX ORDER — FUROSEMIDE 40 MG
40 TABLET ORAL ONCE
Refills: 0 | Status: COMPLETED | OUTPATIENT
Start: 2023-08-01 | End: 2023-08-01

## 2023-08-01 RX ORDER — HYDRALAZINE HCL 50 MG
25 TABLET ORAL THREE TIMES A DAY
Refills: 0 | Status: DISCONTINUED | OUTPATIENT
Start: 2023-08-01 | End: 2023-08-02

## 2023-08-01 RX ADMIN — Medication 3 MILLILITER(S): at 12:42

## 2023-08-01 RX ADMIN — Medication 1 TABLET(S): at 10:39

## 2023-08-01 RX ADMIN — Medication 25 MILLIGRAM(S): at 13:18

## 2023-08-01 RX ADMIN — SERTRALINE 25 MILLIGRAM(S): 25 TABLET, FILM COATED ORAL at 10:39

## 2023-08-01 RX ADMIN — AMIODARONE HYDROCHLORIDE 200 MILLIGRAM(S): 400 TABLET ORAL at 05:28

## 2023-08-01 RX ADMIN — CARVEDILOL PHOSPHATE 12.5 MILLIGRAM(S): 80 CAPSULE, EXTENDED RELEASE ORAL at 17:17

## 2023-08-01 RX ADMIN — Medication 325 MILLIGRAM(S): at 10:39

## 2023-08-01 RX ADMIN — Medication 40 MILLIGRAM(S): at 17:18

## 2023-08-01 RX ADMIN — ATORVASTATIN CALCIUM 10 MILLIGRAM(S): 80 TABLET, FILM COATED ORAL at 21:25

## 2023-08-01 RX ADMIN — Medication 3 MILLILITER(S): at 00:32

## 2023-08-01 RX ADMIN — Medication 5 MILLIGRAM(S): at 05:29

## 2023-08-01 RX ADMIN — Medication 25 MILLIGRAM(S): at 09:29

## 2023-08-01 RX ADMIN — Medication 3 MILLILITER(S): at 19:09

## 2023-08-01 RX ADMIN — Medication 3 MILLILITER(S): at 07:13

## 2023-08-01 RX ADMIN — Medication 81 MILLIGRAM(S): at 10:39

## 2023-08-01 RX ADMIN — BUDESONIDE AND FORMOTEROL FUMARATE DIHYDRATE 2 PUFF(S): 160; 4.5 AEROSOL RESPIRATORY (INHALATION) at 06:00

## 2023-08-01 RX ADMIN — AMLODIPINE BESYLATE 5 MILLIGRAM(S): 2.5 TABLET ORAL at 05:30

## 2023-08-01 RX ADMIN — Medication 40 MILLIEQUIVALENT(S): at 10:39

## 2023-08-01 RX ADMIN — Medication 4 MILLILITER(S): at 07:11

## 2023-08-01 RX ADMIN — Medication 4 MILLILITER(S): at 19:09

## 2023-08-01 RX ADMIN — APIXABAN 2.5 MILLIGRAM(S): 2.5 TABLET, FILM COATED ORAL at 05:28

## 2023-08-01 RX ADMIN — APIXABAN 5 MILLIGRAM(S): 2.5 TABLET, FILM COATED ORAL at 17:22

## 2023-08-01 RX ADMIN — Medication 48 MILLIGRAM(S): at 10:39

## 2023-08-01 RX ADMIN — Medication 40 MILLIEQUIVALENT(S): at 13:21

## 2023-08-01 RX ADMIN — NYSTATIN CREAM 1 APPLICATION(S): 100000 CREAM TOPICAL at 06:20

## 2023-08-01 RX ADMIN — Medication 40 MILLIGRAM(S): at 05:29

## 2023-08-01 RX ADMIN — BUDESONIDE AND FORMOTEROL FUMARATE DIHYDRATE 2 PUFF(S): 160; 4.5 AEROSOL RESPIRATORY (INHALATION) at 19:40

## 2023-08-01 RX ADMIN — Medication 25 MILLIGRAM(S): at 21:26

## 2023-08-01 RX ADMIN — NYSTATIN CREAM 1 APPLICATION(S): 100000 CREAM TOPICAL at 17:22

## 2023-08-01 RX ADMIN — CARVEDILOL PHOSPHATE 12.5 MILLIGRAM(S): 80 CAPSULE, EXTENDED RELEASE ORAL at 05:29

## 2023-08-01 NOTE — PHYSICAL THERAPY INITIAL EVALUATION ADULT - GENERAL OBSERVATIONS, REHAB EVAL
Patient chart reviewed, events noted. Patient received seated in chair +NC (3 L/min O2), +Primafit, +remote tele, in NAD. Agreeable to PT at this time.

## 2023-08-01 NOTE — PHYSICAL THERAPY INITIAL EVALUATION ADULT - NSACTIVITYREC_GEN_A_PT
OOB to chair with supervision; Ambulate to room/bathroom with RW and supervision monitoring SpO2 and breathing; home exercise program for deep breathing/BLE strengthening and Incentive Spirometry.

## 2023-08-01 NOTE — PROGRESS NOTE ADULT - PROBLEM SELECTOR PLAN 4
Chronic. Pt on home eliquis (2.5mg, BID)  - EKG on admission - 60bpm. Atrial-paced rhythm, QT/QTc 526/526. LBBB  - continue amiodarone, eliquis, aspirin.  - cardio consulted (Dr. Nguyễn), recs appreciated Chronic. Pt on home eliquis (2.5mg, BID)  - EKG on admission - 60bpm. Atrial-paced rhythm, QT/QTc 526/526. LBBB  - continue amiodarone, Eliquis, aspirin.  - cardio consulted (Dr. Nguyễn),

## 2023-08-01 NOTE — PHARMACOTHERAPY INTERVENTION NOTE - COMMENTS
Patient is an 82y F being treated with ceftriaxone + azithromycin empirically for pneumonia. Recent QTc > 500 on ECG. Discussed with Dr. Argueta, recommended discontinuing azithromycin and ordering doxycyline 100 mg PO Q12h pending urine legionella. Accepted and order entered.    Katarina Farooq, PharmD  Clinical Pharmacy Specialist [phone]  Available on Microsoft Teams
Patient with a history of (Afib) currently ordered for Eliquis 2.5 BID. Discussed with cardio Dr. Nguyễn since patient does not meet criteria for dose reduction of Eliquis (Serum creatinine =1.5 mg/dL (133 micromole/L) and either =78 years of age or body weight =60 k.5 mg twice daily). MD would like to increase dose to Eliquis 5 mg BID. Order adjusted per discussion.     Emilia Mahmood, PharmD [phone]  Clinical Pharmacy Specialist  Available on Microsoft Teams

## 2023-08-01 NOTE — PHYSICAL THERAPY INITIAL EVALUATION ADULT - WORK/LEISURE ACTIVITY, REHAB EVAL
[facility]         Critical Care Progress Note    Patient: Nina Prietor Date of Service: 11/17/21   female, 77year old  Admit Date: 11/7/2021   Attending: Brent Rosario MD      Patient Description:  77year old year old female admitted with Acute hypoxemic respiratory failure due to COVID-19 (CMS/HCC)    ICU Timeline:  11/9/2021:  Patient is more tachypneic today. No changes in work of breathing despite being on BiPAP/CPAP. Gram-positive bacilli in 1 of the blood culture is likely a contaminant, blood cultures repeated today. Solu-Medrol dose increased. Continue Lovenox 40 mg b.i.d. for DVT prophylaxis. Lantus and sliding scale insulin adjusted. Continue remdesivir and tacrolimus. Precedex for anxiety. 11/10/2021: stable respiratory status. Wean off Precedex. Ok to use PRN Xanax. Transplant medications adjusted as recommended by Transplant Center. Lantus adjusted. Change status to IMCU.  11/11/2021:  Continue steroids and supportive treatment. Seroquel added to help with anxiety/delirium. Insulin drip.   11/12/2021: Resume PTA antihypertensives. CPAP as tolerated. DC vancomycin. Continue cefepime. 11/13/2021: Hypertensive intermittently. Clonidine dose increased. Amlodipine resumed. Start hydrochlorothiazide. Has been off of the Precedex overnight. 11/14/2021: Improving respiratory status. Poor appetite. Hypernatremia likely secondary to dehydration. On D5W.   11/15/2021: More somnolent this am, likely secondary to Seroquel. Discontinued. Continue supportive care with high flow and CPAP. D5W for hypernatremia. Encourage oral intake. 11/16/2021: Stable respiratory status. Not worsening but also not improving. Poor appetite. 11/17/2021: Stable respiratory status. Increased oral intake. Ok to transfer to the floor.      Assessment:  Active Hospital Problems    Diagnosis    â¢ Acute hypoxemic respiratory failure due to COVID-19 (CMS/HCC)    â¢ Lactic acidosis    â¢ Acute kidney injury due to severe acute respiratory syndrome coronavirus 2 (SARS-CoV-2) disease (CMS/[facility])    â¢ Hepatitis B antibody positive    â¢ Sepsis due to COVID-19 (CMS/[facility])    â¢ Septic shock (CMS/HCC)    â¢ DM (diabetes mellitus), type 2 (CMS/[facility])    â¢ Immunosuppression (CMS/HCC)    â¢ S/P kidney transplant      Plan:   CNS:  Delirium prevention; PT/OT; Pain management  Cardiovascular:  Close hemodynamic monitoring. Pulmonary: Supplemental oxygen to maintain SpO2 88-92%, Solumedrol tapering dose. GI:  Diet as tolerated. GI PPx:  Pepcid  Renal:  Avoid nephrotoxins, continue Prograf. ID: Cefepime. Hematology/Oncology/DVT PPx:  Lovenox 40 mg daily. Endocrine:  Maintain euglycemia (Blood glucose level between 140-180 mg/dl); SSI +/- Lantus per [facility]    Code Status: Full Resuscitation  Patient/Family Communication: Discussed with patient's daughter. Disposition: Ok to transfer to floor. I have personally examined the patient and reviewed all pertinent data. Evaluation and management / Critical Care time: 27 minutes were spent in the care of this patient. Direct, face to face contact, including monitoring of multiple complex databases, coordinating with nursing/RT/pharmacy, and formulating a treatment plan. This does not include time spent on procedures and teaching. Patient has a high probability of sudden and significant deterioration requiring urgent intervention.     Theresa Watts MD  11/17/2021  4:12 PM      CURRENT MEDICATIONS      â¢ albuterol  2 puff Inhalation 4x Daily Resp   â¢ insulin lispro   Subcutaneous 4 times per day   â¢ insulin glargine  15 Units Subcutaneous Nightly   â¢ potassium CHLORIDE  20 mEq Oral Q4H   â¢ enoxaparin  40 mg Subcutaneous Q24H   â¢ polyethylene glycol  17 g Per NG tube BID   â¢ senna  1 tablet Oral Nightly   â¢ cloNIDine  0.2 mg Oral 3 times per day   â¢ hydrochlorothiazide  25 mg Oral BID   â¢ amLODIPine  10 mg Oral Daily   â¢ cefepime (MAXIPIME) extended infusion IVPB  2,000 mg Intravenous Q12H   â¢ melatonin  6 mg Oral Nightly   â¢ TACROlimus  0.5 mg Oral Daily Tx   â¢ entecavir  0.5 mg Oral Daily   â¢ famotidine  20 mg Oral Daily   â¢ methylPREDNISolone (SOLU-Medrol) IV  80 mg Intravenous Q6H    Followed by   Zayra Benites ON 2021] methylPREDNISolone (SOLU-Medrol) IV  60 mg Intravenous Q6H    Followed by   Zayra Benites ON 2021] methylPREDNISolone (SOLU-Medrol) IV  40 mg Intravenous Q6H    Followed by   Zayra Benites ON 2021] methylPREDNISolone (SOLU-Medrol) IV  20 mg Intravenous Q6H    Followed by   Zayra Benites ON 2021] predniSONE  40 mg Oral Daily    Followed by   Zayra Benites ON 2021] predniSONE  20 mg Oral Daily    Followed by   Zayra Benites ON 12/3/2021] predniSONE  10 mg Oral Daily   â¢ sodium chloride (PF)  2 mL Intracatheter 2 times per day   â¢ Potassium Standard Replacement Protocol   Does not apply See Admin Instructions   â¢ Phosphorus Standard Replacement Protocol   Does not apply See Admin Instructions   â¢ Magnesium Standard Replacement Protocol   Does not apply See Admin Instructions   â¢ loratadine  10 mg Oral QAM AC   â¢ calcium carbonate-vitamin D  1 tablet Oral BID WC       CONTINUOUS INFUSIONS  â¢ sodium chloride 0.9% infusion 10 mL/hr at 21 1500   â¢ sodium chloride 0.9% infusion         PRN MEDICATIONS  dextrose, dextrose, glucagon, dextrose, dextrose, ALPRAZolam, hydrOXYzine, hydrALAZINE, labetalol, sodium chloride, sodium chloride, sodium chloride, ondansetron **OR** ondansetron, acetaminophen, docusate sodium-sennosides    PHYSICAL EXAM    Vital 24 Hour Range Most Recent Value   Temperature Temp  Min: 97.7 Â°F (36.5 Â°C)  Max: 98.4 Â°F (36.9 Â°C) 98.4 Â°F (36.9 Â°C)   Pulse Pulse  Min: 48  Max: 96 62   Respiratory Resp  Min: 21  Max: 44 (!) 27   Blood Pressure BP  Min: 126/56  Max: 164/64 135/56   Pulse Oximetry SpO2  Min: 88 %  Max: 97 % 91 %   Art.  BP No data recorded     O2 O2 Flow Rate (L/min)  Av.1 L/min  Min: 10 L/min   Min taken time: 21 0900  Max: 15 L/min   Max taken time: 11/17/21 0415       Weight    11/11/21 0551 11/13/21 0529 11/14/21 0212 11/15/21 0415   Weight: 57 kg (125 lb 10.6 oz) 57.8 kg (127 lb 6.8 oz) 55 kg (121 lb 4.1 oz) 55.6 kg (122 lb 9.2 oz)       Constitutional Laying in bed without acute distress. Eyes Pupils are equal and reactive to light Sclerae and conjunctivae are normal.   Head/Face Head is normocephalic and atraumatic. Face is symmetric. ENT Oral mucosa moist and pink   Neck Neck supple., There is no crepitus. , Trachea midline. and No masses or lymphadenopathy. Respiratory Chest: Symmetric and Nontender    Lungs: Clear to auscultation bilaterally and Decreased breathsounds at the bases   Cardiovascular  Regular rate and rhythm and S1, S2 present   Abdomen Soft and Nontender   Musculoskeletal   no rashes, lesions, or ulcers noted   Neurological No focal neurological deficit noted. Generalized weakness. Moves all extremities.      LDA reviewed    LABS    ABGs:    No results found    Laboratory Results:    Recent Labs   Lab 11/17/21  1347 11/17/21  0541 11/16/21 2010 11/16/21  0459 11/15/21  0525 11/14/21  1334 11/14/21  1334 11/14/21  0526 11/14/21  0526 11/13/21  0326 11/13/21  0326 11/12/21  0405 11/12/21  0405 11/11/21  1845 11/11/21  1845 11/11/21  0558 11/11/21  0558   SODIUM  --  144  --  140 152*  --  152*  --  156*   < > 151*   < > 149*   < > 145   < > 140   POTASSIUM 3.6 3.2* 3.9 3.3* 3.4   < > 4.4   < > 3.5   < > 4.0   < > 4.6   < > 4.2   < > 4.0   CHLORIDE  --  113*  --  111* 121*  --  124*  --  128*   < > 124*   < > 123*   < > 115*   < > 110*   CO2  --  24  --  23 25  --  21  --  22   < > 23   < > 24   < > 21   < > 19*   BUN  --  37*  --  30* 30*  --  29*  --  29*   < > 40*   < > 36*   < > 33*   < > 40*   CREATININE  --  0.63  --  0.66 0.71  --  0.65  --  0.70   < > 0.86   < > 0.88   < > 0.78   < > 0.98*   GLUCOSE  --  61*  --  136* 153*  --  247*  --  89   < > 160*   < > 86   < > 282*   < > 402*   PHOS  --   -- --   --   --   --   --   --   --   --  2.8  --   --   --  2.4  --   --    AST  --   --   --  43* 31  --   --   --  37  --  45*  --  36  --   --    < > 44*   GPT  --   --   --  37 31  --   --   --  33  --  32  --  27  --   --    < > 32   BILIRUBIN  --   --   --  0.5 0.5  --   --   --  0.6  --  0.4  --  0.4  --   --    < > 0.5   WBC  --   --   --  10.6  --   --   --   --  7.6  --  6.0  --  7.7  --   --   --   --    HGB  --   --   --  10.3*  --   --   --   --  10.6*  --  9.9*  --  9.9*  --   --   --   --    HCT  --   --   --  31.1*  --   --   --   --  32.4*  --  30.9*  --  30.3*  --   --   --   --    PLT  --   --   --  154  --   --   --   --  195  --  183  --  198  --   --   --   --    PTT  --   --   --   --   --   --   --   --   --   --   --   --   --   --   --   --  31*    < > = values in this interval not displayed. IMAGING & OTHER STUDIES    Personally reviewed. needs device

## 2023-08-01 NOTE — PROGRESS NOTE ADULT - PROBLEM SELECTOR PROBLEM 1
COPD exacerbation Humira Pregnancy And Lactation Text: This medication is Pregnancy Category B and is considered safe during pregnancy. It is unknown if this medication is excreted in breast milk.

## 2023-08-01 NOTE — PHYSICAL THERAPY INITIAL EVALUATION ADULT - NSPTDISCHREC_GEN_A_CORE
Return to [facility] with home PT; pt will require RW due to decrease strength and balance during transfers and ambulation. Pt reports she uses rollator at [facility] and had a RW at her private home due to small space./Home PT
No

## 2023-08-01 NOTE — PHYSICAL THERAPY INITIAL EVALUATION ADULT - PERTINENT HX OF CURRENT PROBLEM, REHAB EVAL
Hide Additional Notes?: No Detail Level: Simple As per EMR "83 y/o F pmhx of COPD (not on O2), Afib (on Eliquis), s/p pacemaker, HTN, CKD, HLD, Anemia, HFpEF, small bowel CA (s/p resection) presented from [facility] facility ([place]) with cough & shortness of breath x 3 days."

## 2023-08-01 NOTE — PROGRESS NOTE ADULT - SUBJECTIVE AND OBJECTIVE BOX
Patient is a 82y old  Female who presents with a chief complaint of COPD exacerbation (01 Aug 2023 09:13)      Subjective:  INTERVAL HPI/OVERNIGHT EVENTS: Patient seen and examined at bedside. No overnight events occurred. Patient has no complaints at this time. Cough improved. Denies fevers, chills, headache, lightheadedness, chest pain, dyspnea, abdominal pain, n/v/d/c.    MEDICATIONS  (STANDING):  acetylcysteine 20%  Inhalation 4 milliLiter(s) Inhalation two times a day  albuterol/ipratropium for Nebulization 3 milliLiter(s) Nebulizer every 6 hours  aMIOdarone    Tablet 200 milliGRAM(s) Oral daily  amLODIPine   Tablet 5 milliGRAM(s) Oral daily  apixaban 2.5 milliGRAM(s) Oral two times a day  aspirin  chewable 81 milliGRAM(s) Oral daily  atorvastatin 10 milliGRAM(s) Oral at bedtime  budesonide 160 MICROgram(s)/formoterol 4.5 MICROgram(s) Inhaler 2 Puff(s) Inhalation two times a day  carvedilol 12.5 milliGRAM(s) Oral every 12 hours  fenofibrate Tablet 48 milliGRAM(s) Oral daily  ferrous    sulfate 325 milliGRAM(s) Oral daily  hydrALAZINE 25 milliGRAM(s) Oral three times a day  multivitamin 1 Tablet(s) Oral daily  nystatin Powder 1 Application(s) Topical two times a day  potassium chloride    Tablet ER 40 milliEquivalent(s) Oral every 4 hours  predniSONE   Tablet 40 milliGRAM(s) Oral daily  sertraline 25 milliGRAM(s) Oral daily  torsemide 5 milliGRAM(s) Oral daily    MEDICATIONS  (PRN):  acetaminophen     Tablet .. 650 milliGRAM(s) Oral daily PRN Moderate Pain (4 - 6)  aluminum hydroxide/magnesium hydroxide/simethicone Suspension 30 milliLiter(s) Oral every 4 hours PRN Dyspepsia  melatonin 3 milliGRAM(s) Oral at bedtime PRN Insomnia      Allergies    Procardia (Other)  NIFEdipine (Unknown)  fluoxetine (Unknown)    Intolerances    oxycodone (Other)      REVIEW OF SYSTEMS:  CONSTITUTIONAL: No fever or chills  HEENT:  No headache, no sore throat  RESPIRATORY: No wheezing, or shortness of breath +cough (improved from yesterday)  CARDIOVASCULAR: No chest pain, palpitations  GASTROINTESTINAL: No abd pain, nausea, vomiting, or diarrhea  GENITOURINARY: No dysuria, frequency, or hematuria  NEUROLOGICAL: no focal weakness or dizziness  MUSCULOSKELETAL: no myalgias     Objective:  Vital Signs Last 24 Hrs  T(C): 36.8 (01 Aug 2023 11:24), Max: 36.8 (31 Jul 2023 13:05)  T(F): 98.2 (01 Aug 2023 11:24), Max: 98.3 (31 Jul 2023 13:05)  HR: 94 (01 Aug 2023 11:24) (59 - 94)  BP: 148/89 (01 Aug 2023 12:38) (139/83 - 219/79)  BP(mean): --  RR: 19 (01 Aug 2023 11:24) (18 - 19)  SpO2: 93% (01 Aug 2023 11:24) (92% - 98%)    Parameters below as of 01 Aug 2023 11:24  Patient On (Oxygen Delivery Method): nasal cannula  O2 Flow (L/min): 3      GENERAL: NAD, lying in bed comfortably  HEAD:  Atraumatic, Normocephalic  EYES: EOMI, PERRL, conjunctiva and sclera clear  ENT: Moist mucous membranes  NECK: Supple, No JVD  CHEST/LUNG: Mild b/l expiratory wheezing, improved from yesterday. Unlabored respirations  HEART: Regular rate and rhythm; No murmurs, rubs, or gallops  ABDOMEN: Bowel sounds present; Soft, Nontender, Nondistended. No hepatomegaly  EXTREMITIES:  2+ Peripheral Pulses. No clubbing, cyanosis, or edema  NERVOUS SYSTEM:  Alert & Oriented X3, speech clear. No deficits   MSK: FROM all 4 extremities, full and equal strength  SKIN: warm, dry    LABS:                        11.0   9.97  )-----------( 253      ( 01 Aug 2023 10:07 )             34.9     CBC Full  -  ( 01 Aug 2023 10:07 )  WBC Count : 9.97 K/uL  Hemoglobin : 11.0 g/dL  Hematocrit : 34.9 %  Platelet Count - Automated : 253 K/uL  Mean Cell Volume : 98.9 fl  Mean Cell Hemoglobin : 31.2 pg  Mean Cell Hemoglobin Concentration : 31.5 gm/dL  Auto Neutrophil # : x  Auto Lymphocyte # : x  Auto Monocyte # : x  Auto Eosinophil # : x  Auto Basophil # : x  Auto Neutrophil % : x  Auto Lymphocyte % : x  Auto Monocyte % : x  Auto Eosinophil % : x  Auto Basophil % : x    01 Aug 2023 10:07    143    |  101    |  28     ----------------------------<  145    4.1     |  38     |  0.92     Ca    9.0        01 Aug 2023 10:07        Urinalysis Basic - ( 01 Aug 2023 10:07 )    Color: x / Appearance: x / SG: x / pH: x  Gluc: 145 mg/dL / Ketone: x  / Bili: x / Urobili: x   Blood: x / Protein: x / Nitrite: x   Leuk Esterase: x / RBC: x / WBC x   Sq Epi: x / Non Sq Epi: x / Bacteria: x      CAPILLARY BLOOD GLUCOSE        Consultant(s) Notes Reviewed:  [x] YES  [ ] NO     Patient is a 82y old  Female who presents with a chief complaint of COPD exacerbation (01 Aug 2023 09:13)      Subjective:  INTERVAL HPI/OVERNIGHT EVENTS: Patient seen and examined at bedside. No overnight events occurred. Patient has no complaints at this time. Cough improved. Denies fevers, chills, headache, lightheadedness, chest pain, dyspnea, abdominal pain .         REVIEW OF SYSTEMS:  CONSTITUTIONAL: No fever or chills  HEENT:  No headache, no sore throat  RESPIRATORY: No wheezing, or shortness of breath +cough (improved from yesterday)  CARDIOVASCULAR: No chest pain, palpitations  GASTROINTESTINAL: No abd pain, nausea, vomiting, or diarrhea  GENITOURINARY: No dysuria, frequency, or hematuria  NEUROLOGICAL: no focal weakness or dizziness  MUSCULOSKELETAL: no myalgias     Objective:  Vital Signs Last 24 Hrs  T(C): 36.8 (01 Aug 2023 11:24), Max: 36.8 (31 Jul 2023 13:05)  T(F): 98.2 (01 Aug 2023 11:24), Max: 98.3 (31 Jul 2023 13:05)  HR: 94 (01 Aug 2023 11:24) (59 - 94)  BP: 148/89 (01 Aug 2023 12:38) (139/83 - 219/79)  BP(mean): --  RR: 19 (01 Aug 2023 11:24) (18 - 19)  SpO2: 93% (01 Aug 2023 11:24) (92% - 98%)    Parameters below as of 01 Aug 2023 11:24  Patient On (Oxygen Delivery Method): nasal cannula  O2 Flow (L/min): 3      GENERAL: NAD, lying in bed comfortably  HEAD:  Atraumatic, Normocephalic  EYES: EOMI, PERRL, conjunctiva and sclera clear  ENT: Moist mucous membranes  NECK: Supple, No JVD  CHEST/LUNG: Mild b/l expiratory wheezing, no rale   HEART: Regular rate and rhythm; No murmurs , no tachy   ABDOMEN: Bowel sounds present; Soft, Nontender, Nondistended.   EXTREMITIES:  2+ Peripheral Pulses. No clubbing, cyanosis, or edema  NERVOUS SYSTEM:  Alert & Oriented X3, sensory/ motor intact   MSK: FROM all 4 extremities, full and equal strength  SKIN: warm, dry      MEDICATIONS  (STANDING):  acetylcysteine 20%  Inhalation 4 milliLiter(s) Inhalation two times a day  albuterol/ipratropium for Nebulization 3 milliLiter(s) Nebulizer every 6 hours  aMIOdarone    Tablet 200 milliGRAM(s) Oral daily  amLODIPine   Tablet 5 milliGRAM(s) Oral daily  apixaban 2.5 milliGRAM(s) Oral two times a day  aspirin  chewable 81 milliGRAM(s) Oral daily  atorvastatin 10 milliGRAM(s) Oral at bedtime  budesonide 160 MICROgram(s)/formoterol 4.5 MICROgram(s) Inhaler 2 Puff(s) Inhalation two times a day  carvedilol 12.5 milliGRAM(s) Oral every 12 hours  fenofibrate Tablet 48 milliGRAM(s) Oral daily  ferrous    sulfate 325 milliGRAM(s) Oral daily  hydrALAZINE 25 milliGRAM(s) Oral three times a day  multivitamin 1 Tablet(s) Oral daily  nystatin Powder 1 Application(s) Topical two times a day  potassium chloride    Tablet ER 40 milliEquivalent(s) Oral every 4 hours  predniSONE   Tablet 40 milliGRAM(s) Oral daily  sertraline 25 milliGRAM(s) Oral daily  torsemide 5 milliGRAM(s) Oral daily    MEDICATIONS  (PRN):  acetaminophen     Tablet .. 650 milliGRAM(s) Oral daily PRN Moderate Pain (4 - 6)  aluminum hydroxide/magnesium hydroxide/simethicone Suspension 30 milliLiter(s) Oral every 4 hours PRN Dyspepsia  melatonin 3 milliGRAM(s) Oral at bedtime PRN Insomnia      Allergies    Procardia (Other)  NIFEdipine (Unknown)  fluoxetine (Unknown)    Intolerances    oxycodone (Other)    LABS:                        11.0   9.97  )-----------( 253      ( 01 Aug 2023 10:07 )             34.9     CBC Full  -  ( 01 Aug 2023 10:07 )  WBC Count : 9.97 K/uL  Hemoglobin : 11.0 g/dL  Hematocrit : 34.9 %  Platelet Count - Automated : 253 K/uL  Mean Cell Volume : 98.9 fl  Mean Cell Hemoglobin : 31.2 pg  Mean Cell Hemoglobin Concentration : 31.5 gm/dL  Auto Neutrophil # : x  Auto Lymphocyte # : x  Auto Monocyte # : x  Auto Eosinophil # : x  Auto Basophil # : x  Auto Neutrophil % : x  Auto Lymphocyte % : x  Auto Monocyte % : x  Auto Eosinophil % : x  Auto Basophil % : x    01 Aug 2023 10:07    143    |  101    |  28     ----------------------------<  145    4.1     |  38     |  0.92     Ca    9.0        01 Aug 2023 10:07        Urinalysis Basic - ( 01 Aug 2023 10:07 )    Color: x / Appearance: x / SG: x / pH: x  Gluc: 145 mg/dL / Ketone: x  / Bili: x / Urobili: x   Blood: x / Protein: x / Nitrite: x   Leuk Esterase: x / RBC: x / WBC x   Sq Epi: x / Non Sq Epi: x / Bacteria: x      CAPILLARY BLOOD GLUCOSE        Consultant(s) Notes Reviewed:  [x] YES  [ ] NO

## 2023-08-01 NOTE — PROGRESS NOTE ADULT - PROBLEM SELECTOR PLAN 5
Chronic. BP on admission 143/83.  - continue home carvedilol w/ hold parameters.  - amlodipine 5mg w/ hold parameters  - adding hydralazine 25 mg TID.  - cardio consulted (Dr. Nguyễn), recs appreciated Chronic. BP on admission 143/83.  - continue home carvedilol w/ hold parameters.  - amlodipine 5mg w/ hold parameters  - adding hydralazine 25 mg TID.  - cardio consulted (Dr. Nguyễn),

## 2023-08-01 NOTE — PROGRESS NOTE ADULT - PROBLEM SELECTOR PLAN 2
Last TTE (5/26/22) shows LVEF 55-60%  - repeat TTE on 7/27/23 EF = 54% and evidence of diastolic dysfunction  - cont. home torsemide 5mg  - cardio consulted (Dr. Nguyễn), recs appreciated   IV lasix instead of torsemide???  Trend proBNP>??? Last TTE (5/26/22) shows LVEF 55-60%  - repeat TTE on 7/27/23 EF = 54% and evidence of moderate (grade 2) left ventricular diastolic dysfunction  - cont. home torsemide 5mg  - cardio consulted (Dr. Nguyễn), recs appreciated   IV lasix instead of torsemide???  Trend proBNP>??? Last TTE (5/26/22) shows LVEF 55-60%  - repeat TTE on 7/27/23 EF = 54% and evidence of moderate (grade 2) left ventricular diastolic dysfunction  - cont. home torsemide 5mg  - cardio consulted (Dr. Nguyễn), recs appreciated    -  s/p 40  mg    IV lasix  today   , hold 5  mg  torsemide  Trend proBNP in am

## 2023-08-01 NOTE — CASE MANAGEMENT PROGRESS NOTE - NSCMPROGRESSNOTE_GEN_ALL_CORE
CORA received a call from Andreas Bermudez at [facility] asking if patient is still on oxygen. CORA discussed that pt remains on oxyygen and the plan is to check room air and ambulating oxygen saturations today. Andreas stated that oxygen concentrator will need to be delivered to the facility prior to the patient returning. Discussed pt in rounds pt remains acute, with periods of elevated BP, per Md medications being adjusted. CORA received a call from Andreas Bermudez at [facility] asking if patient is still on oxygen. CORA discussed that pt remains on oxyygen and the plan is to check room air and ambulating oxygen saturations today. Andreas stated that oxygen concentrator will need to be delivered to the facility prior to the patient returning. Discussed pt in rounds pt remains acute, on telemetry, with periods of elevated BP, per Md medications being adjusted.

## 2023-08-01 NOTE — CONSULT NOTE ADULT - ASSESSMENT
83 y/o F pmhx of COPD (not on O2), Afib (on Eliquis), s/p pacemaker, HTN, CKD, HLD, Anemia, HFpEF, small bowel CA (s/p resection) presented from [facility] ([place]) with cough & shortness of breath x 3 days. Pt also  reports chills and productive cough w/ greyish-brown sputum that also began 3 days ago. She reports having an CXR done at the facility which revealed a "consolidation" on the left lung. She states she was started on an oral antibiotic (unsure) which she took yesterday. She notes progressively worsening SOB, cough since 3 days ago. Pt also notes increasing LE swelling R > L. Of note, Pt has a chronic R leg rash since recovering from cellulitis last year. Denies fevers, n/v/d, chest pain, abdominal pain, numbness/tingling.     1. PNA Consolidation on chest CT    2. HTN BP max at slightly over 200.   Currently on amlodipine 5 mg, coreg 12.5 mg BID.  With h/o CHF, recommend adding hydralazine 25 mg TID.  Has CKD with creatine in the past up >2.0.  Hold off on ACEI ARB.     3. HFpEF  Current presentation more c/w pulmonary exacerbation.  ProBNP is elevated but lower than prior levels.  Consider a couple of days of IV lasix instead of torsemide.  Trend proBNP>    Will follow  Pt sees Dr Vogel outpt.           81 y/o F pmhx of COPD (not on O2), Afib (on Eliquis), s/p pacemaker, HTN, CKD, HLD, Anemia, HFpEF, small bowel CA (s/p resection) presented from [facility] ([place]) with cough & shortness of breath x 3 days. Pt also  reports chills and productive cough w/ greyish-brown sputum that also began 3 days ago. She reports having an CXR done at the facility which revealed a "consolidation" on the left lung. She states she was started on an oral antibiotic (unsure) which she took yesterday. She notes progressively worsening SOB, cough since 3 days ago. Pt also notes increasing LE swelling R > L. Of note, Pt has a chronic R leg rash since recovering from cellulitis last year. Denies fevers, n/v/d, chest pain, abdominal pain, numbness/tingling.     1. PNA Consolidation on chest CT    2. HTN BP max at slightly over 200.   Currently on amlodipine 5 mg, coreg 12.5 mg BID.  With h/o CHF, recommend adding hydralazine 25 mg TID.  Has CKD with creatine in the past up >2.0.  Hold off on ACEI ARB.     3. HFpEF  Current presentation more c/w pulmonary exacerbation.  ProBNP is elevated but lower than prior levels.  Consider a couple of days of IV lasix instead of torsemide.  Trend proBNP    4. PAF  Rhythm mainly atrial paced.  Continue amiodarone    Will follow  Pt sees Dr Vogel outpt.

## 2023-08-01 NOTE — PHYSICAL THERAPY INITIAL EVALUATION ADULT - TRANSFER TRAINING, PT EVAL
Patient will perform stand<>sit independently to be able to get up to use the bathroom, within 3 to 5 sessions.

## 2023-08-01 NOTE — PHYSICAL THERAPY INITIAL EVALUATION ADULT - MANUAL MUSCLE TESTING RESULTS, REHAB EVAL
; B LE grossly < or = to 3+/5 via functional assessment, no resistance applied./grossly assessed due to

## 2023-08-01 NOTE — PHYSICAL THERAPY INITIAL EVALUATION ADULT - LIVES WITH, PROFILE
Pt lives in [place] as does her , no steps to negotiate. Pt was managing ambulation with rollator + seat, was not on oxygen PTA. Pt was able to perform her own dressing per report.

## 2023-08-01 NOTE — PROGRESS NOTE ADULT - ASSESSMENT
81 y/o F pmhx of COPD (not on O2), Afib (on Eliquis), s/p pacemaker, HTN, CKD, HLD, Anemia, HFpEF, small bowel CA (s/p resection) presented from [facility] facility ([place]) with cough & shortness of breath x 3 days. Admitted for management of COPD exacerbation.

## 2023-08-01 NOTE — PROGRESS NOTE ADULT - PROBLEM SELECTOR PLAN 6
- continue home fenofibrate and statin.  - f/u AM lipid panel. - continue home fenofibrate and statin.  - lipid panel- ldl 107 , tg 99.

## 2023-08-01 NOTE — PROGRESS NOTE ADULT - PROBLEM SELECTOR PLAN 1
COPD exacerbation with pna  - Pt has Hx of COPD (not on home O2). Presented w/ SOB x3 days.  - completed 5 day course of ceftriaxone  - continue duonebs q6 standing  - added mucomyst 20% inhalation  - pulm consulted (Dr. Laurent Callejas)

## 2023-08-01 NOTE — PHYSICAL THERAPY INITIAL EVALUATION ADULT - RANGE OF MOTION EXAMINATION, REHAB EVAL
B UE grossly limited in AROM <50% shoulder AROM; pt is able to don a shirt and wash her hair with increased neck and elbow flexion./bilateral lower extremity ROM was WFL (within functional limits)/deficits as listed below

## 2023-08-01 NOTE — CONSULT NOTE ADULT - SUBJECTIVE AND OBJECTIVE BOX
CARDIOLOGY CONSULT NOTE    Patient is a 82y Female with a known history of :  COPD exacerbation [J44.1]    Cellulitis [L03.90]    HTN (hypertension) [I10]    HLD (hyperlipidemia) [E78.5]    Anemia [D64.9]    Need for prophylactic measure [Z29.9]    Heart failure with preserved left ventricular function (HFpEF) [I50.30]    CKD (chronic kidney disease) [N18.9]    Chronic atrial fibrillation [I48.20]    Major depression [F32.9]      HPI:  83 y/o F pmhx of COPD (not on O2), Afib (on Eliquis), s/p pacemaker, HTN, CKD, HLD, Anemia, HFpEF, small bowel CA (s/p resection) presented from [facility] ([place]) with cough & shortness of breath x 3 days. Pt also  reports chills and productive cough w/ greyish-brown sputum that also began 3 days ago. She reports having an CXR done at the facility which revealed a "consolidation" on the left lung. She states she was started on an oral antibiotic (unsure) which she took yesterday. She notes progressively worsening SOB, cough since 3 days ago. Pt also notes increasing LE swelling R > L. Of note, Pt has a chronic R leg rash since recovering from cellulitis last year. Denies fevers, n/v/d, chest pain, abdominal pain, numbness/tingling.     ED Course:  Vitals: 60bpm, 97.8F, 143/83mmHg, 98% on 3L NC  Labs: Hb 9.8, GFR 41, BNP 2690  EKG: Atrial-paced rhythm, QT/QTc 526/526. LBBB  Imaging:  CXR -There is a persistent mild left base effusion with adjacent small infiltrate. Stable left base findings and pacemaker    In ED given azithromycin 500mg x1, rocephin 1g x1, duoneb x1, solumedrol x1.  (26 Jul 2023 15:50)      REVIEW OF SYSTEMS:    CONSTITUTIONAL: No fever, weight loss, or fatigue  EYES: No eye pain, visual disturbances, or discharge  ENMT:  No difficulty hearing, tinnitus, vertigo; No sinus or throat pain  NECK: No pain or stiffness  BREASTS: No pain, masses, or nipple discharge  RESPIRATORY: No cough, wheezing, chills or hemoptysis; No shortness of breath  CARDIOVASCULAR: No chest pain, palpitations, dizziness, or leg swelling  GASTROINTESTINAL: No abdominal or epigastric pain. No nausea, vomiting, or hematemesis; No diarrhea or constipation. No melena or hematochezia.  GENITOURINARY: No dysuria, frequency, hematuria, or incontinence  NEUROLOGICAL: No headaches, memory loss, loss of strength, numbness, or tremors  SKIN: No itching, burning, rashes, or lesions   LYMPH NODES: No enlarged glands  ENDOCRINE: No heat or cold intolerance; No hair loss  MUSCULOSKELETAL: No joint pain or swelling; No muscle, back, or extremity pain  PSYCHIATRIC: No depression, anxiety, mood swings, or difficulty sleeping  HEME/LYMPH: No easy bruising, or bleeding gums  ALLERGY AND IMMUNOLOGIC: No hives or eczema    MEDICATIONS  (STANDING):  acetylcysteine 20%  Inhalation 4 milliLiter(s) Inhalation two times a day  albuterol/ipratropium for Nebulization 3 milliLiter(s) Nebulizer every 6 hours  aMIOdarone    Tablet 200 milliGRAM(s) Oral daily  amLODIPine   Tablet 5 milliGRAM(s) Oral daily  apixaban 2.5 milliGRAM(s) Oral two times a day  aspirin  chewable 81 milliGRAM(s) Oral daily  atorvastatin 10 milliGRAM(s) Oral at bedtime  budesonide 160 MICROgram(s)/formoterol 4.5 MICROgram(s) Inhaler 2 Puff(s) Inhalation two times a day  carvedilol 12.5 milliGRAM(s) Oral every 12 hours  fenofibrate Tablet 48 milliGRAM(s) Oral daily  ferrous    sulfate 325 milliGRAM(s) Oral daily  hydrALAZINE 25 milliGRAM(s) Oral three times a day  multivitamin 1 Tablet(s) Oral daily  nystatin Powder 1 Application(s) Topical two times a day  predniSONE   Tablet 40 milliGRAM(s) Oral daily  sertraline 25 milliGRAM(s) Oral daily  torsemide 5 milliGRAM(s) Oral daily    MEDICATIONS  (PRN):  acetaminophen     Tablet .. 650 milliGRAM(s) Oral daily PRN Moderate Pain (4 - 6)  aluminum hydroxide/magnesium hydroxide/simethicone Suspension 30 milliLiter(s) Oral every 4 hours PRN Dyspepsia  melatonin 3 milliGRAM(s) Oral at bedtime PRN Insomnia      ALLERGIES: Procardia (Other)  NIFEdipine (Unknown)  fluoxetine (Unknown)      FAMILY HISTORY:  No pertinent family history in first degree relatives        PHYSICAL EXAMINATION:  -----------------------------  T(C): 36.6 (08-01-23 @ 04:33), Max: 36.8 (07-31-23 @ 13:05)  HR: 60 (08-01-23 @ 07:25) (59 - 62)  BP: 170/80 (08-01-23 @ 04:35) (139/83 - 219/79)  RR: 18 (08-01-23 @ 04:33) (18 - 18)  SpO2: 93% (08-01-23 @ 07:25) (92% - 98%)  Wt(kg): --    07-31 @ 07:01  -  08-01 @ 07:00  --------------------------------------------------------  IN:  Total IN: 0 mL    OUT:    Voided (mL): 500 mL  Total OUT: 500 mL    Total NET: -500 mL            Constitutional: well developed, normal appearance, well groomed, well nourished, no deformities and no acute distress.   Eyes: the conjunctiva exhibited no abnormalities and the eyelids demonstrated no xanthelasmas.   HEENT: normal oral mucosa, no oral pallor and no oral cyanosis.   Neck: normal jugular venous A waves present, normal jugular venous V waves present and no jugular venous gates A waves.   Pulmonary: no respiratory distress, normal respiratory rhythm and effort, no accessory muscle use and lungs were clear to auscultation bilaterally.   Cardiovascular: heart rate and rhythm were normal, normal S1 and S2 and no murmur, gallop, rub, heave or thrill are present.   Abdomen: soft, non-tender, no hepato-splenomegaly and no abdominal mass palpated.   Musculoskeletal: the gait could not be assessed..   Extremities: no clubbing of the fingernails, no localized cyanosis, no petechial hemorrhages and no ischemic changes.   Skin: normal skin color and pigmentation, no rash, no venous stasis, no skin lesions, no skin ulcer and no xanthoma was observed.   Psychiatric: oriented to person, place, and time, the affect was normal, the mood was normal and not feeling anxious.     LABS:   --------  07-31    145  |  102  |  32<H>  ----------------------------<  91  3.4<L>   |  40<H>  |  0.99    Ca    8.6      31 Jul 2023 07:12                           10.0   7.93  )-----------( 233      ( 31 Jul 2023 07:12 )             31.8                 RADIOLOGY:  -----------------        ECG:  CARDIOLOGY CONSULT NOTE    Patient is a 82y Female with a known history of :  COPD exacerbation [J44.1]    Cellulitis [L03.90]    HTN (hypertension) [I10]    HLD (hyperlipidemia) [E78.5]    Anemia [D64.9]    Need for prophylactic measure [Z29.9]    Heart failure with preserved left ventricular function (HFpEF) [I50.30]    CKD (chronic kidney disease) [N18.9]    Chronic atrial fibrillation [I48.20]    Major depression [F32.9]      HPI:  83 y/o F pmhx of COPD (not on O2), Afib (on Eliquis), s/p pacemaker, HTN, CKD, HLD, Anemia, HFpEF, small bowel CA (s/p resection) presented from [facility] ([place]) with cough & shortness of breath x 3 days. Pt also  reports chills and productive cough w/ greyish-brown sputum that also began 3 days ago. She reports having an CXR done at the facility which revealed a "consolidation" on the left lung. She states she was started on an oral antibiotic (unsure) which she took yesterday. She notes progressively worsening SOB, cough since 3 days ago. Pt also notes increasing LE swelling R > L. Of note, Pt has a chronic R leg rash since recovering from cellulitis last year. Denies fevers, n/v/d, chest pain, abdominal pain, numbness/tingling.     ED Course:  Vitals: 60bpm, 97.8F, 143/83mmHg, 98% on 3L NC  Labs: Hb 9.8, GFR 41, BNP 2690  EKG: Atrial-paced rhythm, QT/QTc 526/526. LBBB  Imaging:  CXR -There is a persistent mild left base effusion with adjacent small infiltrate. Stable left base findings and pacemaker    In ED given azithromycin 500mg x1, rocephin 1g x1, duoneb x1, solumedrol x1.  (26 Jul 2023 15:50)      REVIEW OF SYSTEMS:    CONSTITUTIONAL: No fever, weight loss, or fatigue  NECK: No pain or stiffness  RESPIRATORY:  +cough, no wheezing, chills or hemoptysis;  +shortness of breath  CARDIOVASCULAR: No chest pain, palpitations, dizziness, or leg swelling  GASTROINTESTINAL: No abdominal or epigastric pain. No melena or hematochezia.  GENITOURINARY: No dysuria, frequency, hematuria, or incontinence  NEUROLOGICAL: No headaches, memory loss, loss of strength, numbness, or tremors  SKIN: No itching, burning, rashes, or lesions   MUSCULOSKELETAL: No joint pain or swelling; No muscle, back, or extremity pain  PSYCHIATRIC: No depression, anxiety, mood swings, or difficulty sleeping  HEME/LYMPH: No easy bruising, or bleeding gums  ALLERGY AND IMMUNOLOGIC: No hives or eczema    MEDICATIONS  (STANDING):  acetylcysteine 20%  Inhalation 4 milliLiter(s) Inhalation two times a day  albuterol/ipratropium for Nebulization 3 milliLiter(s) Nebulizer every 6 hours  aMIOdarone    Tablet 200 milliGRAM(s) Oral daily  amLODIPine   Tablet 5 milliGRAM(s) Oral daily  apixaban 2.5 milliGRAM(s) Oral two times a day  aspirin  chewable 81 milliGRAM(s) Oral daily  atorvastatin 10 milliGRAM(s) Oral at bedtime  budesonide 160 MICROgram(s)/formoterol 4.5 MICROgram(s) Inhaler 2 Puff(s) Inhalation two times a day  carvedilol 12.5 milliGRAM(s) Oral every 12 hours  fenofibrate Tablet 48 milliGRAM(s) Oral daily  ferrous    sulfate 325 milliGRAM(s) Oral daily  hydrALAZINE 25 milliGRAM(s) Oral three times a day  multivitamin 1 Tablet(s) Oral daily  nystatin Powder 1 Application(s) Topical two times a day  predniSONE   Tablet 40 milliGRAM(s) Oral daily  sertraline 25 milliGRAM(s) Oral daily  torsemide 5 milliGRAM(s) Oral daily    MEDICATIONS  (PRN):  acetaminophen     Tablet .. 650 milliGRAM(s) Oral daily PRN Moderate Pain (4 - 6)  aluminum hydroxide/magnesium hydroxide/simethicone Suspension 30 milliLiter(s) Oral every 4 hours PRN Dyspepsia  melatonin 3 milliGRAM(s) Oral at bedtime PRN Insomnia      ALLERGIES: Procardia (Other)  NIFEdipine (Unknown)  fluoxetine (Unknown)      FAMILY HISTORY:  No pertinent family history in first degree relatives        PHYSICAL EXAMINATION:  -----------------------------  T(C): 36.6 (08-01-23 @ 04:33), Max: 36.8 (07-31-23 @ 13:05)  HR: 60 (08-01-23 @ 07:25) (59 - 62)  BP: 170/80 (08-01-23 @ 04:35) (139/83 - 219/79)  RR: 18 (08-01-23 @ 04:33) (18 - 18)  SpO2: 93% (08-01-23 @ 07:25) (92% - 98%)  Wt(kg): --    07-31 @ 07:01  -  08-01 @ 07:00  --------------------------------------------------------  IN:  Total IN: 0 mL    OUT:    Voided (mL): 500 mL  Total OUT: 500 mL    Total NET: -500 mL            Constitutional: well developed, no deformities and no acute distress.   Neck: normal jugular venous    Pulmonary: no respiratory distress, bilateral ronchi and central congestion   Cardiovascular: reg S1 and S2  No sig murmur  Abdomen: soft, non-tender   Musculoskeletal: the gait could not be assessed..   Extremities: no edema   Skin: normal skin color   no skin lesions   Psychiatric:   affect was normal, the mood was normal and not feeling anxious.     LABS:   --------  07-31    145  |  102  |  32<H>  ----------------------------<  91  3.4<L>   |  40<H>  |  0.99    Ca    8.6      31 Jul 2023 07:12                           10.0   7.93  )-----------( 233      ( 31 Jul 2023 07:12 )             31.8                 RADIOLOGY:  -----------------        ECG:

## 2023-08-01 NOTE — PROGRESS NOTE ADULT - PROBLEM SELECTOR PLAN 7
H/H 9.8/30.9 on admission (appears to be at baseline)  - Pt has no active signs of bleeding.   - continue monitoring CBC. H/H 9.8/30.9 on admission (appears to be at baseline)  - Pt has no active signs of bleeding.

## 2023-08-02 LAB
ALBUMIN SERPL ELPH-MCNC: 2.9 G/DL — LOW (ref 3.3–5)
ALP SERPL-CCNC: 61 U/L — SIGNIFICANT CHANGE UP (ref 40–120)
ALT FLD-CCNC: 18 U/L — SIGNIFICANT CHANGE UP (ref 12–78)
ANION GAP SERPL CALC-SCNC: 5 MMOL/L — SIGNIFICANT CHANGE UP (ref 5–17)
AST SERPL-CCNC: 18 U/L — SIGNIFICANT CHANGE UP (ref 15–37)
BILIRUB SERPL-MCNC: 0.3 MG/DL — SIGNIFICANT CHANGE UP (ref 0.2–1.2)
BUN SERPL-MCNC: 34 MG/DL — HIGH (ref 7–23)
CALCIUM SERPL-MCNC: 9 MG/DL — SIGNIFICANT CHANGE UP (ref 8.5–10.1)
CHLORIDE SERPL-SCNC: 100 MMOL/L — SIGNIFICANT CHANGE UP (ref 96–108)
CO2 SERPL-SCNC: 39 MMOL/L — HIGH (ref 22–31)
CREAT SERPL-MCNC: 1 MG/DL — SIGNIFICANT CHANGE UP (ref 0.5–1.3)
EGFR: 56 ML/MIN/1.73M2 — LOW
GLUCOSE SERPL-MCNC: 93 MG/DL — SIGNIFICANT CHANGE UP (ref 70–99)
HCT VFR BLD CALC: 33.8 % — LOW (ref 34.5–45)
HGB BLD-MCNC: 10.9 G/DL — LOW (ref 11.5–15.5)
MCHC RBC-ENTMCNC: 31.5 PG — SIGNIFICANT CHANGE UP (ref 27–34)
MCHC RBC-ENTMCNC: 32.2 GM/DL — SIGNIFICANT CHANGE UP (ref 32–36)
MCV RBC AUTO: 97.7 FL — SIGNIFICANT CHANGE UP (ref 80–100)
NRBC # BLD: 0 /100 WBCS — SIGNIFICANT CHANGE UP (ref 0–0)
NT-PROBNP SERPL-SCNC: 2223 PG/ML — HIGH (ref 0–450)
PLATELET # BLD AUTO: 242 K/UL — SIGNIFICANT CHANGE UP (ref 150–400)
POTASSIUM SERPL-MCNC: 4.3 MMOL/L — SIGNIFICANT CHANGE UP (ref 3.5–5.3)
POTASSIUM SERPL-SCNC: 4.3 MMOL/L — SIGNIFICANT CHANGE UP (ref 3.5–5.3)
PROT SERPL-MCNC: 6.1 G/DL — SIGNIFICANT CHANGE UP (ref 6–8.3)
RBC # BLD: 3.46 M/UL — LOW (ref 3.8–5.2)
RBC # FLD: 14.2 % — SIGNIFICANT CHANGE UP (ref 10.3–14.5)
SODIUM SERPL-SCNC: 144 MMOL/L — SIGNIFICANT CHANGE UP (ref 135–145)
WBC # BLD: 11.24 K/UL — HIGH (ref 3.8–10.5)
WBC # FLD AUTO: 11.24 K/UL — HIGH (ref 3.8–10.5)

## 2023-08-02 PROCEDURE — 99233 SBSQ HOSP IP/OBS HIGH 50: CPT | Mod: GC

## 2023-08-02 RX ORDER — AMLODIPINE BESYLATE 2.5 MG/1
10 TABLET ORAL DAILY
Refills: 0 | Status: DISCONTINUED | OUTPATIENT
Start: 2023-08-03 | End: 2023-08-03

## 2023-08-02 RX ORDER — HYDRALAZINE HCL 50 MG
50 TABLET ORAL THREE TIMES A DAY
Refills: 0 | Status: DISCONTINUED | OUTPATIENT
Start: 2023-08-02 | End: 2023-08-03

## 2023-08-02 RX ORDER — FUROSEMIDE 40 MG
20 TABLET ORAL ONCE
Refills: 0 | Status: COMPLETED | OUTPATIENT
Start: 2023-08-02 | End: 2023-08-02

## 2023-08-02 RX ADMIN — Medication 40 MILLIGRAM(S): at 05:37

## 2023-08-02 RX ADMIN — Medication 50 MILLIGRAM(S): at 13:45

## 2023-08-02 RX ADMIN — SERTRALINE 25 MILLIGRAM(S): 25 TABLET, FILM COATED ORAL at 11:53

## 2023-08-02 RX ADMIN — Medication 20 MILLIGRAM(S): at 17:25

## 2023-08-02 RX ADMIN — Medication 3 MILLILITER(S): at 19:39

## 2023-08-02 RX ADMIN — CARVEDILOL PHOSPHATE 12.5 MILLIGRAM(S): 80 CAPSULE, EXTENDED RELEASE ORAL at 17:20

## 2023-08-02 RX ADMIN — Medication 325 MILLIGRAM(S): at 11:56

## 2023-08-02 RX ADMIN — ATORVASTATIN CALCIUM 10 MILLIGRAM(S): 80 TABLET, FILM COATED ORAL at 21:20

## 2023-08-02 RX ADMIN — Medication 81 MILLIGRAM(S): at 11:53

## 2023-08-02 RX ADMIN — AMIODARONE HYDROCHLORIDE 200 MILLIGRAM(S): 400 TABLET ORAL at 05:35

## 2023-08-02 RX ADMIN — Medication 4 MILLILITER(S): at 07:34

## 2023-08-02 RX ADMIN — NYSTATIN CREAM 1 APPLICATION(S): 100000 CREAM TOPICAL at 05:36

## 2023-08-02 RX ADMIN — Medication 1 TABLET(S): at 11:53

## 2023-08-02 RX ADMIN — BUDESONIDE AND FORMOTEROL FUMARATE DIHYDRATE 2 PUFF(S): 160; 4.5 AEROSOL RESPIRATORY (INHALATION) at 05:35

## 2023-08-02 RX ADMIN — Medication 3 MILLILITER(S): at 12:37

## 2023-08-02 RX ADMIN — NYSTATIN CREAM 1 APPLICATION(S): 100000 CREAM TOPICAL at 17:29

## 2023-08-02 RX ADMIN — AMLODIPINE BESYLATE 5 MILLIGRAM(S): 2.5 TABLET ORAL at 05:35

## 2023-08-02 RX ADMIN — Medication 25 MILLIGRAM(S): at 05:35

## 2023-08-02 RX ADMIN — Medication 50 MILLIGRAM(S): at 21:20

## 2023-08-02 RX ADMIN — APIXABAN 5 MILLIGRAM(S): 2.5 TABLET, FILM COATED ORAL at 17:20

## 2023-08-02 RX ADMIN — Medication 3 MILLILITER(S): at 07:34

## 2023-08-02 RX ADMIN — Medication 48 MILLIGRAM(S): at 11:53

## 2023-08-02 RX ADMIN — Medication 3 MILLILITER(S): at 00:35

## 2023-08-02 RX ADMIN — APIXABAN 5 MILLIGRAM(S): 2.5 TABLET, FILM COATED ORAL at 05:35

## 2023-08-02 RX ADMIN — Medication 4 MILLILITER(S): at 19:39

## 2023-08-02 RX ADMIN — BUDESONIDE AND FORMOTEROL FUMARATE DIHYDRATE 2 PUFF(S): 160; 4.5 AEROSOL RESPIRATORY (INHALATION) at 17:20

## 2023-08-02 RX ADMIN — CARVEDILOL PHOSPHATE 12.5 MILLIGRAM(S): 80 CAPSULE, EXTENDED RELEASE ORAL at 05:35

## 2023-08-02 NOTE — PROGRESS NOTE ADULT - SUBJECTIVE AND OBJECTIVE BOX
Patient is a 82y Female with a known history of :  COPD exacerbation [J44.1]    Cellulitis [L03.90]    HTN (hypertension) [I10]    HLD (hyperlipidemia) [E78.5]    Anemia [D64.9]    Need for prophylactic measure [Z29.9]    Heart failure with preserved left ventricular function (HFpEF) [I50.30]    CKD (chronic kidney disease) [N18.9]    Chronic atrial fibrillation [I48.20]    Major depression [F32.9]      HPI:  83 y/o F pmhx of COPD (not on O2), Afib (on Eliquis), s/p pacemaker, HTN, CKD, HLD, Anemia, HFpEF, small bowel CA (s/p resection) presented from [facility] ([place]) with cough & shortness of breath x 3 days. Pt also  reports chills and productive cough w/ greyish-brown sputum that also began 3 days ago. She reports having an CXR done at the facility which revealed a "consolidation" on the left lung. She states she was started on an oral antibiotic (unsure) which she took yesterday. She notes progressively worsening SOB, cough since 3 days ago. Pt also notes increasing LE swelling R > L. Of note, Pt has a chronic R leg rash since recovering from cellulitis last year. Denies fevers, n/v/d, chest pain, abdominal pain, numbness/tingling.     ED Course:  Vitals: 60bpm, 97.8F, 143/83mmHg, 98% on 3L NC  Labs: Hb 9.8, GFR 41, BNP 2690  EKG: Atrial-paced rhythm, QT/QTc 526/526. LBBB  Imaging:  CXR -There is a persistent mild left base effusion with adjacent small infiltrate. Stable left base findings and pacemaker    In ED given azithromycin 500mg x1, rocephin 1g x1, duoneb x1, solumedrol x1.  (26 Jul 2023 15:50)      REVIEW OF SYSTEMS:  CONSTITUTIONAL: No fever, weight loss, or fatigue  NECK: No pain or stiffness  RESPIRATORY:  +cough, no wheezing, chills or hemoptysis;  +shortness of breath  CARDIOVASCULAR: No chest pain, palpitations, dizziness, or leg swelling  GASTROINTESTINAL: No abdominal or epigastric pain. No melena or hematochezia.  GENITOURINARY: No dysuria, frequency, hematuria, or incontinence  NEUROLOGICAL: No headaches, memory loss, loss of strength, numbness, or tremors  SKIN: No itching, burning, rashes, or lesions   MUSCULOSKELETAL: No joint pain or swelling; No muscle, back, or extremity pain  PSYCHIATRIC: No depression, anxiety, mood swings, or difficulty sleeping  HEME/LYMPH: No easy bruising, or bleeding gums  ALLERGY AND IMMUNOLOGIC: No hives or eczema        MEDICATIONS  (STANDING):  acetylcysteine 20%  Inhalation 4 milliLiter(s) Inhalation two times a day  albuterol/ipratropium for Nebulization 3 milliLiter(s) Nebulizer every 6 hours  aMIOdarone    Tablet 200 milliGRAM(s) Oral daily  amLODIPine   Tablet 5 milliGRAM(s) Oral daily  apixaban 5 milliGRAM(s) Oral two times a day  aspirin  chewable 81 milliGRAM(s) Oral daily  atorvastatin 10 milliGRAM(s) Oral at bedtime  budesonide 160 MICROgram(s)/formoterol 4.5 MICROgram(s) Inhaler 2 Puff(s) Inhalation two times a day  carvedilol 12.5 milliGRAM(s) Oral every 12 hours  fenofibrate Tablet 48 milliGRAM(s) Oral daily  ferrous    sulfate 325 milliGRAM(s) Oral daily  hydrALAZINE 25 milliGRAM(s) Oral three times a day  multivitamin 1 Tablet(s) Oral daily  nystatin Powder 1 Application(s) Topical two times a day  predniSONE   Tablet 40 milliGRAM(s) Oral daily  sertraline 25 milliGRAM(s) Oral daily    MEDICATIONS  (PRN):  acetaminophen     Tablet .. 650 milliGRAM(s) Oral daily PRN Moderate Pain (4 - 6)  aluminum hydroxide/magnesium hydroxide/simethicone Suspension 30 milliLiter(s) Oral every 4 hours PRN Dyspepsia  melatonin 3 milliGRAM(s) Oral at bedtime PRN Insomnia      ALLERGIES: Procardia (Other)  NIFEdipine (Unknown)  fluoxetine (Unknown)      FAMILY HISTORY:  No pertinent family history in first degree relatives        PHYSICAL EXAMINATION:  -----------------------------  T(C): 36.8 (08-02-23 @ 11:00), Max: 36.8 (08-02-23 @ 11:00)  HR: 60 (08-02-23 @ 11:00) (60 - 73)  BP: 175/67 (08-02-23 @ 11:00) (136/75 - 189/74)  RR: 20 (08-02-23 @ 11:00) (18 - 20)  SpO2: 96% (08-02-23 @ 11:15) (91% - 98%)  Wt(kg): --    08-01 @ 07:01  -  08-02 @ 07:00  --------------------------------------------------------  IN:  Total IN: 0 mL    OUT:    Voided (mL): 1850 mL  Total OUT: 1850 mL    Total NET: -1850 mL      PHYSICAL EXAM   Constitutional: well developed, no deformities and no acute distress.   Neck: normal jugular venous    Pulmonary: no respiratory distress, bilateral ronchi and central congestion   Cardiovascular: reg S1 and S2  No sig murmur  Abdomen: soft, non-tender   Musculoskeletal: the gait could not be assessed..   Extremities: no edema   Skin: normal skin color   no skin lesions   Psychiatric:   affect was normal, the mood was normal and not feeling anxious.           LABS:   --------  08-02    144  |  100  |  34<H>  ----------------------------<  93  4.3   |  39<H>  |  1.00    Ca    9.0      02 Aug 2023 06:20    TPro  6.1  /  Alb  2.9<L>  /  TBili  0.3  /  DBili  x   /  AST  18  /  ALT  18  /  AlkPhos  61  08-02                         10.9   11.24 )-----------( 242      ( 02 Aug 2023 06:20 )             33.8

## 2023-08-02 NOTE — PROGRESS NOTE ADULT - SUBJECTIVE AND OBJECTIVE BOX
Patient is a 82y old  Female who presents with a chief complaint of Chronic obstructive pulmonary disease with acute exacerbation    "83 y/o F pmhx of COPD (not on O2), Afib (on Eliquis), s/p pacemaker, HTN, CKD, HLD, Anemia, HFpEF, small bowel CA (s/p resection) presented from [facility] ([place]) with cough & shortness of breath x 3 days. Admitted for management of COPD exacerbation."  Pt also found to have pna.  (02 Aug 2023 13:32)      Subjective:  INTERVAL HPI/OVERNIGHT EVENTS: Patient seen and examined at bedside. No overnight events occurred. Patient still has productive cough but states that she feels better than when she initially came in. Denies fevers, chills, headache, lightheadedness, chest pain, dyspnea, abdominal pain, n/v/d/c.    MEDICATIONS  (STANDING):  acetylcysteine 20%  Inhalation 4 milliLiter(s) Inhalation two times a day  albuterol/ipratropium for Nebulization 3 milliLiter(s) Nebulizer every 6 hours  aMIOdarone    Tablet 200 milliGRAM(s) Oral daily  amLODIPine   Tablet 10 milliGRAM(s) Oral daily  apixaban 5 milliGRAM(s) Oral two times a day  aspirin  chewable 81 milliGRAM(s) Oral daily  atorvastatin 10 milliGRAM(s) Oral at bedtime  budesonide 160 MICROgram(s)/formoterol 4.5 MICROgram(s) Inhaler 2 Puff(s) Inhalation two times a day  carvedilol 12.5 milliGRAM(s) Oral every 12 hours  fenofibrate Tablet 48 milliGRAM(s) Oral daily  ferrous    sulfate 325 milliGRAM(s) Oral daily  hydrALAZINE 50 milliGRAM(s) Oral three times a day  multivitamin 1 Tablet(s) Oral daily  nystatin Powder 1 Application(s) Topical two times a day  predniSONE   Tablet 40 milliGRAM(s) Oral daily  sertraline 25 milliGRAM(s) Oral daily    MEDICATIONS  (PRN):  acetaminophen     Tablet .. 650 milliGRAM(s) Oral daily PRN Moderate Pain (4 - 6)  aluminum hydroxide/magnesium hydroxide/simethicone Suspension 30 milliLiter(s) Oral every 4 hours PRN Dyspepsia  melatonin 3 milliGRAM(s) Oral at bedtime PRN Insomnia      Allergies    Procardia (Other)  NIFEdipine (Unknown)  fluoxetine (Unknown)    Intolerances    oxycodone (Other)      REVIEW OF SYSTEMS:  CONSTITUTIONAL: No fever or chills  HEENT:  No headache, no sore throat  RESPIRATORY: No wheezing, or shortness of breath +cough  CARDIOVASCULAR: No chest pain, palpitations  GASTROINTESTINAL: No abd pain, nausea, vomiting, or diarrhea  GENITOURINARY: No dysuria, frequency, or hematuria  NEUROLOGICAL: no focal weakness or dizziness  MUSCULOSKELETAL: no myalgias     Objective:  Vital Signs Last 24 Hrs  T(C): 36.8 (02 Aug 2023 11:00), Max: 36.8 (02 Aug 2023 11:00)  T(F): 98.3 (02 Aug 2023 11:00), Max: 98.3 (02 Aug 2023 11:00)  HR: 64 (02 Aug 2023 13:40) (60 - 68)  BP: 155/72 (02 Aug 2023 13:40) (136/75 - 189/74)  BP(mean): --  RR: 20 (02 Aug 2023 11:00) (18 - 20)  SpO2: 96% (02 Aug 2023 13:02) (91% - 98%)    Parameters below as of 02 Aug 2023 13:02  Patient On (Oxygen Delivery Method): nasal cannula w/ humidification, 3L        GENERAL: NAD, lying in bed comfortably  HEAD:  Atraumatic, Normocephalic  EYES: EOMI, PERRL, conjunctiva and sclera clear  ENT: Moist mucous membranes  NECK: Supple, No JVD  CHEST/LUNG: +expiratory wheezing, louder at the bases. Unlabored respirations  HEART: Regular rate and rhythm; No murmurs, rubs, or gallops  ABDOMEN: Bowel sounds present; Soft, Nontender, Nondistended.  EXTREMITIES:  2+ Peripheral Pulses. No clubbing, cyanosis, or edema  NERVOUS SYSTEM:  Alert & Oriented X3, speech clear. No deficits   MSK: FROM all 4 extremities, full and equal strength  SKIN: warm, dry    LABS:                        10.9   11.24 )-----------( 242      ( 02 Aug 2023 06:20 )             33.8     CBC Full  -  ( 02 Aug 2023 06:20 )  WBC Count : 11.24 K/uL  Hemoglobin : 10.9 g/dL  Hematocrit : 33.8 %  Platelet Count - Automated : 242 K/uL  Mean Cell Volume : 97.7 fl  Mean Cell Hemoglobin : 31.5 pg  Mean Cell Hemoglobin Concentration : 32.2 gm/dL  Auto Neutrophil # : x  Auto Lymphocyte # : x  Auto Monocyte # : x  Auto Eosinophil # : x  Auto Basophil # : x  Auto Neutrophil % : x  Auto Lymphocyte % : x  Auto Monocyte % : x  Auto Eosinophil % : x  Auto Basophil % : x    02 Aug 2023 06:20    144    |  100    |  34     ----------------------------<  93     4.3     |  39     |  1.00     Ca    9.0        02 Aug 2023 06:20    TPro  6.1    /  Alb  2.9    /  TBili  0.3    /  DBili  x      /  AST  18     /  ALT  18     /  AlkPhos  61     02 Aug 2023 06:20      Urinalysis Basic - ( 02 Aug 2023 06:20 )    Color: x / Appearance: x / SG: x / pH: x  Gluc: 93 mg/dL / Ketone: x  / Bili: x / Urobili: x   Blood: x / Protein: x / Nitrite: x   Leuk Esterase: x / RBC: x / WBC x   Sq Epi: x / Non Sq Epi: x / Bacteria: x      CAPILLARY BLOOD GLUCOSE            Consultant(s) Notes Reviewed:  [x] YES  [ ] NO     Patient is a 82y old  Female who presents with a chief complaint of SOB.      Subjective:  INTERVAL HPI/OVERNIGHT EVENTS: Patient seen and examined at bedside. No acute overnight events occurred. Patient still has productive cough, but states that she feels better than when she initially came in. Denies fevers, chills, headache, lightheadedness, chest pain, dyspnea, abdominal pain, n/v/d/c.    MEDICATIONS  (STANDING):  acetylcysteine 20%  Inhalation 4 milliLiter(s) Inhalation two times a day  albuterol/ipratropium for Nebulization 3 milliLiter(s) Nebulizer every 6 hours  aMIOdarone    Tablet 200 milliGRAM(s) Oral daily  amLODIPine   Tablet 10 milliGRAM(s) Oral daily  apixaban 5 milliGRAM(s) Oral two times a day  aspirin  chewable 81 milliGRAM(s) Oral daily  atorvastatin 10 milliGRAM(s) Oral at bedtime  budesonide 160 MICROgram(s)/formoterol 4.5 MICROgram(s) Inhaler 2 Puff(s) Inhalation two times a day  carvedilol 12.5 milliGRAM(s) Oral every 12 hours  fenofibrate Tablet 48 milliGRAM(s) Oral daily  ferrous    sulfate 325 milliGRAM(s) Oral daily  hydrALAZINE 50 milliGRAM(s) Oral three times a day  multivitamin 1 Tablet(s) Oral daily  nystatin Powder 1 Application(s) Topical two times a day  predniSONE   Tablet 40 milliGRAM(s) Oral daily  sertraline 25 milliGRAM(s) Oral daily    MEDICATIONS  (PRN):  acetaminophen     Tablet .. 650 milliGRAM(s) Oral daily PRN Moderate Pain (4 - 6)  aluminum hydroxide/magnesium hydroxide/simethicone Suspension 30 milliLiter(s) Oral every 4 hours PRN Dyspepsia  melatonin 3 milliGRAM(s) Oral at bedtime PRN Insomnia      Allergies    Procardia (Other)  NIFEdipine (Unknown)  fluoxetine (Unknown)    Intolerances    oxycodone (Other)      REVIEW OF SYSTEMS:  CONSTITUTIONAL: No fever or chills  HEENT:  No headache, no sore throat  RESPIRATORY: No wheezing, or shortness of breath ; +cough (improving)  CARDIOVASCULAR: No chest pain, palpitations  GASTROINTESTINAL: No abd pain, nausea, vomiting, or diarrhea  GENITOURINARY: No dysuria, frequency, or hematuria  NEUROLOGICAL: no focal weakness or dizziness  MUSCULOSKELETAL: no myalgias     Objective:  Vital Signs Last 24 Hrs  T(C): 36.8 (02 Aug 2023 11:00), Max: 36.8 (02 Aug 2023 11:00)  T(F): 98.3 (02 Aug 2023 11:00), Max: 98.3 (02 Aug 2023 11:00)  HR: 64 (02 Aug 2023 13:40) (60 - 68)  BP: 155/72 (02 Aug 2023 13:40) (136/75 - 189/74)  BP(mean): --  RR: 20 (02 Aug 2023 11:00) (18 - 20)  SpO2: 96% (02 Aug 2023 13:02) (91% - 98%)    Parameters below as of 02 Aug 2023 13:02  Patient On (Oxygen Delivery Method): nasal cannula w/ humidification, 3L        GENERAL: NAD, lying in bed comfortably  HEENT: Moist mucous membranes, anicteric   CHEST/LUNG: +expiratory wheezing, louder at the bases. Unlabored respirations  HEART: Regular rate and rhythm, S1, S2  ABDOMEN: Bowel sounds present; Soft, Nontender, Nondistended.  EXTREMITIES:  No cyanosis, or edema  NERVOUS SYSTEM:  answering questions and following commands appropriately     LABS:                        10.9   11.24 )-----------( 242      ( 02 Aug 2023 06:20 )             33.8     CBC Full  -  ( 02 Aug 2023 06:20 )  WBC Count : 11.24 K/uL  Hemoglobin : 10.9 g/dL  Hematocrit : 33.8 %  Platelet Count - Automated : 242 K/uL  Mean Cell Volume : 97.7 fl  Mean Cell Hemoglobin : 31.5 pg  Mean Cell Hemoglobin Concentration : 32.2 gm/dL  Auto Neutrophil # : x  Auto Lymphocyte # : x  Auto Monocyte # : x  Auto Eosinophil # : x  Auto Basophil # : x  Auto Neutrophil % : x  Auto Lymphocyte % : x  Auto Monocyte % : x  Auto Eosinophil % : x  Auto Basophil % : x    02 Aug 2023 06:20    144    |  100    |  34     ----------------------------<  93     4.3     |  39     |  1.00     Ca    9.0        02 Aug 2023 06:20    TPro  6.1    /  Alb  2.9    /  TBili  0.3    /  DBili  x      /  AST  18     /  ALT  18     /  AlkPhos  61     02 Aug 2023 06:20      Urinalysis Basic - ( 02 Aug 2023 06:20 )    Color: x / Appearance: x / SG: x / pH: x  Gluc: 93 mg/dL / Ketone: x  / Bili: x / Urobili: x   Blood: x / Protein: x / Nitrite: x   Leuk Esterase: x / RBC: x / WBC x   Sq Epi: x / Non Sq Epi: x / Bacteria: x      CAPILLARY BLOOD GLUCOSE            Consultant(s) Notes Reviewed:  [x] YES  [ ] NO

## 2023-08-02 NOTE — DIETITIAN INITIAL EVALUATION ADULT - PERTINENT MEDS FT
MEDICATIONS  (STANDING):  acetylcysteine 20%  Inhalation 4 milliLiter(s) Inhalation two times a day  albuterol/ipratropium for Nebulization 3 milliLiter(s) Nebulizer every 6 hours  aMIOdarone    Tablet 200 milliGRAM(s) Oral daily  amLODIPine   Tablet 10 milliGRAM(s) Oral daily  apixaban 5 milliGRAM(s) Oral two times a day  aspirin  chewable 81 milliGRAM(s) Oral daily  atorvastatin 10 milliGRAM(s) Oral at bedtime  budesonide 160 MICROgram(s)/formoterol 4.5 MICROgram(s) Inhaler 2 Puff(s) Inhalation two times a day  carvedilol 12.5 milliGRAM(s) Oral every 12 hours  fenofibrate Tablet 48 milliGRAM(s) Oral daily  ferrous    sulfate 325 milliGRAM(s) Oral daily  hydrALAZINE 50 milliGRAM(s) Oral three times a day  multivitamin 1 Tablet(s) Oral daily  nystatin Powder 1 Application(s) Topical two times a day  predniSONE   Tablet 40 milliGRAM(s) Oral daily  sertraline 25 milliGRAM(s) Oral daily    MEDICATIONS  (PRN):  acetaminophen     Tablet .. 650 milliGRAM(s) Oral daily PRN Moderate Pain (4 - 6)  aluminum hydroxide/magnesium hydroxide/simethicone Suspension 30 milliLiter(s) Oral every 4 hours PRN Dyspepsia  melatonin 3 milliGRAM(s) Oral at bedtime PRN Insomnia

## 2023-08-02 NOTE — PROGRESS NOTE ADULT - PROBLEM SELECTOR PLAN 4
Chronic. Pt on home eliquis (2.5mg, BID)  - EKG on admission - 60bpm. Atrial-paced rhythm, QT/QTc 526/526. LBBB  - continue amiodarone, Eliquis, aspirin.  - cardio consulted (Dr. Nguyễn), Chronic. Pt on home eliquis (2.5mg, BID)  - EKG on admission - 60bpm. Atrial-paced rhythm, QT/QTc 526/526. LBBB  - continue amiodarone, aspirin.  - increased home Eliquis from 2.5mg BID to 5mg BID per cardio & pharmacy  - cardio consulted (Dr. Nguyễn), CKD3 at baseline  Cr 1.3 (baseline ~1) on admission -- renal function improved  - avoid nephrotoxic meds

## 2023-08-02 NOTE — PROGRESS NOTE ADULT - PROBLEM SELECTOR PLAN 1
COPD exacerbation with pna  - Pt has Hx of COPD (not on home O2). Presented w/ SOB x3 days.  - completed 5 day course of ceftriaxone  - continue duonebs q6 standing  - added mucomyst 20% inhalation  - patient O2 sat above 90% while ambulating while on 2L NC  - pulm consulted (Dr. Laurent Callejas) - acute hypoxic respiratory failure due to COPD exacerbation and found to have rhino/enterovirus infection, as well as, likely superimposed bacterial PNA  - pt still requiring oxygen and was saturating 85% on RA at rest today -- CM arranging for home O2  - completed 5 day course of ceftriaxone  - continue duonebs q6 standing  - c/w prednisone taper  - c/w Mucomyst   - patient O2 sat above 90% while ambulating while on 2L NC  - pulm consulted (Dr. Laurent Callejas), recs appreciated

## 2023-08-02 NOTE — PROGRESS NOTE ADULT - PROBLEM SELECTOR PLAN 3
GFR 41. Cr 1.3 (baseline ~1) on admission.   - avoid nephrotoxic meds Last TTE (5/26/22) shows LVEF 55-60%  - repeat TTE on 7/27/23 EF = 54% and evidence of moderate (grade 2) left ventricular diastolic dysfunction  - cardio consulted (Dr. Nguyễn), recs appreciated  - 20 mg IV lasix today, hold 5 mg torsemide (resume oral diuretics likely tomorrow)  - proBNP 2690 -> 2223

## 2023-08-02 NOTE — DIETITIAN INITIAL EVALUATION ADULT - PERSON TAUGHT/METHOD
reviewed low Na food options within facility provided options (pt feels food at [facility] is not low Na despite being on DASH diet Rx)/verbal instruction/group instruction/patient instructed

## 2023-08-02 NOTE — CAREGIVER ENGAGEMENT NOTE - CAREGIVER OUTREACH NOTES - FREE TEXT
CM spoke to Tanisha and explained role of CM and transition planning. Tanisha is in agreement to participate in transition plan and stated she will drive the patient back to [place] upon discharge. 
CM spoke with Tanisha, introduced self, discussed that her mother will need home oxygen as her oxygen saturation went down to 85% on room air. CM discussed that she will need to order oxygen for her mother. CM also discussed with patient at bedside. Both are in agreement with receiving home oxygen. CM also discussed that Physical therapy is recommending home physical therapy and patient will also need a visiting nurse. Referral will be sent to [facility] [phone] home care agency when patient is cleared for transition back to [facility]. CM notified MD of above. will send referral as soon as MD signs prescription. CM contacted [facility] [phone]/fax [phone]. Utilizes [facility] [phone]., unable to speak with wellness department, CM left message with CM contact information for them to return call.

## 2023-08-02 NOTE — DIETITIAN INITIAL EVALUATION ADULT - REASON FOR ADMISSION
Chronic obstructive pulmonary disease with acute exacerbation    "83 y/o F pmhx of COPD (not on O2), Afib (on Eliquis), s/p pacemaker, HTN, CKD, HLD, Anemia, HFpEF, small bowel CA (s/p resection) presented from [facility] facility ([place]) with cough & shortness of breath x 3 days. Admitted for management of COPD exacerbation."  Pt also found to have pna.

## 2023-08-02 NOTE — DIETITIAN INITIAL EVALUATION ADULT - PROBLEM SELECTOR PLAN 2
Pt appears volume overloaded, with mild crackles in left lung base and 2+ pitting edema.  Last TTE (5/26/22) shows LVEF 55-60%  - repeat TTE  - hold home torsemide  -give IV lasix 20mg x1 today, monitor renal fxn and dose lasix accordingly in setting of her CKD  - cardio consult-Dr Kyle Allen

## 2023-08-02 NOTE — PROGRESS NOTE ADULT - PROBLEM SELECTOR PLAN 5
Chronic. BP on admission 143/83.  - continue home carvedilol w/ hold parameters.  - amlodipine 5mg w/ hold parameters  - adding hydralazine 25 mg TID.  - cardio consulted (Dr. Nguyễn), Chronic. Pt on home eliquis (2.5mg, BID)  - EKG on admission - 60bpm. Atrial-paced rhythm, QT/QTc 526/526. LBBB  - continue amiodarone, aspirin.  - increased home Eliquis from 2.5mg BID to 5mg BID per cardio & pharmacy  - cardio consulted (Dr. Nguyễn), recs appreciated

## 2023-08-02 NOTE — DIETITIAN INITIAL EVALUATION ADULT - ORAL INTAKE PTA/DIET HISTORY
Pt from [facility].  States food there is salty but no one cares.  She either has to eat what they offer or starve. Diet Rx states "DASH" in transfer papers. Denies problems chewing or swallowing, NKFA.  Doesn't mind being on Easy to Chew diet while here as she has difficulty cutting up her food when she is in bed. States wt hx 159# prior to move to [facility] ~1.5 yr ago.   States food here has been good.

## 2023-08-02 NOTE — PROGRESS NOTE ADULT - PROBLEM SELECTOR PLAN 2
Last TTE (5/26/22) shows LVEF 55-60%  - repeat TTE on 7/27/23 EF = 54% and evidence of moderate (grade 2) left ventricular diastolic dysfunction  - cont. home torsemide 5mg  - cardio consulted (Dr. Nguyễn), recs appreciated    -  s/p 40  mg    IV lasix  today   , hold 5  mg  torsemide  **CONT LASIX**???  Trend proBNP in am Last TTE (5/26/22) shows LVEF 55-60%  - repeat TTE on 7/27/23 EF = 54% and evidence of moderate (grade 2) left ventricular diastolic dysfunction  - cardio consulted (Dr. Nguyễn), recs appreciated    -  20 mg IV lasix  today, hold 5 mg  torsemide (resume tomorrow)  proBNP 2690 -> 2223 COPD exacerbation with pna  - Pt has Hx of COPD (not on home O2). Presented w/ SOB x3 days.  - completed 5 day course of ceftriaxone  - continue duonebs q6 standing  - c/w prednisone taper  - c/w Mucomyst   - patient O2 sat above 90% while ambulating while on 2L NC  - pulm consulted (Dr. Laurent Callejas)

## 2023-08-02 NOTE — DIETITIAN INITIAL EVALUATION ADULT - PERTINENT LABORATORY DATA
08-02    144  |  100  |  34<H>  ----------------------------<  93  4.3   |  39<H>  |  1.00    Ca    9.0      02 Aug 2023 06:20    TPro  6.1  /  Alb  2.9<L>  /  TBili  0.3  /  DBili  x   /  AST  18  /  ALT  18  /  AlkPhos  61  08-02  A1C with Estimated Average Glucose Result: 5.2 % (07-27-23 @ 05:42)

## 2023-08-02 NOTE — PROGRESS NOTE ADULT - TIME BILLING
pt seen and examine today see above plan - COPD exacerbation with pna  - Pt has Hx of COPD (not on home O2). Presented w/ SOB x3 days.  - s/p  5 day course of ceftriaxone ,  prednisone 30 mg po daily /3 days  taper slowly , neb tt .
pt seen and examine today see above plan - COPD exacerbation with pna  - Pt has Hx of COPD (not on home O2). Presented w/ SOB x3 days.  - s/p  5 day course of ceftriaxone ,  prednisone 30 mg po daily / taper every 3day 10 mg   neb tt  pulse ox 93 3lit nc  .
Pt seen + examined. Case discussed with resident. Agree with assessment and plan above (edited by me in detail):  Time spent: 50min. More than 50% of the visit was spent counseling the patient and pt's daughter on medical condition -- acute hypoxic respiratory failure due to COPD exacerbation and found to have rhino/enterovirus infection, as well as, likely superimposed bacterial PNA, home O2.     81yo F w/ PMH of COPD (not on O2), Afib (on Eliquis), s/p pacemaker, HTN, CKD, HLD, Anemia, HFpEF, small bowel CA (s/p resection) presented from [facility]) with cough & shortness of breath x 3 days admitted with acute hypoxic respiratory failure due to COPD exacerbation and found to have rhino/enterovirus infection, as well as, likely superimposed bacterial PNA.    - acute hypoxic respiratory failure due to COPD exacerbation and found to have rhino/enterovirus infection, as well as, likely superimposed bacterial PNA  - pt still requiring oxygen and was saturating 85% on RA at rest today -- CM arranging for home O2  - completed 5 day course of ceftriaxone  - continue duonebs q6 standing  - c/w prednisone taper  - c/w Mucomyst   - patient O2 sat above 90% while ambulating while on 2L NC  - pulm consulted (Dr. Laurent Callejas), recs appreciated    - TTE on 7/27/23 EF = 54% and evidence of moderate (grade 2) left ventricular diastolic dysfunction  - cardio consulted (Dr. Nguyễn), recs appreciated  - 20 mg IV lasix today per discussion with cardio, hold 5 mg torsemide (resume oral diuretics likely tomorrow)  - proBNP 2690 -> 2223  - doubt pt has CHF exacerbation at this time -- etiology largely pulmonary / infectious     - likely d/c back to [facility] in next 1-2 days

## 2023-08-02 NOTE — PROVIDER CONTACT NOTE (OTHER) - SITUATION
Pt BP elevated. In the AM BP was 189/74 BP Now is 175/67.  Pt due for BP medication at 2PM. pt received AM medications. Pt asymptomatic. Pt sitting in bed.
Manual BP done , 184/78
Pt was having routine vitals done; bp was noted to be high 185/80. Pt asymptomatic on assessment

## 2023-08-02 NOTE — PROGRESS NOTE ADULT - PROBLEM SELECTOR PLAN 8
- continue home sertraline H/H 9.8/30.9 on admission (appears to be at baseline)  - Pt has no active signs of bleeding.

## 2023-08-02 NOTE — PROGRESS NOTE ADULT - PROBLEM SELECTOR PLAN 6
- continue home fenofibrate and statin.  - lipid panel- ldl 107 , tg 99. Chronic. BP on admission 143/83.  - continue home carvedilol w/ hold parameters.  - amlodipine 5mg w/ hold parameters  - adding hydralazine 25 mg TID.  - cardio consulted (Dr. Nguyễn), recs appreciated

## 2023-08-02 NOTE — PROGRESS NOTE ADULT - ASSESSMENT
81 y/o F pmhx of COPD (not on O2), Afib (on Eliquis), s/p pacemaker, HTN, CKD, HLD, Anemia, HFpEF, small bowel CA (s/p resection) presented from [facility] ([place]) with cough & shortness of breath x 3 days. Pt also  reports chills and productive cough w/ greyish-brown sputum that also began 3 days ago. She reports having an CXR done at the facility which revealed a "consolidation" on the left lung. She states she was started on an oral antibiotic (unsure) which she took yesterday. She notes progressively worsening SOB, cough since 3 days ago. Pt also notes increasing LE swelling R > L. Of note, Pt has a chronic R leg rash since recovering from cellulitis last year. Denies fevers, n/v/d, chest pain, abdominal pain, numbness/tingling.     1. PNA Consolidation on chest CT  2. HTN BP max at slightly over 200.   Currently on amlodipine 5 mg, to increase to 10 mg daily, coreg 12.5 mg BID.  With h/o CHF, recommend adding hydralazine 25 mg TID, to increase to 50 mg TID.  Has CKD with creatine in the past up >2.0.  Hold off on ACEI ARB.     3. HFpEF  Current presentation more c/w pulmonary exacerbation.  ProBNP is elevated but lower than prior levels.  Consider a couple of days of IV lasix instead of torsemide.  Trend proBNP, 7/26 2690 --> 8/2 2223.     4. PAF  Rhythm mainly atrial paced.  Continue amiodarone    Will follow  Pt sees Dr Vogel outpt.

## 2023-08-02 NOTE — DIETITIAN INITIAL EVALUATION ADULT - PROBLEM SELECTOR PLAN 4
Chronic. Pt on home eliquis.  - EKG on admission - 60bpm. Atrial-paced rhythm, QT/QTc 526/526. LBBB  - continue amiodarone, eliquis, aspirin.

## 2023-08-02 NOTE — PROGRESS NOTE ADULT - PROBLEM SELECTOR PLAN 7
H/H 9.8/30.9 on admission (appears to be at baseline)  - Pt has no active signs of bleeding. - continue home fenofibrate and statin.  - lipid panel- ldl 107 , tg 99.

## 2023-08-02 NOTE — PROGRESS NOTE ADULT - PROBLEM SELECTOR PLAN 9
DVT ppx - increased home Eliquis from 2.5mg BID to 5mg BID per cardio & pharmacy - continue home sertraline

## 2023-08-02 NOTE — PROVIDER CONTACT NOTE (OTHER) - ACTION/TREATMENT ORDERED:
Monitor and give meds as ordered
PMD made aware, states will place new orders, pt also kept informed.
No New orders at this time. Will Continue to monitor.

## 2023-08-02 NOTE — CASE MANAGEMENT PROGRESS NOTE - NSCMPROGRESSNOTE_GEN_ALL_CORE
The patient has a mobility limitation that significantly impairs the ability to participate in one or more mobility related activities of daily living in the home. The patient is able to safely use the rolling walker. The functional mobility deficit can be sufficiently resolved by use of rolling walker.

## 2023-08-02 NOTE — PROGRESS NOTE ADULT - PROBLEM SELECTOR PROBLEM 4
Met with Linn Lucia who's initial feeding plan was to breastfeed her baby boy  She has decided that she wants to exclusively Pump and Bottle feed  Instructions given on pumping  Discussed when to start, frequency and manual expression  Discussed hygiene of hands and supplies as well as assembly, placement of flanges, size of flanges, preparing the breast and cycles and suction settings on pump  Also provided her with the Ready Set Baby and the Discharge Breastfeeding Booklets for pumping and breast care information  Encouraged her to call for additional assistance and with questions as needed  Phone number provided  Case Management consulted for the [facility] Breast Pump  Chronic atrial fibrillation CKD (chronic kidney disease)

## 2023-08-02 NOTE — CASE MANAGEMENT PROGRESS NOTE - NSCMPROGRESSNOTE_GEN_ALL_CORE
CORA received a call back from Andreas at [facility], CM confirmed that patient will need home oxygen. CORA discussed that referral was sent to [facility] as the plan is for patient to be transitioned back to [facility] on 8/3/24. Adnreas stated that the concentrator will need to be delivered this afternoon or early tomorrow morning before the patient returns. She also stated that They will need the 3122 and a physical script for the oxygen faxed to them prior to the patient's return. CORA will notify MD.

## 2023-08-02 NOTE — PROGRESS NOTE ADULT - ASSESSMENT
83 y/o F pmhx of COPD (not on O2), Afib (on Eliquis), s/p pacemaker, HTN, CKD, HLD, Anemia, HFpEF, small bowel CA (s/p resection) presented from [facility] facility ([place]) with cough & shortness of breath x 3 days. Admitted for management of COPD exacerbation.  83yo F w/ PMH of COPD (not on O2), Afib (on Eliquis), s/p pacemaker, HTN, CKD, HLD, Anemia, HFpEF, small bowel CA (s/p resection) presented from [facility] assisted [facility] ([place]) with cough & shortness of breath x 3 days admitted with acute hypoxic respiratory failure due to COPD exacerbation and found to have rhino/enterovirus infection, as well as, likely superimposed bacterial PNA.

## 2023-08-03 ENCOUNTER — TRANSCRIPTION ENCOUNTER (OUTPATIENT)
Age: 83
End: 2023-08-03

## 2023-08-03 VITALS — DIASTOLIC BLOOD PRESSURE: 67 MMHG | SYSTOLIC BLOOD PRESSURE: 150 MMHG

## 2023-08-03 DIAGNOSIS — J96.01 ACUTE RESPIRATORY FAILURE WITH HYPOXIA: ICD-10-CM

## 2023-08-03 PROBLEM — I48.91 UNSPECIFIED ATRIAL FIBRILLATION: Chronic | Status: ACTIVE | Noted: 2023-07-26

## 2023-08-03 PROBLEM — Z95.0 PRESENCE OF CARDIAC PACEMAKER: Chronic | Status: ACTIVE | Noted: 2023-07-26

## 2023-08-03 PROBLEM — C17.9 MALIGNANT NEOPLASM OF SMALL INTESTINE, UNSPECIFIED: Chronic | Status: ACTIVE | Noted: 2023-07-26

## 2023-08-03 PROBLEM — J44.9 CHRONIC OBSTRUCTIVE PULMONARY DISEASE, UNSPECIFIED: Chronic | Status: ACTIVE | Noted: 2023-07-27

## 2023-08-03 PROBLEM — N18.9 CHRONIC KIDNEY DISEASE, UNSPECIFIED: Chronic | Status: ACTIVE | Noted: 2023-07-26

## 2023-08-03 PROBLEM — F32.9 MAJOR DEPRESSIVE DISORDER, SINGLE EPISODE, UNSPECIFIED: Chronic | Status: ACTIVE | Noted: 2023-07-26

## 2023-08-03 LAB
ANION GAP SERPL CALC-SCNC: 5 MMOL/L — SIGNIFICANT CHANGE UP (ref 5–17)
BUN SERPL-MCNC: 39 MG/DL — HIGH (ref 7–23)
CALCIUM SERPL-MCNC: 8.5 MG/DL — SIGNIFICANT CHANGE UP (ref 8.5–10.1)
CHLORIDE SERPL-SCNC: 100 MMOL/L — SIGNIFICANT CHANGE UP (ref 96–108)
CO2 SERPL-SCNC: 35 MMOL/L — HIGH (ref 22–31)
CREAT SERPL-MCNC: 1.1 MG/DL — SIGNIFICANT CHANGE UP (ref 0.5–1.3)
EGFR: 50 ML/MIN/1.73M2 — LOW
GLUCOSE SERPL-MCNC: 90 MG/DL — SIGNIFICANT CHANGE UP (ref 70–99)
HCT VFR BLD CALC: 29.6 % — LOW (ref 34.5–45)
HGB BLD-MCNC: 9.7 G/DL — LOW (ref 11.5–15.5)
MCHC RBC-ENTMCNC: 31.6 PG — SIGNIFICANT CHANGE UP (ref 27–34)
MCHC RBC-ENTMCNC: 32.8 GM/DL — SIGNIFICANT CHANGE UP (ref 32–36)
MCV RBC AUTO: 96.4 FL — SIGNIFICANT CHANGE UP (ref 80–100)
NRBC # BLD: 0 /100 WBCS — SIGNIFICANT CHANGE UP (ref 0–0)
PLATELET # BLD AUTO: 215 K/UL — SIGNIFICANT CHANGE UP (ref 150–400)
POTASSIUM SERPL-MCNC: 3.5 MMOL/L — SIGNIFICANT CHANGE UP (ref 3.5–5.3)
POTASSIUM SERPL-SCNC: 3.5 MMOL/L — SIGNIFICANT CHANGE UP (ref 3.5–5.3)
RBC # BLD: 3.07 M/UL — LOW (ref 3.8–5.2)
RBC # FLD: 14.5 % — SIGNIFICANT CHANGE UP (ref 10.3–14.5)
SODIUM SERPL-SCNC: 140 MMOL/L — SIGNIFICANT CHANGE UP (ref 135–145)
WBC # BLD: 8.85 K/UL — SIGNIFICANT CHANGE UP (ref 3.8–10.5)
WBC # FLD AUTO: 8.85 K/UL — SIGNIFICANT CHANGE UP (ref 3.8–10.5)

## 2023-08-03 PROCEDURE — 93306 TTE W/DOPPLER COMPLETE: CPT

## 2023-08-03 PROCEDURE — 93971 EXTREMITY STUDY: CPT

## 2023-08-03 PROCEDURE — 85025 COMPLETE CBC W/AUTO DIFF WBC: CPT

## 2023-08-03 PROCEDURE — 83735 ASSAY OF MAGNESIUM: CPT

## 2023-08-03 PROCEDURE — 96375 TX/PRO/DX INJ NEW DRUG ADDON: CPT

## 2023-08-03 PROCEDURE — 94640 AIRWAY INHALATION TREATMENT: CPT

## 2023-08-03 PROCEDURE — 80053 COMPREHEN METABOLIC PANEL: CPT

## 2023-08-03 PROCEDURE — 99285 EMERGENCY DEPT VISIT HI MDM: CPT | Mod: 25

## 2023-08-03 PROCEDURE — 93005 ELECTROCARDIOGRAM TRACING: CPT

## 2023-08-03 PROCEDURE — 84100 ASSAY OF PHOSPHORUS: CPT

## 2023-08-03 PROCEDURE — 94760 N-INVAS EAR/PLS OXIMETRY 1: CPT

## 2023-08-03 PROCEDURE — 0225U NFCT DS DNA&RNA 21 SARSCOV2: CPT

## 2023-08-03 PROCEDURE — 85027 COMPLETE CBC AUTOMATED: CPT

## 2023-08-03 PROCEDURE — 83880 ASSAY OF NATRIURETIC PEPTIDE: CPT

## 2023-08-03 PROCEDURE — 99239 HOSP IP/OBS DSCHRG MGMT >30: CPT

## 2023-08-03 PROCEDURE — 82803 BLOOD GASES ANY COMBINATION: CPT

## 2023-08-03 PROCEDURE — 80048 BASIC METABOLIC PNL TOTAL CA: CPT

## 2023-08-03 PROCEDURE — 85730 THROMBOPLASTIN TIME PARTIAL: CPT

## 2023-08-03 PROCEDURE — 36415 COLL VENOUS BLD VENIPUNCTURE: CPT

## 2023-08-03 PROCEDURE — 80061 LIPID PANEL: CPT

## 2023-08-03 PROCEDURE — 97530 THERAPEUTIC ACTIVITIES: CPT

## 2023-08-03 PROCEDURE — 97162 PT EVAL MOD COMPLEX 30 MIN: CPT

## 2023-08-03 PROCEDURE — 71250 CT THORAX DX C-: CPT

## 2023-08-03 PROCEDURE — 97116 GAIT TRAINING THERAPY: CPT

## 2023-08-03 PROCEDURE — 83036 HEMOGLOBIN GLYCOSYLATED A1C: CPT

## 2023-08-03 PROCEDURE — 85610 PROTHROMBIN TIME: CPT

## 2023-08-03 PROCEDURE — 71045 X-RAY EXAM CHEST 1 VIEW: CPT

## 2023-08-03 PROCEDURE — 84484 ASSAY OF TROPONIN QUANT: CPT

## 2023-08-03 PROCEDURE — 96374 THER/PROPH/DIAG INJ IV PUSH: CPT

## 2023-08-03 RX ORDER — FLUTICASONE FUROATE AND VILANTEROL TRIFENATATE 100; 25 UG/1; UG/1
1 POWDER RESPIRATORY (INHALATION)
Qty: 1 | Refills: 0
Start: 2023-08-03 | End: 2023-08-30

## 2023-08-03 RX ORDER — FENOFIBRATE,MICRONIZED 130 MG
1 CAPSULE ORAL
Refills: 0 | DISCHARGE

## 2023-08-03 RX ORDER — FERROUS SULFATE 325(65) MG
1 TABLET ORAL
Qty: 30 | Refills: 0
Start: 2023-08-03 | End: 2023-09-01

## 2023-08-03 RX ORDER — APIXABAN 2.5 MG/1
1 TABLET, FILM COATED ORAL
Qty: 0 | Refills: 0 | DISCHARGE
Start: 2023-08-03

## 2023-08-03 RX ORDER — ASPIRIN/CALCIUM CARB/MAGNESIUM 324 MG
1 TABLET ORAL
Qty: 30 | Refills: 0
Start: 2023-08-03 | End: 2023-09-01

## 2023-08-03 RX ORDER — IPRATROPIUM/ALBUTEROL SULFATE 18-103MCG
3 AEROSOL WITH ADAPTER (GRAM) INHALATION
Qty: 60 | Refills: 0
Start: 2023-08-03 | End: 2023-09-01

## 2023-08-03 RX ORDER — AMLODIPINE BESYLATE 2.5 MG/1
1 TABLET ORAL
Qty: 30 | Refills: 0
Start: 2023-08-03 | End: 2023-09-01

## 2023-08-03 RX ORDER — CARVEDILOL PHOSPHATE 80 MG/1
1 CAPSULE, EXTENDED RELEASE ORAL
Qty: 60 | Refills: 0
Start: 2023-08-03 | End: 2023-09-01

## 2023-08-03 RX ORDER — HYDRALAZINE HCL 50 MG
1 TABLET ORAL
Qty: 90 | Refills: 0
Start: 2023-08-03 | End: 2023-09-01

## 2023-08-03 RX ORDER — BUDESONIDE, MICRONIZED 100 %
2 POWDER (GRAM) MISCELLANEOUS
Qty: 120 | Refills: 0
Start: 2023-08-03 | End: 2023-09-01

## 2023-08-03 RX ORDER — APIXABAN 2.5 MG/1
1 TABLET, FILM COATED ORAL
Qty: 60 | Refills: 0
Start: 2023-08-03 | End: 2023-09-01

## 2023-08-03 RX ORDER — AMLODIPINE BESYLATE 2.5 MG/1
1 TABLET ORAL
Qty: 90 | Refills: 0
Start: 2023-08-03

## 2023-08-03 RX ORDER — CARVEDILOL PHOSPHATE 80 MG/1
1 CAPSULE, EXTENDED RELEASE ORAL
Refills: 0 | DISCHARGE

## 2023-08-03 RX ORDER — POTASSIUM CHLORIDE 20 MEQ
1 PACKET (EA) ORAL
Qty: 30 | Refills: 0
Start: 2023-08-03 | End: 2023-09-01

## 2023-08-03 RX ORDER — FENOFIBRATE,MICRONIZED 130 MG
1 CAPSULE ORAL
Qty: 30 | Refills: 0
Start: 2023-08-03 | End: 2023-09-01

## 2023-08-03 RX ORDER — BUDESONIDE, MICRONIZED 100 %
2 POWDER (GRAM) MISCELLANEOUS
Refills: 0 | DISCHARGE

## 2023-08-03 RX ORDER — ACETAMINOPHEN 500 MG
2 TABLET ORAL
Qty: 240 | Refills: 0
Start: 2023-08-03 | End: 2023-09-01

## 2023-08-03 RX ORDER — AMIODARONE HYDROCHLORIDE 400 MG/1
1 TABLET ORAL
Qty: 30 | Refills: 0
Start: 2023-08-03 | End: 2023-09-01

## 2023-08-03 RX ORDER — IPRATROPIUM/ALBUTEROL SULFATE 18-103MCG
3 AEROSOL WITH ADAPTER (GRAM) INHALATION
Qty: 0 | Refills: 0 | DISCHARGE

## 2023-08-03 RX ORDER — POTASSIUM CHLORIDE 20 MEQ
1 PACKET (EA) ORAL
Qty: 0 | Refills: 0 | DISCHARGE

## 2023-08-03 RX ORDER — SERTRALINE 25 MG/1
1 TABLET, FILM COATED ORAL
Qty: 30 | Refills: 0
Start: 2023-08-03 | End: 2023-09-01

## 2023-08-03 RX ORDER — POTASSIUM CHLORIDE 20 MEQ
40 PACKET (EA) ORAL ONCE
Refills: 0 | Status: COMPLETED | OUTPATIENT
Start: 2023-08-03 | End: 2023-08-03

## 2023-08-03 RX ADMIN — AMIODARONE HYDROCHLORIDE 200 MILLIGRAM(S): 400 TABLET ORAL at 05:26

## 2023-08-03 RX ADMIN — CARVEDILOL PHOSPHATE 12.5 MILLIGRAM(S): 80 CAPSULE, EXTENDED RELEASE ORAL at 05:26

## 2023-08-03 RX ADMIN — Medication 40 MILLIGRAM(S): at 05:30

## 2023-08-03 RX ADMIN — AMLODIPINE BESYLATE 10 MILLIGRAM(S): 2.5 TABLET ORAL at 05:25

## 2023-08-03 RX ADMIN — Medication 3 MILLILITER(S): at 00:51

## 2023-08-03 RX ADMIN — Medication 81 MILLIGRAM(S): at 11:11

## 2023-08-03 RX ADMIN — Medication 48 MILLIGRAM(S): at 11:08

## 2023-08-03 RX ADMIN — Medication 4 MILLILITER(S): at 06:52

## 2023-08-03 RX ADMIN — BUDESONIDE AND FORMOTEROL FUMARATE DIHYDRATE 2 PUFF(S): 160; 4.5 AEROSOL RESPIRATORY (INHALATION) at 05:26

## 2023-08-03 RX ADMIN — SERTRALINE 25 MILLIGRAM(S): 25 TABLET, FILM COATED ORAL at 11:08

## 2023-08-03 RX ADMIN — Medication 50 MILLIGRAM(S): at 05:26

## 2023-08-03 RX ADMIN — Medication 3 MILLILITER(S): at 13:15

## 2023-08-03 RX ADMIN — Medication 325 MILLIGRAM(S): at 11:08

## 2023-08-03 RX ADMIN — Medication 50 MILLIGRAM(S): at 13:56

## 2023-08-03 RX ADMIN — NYSTATIN CREAM 1 APPLICATION(S): 100000 CREAM TOPICAL at 05:26

## 2023-08-03 RX ADMIN — Medication 1 TABLET(S): at 11:08

## 2023-08-03 RX ADMIN — Medication 40 MILLIEQUIVALENT(S): at 14:16

## 2023-08-03 RX ADMIN — Medication 3 MILLILITER(S): at 06:52

## 2023-08-03 RX ADMIN — APIXABAN 5 MILLIGRAM(S): 2.5 TABLET, FILM COATED ORAL at 05:25

## 2023-08-03 NOTE — DISCHARGE NOTE PROVIDER - NSDCMRMEDTOKEN_GEN_ALL_CORE_FT
acetaminophen 325 mg oral tablet: 2 tab(s) orally every 6 hours, As Needed  amiodarone 200 mg oral tablet: 1 tab(s) orally once a day  apixaban 5 mg oral tablet: 1 tab(s) orally 2 times a day  aspirin 81 mg oral tablet, chewable: 1 tab(s) orally once a day  Breo Ellipta 100 mcg-25 mcg/inh inhalation powder: 1 puff(s) inhaled once a day  budesonide 0.5 mg/2 mL inhalation suspension: 2 milliliter(s) by nebulizer 2 times a day  carvedilol 12.5 mg oral tablet: 1 tab(s) orally 2 times a day  fenofibrate 48 mg oral tablet: 1 tab(s) orally once a day  ferrous sulfate 325 mg (65 mg elemental iron) oral tablet: 1 tab(s) orally once a day  ipratropium-albuterol 0.5 mg-2.5 mg/3 mL inhalation solution: 3 milliliter(s) by nebulizer 2 times a day as needed for  bronchospasm q4 hours  Multiple Vitamins oral tablet: 1 tab(s) orally once a day  pravastatin 20 mg oral tablet: 1 tab(s) orally once a day  sertraline 25 mg oral tablet: 1 tab(s) orally once a day  torsemide 20 mg oral tablet: 2 tab(s) orally once a day   acetaminophen 325 mg oral tablet: 2 tab(s) orally every 6 hours as needed for  mild pain  amiodarone 200 mg oral tablet: 1 tab(s) orally once a day  apixaban 5 mg oral tablet: 1 tab(s) orally 2 times a day  aspirin 81 mg oral tablet, chewable: 1 tab(s) orally once a day  Breo Ellipta 100 mcg-25 mcg/inh inhalation powder: 1 puff(s) inhaled once a day  budesonide 0.5 mg/2 mL inhalation suspension: 2 milliliter(s) by nebulizer 2 times a day  carvedilol 12.5 mg oral tablet: 1 tab(s) orally 2 times a day  fenofibrate 48 mg oral tablet: 1 tab(s) orally once a day  ferrous sulfate 325 mg (65 mg elemental iron) oral tablet: 1 tab(s) orally once a day  home oxygen 3L via nasal cannula: home oxygen 3L via nasal cannula  hydrALAZINE 50 mg oral tablet: 1 tab(s) orally 3 times a day  ipratropium-albuterol 0.5 mg-2.5 mg/3 mL inhalation solution: 3 milliliter(s) by nebulizer 2 times a day can take up to 4x per day if needed for wheezing  Multiple Vitamins oral tablet: 1 tab(s) orally once a day  Potassium Chloride (Eqv-Klor-Con M20) 20 mEq oral tablet, extended release: 1 tab(s) orally once a day  pravastatin 20 mg oral tablet: 1 tab(s) orally once a day  predniSONE 10 mg oral tablet: 3 tab(s) orally once a day Take 30mg (3 tabs) once daily for 3 days starting on 8/4  predniSONE 10 mg oral tablet: 1 tab(s) orally once a day Take 10mg (1 tab) once daily for 3 days starting on 8/10  predniSONE 10 mg oral tablet: 2 tab(s) orally once a day Take 20mg (2 tabs) daily starting on 8/7  sertraline 25 mg oral tablet: 1 tab(s) orally once a day  torsemide 20 mg oral tablet: 2 tab(s) orally once a day

## 2023-08-03 NOTE — DISCHARGE NOTE PROVIDER - PROVIDER TOKENS
PROVIDER:[TOKEN:[[id number]],FOLLOWUP:[1 week]],PROVIDER:[TOKEN:[[id number]],FOLLOWUP:[1 week],ESTABLISHEDPATIENT:[T]] PROVIDER:[TOKEN:[[id number]],FOLLOWUP:[1 week],ESTABLISHEDPATIENT:[T]],PROVIDER:[TOKEN:[[id number]]]

## 2023-08-03 NOTE — DISCHARGE NOTE NURSING/CASE MANAGEMENT/SOCIAL WORK - PATIENT PORTAL LINK FT
You can access the FollowMyHealth Patient Portal offered by [facility] by registering at the following website: [email or web address]. By joining [employer]’s FollowMyHealth portal, you will also be able to view your health information using other applications (apps) compatible with our system.

## 2023-08-03 NOTE — DISCHARGE NOTE PROVIDER - NSDCFUSCHEDAPPT_GEN_ALL_CORE_FT
Nagi Lopez  [facility] Physician [facility]  CARDIOLOGY [address] [facility]  Scheduled Appointment: 08/16/2023

## 2023-08-03 NOTE — DISCHARGE NOTE PROVIDER - CARE PROVIDER_API CALL
Yandy Nguyễn  Cardiovascular Disease  [address] [place] [address]  Phone: [phone]  Fax: [phone]  Follow Up Time: 1 week    Mariano Mahajan  Pulmonary Disease  [address], [address]  [place] [address]  Phone: [phone]  Fax: [phone]  Established Patient  Follow Up Time: 1 week   Mariano Mahajan  Pulmonary Disease  [address]  [place] [address]  Phone: [phone]  Fax: [phone]  Established Patient  Follow Up Time: 1 week    Kam Vogel  Cardiovascular Disease  [address] [place] [address]  Phone: [phone]  Fax: [phone]  Follow Up Time:

## 2023-08-03 NOTE — DISCHARGE NOTE PROVIDER - NSDCCPCAREPLAN_GEN_ALL_CORE_FT
PRINCIPAL DISCHARGE DIAGNOSIS  Diagnosis: COPD exacerbation  Assessment and Plan of Treatment: You were admitted for worsening of your known COPD due to a viral infection with superimposed bacterial infection. You completed a course of IV antibiotics and received steroids with improvement in your respiratory status. You required supplemental oxygen during the hospitalization and will be provided with supplemental oxygen to be used upon discharge at the advanced living facility. Start Prednisone (steroid) taper as directed below (this medication has been sent to your pharmacy with instructions as well).  Prednisone 30mg (3 tabs) daily starting on 8/4  Prednisone 20mg (2 tabs) daily starting on 8/7  Prednisone 10mg (1 tab) daily starting on 8/10  Continue your home inhalers. Return to the ED if you become short of breath again. Follow up with your PCP within 1 week of discharge.      SECONDARY DISCHARGE DIAGNOSES  Diagnosis: HTN (hypertension)  Assessment and Plan of Treatment: You have a known history of hypertension and your blood pressure was closely monitored during the hospitalization. You were started on Norvasc 10mg daily for better blood pressure control. Start taking Norvasc 10mg daily.    Diagnosis: Chronic diastolic congestive heart failure  Assessment and Plan of Treatment: You have a known history of congestive heart failure. Your fluid status was monitored closely and you were diuresed with IV Lasix per daily assessment. Continue home Torsemide and return to ED if you develop signs/symptoms of heart failure exacerbation including swelling or shortness of breath.     PRINCIPAL DISCHARGE DIAGNOSIS  Diagnosis: COPD exacerbation  Assessment and Plan of Treatment: You were admitted for worsening of your known COPD due to a viral infection with superimposed bacterial infection. You completed a course of IV antibiotics and received steroids with improvement in your respiratory status. You required supplemental oxygen during the hospitalization and will be provided with supplemental oxygen to be used upon discharge at the advanced living facility. Start Prednisone (steroid) taper as directed below (this medication has been sent to your pharmacy with instructions as well).  Prednisone 30mg (3 tabs) daily starting on 8/4  Prednisone 20mg (2 tabs) daily starting on 8/7  Prednisone 10mg (1 tab) daily starting on 8/10  Continue your home inhalers. Return to the ED if you become short of breath again. Follow up with your PCP within 1 week of discharge.      SECONDARY DISCHARGE DIAGNOSES  Diagnosis: HTN (hypertension)  Assessment and Plan of Treatment: You have a known history of hypertension and your blood pressure was closely monitored during the hospitalization. You were started on Norvasc (amlodipine) 5mg daily and Hydralazine 25mg three times a day for better blood pressure control.   Please start taking Norvasc 5mg daily.  Please start taking Hydralazine 25mg three times a day    Diagnosis: Chronic diastolic congestive heart failure  Assessment and Plan of Treatment: You have a known history of congestive heart failure. Your fluid status was monitored closely and you were diuresed with IV Lasix per daily assessment. Continue home Torsemide and return to ED if you develop signs/symptoms of heart failure exacerbation including swelling or shortness of breath.     PRINCIPAL DISCHARGE DIAGNOSIS  Diagnosis: COPD exacerbation  Assessment and Plan of Treatment: You were admitted for worsening of your known COPD due to a viral infection with superimposed bacterial infection. You completed a course of IV antibiotics and received steroids with improvement in your respiratory status. You required supplemental oxygen during the hospitalization and will be provided with supplemental oxygen to be used upon discharge at the advanced living facility. Start Prednisone (steroid) taper as directed below (this medication has been sent to your pharmacy with instructions as well).  Prednisone 30mg (3 tabs) daily starting on 8/4 for three days  Prednisone 20mg (2 tabs) daily starting on 8/7 for three days  Prednisone 10mg (1 tab) daily starting on 8/10 for three days  Continue your home inhalers. Return to the ED if you become short of breath again. Follow up with your PCP within 1 week of discharge.  Follow up with pulmonologist Dr. Mahajan on scheduled appointment 8/18/23 at 9AM.  You have needed to use supplemental oxygen. Continue to use your home oxygen at 3L [place].      SECONDARY DISCHARGE DIAGNOSES  Diagnosis: HTN (hypertension)  Assessment and Plan of Treatment: You have a known history of hypertension and your blood pressure was closely monitored during the hospitalization. You were started on Hydralazine 50mg three times a day by cardiologist for better blood pressure control as your blood pressure was very high.  Please start taking Hydralazine 50mg three times a day as prescribed.  Continue your home cavedilol and torsemide.   Monitor your blood pressure, and make a daily log of values twice a day to show your cardiologist and discuss if any further changes need to be made.    Diagnosis: Chronic diastolic congestive heart failure  Assessment and Plan of Treatment: You have a known history of congestive heart failure. Your fluid status was monitored closely and you were diuresed with IV Lasix per daily assessment. Continue home Torsemide and return to ED if you develop signs/symptoms of heart failure exacerbation including swelling or shortness of breath. Continue taking your home potassium supplement.  Recheck your kidney function and electrolyte blood test (BMP) in a week with your PCP or cardiologist.     PRINCIPAL DISCHARGE DIAGNOSIS  Diagnosis: COPD exacerbation  Assessment and Plan of Treatment: You were admitted for worsening of your known COPD due to a viral infection with superimposed bacterial infection. You completed a course of IV antibiotics and received steroids with improvement in your respiratory status. You required supplemental oxygen during the hospitalization and will be provided with supplemental oxygen to be used upon discharge at the advanced living facility. Start Prednisone (steroid) taper as directed below (this medication has been sent to your pharmacy with instructions as well).  Prednisone 30mg (3 tabs) daily starting on 8/4 for three days  Prednisone 20mg (2 tabs) daily starting on 8/7 for three days  Prednisone 10mg (1 tab) daily starting on 8/10 for three days  Continue your home inhalers. Return to the ED if you become short of breath again. Follow up with your PCP within 1 week of discharge.  Follow up with pulmonologist Dr. Mahajan on scheduled appointment 8/18/23 at 9AM.  You have needed to use supplemental oxygen. Continue to use your home oxygen at 3L [place].      SECONDARY DISCHARGE DIAGNOSES  Diagnosis: HTN (hypertension)  Assessment and Plan of Treatment: You have a known history of hypertension and your blood pressure was closely monitored during the hospitalization. You were started on Hydralazine 50mg three times a day by cardiologist for better blood pressure control as your blood pressure was very high.  Please start taking Hydralazine 50mg three times a day as prescribed.  Continue your home cavedilol and torsemide.   Monitor your blood pressure, and make a daily log of values twice a day to show your cardiologist and discuss if any further changes need to be made.    Diagnosis: Chronic diastolic congestive heart failure  Assessment and Plan of Treatment: You have a known history of congestive heart failure. Your fluid status was monitored closely and you were diuresed with IV Lasix per daily assessment. Continue home Torsemide and return to ED if you develop signs/symptoms of heart failure exacerbation including swelling or shortness of breath. Continue taking your home potassium supplement.  Recheck your kidney function and electrolyte blood test (BMP) in a week with your PCP or cardiologist.    Diagnosis: Atrial fibrillation  Assessment and Plan of Treatment: Continue your home amiodarone and carvedilol.  Your dose of apixaban (aka Eliquis) was INCREASED to 5mg twice a day by the cardiology team following your care in the hospital.   Please continue at that higher dose. Do NOT take your prior 2.5mg dose that you might have left over at home.   Follow up with your cardiologist, Dr. Vogel, in a wek.

## 2023-08-03 NOTE — DISCHARGE NOTE NURSING/CASE MANAGEMENT/SOCIAL WORK - NSDCFUADDAPPT_GEN_ALL_CORE_FT
DR RAUSCH ON VK NEXT WEEK BACK 8/14 DR LOPEZ ON VK NEXT WEEK BACK 8/14-appt made ayesha Bernardo for 8/16 1245 pm w Dr Lopez  will see PCP Dr Mahajan at [facility] prior to that appt F/U oneil Mahajan s/w Hina appt arranged 8/18 9am  F/U oneil Mahajan s/w Hina appt arranged 8/18 9am on wait list for earlier

## 2023-08-03 NOTE — DISCHARGE NOTE PROVIDER - HOSPITAL COURSE
ADMISSION H+P:    HPI:  81 y/o F pmhx of COPD (not on O2), Afib (on Eliquis), s/p pacemaker, HTN, CKD, HLD, Anemia, HFpEF, small bowel CA (s/p resection) presented from [facility] ([place]) with cough & shortness of breath x 3 days. Pt also  reports chills and productive cough w/ greyish-brown sputum that also began 3 days ago. She reports having an CXR done at the facility which revealed a "consolidation" on the left lung. She states she was started on an oral antibiotic (unsure) which she took yesterday. She notes progressively worsening SOB, cough since 3 days ago. Pt also notes increasing LE swelling R > L. Of note, Pt has a chronic R leg rash since recovering from cellulitis last year. Denies fevers, n/v/d, chest pain, abdominal pain, numbness/tingling.     ED Course:  Vitals: 60bpm, 97.8F, 143/83mmHg, 98% on 3L NC  Labs: Hb 9.8, GFR 41, BNP 2690  EKG: Atrial-paced rhythm, QT/QTc 526/526. LBBB  Imaging:  CXR -There is a persistent mild left base effusion with adjacent small infiltrate. Stable left base findings and pacemaker    In ED given azithromycin 500mg x1, rocephin 1g x1, duoneb x1, solumedrol x1.  (26 Jul 2023 15:50)      ---  HOSPITAL COURSE:   Patient was admitted for acute hypoxic respiratory failure 2/2 COPD exacerbation in the setting of Entero/Rhinovirus with superimposed bacterial infection. Patient was treated with steroids and empiric course of IV abx with improvement in respiratory status. Given Hx of CHF, pt was diuresed daily per assessment for signs/symptoms of volume overload. Patient had KELSEY on admission that resolved with avoidance of nephrotoxic meds and close monitoring of renal function. PT evaluated patient and recommended return to [facility] upon discharge. Patient was provided home oxygen upon discharge due to persistent supplemental oxygen requirements during the hospitalization.     Patient was medically optimized and improved clinically throughout hospital course. Patient seen and examined on day of discharge.    Vital Signs  T(C): 36.5 (03 Aug 2023 11:20), Max: 36.6 (02 Aug 2023 19:52)  T(F): 97.7 (03 Aug 2023 11:20), Max: 97.9 (02 Aug 2023 19:52)  HR: 60 (03 Aug 2023 11:20) (60 - 68)  BP: 153/66 (03 Aug 2023 11:20) (139/75 - 176/74)  RR: 20 (03 Aug 2023 11:20) (18 - 20)  SpO2: 95% (03 Aug 2023 11:20) (93% - 98%)    Physical Exam:  General: well-developed, well-nourished, NAD  HEENT: normocephalic, atraumatic, EOMI, moist mucous membranes   Neurology: AAOx3,appropriately responsive to questions and commands  Respiratory: decreased breath sounds bilaterally; no wheezes, rhonchi, or rales  CV: regular rate and rhythm, soft S1/S2, no murmurs, rubs, or gallops  Abdominal: soft, non-tender, non-distended  Extremities: no clubbing, cyanosis, or edema; palpable peripheral pulses  Skin: warm, dry    Patient is medically stable for discharge to [facility] with outpatient follow up.  ---  CONSULTANTS:   Cardio- Dr. Nguyễn  ---  TIME SPENT:   I, the attending physician, was physically present for the key portions of the evaluation and management (E/M) service provided. The total amount of time spent reviewing the hospital course, laboratory values, imaging findings, assessing/counseling the patient, discussing with consultant physicians, social work, nursing staff was -- minutes.     ---  FINAL DISCHARGE DIAGNOSIS LIST:  Please see last daily progress note for final discharge diagnoses    ---  Primary care provider was made aware of plan for discharge:  [    ] NO     [     ] YES       ADMISSION H+P:    HPI:  83 y/o F pmhx of COPD (not on O2), Afib (on Eliquis), s/p pacemaker, HTN, CKD, HLD, Anemia, HFpEF, small bowel CA (s/p resection) presented from [facility] ([place]) with cough & shortness of breath x 3 days. Pt also  reports chills and productive cough w/ greyish-brown sputum that also began 3 days ago. She reports having an CXR done at the facility which revealed a "consolidation" on the left lung. She states she was started on an oral antibiotic (unsure) which she took yesterday. She notes progressively worsening SOB, cough since 3 days ago. Pt also notes increasing LE swelling R > L. Of note, Pt has a chronic R leg rash since recovering from cellulitis last year. Denies fevers, n/v/d, chest pain, abdominal pain, numbness/tingling.     ED Course:  Vitals: 60bpm, 97.8F, 143/83mmHg, 98% on 3L NC  Labs: Hb 9.8, GFR 41, BNP 2690  EKG: Atrial-paced rhythm, QT/QTc 526/526. LBBB  Imaging:  CXR -There is a persistent mild left base effusion with adjacent small infiltrate. Stable left base findings and pacemaker    In ED given azithromycin 500mg x1, rocephin 1g x1, duoneb x1, solumedrol x1.  (26 Jul 2023 15:50)      ---  HOSPITAL COURSE:   Patient was admitted for acute hypoxic respiratory failure 2/2 COPD exacerbation in the setting of Entero/Rhinovirus with superimposed bacterial infection. Patient was treated with duonebs, steroids and empiric course of IV abx with improvement in respiratory status. Given Hx of CHF, pt was diuresed daily per assessment for signs/symptoms of volume overload. Patient had KELSEY on admission that resolved with avoidance of nephrotoxic meds and close monitoring of renal function. PT evaluated patient and recommended return to [facility] upon discharge. Patient was provided home oxygen upon discharge due to persistent supplemental oxygen requirements during the hospitalization.     Patient was medically optimized and improved clinically throughout hospital course. Patient seen and examined on day of discharge.    Vital Signs  T(C): 36.5 (03 Aug 2023 11:20), Max: 36.6 (02 Aug 2023 19:52)  T(F): 97.7 (03 Aug 2023 11:20), Max: 97.9 (02 Aug 2023 19:52)  HR: 60 (03 Aug 2023 11:20) (60 - 68)  BP: 153/66 (03 Aug 2023 11:20) (139/75 - 176/74)  RR: 20 (03 Aug 2023 11:20) (18 - 20)  SpO2: 95% (03 Aug 2023 11:20) (93% - 98%)    Physical Exam:  General: well-developed, well-nourished, NAD  HEENT: normocephalic, atraumatic, EOMI, moist mucous membranes   Neurology: AAOx3,appropriately responsive to questions and commands  Respiratory: decreased breath sounds bilaterally; mild expiratory wheezes  CV: regular rate and rhythm, soft S1/S2, no murmurs, rubs, or gallops  Abdominal: soft, non-tender, non-distended  Extremities: no clubbing, cyanosis, or edema; palpable peripheral pulses  Skin: warm, dry    Patient is medically stable for discharge to [facility] with outpatient follow up.  ---  CONSULTANTS:   Cardio- Dr. Nguyễn  Pulm - Dr. OLIVER Callejas    ---  TIME SPENT:   I, the attending physician, was physically present for the key portions of the evaluation and management (E/M) service provided. The total amount of time spent reviewing the hospital course, laboratory values, imaging findings, assessing/counseling the patient, discussing with consultant physicians, social work, nursing staff was -- minutes.     ---  FINAL DISCHARGE DIAGNOSIS LIST:  Please see last daily progress note for final discharge diagnoses    ---  Primary care provider was made aware of plan for discharge:  [    ] NO     [     ] YES       ADMISSION H+P:    HPI:  81 y/o F pmhx of COPD (not on O2), Afib (on Eliquis), s/p pacemaker, HTN, CKD, HLD, Anemia, HFpEF, small bowel CA (s/p resection) presented from [facility] ([place]) with cough & shortness of breath x 3 days. Pt also  reports chills and productive cough w/ greyish-brown sputum that also began 3 days ago. She reports having an CXR done at the facility which revealed a "consolidation" on the left lung. She states she was started on an oral antibiotic (unsure) which she took yesterday. She notes progressively worsening SOB, cough since 3 days ago. Pt also notes increasing LE swelling R > L. Of note, Pt has a chronic R leg rash since recovering from cellulitis last year. Denies fevers, n/v/d, chest pain, abdominal pain, numbness/tingling.     ED Course:  Vitals: 60bpm, 97.8F, 143/83mmHg, 98% on 3L NC  Labs: Hb 9.8, GFR 41, BNP 2690  EKG: Atrial-paced rhythm, QT/QTc 526/526. LBBB  Imaging:  CXR -There is a persistent mild left base effusion with adjacent small infiltrate. Stable left base findings and pacemaker    In ED given azithromycin 500mg x1, rocephin 1g x1, duoneb x1, solumedrol x1.  (26 Jul 2023 15:50)      ---  HOSPITAL COURSE:   Patient was admitted for acute hypoxic respiratory failure 2/2 COPD exacerbation in the setting of Entero/Rhinovirus with superimposed bacterial infection. Patient was treated with duonebs, steroids and empiric course of IV abx with improvement in respiratory status. Given Hx of CHF, pt was diuresed daily per assessment for signs/symptoms of volume overload. Patient had KELSEY on admission that resolved with avoidance of nephrotoxic meds and close monitoring of renal function. PT evaluated patient and recommended return to [facility] upon discharge. Patient was provided home oxygen upon discharge due to persistent supplemental oxygen requirements during the hospitalization.     Patient was medically optimized and improved clinically throughout hospital course. Patient seen and examined on day of discharge.    Vital Signs  T(C): 36.5 (03 Aug 2023 11:20), Max: 36.6 (02 Aug 2023 19:52)  T(F): 97.7 (03 Aug 2023 11:20), Max: 97.9 (02 Aug 2023 19:52)  HR: 60 (03 Aug 2023 11:20) (60 - 68)  BP: 153/66 (03 Aug 2023 11:20) (139/75 - 176/74)  RR: 20 (03 Aug 2023 11:20) (18 - 20)  SpO2: 95% (03 Aug 2023 11:20) (93% - 98%)    Physical Exam:  General: well-developed, well-nourished, NAD  HEENT: normocephalic, atraumatic, EOMI, moist mucous membranes   Neurology: AAOx3,appropriately responsive to questions and commands  Respiratory: decreased breath sounds bilaterally; mild expiratory wheezes  CV: regular rate and rhythm, soft S1/S2, no murmurs, rubs, or gallops  Abdominal: soft, non-tender, non-distended  Extremities: no clubbing, cyanosis, or edema; palpable peripheral pulses  Skin: warm, dry    Patient is medically stable for discharge to [facility] with outpatient follow up.  ---  CONSULTANTS:   Cardio- Dr. Delma Teague - Dr. OLIVER Callejas    ---  TIME SPENT:  40min ADMISSION H+P:    HPI:  81 y/o F pmhx of COPD (not on O2), Afib (on Eliquis), s/p pacemaker, HTN, CKD, HLD, Anemia, HFpEF, small bowel CA (s/p resection) presented from [facility] ([place]) with cough & shortness of breath x 3 days. Pt also  reports chills and productive cough w/ greyish-brown sputum that also began 3 days ago. She reports having an CXR done at the facility which revealed a "consolidation" on the left lung. She states she was started on an oral antibiotic (unsure) which she took yesterday. She notes progressively worsening SOB, cough since 3 days ago. Pt also notes increasing LE swelling R > L. Of note, Pt has a chronic R leg rash since recovering from cellulitis last year. Denies fevers, n/v/d, chest pain, abdominal pain, numbness/tingling.     ED Course:  Vitals: 60bpm, 97.8F, 143/83mmHg, 98% on 3L NC  Labs: Hb 9.8, GFR 41, BNP 2690  EKG: Atrial-paced rhythm, QT/QTc 526/526. LBBB  Imaging:  CXR -There is a persistent mild left base effusion with adjacent small infiltrate. Stable left base findings and pacemaker    In ED given azithromycin 500mg x1, rocephin 1g x1, duoneb x1, solumedrol x1.  (26 Jul 2023 15:50)      ---  HOSPITAL COURSE:   Patient was admitted for acute hypoxic respiratory failure 2/2 COPD exacerbation in the setting of Entero/Rhinovirus with superimposed bacterial PNA. Patient was treated with duonebs, steroids and empiric course of IV abx with improvement in respiratory status. Given Hx of CHF, pt was diuresed daily per assessment for signs/symptoms of volume overload. Patient had KELSEY on admission that resolved with avoidance of nephrotoxic meds and close monitoring of renal function. PT evaluated patient and recommended return to [facility]-way upon discharge. Patient was provided home oxygen upon discharge due to persistent supplemental oxygen requirements during the hospitalization. Pt felt improved and was discharged back to her [facility]-way with close outpt f/up.       On day of discharge:  ROS: Reports cough is gradually improving. Denies fever, chills, SOB, CP, abd pain, n/v.  Vital Signs  T(C): 36.5 (03 Aug 2023 11:20), Max: 36.6 (02 Aug 2023 19:52)  T(F): 97.7 (03 Aug 2023 11:20), Max: 97.9 (02 Aug 2023 19:52)  HR: 60 (03 Aug 2023 11:20) (60 - 68)  BP: 153/66 (03 Aug 2023 11:20) (139/75 - 176/74)  RR: 20 (03 Aug 2023 11:20) (18 - 20)  SpO2: 95% (03 Aug 2023 11:20) (93% - 98%)    Physical Exam:  General: well-developed, well-nourished, NAD  HEENT: normocephalic, atraumatic, EOMI, moist mucous membranes   Respiratory: decreased breath sounds bilaterally; scant wheezes, largely CTA  CV: regular rate and rhythm, S1, S2  Abdominal: BS+, soft, non-tender, non-distended  Extremities: no cyanosis or calf tenderness  Neurology: appropriately responsive to questions and commands    ---  CONSULTANTS:   Cardio- Dr. Delma Teague - Dr. OLIVER Callejas    ---  TIME SPENT:  40min

## 2023-08-03 NOTE — DISCHARGE NOTE NURSING/CASE MANAGEMENT/SOCIAL WORK - NSTRANSFERBELONGINGSRESP_GEN_A_NUR
yes
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

## 2023-08-04 ENCOUNTER — TRANSCRIPTION ENCOUNTER (OUTPATIENT)
Age: 83
End: 2023-08-04

## 2023-08-07 ENCOUNTER — APPOINTMENT (OUTPATIENT)
Dept: CARE COORDINATION | Facility: HOME HEALTH | Age: 83
End: 2023-08-07
Payer: MEDICARE

## 2023-08-07 VITALS
OXYGEN SATURATION: 95 % | HEART RATE: 50 BPM | SYSTOLIC BLOOD PRESSURE: 140 MMHG | DIASTOLIC BLOOD PRESSURE: 58 MMHG | BODY MASS INDEX: 34.57 KG/M2 | WEIGHT: 177 LBS

## 2023-08-07 DIAGNOSIS — E78.00 PURE HYPERCHOLESTEROLEMIA, UNSPECIFIED: ICD-10-CM

## 2023-08-07 PROCEDURE — 99495 TRANSJ CARE MGMT MOD F2F 14D: CPT

## 2023-08-07 RX ORDER — IPRATROPIUM BROMIDE AND ALBUTEROL SULFATE 2.5; .5 MG/3ML; MG/3ML
0.5-2.5 (3) SOLUTION RESPIRATORY (INHALATION) TWICE DAILY
Refills: 0 | Status: ACTIVE | COMMUNITY
Start: 2023-08-07

## 2023-08-07 RX ORDER — BUDESONIDE 0.5 MG/2ML
0.5 INHALANT ORAL TWICE DAILY
Qty: 1 | Refills: 2 | Status: ACTIVE | COMMUNITY
Start: 2023-08-07 | End: 1900-01-01

## 2023-08-07 RX ORDER — SERTRALINE 25 MG/1
25 TABLET, FILM COATED ORAL
Qty: 30 | Refills: 0 | Status: ACTIVE | COMMUNITY
Start: 2023-08-07

## 2023-08-07 RX ORDER — IRBESARTAN 300 MG/1
300 TABLET ORAL
Qty: 30 | Refills: 0 | Status: DISCONTINUED | COMMUNITY
Start: 2019-01-10 | End: 2023-08-07

## 2023-08-07 RX ORDER — CARVEDILOL 12.5 MG/1
12.5 TABLET, FILM COATED ORAL
Refills: 3 | Status: ACTIVE | COMMUNITY
Start: 2023-08-07

## 2023-08-07 RX ORDER — GUAIFENESIN 600 MG/1
600 TABLET, EXTENDED RELEASE ORAL
Qty: 20 | Refills: 0 | Status: DISCONTINUED | COMMUNITY
Start: 2019-04-04 | End: 2023-08-07

## 2023-08-07 RX ORDER — POTASSIUM CHLORIDE 1500 MG/1
20 TABLET, EXTENDED RELEASE ORAL
Qty: 135 | Refills: 3 | Status: ACTIVE | COMMUNITY
Start: 2023-08-07

## 2023-08-07 RX ORDER — PRAVASTATIN SODIUM 20 MG/1
20 TABLET ORAL
Refills: 0 | Status: ACTIVE | COMMUNITY
Start: 2019-01-07

## 2023-08-07 RX ORDER — AMIODARONE HYDROCHLORIDE 200 MG/1
200 TABLET ORAL DAILY
Qty: 90 | Refills: 1 | Status: ACTIVE | COMMUNITY
Start: 2023-08-07

## 2023-08-07 RX ORDER — APIXABAN 5 MG/1
5 TABLET, FILM COATED ORAL
Qty: 60 | Refills: 5 | Status: ACTIVE | COMMUNITY
Start: 2023-08-07 | End: 1900-01-01

## 2023-08-07 RX ORDER — BISACODYL 5 MG/1
5 TABLET, DELAYED RELEASE ORAL
Qty: 2 | Refills: 0 | Status: DISCONTINUED | COMMUNITY
Start: 2019-01-25 | End: 2023-08-07

## 2023-08-07 RX ORDER — FLUTICASONE FUROATE AND VILANTEROL TRIFENATATE 100; 25 UG/1; UG/1
100-25 POWDER RESPIRATORY (INHALATION)
Refills: 2 | Status: ACTIVE | COMMUNITY
Start: 2019-01-07

## 2023-08-07 RX ORDER — TORSEMIDE 20 MG/1
20 TABLET ORAL DAILY
Qty: 60 | Refills: 2 | Status: ACTIVE | COMMUNITY
Start: 2023-08-07 | End: 1900-01-01

## 2023-08-07 RX ORDER — POLYETHYLENE GLYCOL-3350, SODIUM CHLORIDE, POTASSIUM CHLORIDE AND SODIUM BICARBONATE 420; 11.2; 5.72; 1.48 G/438.4G; G/438.4G; G/438.4G; G/438.4G
420 POWDER, FOR SOLUTION ORAL
Qty: 4000 | Refills: 0 | Status: DISCONTINUED | COMMUNITY
Start: 2019-01-25 | End: 2023-08-07

## 2023-08-07 RX ORDER — LEVOFLOXACIN 500 MG/1
500 TABLET, FILM COATED ORAL
Qty: 7 | Refills: 0 | Status: DISCONTINUED | COMMUNITY
Start: 2019-04-04 | End: 2023-08-07

## 2023-08-07 RX ORDER — CHLORHEXIDINE GLUCONATE 4 %
325 (65 FE) LIQUID (ML) TOPICAL DAILY
Refills: 0 | Status: ACTIVE | COMMUNITY
Start: 2023-08-07

## 2023-08-07 RX ORDER — CEPHALEXIN 500 MG/1
500 CAPSULE ORAL
Qty: 14 | Refills: 0 | Status: DISCONTINUED | COMMUNITY
Start: 2018-12-10 | End: 2023-08-07

## 2023-08-07 NOTE — PHYSICAL EXAM
[No Acute Distress] : no acute distress [Normal Sclera/Conjunctiva] : normal sclera/conjunctiva [Normal Outer Ear/Nose] : the outer ears and nose were normal in appearance [No JVD] : no jugular venous distention [No Respiratory Distress] : no respiratory distress  [Normal Rate] : normal rate  [Soft] : abdomen soft [Coordination Grossly Intact] : coordination grossly intact [No Focal Deficits] : no focal deficits [Normal Affect] : the affect was normal [Normal Insight/Judgement] : insight and judgment were intact [de-identified] : R and L base rhonchi [de-identified] : +1 LLE edema; trace RLE edema

## 2023-08-07 NOTE — HISTORY OF PRESENT ILLNESS
[Post-hospitalization from ___ Hospital] : Post-hospitalization from [unfilled] Hospital [Admitted on: ___] : The patient was admitted on [unfilled] [Discharged on ___] : discharged on [unfilled] [Discharge Summary] : discharge summary [Discharge Med List] : discharge medication list [Med Reconciliation] : medication reconciliation has been completed [Patient Contacted By: ____] : and contacted by [unfilled] [FreeTextEntry3] : Patient was contacted by TCM RN on 8/4/23 for 24/48 hour call and documentation is present in the Clinical Viewer  [FreeTextEntry2] : 81 y/o F pmhx of COPD, Afib (on Eliquis), s/p pacemaker, HTN, CKD, HLD, Anemia, HFpEF, small bowel CA (s/p resection) presented from [facility] ([place]) with cough & shortness of breath x 3 days. Patient was admitted for acute hypoxic respiratory failure 2/2 COPD exacerbation in the setting of Entero/Rhinovirus with superimposed bacterial PNA.  Given Hx of CHF, pt was diuresed daily per assessment for signs/symptoms of volume overload. Patient had KELSEY on admission that resolved with avoidance of nephrotoxic meds and close monitoring of renal function. PT evaluated patient and recommended return to [facility] upon discharge. Patient was provided home oxygen upon discharge due to persistent supplemental oxygen requirements during the hospitalization.   Since dc, pt states she has been feeling better but continues to have SOB and SINGH at times.  She has been on 3LNC ATC.  Her leg swelling has improved.  She has appt with cardio Dr. Lopez on 8/16 and pulm Dr. Mahajan on 8/9.  [facility] homecare had SOC today.  TCM numbers provided for any questions/concerns.

## 2023-08-16 ENCOUNTER — APPOINTMENT (OUTPATIENT)
Dept: CARDIOLOGY | Facility: CLINIC | Age: 83
End: 2023-08-16

## 2023-09-05 ENCOUNTER — INPATIENT (INPATIENT)
Facility: HOSPITAL | Age: 83
LOS: 6 days | Discharge: NURSING, REHAB OR LONG-TERM CARE | DRG: 689 | End: 2023-09-12
Attending: FAMILY MEDICINE | Admitting: FAMILY MEDICINE
Payer: MEDICARE

## 2023-09-05 VITALS
HEIGHT: 63 IN | TEMPERATURE: 98 F | SYSTOLIC BLOOD PRESSURE: 101 MMHG | OXYGEN SATURATION: 97 % | RESPIRATION RATE: 18 BRPM | DIASTOLIC BLOOD PRESSURE: 64 MMHG | HEART RATE: 60 BPM

## 2023-09-05 DIAGNOSIS — Z90.49 ACQUIRED ABSENCE OF OTHER SPECIFIED PARTS OF DIGESTIVE TRACT: Chronic | ICD-10-CM

## 2023-09-05 DIAGNOSIS — Z29.9 ENCOUNTER FOR PROPHYLACTIC MEASURES, UNSPECIFIED: ICD-10-CM

## 2023-09-05 DIAGNOSIS — Z98.89 OTHER SPECIFIED POSTPROCEDURAL STATES: Chronic | ICD-10-CM

## 2023-09-05 DIAGNOSIS — E87.6 HYPOKALEMIA: ICD-10-CM

## 2023-09-05 DIAGNOSIS — Z96.652 PRESENCE OF LEFT ARTIFICIAL KNEE JOINT: Chronic | ICD-10-CM

## 2023-09-05 DIAGNOSIS — R53.1 WEAKNESS: ICD-10-CM

## 2023-09-05 LAB
A1C WITH ESTIMATED AVERAGE GLUCOSE RESULT: 5 % — SIGNIFICANT CHANGE UP (ref 4–5.6)
ALBUMIN SERPL ELPH-MCNC: 3.4 G/DL — SIGNIFICANT CHANGE UP (ref 3.3–5)
ALP SERPL-CCNC: 78 U/L — SIGNIFICANT CHANGE UP (ref 40–120)
ALT FLD-CCNC: 32 U/L — SIGNIFICANT CHANGE UP (ref 12–78)
ANION GAP SERPL CALC-SCNC: 8 MMOL/L — SIGNIFICANT CHANGE UP (ref 5–17)
ANION GAP SERPL CALC-SCNC: 9 MMOL/L — SIGNIFICANT CHANGE UP (ref 5–17)
APPEARANCE UR: CLEAR — SIGNIFICANT CHANGE UP
APTT BLD: 32.5 SEC — SIGNIFICANT CHANGE UP (ref 24.5–35.6)
AST SERPL-CCNC: 34 U/L — SIGNIFICANT CHANGE UP (ref 15–37)
BASOPHILS # BLD AUTO: 0.02 K/UL — SIGNIFICANT CHANGE UP (ref 0–0.2)
BASOPHILS NFR BLD AUTO: 0.3 % — SIGNIFICANT CHANGE UP (ref 0–2)
BILIRUB SERPL-MCNC: 0.4 MG/DL — SIGNIFICANT CHANGE UP (ref 0.2–1.2)
BILIRUB UR-MCNC: NEGATIVE — SIGNIFICANT CHANGE UP
BUN SERPL-MCNC: 46 MG/DL — HIGH (ref 7–23)
BUN SERPL-MCNC: 52 MG/DL — HIGH (ref 7–23)
CALCIUM SERPL-MCNC: 8.9 MG/DL — SIGNIFICANT CHANGE UP (ref 8.5–10.1)
CALCIUM SERPL-MCNC: 9.1 MG/DL — SIGNIFICANT CHANGE UP (ref 8.5–10.1)
CHLORIDE SERPL-SCNC: 80 MMOL/L — LOW (ref 96–108)
CHLORIDE SERPL-SCNC: 87 MMOL/L — LOW (ref 96–108)
CO2 SERPL-SCNC: 37 MMOL/L — HIGH (ref 22–31)
CO2 SERPL-SCNC: 39 MMOL/L — HIGH (ref 22–31)
COLOR SPEC: YELLOW — SIGNIFICANT CHANGE UP
CREAT SERPL-MCNC: 1.3 MG/DL — SIGNIFICANT CHANGE UP (ref 0.5–1.3)
CREAT SERPL-MCNC: 1.6 MG/DL — HIGH (ref 0.5–1.3)
DIFF PNL FLD: NEGATIVE — SIGNIFICANT CHANGE UP
EGFR: 32 ML/MIN/1.73M2 — LOW
EGFR: 41 ML/MIN/1.73M2 — LOW
EOSINOPHIL # BLD AUTO: 0.04 K/UL — SIGNIFICANT CHANGE UP (ref 0–0.5)
EOSINOPHIL NFR BLD AUTO: 0.6 % — SIGNIFICANT CHANGE UP (ref 0–6)
ESTIMATED AVERAGE GLUCOSE: 97 MG/DL — SIGNIFICANT CHANGE UP (ref 68–114)
FERRITIN SERPL-MCNC: 261 NG/ML — SIGNIFICANT CHANGE UP (ref 13–330)
GLUCOSE SERPL-MCNC: 106 MG/DL — HIGH (ref 70–99)
GLUCOSE SERPL-MCNC: 87 MG/DL — SIGNIFICANT CHANGE UP (ref 70–99)
GLUCOSE UR QL: NEGATIVE MG/DL — SIGNIFICANT CHANGE UP
HCT VFR BLD CALC: 26.9 % — LOW (ref 34.5–45)
HGB BLD-MCNC: 9.1 G/DL — LOW (ref 11.5–15.5)
IMM GRANULOCYTES NFR BLD AUTO: 1.3 % — HIGH (ref 0–0.9)
INR BLD: 1.46 RATIO — HIGH (ref 0.85–1.18)
IRON SATN MFR SERPL: 17 % — SIGNIFICANT CHANGE UP (ref 14–50)
IRON SATN MFR SERPL: 53 UG/DL — SIGNIFICANT CHANGE UP (ref 30–160)
KETONES UR-MCNC: NEGATIVE MG/DL — SIGNIFICANT CHANGE UP
LACTATE SERPL-SCNC: 1.1 MMOL/L — SIGNIFICANT CHANGE UP (ref 0.7–2)
LEUKOCYTE ESTERASE UR-ACNC: ABNORMAL
LYMPHOCYTES # BLD AUTO: 1 K/UL — SIGNIFICANT CHANGE UP (ref 1–3.3)
LYMPHOCYTES # BLD AUTO: 14.1 % — SIGNIFICANT CHANGE UP (ref 13–44)
MAGNESIUM SERPL-MCNC: 2.2 MG/DL — SIGNIFICANT CHANGE UP (ref 1.6–2.6)
MCHC RBC-ENTMCNC: 31.2 PG — SIGNIFICANT CHANGE UP (ref 27–34)
MCHC RBC-ENTMCNC: 33.8 GM/DL — SIGNIFICANT CHANGE UP (ref 32–36)
MCV RBC AUTO: 92.1 FL — SIGNIFICANT CHANGE UP (ref 80–100)
MONOCYTES # BLD AUTO: 0.93 K/UL — HIGH (ref 0–0.9)
MONOCYTES NFR BLD AUTO: 13.1 % — SIGNIFICANT CHANGE UP (ref 2–14)
NEUTROPHILS # BLD AUTO: 5 K/UL — SIGNIFICANT CHANGE UP (ref 1.8–7.4)
NEUTROPHILS NFR BLD AUTO: 70.6 % — SIGNIFICANT CHANGE UP (ref 43–77)
NITRITE UR-MCNC: NEGATIVE — SIGNIFICANT CHANGE UP
NRBC # BLD: 0 /100 WBCS — SIGNIFICANT CHANGE UP (ref 0–0)
NT-PROBNP SERPL-SCNC: 1091 PG/ML — HIGH (ref 0–450)
PH UR: 6 — SIGNIFICANT CHANGE UP (ref 5–8)
PLATELET # BLD AUTO: 221 K/UL — SIGNIFICANT CHANGE UP (ref 150–400)
POTASSIUM SERPL-MCNC: 2.3 MMOL/L — CRITICAL LOW (ref 3.5–5.3)
POTASSIUM SERPL-MCNC: 3.1 MMOL/L — LOW (ref 3.5–5.3)
POTASSIUM SERPL-SCNC: 2.3 MMOL/L — CRITICAL LOW (ref 3.5–5.3)
POTASSIUM SERPL-SCNC: 3.1 MMOL/L — LOW (ref 3.5–5.3)
PROCALCITONIN SERPL-MCNC: 0.11 NG/ML — HIGH
PROT SERPL-MCNC: 6.4 G/DL — SIGNIFICANT CHANGE UP (ref 6–8.3)
PROT UR-MCNC: NEGATIVE MG/DL — SIGNIFICANT CHANGE UP
PROTHROM AB SERPL-ACNC: 16.9 SEC — HIGH (ref 9.5–13)
RAPID RVP RESULT: SIGNIFICANT CHANGE UP
RBC # BLD: 2.92 M/UL — LOW (ref 3.8–5.2)
RBC # FLD: 14.6 % — HIGH (ref 10.3–14.5)
SARS-COV-2 RNA SPEC QL NAA+PROBE: SIGNIFICANT CHANGE UP
SODIUM SERPL-SCNC: 127 MMOL/L — LOW (ref 135–145)
SODIUM SERPL-SCNC: 133 MMOL/L — LOW (ref 135–145)
SP GR SPEC: 1.01 — SIGNIFICANT CHANGE UP (ref 1–1.03)
TIBC SERPL-MCNC: 308 UG/DL — SIGNIFICANT CHANGE UP (ref 220–430)
TROPONIN I, HIGH SENSITIVITY RESULT: 24.2 NG/L — SIGNIFICANT CHANGE UP
TROPONIN I, HIGH SENSITIVITY RESULT: 25.3 NG/L — SIGNIFICANT CHANGE UP
UIBC SERPL-MCNC: 255 UG/DL — SIGNIFICANT CHANGE UP (ref 110–370)
UROBILINOGEN FLD QL: 0.2 MG/DL — SIGNIFICANT CHANGE UP (ref 0.2–1)
WBC # BLD: 7.08 K/UL — SIGNIFICANT CHANGE UP (ref 3.8–10.5)
WBC # FLD AUTO: 7.08 K/UL — SIGNIFICANT CHANGE UP (ref 3.8–10.5)

## 2023-09-05 PROCEDURE — 71045 X-RAY EXAM CHEST 1 VIEW: CPT | Mod: 26

## 2023-09-05 PROCEDURE — 99497 ADVNCD CARE PLAN 30 MIN: CPT

## 2023-09-05 PROCEDURE — 71250 CT THORAX DX C-: CPT | Mod: 26

## 2023-09-05 PROCEDURE — 93010 ELECTROCARDIOGRAM REPORT: CPT

## 2023-09-05 PROCEDURE — 99285 EMERGENCY DEPT VISIT HI MDM: CPT

## 2023-09-05 PROCEDURE — 76770 US EXAM ABDO BACK WALL COMP: CPT | Mod: 26

## 2023-09-05 RX ORDER — FUROSEMIDE 40 MG
40 TABLET ORAL DAILY
Refills: 0 | Status: DISCONTINUED | OUTPATIENT
Start: 2023-09-05 | End: 2023-09-06

## 2023-09-05 RX ORDER — SERTRALINE 25 MG/1
25 TABLET, FILM COATED ORAL DAILY
Refills: 0 | Status: DISCONTINUED | OUTPATIENT
Start: 2023-09-05 | End: 2023-09-12

## 2023-09-05 RX ORDER — CARVEDILOL PHOSPHATE 80 MG/1
12.5 CAPSULE, EXTENDED RELEASE ORAL EVERY 12 HOURS
Refills: 0 | Status: DISCONTINUED | OUTPATIENT
Start: 2023-09-05 | End: 2023-09-06

## 2023-09-05 RX ORDER — ASPIRIN/CALCIUM CARB/MAGNESIUM 324 MG
81 TABLET ORAL DAILY
Refills: 0 | Status: DISCONTINUED | OUTPATIENT
Start: 2023-09-05 | End: 2023-09-12

## 2023-09-05 RX ORDER — AMIODARONE HYDROCHLORIDE 400 MG/1
200 TABLET ORAL DAILY
Refills: 0 | Status: DISCONTINUED | OUTPATIENT
Start: 2023-09-05 | End: 2023-09-12

## 2023-09-05 RX ORDER — POTASSIUM CHLORIDE 20 MEQ
20 PACKET (EA) ORAL
Refills: 0 | Status: DISCONTINUED | OUTPATIENT
Start: 2023-09-05 | End: 2023-09-07

## 2023-09-05 RX ORDER — CEFTRIAXONE 500 MG/1
1000 INJECTION, POWDER, FOR SOLUTION INTRAMUSCULAR; INTRAVENOUS ONCE
Refills: 0 | Status: COMPLETED | OUTPATIENT
Start: 2023-09-05 | End: 2023-09-05

## 2023-09-05 RX ORDER — LACTOBACILLUS ACIDOPHILUS 100MM CELL
1 CAPSULE ORAL DAILY
Refills: 0 | Status: DISCONTINUED | OUTPATIENT
Start: 2023-09-05 | End: 2023-09-12

## 2023-09-05 RX ORDER — ACETAMINOPHEN 500 MG
650 TABLET ORAL EVERY 6 HOURS
Refills: 0 | Status: DISCONTINUED | OUTPATIENT
Start: 2023-09-05 | End: 2023-09-12

## 2023-09-05 RX ORDER — APIXABAN 2.5 MG/1
5 TABLET, FILM COATED ORAL
Refills: 0 | Status: DISCONTINUED | OUTPATIENT
Start: 2023-09-05 | End: 2023-09-12

## 2023-09-05 RX ORDER — FERROUS SULFATE 325(65) MG
325 TABLET ORAL DAILY
Refills: 0 | Status: DISCONTINUED | OUTPATIENT
Start: 2023-09-05 | End: 2023-09-12

## 2023-09-05 RX ORDER — FENOFIBRATE,MICRONIZED 130 MG
48 CAPSULE ORAL DAILY
Refills: 0 | Status: DISCONTINUED | OUTPATIENT
Start: 2023-09-05 | End: 2023-09-12

## 2023-09-05 RX ORDER — CEFTRIAXONE 500 MG/1
1000 INJECTION, POWDER, FOR SOLUTION INTRAMUSCULAR; INTRAVENOUS EVERY 24 HOURS
Refills: 0 | Status: COMPLETED | OUTPATIENT
Start: 2023-09-06 | End: 2023-09-08

## 2023-09-05 RX ORDER — FAMOTIDINE 10 MG/ML
20 INJECTION INTRAVENOUS DAILY
Refills: 0 | Status: DISCONTINUED | OUTPATIENT
Start: 2023-09-05 | End: 2023-09-12

## 2023-09-05 RX ORDER — ONDANSETRON 8 MG/1
4 TABLET, FILM COATED ORAL EVERY 8 HOURS
Refills: 0 | Status: DISCONTINUED | OUTPATIENT
Start: 2023-09-05 | End: 2023-09-12

## 2023-09-05 RX ORDER — HYDRALAZINE HCL 50 MG
50 TABLET ORAL THREE TIMES A DAY
Refills: 0 | Status: DISCONTINUED | OUTPATIENT
Start: 2023-09-05 | End: 2023-09-06

## 2023-09-05 RX ORDER — ALBUTEROL 90 UG/1
2 AEROSOL, METERED ORAL EVERY 6 HOURS
Refills: 0 | Status: DISCONTINUED | OUTPATIENT
Start: 2023-09-05 | End: 2023-09-12

## 2023-09-05 RX ORDER — LANOLIN ALCOHOL/MO/W.PET/CERES
3 CREAM (GRAM) TOPICAL AT BEDTIME
Refills: 0 | Status: DISCONTINUED | OUTPATIENT
Start: 2023-09-05 | End: 2023-09-12

## 2023-09-05 RX ORDER — POTASSIUM CHLORIDE 20 MEQ
10 PACKET (EA) ORAL
Refills: 0 | Status: COMPLETED | OUTPATIENT
Start: 2023-09-05 | End: 2023-09-05

## 2023-09-05 RX ORDER — CARVEDILOL PHOSPHATE 80 MG/1
12.5 CAPSULE, EXTENDED RELEASE ORAL EVERY 12 HOURS
Refills: 0 | Status: DISCONTINUED | OUTPATIENT
Start: 2023-09-05 | End: 2023-09-05

## 2023-09-05 RX ORDER — SODIUM CHLORIDE 9 MG/ML
500 INJECTION INTRAMUSCULAR; INTRAVENOUS; SUBCUTANEOUS ONCE
Refills: 0 | Status: COMPLETED | OUTPATIENT
Start: 2023-09-05 | End: 2023-09-05

## 2023-09-05 RX ORDER — TIOTROPIUM BROMIDE 18 UG/1
2 CAPSULE ORAL; RESPIRATORY (INHALATION) DAILY
Refills: 0 | Status: DISCONTINUED | OUTPATIENT
Start: 2023-09-05 | End: 2023-09-12

## 2023-09-05 RX ORDER — POTASSIUM CHLORIDE 20 MEQ
40 PACKET (EA) ORAL ONCE
Refills: 0 | Status: COMPLETED | OUTPATIENT
Start: 2023-09-05 | End: 2023-09-05

## 2023-09-05 RX ORDER — BUDESONIDE, MICRONIZED 100 %
0.5 POWDER (GRAM) MISCELLANEOUS
Refills: 0 | Status: DISCONTINUED | OUTPATIENT
Start: 2023-09-05 | End: 2023-09-12

## 2023-09-05 RX ADMIN — Medication 40 MILLIGRAM(S): at 08:33

## 2023-09-05 RX ADMIN — Medication 40 MILLIGRAM(S): at 13:36

## 2023-09-05 RX ADMIN — Medication 1 TABLET(S): at 11:11

## 2023-09-05 RX ADMIN — AMIODARONE HYDROCHLORIDE 200 MILLIGRAM(S): 400 TABLET ORAL at 18:13

## 2023-09-05 RX ADMIN — APIXABAN 5 MILLIGRAM(S): 2.5 TABLET, FILM COATED ORAL at 10:31

## 2023-09-05 RX ADMIN — Medication 100 MILLIEQUIVALENT(S): at 08:57

## 2023-09-05 RX ADMIN — Medication 100 MILLIEQUIVALENT(S): at 02:50

## 2023-09-05 RX ADMIN — Medication 50 MILLIGRAM(S): at 21:08

## 2023-09-05 RX ADMIN — Medication 81 MILLIGRAM(S): at 11:11

## 2023-09-05 RX ADMIN — Medication 325 MILLIGRAM(S): at 11:11

## 2023-09-05 RX ADMIN — Medication 20 MILLIEQUIVALENT(S): at 07:15

## 2023-09-05 RX ADMIN — Medication 50 MILLIGRAM(S): at 13:36

## 2023-09-05 RX ADMIN — SODIUM CHLORIDE 500 MILLILITER(S): 9 INJECTION INTRAMUSCULAR; INTRAVENOUS; SUBCUTANEOUS at 01:21

## 2023-09-05 RX ADMIN — FAMOTIDINE 20 MILLIGRAM(S): 10 INJECTION INTRAVENOUS at 11:11

## 2023-09-05 RX ADMIN — CEFTRIAXONE 100 MILLIGRAM(S): 500 INJECTION, POWDER, FOR SOLUTION INTRAMUSCULAR; INTRAVENOUS at 05:32

## 2023-09-05 RX ADMIN — Medication 40 MILLIEQUIVALENT(S): at 02:50

## 2023-09-05 RX ADMIN — ALBUTEROL 2 PUFF(S): 90 AEROSOL, METERED ORAL at 13:36

## 2023-09-05 RX ADMIN — Medication 0.5 MILLIGRAM(S): at 08:46

## 2023-09-05 RX ADMIN — ALBUTEROL 2 PUFF(S): 90 AEROSOL, METERED ORAL at 08:33

## 2023-09-05 RX ADMIN — Medication 48 MILLIGRAM(S): at 11:11

## 2023-09-05 RX ADMIN — Medication 40 MILLIGRAM(S): at 21:08

## 2023-09-05 RX ADMIN — ALBUTEROL 2 PUFF(S): 90 AEROSOL, METERED ORAL at 19:11

## 2023-09-05 RX ADMIN — Medication 10 MILLIEQUIVALENT(S): at 05:25

## 2023-09-05 RX ADMIN — TIOTROPIUM BROMIDE 2 PUFF(S): 18 CAPSULE ORAL; RESPIRATORY (INHALATION) at 08:33

## 2023-09-05 RX ADMIN — Medication 0.5 MILLIGRAM(S): at 20:31

## 2023-09-05 RX ADMIN — Medication 20 MILLIEQUIVALENT(S): at 18:13

## 2023-09-05 RX ADMIN — APIXABAN 5 MILLIGRAM(S): 2.5 TABLET, FILM COATED ORAL at 21:08

## 2023-09-05 RX ADMIN — SERTRALINE 25 MILLIGRAM(S): 25 TABLET, FILM COATED ORAL at 11:11

## 2023-09-05 RX ADMIN — Medication 20 MILLIEQUIVALENT(S): at 11:12

## 2023-09-05 RX ADMIN — Medication 100 MILLIEQUIVALENT(S): at 06:35

## 2023-09-05 NOTE — ED PROVIDER NOTE - WR INTERPRETATION 1
No clear new consolidations, concern for possible persistent left-sided effusion versus consolidation.

## 2023-09-05 NOTE — PHYSICAL THERAPY INITIAL EVALUATION ADULT - ADDITIONAL COMMENTS
Patient resides in assisted living, was independent in all ADLs and ambulated independently with rollator.

## 2023-09-05 NOTE — ED PROVIDER NOTE - PROGRESS NOTE DETAILS
Patient hypokalemic, hyponatremic and hypochloremic.  We will plan to replete potassium, admit for fatigue and weakness.  Has mild KELSEY as well.  We will plan to admit.  Jadiel Duke MD. Awaiting callback from Dr. Pate.  Patient states that she used to take 2 potassium pills a day, but now she only takes 1.  Jadiel Duke MD. spoke to dr. alejandre, will admit. Jadiel Duke MD.

## 2023-09-05 NOTE — ED CLERICAL - NS ED CLERK NOTE PRE-ARRIVAL INFORMATION; ADDITIONAL PRE-ARRIVAL INFORMATION
This patient is enrolled in the [facility] readmission reduction initiative and has active care navigation. This patient can be followed up by the care navigation team within 24 hours.  To arrange close follow-up or to obtain additional clinical information, please call the care navigation contact number above.      For patients previously on the Hospitalist Service please call the hospitalist at [phone] for input and/or consultation PRIOR to admission. If the patient was on a Voluntary Physician’s service please contact that physician for input and/or consultation PRIOR to admission.

## 2023-09-05 NOTE — H&P ADULT - HISTORY OF PRESENT ILLNESS
JAZ BAIG is an 83 YO Female presented to the ED from assisted living facility with generalized weakness.  Present for the past few days.  No nausea or vomiting.  Denies any cough or new shortness of breath.  No fevers.  States that she does have aching pain to her left arm and bilateral legs.  States tonight that she was having trouble getting in and out of bed and felt too weak which is why she came to ED.  States that her stools are always dark because of iron but no changes recently.  Patient with a past medical history of A-fib on Eliquis, COPD now on home 2 to 3 L nasal cannula oxygen, hypertension, hyperlipidemia, chronic anemia, heart failure, CKD not on dialysis.

## 2023-09-05 NOTE — CONSULT NOTE ADULT - ASSESSMENT
KELSEY: Prerenal azotemia  Hyponatremia  Hypokalemia KELSEY on CKD 3, CRS  Hyponatremia: On thiazide diuretic  Hypokalemia    Get repeat labs to monitor trend. Hold metolazone. Potassium supplementation. Avoid nephrotoxic meds as possible.   Cardiology follow up. Will follow electrolytes and renal function trend. D/w patient regarding need for out patient nephrology follow up.     Further recommendations pending clinical course. Thank you for the courtesy of this referral.

## 2023-09-05 NOTE — CONSULT NOTE ADULT - SUBJECTIVE AND OBJECTIVE BOX
Patient is a 82y old  Female who presents with a chief complaint of Generalized weakness.  Anemia possibly secondary to GI Bleeding. (05 Sep 2023 09:01)    HPI:  JAZ BAIG is an 81 YO Female presented to the ED from assisted living facility with generalized weakness.  Present for the past few days.  No nausea or vomiting.  Denies any cough or new shortness of breath.  No fevers.  States that she does have aching pain to her left arm and bilateral legs.  States tonight that she was having trouble getting in and out of bed and felt too weak which is why she came to ED.  States that her stools are always dark because of iron but no changes recently.  Patient with a past medical history of A-fib on Eliquis, COPD now on home 2 to 3 L nasal cannula oxygen, hypertension, hyperlipidemia, chronic anemia, heart failure, CKD not on dialysis. (05 Sep 2023 06:46)      PAST MEDICAL HISTORY:  HTN (hypertension)    HLD (hyperlipidemia)    Anemia    DVT (deep venous thrombosis)    Pneumonia    OA (osteoarthritis)    Afib    Small bowel cancer    Pacemaker    Major depression    Chronic kidney disease (CKD)    COPD (chronic obstructive pulmonary disease)        PAST SURGICAL HISTORY:  Status post total left knee replacement    History of cholecystectomy    H/O hernia repair    H/O resection of small bowel        FAMILY HISTORY:  No pertinent family history in first degree relatives        SOCIAL HISTORY:    Allergies    Procardia (Other)  NIFEdipine (Unknown)  fluoxetine (Unknown)    Intolerances    oxycodone (Other)    Home Medications:  Julio Cesar-Protect topical cream: Apply topically to affected area 2 times a day (05 Sep 2023 09:21)  metOLazone 5 mg oral tablet: 1 tab(s) orally Monday, Wednesday, and Friday *Take 1 Hour prior to Torsemide* (05 Sep 2023 09:12)  torsemide 20 mg oral tablet: 3 tab(s) orally once a day (05 Sep 2023 09:12)    MEDICATIONS  (STANDING):  albuterol    90 MICROgram(s) HFA Inhaler 2 Puff(s) Inhalation every 6 hours  aMIOdarone    Tablet 200 milliGRAM(s) Oral daily  apixaban 5 milliGRAM(s) Oral two times a day  aspirin  chewable 81 milliGRAM(s) Oral daily  buDESOnide    Inhalation Suspension 0.5 milliGRAM(s) Inhalation two times a day  carvedilol 12.5 milliGRAM(s) Oral every 12 hours  cefTRIAXone   IVPB 1000 milliGRAM(s) IV Intermittent every 24 hours  famotidine    Tablet 20 milliGRAM(s) Oral daily  fenofibrate Tablet 48 milliGRAM(s) Oral daily  ferrous    sulfate 325 milliGRAM(s) Oral daily  furosemide   Injectable 40 milliGRAM(s) IV Push daily  hydrALAZINE 50 milliGRAM(s) Oral three times a day  lactobacillus acidophilus 1 Tablet(s) Oral daily  methylPREDNISolone sodium succinate Injectable 40 milliGRAM(s) IV Push every 8 hours  multivitamin 1 Tablet(s) Oral daily  potassium chloride    Tablet ER 20 milliEquivalent(s) Oral four times a day  sertraline 25 milliGRAM(s) Oral daily  tiotropium 2.5 MICROgram(s) Inhaler 2 Puff(s) Inhalation daily    MEDICATIONS  (PRN):  acetaminophen     Tablet .. 650 milliGRAM(s) Oral every 6 hours PRN Temp greater or equal to 38C (100.4F)  aluminum hydroxide/magnesium hydroxide/simethicone Suspension 30 milliLiter(s) Oral every 4 hours PRN Dyspepsia  melatonin 3 milliGRAM(s) Oral at bedtime PRN Insomnia  ondansetron Injectable 4 milliGRAM(s) IV Push every 8 hours PRN Nausea and/or Vomiting      REVIEW OF SYSTEMS:  General:   Respiratory: No cough, SOB  Cardiovascular: No CP or Palpitations	  Gastrointestinal: No nausea, Vomiting. No diarrhea  Genitourinary: No urinary complaints	  Musculoskeletal: No leg swelling, No new rash or lesions	  Neurological: 	  all other systems negative    T(F): 98 (23 @ 08:04), Max: 98 (23 @ 05:35)  HR: 60 (23 @ 08:04) (60 - 60)  BP: 151/70 (23 @ 08:04) (101/64 - 151/70)  RR: 17 (23 @ 08:04) (16 - 18)  SpO2: 99% (23 @ 08:04) (97% - 99%)  Wt(kg): --    PHYSICAL EXAM:  General: NAD  Respiratory: b/l air entry  Cardiovascular: S1 S2  Gastrointestinal: soft  Extremities: edema            127<L>  |  80<L>  |  52<H>  ----------------------------<  106<H>  2.3<LL>   |  39<H>  |  1.60<H>    Ca    8.9      05 Sep 2023 01:25  Mg     2.2         TPro  6.4  /  Alb  3.4  /  TBili  0.4  /  DBili  x   /  AST  34  /  ALT  32  /  AlkPhos  78                            9.1    7.08  )-----------( 221      ( 05 Sep 2023 01:25 )             26.9       Hemoglobin: 9.1 g/dL ( @ 01:25)  Hematocrit: 26.9 % ( @ 01:25)  Potassium: 2.3 mmol/L ( @ 01:25)  Blood Urea Nitrogen: 52 mg/dL ( @ 01:25)      Creatinine, Serum: 1.60 ( @ 01:25)      Urinalysis Basic - ( 05 Sep 2023 03:00 )    Color: Yellow / Appearance: Clear / S.006 / pH: x  Gluc: x / Ketone: Negative mg/dL  / Bili: Negative / Urobili: 0.2 mg/dL   Blood: x / Protein: Negative mg/dL / Nitrite: Negative   Leuk Esterase: Large / RBC: 1 /HPF / WBC 26 /HPF   Sq Epi: x / Non Sq Epi: x / Bacteria: Moderate /HPF      LIVER FUNCTIONS - ( 05 Sep 2023 01:25 )  Alb: 3.4 g/dL / Pro: 6.4 g/dL / ALK PHOS: 78 U/L / ALT: 32 U/L / AST: 34 U/L / GGT: x                       I&O's Detail           Patient is a 82y old  Female who presents with a chief complaint of Generalized weakness.  Anemia possibly secondary to GI Bleeding. (05 Sep 2023 09:01)    HPI:  JAZ BAIG is an 81 YO Female presented to the ED from assisted living facility with generalized weakness.  Present for the past few days.  No nausea or vomiting.  Denies any cough or new shortness of breath.  No fevers.  States that she does have aching pain to her left arm and bilateral legs.  States tonight that she was having trouble getting in and out of bed and felt too weak which is why she came to ED.  States that her stools are always dark because of iron but no changes recently.  Patient with a past medical history of A-fib on Eliquis, COPD now on home 2 to 3 L nasal cannula oxygen, hypertension, hyperlipidemia, chronic anemia, heart failure, CKD not on dialysis. (05 Sep 2023 06:46)      PAST MEDICAL HISTORY:  HTN (hypertension)    HLD (hyperlipidemia)    Anemia    DVT (deep venous thrombosis)    Pneumonia    OA (osteoarthritis)    Afib    Small bowel cancer    Pacemaker    Major depression    Chronic kidney disease (CKD)    COPD (chronic obstructive pulmonary disease)        PAST SURGICAL HISTORY:  Status post total left knee replacement    History of cholecystectomy    H/O hernia repair    H/O resection of small bowel        FAMILY HISTORY:  No pertinent family history in first degree relatives        SOCIAL HISTORY:    Allergies    Procardia (Other)  NIFEdipine (Unknown)  fluoxetine (Unknown)    Intolerances    oxycodone (Other)    Home Medications:  Julio Cesar-Protect topical cream: Apply topically to affected area 2 times a day (05 Sep 2023 09:21)  metOLazone 5 mg oral tablet: 1 tab(s) orally Monday, Wednesday, and Friday *Take 1 Hour prior to Torsemide* (05 Sep 2023 09:12)  torsemide 20 mg oral tablet: 3 tab(s) orally once a day (05 Sep 2023 09:12)    MEDICATIONS  (STANDING):  albuterol    90 MICROgram(s) HFA Inhaler 2 Puff(s) Inhalation every 6 hours  aMIOdarone    Tablet 200 milliGRAM(s) Oral daily  apixaban 5 milliGRAM(s) Oral two times a day  aspirin  chewable 81 milliGRAM(s) Oral daily  buDESOnide    Inhalation Suspension 0.5 milliGRAM(s) Inhalation two times a day  carvedilol 12.5 milliGRAM(s) Oral every 12 hours  cefTRIAXone   IVPB 1000 milliGRAM(s) IV Intermittent every 24 hours  famotidine    Tablet 20 milliGRAM(s) Oral daily  fenofibrate Tablet 48 milliGRAM(s) Oral daily  ferrous    sulfate 325 milliGRAM(s) Oral daily  furosemide   Injectable 40 milliGRAM(s) IV Push daily  hydrALAZINE 50 milliGRAM(s) Oral three times a day  lactobacillus acidophilus 1 Tablet(s) Oral daily  methylPREDNISolone sodium succinate Injectable 40 milliGRAM(s) IV Push every 8 hours  multivitamin 1 Tablet(s) Oral daily  potassium chloride    Tablet ER 20 milliEquivalent(s) Oral four times a day  sertraline 25 milliGRAM(s) Oral daily  tiotropium 2.5 MICROgram(s) Inhaler 2 Puff(s) Inhalation daily    MEDICATIONS  (PRN):  acetaminophen     Tablet .. 650 milliGRAM(s) Oral every 6 hours PRN Temp greater or equal to 38C (100.4F)  aluminum hydroxide/magnesium hydroxide/simethicone Suspension 30 milliLiter(s) Oral every 4 hours PRN Dyspepsia  melatonin 3 milliGRAM(s) Oral at bedtime PRN Insomnia  ondansetron Injectable 4 milliGRAM(s) IV Push every 8 hours PRN Nausea and/or Vomiting      REVIEW OF SYSTEMS:  General:   Respiratory: No cough, SOB  Cardiovascular: No CP or Palpitations	  Gastrointestinal: No nausea, Vomiting. No diarrhea  Genitourinary: No urinary complaints	  Musculoskeletal: No leg swelling, No new rash or lesions	  Neurological: 	  all other systems negative    T(F): 98 (23 @ 08:04), Max: 98 (23 @ 05:35)  HR: 60 (23 @ 08:04) (60 - 60)  BP: 151/70 (23 @ 08:04) (101/64 - 151/70)  RR: 17 (23 @ 08:04) (16 - 18)  SpO2: 99% (23 @ 08:04) (97% - 99%)  Wt(kg): --    PHYSICAL EXAM:  General: NAD  Respiratory: b/l air entry  Cardiovascular: S1 S2  Gastrointestinal: soft  Extremities: edema            127<L>  |  80<L>  |  52<H>  ----------------------------<  106<H>  2.3<LL>   |  39<H>  |  1.60<H>    Ca    8.9      05 Sep 2023 01:25  Mg     2.2         TPro  6.4  /  Alb  3.4  /  TBili  0.4  /  DBili  x   /  AST  34  /  ALT  32  /  AlkPhos  78                            9.1    7.08  )-----------( 221      ( 05 Sep 2023 01:25 )             26.9       Hemoglobin: 9.1 g/dL ( @ 01:25)  Hematocrit: 26.9 % ( @ 01:25)  Potassium: 2.3 mmol/L ( @ 01:25)  Blood Urea Nitrogen: 52 mg/dL ( @ 01:25)      Creatinine, Serum: 1.60 ( @ 01:25)      Urinalysis Basic - ( 05 Sep 2023 03:00 )    Color: Yellow / Appearance: Clear / S.006 / pH: x  Gluc: x / Ketone: Negative mg/dL  / Bili: Negative / Urobili: 0.2 mg/dL   Blood: x / Protein: Negative mg/dL / Nitrite: Negative   Leuk Esterase: Large / RBC: 1 /HPF / WBC 26 /HPF   Sq Epi: x / Non Sq Epi: x / Bacteria: Moderate /HPF      LIVER FUNCTIONS - ( 05 Sep 2023 01:25 )  Alb: 3.4 g/dL / Pro: 6.4 g/dL / ALK PHOS: 78 U/L / ALT: 32 U/L / AST: 34 U/L / GGT: x                   < from: US Kidney and Bladder (23 @ 08:25) >    ACC: [id number] EXAM:  US KIDNEYS AND BLADDER   ORDERED BY: REINALDO BOYLE     PROCEDURE DATE:  2023          INTERPRETATION:  CLINICAL INFORMATION: KELSEY. Rule out hydronephrosis and   evaluate post void residual.    COMPARISON: CT dated 2021.    TECHNIQUE: Sonography of the kidneys and bladder.    FINDINGS:  Right kidney: 9.5 cm. No renal mass, hydronephrosis or calculi.    Left kidney: 11.0 cm. No renal mass, hydronephrosis or calculi. A few   small cysts measuring up to 2.4 cm.    Urinary bladder: Within normal limits. Bilateral ureteral jets are   identified. Prevoid volume was 309 cc and post void volume was 139 cc.    IMPRESSION:  No evidence of hydronephrosis.  139 cc of post void residual.        --- End of Report ---            BRANDON GA MD; Attending Radiologist  This document has been electronically signed. Sep  5 2023  8:37AM    < end of copied text >  < from: CT Chest No Cont (23 @ 08:02) >    ACC: [id number] EXAM:  CT CHEST   ORDERED BY: REINALDO BOYLE     PROCEDURE DATE:  2023          INTERPRETATION:  INDICATION: Generalized weakness. History of COPD.    TECHNIQUE: Helical acquisition images of the chest without intravenous   contrast. Maximum intensity projection images were generated.    COMPARISON: CT chest 2023.    FINDINGS:    LUNGS/AIRWAYS/PLEURA: Patent central airways. Peripheral tubular and   tree-in-bud nodular opacities, likely representing mucoid impaction of  the distal airways. Trace left pleural effusion.    LYMPH NODES/MEDIASTINUM: No lymphadenopathy.    HEART/VASCULATURE: Heart size is enlarged. Trace pericardial effusion.   Calcified plaque in the aorta and coronary arteries. Calcification of the   aortic valve. Left chest wall pacemaker with leads in the right atrium   and right ventricle.    UPPER ABDOMEN: Cholecystectomy. Status post Whipple procedure. 1.1 cm   cyst in the right hepatic lobe, unchanged from prior exam. Stable 1.2 cm   right adrenal nodule. 1.1 cm renal cyst in the left kidney. Partially   visualized IVC filter.    BONES/SOFT TISSUES: Heterogeneous thyroid gland. Degenerative changes.      IMPRESSION:    Redemonstrated mucoid impaction of the distal airways.    < from: Xray Chest 1 View- PORTABLE-Urgent (23 @ 01:50) >    ACC: [id number] EXAM:  XR CHEST PORTABLE URGENT 1V   ORDERED BY:  HOLDEN AKERS     PROCEDURE DATE:  2023          INTERPRETATION:  INDICATION: Sepsis    PRIORS: 2023    VIEWS: Portable AP radiography of the chest performed.    FINDINGS: Heart size appears within normal limits. Indwelling pacemaker.   No hilar or superior mediastinal abnormalities are identified. Opacity   adjacent to the left heart border corresponds to prominent epicardial fat   on CT examination. There is no evidence for focal infiltrate, lobar   consolidation or pulmonary edema. No pleural effusion or pneumothorax is   demonstrated. No mediastinal shift is noted. No acute osseous fractures.    IMPRESSION: No evidence for focal infiltrate or lobar consolidation.    --- End of Report ---            OFELIA BURNHAM MD; Attending Radiologist  This document has been electronically signed. Sep  5 2023 11:57AM    < end of copied text >  --- End ofReport ---           RAY CUELLAR MD; Resident Radiologist  This document has been electronically signed.  CARLITOS GRAHAM MD; Attending Radiologist  This document has been electronically signed. Sep  5 2023 10:33AM    < end of copied text >         Patient is a 82y old  Female who presents with a chief complaint of Generalized weakness.  Anemia possibly secondary to GI Bleeding. (05 Sep 2023 09:01)    HPI:  JAZ BAIG is an 81 YO Female presented to the ED from assisted living facility with generalized weakness.  Present for the past few days.  No nausea or vomiting.  Denies any cough or new shortness of breath.  No fevers.  States that she does have aching pain to her left arm and bilateral legs.  States tonight that she was having trouble getting in and out of bed and felt too weak which is why she came to ED.  States that her stools are always dark because of iron but no changes recently.  Patient with a past medical history of A-fib on Eliquis, COPD now on home 2 to 3 L nasal cannula oxygen, hypertension, hyperlipidemia, chronic anemia, heart failure, CKD not on dialysis. (05 Sep 2023 06:46)3    Renal consult called for KELSEY., hyponatremia. History obtained from chart and patient.       PAST MEDICAL HISTORY:  HTN (hypertension)    HLD (hyperlipidemia)    Anemia    DVT (deep venous thrombosis)    Pneumonia    OA (osteoarthritis)    Afib    Small bowel cancer    Pacemaker    Major depression    Chronic kidney disease (CKD)    COPD (chronic obstructive pulmonary disease)        PAST SURGICAL HISTORY:  Status post total left knee replacement    History of cholecystectomy    H/O hernia repair    H/O resection of small bowel        FAMILY HISTORY:  No pertinent family history in first degree relatives        SOCIAL HISTORY: Former smoker, occasional alcohol use    Allergies    Procardia (Other)  NIFEdipine (Unknown)  fluoxetine (Unknown)    Intolerances    oxycodone (Other)    Home Medications:  Julio Cesar-Protect topical cream: Apply topically to affected area 2 times a day (05 Sep 2023 09:21)  metOLazone 5 mg oral tablet: 1 tab(s) orally Monday, Wednesday, and Friday *Take 1 Hour prior to Torsemide* (05 Sep 2023 09:12)  torsemide 20 mg oral tablet: 3 tab(s) orally once a day (05 Sep 2023 09:12)    MEDICATIONS  (STANDING):  albuterol    90 MICROgram(s) HFA Inhaler 2 Puff(s) Inhalation every 6 hours  aMIOdarone    Tablet 200 milliGRAM(s) Oral daily  apixaban 5 milliGRAM(s) Oral two times a day  aspirin  chewable 81 milliGRAM(s) Oral daily  buDESOnide    Inhalation Suspension 0.5 milliGRAM(s) Inhalation two times a day  carvedilol 12.5 milliGRAM(s) Oral every 12 hours  cefTRIAXone   IVPB 1000 milliGRAM(s) IV Intermittent every 24 hours  famotidine    Tablet 20 milliGRAM(s) Oral daily  fenofibrate Tablet 48 milliGRAM(s) Oral daily  ferrous    sulfate 325 milliGRAM(s) Oral daily  furosemide   Injectable 40 milliGRAM(s) IV Push daily  hydrALAZINE 50 milliGRAM(s) Oral three times a day  lactobacillus acidophilus 1 Tablet(s) Oral daily  methylPREDNISolone sodium succinate Injectable 40 milliGRAM(s) IV Push every 8 hours  multivitamin 1 Tablet(s) Oral daily  potassium chloride    Tablet ER 20 milliEquivalent(s) Oral four times a day  sertraline 25 milliGRAM(s) Oral daily  tiotropium 2.5 MICROgram(s) Inhaler 2 Puff(s) Inhalation daily    MEDICATIONS  (PRN):  acetaminophen     Tablet .. 650 milliGRAM(s) Oral every 6 hours PRN Temp greater or equal to 38C (100.4F)  aluminum hydroxide/magnesium hydroxide/simethicone Suspension 30 milliLiter(s) Oral every 4 hours PRN Dyspepsia  melatonin 3 milliGRAM(s) Oral at bedtime PRN Insomnia  ondansetron Injectable 4 milliGRAM(s) IV Push every 8 hours PRN Nausea and/or Vomiting      REVIEW OF SYSTEMS:  General: no distress  Respiratory: No cough, SOB  Cardiovascular: No CP or Palpitations	  Gastrointestinal: No nausea, Vomiting. No diarrhea  Genitourinary: No urinary complaints	  Musculoskeletal: No new rash or lesions	  all other systems negative    T(F): 98 (23 @ 08:04), Max: 98 (23 @ 05:35)  HR: 60 (23 @ 08:04) (60 - 60)  BP: 151/70 (23 @ 08:04) (101/64 - 151/70)  RR: 17 (23 @ 08:04) (16 - 18)  SpO2: 99% (23 @ 08:04) (97% - 99%)  Wt(kg): --    PHYSICAL EXAM:  General: NAD  Respiratory: b/l air entry  Cardiovascular: S1 S2  Gastrointestinal: soft  Extremities: edema            127<L>  |  80<L>  |  52<H>  ----------------------------<  106<H>  2.3<LL>   |  39<H>  |  1.60<H>    Ca    8.9      05 Sep 2023 01:25  Mg     2.2         TPro  6.4  /  Alb  3.4  /  TBili  0.4  /  DBili  x   /  AST  34  /  ALT  32  /  AlkPhos  78                            9.1    7.08  )-----------( 221      ( 05 Sep 2023 01:25 )             26.9       Hemoglobin: 9.1 g/dL ( @ 01:25)  Hematocrit: 26.9 % ( @ 01:25)  Potassium: 2.3 mmol/L ( @ 01:25)  Blood Urea Nitrogen: 52 mg/dL ( @ 01:25)      Creatinine, Serum: 1.60 ( @ 01:25)      Urinalysis Basic - ( 05 Sep 2023 03:00 )    Color: Yellow / Appearance: Clear / S.006 / pH: x  Gluc: x / Ketone: Negative mg/dL  / Bili: Negative / Urobili: 0.2 mg/dL   Blood: x / Protein: Negative mg/dL / Nitrite: Negative   Leuk Esterase: Large / RBC: 1 /HPF / WBC 26 /HPF   Sq Epi: x / Non Sq Epi: x / Bacteria: Moderate /HPF      LIVER FUNCTIONS - ( 05 Sep 2023 01:25 )  Alb: 3.4 g/dL / Pro: 6.4 g/dL / ALK PHOS: 78 U/L / ALT: 32 U/L / AST: 34 U/L / GGT: x                   < from: US Kidney and Bladder (23 @ 08:25) >    ACC: [id number] EXAM:  US KIDNEYS AND BLADDER   ORDERED BY: REINALDO BOYLE     PROCEDURE DATE:  2023          INTERPRETATION:  CLINICAL INFORMATION: KELSEY. Rule out hydronephrosis and   evaluate post void residual.    COMPARISON: CT dated 2021.    TECHNIQUE: Sonography of the kidneys and bladder.    FINDINGS:  Right kidney: 9.5 cm. No renal mass, hydronephrosis or calculi.    Left kidney: 11.0 cm. No renal mass, hydronephrosis or calculi. A few   small cysts measuring up to 2.4 cm.    Urinary bladder: Within normal limits. Bilateral ureteral jets are   identified. Prevoid volume was 309 cc and post void volume was 139 cc.    IMPRESSION:  No evidence of hydronephrosis.  139 cc of post void residual.        --- End of Report ---            BRANDON GA MD; Attending Radiologist  This document has been electronically signed. Sep  5 2023  8:37AM    < end of copied text >  < from: CT Chest No Cont (23 @ 08:02) >    ACC: [id number] EXAM:  CT CHEST   ORDERED BY: REINALDO BOYLE     PROCEDURE DATE:  2023          INTERPRETATION:  INDICATION: Generalized weakness. History of COPD.    TECHNIQUE: Helical acquisition images of the chest without intravenous   contrast. Maximum intensity projection images were generated.    COMPARISON: CT chest 2023.    FINDINGS:    LUNGS/AIRWAYS/PLEURA: Patent central airways. Peripheral tubular and   tree-in-bud nodular opacities, likely representing mucoid impaction of  the distal airways. Trace left pleural effusion.    LYMPH NODES/MEDIASTINUM: No lymphadenopathy.    HEART/VASCULATURE: Heart size is enlarged. Trace pericardial effusion.   Calcified plaque in the aorta and coronary arteries. Calcification of the   aortic valve. Left chest wall pacemaker with leads in the right atrium   and right ventricle.    UPPER ABDOMEN: Cholecystectomy. Status post Whipple procedure. 1.1 cm   cyst in the right hepatic lobe, unchanged from prior exam. Stable 1.2 cm   right adrenal nodule. 1.1 cm renal cyst in the left kidney. Partially   visualized IVC filter.    BONES/SOFT TISSUES: Heterogeneous thyroid gland. Degenerative changes.      IMPRESSION:    Redemonstrated mucoid impaction of the distal airways.    < from: Xray Chest 1 View- PORTABLE-Urgent (23 @ 01:50) >    ACC: [id number] EXAM:  XR CHEST PORTABLE URGENT 1V   ORDERED BY:  HOLDEN AKERS     PROCEDURE DATE:  2023          INTERPRETATION:  INDICATION: Sepsis    PRIORS: 2023    VIEWS: Portable AP radiography of the chest performed.    FINDINGS: Heart size appears within normal limits. Indwelling pacemaker.   No hilar or superior mediastinal abnormalities are identified. Opacity   adjacent to the left heart border corresponds to prominent epicardial fat   on CT examination. There is no evidence for focal infiltrate, lobar   consolidation or pulmonary edema. No pleural effusion or pneumothorax is   demonstrated. No mediastinal shift is noted. No acute osseous fractures.    IMPRESSION: No evidence for focal infiltrate or lobar consolidation.    --- End of Report ---            OFELIA BURNHAM MD; Attending Radiologist  This document has been electronically signed. Sep  5 2023 11:57AM    < end of copied text >  --- End ofReport ---           RAY CUELLAR MD; Resident Radiologist  This document has been electronically signed.  CARLITOS GRAHAM MD; Attending Radiologist  This document has been electronically signed. Sep  5 2023 10:33AM    < end of copied text >

## 2023-09-05 NOTE — GOALS OF CARE CONVERSATION - ADVANCED CARE PLANNING - CONVERSATION DETAILS
Palliative care SW met with patient at bedside to discuss advanced care planning.  Reviewed patient's medical and social history as well as events leading to patient's hospitalization. Writer discussed patient's current diagnosis (hypokalemia; HX of A-fib on Eliquis, COPD now on home 2 to 3 L nasal cannula oxygen, hypertension, hyperlipidemia, chronic anemia, heart failure, CKD), medical condition and management. Patient has a HCP with daughter Lela as health care agent. As per chart, patient has a prior M[facility]ST with DNR/DNI orders. Patient states her wishes are for DNR/DNI. She is unsure of where MOLST document is located. Writer recommended completion of a new MOLST form. Patient showed insight into patient's medical condition. New MOLST form completed with DNR/DNI orders and placed on patient's chart. All questions answered.  Psychosocial support provided.

## 2023-09-05 NOTE — PROGRESS NOTE ADULT - SUBJECTIVE AND OBJECTIVE BOX
Date of Service 09-05-23 @ 15:25    Patient is a 82y old  Female who presents with a chief complaint of Generalized weakness.  Anemia possibly secondary to GI Bleeding. (05 Sep 2023 09:40)      INTERVAL /OVERNIGHT EVENTS: feels weak    MEDICATIONS  (STANDING):  albuterol    90 MICROgram(s) HFA Inhaler 2 Puff(s) Inhalation every 6 hours  aMIOdarone    Tablet 200 milliGRAM(s) Oral daily  apixaban 5 milliGRAM(s) Oral two times a day  aspirin  chewable 81 milliGRAM(s) Oral daily  buDESOnide    Inhalation Suspension 0.5 milliGRAM(s) Inhalation two times a day  carvedilol 12.5 milliGRAM(s) Oral every 12 hours  cefTRIAXone   IVPB 1000 milliGRAM(s) IV Intermittent every 24 hours  famotidine    Tablet 20 milliGRAM(s) Oral daily  fenofibrate Tablet 48 milliGRAM(s) Oral daily  ferrous    sulfate 325 milliGRAM(s) Oral daily  furosemide   Injectable 40 milliGRAM(s) IV Push daily  hydrALAZINE 50 milliGRAM(s) Oral three times a day  lactobacillus acidophilus 1 Tablet(s) Oral daily  methylPREDNISolone sodium succinate Injectable 40 milliGRAM(s) IV Push every 8 hours  multivitamin 1 Tablet(s) Oral daily  potassium chloride    Tablet ER 20 milliEquivalent(s) Oral four times a day  sertraline 25 milliGRAM(s) Oral daily  tiotropium 2.5 MICROgram(s) Inhaler 2 Puff(s) Inhalation daily    MEDICATIONS  (PRN):  acetaminophen     Tablet .. 650 milliGRAM(s) Oral every 6 hours PRN Temp greater or equal to 38C (100.4F)  aluminum hydroxide/magnesium hydroxide/simethicone Suspension 30 milliLiter(s) Oral every 4 hours PRN Dyspepsia  melatonin 3 milliGRAM(s) Oral at bedtime PRN Insomnia  ondansetron Injectable 4 milliGRAM(s) IV Push every 8 hours PRN Nausea and/or Vomiting      Allergies    Procardia (Other)  NIFEdipine (Unknown)  fluoxetine (Unknown)    Intolerances    oxycodone (Other)      REVIEW OF SYSTEMS:  CONSTITUTIONAL: + fatigue  EYES: No eye pain, visual disturbances, or discharge  ENMT:  No difficulty hearing, tinnitus, vertigo; No sinus or throat pain  NECK: No pain or stiffness  RESPIRATORY: No cough, wheezing, chills or hemoptysis; No shortness of breath  CARDIOVASCULAR: No chest pain, palpitations, dizziness, or leg swelling  GASTROINTESTINAL: No abdominal or epigastric pain. No nausea, vomiting, or hematemesis; No diarrhea or constipation. No melena or hematochezia.  GENITOURINARY: No dysuria, frequency, hematuria, or incontinence  NEUROLOGICAL: No headaches, memory loss, loss of strength, numbness, or tremors  SKIN: No itching, burning, rashes, or lesions   LYMPH NODES: No enlarged glands  ENDOCRINE: No heat or cold intolerance; No hair loss; No polydipsia or polyuria  MUSCULOSKELETAL: No joint pain or swelling; No muscle, back, or extremity pain  PSYCHIATRIC: No depression, anxiety, mood swings, or difficulty sleeping  HEME/LYMPH: No easy bruising, or bleeding gums  ALLERGY AND IMMUNOLOGIC: No hives or eczema    Vital Signs Last 24 Hrs  T(C): 36.9 (05 Sep 2023 09:30), Max: 36.9 (05 Sep 2023 09:30)  T(F): 98.4 (05 Sep 2023 09:30), Max: 98.4 (05 Sep 2023 09:30)  HR: 60 (05 Sep 2023 13:35) (59 - 60)  BP: 140/60 (05 Sep 2023 13:35) (101/64 - 163/79)  BP(mean): --  RR: 18 (05 Sep 2023 09:30) (16 - 18)  SpO2: 97% (05 Sep 2023 09:30) (97% - 99%)    Parameters below as of 05 Sep 2023 09:30    O2 Flow (L/min): 2      PHYSICAL EXAM:  GENERAL: NAD, well-groomed, well-developed  HEAD:  Atraumatic, Normocephalic  EYES: EOMI, PERRLA, conjunctiva and sclera clear  ENMT: No tonsillar erythema, exudates, or enlargement; Moist mucous membranes, Good dentition, No lesions  NECK: Supple, No JVD, Normal thyroid  NERVOUS SYSTEM:  Alert & Oriented X3, Good concentration; Motor Strength 5/5 B/L upper and lower extremities; DTRs 2+ intact and symmetric  CHEST/LUNG: Clear to auscultation bilaterally; No rales, rhonchi, wheezing, or rubs  HEART: Regular rate and rhythm; No murmurs, rubs, or gallops  ABDOMEN: Soft, Nontender, Nondistended; Bowel sounds present  EXTREMITIES:  2+ Peripheral Pulses, No clubbing, cyanosis, or edema  LYMPH: No lymphadenopathy noted  SKIN: No rashes or lesions    LABS:                        9.1    7.08  )-----------( 221      ( 05 Sep 2023 01:25 )             26.9     05 Sep 2023 10:10    133    |  87     |  46     ----------------------------<  87     3.1     |  37     |  1.30     Ca    9.1        05 Sep 2023 10:10  Mg     2.2       05 Sep 2023 01:25    TPro  6.4    /  Alb  3.4    /  TBili  0.4    /  DBili  x      /  AST  34     /  ALT  32     /  AlkPhos  78     05 Sep 2023 01:25    PT/INR - ( 05 Sep 2023 01:25 )   PT: 16.9 sec;   INR: 1.46 ratio         PTT - ( 05 Sep 2023 01:25 )  PTT:32.5 sec  Urinalysis Basic - ( 05 Sep 2023 10:10 )    Color: x / Appearance: x / SG: x / pH: x  Gluc: 87 mg/dL / Ketone: x  / Bili: x / Urobili: x   Blood: x / Protein: x / Nitrite: x   Leuk Esterase: x / RBC: x / WBC x   Sq Epi: x / Non Sq Epi: x / Bacteria: x      CAPILLARY BLOOD GLUCOSE          RADIOLOGY & ADDITIONAL TESTS:    Notes Reviewed:  [x ] YES  [ ] NO    Care Discussed with Consultants/Other Providers [x ] YES  [ ] NO

## 2023-09-05 NOTE — OCCUPATIONAL THERAPY INITIAL EVALUATION ADULT - ADDITIONAL COMMENTS
[facility] [place] [facility] [place] [facility] [place] She reports she uses home O2. Pt completed functional mobility with rollator and was independent with ADLs.

## 2023-09-05 NOTE — PHYSICAL THERAPY INITIAL EVALUATION ADULT - TRANSFER TRAINING, PT EVAL
Patient will transfer independently from all surfaces in 2 weeks in order to increase safety at home

## 2023-09-05 NOTE — ED PROVIDER NOTE - OBJECTIVE STATEMENT
Patient with a past medical history of A-fib on Eliquis, COPD now on home 2 to 3 L nasal cannula oxygen, hypertension, hyperlipidemia, chronic anemia, heart failure, CKD not on dialysis is presenting from assisted living facility with generalized weakness.  Present for the past few days.  No nausea or vomiting.  Denies any cough or new shortness of breath.  No fevers.  States that she does have aching pain to her left arm and bilateral legs.  States tonight that she was having trouble getting in and out of bed and felt too weak which is why she came to ED.  States that her stools are always dark because of iron but no changes recently.

## 2023-09-05 NOTE — OCCUPATIONAL THERAPY INITIAL EVALUATION ADULT - PERTINENT HX OF CURRENT PROBLEM, REHAB EVAL
81 YO Female presented to the ED 9/5/23 from assisted living facility with generalized weakness.  Present for the past few days. No nausea or vomiting. Denies any cough or new shortness of breath. No fevers. States that she does have aching pain to her left arm and bilateral legs. States tonight that she was having trouble getting in and out of bed and felt too weak which is why she came to ED. States that her stools are always dark because of iron but no changes recently. Patient with a past medical history of A-fib on Eliquis, COPD now on home 2 to 3 L nasal cannula oxygen, hypertension, hyperlipidemia, chronic anemia, heart failure, CKD not on dialysis.

## 2023-09-05 NOTE — ED PROVIDER NOTE - CLINICAL SUMMARY MEDICAL DECISION MAKING FREE TEXT BOX
Patient with concern for possible COVID given coming from assisted living facility in with generalized weakness.  We will also assess for anemia as she is not on anticoagulation.  Possible atypical ACS.  Possible recurrent pneumonia though patient is without cough or new hypoxia.  no wheezing to suggest copd exacerbation so will hold steroids/nebs at this time. We will also screen for UTI.  We will check lab work, imaging, light IV fluids and reassess.  pmashley narayanan. mahesh carmen. Patient with concern for possible COVID given coming from assisted living facility in with generalized weakness.  We will also assess for anemia as she is on anticoagulation.  Possible atypical ACS.  Possible recurrent pneumonia though patient is without cough or new hypoxia.  no wheezing to suggest copd exacerbation so will hold steroids/nebs at this time. We will also screen for UTI.  We will check lab work, imaging, light IV fluids and reassess.  pmashley narayanan. mahesh carmen.

## 2023-09-05 NOTE — CONSULT NOTE ADULT - SUBJECTIVE AND OBJECTIVE BOX
[facility] Cardiology Consult - Laura Lela Nguyễn [phone]    CHIEF COMPLAINT: Patient is a 82y old  Female who presents with a chief complaint of Generalized weakness.  Anemia possibly secondary to GI Bleeding. (05 Sep 2023 06:46)      HPI:  JAZ BAIG is an 81 YO Female presented to the ED from assisted living facility with generalized weakness.  Present for the past few days.  No nausea or vomiting.  Denies any cough or new shortness of breath.  No fevers.  States that she does have aching pain to her left arm and bilateral legs.  States tonight that she was having trouble getting in and out of bed and felt too weak which is why she came to ED.  States that her stools are always dark because of iron but no changes recently.  Patient with a past medical history of A-fib on Eliquis, COPD now on home 2 to 3 L nasal cannula oxygen, hypertension, hyperlipidemia, chronic anemia, heart failure, CKD not on dialysis. (05 Sep 2023 06:46)      PAST MEDICAL & SURGICAL HISTORY:  HTN (hypertension)      HLD (hyperlipidemia)      Anemia  newly diagnosed      Pneumonia  years ago      OA (osteoarthritis)      Afib      Small bowel cancer      Pacemaker      Major depression      Chronic kidney disease (CKD)      COPD (chronic obstructive pulmonary disease)      Status post total left knee replacement  2007      History of cholecystectomy  open , many years ago      H/O hernia repair  years ago      H/O resection of small bowel          SOCIAL HISTORY: Alochol: Denied  Smoking: Nonsmoker  Drug Use: Denied  Marital Status:         FAMILY HISTORY: FAMILY HISTORY:  No pertinent family history in first degree relatives        MEDICATIONS  (STANDING):  albuterol    90 MICROgram(s) HFA Inhaler 2 Puff(s) Inhalation every 6 hours  aMIOdarone    Tablet 200 milliGRAM(s) Oral daily  apixaban 5 milliGRAM(s) Oral two times a day  aspirin  chewable 81 milliGRAM(s) Oral daily  buDESOnide    Inhalation Suspension 0.5 milliGRAM(s) Inhalation two times a day  carvedilol 12.5 milliGRAM(s) Oral every 12 hours  cefTRIAXone   IVPB 1000 milliGRAM(s) IV Intermittent every 24 hours  famotidine    Tablet 20 milliGRAM(s) Oral daily  fenofibrate Tablet 48 milliGRAM(s) Oral daily  ferrous    sulfate 325 milliGRAM(s) Oral daily  furosemide   Injectable 40 milliGRAM(s) IV Push daily  hydrALAZINE 50 milliGRAM(s) Oral three times a day  lactobacillus acidophilus 1 Tablet(s) Oral daily  methylPREDNISolone sodium succinate Injectable 40 milliGRAM(s) IV Push every 8 hours  multivitamin 1 Tablet(s) Oral daily  potassium chloride    Tablet ER 20 milliEquivalent(s) Oral four times a day  sertraline 25 milliGRAM(s) Oral daily  tiotropium 2.5 MICROgram(s) Inhaler 2 Puff(s) Inhalation daily    MEDICATIONS  (PRN):  acetaminophen     Tablet .. 650 milliGRAM(s) Oral every 6 hours PRN Temp greater or equal to 38C (100.4F)  aluminum hydroxide/magnesium hydroxide/simethicone Suspension 30 milliLiter(s) Oral every 4 hours PRN Dyspepsia  melatonin 3 milliGRAM(s) Oral at bedtime PRN Insomnia  ondansetron Injectable 4 milliGRAM(s) IV Push every 8 hours PRN Nausea and/or Vomiting      Allergies    Procardia (Other)  NIFEdipine (Unknown)  fluoxetine (Unknown)    Intolerances    oxycodone (Other)      REVIEW OF SYSTEMS:  CONSTITUTIONAL: No weakness, fevers or chills  EYES/ENT: No visual changes;  No vertigo or throat pain   NECK: No pain or stiffness  RESPIRATORY: No cough, wheezing, hemoptysis; No shortness of breath  CARDIOVASCULAR: No chest pain or palpitations  GASTROINTESTINAL: No abdominal pain. No nausea, vomiting, or hematemesis; No diarrhea or constipation. No melena or hematochezia.  GENITOURINARY: No dysuria, frequency or hematuria  NEUROLOGICAL: No numbness or weakness  SKIN: No itching or rash  All other review of systems is negative unless indicated above    VITAL SIGNS:   Vital Signs Last 24 Hrs  T(C): 36.7 (05 Sep 2023 08:04), Max: 36.7 (05 Sep 2023 05:35)  T(F): 98 (05 Sep 2023 08:04), Max: 98 (05 Sep 2023 05:35)  HR: 60 (05 Sep 2023 08:04) (60 - 60)  BP: 151/70 (05 Sep 2023 08:04) (101/64 - 151/70)  BP(mean): --  RR: 17 (05 Sep 2023 08:04) (16 - 18)  SpO2: 99% (05 Sep 2023 08:04) (97% - 99%)    Parameters below as of 05 Sep 2023 05:35  Patient On (Oxygen Delivery Method): nasal cannula  O2 Flow (L/min): 2.5      I&O's Summary      PHYSICAL EXAM:  Constitutional: Awake in NAD  HEENT:  MAAME, EOMI  Neck: No JVD  Pulmonary: CTA B/L No R/R/W  Cardiovascular: PMI not palpable non-displaced Regular S1 and S2, no murmurs  Gastrointestinal: Bowel Sounds present, soft, nontender.   Extremities: Trace peripheral edema.   Neuro: No gross focal deficits    LABS: All Labs Reviewed:                        9.1    7.08  )-----------( 221      ( 05 Sep 2023 01:25 )             26.9     05 Sep 2023 01:25    127    |  80     |  52     ----------------------------<  106    2.3     |  39     |  1.60     Ca    8.9        05 Sep 2023 01:25  Mg     2.2       05 Sep 2023 01:25    TPro  6.4    /  Alb  3.4    /  TBili  0.4    /  DBili  x      /  AST  34     /  ALT  32     /  AlkPhos  78     05 Sep 2023 01:25    PT/INR - ( 05 Sep 2023 01:25 )   PT: 16.9 sec;   INR: 1.46 ratio         PTT - ( 05 Sep 2023 01:25 )  PTT:32.5 sec      Blood Culture:         RADIOLOGY/EKG:    < from: US Kidney and Bladder (09.05.23 @ 08:25) >    FINDINGS:  Right kidney: 9.5 cm. No renal mass, hydronephrosis or calculi.    Left kidney: 11.0 cm. No renal mass, hydronephrosis or calculi. A few   small cysts measuring up to 2.4 cm.    Urinary bladder: Within normal limits. Bilateral ureteral jets are   identified. Prevoid volume was 309 cc and post void volume was 139 cc.    IMPRESSION:  No evidence of hydronephrosis.  139 cc of post void residual.        < from: TTE W or WO Ultrasound Enhancing Agent (07.27.23 @ 16:10) >      1. The left ventricular systolic function is normal with an ejection fraction of 54 % by Sheldon's method of disks.   2. Normal right ventricular cavity size, normal right ventricular wall thickness and normal right ventricular systolic function.   3. Mild thickening of the aortic valve leaflets. The valve is trileaflet.   4. Trace aortic regurgitation.   5. There is calcification of the mitral valve annulus.   6. Mild mitral regurgitation.   7. The left atrium is moderately dilated in size.   8. There is moderate (grade 2) left ventricular diastolic dysfunction.   9. The inferior vena cava is dilated measuring 2.36 cm in diameter, (dilated >2.1cm) with abnormal inspiratory collapse (abnormal <50%) consistent with elevated right atrial pressure (~15, range 10-20mmHg).    < end of copied text >

## 2023-09-05 NOTE — H&P ADULT - NSHPLABSRESULTS_GEN_ALL_CORE
9.1    7.08  )-----------( 221      ( 05 Sep 2023 01:25 )             26.9     05 Sep 2023 01:25    127    |  80     |  52     ----------------------------<  106    2.3     |  39     |  1.60     Ca    8.9        05 Sep 2023 01:25  Mg     2.2       05 Sep 2023 01:25    TPro  6.4    /  Alb  3.4    /  TBili  0.4    /  DBili  x      /  AST  34     /  ALT  32     /  AlkPhos  78     05 Sep 2023 01:25    LIVER FUNCTIONS - ( 05 Sep 2023 01:25 )  Alb: 3.4 g/dL / Pro: 6.4 g/dL / ALK PHOS: 78 U/L / ALT: 32 U/L / AST: 34 U/L / GGT: x           PT/INR - ( 05 Sep 2023 01:25 )   PT: 16.9 sec;   INR: 1.46 ratio         PTT - ( 05 Sep 2023 01:25 )  PTT:32.5 sec  CAPILLARY BLOOD GLUCOSE    Urinalysis Basic - ( 05 Sep 2023 03:00 )    Color: Yellow / Appearance: Clear / S.006 / pH: x  Gluc: x / Ketone: Negative mg/dL  / Bili: Negative / Urobili: 0.2 mg/dL   Blood: x / Protein: Negative mg/dL / Nitrite: Negative   Leuk Esterase: Large / RBC: 1 /HPF / WBC 26 /HPF   Sq Epi: x / Non Sq Epi: x / Bacteria: Moderate /HPF

## 2023-09-05 NOTE — ED PROVIDER NOTE - CARE PLAN
1 Principal Discharge DX:	Hypokalemia  Secondary Diagnosis:	Hyponatremia  Secondary Diagnosis:	Fatigue  Secondary Diagnosis:	Weakness

## 2023-09-05 NOTE — PATIENT PROFILE ADULT - FALL HARM RISK - HARM RISK INTERVENTIONS

## 2023-09-05 NOTE — PHYSICAL THERAPY INITIAL EVALUATION ADULT - GENERAL OBSERVATIONS, REHAB EVAL
Patient received supine in bed, cooperative with physical therapy, +O2 via nasal canula, external catheter

## 2023-09-05 NOTE — ED ADULT NURSE NOTE - OBJECTIVE STATEMENT
Pt to ED via EMS c/c weakness, "I just don' feel right". Pt states she is recovering from pneumonia approximately 1 Month ago.. A/o x 4, skin w/d, resps nonlabored, Pt is on O2 2l/min which is what she uses at assisted living facility. Denies, pain at this time

## 2023-09-05 NOTE — ED ADULT TRIAGE NOTE - GLASGOW COMA SCALE: BEST VERBAL RESPONSE, MLM
Called patient and left a message to call back and schedule an appointment for this week sometime. (V5) oriented

## 2023-09-05 NOTE — H&P ADULT - ASSESSMENT
JAZ BAIG is an 83 YO Female presented to the ED from assisted living facility with generalized weakness.  Present for the past few days.  No nausea or vomiting.  Denies any cough or new shortness of breath.  No fevers.  States that she does have aching pain to her left arm and bilateral legs.  States tonight that she was having trouble getting in and out of bed and felt too weak which is why she came to ED

## 2023-09-05 NOTE — ED ADULT TRIAGE NOTE - CHIEF COMPLAINT QUOTE
Patient Brought in by ambulance with complaints of not feeling right.  Patient is getting over a prior diagnosis of PNA. Patient has pain in left arm.

## 2023-09-05 NOTE — CONSULT NOTE ADULT - ASSESSMENT
82F with known AF on Eliquis, HTN, HLD, HFpEF PPM (implanted 7/2021), severe Pulm HTN follwed by Dr. Mahajan, COPD on home O2 a/w worsening weakness. Her BNP is 1091 with significant Na level of 127 consistent with hyponatremia    Suggest:    1. Weakness likely from hyponatremia and possible UTI  - fluid restrict  - Empiric Abx per primary team  - Supplement Mg and K with hyponatremia  - Consider holding sertraline with hyponatremia    2. PAF  - c/w Eliquis with relative anemia  - monitor CBC    3. HTN  - C/w Coreg    4. Will follow

## 2023-09-05 NOTE — ED PROVIDER NOTE - PHYSICAL EXAMINATION
Constitutional: Awake, Alert, non-toxic. appears fatigued/weak  HEAD: Normocephalic, atraumatic.   EYES: PERRL, EOM intact, conjunctiva and sclera are clear bilaterally.  ENT: External ears normal. No rhinorrhea, no tracheal deviation   NECK: Supple, non-tender  CARDIOVASCULAR: irregular rate and rhythm.  RESPIRATORY: Normal respiratory effort; breath sounds are diminished b/l. Speaking in full sentences. No accessory muscle use. on 2L NC.  ABDOMEN: Soft; non-tender, non-distended. No rebound or guarding.   MSK:  trace lower extremity edema, no deformities  SKIN: Warm, dry  NEURO: A&O x3. Sensory and motor functions are grossly intact. Speech is normal. No facial droop.  PSYCH: Appearance and judgement seem appropriate for gender and age.

## 2023-09-05 NOTE — OCCUPATIONAL THERAPY INITIAL EVALUATION ADULT - GENERAL OBSERVATIONS, REHAB EVAL
Pt received supine in bed (+) external catheter, tele, O2 2LPM via NC; NAD. Pt agrees to participate with OT for eval and arian well. Pt reports she feels much better than when she first came to hospital.

## 2023-09-06 DIAGNOSIS — R79.89 OTHER SPECIFIED ABNORMAL FINDINGS OF BLOOD CHEMISTRY: ICD-10-CM

## 2023-09-06 DIAGNOSIS — I48.91 UNSPECIFIED ATRIAL FIBRILLATION: ICD-10-CM

## 2023-09-06 LAB
ALBUMIN SERPL ELPH-MCNC: 3.3 G/DL — SIGNIFICANT CHANGE UP (ref 3.3–5)
ALP SERPL-CCNC: 59 U/L — SIGNIFICANT CHANGE UP (ref 40–120)
ALT FLD-CCNC: 38 U/L — SIGNIFICANT CHANGE UP (ref 12–78)
ANION GAP SERPL CALC-SCNC: 6 MMOL/L — SIGNIFICANT CHANGE UP (ref 5–17)
AST SERPL-CCNC: 37 U/L — SIGNIFICANT CHANGE UP (ref 15–37)
BILIRUB SERPL-MCNC: 0.2 MG/DL — SIGNIFICANT CHANGE UP (ref 0.2–1.2)
BUN SERPL-MCNC: 45 MG/DL — HIGH (ref 7–23)
CALCIUM SERPL-MCNC: 9.2 MG/DL — SIGNIFICANT CHANGE UP (ref 8.5–10.1)
CHLORIDE SERPL-SCNC: 93 MMOL/L — LOW (ref 96–108)
CO2 SERPL-SCNC: 37 MMOL/L — HIGH (ref 22–31)
CREAT SERPL-MCNC: 1.4 MG/DL — HIGH (ref 0.5–1.3)
EGFR: 37 ML/MIN/1.73M2 — LOW
GLUCOSE SERPL-MCNC: 160 MG/DL — HIGH (ref 70–99)
HCT VFR BLD CALC: 29.2 % — LOW (ref 34.5–45)
HGB BLD-MCNC: 9.6 G/DL — LOW (ref 11.5–15.5)
MAGNESIUM SERPL-MCNC: 2.5 MG/DL — SIGNIFICANT CHANGE UP (ref 1.6–2.6)
MCHC RBC-ENTMCNC: 31.3 PG — SIGNIFICANT CHANGE UP (ref 27–34)
MCHC RBC-ENTMCNC: 32.9 GM/DL — SIGNIFICANT CHANGE UP (ref 32–36)
MCV RBC AUTO: 95.1 FL — SIGNIFICANT CHANGE UP (ref 80–100)
NRBC # BLD: 0 /100 WBCS — SIGNIFICANT CHANGE UP (ref 0–0)
PLATELET # BLD AUTO: 256 K/UL — SIGNIFICANT CHANGE UP (ref 150–400)
POTASSIUM SERPL-MCNC: 4.1 MMOL/L — SIGNIFICANT CHANGE UP (ref 3.5–5.3)
POTASSIUM SERPL-SCNC: 4.1 MMOL/L — SIGNIFICANT CHANGE UP (ref 3.5–5.3)
PROT SERPL-MCNC: 6.7 G/DL — SIGNIFICANT CHANGE UP (ref 6–8.3)
RBC # BLD: 3.07 M/UL — LOW (ref 3.8–5.2)
RBC # FLD: 14.6 % — HIGH (ref 10.3–14.5)
SODIUM SERPL-SCNC: 136 MMOL/L — SIGNIFICANT CHANGE UP (ref 135–145)
TSH SERPL-MCNC: 0.25 UIU/ML — LOW (ref 0.36–3.74)
WBC # BLD: 7.73 K/UL — SIGNIFICANT CHANGE UP (ref 3.8–10.5)
WBC # FLD AUTO: 7.73 K/UL — SIGNIFICANT CHANGE UP (ref 3.8–10.5)

## 2023-09-06 RX ORDER — HYDRALAZINE HCL 50 MG
75 TABLET ORAL THREE TIMES A DAY
Refills: 0 | Status: DISCONTINUED | OUTPATIENT
Start: 2023-09-06 | End: 2023-09-08

## 2023-09-06 RX ORDER — CARVEDILOL PHOSPHATE 80 MG/1
12.5 CAPSULE, EXTENDED RELEASE ORAL EVERY 12 HOURS
Refills: 0 | Status: DISCONTINUED | OUTPATIENT
Start: 2023-09-06 | End: 2023-09-12

## 2023-09-06 RX ORDER — FUROSEMIDE 40 MG
40 TABLET ORAL DAILY
Refills: 0 | Status: DISCONTINUED | OUTPATIENT
Start: 2023-09-07 | End: 2023-09-09

## 2023-09-06 RX ADMIN — Medication 75 MILLIGRAM(S): at 14:14

## 2023-09-06 RX ADMIN — Medication 40 MILLIGRAM(S): at 05:51

## 2023-09-06 RX ADMIN — Medication 20 MILLIEQUIVALENT(S): at 23:33

## 2023-09-06 RX ADMIN — Medication 20 MILLIEQUIVALENT(S): at 17:40

## 2023-09-06 RX ADMIN — Medication 1 TABLET(S): at 11:35

## 2023-09-06 RX ADMIN — FAMOTIDINE 20 MILLIGRAM(S): 10 INJECTION INTRAVENOUS at 11:35

## 2023-09-06 RX ADMIN — Medication 48 MILLIGRAM(S): at 11:35

## 2023-09-06 RX ADMIN — ALBUTEROL 2 PUFF(S): 90 AEROSOL, METERED ORAL at 05:51

## 2023-09-06 RX ADMIN — TIOTROPIUM BROMIDE 2 PUFF(S): 18 CAPSULE ORAL; RESPIRATORY (INHALATION) at 05:51

## 2023-09-06 RX ADMIN — ALBUTEROL 2 PUFF(S): 90 AEROSOL, METERED ORAL at 17:41

## 2023-09-06 RX ADMIN — Medication 81 MILLIGRAM(S): at 11:34

## 2023-09-06 RX ADMIN — ALBUTEROL 2 PUFF(S): 90 AEROSOL, METERED ORAL at 12:07

## 2023-09-06 RX ADMIN — SERTRALINE 25 MILLIGRAM(S): 25 TABLET, FILM COATED ORAL at 11:34

## 2023-09-06 RX ADMIN — Medication 20 MILLIEQUIVALENT(S): at 00:35

## 2023-09-06 RX ADMIN — Medication 0.5 MILLIGRAM(S): at 20:40

## 2023-09-06 RX ADMIN — CEFTRIAXONE 100 MILLIGRAM(S): 500 INJECTION, POWDER, FOR SOLUTION INTRAMUSCULAR; INTRAVENOUS at 05:52

## 2023-09-06 RX ADMIN — ALBUTEROL 2 PUFF(S): 90 AEROSOL, METERED ORAL at 23:33

## 2023-09-06 RX ADMIN — Medication 75 MILLIGRAM(S): at 21:01

## 2023-09-06 RX ADMIN — AMIODARONE HYDROCHLORIDE 200 MILLIGRAM(S): 400 TABLET ORAL at 05:52

## 2023-09-06 RX ADMIN — Medication 40 MILLIGRAM(S): at 14:13

## 2023-09-06 RX ADMIN — CARVEDILOL PHOSPHATE 12.5 MILLIGRAM(S): 80 CAPSULE, EXTENDED RELEASE ORAL at 05:52

## 2023-09-06 RX ADMIN — Medication 325 MILLIGRAM(S): at 11:35

## 2023-09-06 RX ADMIN — ALBUTEROL 2 PUFF(S): 90 AEROSOL, METERED ORAL at 00:35

## 2023-09-06 RX ADMIN — Medication 1 TABLET(S): at 11:34

## 2023-09-06 RX ADMIN — Medication 20 MILLIEQUIVALENT(S): at 11:35

## 2023-09-06 RX ADMIN — Medication 50 MILLIGRAM(S): at 05:52

## 2023-09-06 RX ADMIN — Medication 20 MILLIEQUIVALENT(S): at 05:52

## 2023-09-06 RX ADMIN — Medication 0.5 MILLIGRAM(S): at 08:07

## 2023-09-06 RX ADMIN — CARVEDILOL PHOSPHATE 12.5 MILLIGRAM(S): 80 CAPSULE, EXTENDED RELEASE ORAL at 17:40

## 2023-09-06 RX ADMIN — APIXABAN 5 MILLIGRAM(S): 2.5 TABLET, FILM COATED ORAL at 21:01

## 2023-09-06 RX ADMIN — APIXABAN 5 MILLIGRAM(S): 2.5 TABLET, FILM COATED ORAL at 10:03

## 2023-09-06 NOTE — PROGRESS NOTE ADULT - SUBJECTIVE AND OBJECTIVE BOX
Patient is a 82y old  Female who presents with a chief complaint of Generalized weakness.  Anemia possibly secondary to GI Bleeding. (06 Sep 2023 07:55)    Patient seen in follow up for hyponatremia.        PAST MEDICAL HISTORY:  HTN (hypertension)    HLD (hyperlipidemia)    Anemia    DVT (deep venous thrombosis)    Pneumonia    OA (osteoarthritis)    Afib    Small bowel cancer    Pacemaker    Major depression    Chronic kidney disease (CKD)    COPD (chronic obstructive pulmonary disease)      MEDICATIONS  (STANDING):  albuterol    90 MICROgram(s) HFA Inhaler 2 Puff(s) Inhalation every 6 hours  aMIOdarone    Tablet 200 milliGRAM(s) Oral daily  apixaban 5 milliGRAM(s) Oral two times a day  aspirin  chewable 81 milliGRAM(s) Oral daily  buDESOnide    Inhalation Suspension 0.5 milliGRAM(s) Inhalation two times a day  carvedilol 12.5 milliGRAM(s) Oral every 12 hours  cefTRIAXone   IVPB 1000 milliGRAM(s) IV Intermittent every 24 hours  famotidine    Tablet 20 milliGRAM(s) Oral daily  fenofibrate Tablet 48 milliGRAM(s) Oral daily  ferrous    sulfate 325 milliGRAM(s) Oral daily  furosemide   Injectable 40 milliGRAM(s) IV Push daily  hydrALAZINE 50 milliGRAM(s) Oral three times a day  lactobacillus acidophilus 1 Tablet(s) Oral daily  methylPREDNISolone sodium succinate Injectable 40 milliGRAM(s) IV Push every 8 hours  multivitamin 1 Tablet(s) Oral daily  potassium chloride    Tablet ER 20 milliEquivalent(s) Oral four times a day  sertraline 25 milliGRAM(s) Oral daily  tiotropium 2.5 MICROgram(s) Inhaler 2 Puff(s) Inhalation daily    MEDICATIONS  (PRN):  acetaminophen     Tablet .. 650 milliGRAM(s) Oral every 6 hours PRN Temp greater or equal to 38C (100.4F)  aluminum hydroxide/magnesium hydroxide/simethicone Suspension 30 milliLiter(s) Oral every 4 hours PRN Dyspepsia  melatonin 3 milliGRAM(s) Oral at bedtime PRN Insomnia  ondansetron Injectable 4 milliGRAM(s) IV Push every 8 hours PRN Nausea and/or Vomiting    T(C): 36.4 (09-06-23 @ 04:45), Max: 36.9 (09-05-23 @ 09:30)  HR: 63 (09-06-23 @ 08:07) (59 - 64)  BP: 172/74 (09-06-23 @ 04:45) (101/64 - 172/74)  RR: 18 (09-06-23 @ 04:45) (16 - 18)  SpO2: 95% (09-06-23 @ 04:45) (95% - 99%)  Wt(kg): --  I&O's Detail    05 Sep 2023 07:01  -  06 Sep 2023 07:00  --------------------------------------------------------  IN:  Total IN: 0 mL    OUT:    Voided (mL): 450 mL  Total OUT: 450 mL    Total NET: -450 mL          PHYSICAL EXAM:  General: No distress  Respiratory: b/l air entry  Cardiovascular: S1 S2  Gastrointestinal: soft  Extremities:  + edema                              9.6    7.73  )-----------( 256      ( 06 Sep 2023 07:07 )             29.2     09-06    136  |  93<L>  |  45<H>  ----------------------------<  160<H>  4.1   |  37<H>  |  1.40<H>    Ca    9.2      06 Sep 2023 07:07  Mg     2.5     09-06    TPro  6.7  /  Alb  3.3  /  TBili  0.2  /  DBili  x   /  AST  37  /  ALT  38  /  AlkPhos  59  09-06        LIVER FUNCTIONS - ( 06 Sep 2023 07:07 )  Alb: 3.3 g/dL / Pro: 6.7 g/dL / ALK PHOS: 59 U/L / ALT: 38 U/L / AST: 37 U/L / GGT: x           Urinalysis Basic - ( 06 Sep 2023 07:07 )    Color: x / Appearance: x / SG: x / pH: x  Gluc: 160 mg/dL / Ketone: x  / Bili: x / Urobili: x   Blood: x / Protein: x / Nitrite: x   Leuk Esterase: x / RBC: x / WBC x   Sq Epi: x / Non Sq Epi: x / Bacteria: x        Sodium, Serum: 136 (09-06 @ 07:07)  Sodium, Serum: 133 (09-05 @ 10:10)  Sodium, Serum: 127 (09-05 @ 01:25)    Creatinine, Serum: 1.40 (09-06 @ 07:07)  Creatinine, Serum: 1.30 (09-05 @ 10:10)  Creatinine, Serum: 1.60 (09-05 @ 01:25)    Potassium, Serum: 4.1 (09-06 @ 07:07)  Potassium, Serum: 3.1 (09-05 @ 10:10)  Potassium, Serum: 2.3 (09-05 @ 01:25)    Hemoglobin: 9.6 (09-06 @ 07:07)  Hemoglobin: 9.1 (09-05 @ 01:25)

## 2023-09-06 NOTE — CONSULT NOTE ADULT - PROBLEM SELECTOR RECOMMENDATION 9
on amio  ?low tsh due to glucocorticoids?  recheck tfts including ft4, ft3  further recommendations pending receipt of results

## 2023-09-06 NOTE — CARE COORDINATION ASSESSMENT. - PATIENT WAS INVOLVED WITH A [facility] AGENCY PRIOR TO ADMISSION?
Central Prior Authorization Team   Phone: [phone]      PA Initiation    Medication: orphenadrine ER (NORFLEX) 100 MG 12 hr tablet  Insurance Company: [employer] Part D - Phone [phone] Fax [phone]  Pharmacy Filling the Rx: [id number] - [place], [place] - [address]  Filling Pharmacy Phone: [phone]  Filling Pharmacy Fax: [phone]  Start Date: 10/12/2021                       No

## 2023-09-06 NOTE — CONSULT NOTE ADULT - SUBJECTIVE AND OBJECTIVE BOX
Patient is a 82y old  Female who presents with a chief complaint of Generalized weakness.  Anemia possibly secondary to GI Bleeding. (06 Sep 2023 20:06)      Reason For Consult: abnl tfts    HPI:  JAZ BAIG is an 81 YO Female presented to the ED from assisted living facility with generalized weakness.  Present for the past few days.  No nausea or vomiting.  Denies any cough or new shortness of breath.  No fevers.  States that she does have aching pain to her left arm and bilateral legs.  States tonight that she was having trouble getting in and out of bed and felt too weak which is why she came to ED.  States that her stools are always dark because of iron but no changes recently.  Patient with a past medical history of A-fib on Eliquis, COPD now on home 2 to 3 L nasal cannula oxygen, hypertension, hyperlipidemia, chronic anemia, heart failure, CKD not on dialysis. (05 Sep 2023 06:46)      PAST MEDICAL & SURGICAL HISTORY:  HTN (hypertension)      HLD (hyperlipidemia)      Anemia  newly diagnosed      Pneumonia  years ago      OA (osteoarthritis)      Afib      Small bowel cancer      Pacemaker      Major depression      Chronic kidney disease (CKD)      COPD (chronic obstructive pulmonary disease)      Status post total left knee replacement  2007      History of cholecystectomy  open , many years ago      H/O hernia repair  years ago      H/O resection of small bowel          FAMILY HISTORY:  No pertinent family history in first degree relatives          Social History:    MEDICATIONS  (STANDING):  albuterol    90 MICROgram(s) HFA Inhaler 2 Puff(s) Inhalation every 6 hours  aMIOdarone    Tablet 200 milliGRAM(s) Oral daily  apixaban 5 milliGRAM(s) Oral two times a day  aspirin  chewable 81 milliGRAM(s) Oral daily  buDESOnide    Inhalation Suspension 0.5 milliGRAM(s) Inhalation two times a day  carvedilol 12.5 milliGRAM(s) Oral every 12 hours  cefTRIAXone   IVPB 1000 milliGRAM(s) IV Intermittent every 24 hours  famotidine    Tablet 20 milliGRAM(s) Oral daily  fenofibrate Tablet 48 milliGRAM(s) Oral daily  ferrous    sulfate 325 milliGRAM(s) Oral daily  hydrALAZINE 75 milliGRAM(s) Oral three times a day  lactobacillus acidophilus 1 Tablet(s) Oral daily  multivitamin 1 Tablet(s) Oral daily  potassium chloride    Tablet ER 20 milliEquivalent(s) Oral four times a day  sertraline 25 milliGRAM(s) Oral daily  tiotropium 2.5 MICROgram(s) Inhaler 2 Puff(s) Inhalation daily    MEDICATIONS  (PRN):  acetaminophen     Tablet .. 650 milliGRAM(s) Oral every 6 hours PRN Temp greater or equal to 38C (100.4F)  aluminum hydroxide/magnesium hydroxide/simethicone Suspension 30 milliLiter(s) Oral every 4 hours PRN Dyspepsia  melatonin 3 milliGRAM(s) Oral at bedtime PRN Insomnia  ondansetron Injectable 4 milliGRAM(s) IV Push every 8 hours PRN Nausea and/or Vomiting        T(C): 36.5 (09-06-23 @ 20:57), Max: 36.6 (09-06-23 @ 12:13)  HR: 60 (09-06-23 @ 20:57) (60 - 64)  BP: 160/78 (09-06-23 @ 20:57) (142/63 - 172/74)  RR: 18 (09-06-23 @ 20:57) (18 - 18)  SpO2: 98% (09-06-23 @ 20:57) (95% - 98%)  Wt(kg): --    PHYSICAL EXAM:  GENERAL: NAD, well-groomed, well-developed  HEAD:  Atraumatic, Normocephalic  NECK: Supple, No JVD, Normal thyroid  CHEST/LUNG: Clear to percussion bilaterally; No rales, rhonchi, wheezing, or rubs  HEART: Regular rate and rhythm; No murmurs, rubs, or gallops  ABDOMEN: Soft, Nontender, Nondistended; Bowel sounds present  EXTREMITIES:  2+ Peripheral Pulses, No clubbing, cyanosis, or edema  SKIN: No rashes or lesions    CAPILLARY BLOOD GLUCOSE                                9.6    7.73  )-----------( 256      ( 06 Sep 2023 07:07 )             29.2       CMP:  09-06 @ 07:07  SGPT 38  Albumin 3.3   Alk Phos 59   Anion Gap 6   SGOT 37   Total Bili 0.2   BUN 45   Calcium Total 9.2   CO2 37   Chloride 93   Creatinine 1.40   eGFR if AA --   eGFR if non AA --   Glucose 160   Potassium 4.1   Protein 6.7   Sodium 136      Thyroid Function Tests:  09-06 @ 11:09 TSH 0.25 FreeT4 -- T3 -- Anti TPO -- Anti Thyroglobulin Ab -- TSI --      Diabetes Tests:       Radiology:

## 2023-09-06 NOTE — PROGRESS NOTE ADULT - SUBJECTIVE AND OBJECTIVE BOX
Patient is a 82y old  Female who presents with a chief complaint of Generalized weakness.  (06 Sep 2023 11:57)  states weakness is improving  describes dark stool (although has been on feso4)      INTERVAL HPI/OVERNIGHT EVENTS:  T(C): 36.6 (09-06-23 @ 12:13), Max: 36.7 (09-05-23 @ 20:47)  HR: 60 (09-06-23 @ 12:13) (60 - 64)  BP: 154/70 (09-06-23 @ 12:13) (140/60 - 172/74)  RR: 18 (09-06-23 @ 12:13) (18 - 18)  SpO2: 96% (09-06-23 @ 12:13) (95% - 99%)  Wt(kg): --  I&O's Summary    05 Sep 2023 07:01  -  06 Sep 2023 07:00  --------------------------------------------------------  IN: 0 mL / OUT: 450 mL / NET: -450 mL        LABS:                        9.6    7.73  )-----------( 256      ( 06 Sep 2023 07:07 )             29.2     09-06    136  |  93<L>  |  45<H>  ----------------------------<  160<H>  4.1   |  37<H>  |  1.40<H>    Ca    9.2      06 Sep 2023 07:07  Mg     2.5     09-06    TPro  6.7  /  Alb  3.3  /  TBili  0.2  /  DBili  x   /  AST  37  /  ALT  38  /  AlkPhos  59  09-06    PT/INR - ( 05 Sep 2023 01:25 )   PT: 16.9 sec;   INR: 1.46 ratio         PTT - ( 05 Sep 2023 01:25 )  PTT:32.5 sec  Urinalysis Basic - ( 06 Sep 2023 07:07 )    Color: x / Appearance: x / SG: x / pH: x  Gluc: 160 mg/dL / Ketone: x  / Bili: x / Urobili: x   Blood: x / Protein: x / Nitrite: x   Leuk Esterase: x / RBC: x / WBC x   Sq Epi: x / Non Sq Epi: x / Bacteria: x      CAPILLARY BLOOD GLUCOSE            Urinalysis Basic - ( 06 Sep 2023 07:07 )    Color: x / Appearance: x / SG: x / pH: x  Gluc: 160 mg/dL / Ketone: x  / Bili: x / Urobili: x   Blood: x / Protein: x / Nitrite: x   Leuk Esterase: x / RBC: x / WBC x   Sq Epi: x / Non Sq Epi: x / Bacteria: x        MEDICATIONS  (STANDING):  albuterol    90 MICROgram(s) HFA Inhaler 2 Puff(s) Inhalation every 6 hours  aMIOdarone    Tablet 200 milliGRAM(s) Oral daily  apixaban 5 milliGRAM(s) Oral two times a day  aspirin  chewable 81 milliGRAM(s) Oral daily  buDESOnide    Inhalation Suspension 0.5 milliGRAM(s) Inhalation two times a day  carvedilol 12.5 milliGRAM(s) Oral every 12 hours  cefTRIAXone   IVPB 1000 milliGRAM(s) IV Intermittent every 24 hours  famotidine    Tablet 20 milliGRAM(s) Oral daily  fenofibrate Tablet 48 milliGRAM(s) Oral daily  ferrous    sulfate 325 milliGRAM(s) Oral daily  hydrALAZINE 75 milliGRAM(s) Oral three times a day  lactobacillus acidophilus 1 Tablet(s) Oral daily  methylPREDNISolone sodium succinate Injectable 40 milliGRAM(s) IV Push every 8 hours  multivitamin 1 Tablet(s) Oral daily  potassium chloride    Tablet ER 20 milliEquivalent(s) Oral four times a day  sertraline 25 milliGRAM(s) Oral daily  tiotropium 2.5 MICROgram(s) Inhaler 2 Puff(s) Inhalation daily    MEDICATIONS  (PRN):  acetaminophen     Tablet .. 650 milliGRAM(s) Oral every 6 hours PRN Temp greater or equal to 38C (100.4F)  aluminum hydroxide/magnesium hydroxide/simethicone Suspension 30 milliLiter(s) Oral every 4 hours PRN Dyspepsia  melatonin 3 milliGRAM(s) Oral at bedtime PRN Insomnia  ondansetron Injectable 4 milliGRAM(s) IV Push every 8 hours PRN Nausea and/or Vomiting      REVIEW OF SYSTEMS:  CONSTITUTIONAL: No fever, weight loss, or fatigue  EYES: No eye pain, visual disturbances, or discharge  ENMT:  No difficulty hearing, tinnitus, vertigo; No sinus or throat pain  NECK: No pain or stiffness  RESPIRATORY: No cough, wheezing, chills or hemoptysis; No shortness of breath  CARDIOVASCULAR: No chest pain, palpitations, dizziness, or leg swelling  GASTROINTESTINAL: No abdominal or epigastric pain. No nausea, vomiting, or hematemesis; No diarrhea or constipation. No melena or hematochezia.  GENITOURINARY: No dysuria, frequency, hematuria, or incontinence  NEUROLOGICAL: No headaches, memory loss, loss of strength, numbness, or tremors  SKIN: No itching, burning, rashes, or lesions   LYMPH NODES: No enlarged glands  ENDOCRINE: No heat or cold intolerance; No hair loss  MUSCULOSKELETAL: No joint pain or swelling; No muscle, back, or extremity pain  PSYCHIATRIC: No depression, anxiety, mood swings, or difficulty sleeping  HEME/LYMPH: No easy bruising, or bleeding gums  ALLERY AND IMMUNOLOGIC: No hives or eczema    RADIOLOGY & ADDITIONAL TESTS:    Imaging Personally Reviewed:  [x] YES  [ ] NO    Consultant(s) Notes Reviewed:  [x] YES  [ ] NO    PHYSICAL EXAM:  GENERAL: NAD, well-groomed, well-developed  HEAD:  Atraumatic, Normocephalic  EYES: EOMI, PERRLA, conjunctiva and sclera clear  ENMT: No tonsillar erythema, exudates, or enlargement; Moist mucous membranes, Good dentition, No lesions  NECK: Supple, No JVD, Normal thyroid  NERVOUS SYSTEM:  Alert & Oriented X3, Good concentration; Motor Strength 5/5 B/L upper and lower extremities; DTRs 2+ intact and symmetric  CHEST/LUNG: Clear to percussion bilaterally; No rales, rhonchi, wheezing, or rubs  HEART: Regular rate and rhythm; No murmurs, rubs, or gallops  ABDOMEN: Soft, Nontender, Nondistended; Bowel sounds present  EXTREMITIES:  2+ Peripheral Pulses, No clubbing, cyanosis, or edema  LYMPH: No lymphadenopathy noted  SKIN: No rashes or lesions    Care Discussed with Consultants/Other Providers [x ] YES  [ ] NO    advance care planning and advance directives discussed, including but not limited to long term care planning, and all forms reviewed [x]YES  [ ]NO

## 2023-09-06 NOTE — CASE MANAGEMENT PROGRESS NOTE - NSCMPROGRESSNOTE_GEN_ALL_CORE
Angelica Forrest (phone number [phone]) needs 3122, DC note, to be faxed to [phone] and meds sent to [facility] pharmacy [phone]. Also, they uses [facility] as home care [phone].

## 2023-09-06 NOTE — DIETITIAN INITIAL EVALUATION ADULT - PERTINENT MEDS FT
MEDICATIONS  (STANDING):  albuterol    90 MICROgram(s) HFA Inhaler 2 Puff(s) Inhalation every 6 hours  aMIOdarone    Tablet 200 milliGRAM(s) Oral daily  apixaban 5 milliGRAM(s) Oral two times a day  aspirin  chewable 81 milliGRAM(s) Oral daily  buDESOnide    Inhalation Suspension 0.5 milliGRAM(s) Inhalation two times a day  carvedilol 12.5 milliGRAM(s) Oral every 12 hours  cefTRIAXone   IVPB 1000 milliGRAM(s) IV Intermittent every 24 hours  famotidine    Tablet 20 milliGRAM(s) Oral daily  fenofibrate Tablet 48 milliGRAM(s) Oral daily  ferrous    sulfate 325 milliGRAM(s) Oral daily  hydrALAZINE 75 milliGRAM(s) Oral three times a day  lactobacillus acidophilus 1 Tablet(s) Oral daily  methylPREDNISolone sodium succinate Injectable 40 milliGRAM(s) IV Push every 8 hours  multivitamin 1 Tablet(s) Oral daily  potassium chloride    Tablet ER 20 milliEquivalent(s) Oral four times a day  sertraline 25 milliGRAM(s) Oral daily  tiotropium 2.5 MICROgram(s) Inhaler 2 Puff(s) Inhalation daily    MEDICATIONS  (PRN):  acetaminophen     Tablet .. 650 milliGRAM(s) Oral every 6 hours PRN Temp greater or equal to 38C (100.4F)  aluminum hydroxide/magnesium hydroxide/simethicone Suspension 30 milliLiter(s) Oral every 4 hours PRN Dyspepsia  melatonin 3 milliGRAM(s) Oral at bedtime PRN Insomnia  ondansetron Injectable 4 milliGRAM(s) IV Push every 8 hours PRN Nausea and/or Vomiting

## 2023-09-06 NOTE — CARE COORDINATION ASSESSMENT. - NSCAREPROVIDERS_GEN_ALL_CORE_FT
CARE PROVIDERS:  Accepting Physician: Deshaun Pate  Administration: Guilherme Noonan  Administration: Alejandro Johnson  Admitting: Deshaun Pate  Attending: Deshaun Pate  Case Management: Mariano Mays  Case Management: Vijaya Mart  Clinical Doc. Improvement: Annabella Avalos  Consultant: Yandy Nguyễn  Consultant: Boni Sainz  Consultant: Laurent Callejas  Consultant: Blessing Romeo  Consultant: Yamila Lawson  Consultant: Flavia Patel  Covering Team: Karen Nelson  Covering Team: Neo Vazquez  Covering Team: Perlman, Daryl  ED Attending: Jadiel Duke ED Nurse: Lisa Newman  Nurse: Sara Brennan  Nurse: Liat Clayton  Ordered: ADM, User  Ordered: ServiceAccount, SCMMLM  Ordered: Yamila Lawson  Outpatient Provider: Laurent Callejas  Outpatient Provider: Joseph Sanchez  Outpatient Provider: Enrique Yun  PCA/Nursing Assistant: Paige Martinez  PCA/Nursing Assistant: Elizabeth Fields  Primary Team: Madhuri Chacon  Registered Dietitian: Ernestine Owen  Respiratory Therapy: Kam Grijalva  Respiratory Therapy: Iggy Kelley  : Ashlyn Mas  : Iliana Elizalde  Team: [facility], Team

## 2023-09-06 NOTE — DIETITIAN INITIAL EVALUATION ADULT - PERTINENT LABORATORY DATA
09-06    136  |  93<L>  |  45<H>  ----------------------------<  160<H>  4.1   |  37<H>  |  1.40<H>    Ca    9.2      06 Sep 2023 07:07  Mg     2.5     09-06    TPro  6.7  /  Alb  3.3  /  TBili  0.2  /  DBili  x   /  AST  37  /  ALT  38  /  AlkPhos  59  09-06  A1C with Estimated Average Glucose Result: 5.0 % (09-05-23 @ 11:10)  A1C with Estimated Average Glucose Result: 5.2 % (07-27-23 @ 05:42)

## 2023-09-06 NOTE — CARE COORDINATION ASSESSMENT. - NSPASTMEDSURGHISTORY_GEN_ALL_CORE_FT
PAST MEDICAL & SURGICAL HISTORY:  OA (osteoarthritis)      Pneumonia  years ago      Anemia  newly diagnosed      HLD (hyperlipidemia)      HTN (hypertension)      H/O hernia repair  years ago      History of cholecystectomy  open , many years ago      Status post total left knee replacement  2007      COPD (chronic obstructive pulmonary disease)      Chronic kidney disease (CKD)      Major depression      Pacemaker      Small bowel cancer      Afib      H/O resection of small bowel

## 2023-09-06 NOTE — PROGRESS NOTE ADULT - SUBJECTIVE AND OBJECTIVE BOX
Patient is a 82y Female with a known history of :  Hypokalemia [E87.6]    Weakness [R53.1]    Preventive measure [Z29.9]      HPI:  JAZ BAIG is an 83 YO Female presented to the ED from assisted living facility with generalized weakness.  Present for the past few days.  No nausea or vomiting.  Denies any cough or new shortness of breath.  No fevers.  States that she does have aching pain to her left arm and bilateral legs.  States tonight that she was having trouble getting in and out of bed and felt too weak which is why she came to ED.  States that her stools are always dark because of iron but no changes recently.  Patient with a past medical history of A-fib on Eliquis, COPD now on home 2 to 3 L nasal cannula oxygen, hypertension, hyperlipidemia, chronic anemia, heart failure, CKD not on dialysis. (05 Sep 2023 06:46)      REVIEW OF SYSTEMS:  CONSTITUTIONAL: No weakness, fevers or chills  EYES/ENT: No visual changes;  No vertigo or throat pain   NECK: No pain or stiffness  RESPIRATORY: No cough, wheezing, hemoptysis; No shortness of breath  CARDIOVASCULAR: No chest pain or palpitations  GASTROINTESTINAL: No abdominal pain. No nausea, vomiting, or hematemesis; No diarrhea or constipation. No melena or hematochezia.  GENITOURINARY: No dysuria, frequency or hematuria  NEUROLOGICAL: No numbness or weakness  SKIN: No itching or rash  All other review of systems is negative unless indicated above      MEDICATIONS  (STANDING):  albuterol    90 MICROgram(s) HFA Inhaler 2 Puff(s) Inhalation every 6 hours  aMIOdarone    Tablet 200 milliGRAM(s) Oral daily  apixaban 5 milliGRAM(s) Oral two times a day  aspirin  chewable 81 milliGRAM(s) Oral daily  buDESOnide    Inhalation Suspension 0.5 milliGRAM(s) Inhalation two times a day  carvedilol 12.5 milliGRAM(s) Oral every 12 hours  cefTRIAXone   IVPB 1000 milliGRAM(s) IV Intermittent every 24 hours  famotidine    Tablet 20 milliGRAM(s) Oral daily  fenofibrate Tablet 48 milliGRAM(s) Oral daily  ferrous    sulfate 325 milliGRAM(s) Oral daily  furosemide   Injectable 40 milliGRAM(s) IV Push daily  hydrALAZINE 50 milliGRAM(s) Oral three times a day  lactobacillus acidophilus 1 Tablet(s) Oral daily  methylPREDNISolone sodium succinate Injectable 40 milliGRAM(s) IV Push every 8 hours  multivitamin 1 Tablet(s) Oral daily  potassium chloride    Tablet ER 20 milliEquivalent(s) Oral four times a day  sertraline 25 milliGRAM(s) Oral daily  tiotropium 2.5 MICROgram(s) Inhaler 2 Puff(s) Inhalation daily    MEDICATIONS  (PRN):  acetaminophen     Tablet .. 650 milliGRAM(s) Oral every 6 hours PRN Temp greater or equal to 38C (100.4F)  aluminum hydroxide/magnesium hydroxide/simethicone Suspension 30 milliLiter(s) Oral every 4 hours PRN Dyspepsia  melatonin 3 milliGRAM(s) Oral at bedtime PRN Insomnia  ondansetron Injectable 4 milliGRAM(s) IV Push every 8 hours PRN Nausea and/or Vomiting      ALLERGIES: Procardia (Other)  NIFEdipine (Unknown)  fluoxetine (Unknown)      FAMILY HISTORY:  No pertinent family history in first degree relatives        PHYSICAL EXAMINATION:  -----------------------------  T(C): 36.4 (09-06-23 @ 04:45), Max: 36.9 (09-05-23 @ 09:30)  HR: 61 (09-06-23 @ 04:45) (59 - 64)  BP: 172/74 (09-06-23 @ 04:45) (140/60 - 172/74)  RR: 18 (09-06-23 @ 04:45) (17 - 18)  SpO2: 95% (09-06-23 @ 04:45) (95% - 99%)  Wt(kg): --    09-05 @ 07:01  -  09-06 @ 07:00  --------------------------------------------------------  IN:  Total IN: 0 mL    OUT:    Voided (mL): 450 mL  Total OUT: 450 mL    Total NET: -450 mL      PHYSICAL EXAM:  Constitutional: Awake in NAD  HEENT:  MAAME, EOMI  Neck: No JVD  Pulmonary: CTA B/L No R/R/W  Cardiovascular: PMI not palpable non-displaced Regular S1 and S2, no murmurs  Gastrointestinal: Bowel Sounds present, soft, nontender.   Extremities: Trace peripheral edema.   Neuro: No gross focal deficits        LABS:   --------  09-05    133<L>  |  87<L>  |  46<H>  ----------------------------<  87  3.1<L>   |  37<H>  |  1.30    Ca    9.1      05 Sep 2023 10:10  Mg     2.2     09-05    TPro  6.4  /  Alb  3.4  /  TBili  0.4  /  DBili  x   /  AST  34  /  ALT  32  /  AlkPhos  78  09-05                         9.1    7.08  )-----------( 221      ( 05 Sep 2023 01:25 )             26.9     PT/INR - ( 05 Sep 2023 01:25 )   PT: 16.9 sec;   INR: 1.46 ratio         PTT - ( 05 Sep 2023 01:25 )  PTT:32.5 sec

## 2023-09-06 NOTE — PROGRESS NOTE ADULT - ASSESSMENT
82F with known AF on Eliquis, HTN, HLD, HFpEF PPM (implanted 7/2021), severe Pulm HTN follwed by Dr. Mahajan, COPD on home O2 a/w worsening weakness. Her BNP is 1091 with significant Na level of 127 consistent with hyponatremia    Suggest:    1. Weakness likely from hyponatremia and possible UTI  - fluid restrict, Na improved 127 up to 133    - Empiric Abx per primary team  - Supplement Mg and K with hyponatremia  - On IV lasix 40 mg IV, can switch to po.   - Consider holding sertraline with hyponatremia  - Check TFT on amiodarone.     2. PAF  - In atrial paced rhythm with amiodarone  - c/w Eliquis with relative anemia  - monitor CBC    3. HTN  - C/w Coreg 12.5 mg BID  - Hydralazine is at 50 mg TID.  BP still often elevated, can increase to 75 mg TID    4. Will follow     82F with known AF on Eliquis, HTN, HLD, HFpEF PPM (implanted 7/2021), severe Pulm HTN follwed by Dr. Mahajan, COPD on home O2 a/w worsening weakness. Her BNP is 1091 with significant Na level of 127 consistent with hyponatremia    Suggest:    1. Weakness likely from hyponatremia and possible UTI  - fluid restrict, Na improved 127 up to 133 and she is feeling much better, almost back to baseline.   - Empiric Abx per primary team  - Supplement Mg and K with hyponatremia  - Consider holding sertraline with hyponatremia  - Check TFT on amiodarone.     2. Edeam  - IV lasix and edema is all gone.  Can change to po.     3. PAF  - In atrial paced rhythm with amiodarone  - c/w Eliquis with relative anemia  - monitor CBC    4. HTN  - C/w Coreg 12.5 mg BID  - Hydralazine is at 50 mg TID.  BP still often elevated, can increase to 75 mg TID     Will follow

## 2023-09-06 NOTE — CARE COORDINATION ASSESSMENT. - OTHER PERTINENT REFERRAL INFORMATION
CM met with patient at bedside to explain role and transitions of care. Patient lives with spouse at [facility].  Patient is independent prior to admission.  Patient identified her  and [facility] as her caregiver.  No home care prior to admission. Patient uses a walker for ambulation.   Daughter Tanisha will transport patient home when ready to be discharge.  Patient states that she will need PT at the facility.  CM explained  home care expectation, process, insurance provision and home safety with good understanding.  CM to make referral if appropriate. Patient verbalized understanding of plans after discharge and are in agreement.  Resource folder left at bedside.  All questions answered to the best of my abilities.  CM remains available throughout the hospital stay.

## 2023-09-06 NOTE — PROGRESS NOTE ADULT - SUBJECTIVE AND OBJECTIVE BOX
The patient is an 83 YO female former smoker 1 pack per day times 30 years with a history of COPD who presented to the ER with weakness. She is followed in our office by Dr. Mahajan. There is a history of chronic kidney disease, hypertension, hyperlipidemia, pulmonary emboli, peripheral arterial disease, resection of small bowel carcinoma, and chronic hypoxic respiratory failure. Surgical history is positive for cholecystectomy, hernia repair, and whipple procedure. There is no history of ETOH or drug abuse. At home the patient was taking Budesonide by nebulizer, Duoneb via nebulizer, Zoloft, Coreg, Tricor, Breo inhaler, Ferrous Sulfate, Famotidine, Apixaban, and Zaroxolyn. She is allergic to Prozac and Procardia.      (06 Sep 2023 13:29)      INTERVAL HPI/OVERNIGHT EVENTS:    MEDICATIONS  (STANDING):  albuterol    90 MICROgram(s) HFA Inhaler 2 Puff(s) Inhalation every 6 hours  aMIOdarone    Tablet 200 milliGRAM(s) Oral daily  apixaban 5 milliGRAM(s) Oral two times a day  aspirin  chewable 81 milliGRAM(s) Oral daily  buDESOnide    Inhalation Suspension 0.5 milliGRAM(s) Inhalation two times a day  carvedilol 12.5 milliGRAM(s) Oral every 12 hours  cefTRIAXone   IVPB 1000 milliGRAM(s) IV Intermittent every 24 hours  famotidine    Tablet 20 milliGRAM(s) Oral daily  fenofibrate Tablet 48 milliGRAM(s) Oral daily  ferrous    sulfate 325 milliGRAM(s) Oral daily  hydrALAZINE 75 milliGRAM(s) Oral three times a day  lactobacillus acidophilus 1 Tablet(s) Oral daily  methylPREDNISolone sodium succinate Injectable 40 milliGRAM(s) IV Push every 8 hours  multivitamin 1 Tablet(s) Oral daily  potassium chloride    Tablet ER 20 milliEquivalent(s) Oral four times a day  sertraline 25 milliGRAM(s) Oral daily  tiotropium 2.5 MICROgram(s) Inhaler 2 Puff(s) Inhalation daily      MEDICATIONS  (PRN):  acetaminophen     Tablet .. 650 milliGRAM(s) Oral every 6 hours PRN Temp greater or equal to 38C (100.4F)  aluminum hydroxide/magnesium hydroxide/simethicone Suspension 30 milliLiter(s) Oral every 4 hours PRN Dyspepsia  melatonin 3 milliGRAM(s) Oral at bedtime PRN Insomnia  ondansetron Injectable 4 milliGRAM(s) IV Push every 8 hours PRN Nausea and/or Vomiting      Allergies    Procardia (Other)  NIFEdipine (Unknown)  fluoxetine (Unknown)    Intolerances    oxycodone (Other)      PAST MEDICAL & SURGICAL HISTORY:  HTN (hypertension)      HLD (hyperlipidemia)      Anemia  newly diagnosed      Pneumonia  years ago      OA (osteoarthritis)      Afib      Small bowel cancer      Pacemaker      Major depression      Chronic kidney disease (CKD)      COPD (chronic obstructive pulmonary disease)      Status post total left knee replacement  2007      History of cholecystectomy  open , many years ago      H/O hernia repair  years ago      H/O resection of small bowel          Vital Signs Last 24 Hrs  T(C): 36.6 (06 Sep 2023 12:13), Max: 36.7 (05 Sep 2023 20:47)  T(F): 97.9 (06 Sep 2023 12:13), Max: 98.1 (05 Sep 2023 20:47)  HR: 60 (06 Sep 2023 17:40) (60 - 64)  BP: 148/76 (06 Sep 2023 17:40) (142/63 - 172/74)  BP(mean): --  RR: 18 (06 Sep 2023 12:13) (18 - 18)  SpO2: 96% (06 Sep 2023 12:13) (95% - 99%)    Parameters below as of 06 Sep 2023 12:13  Patient On (Oxygen Delivery Method): room air  O2 Flow (L/min): 2      PHYSICAL EXAMINATION:    GENERAL: The patient is awake and alert in no apparent distress.     HEENT: Head is normocephalic and atraumatic.     NECK: Supple.    LUNGS: diminished air entry bilateral    HEART: Regular rate and rhythm without murmur.    ABDOMEN: Soft, nontender, and nondistended.      EXTREMITIES: Without any cyanosis, clubbing, rash, lesions or edema.    NEUROLOGIC: Grossly intact.    SKIN: No ulceration or induration present.      LABS:                        9.6    7.73  )-----------( 256      ( 06 Sep 2023 07:07 )             29.2     09-06    136  |  93<L>  |  45<H>  ----------------------------<  160<H>  4.1   |  37<H>  |  1.40<H>    Ca    9.2      06 Sep 2023 07:07  Mg     2.5     09-06    TPro  6.7  /  Alb  3.3  /  TBili  0.2  /  DBili  x   /  AST  37  /  ALT  38  /  AlkPhos  59  09-06    PT/INR - ( 05 Sep 2023 01:25 )   PT: 16.9 sec;   INR: 1.46 ratio         PTT - ( 05 Sep 2023 01:25 )  PTT:32.5 sec  Urinalysis Basic - ( 06 Sep 2023 07:07 )    Color: x / Appearance: x / SG: x / pH: x  Gluc: 160 mg/dL / Ketone: x  / Bili: x / Urobili: x   Blood: x / Protein: x / Nitrite: x   Leuk Esterase: x / RBC: x / WBC x   Sq Epi: x / Non Sq Epi: x / Bacteria: x                  Procalcitonin, Serum: 0.11 ng/mL (09-05-23 @ 10:10)      MICROBIOLOGY:  Culture Results:   >100,000 CFU/ml Escherichia coli (09-05 @ 03:00)  Culture Results:   No growth at 24 hours (09-05 @ 01:35)  Culture Results:   No growth at 24 hours (09-05 @ 01:25)        Assessment:    COPD  Hyponatremia  Hypokalemia  Hx Pulmonary emboli  Acute on Chronic Hypoxic respiratory failure  Atrial Fibrillation  Hx Resection of Small bowel carcinoma  Chronic Kidney Disease      Plan:    Supplemental oxygen  Follow pulse oximetry  Budesonide + Duoneb via nebulizer  Anticoagulation with Apixaban  Will taper Solumedrol

## 2023-09-06 NOTE — DIETITIAN INITIAL EVALUATION ADULT - ORAL INTAKE PTA/DIET HISTORY
Pt familiar to this writer from admission last month.  At that time pt reported a wt of 159# 1.5 years ago prior to moving to [facility].  Last admit wt 179# which now pt states was due to fluid retention due to food being salty at [facility].  Current wt 160# which pt states was due to being on 3 water pills.  Pt slightly confused during interview getting facilities mixed up when trying to recall hx.  Does endorse current lack of appetite however due to feeling weak.   Diet noted in transfer papers Easy to Chew, low Na and wt of 178# noted.

## 2023-09-06 NOTE — PROGRESS NOTE ADULT - ASSESSMENT
KELSEY on CKD 3, CRS  Hyponatremia: On thiazide diuretic  Hypokalemia    09/05/23: Get repeat labs to monitor trend. Hold metolazone. Potassium supplementation. Avoid nephrotoxic meds as possible.   Cardiology follow up. Will follow electrolytes and renal function trend. D/w patient regarding need for out patient nephrology follow up.   09/06/23: Improving renal indices. To continue current meds. Encourage PO intake as tolerated. On IV lasix. Hold metolazone.

## 2023-09-06 NOTE — DIETITIAN INITIAL EVALUATION ADULT - NS FNS WEIGHT CHANGE REASON
cannot verify for sure wt change/due to pt confusion, conflicting data- possibly due to loss of fluid/unintentional

## 2023-09-07 DIAGNOSIS — N39.0 URINARY TRACT INFECTION, SITE NOT SPECIFIED: ICD-10-CM

## 2023-09-07 LAB
-  AMIKACIN: SIGNIFICANT CHANGE UP
-  AMOXICILLIN/CLAVULANIC ACID: SIGNIFICANT CHANGE UP
-  AMPICILLIN/SULBACTAM: SIGNIFICANT CHANGE UP
-  AMPICILLIN: SIGNIFICANT CHANGE UP
-  AZTREONAM: SIGNIFICANT CHANGE UP
-  CEFAZOLIN: SIGNIFICANT CHANGE UP
-  CEFEPIME: SIGNIFICANT CHANGE UP
-  CEFOXITIN: SIGNIFICANT CHANGE UP
-  CEFTRIAXONE: SIGNIFICANT CHANGE UP
-  CEFUROXIME: SIGNIFICANT CHANGE UP
-  CIPROFLOXACIN: SIGNIFICANT CHANGE UP
-  ERTAPENEM: SIGNIFICANT CHANGE UP
-  GENTAMICIN: SIGNIFICANT CHANGE UP
-  IMIPENEM: SIGNIFICANT CHANGE UP
-  LEVOFLOXACIN: SIGNIFICANT CHANGE UP
-  MEROPENEM: SIGNIFICANT CHANGE UP
-  NITROFURANTOIN: SIGNIFICANT CHANGE UP
-  PIPERACILLIN/TAZOBACTAM: SIGNIFICANT CHANGE UP
-  TOBRAMYCIN: SIGNIFICANT CHANGE UP
-  TRIMETHOPRIM/SULFAMETHOXAZOLE: SIGNIFICANT CHANGE UP
ANION GAP SERPL CALC-SCNC: 6 MMOL/L — SIGNIFICANT CHANGE UP (ref 5–17)
BUN SERPL-MCNC: 52 MG/DL — HIGH (ref 7–23)
CALCIUM SERPL-MCNC: 9.2 MG/DL — SIGNIFICANT CHANGE UP (ref 8.5–10.1)
CHLORIDE SERPL-SCNC: 98 MMOL/L — SIGNIFICANT CHANGE UP (ref 96–108)
CO2 SERPL-SCNC: 34 MMOL/L — HIGH (ref 22–31)
CREAT SERPL-MCNC: 1.3 MG/DL — SIGNIFICANT CHANGE UP (ref 0.5–1.3)
CULTURE RESULTS: SIGNIFICANT CHANGE UP
EGFR: 41 ML/MIN/1.73M2 — LOW
GLUCOSE SERPL-MCNC: 118 MG/DL — HIGH (ref 70–99)
HCT VFR BLD CALC: 31.5 % — LOW (ref 34.5–45)
HGB BLD-MCNC: 10 G/DL — LOW (ref 11.5–15.5)
MCHC RBC-ENTMCNC: 31.2 PG — SIGNIFICANT CHANGE UP (ref 27–34)
MCHC RBC-ENTMCNC: 31.7 GM/DL — LOW (ref 32–36)
MCV RBC AUTO: 98.1 FL — SIGNIFICANT CHANGE UP (ref 80–100)
METHOD TYPE: SIGNIFICANT CHANGE UP
NRBC # BLD: 0 /100 WBCS — SIGNIFICANT CHANGE UP (ref 0–0)
ORGANISM # SPEC MICROSCOPIC CNT: SIGNIFICANT CHANGE UP
ORGANISM # SPEC MICROSCOPIC CNT: SIGNIFICANT CHANGE UP
PLATELET # BLD AUTO: 258 K/UL — SIGNIFICANT CHANGE UP (ref 150–400)
POTASSIUM SERPL-MCNC: 4.8 MMOL/L — SIGNIFICANT CHANGE UP (ref 3.5–5.3)
POTASSIUM SERPL-MCNC: 5.6 MMOL/L — HIGH (ref 3.5–5.3)
POTASSIUM SERPL-SCNC: 4.8 MMOL/L — SIGNIFICANT CHANGE UP (ref 3.5–5.3)
POTASSIUM SERPL-SCNC: 5.6 MMOL/L — HIGH (ref 3.5–5.3)
RBC # BLD: 3.21 M/UL — LOW (ref 3.8–5.2)
RBC # FLD: 14.6 % — HIGH (ref 10.3–14.5)
SODIUM SERPL-SCNC: 138 MMOL/L — SIGNIFICANT CHANGE UP (ref 135–145)
SPECIMEN SOURCE: SIGNIFICANT CHANGE UP
T3 SERPL-MCNC: 27 NG/DL — LOW (ref 80–200)
T4 AB SER-ACNC: 11.9 UG/DL — SIGNIFICANT CHANGE UP (ref 4.6–12)
TSH SERPL-MCNC: 0.29 UIU/ML — LOW (ref 0.36–3.74)
WBC # BLD: 13.05 K/UL — HIGH (ref 3.8–10.5)
WBC # FLD AUTO: 13.05 K/UL — HIGH (ref 3.8–10.5)

## 2023-09-07 RX ADMIN — Medication 0.5 MILLIGRAM(S): at 07:30

## 2023-09-07 RX ADMIN — CEFTRIAXONE 100 MILLIGRAM(S): 500 INJECTION, POWDER, FOR SOLUTION INTRAMUSCULAR; INTRAVENOUS at 05:06

## 2023-09-07 RX ADMIN — Medication 75 MILLIGRAM(S): at 21:51

## 2023-09-07 RX ADMIN — AMIODARONE HYDROCHLORIDE 200 MILLIGRAM(S): 400 TABLET ORAL at 05:05

## 2023-09-07 RX ADMIN — ALBUTEROL 2 PUFF(S): 90 AEROSOL, METERED ORAL at 05:16

## 2023-09-07 RX ADMIN — Medication 0.5 MILLIGRAM(S): at 20:29

## 2023-09-07 RX ADMIN — APIXABAN 5 MILLIGRAM(S): 2.5 TABLET, FILM COATED ORAL at 10:24

## 2023-09-07 RX ADMIN — Medication 75 MILLIGRAM(S): at 15:40

## 2023-09-07 RX ADMIN — Medication 40 MILLIGRAM(S): at 05:03

## 2023-09-07 RX ADMIN — TIOTROPIUM BROMIDE 2 PUFF(S): 18 CAPSULE ORAL; RESPIRATORY (INHALATION) at 05:15

## 2023-09-07 RX ADMIN — FAMOTIDINE 20 MILLIGRAM(S): 10 INJECTION INTRAVENOUS at 15:43

## 2023-09-07 RX ADMIN — ALBUTEROL 2 PUFF(S): 90 AEROSOL, METERED ORAL at 15:38

## 2023-09-07 RX ADMIN — Medication 1 TABLET(S): at 15:39

## 2023-09-07 RX ADMIN — SERTRALINE 25 MILLIGRAM(S): 25 TABLET, FILM COATED ORAL at 15:39

## 2023-09-07 RX ADMIN — Medication 40 MILLIGRAM(S): at 05:05

## 2023-09-07 RX ADMIN — Medication 81 MILLIGRAM(S): at 15:39

## 2023-09-07 RX ADMIN — CARVEDILOL PHOSPHATE 12.5 MILLIGRAM(S): 80 CAPSULE, EXTENDED RELEASE ORAL at 19:25

## 2023-09-07 RX ADMIN — Medication 1 TABLET(S): at 15:38

## 2023-09-07 RX ADMIN — Medication 48 MILLIGRAM(S): at 15:38

## 2023-09-07 RX ADMIN — ALBUTEROL 2 PUFF(S): 90 AEROSOL, METERED ORAL at 19:26

## 2023-09-07 RX ADMIN — CARVEDILOL PHOSPHATE 12.5 MILLIGRAM(S): 80 CAPSULE, EXTENDED RELEASE ORAL at 05:05

## 2023-09-07 RX ADMIN — APIXABAN 5 MILLIGRAM(S): 2.5 TABLET, FILM COATED ORAL at 21:51

## 2023-09-07 RX ADMIN — Medication 75 MILLIGRAM(S): at 05:04

## 2023-09-07 RX ADMIN — Medication 20 MILLIEQUIVALENT(S): at 05:06

## 2023-09-07 RX ADMIN — Medication 325 MILLIGRAM(S): at 15:39

## 2023-09-07 NOTE — CASE MANAGEMENT PROGRESS NOTE - NSCMPROGRESSNOTE_GEN_ALL_CORE
Addendum to previous CM's note:   Per Dr Pate, patient is not cleared for DC today.  not cleared by ID>   Pt still on IV Rocephin and IV SoluMedrol.     DC plan: Return to [facility]. She has Home O2 prior to hosp admission.  Dtr will transport.  Received call from [facility]   Andreas Manuel (phone number [phone]), they will need 3122, DC note, to be faxed to [phone] and all meds to be sent to [facility] pharmacy [phone]. Also, they use [facility] as home care [phone].  F2F requested from MD  PCP: Dr Chandan Ross [phone].

## 2023-09-07 NOTE — PROGRESS NOTE ADULT - SUBJECTIVE AND OBJECTIVE BOX
Patient is a 82y old  Female who presents with a chief complaint of Generalized weakness.  Anemia possibly secondary to GI Bleeding. (06 Sep 2023 07:55)    Patient seen in follow up for hyponatremia.        PAST MEDICAL HISTORY:  HTN (hypertension)    HLD (hyperlipidemia)    Anemia    DVT (deep venous thrombosis)    Pneumonia    OA (osteoarthritis)    Afib    Small bowel cancer    Pacemaker    Major depression    Chronic kidney disease (CKD)    COPD (chronic obstructive pulmonary disease)      MEDICATIONS  (STANDING):  albuterol    90 MICROgram(s) HFA Inhaler 2 Puff(s) Inhalation every 6 hours  aMIOdarone    Tablet 200 milliGRAM(s) Oral daily  apixaban 5 milliGRAM(s) Oral two times a day  aspirin  chewable 81 milliGRAM(s) Oral daily  buDESOnide    Inhalation Suspension 0.5 milliGRAM(s) Inhalation two times a day  carvedilol 12.5 milliGRAM(s) Oral every 12 hours  cefTRIAXone   IVPB 1000 milliGRAM(s) IV Intermittent every 24 hours  famotidine    Tablet 20 milliGRAM(s) Oral daily  fenofibrate Tablet 48 milliGRAM(s) Oral daily  ferrous    sulfate 325 milliGRAM(s) Oral daily  furosemide    Tablet 40 milliGRAM(s) Oral daily  hydrALAZINE 75 milliGRAM(s) Oral three times a day  lactobacillus acidophilus 1 Tablet(s) Oral daily  methylPREDNISolone sodium succinate Injectable 40 milliGRAM(s) IV Push daily  multivitamin 1 Tablet(s) Oral daily  sertraline 25 milliGRAM(s) Oral daily  tiotropium 2.5 MICROgram(s) Inhaler 2 Puff(s) Inhalation daily    MEDICATIONS  (PRN):  acetaminophen     Tablet .. 650 milliGRAM(s) Oral every 6 hours PRN Temp greater or equal to 38C (100.4F)  aluminum hydroxide/magnesium hydroxide/simethicone Suspension 30 milliLiter(s) Oral every 4 hours PRN Dyspepsia  melatonin 3 milliGRAM(s) Oral at bedtime PRN Insomnia  ondansetron Injectable 4 milliGRAM(s) IV Push every 8 hours PRN Nausea and/or Vomiting    T(C): 36.8 (09-07-23 @ 11:07), Max: 36.8 (09-07-23 @ 11:07)  HR: 60 (09-07-23 @ 11:07) (60 - 68)  BP: 134/57 (09-07-23 @ 11:07) (120/66 - 172/74)  RR: 18 (09-07-23 @ 11:07)  SpO2: 96% (09-07-23 @ 11:07)  Wt(kg): --  I&O's Detail    06 Sep 2023 07:01  -  07 Sep 2023 07:00  --------------------------------------------------------  IN:  Total IN: 0 mL    OUT:    Voided (mL): 1700 mL  Total OUT: 1700 mL    Total NET: -1700 mL              PHYSICAL EXAM:  General: No distress  Respiratory: b/l air entry  Cardiovascular: S1 S2  Gastrointestinal: soft  Extremities:  edema better                             LABORATORY:                        10.0   13.05 )-----------( 258      ( 07 Sep 2023 07:20 )             31.5     09-07    x   |  x   |  x   ----------------------------<  x   4.8   |  x   |  x     Ca    9.2      07 Sep 2023 07:20  Mg     2.5     09-06    TPro  6.7  /  Alb  3.3  /  TBili  0.2  /  DBili  x   /  AST  37  /  ALT  38  /  AlkPhos  59  09-06    Sodium: 138 mmol/L (09-07 @ 07:20)  Sodium: 136 mmol/L (09-06 @ 07:07)    Potassium: 4.8 mmol/L (09-07 @ 11:49)  Potassium: 5.6 mmol/L (09-07 @ 07:20)  Potassium: 4.1 mmol/L (09-06 @ 07:07)    Hemoglobin: 10.0 g/dL (09-07 @ 07:20)  Hemoglobin: 9.6 g/dL (09-06 @ 07:07)  Hemoglobin: 9.1 g/dL (09-05 @ 01:25)    Creatinine, Serum 1.30 (09-07 @ 07:20)  Creatinine, Serum 1.40 (09-06 @ 07:07)  Creatinine, Serum 1.30 (09-05 @ 10:10)  Creatinine, Serum 1.60 (09-05 @ 01:25)        LIVER FUNCTIONS - ( 06 Sep 2023 07:07 )  Alb: 3.3 g/dL / Pro: 6.7 g/dL / ALK PHOS: 59 U/L / ALT: 38 U/L / AST: 37 U/L / GGT: x           Urinalysis Basic - ( 07 Sep 2023 07:20 )    Color: x / Appearance: x / SG: x / pH: x  Gluc: 118 mg/dL / Ketone: x  / Bili: x / Urobili: x   Blood: x / Protein: x / Nitrite: x   Leuk Esterase: x / RBC: x / WBC x   Sq Epi: x / Non Sq Epi: x / Bacteria: x

## 2023-09-07 NOTE — PROGRESS NOTE ADULT - ASSESSMENT
JAZ BAIG is an 81 YO Female presented to the ED from assisted living facility with generalized weakness.  Present for the past few days.  No nausea or vomiting.  Denies any cough or new shortness of breath.  No fevers.  States that she does have aching pain to her left arm and bilateral legs.  States tonight that she was having trouble getting in and out of bed and felt too weak which is why she came to ED

## 2023-09-07 NOTE — PROGRESS NOTE ADULT - ASSESSMENT
KELSEY on CKD 3, CRS  Hyponatremia: On thiazide diuretic  Hypokalemia  COPD    09/05/23: Get repeat labs to monitor trend. Hold metolazone. Potassium supplementation. Avoid nephrotoxic meds as possible.   Cardiology follow up. Will follow electrolytes and renal function trend. D/w patient regarding need for out patient nephrology follow up.   09/06/23: Improving renal indices. To continue current meds. Encourage PO intake as tolerated. On IV lasix. Hold metolazone.   09/07/23: Stable sodium levels. On PO diuretics. off metolazone. Pulmonary follow up.

## 2023-09-07 NOTE — CASE MANAGEMENT PROGRESS NOTE - NSCMPROGRESSNOTE_GEN_ALL_CORE
DC plan: Return to [facility]. She has Home O2 prior to hosp admission.  Dtr will transport.  Per MD patient is likely to be medically cleared today to transition back to the community.     CM reached out to [facility] / left  a message for the patient's  "Andreas Manuel"/ awaiting call back.   Per previous CM's note: Angelica Forrest (phone number [phone]) needs 3122, DC note, to be faxed to [phone] and meds sent to [facility] pharmacy [phone]. Also, they uses [facility] as home care [phone].  PCP: Dr Chandan Ross [phone].

## 2023-09-07 NOTE — PROGRESS NOTE ADULT - SUBJECTIVE AND OBJECTIVE BOX
[facility]. Physical Therapy  [address] [place]  Phone: [phone] Fax: [phone]    Progress Note    Name: Grace Gonzalez   : 1987   MD: Leonid Baumann DO       Treatment Diagnosis: Neuropathy [G62.9]  Start of Care: 21    Visits from Start of Care: 15  Missed Visits: Cancelled 2, No show 1    Summary of Care: The Pt has been seen for 15 outpatient physical therapy sessions with gait and balance impairments secondary to neuropathy. The Pt's therapy program has focused on improving her LE strength and stability, improving her hip and core strength, improving her balance and neuromuscular control, correcting her gait impairments, and improving her activity tolerance and endurance via therapeutic exercises. The Pt has made significant improvements with her gait. She is now able to walk without any assistive device and feels more confident with this. At this time she is unable to walk >10 minutes before she feels like her legs start to weaken and fatigue and she starts to feel more likely to fall or lose her balance. She is concerned because she likes to be able to go to concerts, but she is unable to walk the distance to get to her seat and has to take a wheelchair and it frustrates her because she does not want to have to rely on another individual or a wheelchair to get to where she wants to go at her age. She is feeling more confident with her balance and has noticed that she is able to do more at work without needing to hold onto an external support. She has found that after standing without a support > 1 minute she starts to feel \"off balanced\" and has to hold onto something. 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   She feels like overall she has made significant changes from where she was when she first started, but still has some major hurdles to overcome to get her to where she wants to be/needs to be to be able to an independent young adult. Recommend continued PT for an additional 1x/wk for 8 more weeks to help progress these remaining impairments to allow the Pt to safely return to her PLOF with less risk of falling. Thank you for this referral.     Other Objective/Functional Measures:  FOTO 50/100 (16/100 at initial evaluation)  MTSIB: trial 1- 30s, trial 2- 30s, trial 3- 30s, trial 4- 3s (99/120)     Progress towards goals / Updated goals:  Short Term Goals: To be accomplished in 6 treatments:  1. The Pt will be independent and compliant with their HEP.  Met   2. The Pt will report a 50% reduction in their pain with ADLs.  Met   Long Term Goals: To be accomplished in 16 treatments:  1. The Pt will score the MCII on her FOTO survey demonstrating improved overall function (16 to 39 points). Met   2. The Pt will score >/= 110/120 on the MCTSIB demonstrating improved balance and neuromuscular control.  Progressing towards  3. The Pt will report a 50% reduction in falling. Met   4. The Pt will be able to walk for 20 minutes consecutively without any assistive device to allow the Pt to be able to walk to a concert with less difficulty. 5. The Pt will be able to stand independently without any external support for >/= 5 minutes to allow the Pt to be able to assist customers at work without having to hold onto something.     Kerwin Contreras, PT 3/24/2022 Patient is a 82y old  Female who presents with a chief complaint of Generalized weakness.  Anemia possibly secondary to GI Bleeding. (07 Sep 2023 15:14)      INTERVAL HPI/OVERNIGHT EVENTS:    feels better. wants to be discharged    MEDICATIONS  (STANDING):  albuterol    90 MICROgram(s) HFA Inhaler 2 Puff(s) Inhalation every 6 hours  aMIOdarone    Tablet 200 milliGRAM(s) Oral daily  apixaban 5 milliGRAM(s) Oral two times a day  aspirin  chewable 81 milliGRAM(s) Oral daily  buDESOnide    Inhalation Suspension 0.5 milliGRAM(s) Inhalation two times a day  carvedilol 12.5 milliGRAM(s) Oral every 12 hours  cefTRIAXone   IVPB 1000 milliGRAM(s) IV Intermittent every 24 hours  famotidine    Tablet 20 milliGRAM(s) Oral daily  fenofibrate Tablet 48 milliGRAM(s) Oral daily  ferrous    sulfate 325 milliGRAM(s) Oral daily  furosemide    Tablet 40 milliGRAM(s) Oral daily  hydrALAZINE 75 milliGRAM(s) Oral three times a day  lactobacillus acidophilus 1 Tablet(s) Oral daily  methylPREDNISolone sodium succinate Injectable 40 milliGRAM(s) IV Push daily  multivitamin 1 Tablet(s) Oral daily  sertraline 25 milliGRAM(s) Oral daily  tiotropium 2.5 MICROgram(s) Inhaler 2 Puff(s) Inhalation daily      MEDICATIONS  (PRN):  acetaminophen     Tablet .. 650 milliGRAM(s) Oral every 6 hours PRN Temp greater or equal to 38C (100.4F)  aluminum hydroxide/magnesium hydroxide/simethicone Suspension 30 milliLiter(s) Oral every 4 hours PRN Dyspepsia  melatonin 3 milliGRAM(s) Oral at bedtime PRN Insomnia  ondansetron Injectable 4 milliGRAM(s) IV Push every 8 hours PRN Nausea and/or Vomiting      Allergies    Procardia (Other)  NIFEdipine (Unknown)  fluoxetine (Unknown)    Intolerances    oxycodone (Other)      PAST MEDICAL & SURGICAL HISTORY:  HTN (hypertension)      HLD (hyperlipidemia)      Anemia  newly diagnosed      Pneumonia  years ago      OA (osteoarthritis)      Afib      Small bowel cancer      Pacemaker      Major depression      Chronic kidney disease (CKD)      COPD (chronic obstructive pulmonary disease)      Status post total left knee replacement  2007      History of cholecystectomy  open , many years ago      H/O hernia repair  years ago      H/O resection of small bowel          Vital Signs Last 24 Hrs  T(C): 36.8 (07 Sep 2023 11:07), Max: 36.8 (07 Sep 2023 11:07)  T(F): 98.2 (07 Sep 2023 11:07), Max: 98.2 (07 Sep 2023 11:07)  HR: 60 (07 Sep 2023 11:07) (60 - 68)  BP: 134/57 (07 Sep 2023 11:07) (120/66 - 164/78)  BP(mean): --  RR: 18 (07 Sep 2023 11:07) (18 - 18)  SpO2: 96% (07 Sep 2023 11:07) (95% - 98%)    Parameters below as of 07 Sep 2023 11:07  Patient On (Oxygen Delivery Method): nasal cannula  O2 Flow (L/min): 2      PHYSICAL EXAMINATION:    GENERAL: The patient is awake and alert in no apparent distress.     HEENT: Head is normocephalic and atraumatic.     NECK: no JVD    LUNGS: Clear to auscultation without wheezing, rales or rhonchi; respirations unlabored    HEART: Regular rate and rhythm without murmur.    ABDOMEN: Soft, nontender, and nondistended.      EXTREMITIES: Without any cyanosis, clubbing, rash, lesions or edema.    NEUROLOGIC: Grossly intact.    SKIN: No ulceration or induration present.      LABS:                        10.0   13.05 )-----------( 258      ( 07 Sep 2023 07:20 )             31.5     09-07    x   |  x   |  x   ----------------------------<  x   4.8   |  x   |  x     Ca    9.2      07 Sep 2023 07:20  Mg     2.5     09-06    TPro  6.7  /  Alb  3.3  /  TBili  0.2  /  DBili  x   /  AST  37  /  ALT  38  /  AlkPhos  59  09-06      Urinalysis Basic - ( 07 Sep 2023 07:20 )    Color: x / Appearance: x / SG: x / pH: x  Gluc: 118 mg/dL / Ketone: x  / Bili: x / Urobili: x   Blood: x / Protein: x / Nitrite: x   Leuk Esterase: x / RBC: x / WBC x   Sq Epi: x / Non Sq Epi: x / Bacteria: x                  Procalcitonin, Serum: 0.11 ng/mL (09-05-23 @ 10:10)      MICROBIOLOGY:  Culture Results:   >100,000 CFU/ml Escherichia coli (09-05 @ 03:00)  Culture Results:   No growth at 48 Hours (09-05 @ 01:35)  Culture Results:   No growth at 48 Hours (09-05 @ 01:25)        Assessment:    Acute on Chronic Hypoxic respiratory failure  COPD    Plan:    Will change to oral steroids  Probable discharge in AM  Pulmonary follow-up in office in several weeks

## 2023-09-07 NOTE — PROGRESS NOTE ADULT - SUBJECTIVE AND OBJECTIVE BOX
Date of Service 09-07-23 @ 19:30    Patient is a 82y old  Female who presents with a chief complaint of Generalized weakness.  Anemia possibly secondary to GI Bleeding. (07 Sep 2023 16:17)      INTERVAL /OVERNIGHT EVENTS: still claims weak, but ambulating with PT    MEDICATIONS  (STANDING):  albuterol    90 MICROgram(s) HFA Inhaler 2 Puff(s) Inhalation every 6 hours  aMIOdarone    Tablet 200 milliGRAM(s) Oral daily  apixaban 5 milliGRAM(s) Oral two times a day  aspirin  chewable 81 milliGRAM(s) Oral daily  buDESOnide    Inhalation Suspension 0.5 milliGRAM(s) Inhalation two times a day  carvedilol 12.5 milliGRAM(s) Oral every 12 hours  cefTRIAXone   IVPB 1000 milliGRAM(s) IV Intermittent every 24 hours  famotidine    Tablet 20 milliGRAM(s) Oral daily  fenofibrate Tablet 48 milliGRAM(s) Oral daily  ferrous    sulfate 325 milliGRAM(s) Oral daily  furosemide    Tablet 40 milliGRAM(s) Oral daily  hydrALAZINE 75 milliGRAM(s) Oral three times a day  lactobacillus acidophilus 1 Tablet(s) Oral daily  multivitamin 1 Tablet(s) Oral daily  sertraline 25 milliGRAM(s) Oral daily  tiotropium 2.5 MICROgram(s) Inhaler 2 Puff(s) Inhalation daily    MEDICATIONS  (PRN):  acetaminophen     Tablet .. 650 milliGRAM(s) Oral every 6 hours PRN Temp greater or equal to 38C (100.4F)  aluminum hydroxide/magnesium hydroxide/simethicone Suspension 30 milliLiter(s) Oral every 4 hours PRN Dyspepsia  melatonin 3 milliGRAM(s) Oral at bedtime PRN Insomnia  ondansetron Injectable 4 milliGRAM(s) IV Push every 8 hours PRN Nausea and/or Vomiting      Allergies    Procardia (Other)  NIFEdipine (Unknown)  fluoxetine (Unknown)    Intolerances    oxycodone (Other)      REVIEW OF SYSTEMS:  CONSTITUTIONAL: + fatigue  EYES: No eye pain, visual disturbances, or discharge  ENMT:  No difficulty hearing, tinnitus, vertigo; No sinus or throat pain  NECK: No pain or stiffness  RESPIRATORY: No cough, wheezing, chills or hemoptysis; No shortness of breath  CARDIOVASCULAR: No chest pain, palpitations, dizziness, or leg swelling  GASTROINTESTINAL: No abdominal or epigastric pain. No nausea, vomiting, or hematemesis; No diarrhea or constipation. No melena or hematochezia.  GENITOURINARY: No dysuria, frequency, hematuria, or incontinence  NEUROLOGICAL: No headaches, memory loss, loss of strength, numbness, or tremors  SKIN: No itching, burning, rashes, or lesions   LYMPH NODES: No enlarged glands  ENDOCRINE: No heat or cold intolerance; No hair loss; No polydipsia or polyuria  MUSCULOSKELETAL: No joint pain or swelling; No muscle, back, or extremity pain  PSYCHIATRIC: No depression, anxiety, mood swings, or difficulty sleeping  HEME/LYMPH: No easy bruising, or bleeding gums  ALLERGY AND IMMUNOLOGIC: No hives or eczema    Vital Signs Last 24 Hrs  T(C): 36.8 (07 Sep 2023 11:07), Max: 36.8 (07 Sep 2023 11:07)  T(F): 98.2 (07 Sep 2023 11:07), Max: 98.2 (07 Sep 2023 11:07)  HR: 60 (07 Sep 2023 11:07) (60 - 68)  BP: 134/57 (07 Sep 2023 11:07) (120/66 - 164/78)  BP(mean): --  RR: 18 (07 Sep 2023 11:07) (18 - 18)  SpO2: 96% (07 Sep 2023 11:07) (95% - 98%)    Parameters below as of 07 Sep 2023 11:07  Patient On (Oxygen Delivery Method): nasal cannula  O2 Flow (L/min): 2      PHYSICAL EXAM:  GENERAL: NAD, well-groomed, well-developed  HEAD:  Atraumatic, Normocephalic  EYES: EOMI, PERRLA, conjunctiva and sclera clear  ENMT: No tonsillar erythema, exudates, or enlargement; Moist mucous membranes, Good dentition, No lesions  NECK: Supple, No JVD, Normal thyroid  NERVOUS SYSTEM:  Alert & Oriented X3, Good concentration; Motor Strength 5/5 B/L upper and lower extremities; DTRs 2+ intact and symmetric  CHEST/LUNG: Clear to auscultation bilaterally; No rales, rhonchi, wheezing, or rubs  HEART: Regular rate and rhythm; No murmurs, rubs, or gallops  ABDOMEN: Soft, Nontender, Nondistended; Bowel sounds present  EXTREMITIES:  2+ Peripheral Pulses, No clubbing, cyanosis, or edema  LYMPH: No lymphadenopathy noted  SKIN: No rashes or lesions    LABS:                        10.0   13.05 )-----------( 258      ( 07 Sep 2023 07:20 )             31.5     07 Sep 2023 11:49    x      |  x      |  x      ----------------------------<  x      4.8     |  x      |  x        Ca    9.2        07 Sep 2023 07:20        Urinalysis Basic - ( 07 Sep 2023 07:20 )    Color: x / Appearance: x / SG: x / pH: x  Gluc: 118 mg/dL / Ketone: x  / Bili: x / Urobili: x   Blood: x / Protein: x / Nitrite: x   Leuk Esterase: x / RBC: x / WBC x   Sq Epi: x / Non Sq Epi: x / Bacteria: x      CAPILLARY BLOOD GLUCOSE          RADIOLOGY & ADDITIONAL TESTS:    Notes Reviewed:  [x ] YES  [ ] NO    Care Discussed with Consultants/Other Providers [x ] YES  [ ] NO

## 2023-09-07 NOTE — PROGRESS NOTE ADULT - SUBJECTIVE AND OBJECTIVE BOX
[facility] Cardiology    CHIEF COMPLAINT: Patient is a 82y old  Female who presents with a chief complaint of Generalized weakness.  Anemia possibly secondary to GI Bleeding. (06 Sep 2023 23:27)      Follow Up: [ ] Chest Pain      [ ] Dyspnea     [ ] Palpitations    [ ] Atrial Fibrillation     [ ] Ventricular Dysrhythmia    [ ] Abnormal EKG                      [ ] Abnormal Cardiac Enzymes     [ ] Valvular Disease    HPI:  JAZ BAIG is an 81 YO Female presented to the ED from assisted living facility with generalized weakness.  Present for the past few days.  No nausea or vomiting.  Denies any cough or new shortness of breath.  No fevers.  States that she does have aching pain to her left arm and bilateral legs.  States tonight that she was having trouble getting in and out of bed and felt too weak which is why she came to ED.  States that her stools are always dark because of iron but no changes recently.  Patient with a past medical history of A-fib on Eliquis, COPD now on home 2 to 3 L nasal cannula oxygen, hypertension, hyperlipidemia, chronic anemia, heart failure, CKD not on dialysis. (05 Sep 2023 06:46)    PAST MEDICAL & SURGICAL HISTORY:  HTN (hypertension)      HLD (hyperlipidemia)      Anemia  newly diagnosed      Pneumonia  years ago      OA (osteoarthritis)      Afib      Small bowel cancer      Pacemaker      Major depression      Chronic kidney disease (CKD)      COPD (chronic obstructive pulmonary disease)      Status post total left knee replacement  2007      History of cholecystectomy  open , many years ago      H/O hernia repair  years ago      H/O resection of small bowel    MEDICATIONS  (STANDING):  albuterol    90 MICROgram(s) HFA Inhaler 2 Puff(s) Inhalation every 6 hours  aMIOdarone    Tablet 200 milliGRAM(s) Oral daily  apixaban 5 milliGRAM(s) Oral two times a day  aspirin  chewable 81 milliGRAM(s) Oral daily  buDESOnide    Inhalation Suspension 0.5 milliGRAM(s) Inhalation two times a day  carvedilol 12.5 milliGRAM(s) Oral every 12 hours  cefTRIAXone   IVPB 1000 milliGRAM(s) IV Intermittent every 24 hours  famotidine    Tablet 20 milliGRAM(s) Oral daily  fenofibrate Tablet 48 milliGRAM(s) Oral daily  ferrous    sulfate 325 milliGRAM(s) Oral daily  furosemide    Tablet 40 milliGRAM(s) Oral daily  hydrALAZINE 75 milliGRAM(s) Oral three times a day  lactobacillus acidophilus 1 Tablet(s) Oral daily  methylPREDNISolone sodium succinate Injectable 40 milliGRAM(s) IV Push daily  multivitamin 1 Tablet(s) Oral daily  sertraline 25 milliGRAM(s) Oral daily  tiotropium 2.5 MICROgram(s) Inhaler 2 Puff(s) Inhalation daily    MEDICATIONS  (PRN):  acetaminophen     Tablet .. 650 milliGRAM(s) Oral every 6 hours PRN Temp greater or equal to 38C (100.4F)  aluminum hydroxide/magnesium hydroxide/simethicone Suspension 30 milliLiter(s) Oral every 4 hours PRN Dyspepsia  melatonin 3 milliGRAM(s) Oral at bedtime PRN Insomnia  ondansetron Injectable 4 milliGRAM(s) IV Push every 8 hours PRN Nausea and/or Vomiting    Allergies    Procardia (Other)  NIFEdipine (Unknown)  fluoxetine (Unknown)    Intolerances    oxycodone (Other)      REVIEW OF SYSTEMS:    CONSTITUTIONAL: No weakness, fevers or chills.   EYES/ENT: No visual changes;    NECK: No pain or stiffness  RESPIRATORY: No cough, wheezing, No shortness of breath  CARDIOVASCULAR: No chest pain or palpitations  GASTROINTESTINAL: No abdominal pain, or hematochezia.  GENITOURINARY: No dysuria orhematuria  NEUROLOGICAL: No numbness or weakness  SKIN: No itching, burning, rashes  All other review of systems is negative unless indicated above    Vital Signs Last 24 Hrs  T(C): 36.6 (07 Sep 2023 05:08), Max: 36.6 (06 Sep 2023 12:13)  T(F): 97.8 (07 Sep 2023 05:08), Max: 97.9 (06 Sep 2023 12:13)  HR: 60 (07 Sep 2023 08:30) (60 - 68)  BP: 164/78 (07 Sep 2023 05:08) (120/66 - 164/78)  BP(mean): --  RR: 18 (07 Sep 2023 05:08) (18 - 18)  SpO2: 95% (07 Sep 2023 07:30) (95% - 98%)    Parameters below as of 07 Sep 2023 08:30  Patient On (Oxygen Delivery Method): nasal cannula  O2 Flow (L/min): 2    I&O's Summary    06 Sep 2023 07:01  -  07 Sep 2023 07:00  --------------------------------------------------------  IN: 0 mL / OUT: 1700 mL / NET: -1700 mL        PHYSICAL EXAM:  Constitutional: NAD  Neurological: Alert, no focal deficits  HEENT: no JVD, EOMI  Cardiovascular: Regular, S1 and S2, no murmur  Pulmonary: breath sounds bilaterally  Gastrointestinal: Bowel Sounds present, soft, nontender  EXT:  no peripheral edema  Skin: No rashes.  Psych:  Mood calm  LABS: All Labs Reviewed:                          10.0   13.05 )-----------( 258      ( 07 Sep 2023 07:20 )             31.5     09-07    138  |  98  |  52<H>  ----------------------------<  118<H>  5.6<H>   |  34<H>  |  1.30    Ca    9.2      07 Sep 2023 07:20  Mg     2.5     09-06    TPro  6.7  /  Alb  3.3  /  TBili  0.2  /  DBili  x   /  AST  37  /  ALT  38  /  AlkPhos  59  09-06          Xray Chest 1 View- PORTABLE-Urgent:   ACC: [id number] EXAM:  XR CHEST PORTABLE URGENT 1V   ORDERED BY:  HOLDEN AKERS     PROCEDURE DATE:  09/05/2023          INTERPRETATION:  INDICATION: Sepsis    PRIORS: 7/26/2023    VIEWS: Portable AP radiography of the chest performed.    FINDINGS: Heart size appears within normal limits. Indwelling pacemaker.   No hilar or superior mediastinal abnormalities are identified. Opacity   adjacent to the left heart border corresponds to prominent epicardial fat   on CT examination. There is no evidence for focal infiltrate, lobar   consolidation or pulmonary edema. No pleural effusion or pneumothorax is   demonstrated. No mediastinal shift is noted. No acute osseous fractures.    IMPRESSION: No evidence for focal infiltrate or lobar consolidation.    --- End of Report ---    OFELIA BURNHAM MD; Attending Radiologist  This document has been electronically signed. Sep  5 2023 11:57AM (09-05-23 @ 01:50)  12 Lead ECG:   Ventricular Rate 60 BPM    Atrial Rate 60 BPM    P-R Interval 174 ms    QRS Duration 218 ms    Q-T Interval 644 ms    QTC Calculation(Bazett) 644 ms    P Axis 82 degrees    R Axis 76 degrees    T Axis 85 degrees    Diagnosis Line AV dual-paced rhythm  Abnormal ECG  When compared with ECG of 26-JUL-2023 12:42,  Electronic ventricular pacemaker has replaced Electronic atrial pacemaker  Confirmed by SAURAV EDMONDS ([id number]) on 9/5/2023 8:22:36 PM (09-05-23 @ 00:58)    Assessment and Plan:   · Assessment    82F with known AF on Eliquis, HTN, HLD, HFpEF PPM (implanted 7/2021), severe Pulm HTN follwed by Dr. Mahajan, COPD on home O2 a/w worsening weakness. Her BNP is 1091 with significant Na level of 127 consistent with hyponatremia    Suggest:    1. Weakness likely from hyponatremia and possible UTI  - fluid restrict, Na improved 127 up to 133 and she is feeling much better, almost back to baseline.   - Empiric Abx per primary team  - Supplement Mg and K with hyponatremia  - Consider holding sertraline with hyponatremia  - Check TFT on amiodarone.   -Trend K+ daily    2. Edeam  - IV lasix and edema is all gone.  Now on 40 PO  -Rec cont Lasix     3. PAF  - In atrial paced rhythm with amiodarone  - c/w Eliquis with relative anemia  - monitor CBC    4. HTN  BP elvated  - C/w Coreg 12.5 mg BID  - Hydralazine is at 50 mg TID, increased to 75 mg TID  -will increase further as needed     Will follow here and outpt

## 2023-09-08 ENCOUNTER — TRANSCRIPTION ENCOUNTER (OUTPATIENT)
Age: 83
End: 2023-09-08

## 2023-09-08 LAB
ANION GAP SERPL CALC-SCNC: 9 MMOL/L — SIGNIFICANT CHANGE UP (ref 5–17)
BUN SERPL-MCNC: 58 MG/DL — HIGH (ref 7–23)
CALCIUM SERPL-MCNC: 8.8 MG/DL — SIGNIFICANT CHANGE UP (ref 8.5–10.1)
CHLORIDE SERPL-SCNC: 95 MMOL/L — LOW (ref 96–108)
CO2 SERPL-SCNC: 33 MMOL/L — HIGH (ref 22–31)
CREAT SERPL-MCNC: 1.6 MG/DL — HIGH (ref 0.5–1.3)
EGFR: 32 ML/MIN/1.73M2 — LOW
GLUCOSE SERPL-MCNC: 212 MG/DL — HIGH (ref 70–99)
HCT VFR BLD CALC: 31 % — LOW (ref 34.5–45)
HGB BLD-MCNC: 10.2 G/DL — LOW (ref 11.5–15.5)
MCHC RBC-ENTMCNC: 31.8 PG — SIGNIFICANT CHANGE UP (ref 27–34)
MCHC RBC-ENTMCNC: 32.9 GM/DL — SIGNIFICANT CHANGE UP (ref 32–36)
MCV RBC AUTO: 96.6 FL — SIGNIFICANT CHANGE UP (ref 80–100)
NRBC # BLD: 0 /100 WBCS — SIGNIFICANT CHANGE UP (ref 0–0)
PLATELET # BLD AUTO: 273 K/UL — SIGNIFICANT CHANGE UP (ref 150–400)
POTASSIUM SERPL-MCNC: 3.7 MMOL/L — SIGNIFICANT CHANGE UP (ref 3.5–5.3)
POTASSIUM SERPL-SCNC: 3.7 MMOL/L — SIGNIFICANT CHANGE UP (ref 3.5–5.3)
RBC # BLD: 3.21 M/UL — LOW (ref 3.8–5.2)
RBC # FLD: 15 % — HIGH (ref 10.3–14.5)
SODIUM SERPL-SCNC: 137 MMOL/L — SIGNIFICANT CHANGE UP (ref 135–145)
WBC # BLD: 8.95 K/UL — SIGNIFICANT CHANGE UP (ref 3.8–10.5)
WBC # FLD AUTO: 8.95 K/UL — SIGNIFICANT CHANGE UP (ref 3.8–10.5)

## 2023-09-08 PROCEDURE — 93010 ELECTROCARDIOGRAM REPORT: CPT

## 2023-09-08 PROCEDURE — 99222 1ST HOSP IP/OBS MODERATE 55: CPT

## 2023-09-08 RX ORDER — IPRATROPIUM/ALBUTEROL SULFATE 18-103MCG
3 AEROSOL WITH ADAPTER (GRAM) INHALATION
Qty: 60 | Refills: 0
Start: 2023-09-08 | End: 2023-10-07

## 2023-09-08 RX ORDER — HYDRALAZINE HCL 50 MG
1 TABLET ORAL
Qty: 90 | Refills: 0
Start: 2023-09-08 | End: 2023-10-07

## 2023-09-08 RX ORDER — FERROUS SULFATE 325(65) MG
1 TABLET ORAL
Qty: 30 | Refills: 0
Start: 2023-09-08 | End: 2023-10-07

## 2023-09-08 RX ORDER — SERTRALINE 25 MG/1
1 TABLET, FILM COATED ORAL
Qty: 30 | Refills: 0
Start: 2023-09-08 | End: 2023-10-07

## 2023-09-08 RX ORDER — FAMOTIDINE 10 MG/ML
1 INJECTION INTRAVENOUS
Qty: 30 | Refills: 0
Start: 2023-09-08 | End: 2023-10-07

## 2023-09-08 RX ORDER — HYDRALAZINE HCL 50 MG
100 TABLET ORAL THREE TIMES A DAY
Refills: 0 | Status: DISCONTINUED | OUTPATIENT
Start: 2023-09-08 | End: 2023-09-08

## 2023-09-08 RX ORDER — TIOTROPIUM BROMIDE 18 UG/1
2 CAPSULE ORAL; RESPIRATORY (INHALATION)
Qty: 1 | Refills: 0
Start: 2023-09-08

## 2023-09-08 RX ORDER — FLUTICASONE FUROATE AND VILANTEROL TRIFENATATE 100; 25 UG/1; UG/1
1 POWDER RESPIRATORY (INHALATION)
Qty: 1 | Refills: 0
Start: 2023-09-08 | End: 2023-10-05

## 2023-09-08 RX ORDER — BUDESONIDE, MICRONIZED 100 %
2 POWDER (GRAM) MISCELLANEOUS
Qty: 1 | Refills: 0
Start: 2023-09-08 | End: 2023-10-07

## 2023-09-08 RX ORDER — METOLAZONE 5 MG/1
1 TABLET ORAL
Refills: 0 | DISCHARGE

## 2023-09-08 RX ORDER — LANOLIN/MINERAL OIL
1 LOTION (ML) TOPICAL
Refills: 0 | DISCHARGE

## 2023-09-08 RX ORDER — AMIODARONE HYDROCHLORIDE 400 MG/1
1 TABLET ORAL
Qty: 30 | Refills: 0
Start: 2023-09-08 | End: 2023-10-07

## 2023-09-08 RX ORDER — FENOFIBRATE,MICRONIZED 130 MG
1 CAPSULE ORAL
Qty: 30 | Refills: 0
Start: 2023-09-08 | End: 2023-10-07

## 2023-09-08 RX ORDER — CARVEDILOL PHOSPHATE 80 MG/1
1 CAPSULE, EXTENDED RELEASE ORAL
Qty: 60 | Refills: 0
Start: 2023-09-08 | End: 2023-10-07

## 2023-09-08 RX ORDER — CEFPODOXIME PROXETIL 100 MG
100 TABLET ORAL EVERY 12 HOURS
Refills: 0 | Status: DISCONTINUED | OUTPATIENT
Start: 2023-09-08 | End: 2023-09-10

## 2023-09-08 RX ORDER — ASPIRIN/CALCIUM CARB/MAGNESIUM 324 MG
1 TABLET ORAL
Qty: 30 | Refills: 0
Start: 2023-09-08 | End: 2023-10-07

## 2023-09-08 RX ORDER — ALBUTEROL 90 UG/1
2 AEROSOL, METERED ORAL
Qty: 1 | Refills: 0
Start: 2023-09-08

## 2023-09-08 RX ORDER — HYDRALAZINE HCL 50 MG
75 TABLET ORAL THREE TIMES A DAY
Refills: 0 | Status: DISCONTINUED | OUTPATIENT
Start: 2023-09-08 | End: 2023-09-12

## 2023-09-08 RX ORDER — CEFTRIAXONE 500 MG/1
1000 INJECTION, POWDER, FOR SOLUTION INTRAMUSCULAR; INTRAVENOUS EVERY 24 HOURS
Refills: 0 | Status: DISCONTINUED | OUTPATIENT
Start: 2023-09-09 | End: 2023-09-08

## 2023-09-08 RX ORDER — FUROSEMIDE 40 MG
1 TABLET ORAL
Qty: 30 | Refills: 0
Start: 2023-09-08 | End: 2023-10-07

## 2023-09-08 RX ORDER — ACETAMINOPHEN 500 MG
2 TABLET ORAL
Qty: 80 | Refills: 0
Start: 2023-09-08 | End: 2023-09-17

## 2023-09-08 RX ORDER — APIXABAN 2.5 MG/1
1 TABLET, FILM COATED ORAL
Qty: 60 | Refills: 0
Start: 2023-09-08 | End: 2023-10-07

## 2023-09-08 RX ADMIN — FAMOTIDINE 20 MILLIGRAM(S): 10 INJECTION INTRAVENOUS at 12:08

## 2023-09-08 RX ADMIN — APIXABAN 5 MILLIGRAM(S): 2.5 TABLET, FILM COATED ORAL at 09:41

## 2023-09-08 RX ADMIN — Medication 20 MILLIGRAM(S): at 05:24

## 2023-09-08 RX ADMIN — SERTRALINE 25 MILLIGRAM(S): 25 TABLET, FILM COATED ORAL at 12:08

## 2023-09-08 RX ADMIN — Medication 40 MILLIGRAM(S): at 05:24

## 2023-09-08 RX ADMIN — CARVEDILOL PHOSPHATE 12.5 MILLIGRAM(S): 80 CAPSULE, EXTENDED RELEASE ORAL at 05:24

## 2023-09-08 RX ADMIN — ALBUTEROL 2 PUFF(S): 90 AEROSOL, METERED ORAL at 13:01

## 2023-09-08 RX ADMIN — Medication 75 MILLIGRAM(S): at 05:24

## 2023-09-08 RX ADMIN — TIOTROPIUM BROMIDE 2 PUFF(S): 18 CAPSULE ORAL; RESPIRATORY (INHALATION) at 05:33

## 2023-09-08 RX ADMIN — ALBUTEROL 2 PUFF(S): 90 AEROSOL, METERED ORAL at 18:51

## 2023-09-08 RX ADMIN — APIXABAN 5 MILLIGRAM(S): 2.5 TABLET, FILM COATED ORAL at 21:01

## 2023-09-08 RX ADMIN — Medication 48 MILLIGRAM(S): at 12:08

## 2023-09-08 RX ADMIN — Medication 0.5 MILLIGRAM(S): at 20:21

## 2023-09-08 RX ADMIN — AMIODARONE HYDROCHLORIDE 200 MILLIGRAM(S): 400 TABLET ORAL at 05:24

## 2023-09-08 RX ADMIN — CEFTRIAXONE 100 MILLIGRAM(S): 500 INJECTION, POWDER, FOR SOLUTION INTRAMUSCULAR; INTRAVENOUS at 05:23

## 2023-09-08 RX ADMIN — Medication 1 TABLET(S): at 12:08

## 2023-09-08 RX ADMIN — Medication 100 MILLIGRAM(S): at 18:10

## 2023-09-08 RX ADMIN — Medication 0.5 MILLIGRAM(S): at 07:59

## 2023-09-08 RX ADMIN — ALBUTEROL 2 PUFF(S): 90 AEROSOL, METERED ORAL at 01:47

## 2023-09-08 RX ADMIN — ALBUTEROL 2 PUFF(S): 90 AEROSOL, METERED ORAL at 05:34

## 2023-09-08 RX ADMIN — Medication 100 MILLIGRAM(S): at 13:01

## 2023-09-08 RX ADMIN — Medication 75 MILLIGRAM(S): at 21:01

## 2023-09-08 RX ADMIN — Medication 325 MILLIGRAM(S): at 12:08

## 2023-09-08 RX ADMIN — CARVEDILOL PHOSPHATE 12.5 MILLIGRAM(S): 80 CAPSULE, EXTENDED RELEASE ORAL at 18:10

## 2023-09-08 RX ADMIN — Medication 81 MILLIGRAM(S): at 12:09

## 2023-09-08 NOTE — DISCHARGE NOTE PROVIDER - NSDCCPCAREPLAN_GEN_ALL_CORE_FT
PRINCIPAL DISCHARGE DIAGNOSIS  Diagnosis: Hypokalemia  Assessment and Plan of Treatment: follow up with PMD Dr. laughlin      SECONDARY DISCHARGE DIAGNOSES  Diagnosis: Hyponatremia  Assessment and Plan of Treatment:     Diagnosis: Fatigue  Assessment and Plan of Treatment:     Diagnosis: Weakness  Assessment and Plan of Treatment:

## 2023-09-08 NOTE — DISCHARGE NOTE PROVIDER - CARE PROVIDER_API CALL
PANCHITO SKY  [address] [place] [address]  Phone: [phone]  Fax: [phone]  Follow Up Time:     Kam Vogel  Cardiovascular Disease  [address] [place] [address]  Phone: [phone]  Fax: [phone]  Follow Up Time:     Laurent Callejas  Pulmonary Disease  [address] [place] [address]  Phone: [phone]  Fax: [phone]  Follow Up Time:    PANCHITO SKY  [address] [place] [address]  Phone: [phone]  Fax: [phone]  Follow Up Time:     Kam Vogel  Cardiovascular Disease  [address] [place] [address]  Phone: [phone]  Fax: [phone]  Follow Up Time:     Laurent Callejas  Pulmonary Disease  [address] [place] [address]  Phone: [phone]  Fax: [phone]  Follow Up Time:     Adriana Crump  Neurology  [address] [place] [address]  Phone: [phone]  Fax: [phone]  Follow Up Time:

## 2023-09-08 NOTE — PROGRESS NOTE ADULT - ASSESSMENT
KELSEY on CKD 3, CRS  Hyponatremia: On thiazide diuretic  Hypokalemia  COPD    09/05/23: Get repeat labs to monitor trend. Hold metolazone. Potassium supplementation. Avoid nephrotoxic meds as possible.   Cardiology follow up. Will follow electrolytes and renal function trend. D/w patient regarding need for out patient nephrology follow up.   09/06/23: Improving renal indices. To continue current meds. Encourage PO intake as tolerated. On IV lasix. Hold metolazone.   09/07/23: Stable sodium levels. On PO diuretics. off metolazone. Pulmonary follow up.   09/08/23: Mild increase in creatinine with diuresis. Monitor trend as outpt. Avoid NSAIDs.

## 2023-09-08 NOTE — PROGRESS NOTE ADULT - SUBJECTIVE AND OBJECTIVE BOX
Patient is a 82y old  Female who presents with a chief complaint of Generalized weakness.  Anemia possibly secondary to GI Bleeding. (06 Sep 2023 07:55)    Patient seen in follow up for hyponatremia.        PAST MEDICAL HISTORY:  HTN (hypertension)    HLD (hyperlipidemia)    Anemia    DVT (deep venous thrombosis)    Pneumonia    OA (osteoarthritis)    Afib    Small bowel cancer    Pacemaker    Major depression    Chronic kidney disease (CKD)    COPD (chronic obstructive pulmonary disease)        MEDICATIONS  (STANDING):  albuterol    90 MICROgram(s) HFA Inhaler 2 Puff(s) Inhalation every 6 hours  aMIOdarone    Tablet 200 milliGRAM(s) Oral daily  apixaban 5 milliGRAM(s) Oral two times a day  aspirin  chewable 81 milliGRAM(s) Oral daily  buDESOnide    Inhalation Suspension 0.5 milliGRAM(s) Inhalation two times a day  carvedilol 12.5 milliGRAM(s) Oral every 12 hours  cefTRIAXone   IVPB 1000 milliGRAM(s) IV Intermittent every 24 hours  famotidine    Tablet 20 milliGRAM(s) Oral daily  fenofibrate Tablet 48 milliGRAM(s) Oral daily  ferrous    sulfate 325 milliGRAM(s) Oral daily  furosemide    Tablet 40 milliGRAM(s) Oral daily  hydrALAZINE 100 milliGRAM(s) Oral three times a day  lactobacillus acidophilus 1 Tablet(s) Oral daily  multivitamin 1 Tablet(s) Oral daily  predniSONE   Tablet 20 milliGRAM(s) Oral daily  sertraline 25 milliGRAM(s) Oral daily  tiotropium 2.5 MICROgram(s) Inhaler 2 Puff(s) Inhalation daily    MEDICATIONS  (PRN):  acetaminophen     Tablet .. 650 milliGRAM(s) Oral every 6 hours PRN Temp greater or equal to 38C (100.4F)  aluminum hydroxide/magnesium hydroxide/simethicone Suspension 30 milliLiter(s) Oral every 4 hours PRN Dyspepsia  melatonin 3 milliGRAM(s) Oral at bedtime PRN Insomnia  ondansetron Injectable 4 milliGRAM(s) IV Push every 8 hours PRN Nausea and/or Vomiting    T(C): 36.6 (09-08-23 @ 09:35), Max: 36.8 (09-07-23 @ 11:07)  HR: 60 (09-08-23 @ 09:35) (60 - 77)  BP: 126/72 (09-08-23 @ 09:35) (120/66 - 179/61)  RR: 18 (09-08-23 @ 09:35)  SpO2: 93% (09-08-23 @ 09:35)  Wt(kg): --  I&O's Detail    07 Sep 2023 07:01  -  08 Sep 2023 07:00  --------------------------------------------------------  IN:  Total IN: 0 mL    OUT:    Voided (mL): 1800 mL  Total OUT: 1800 mL    Total NET: -1800 mL            PHYSICAL EXAM:  General: No distress  Respiratory: b/l air entry  Cardiovascular: S1 S2  Gastrointestinal: soft  Extremities:  edema better                  LABORATORY:                        10.2   8.95  )-----------( 273      ( 08 Sep 2023 09:42 )             31.0     09-08    137  |  95<L>  |  58<H>  ----------------------------<  212<H>  3.7   |  33<H>  |  1.60<H>    Ca    8.8      08 Sep 2023 09:42      Sodium: 137 mmol/L (09-08 @ 09:42)  Sodium: 138 mmol/L (09-07 @ 07:20)    Potassium: 3.7 mmol/L (09-08 @ 09:42)  Potassium: 4.8 mmol/L (09-07 @ 11:49)  Potassium: 5.6 mmol/L (09-07 @ 07:20)    Hemoglobin: 10.2 g/dL (09-08 @ 09:42)  Hemoglobin: 10.0 g/dL (09-07 @ 07:20)  Hemoglobin: 9.6 g/dL (09-06 @ 07:07)    Creatinine, Serum 1.60 (09-08 @ 09:42)  Creatinine, Serum 1.30 (09-07 @ 07:20)  Creatinine, Serum 1.40 (09-06 @ 07:07)          Urinalysis Basic - ( 08 Sep 2023 09:42 )    Color: x / Appearance: x / SG: x / pH: x  Gluc: 212 mg/dL / Ketone: x  / Bili: x / Urobili: x   Blood: x / Protein: x / Nitrite: x   Leuk Esterase: x / RBC: x / WBC x   Sq Epi: x / Non Sq Epi: x / Bacteria: x

## 2023-09-08 NOTE — PROGRESS NOTE ADULT - SUBJECTIVE AND OBJECTIVE BOX
[facility] Cardiology    CHIEF COMPLAINT: Patient is a 82y old  Female who presents with a chief complaint of Generalized weakness.  Anemia possibly secondary to GI Bleeding. (08 Sep 2023 07:46)      Follow Up: [ ] Chest Pain      [ ] Dyspnea     [ ] Palpitations    [ ] Atrial Fibrillation     [ ] Ventricular Dysrhythmia    [ ] Abnormal EKG                      [ ] Abnormal Cardiac Enzymes     [ ] Valvular Disease    HPI:  JAZ BAIG is an 83 YO Female presented to the ED from assisted living facility with generalized weakness.  Present for the past few days.  No nausea or vomiting.  Denies any cough or new shortness of breath.  No fevers.  States that she does have aching pain to her left arm and bilateral legs.  States tonight that she was having trouble getting in and out of bed and felt too weak which is why she came to ED.  States that her stools are always dark because of iron but no changes recently.  Patient with a past medical history of A-fib on Eliquis, COPD now on home 2 to 3 L nasal cannula oxygen, hypertension, hyperlipidemia, chronic anemia, heart failure, CKD not on dialysis. (05 Sep 2023 06:46)    PAST MEDICAL & SURGICAL HISTORY:  HTN (hypertension)      HLD (hyperlipidemia)      Anemia  newly diagnosed      Pneumonia  years ago      OA (osteoarthritis)      Afib      Small bowel cancer      Pacemaker      Major depression      Chronic kidney disease (CKD)      COPD (chronic obstructive pulmonary disease)      Status post total left knee replacement  2007      History of cholecystectomy  open , many years ago      H/O hernia repair  years ago      H/O resection of small bowel        MEDICATIONS  (STANDING):  albuterol    90 MICROgram(s) HFA Inhaler 2 Puff(s) Inhalation every 6 hours  aMIOdarone    Tablet 200 milliGRAM(s) Oral daily  apixaban 5 milliGRAM(s) Oral two times a day  aspirin  chewable 81 milliGRAM(s) Oral daily  buDESOnide    Inhalation Suspension 0.5 milliGRAM(s) Inhalation two times a day  carvedilol 12.5 milliGRAM(s) Oral every 12 hours  famotidine    Tablet 20 milliGRAM(s) Oral daily  fenofibrate Tablet 48 milliGRAM(s) Oral daily  ferrous    sulfate 325 milliGRAM(s) Oral daily  furosemide    Tablet 40 milliGRAM(s) Oral daily  hydrALAZINE 100 milliGRAM(s) Oral three times a day  lactobacillus acidophilus 1 Tablet(s) Oral daily  multivitamin 1 Tablet(s) Oral daily  predniSONE   Tablet 20 milliGRAM(s) Oral daily  sertraline 25 milliGRAM(s) Oral daily  tiotropium 2.5 MICROgram(s) Inhaler 2 Puff(s) Inhalation daily    MEDICATIONS  (PRN):  acetaminophen     Tablet .. 650 milliGRAM(s) Oral every 6 hours PRN Temp greater or equal to 38C (100.4F)  aluminum hydroxide/magnesium hydroxide/simethicone Suspension 30 milliLiter(s) Oral every 4 hours PRN Dyspepsia  melatonin 3 milliGRAM(s) Oral at bedtime PRN Insomnia  ondansetron Injectable 4 milliGRAM(s) IV Push every 8 hours PRN Nausea and/or Vomiting    Allergies    Procardia (Other)  NIFEdipine (Unknown)  fluoxetine (Unknown)    Intolerances    oxycodone (Other)      REVIEW OF SYSTEMS:    CONSTITUTIONAL: No weakness, fevers or chills.   EYES/ENT: No visual changes;    NECK: No pain or stiffness  RESPIRATORY: No cough, wheezing, No shortness of breath  CARDIOVASCULAR: No chest pain or palpitations  GASTROINTESTINAL: No abdominal pain, or hematochezia.  GENITOURINARY: No dysuria orhematuria  NEUROLOGICAL: No numbness or weakness  SKIN: No itching, burning, rashes  All other review of systems is negative unless indicated above    Vital Signs Last 24 Hrs  T(C): 36.3 (08 Sep 2023 04:53), Max: 36.8 (07 Sep 2023 11:07)  T(F): 97.3 (08 Sep 2023 04:53), Max: 98.2 (07 Sep 2023 11:07)  HR: 75 (08 Sep 2023 08:00) (60 - 77)  BP: 152/78 (08 Sep 2023 04:53) (134/57 - 179/61)  BP(mean): --  RR: 17 (08 Sep 2023 04:53) (17 - 18)  SpO2: 95% (08 Sep 2023 08:00) (93% - 99%)    Parameters below as of 08 Sep 2023 08:00  Patient On (Oxygen Delivery Method): room air      I&O's Summary    07 Sep 2023 07:01  -  08 Sep 2023 07:00  --------------------------------------------------------  IN: 0 mL / OUT: 1800 mL / NET: -1800 mL        PHYSICAL EXAM:  Constitutional: NAD  Neurological: calm  HEENT: no JVD, EOMI  Cardiovascular: Regular, S1 and S2, no murmur  Pulmonary: breath sounds bilaterally  Gastrointestinal: Bowel Sounds present, soft, nontender  EXT:  no peripheral edema  Skin: No rashes.  Psych:  Mood calm  LABS: All Labs Reviewed:                          10.0   13.05 )-----------( 258      ( 07 Sep 2023 07:20 )             31.5     09-07    x   |  x   |  x   ----------------------------<  x   4.8   |  x   |  x     Ca    9.2      07 Sep 2023 07:20    Assessment and Plan:   · Assessment    82F with known AF on Eliquis, HTN, HLD, HFpEF PPM (implanted 7/2021), severe Pulm HTN follwed by Dr. Mahajan, COPD on home O2 a/w worsening weakness. Her BNP is 1091 with significant Na level of 127 consistent with hyponatremia    Suggest:    1. Weakness likely from hyponatremia and possible UTI  - fluid restrict, Na improved 127 up to 133   -repeat labs pending  -abnormal thyroid labs  - Empiric Abx per primary team  - Supplement Mg and K with hyponatremia  -ok to d/c amiodarone given thyroid abnormalities  -Trend K+ daily    2. Edema  - s/p IV lasix, improved edema is all gone.  Now on 40 PO  -Rec cont Lasix     3. PAF  - In atrial paced rhythm with amiodarone  - c/w Eliquis with relative anemia  - monitor CBC    4. HTN  BP elvated  - C/w Coreg 12.5 mg BID  - Hydralazine increased to 75 mg TID  -can increase further as needed     Will follow here prn  -pt sees me outpt

## 2023-09-08 NOTE — CONSULT NOTE ADULT - SUBJECTIVE AND OBJECTIVE BOX
[facility]  INFECTIOUS DISEASES   [address] [place] [address]  Tel: [phone]     Fax: [phone]  ========================================================  MD Lev Gallardo Kaushal, MD Cho, Michelle, MD Sunjit, Jaspal, MD  ========================================================    MRN-[id number]  JAZ BAIG     CC: Has been admitted on 9/5 with Generalized weakness, Anemia and today ID was called for UTI management     HPI:  81yo woman with PMH of HTN, Afib s/p PPM, COPD on home o2, Anemia, CKD, OA, recent pneumonia was admitted with generalized weakness.  She had that for few days and was feeling shaky inside.  NO fever, no GI or  symptoms but UC is back with Ecoli, she has been on ceftriaxone and also had ABx prior to admission for UTI.     PAST MEDICAL & SURGICAL HISTORY:  HTN (hypertension)  HLD (hyperlipidemia)  Anemia  newly diagnosed  Pneumonia  OA (osteoarthritis)  Afib  Small bowel cancer  Pacemaker  Major depression  Chronic kidney disease (CKD)  COPD (chronic obstructive pulmonary disease)  Status post total left knee replacement  2007  History of cholecystectomy  open , many years ago  H/O hernia repair  years ago  H/O resection of small bowel    Social Hx: No current smoking, EtOH or drugs     FAMILY HISTORY:  No pertinent family history in first degree relatives    Allergies  Procardia (Other)  NIFEdipine (Unknown)  fluoxetine (Unknown)    Intolerances  oxycodone (Other)    Antibiotics:  MEDICATIONS  (STANDING):  albuterol    90 MICROgram(s) HFA Inhaler 2 Puff(s) Inhalation every 6 hours  aMIOdarone    Tablet 200 milliGRAM(s) Oral daily  apixaban 5 milliGRAM(s) Oral two times a day  aspirin  chewable 81 milliGRAM(s) Oral daily  buDESOnide    Inhalation Suspension 0.5 milliGRAM(s) Inhalation two times a day  carvedilol 12.5 milliGRAM(s) Oral every 12 hours  cefTRIAXone   IVPB 1000 milliGRAM(s) IV Intermittent every 24 hours  famotidine    Tablet 20 milliGRAM(s) Oral daily  fenofibrate Tablet 48 milliGRAM(s) Oral daily  ferrous    sulfate 325 milliGRAM(s) Oral daily  furosemide    Tablet 40 milliGRAM(s) Oral daily  hydrALAZINE 75 milliGRAM(s) Oral three times a day  lactobacillus acidophilus 1 Tablet(s) Oral daily  multivitamin 1 Tablet(s) Oral daily  predniSONE   Tablet 20 milliGRAM(s) Oral daily  sertraline 25 milliGRAM(s) Oral daily  tiotropium 2.5 MICROgram(s) Inhaler 2 Puff(s) Inhalation daily    MEDICATIONS  (PRN):  acetaminophen     Tablet .. 650 milliGRAM(s) Oral every 6 hours PRN Temp greater or equal to 38C (100.4F)  aluminum hydroxide/magnesium hydroxide/simethicone Suspension 30 milliLiter(s) Oral every 4 hours PRN Dyspepsia  melatonin 3 milliGRAM(s) Oral at bedtime PRN Insomnia  ondansetron Injectable 4 milliGRAM(s) IV Push every 8 hours PRN Nausea and/or Vomiting     REVIEW OF SYSTEMS:  CONSTITUTIONAL:  No Fever or chills + weakness   HEENT:  No diplopia or blurred vision.  No sore throat or runny nose.  CARDIOVASCULAR:  No chest pain or SOB.  RESPIRATORY:  No cough, shortness of breath, PND or orthopnea.  GASTROINTESTINAL:  No nausea, vomiting or diarrhea.  GENITOURINARY:  No dysuria, frequency or urgency. No Blood in urine  MUSCULOSKELETAL:  no joint aches, no muscle pain  SKIN:  No change in skin, hair or nails.  NEUROLOGIC:  No paresthesias or weakness.  PSYCHIATRIC:  No disorder of thought or mood.  ENDOCRINE:  No heat or cold intolerance, polyuria or polydipsia.  HEMATOLOGICAL:  No easy bruising or bleeding.     Physical Exam:  Vital Signs Last 24 Hrs  T(C): 36.7 (08 Sep 2023 14:45), Max: 36.8 (07 Sep 2023 20:45)  T(F): 98 (08 Sep 2023 14:45), Max: 98.2 (07 Sep 2023 20:45)  HR: 60 (08 Sep 2023 14:45) (60 - 77)  BP: 127/78 (08 Sep 2023 14:45) (112/67 - 179/61)  BP(mean): --  RR: 18 (08 Sep 2023 14:45) (17 - 18)  SpO2: 94% (08 Sep 2023 14:45) (93% - 99%)  Parameters below as of 08 Sep 2023 14:45  Patient On (Oxygen Delivery Method): room air  GEN: NAD, obese   HEENT: normocephalic and atraumatic. EOMI. PERRL.    NECK: Supple.  No lymphadenopathy   LUNGS: Clear to auscultation.  HEART: Irregular rate and rhythm   ABDOMEN: Soft, nontender, and nondistended.  Positive bowel sounds.    : No CVA tenderness  EXTREMITIES: + edema.  NEUROLOGIC: grossly intact.  PSYCHIATRIC: Appropriate affect .  SKIN: No rash     Labs:  09-08    137  |  95<L>  |  58<H>  ----------------------------<  212<H>  3.7   |  33<H>  |  1.60<H>    Ca    8.8      08 Sep 2023 09:42                        10.2   8.95  )-----------( 273      ( 08 Sep 2023 09:42 )             31.0     Urinalysis Basic - ( 08 Sep 2023 09:42 )    Color: x / Appearance: x / SG: x / pH: x  Gluc: 212 mg/dL / Ketone: x  / Bili: x / Urobili: x   Blood: x / Protein: x / Nitrite: x   Leuk Esterase: x / RBC: x / WBC x   Sq Epi: x / Non Sq Epi: x / Bacteria: x    Procalcitonin, Serum: 0.11 ng/mL (09-05-23 @ 10:10)    SARS-CoV-2: NotDetec (09-05-23 @ 01:35)    RECENT CULTURES:  09-05 @ 03:00 Clean Catch Clean Catch (Midstream) Escherichia coli    >100,000 CFU/ml Escherichia coli    09-05 @ 01:35 .Blood Blood-Peripheral     No growth at 72 Hours    NotDetec    09-05 @ 01:25 .Blood Blood-Peripheral     No growth at 72 Hours    All imaging and other data have been reviewed.  < from: US Kidney and Bladder (09.05.23 @ 08:25) >  IMPRESSION:  No evidence of hydronephrosis.  139 cc of post void residual.    < from: CT Chest No Cont (09.05.23 @ 08:02) >  IMPRESSION:  Redemonstrated mucoid impaction of the distal airways.    Assessment and Plan:   81yo woman with PMH of HTN, Afib s/p PPM, COPD on home o2, Anemia, CKD, OA, recent pneumonia was admitted with generalized weakness.  She had that for few days and was feeling shaky inside.  NO fever, no GI or  symptoms but UC is back with Ecoli, she has been on ceftriaxone and also had ABx prior to admission for UTI.   Blood cultures NGTD  UC sensitive Ecoli  WBC and TMax normal     Recommendations:  - Can stop Ceftriaxone  - Due to failed po prior to admission and recurrent UTIs will treat longer   - Start Vantin 100mg q12 adjusted for renal function for 3 more days to complete total 7days     Thank you for courtesy of this consult.     Discussed with pt and her daughter at the bedside.     Sukumar Pacheco MD  Division of Infectious Diseases   Please call ID service at [phone] with any question.    75 minutes spent on total encounter assessing patient, examination, chart review, counseling and coordinating care by the attending physician/nurse/care manager.

## 2023-09-08 NOTE — CASE MANAGEMENT PROGRESS NOTE - NSCMPROGRESSNOTE_GEN_ALL_CORE
Discussed patient in  interdisciplinary rounds.  Patient experiencing chills today and is anticipated for repeat cultures, ID re-eval.  Anticipate return to [facility] when D/C ready.  WIll remain available from a case management perspective.

## 2023-09-08 NOTE — DISCHARGE NOTE PROVIDER - HOSPITAL COURSE
admitted for generalized weakness  supplemented potassium for hypokalemia  found to have UTI  ABX per ID  PT and OT for ambulation  DC after cardio and ID clearance admitted for generalized weakness  supplemented potassium for hypokalemia  found to have UTI  ABX per ID  PT and OT for ambulation  episode of arm weakness, cva ruled out  LE neuropathy, trial steroid for foraminal stenosis  DC after cardio and ID clearance

## 2023-09-08 NOTE — PROGRESS NOTE ADULT - SUBJECTIVE AND OBJECTIVE BOX
Date of Service 09-08-23 @ 18:10    Patient is a 82y old  Female who presents with a chief complaint of Generalized weakness.  . (08 Sep 2023 16:17)      INTERVAL /OVERNIGHT EVENTS:  c/o chills    MEDICATIONS  (STANDING):  albuterol    90 MICROgram(s) HFA Inhaler 2 Puff(s) Inhalation every 6 hours  aMIOdarone    Tablet 200 milliGRAM(s) Oral daily  apixaban 5 milliGRAM(s) Oral two times a day  aspirin  chewable 81 milliGRAM(s) Oral daily  buDESOnide    Inhalation Suspension 0.5 milliGRAM(s) Inhalation two times a day  carvedilol 12.5 milliGRAM(s) Oral every 12 hours  cefpodoxime 100 milliGRAM(s) Oral every 12 hours  famotidine    Tablet 20 milliGRAM(s) Oral daily  fenofibrate Tablet 48 milliGRAM(s) Oral daily  ferrous    sulfate 325 milliGRAM(s) Oral daily  furosemide    Tablet 40 milliGRAM(s) Oral daily  hydrALAZINE 75 milliGRAM(s) Oral three times a day  lactobacillus acidophilus 1 Tablet(s) Oral daily  multivitamin 1 Tablet(s) Oral daily  predniSONE   Tablet 20 milliGRAM(s) Oral daily  sertraline 25 milliGRAM(s) Oral daily  tiotropium 2.5 MICROgram(s) Inhaler 2 Puff(s) Inhalation daily    MEDICATIONS  (PRN):  acetaminophen     Tablet .. 650 milliGRAM(s) Oral every 6 hours PRN Temp greater or equal to 38C (100.4F)  aluminum hydroxide/magnesium hydroxide/simethicone Suspension 30 milliLiter(s) Oral every 4 hours PRN Dyspepsia  melatonin 3 milliGRAM(s) Oral at bedtime PRN Insomnia  ondansetron Injectable 4 milliGRAM(s) IV Push every 8 hours PRN Nausea and/or Vomiting      Allergies    Procardia (Other)  NIFEdipine (Unknown)  fluoxetine (Unknown)    Intolerances    oxycodone (Other)      REVIEW OF SYSTEMS:  CONSTITUTIONAL: No fever, weight loss, or fatigue  EYES: No eye pain, visual disturbances, or discharge  ENMT:  No difficulty hearing, tinnitus, vertigo; No sinus or throat pain  NECK: No pain or stiffness  RESPIRATORY: No cough, wheezing, chills or hemoptysis; No shortness of breath  CARDIOVASCULAR: No chest pain, palpitations, dizziness, or leg swelling  GASTROINTESTINAL: No abdominal or epigastric pain. No nausea, vomiting, or hematemesis; No diarrhea or constipation. No melena or hematochezia.  GENITOURINARY: No dysuria, frequency, hematuria, or incontinence  NEUROLOGICAL: No headaches, memory loss, loss of strength, numbness, or tremors  SKIN: No itching, burning, rashes, or lesions   LYMPH NODES: No enlarged glands  ENDOCRINE: No heat or cold intolerance; No hair loss; No polydipsia or polyuria  MUSCULOSKELETAL: No joint pain or swelling; No muscle, back, or extremity pain  PSYCHIATRIC: No depression, anxiety, mood swings, or difficulty sleeping  HEME/LYMPH: No easy bruising, or bleeding gums  ALLERGY AND IMMUNOLOGIC: No hives or eczema    Vital Signs Last 24 Hrs  T(C): 36.7 (08 Sep 2023 14:45), Max: 36.8 (07 Sep 2023 20:45)  T(F): 98 (08 Sep 2023 14:45), Max: 98.2 (07 Sep 2023 20:45)  HR: 63 (08 Sep 2023 17:45) (60 - 77)  BP: 169/66 (08 Sep 2023 17:45) (112/67 - 179/61)  BP(mean): --  RR: 18 (08 Sep 2023 14:45) (17 - 18)  SpO2: 94% (08 Sep 2023 17:45) (93% - 99%)    Parameters below as of 08 Sep 2023 17:45  Patient On (Oxygen Delivery Method): room air        PHYSICAL EXAM:  GENERAL: NAD, well-groomed, well-developed  HEAD:  Atraumatic, Normocephalic  EYES: EOMI, PERRLA, conjunctiva and sclera clear  ENMT: No tonsillar erythema, exudates, or enlargement; Moist mucous membranes, Good dentition, No lesions  NECK: Supple, No JVD, Normal thyroid  NERVOUS SYSTEM:  Alert & Oriented X3, Good concentration; Motor Strength 5/5 B/L upper and lower extremities; DTRs 2+ intact and symmetric  CHEST/LUNG: Clear to auscultation bilaterally; No rales, rhonchi, wheezing, or rubs  HEART: Regular rate and rhythm; No murmurs, rubs, or gallops  ABDOMEN: Soft, Nontender, Nondistended; Bowel sounds present  EXTREMITIES:  2+ Peripheral Pulses, No clubbing, cyanosis, or edema  LYMPH: No lymphadenopathy noted  SKIN: No rashes or lesions    LABS:                        10.2   8.95  )-----------( 273      ( 08 Sep 2023 09:42 )             31.0     08 Sep 2023 09:42    137    |  95     |  58     ----------------------------<  212    3.7     |  33     |  1.60     Ca    8.8        08 Sep 2023 09:42        Urinalysis Basic - ( 08 Sep 2023 09:42 )    Color: x / Appearance: x / SG: x / pH: x  Gluc: 212 mg/dL / Ketone: x  / Bili: x / Urobili: x   Blood: x / Protein: x / Nitrite: x   Leuk Esterase: x / RBC: x / WBC x   Sq Epi: x / Non Sq Epi: x / Bacteria: x      CAPILLARY BLOOD GLUCOSE          RADIOLOGY & ADDITIONAL TESTS:    Notes Reviewed:  [ x] YES  [ ] NO    Care Discussed with Consultants/Other Providers [x ] YES  [ ] NO

## 2023-09-08 NOTE — DISCHARGE NOTE PROVIDER - PROVIDER TOKENS
PROVIDER:[TOKEN:[[id number]]],PROVIDER:[TOKEN:[[id number]]],PROVIDER:[TOKEN:[[id number]]] PROVIDER:[TOKEN:[[id number]]],PROVIDER:[TOKEN:[[id number]]],PROVIDER:[TOKEN:[[id number]]],PROVIDER:[TOKEN:[[id number]]]

## 2023-09-08 NOTE — DISCHARGE NOTE PROVIDER - NSDCMRMEDTOKEN_GEN_ALL_CORE_FT
acetaminophen 325 mg oral tablet: 2 tab(s) orally every 6 hours as needed for  mild pain  albuterol 90 mcg/inh inhalation aerosol: 2 puff(s) inhaled every 6 hours  amiodarone 200 mg oral tablet: 1 tab(s) orally once a day  apixaban 5 mg oral tablet: 1 tab(s) orally 2 times a day  aspirin 81 mg oral tablet, chewable: 1 tab(s) orally once a day  Breo Ellipta 100 mcg-25 mcg/inh inhalation powder: 1 puff(s) inhaled once a day  carvedilol 12.5 mg oral tablet: 1 tab(s) orally 2 times a day  famotidine 20 mg oral tablet: 1 tab(s) orally once a day  fenofibrate 48 mg oral tablet: 1 tab(s) orally once a day  ferrous sulfate 325 mg (65 mg elemental iron) oral tablet: 1 tab(s) orally once a day  furosemide 40 mg oral tablet: 1 tab(s) orally once a day  hydrALAZINE 100 mg oral tablet: 1 tab(s) orally 3 times a day  ipratropium-albuterol 0.5 mg-2.5 mg/3 mL inhalation solution: 3 milliliter(s) by nebulizer 2 times a day can take up to 4x per day if needed for wheezing  Medrol 4 mg oral tablet: 1 packet(s) orally once  Multiple Vitamins oral tablet: 1 tab(s) orally once a day  pravastatin 20 mg oral tablet: 1 tab(s) orally once a day  sertraline 25 mg oral tablet: 1 tab(s) orally once a day  tiotropium 2.5 mcg/inh inhalation aerosol: 2 puff(s) inhaled once a day   acetaminophen 325 mg oral tablet: 2 tab(s) orally every 6 hours as needed for  mild pain  albuterol 90 mcg/inh inhalation aerosol: 2 puff(s) inhaled every 6 hours  amiodarone 200 mg oral tablet: 1 tab(s) orally once a day  apixaban 5 mg oral tablet: 1 tab(s) orally 2 times a day  aspirin 81 mg oral tablet, chewable: 1 tab(s) orally once a day  Breo Ellipta 100 mcg-25 mcg/inh inhalation powder: 1 puff(s) inhaled once a day  carvedilol 12.5 mg oral tablet: 1 tab(s) orally 2 times a day  famotidine 20 mg oral tablet: 1 tab(s) orally once a day  fenofibrate 48 mg oral tablet: 1 tab(s) orally once a day  ferrous sulfate 325 mg (65 mg elemental iron) oral tablet: 1 tab(s) orally once a day  furosemide 40 mg oral tablet: 1 tab(s) orally once a day  hydrALAZINE 100 mg oral tablet: 1 tab(s) orally 2 times a day  ipratropium-albuterol 0.5 mg-2.5 mg/3 mL inhalation solution: 3 milliliter(s) by nebulizer 2 times a day can take up to 4x per day if needed for wheezing  Medrol 4 mg oral tablet: 1 packet(s) orally once  Multiple Vitamins oral tablet: 1 tab(s) orally once a day  pravastatin 20 mg oral tablet: 1 tab(s) orally once a day  sertraline 25 mg oral tablet: 1 tab(s) orally once a day  tiotropium 2.5 mcg/inh inhalation aerosol: 2 puff(s) inhaled once a day

## 2023-09-08 NOTE — CHART NOTE - NSCHARTNOTEFT_GEN_A_CORE
82y old  Female who presents with a chief complaint of Generalized weakness.  Anemia possibly secondary to GI Bleeding. Admitted with weakness and hypokalemia. Potassium replaced and slowly improving. Seen by physical therapy, no skilled pt needs.     Called by RN, pt jacoby c/o dizziness, lightheadedness, clammy, feeling uneasy. Patient seen, denies n/v, chest pain. Able to transfer from chair to bed with min assist. Plan as below.       PLAN:     - EKG now, no ischemic changes, AV paced   - VS  127/78, HR 60, RR 18 94% on room air  - recently switched to Hydralazine 100 mg TID  - given Hydralazine 100 mg at 1300 today per order/parameters  - repeat /67 asymptomatic 1hr post administration  - pts symptoms likely from acute drop in  --> 120  - cardio called Dr. Vogel, will decrease to Hydralazine 75 mg TID and monitor next 36 hrs  - likely to adjust Hydralazine doses based on am and pm BP   - Dr. Pate made aware

## 2023-09-09 LAB
-  STAPHYLOCOCCUS EPIDERMIDIS, METHICILLIN RESISTANT: SIGNIFICANT CHANGE UP
ANION GAP SERPL CALC-SCNC: 6 MMOL/L — SIGNIFICANT CHANGE UP (ref 5–17)
BUN SERPL-MCNC: 52 MG/DL — HIGH (ref 7–23)
CALCIUM SERPL-MCNC: 8.7 MG/DL — SIGNIFICANT CHANGE UP (ref 8.5–10.1)
CHLORIDE SERPL-SCNC: 98 MMOL/L — SIGNIFICANT CHANGE UP (ref 96–108)
CO2 SERPL-SCNC: 34 MMOL/L — HIGH (ref 22–31)
CREAT SERPL-MCNC: 1.4 MG/DL — HIGH (ref 0.5–1.3)
EGFR: 37 ML/MIN/1.73M2 — LOW
GLUCOSE SERPL-MCNC: 91 MG/DL — SIGNIFICANT CHANGE UP (ref 70–99)
GRAM STN FLD: SIGNIFICANT CHANGE UP
HCT VFR BLD CALC: 32.1 % — LOW (ref 34.5–45)
HGB BLD-MCNC: 10.3 G/DL — LOW (ref 11.5–15.5)
MCHC RBC-ENTMCNC: 30.9 PG — SIGNIFICANT CHANGE UP (ref 27–34)
MCHC RBC-ENTMCNC: 32.1 GM/DL — SIGNIFICANT CHANGE UP (ref 32–36)
MCV RBC AUTO: 96.4 FL — SIGNIFICANT CHANGE UP (ref 80–100)
METHOD TYPE: SIGNIFICANT CHANGE UP
NRBC # BLD: 0 /100 WBCS — SIGNIFICANT CHANGE UP (ref 0–0)
PLATELET # BLD AUTO: 259 K/UL — SIGNIFICANT CHANGE UP (ref 150–400)
POTASSIUM SERPL-MCNC: 3.5 MMOL/L — SIGNIFICANT CHANGE UP (ref 3.5–5.3)
POTASSIUM SERPL-SCNC: 3.5 MMOL/L — SIGNIFICANT CHANGE UP (ref 3.5–5.3)
RBC # BLD: 3.33 M/UL — LOW (ref 3.8–5.2)
RBC # FLD: 14.8 % — HIGH (ref 10.3–14.5)
SODIUM SERPL-SCNC: 138 MMOL/L — SIGNIFICANT CHANGE UP (ref 135–145)
SPECIMEN SOURCE: SIGNIFICANT CHANGE UP
WBC # BLD: 7.33 K/UL — SIGNIFICANT CHANGE UP (ref 3.8–10.5)
WBC # FLD AUTO: 7.33 K/UL — SIGNIFICANT CHANGE UP (ref 3.8–10.5)

## 2023-09-09 RX ORDER — POTASSIUM CHLORIDE 20 MEQ
20 PACKET (EA) ORAL ONCE
Refills: 0 | Status: COMPLETED | OUTPATIENT
Start: 2023-09-09 | End: 2023-09-09

## 2023-09-09 RX ORDER — FUROSEMIDE 40 MG
40 TABLET ORAL DAILY
Refills: 0 | Status: DISCONTINUED | OUTPATIENT
Start: 2023-09-09 | End: 2023-09-12

## 2023-09-09 RX ADMIN — FAMOTIDINE 20 MILLIGRAM(S): 10 INJECTION INTRAVENOUS at 12:21

## 2023-09-09 RX ADMIN — Medication 100 MILLIGRAM(S): at 06:29

## 2023-09-09 RX ADMIN — Medication 20 MILLIEQUIVALENT(S): at 10:31

## 2023-09-09 RX ADMIN — AMIODARONE HYDROCHLORIDE 200 MILLIGRAM(S): 400 TABLET ORAL at 06:28

## 2023-09-09 RX ADMIN — ALBUTEROL 2 PUFF(S): 90 AEROSOL, METERED ORAL at 18:46

## 2023-09-09 RX ADMIN — Medication 20 MILLIGRAM(S): at 06:28

## 2023-09-09 RX ADMIN — ALBUTEROL 2 PUFF(S): 90 AEROSOL, METERED ORAL at 14:07

## 2023-09-09 RX ADMIN — CARVEDILOL PHOSPHATE 12.5 MILLIGRAM(S): 80 CAPSULE, EXTENDED RELEASE ORAL at 06:28

## 2023-09-09 RX ADMIN — Medication 100 MILLIGRAM(S): at 17:16

## 2023-09-09 RX ADMIN — Medication 325 MILLIGRAM(S): at 12:22

## 2023-09-09 RX ADMIN — Medication 0.5 MILLIGRAM(S): at 20:17

## 2023-09-09 RX ADMIN — Medication 1 TABLET(S): at 12:21

## 2023-09-09 RX ADMIN — Medication 75 MILLIGRAM(S): at 21:36

## 2023-09-09 RX ADMIN — Medication 48 MILLIGRAM(S): at 12:21

## 2023-09-09 RX ADMIN — TIOTROPIUM BROMIDE 2 PUFF(S): 18 CAPSULE ORAL; RESPIRATORY (INHALATION) at 06:29

## 2023-09-09 RX ADMIN — Medication 40 MILLIGRAM(S): at 06:29

## 2023-09-09 RX ADMIN — ALBUTEROL 2 PUFF(S): 90 AEROSOL, METERED ORAL at 00:33

## 2023-09-09 RX ADMIN — Medication 75 MILLIGRAM(S): at 14:05

## 2023-09-09 RX ADMIN — Medication 75 MILLIGRAM(S): at 06:28

## 2023-09-09 RX ADMIN — APIXABAN 5 MILLIGRAM(S): 2.5 TABLET, FILM COATED ORAL at 21:37

## 2023-09-09 RX ADMIN — Medication 0.5 MILLIGRAM(S): at 07:55

## 2023-09-09 RX ADMIN — ALBUTEROL 2 PUFF(S): 90 AEROSOL, METERED ORAL at 06:29

## 2023-09-09 RX ADMIN — SERTRALINE 25 MILLIGRAM(S): 25 TABLET, FILM COATED ORAL at 12:21

## 2023-09-09 RX ADMIN — CARVEDILOL PHOSPHATE 12.5 MILLIGRAM(S): 80 CAPSULE, EXTENDED RELEASE ORAL at 17:16

## 2023-09-09 RX ADMIN — Medication 81 MILLIGRAM(S): at 12:23

## 2023-09-09 RX ADMIN — APIXABAN 5 MILLIGRAM(S): 2.5 TABLET, FILM COATED ORAL at 10:31

## 2023-09-09 NOTE — PROGRESS NOTE ADULT - SUBJECTIVE AND OBJECTIVE BOX
Date of Service 09-09-23 @ 16:39    Patient is a 82y old  Female who presents with a chief complaint of Generalized weakness.  . (08 Sep 2023 21:57)      INTERVAL /OVERNIGHT EVENTS: feeling anxiuos    MEDICATIONS  (STANDING):  albuterol    90 MICROgram(s) HFA Inhaler 2 Puff(s) Inhalation every 6 hours  aMIOdarone    Tablet 200 milliGRAM(s) Oral daily  apixaban 5 milliGRAM(s) Oral two times a day  aspirin  chewable 81 milliGRAM(s) Oral daily  buDESOnide    Inhalation Suspension 0.5 milliGRAM(s) Inhalation two times a day  carvedilol 12.5 milliGRAM(s) Oral every 12 hours  cefpodoxime 100 milliGRAM(s) Oral every 12 hours  famotidine    Tablet 20 milliGRAM(s) Oral daily  fenofibrate Tablet 48 milliGRAM(s) Oral daily  ferrous    sulfate 325 milliGRAM(s) Oral daily  furosemide    Tablet 40 milliGRAM(s) Oral daily  hydrALAZINE 75 milliGRAM(s) Oral three times a day  lactobacillus acidophilus 1 Tablet(s) Oral daily  multivitamin 1 Tablet(s) Oral daily  predniSONE   Tablet 20 milliGRAM(s) Oral daily  sertraline 25 milliGRAM(s) Oral daily  tiotropium 2.5 MICROgram(s) Inhaler 2 Puff(s) Inhalation daily    MEDICATIONS  (PRN):  acetaminophen     Tablet .. 650 milliGRAM(s) Oral every 6 hours PRN Temp greater or equal to 38C (100.4F)  aluminum hydroxide/magnesium hydroxide/simethicone Suspension 30 milliLiter(s) Oral every 4 hours PRN Dyspepsia  melatonin 3 milliGRAM(s) Oral at bedtime PRN Insomnia  ondansetron Injectable 4 milliGRAM(s) IV Push every 8 hours PRN Nausea and/or Vomiting      Allergies    Procardia (Other)  NIFEdipine (Unknown)  fluoxetine (Unknown)    Intolerances    oxycodone (Other)      REVIEW OF SYSTEMS:  CONSTITUTIONAL: No fever, weight loss, or fatigue  EYES: No eye pain, visual disturbances, or discharge  ENMT:  No difficulty hearing, tinnitus, vertigo; No sinus or throat pain  NECK: No pain or stiffness  RESPIRATORY: No cough, wheezing, chills or hemoptysis; No shortness of breath  CARDIOVASCULAR: No chest pain, palpitations, dizziness, or leg swelling  GASTROINTESTINAL: No abdominal or epigastric pain. No nausea, vomiting, or hematemesis; No diarrhea or constipation. No melena or hematochezia.  GENITOURINARY: No dysuria, frequency, hematuria, or incontinence  NEUROLOGICAL: No headaches, memory loss, loss of strength, numbness, or tremors  SKIN: No itching, burning, rashes, or lesions   LYMPH NODES: No enlarged glands  ENDOCRINE: No heat or cold intolerance; No hair loss; No polydipsia or polyuria  MUSCULOSKELETAL: No joint pain or swelling; No muscle, back, or extremity pain  PSYCHIATRIC: No depression, anxiety, mood swings, or difficulty sleeping  HEME/LYMPH: No easy bruising, or bleeding gums  ALLERGY AND IMMUNOLOGIC: No hives or eczema    Vital Signs Last 24 Hrs  T(C): 36.7 (09 Sep 2023 12:30), Max: 36.7 (09 Sep 2023 12:30)  T(F): 98 (09 Sep 2023 12:30), Max: 98 (09 Sep 2023 12:30)  HR: 60 (09 Sep 2023 14:00) (60 - 64)  BP: 120/72 (09 Sep 2023 14:00) (116/60 - 169/66)  BP(mean): --  RR: 17 (09 Sep 2023 12:30) (17 - 18)  SpO2: 95% (09 Sep 2023 12:30) (94% - 99%)    Parameters below as of 09 Sep 2023 12:30  Patient On (Oxygen Delivery Method): room air        PHYSICAL EXAM:  GENERAL: NAD, well-groomed, well-developed  HEAD:  Atraumatic, Normocephalic  EYES: EOMI, PERRLA, conjunctiva and sclera clear  ENMT: No tonsillar erythema, exudates, or enlargement; Moist mucous membranes, Good dentition, No lesions  NECK: Supple, No JVD, Normal thyroid  NERVOUS SYSTEM:  Alert & Oriented X3, Good concentration; Motor Strength 5/5 B/L upper and lower extremities; DTRs 2+ intact and symmetric  CHEST/LUNG: Clear to auscultation bilaterally; No rales, rhonchi, wheezing, or rubs  HEART: Regular rate and rhythm; No murmurs, rubs, or gallops  ABDOMEN: Soft, Nontender, Nondistended; Bowel sounds present  EXTREMITIES:  2+ Peripheral Pulses, No clubbing, cyanosis, or edema  LYMPH: No lymphadenopathy noted  SKIN: No rashes or lesions    LABS:                        10.3   7.33  )-----------( 259      ( 09 Sep 2023 07:41 )             32.1     09 Sep 2023 07:41    138    |  98     |  52     ----------------------------<  91     3.5     |  34     |  1.40     Ca    8.7        09 Sep 2023 07:41        Urinalysis Basic - ( 09 Sep 2023 07:41 )    Color: x / Appearance: x / SG: x / pH: x  Gluc: 91 mg/dL / Ketone: x  / Bili: x / Urobili: x   Blood: x / Protein: x / Nitrite: x   Leuk Esterase: x / RBC: x / WBC x   Sq Epi: x / Non Sq Epi: x / Bacteria: x      CAPILLARY BLOOD GLUCOSE          RADIOLOGY & ADDITIONAL TESTS:    Notes Reviewed:  [x ] YES  [ ] NO    Care Discussed with Consultants/Other Providers [x ] YES  [ ] NO

## 2023-09-09 NOTE — CHART NOTE - NSCHARTNOTEFT_GEN_A_CORE
Called by RN for patient with blood cx positive for gram+ cocci in clusters in one bottle  - patient admitted for ecoli UTI  - currently on vantin  - likely contaminant as only in one bottle  - will order repeat blood cx

## 2023-09-10 LAB
CULTURE RESULTS: SIGNIFICANT CHANGE UP
GRAM STN FLD: SIGNIFICANT CHANGE UP
ORGANISM # SPEC MICROSCOPIC CNT: SIGNIFICANT CHANGE UP
ORGANISM # SPEC MICROSCOPIC CNT: SIGNIFICANT CHANGE UP
SPECIMEN SOURCE: SIGNIFICANT CHANGE UP

## 2023-09-10 PROCEDURE — 99232 SBSQ HOSP IP/OBS MODERATE 35: CPT

## 2023-09-10 RX ORDER — CEFPODOXIME PROXETIL 100 MG
200 TABLET ORAL EVERY 12 HOURS
Refills: 0 | Status: COMPLETED | OUTPATIENT
Start: 2023-09-10 | End: 2023-09-11

## 2023-09-10 RX ADMIN — APIXABAN 5 MILLIGRAM(S): 2.5 TABLET, FILM COATED ORAL at 09:15

## 2023-09-10 RX ADMIN — Medication 81 MILLIGRAM(S): at 13:26

## 2023-09-10 RX ADMIN — AMIODARONE HYDROCHLORIDE 200 MILLIGRAM(S): 400 TABLET ORAL at 05:43

## 2023-09-10 RX ADMIN — SERTRALINE 25 MILLIGRAM(S): 25 TABLET, FILM COATED ORAL at 13:26

## 2023-09-10 RX ADMIN — FAMOTIDINE 20 MILLIGRAM(S): 10 INJECTION INTRAVENOUS at 13:26

## 2023-09-10 RX ADMIN — Medication 100 MILLIGRAM(S): at 05:42

## 2023-09-10 RX ADMIN — Medication 1 TABLET(S): at 13:26

## 2023-09-10 RX ADMIN — Medication 75 MILLIGRAM(S): at 13:27

## 2023-09-10 RX ADMIN — Medication 0.5 MILLIGRAM(S): at 20:23

## 2023-09-10 RX ADMIN — Medication 3 MILLIGRAM(S): at 22:02

## 2023-09-10 RX ADMIN — Medication 48 MILLIGRAM(S): at 13:27

## 2023-09-10 RX ADMIN — ALBUTEROL 2 PUFF(S): 90 AEROSOL, METERED ORAL at 05:43

## 2023-09-10 RX ADMIN — ALBUTEROL 2 PUFF(S): 90 AEROSOL, METERED ORAL at 19:54

## 2023-09-10 RX ADMIN — TIOTROPIUM BROMIDE 2 PUFF(S): 18 CAPSULE ORAL; RESPIRATORY (INHALATION) at 05:43

## 2023-09-10 RX ADMIN — Medication 75 MILLIGRAM(S): at 05:42

## 2023-09-10 RX ADMIN — CARVEDILOL PHOSPHATE 12.5 MILLIGRAM(S): 80 CAPSULE, EXTENDED RELEASE ORAL at 05:43

## 2023-09-10 RX ADMIN — Medication 0.5 MILLIGRAM(S): at 08:20

## 2023-09-10 RX ADMIN — CARVEDILOL PHOSPHATE 12.5 MILLIGRAM(S): 80 CAPSULE, EXTENDED RELEASE ORAL at 19:09

## 2023-09-10 RX ADMIN — Medication 200 MILLIGRAM(S): at 19:10

## 2023-09-10 RX ADMIN — Medication 325 MILLIGRAM(S): at 13:27

## 2023-09-10 RX ADMIN — APIXABAN 5 MILLIGRAM(S): 2.5 TABLET, FILM COATED ORAL at 22:02

## 2023-09-10 RX ADMIN — ALBUTEROL 2 PUFF(S): 90 AEROSOL, METERED ORAL at 13:25

## 2023-09-10 RX ADMIN — ALBUTEROL 2 PUFF(S): 90 AEROSOL, METERED ORAL at 00:49

## 2023-09-10 RX ADMIN — Medication 20 MILLIGRAM(S): at 05:42

## 2023-09-10 RX ADMIN — Medication 75 MILLIGRAM(S): at 22:02

## 2023-09-10 RX ADMIN — Medication 40 MILLIGRAM(S): at 05:43

## 2023-09-10 NOTE — PROGRESS NOTE ADULT - SUBJECTIVE AND OBJECTIVE BOX
Patient is a 82y old  Female who presents with a chief complaint of Generalized weakness.  . (08 Sep 2023 16:17)      INTERVAL /OVERNIGHT EVENTS:  c/o chills        T(C): 36.5 (09-10-23 @ 12:15), Max: 36.7 (09-10-23 @ 04:42)  HR: 64 (09-10-23 @ 12:15) (60 - 64)  BP: 141/68 (09-10-23 @ 12:15) (132/75 - 141/68)  RR: 18 (09-10-23 @ 12:15) (17 - 18)  SpO2: 94% (09-10-23 @ 12:15) (94% - 97%)      MEDICATIONS  (STANDING):  albuterol    90 MICROgram(s) HFA Inhaler 2 Puff(s) Inhalation every 6 hours  aMIOdarone    Tablet 200 milliGRAM(s) Oral daily  apixaban 5 milliGRAM(s) Oral two times a day  aspirin  chewable 81 milliGRAM(s) Oral daily  buDESOnide    Inhalation Suspension 0.5 milliGRAM(s) Inhalation two times a day  carvedilol 12.5 milliGRAM(s) Oral every 12 hours  cefpodoxime 100 milliGRAM(s) Oral every 12 hours  famotidine    Tablet 20 milliGRAM(s) Oral daily  fenofibrate Tablet 48 milliGRAM(s) Oral daily  ferrous    sulfate 325 milliGRAM(s) Oral daily  furosemide    Tablet 40 milliGRAM(s) Oral daily  hydrALAZINE 75 milliGRAM(s) Oral three times a day  lactobacillus acidophilus 1 Tablet(s) Oral daily  multivitamin 1 Tablet(s) Oral daily  predniSONE   Tablet 20 milliGRAM(s) Oral daily  sertraline 25 milliGRAM(s) Oral daily  tiotropium 2.5 MICROgram(s) Inhaler 2 Puff(s) Inhalation daily    MEDICATIONS  (PRN):  acetaminophen     Tablet .. 650 milliGRAM(s) Oral every 6 hours PRN Temp greater or equal to 38C (100.4F)  aluminum hydroxide/magnesium hydroxide/simethicone Suspension 30 milliLiter(s) Oral every 4 hours PRN Dyspepsia  melatonin 3 milliGRAM(s) Oral at bedtime PRN Insomnia  ondansetron Injectable 4 milliGRAM(s) IV Push every 8 hours PRN Nausea and/or Vomiting  Intolerances    oxycodone (Other)      REVIEW OF SYSTEMS:  CONSTITUTIONAL: No fever, weight loss, or fatigue  EYES: No eye pain, visual disturbances, or discharge  ENMT:  No difficulty hearing, tinnitus, vertigo; No sinus or throat pain  NECK: No pain or stiffness  RESPIRATORY: No cough, wheezing, chills or hemoptysis; No shortness of breath  CARDIOVASCULAR: No chest pain, palpitations, dizziness, or leg swelling  GASTROINTESTINAL: No abdominal or epigastric pain. No nausea, vomiting, or hematemesis; No diarrhea or constipation. No melena or hematochezia.  GENITOURINARY: No dysuria, frequency, hematuria, or incontinence  NEUROLOGICAL: No headaches, memory loss, loss of strength, numbness, or tremors  SKIN: No itching, burning, rashes, or lesions   LYMPH NODES: No enlarged glands  ENDOCRINE: No heat or cold intolerance; No hair loss; No polydipsia or polyuria  MUSCULOSKELETAL: No joint pain or swelling; No muscle, back, or extremity pain  PSYCHIATRIC: No depression, anxiety, mood swings, or difficulty sleeping  HEME/LYMPH: No easy bruising, or bleeding gums  ALLERGY AND IMMUNOLOGIC: No hives or eczema    PHYSICAL EXAM:  GENERAL: NAD, well-groomed, well-developed  HEAD:  Atraumatic, Normocephalic  EYES: EOMI, conjunctiva and sclera clear  NECK  Supple, No JVD, Normal thyroid  NERVOUS SYSTEM:  Alert & Oriented X3, Good concentration; Motor Strength 5/5 B/L upper and lower extremities; DTRs 2+ intact and symmetric  CHEST/LUNG: Clear to auscultation bilaterally; No rales, rhonchi, wheezing, or rubs  HEART: Regular rate and rhythm; No murmurs, rubs, or gallops  ABDOMEN: Soft, Nontender, Nondistended; Bowel sounds present  EXTREMITIES:  2+ Peripheral Pulses, No clubbing, cyanosis, or edema  LYMPH: No lymphadenopathy noted  SKIN: No rashes or lesions    LABS:                        10.2   8.95  )-----------( 273      ( 08 Sep 2023 09:42 )             31.0     08 Sep 2023 09:42    137    |  95     |  58     ----------------------------<  212    3.7     |  33     |  1.60     Ca    8.8        08 Sep 2023 09:42        Urinalysis Basic - ( 08 Sep 2023 09:42 )    Color: x / Appearance: x / SG: x / pH: x  Gluc: 212 mg/dL / Ketone: x  / Bili: x / Urobili: x   Blood: x / Protein: x / Nitrite: x   Leuk Esterase: x / RBC: x / WBC x   Sq Epi: x / Non Sq Epi: x / Bacteria: x      CAPILLARY BLOOD GLUCOSE          RADIOLOGY & ADDITIONAL TESTS:    Notes Reviewed:  [ x] YES  [ ] NO    Care Discussed with Consultants/Other Providers [x ] YES  [ ] NO

## 2023-09-10 NOTE — PROGRESS NOTE ADULT - ASSESSMENT
81yo female with PMH of HTN, Afib s/p PPM, COPD on home o2, Anemia, CKD, OA, recent pneumonia, who was admitted with generalized weakness. She is completing antibiotics for E coli UTI.     Reports feeling better today, but blood cultures from 9/8 growing gpc in clusters in 2/4 bottles. One set noted to have staph epi/staph hominis. Possibly contaminant, but would follow repeat blood cultures for now in the setting of pacemaker. Had recent blood cultures from 9/5 which were no growth.    -increase Vantin to 200mg q12h until tomorrow to complete total 7 days   -follow repeat blood cultures    Yashira Lloyd MD  Division of infectious Diseases  Cell [phone] between 8am and 6pm  After 6pm and over the weekends please call ID service line at [phone].

## 2023-09-10 NOTE — PROVIDER CONTACT NOTE (CRITICAL VALUE NOTIFICATION) - TEST AND RESULT REPORTED:
bld cult 9/8 growth anarobic bottle gm+cocci in clusters
K 2.3
+BC prelim from 9/8, growth in aerobic bottle GM+ cocci in clusters

## 2023-09-10 NOTE — PROGRESS NOTE ADULT - SUBJECTIVE AND OBJECTIVE BOX
Patient is a 82y old  Female who presents with a chief complaint of Generalized weakness.  Anemia possibly secondary to GI Bleeding. (10 Sep 2023 15:18)      INTERVAL HPI/OVERNIGHT EVENTS:    shortness of breath and cough have improved    MEDICATIONS  (STANDING):  albuterol    90 MICROgram(s) HFA Inhaler 2 Puff(s) Inhalation every 6 hours  aMIOdarone    Tablet 200 milliGRAM(s) Oral daily  apixaban 5 milliGRAM(s) Oral two times a day  aspirin  chewable 81 milliGRAM(s) Oral daily  buDESOnide    Inhalation Suspension 0.5 milliGRAM(s) Inhalation two times a day  carvedilol 12.5 milliGRAM(s) Oral every 12 hours  cefpodoxime 200 milliGRAM(s) Oral every 12 hours  famotidine    Tablet 20 milliGRAM(s) Oral daily  fenofibrate Tablet 48 milliGRAM(s) Oral daily  ferrous    sulfate 325 milliGRAM(s) Oral daily  furosemide    Tablet 40 milliGRAM(s) Oral daily  hydrALAZINE 75 milliGRAM(s) Oral three times a day  lactobacillus acidophilus 1 Tablet(s) Oral daily  multivitamin 1 Tablet(s) Oral daily  predniSONE   Tablet 20 milliGRAM(s) Oral daily  sertraline 25 milliGRAM(s) Oral daily  tiotropium 2.5 MICROgram(s) Inhaler 2 Puff(s) Inhalation daily      MEDICATIONS  (PRN):  acetaminophen     Tablet .. 650 milliGRAM(s) Oral every 6 hours PRN Temp greater or equal to 38C (100.4F)  aluminum hydroxide/magnesium hydroxide/simethicone Suspension 30 milliLiter(s) Oral every 4 hours PRN Dyspepsia  melatonin 3 milliGRAM(s) Oral at bedtime PRN Insomnia  ondansetron Injectable 4 milliGRAM(s) IV Push every 8 hours PRN Nausea and/or Vomiting      Allergies    Procardia (Other)  NIFEdipine (Unknown)  fluoxetine (Unknown)    Intolerances    oxycodone (Other)      PAST MEDICAL & SURGICAL HISTORY:  HTN (hypertension)      HLD (hyperlipidemia)      Anemia  newly diagnosed      Pneumonia  years ago      OA (osteoarthritis)      Afib      Small bowel cancer      Pacemaker      Major depression      Chronic kidney disease (CKD)      COPD (chronic obstructive pulmonary disease)      Status post total left knee replacement  2007      History of cholecystectomy  open , many years ago      H/O hernia repair  years ago      H/O resection of small bowel          Vital Signs Last 24 Hrs  T(C): 36.5 (10 Sep 2023 12:15), Max: 36.7 (09 Sep 2023 20:02)  T(F): 97.7 (10 Sep 2023 12:15), Max: 98 (09 Sep 2023 20:02)  HR: 64 (10 Sep 2023 12:15) (59 - 64)  BP: 141/68 (10 Sep 2023 12:15) (132/75 - 168/67)  BP(mean): --  RR: 18 (10 Sep 2023 12:15) (17 - 18)  SpO2: 94% (10 Sep 2023 12:15) (94% - 98%)    Parameters below as of 10 Sep 2023 12:15  Patient On (Oxygen Delivery Method): room air        PHYSICAL EXAMINATION:    GENERAL: The patient is awake and alert in no apparent distress.     HEENT: Head is normocephalic and atraumatic.    NECK: no JVD    LUNGS: rales both bases - left greater than right    HEART: Regular rate and rhythm without murmur.    ABDOMEN: Soft, nontender, and nondistended.      EXTREMITIES: bilateral pedal edema    NEUROLOGIC: Grossly intact.    SKIN: No ulceration or induration present.      LABS:                        10.3   7.33  )-----------( 259      ( 09 Sep 2023 07:41 )             32.1     09-09    138  |  98  |  52<H>  ----------------------------<  91  3.5   |  34<H>  |  1.40<H>    Ca    8.7      09 Sep 2023 07:41        Urinalysis Basic - ( 09 Sep 2023 07:41 )    Color: x / Appearance: x / SG: x / pH: x  Gluc: 91 mg/dL / Ketone: x  / Bili: x / Urobili: x   Blood: x / Protein: x / Nitrite: x   Leuk Esterase: x / RBC: x / WBC x   Sq Epi: x / Non Sq Epi: x / Bacteria: x                      MICROBIOLOGY:  Culture Results:   Growth in aerobic bottle: Staphylococcus hominis  Growth in aerobic bottle: Staphylococcus epidermidis  Coagulase Negative Staphylococci isolated from a single blood culture set  may represent contamination.  Contact the Microbiology Department at [phone] if susceptibility  testing is clinically indicated.  Direct identification is available within approximately 3-5  hours either by Blood Panel Multiplexed PCR or Direct  MALDI-TOF. Details: [email or web address] (09-08 @ 11:05)  Culture Results:   Growth in anaerobic bottle: Gram Positive Cocci in Clusters (09-08 @ 11:00)        Assessment:    Acute on Chronic Hypoxic respiratory failure  COPD  Atrial Fibrillation  S/P PPM  Resolved UTI  Staph Bacteremia - probable contaminant    Plan:    For discharge in near future  Patient has home oxygen  Continue Symbicort + Spiriva inhalers  Albuterol PRN  Will follow in our office after discharge Patient is a 82y old  Female who presents with a chief complaint of Generalized weakness.  Anemia possibly secondary to GI Bleeding. (10 Sep 2023 15:18)      INTERVAL HPI/OVERNIGHT EVENTS:    shortness of breath and cough have improved    MEDICATIONS  (STANDING):  albuterol    90 MICROgram(s) HFA Inhaler 2 Puff(s) Inhalation every 6 hours  aMIOdarone    Tablet 200 milliGRAM(s) Oral daily  apixaban 5 milliGRAM(s) Oral two times a day  aspirin  chewable 81 milliGRAM(s) Oral daily  buDESOnide    Inhalation Suspension 0.5 milliGRAM(s) Inhalation two times a day  carvedilol 12.5 milliGRAM(s) Oral every 12 hours  cefpodoxime 200 milliGRAM(s) Oral every 12 hours  famotidine    Tablet 20 milliGRAM(s) Oral daily  fenofibrate Tablet 48 milliGRAM(s) Oral daily  ferrous    sulfate 325 milliGRAM(s) Oral daily  furosemide    Tablet 40 milliGRAM(s) Oral daily  hydrALAZINE 75 milliGRAM(s) Oral three times a day  lactobacillus acidophilus 1 Tablet(s) Oral daily  multivitamin 1 Tablet(s) Oral daily  predniSONE   Tablet 20 milliGRAM(s) Oral daily  sertraline 25 milliGRAM(s) Oral daily  tiotropium 2.5 MICROgram(s) Inhaler 2 Puff(s) Inhalation daily      MEDICATIONS  (PRN):  acetaminophen     Tablet .. 650 milliGRAM(s) Oral every 6 hours PRN Temp greater or equal to 38C (100.4F)  aluminum hydroxide/magnesium hydroxide/simethicone Suspension 30 milliLiter(s) Oral every 4 hours PRN Dyspepsia  melatonin 3 milliGRAM(s) Oral at bedtime PRN Insomnia  ondansetron Injectable 4 milliGRAM(s) IV Push every 8 hours PRN Nausea and/or Vomiting      Allergies    Procardia (Other)  NIFEdipine (Unknown)  fluoxetine (Unknown)    Intolerances    oxycodone (Other)      PAST MEDICAL & SURGICAL HISTORY:  HTN (hypertension)      HLD (hyperlipidemia)      Anemia  newly diagnosed      Pneumonia  years ago      OA (osteoarthritis)      Afib      Small bowel cancer      Pacemaker      Major depression      Chronic kidney disease (CKD)      COPD (chronic obstructive pulmonary disease)      Status post total left knee replacement  2007      History of cholecystectomy  open , many years ago      H/O hernia repair  years ago      H/O resection of small bowel          Vital Signs Last 24 Hrs  T(C): 36.5 (10 Sep 2023 12:15), Max: 36.7 (09 Sep 2023 20:02)  T(F): 97.7 (10 Sep 2023 12:15), Max: 98 (09 Sep 2023 20:02)  HR: 64 (10 Sep 2023 12:15) (59 - 64)  BP: 141/68 (10 Sep 2023 12:15) (132/75 - 168/67)  BP(mean): --  RR: 18 (10 Sep 2023 12:15) (17 - 18)  SpO2: 94% (10 Sep 2023 12:15) (94% - 98%)    Parameters below as of 10 Sep 2023 12:15  Patient On (Oxygen Delivery Method): room air        PHYSICAL EXAMINATION:    GENERAL: The patient is awake and alert in no apparent distress.     HEENT: Head is normocephalic and atraumatic.    NECK: no JVD    LUNGS: rales both bases - left greater than right    HEART: Regular rate and rhythm without murmur.    ABDOMEN: Soft, nontender, and nondistended.      EXTREMITIES: bilateral pedal edema    NEUROLOGIC: Grossly intact.    SKIN: No ulceration or induration present.      LABS:                        10.3   7.33  )-----------( 259      ( 09 Sep 2023 07:41 )             32.1     09-09    138  |  98  |  52<H>  ----------------------------<  91  3.5   |  34<H>  |  1.40<H>    Ca    8.7      09 Sep 2023 07:41        Urinalysis Basic - ( 09 Sep 2023 07:41 )    Color: x / Appearance: x / SG: x / pH: x  Gluc: 91 mg/dL / Ketone: x  / Bili: x / Urobili: x   Blood: x / Protein: x / Nitrite: x   Leuk Esterase: x / RBC: x / WBC x   Sq Epi: x / Non Sq Epi: x / Bacteria: x                      MICROBIOLOGY:  Culture Results:   Growth in aerobic bottle: Staphylococcus hominis  Growth in aerobic bottle: Staphylococcus epidermidis  Coagulase Negative Staphylococci isolated from a single blood culture set  may represent contamination.  Contact the Microbiology Department at [phone] if susceptibility  testing is clinically indicated.  Direct identification is available within approximately 3-5  hours either by Blood Panel Multiplexed PCR or Direct  MALDI-TOF. Details: [email or web address] (09-08 @ 11:05)  Culture Results:   Growth in anaerobic bottle: Gram Positive Cocci in Clusters (09-08 @ 11:00)        Assessment:    Acute on Chronic Hypoxic respiratory failure  COPD  Atrial Fibrillation  Hx Pulmonary emboli  S/P PPM  Resolved UTI  Staph Bacteremia - probable contaminant    Plan:    For discharge in near future  Patient has home oxygen  Continue Symbicort + Spiriva inhalers  Albuterol PRN  Anticoagulation with Eliquis  Will follow in our office after discharge

## 2023-09-10 NOTE — PROVIDER CONTACT NOTE (CRITICAL VALUE NOTIFICATION) - ACTION/TREATMENT ORDERED:
awaiting orders
possible contaminate, redraw new set blood cult.
Md Benoit notified and made aware. Awaiting orders.

## 2023-09-10 NOTE — PROGRESS NOTE ADULT - SUBJECTIVE AND OBJECTIVE BOX
[facility]  INFECTIOUS DISEASES - [address], [place] [address]  Tel: [phone]     Fax: [phone]  =======================================================    JAZ BAIG [id number]    Follow up: No fevers. reports feeling better. Denies any pain, SOB, nausea, diarrhea or dysuria.    Allergies:  Procardia (Other)  NIFEdipine (Unknown)  oxycodone (Other)  fluoxetine (Unknown)      Antibiotics:  acetaminophen     Tablet .. 650 milliGRAM(s) Oral every 6 hours PRN  albuterol    90 MICROgram(s) HFA Inhaler 2 Puff(s) Inhalation every 6 hours  aluminum hydroxide/magnesium hydroxide/simethicone Suspension 30 milliLiter(s) Oral every 4 hours PRN  aMIOdarone    Tablet 200 milliGRAM(s) Oral daily  apixaban 5 milliGRAM(s) Oral two times a day  aspirin  chewable 81 milliGRAM(s) Oral daily  buDESOnide    Inhalation Suspension 0.5 milliGRAM(s) Inhalation two times a day  carvedilol 12.5 milliGRAM(s) Oral every 12 hours  cefpodoxime 100 milliGRAM(s) Oral every 12 hours  famotidine    Tablet 20 milliGRAM(s) Oral daily  fenofibrate Tablet 48 milliGRAM(s) Oral daily  ferrous    sulfate 325 milliGRAM(s) Oral daily  furosemide    Tablet 40 milliGRAM(s) Oral daily  hydrALAZINE 75 milliGRAM(s) Oral three times a day  lactobacillus acidophilus 1 Tablet(s) Oral daily  melatonin 3 milliGRAM(s) Oral at bedtime PRN  multivitamin 1 Tablet(s) Oral daily  ondansetron Injectable 4 milliGRAM(s) IV Push every 8 hours PRN  predniSONE   Tablet 20 milliGRAM(s) Oral daily  sertraline 25 milliGRAM(s) Oral daily  tiotropium 2.5 MICROgram(s) Inhaler 2 Puff(s) Inhalation daily       REVIEW OF SYSTEMS:  CONSTITUTIONAL:  No Fever or chills  CARDIOVASCULAR:  No chest pain or SOB  RESPIRATORY:  No cough, shortness of breath  GASTROINTESTINAL:  No nausea, vomiting or diarrhea.  GENITOURINARY:  No dysuria, frequency or urgency  MUSCULOSKELETAL:  no joint aches, no back pain  NEUROLOGIC:  No headache or dizziness  PSYCHIATRIC:  No disorder of thought or mood.     Physical Exam:  ICU Vital Signs Last 24 Hrs  T(C): 36.5 (10 Sep 2023 12:15), Max: 36.7 (09 Sep 2023 20:02)  T(F): 97.7 (10 Sep 2023 12:15), Max: 98 (09 Sep 2023 20:02)  HR: 64 (10 Sep 2023 12:15) (59 - 64)  BP: 141/68 (10 Sep 2023 12:15) (132/75 - 168/67)  BP(mean): --  ABP: --  ABP(mean): --  RR: 18 (10 Sep 2023 12:15) (17 - 18)  SpO2: 94% (10 Sep 2023 12:15) (94% - 98%)    O2 Parameters below as of 10 Sep 2023 12:15  Patient On (Oxygen Delivery Method): room air           GEN: NAD  HEENT: normocephalic and atraumatic.  NECK: Supple.   LUNGS: Normal respiratory effort.  HEART: Regular rate and rhythm   ABDOMEN: Soft, nontender, and nondistended.    EXTREMITIES: trace bipedal edema  MSK: s/p L TKA, no knee swelling  NEUROLOGIC: grossly intact.  PSYCHIATRIC: Appropriate affect .    Labs:  09-09    138  |  98  |  52<H>  ----------------------------<  91  3.5   |  34<H>  |  1.40<H>    Ca    8.7      09 Sep 2023 07:41                            10.3   7.33  )-----------( 259      ( 09 Sep 2023 07:41 )             32.1       Urinalysis Basic - ( 09 Sep 2023 07:41 )    Color: x / Appearance: x / SG: x / pH: x  Gluc: 91 mg/dL / Ketone: x  / Bili: x / Urobili: x   Blood: x / Protein: x / Nitrite: x   Leuk Esterase: x / RBC: x / WBC x   Sq Epi: x / Non Sq Epi: x / Bacteria: x          RECENT CULTURES:  09-08 @ 11:05 .Blood Blood-Peripheral Blood Culture PCR    Growth in aerobic bottle: Staphylococcus hominis  Growth in aerobic bottle: Staphylococcus epidermidis  Coagulase Negative Staphylococci isolated from a single blood culture set  may represent contamination.  Contact the Microbiology Department at [phone] if susceptibility  testing is clinically indicated.  Direct identification is available within approximately 3-5  hours either by Blood Panel Multiplexed PCR or Direct  MALDI-TOF. Details: [email or web address]    Growth in aerobic bottle: Gram Positive Cocci in Clusters      09-08 @ 11:00 .Blood Blood-Peripheral     Growth in anaerobic bottle: Gram Positive Cocci in Clusters    Growth in anaerobic bottle: Gram Positive Cocci in Clusters      09-05 @ 03:00 Clean Catch Clean Catch (Midstream) Escherichia coli    >100,000 CFU/ml Escherichia coli        09-05 @ 01:35 .Blood Blood-Peripheral     No growth at 5 days      NotDetec  09-05 @ 01:25 .Blood Blood-Peripheral     No growth at 5 days              All imaging and data are reviewed.

## 2023-09-10 NOTE — PROGRESS NOTE ADULT - PROBLEM SELECTOR PLAN 1
PT eval  OT eval  ? ROSALVA    Blood cx Gram positive Cocci   Follow up specification     Updated patients daughter Lela PT eval  OT eval  ? ROSALVA    Blood cx Gram positive Cocci   Follow up specification     Updated patients daughter Lela over the phone

## 2023-09-11 LAB
ALBUMIN SERPL ELPH-MCNC: 3 G/DL — LOW (ref 3.3–5)
ALP SERPL-CCNC: 51 U/L — SIGNIFICANT CHANGE UP (ref 40–120)
ALT FLD-CCNC: 28 U/L — SIGNIFICANT CHANGE UP (ref 12–78)
ANION GAP SERPL CALC-SCNC: 7 MMOL/L — SIGNIFICANT CHANGE UP (ref 5–17)
ANION GAP SERPL CALC-SCNC: 8 MMOL/L — SIGNIFICANT CHANGE UP (ref 5–17)
APTT BLD: 30.1 SEC — SIGNIFICANT CHANGE UP (ref 24.5–35.6)
AST SERPL-CCNC: 22 U/L — SIGNIFICANT CHANGE UP (ref 15–37)
BASOPHILS # BLD AUTO: 0 K/UL — SIGNIFICANT CHANGE UP (ref 0–0.2)
BASOPHILS NFR BLD AUTO: 0 % — SIGNIFICANT CHANGE UP (ref 0–2)
BILIRUB SERPL-MCNC: 0.3 MG/DL — SIGNIFICANT CHANGE UP (ref 0.2–1.2)
BUN SERPL-MCNC: 49 MG/DL — HIGH (ref 7–23)
BUN SERPL-MCNC: 49 MG/DL — HIGH (ref 7–23)
CALCIUM SERPL-MCNC: 8.3 MG/DL — LOW (ref 8.5–10.1)
CALCIUM SERPL-MCNC: 8.8 MG/DL — SIGNIFICANT CHANGE UP (ref 8.5–10.1)
CHLORIDE SERPL-SCNC: 97 MMOL/L — SIGNIFICANT CHANGE UP (ref 96–108)
CHLORIDE SERPL-SCNC: 99 MMOL/L — SIGNIFICANT CHANGE UP (ref 96–108)
CK MB BLD-MCNC: <4.5 % — HIGH (ref 0–3.5)
CK MB CFR SERPL CALC: <1 NG/ML — SIGNIFICANT CHANGE UP (ref 0–3.6)
CK SERPL-CCNC: 22 U/L — LOW (ref 26–192)
CO2 SERPL-SCNC: 31 MMOL/L — SIGNIFICANT CHANGE UP (ref 22–31)
CO2 SERPL-SCNC: 34 MMOL/L — HIGH (ref 22–31)
CREAT SERPL-MCNC: 1.5 MG/DL — HIGH (ref 0.5–1.3)
CREAT SERPL-MCNC: 1.5 MG/DL — HIGH (ref 0.5–1.3)
CULTURE RESULTS: SIGNIFICANT CHANGE UP
EGFR: 34 ML/MIN/1.73M2 — LOW
EGFR: 34 ML/MIN/1.73M2 — LOW
EOSINOPHIL # BLD AUTO: 0 K/UL — SIGNIFICANT CHANGE UP (ref 0–0.5)
EOSINOPHIL NFR BLD AUTO: 0 % — SIGNIFICANT CHANGE UP (ref 0–6)
GLUCOSE BLDC GLUCOMTR-MCNC: 108 MG/DL — HIGH (ref 70–99)
GLUCOSE SERPL-MCNC: 116 MG/DL — HIGH (ref 70–99)
GLUCOSE SERPL-MCNC: 122 MG/DL — HIGH (ref 70–99)
HCT VFR BLD CALC: 30.3 % — LOW (ref 34.5–45)
HCT VFR BLD CALC: 32.6 % — LOW (ref 34.5–45)
HGB BLD-MCNC: 10.4 G/DL — LOW (ref 11.5–15.5)
HGB BLD-MCNC: 9.9 G/DL — LOW (ref 11.5–15.5)
INR BLD: 1.46 RATIO — HIGH (ref 0.85–1.18)
LYMPHOCYTES # BLD AUTO: 0.63 K/UL — LOW (ref 1–3.3)
LYMPHOCYTES # BLD AUTO: 8 % — LOW (ref 13–44)
MCHC RBC-ENTMCNC: 31 PG — SIGNIFICANT CHANGE UP (ref 27–34)
MCHC RBC-ENTMCNC: 31.2 PG — SIGNIFICANT CHANGE UP (ref 27–34)
MCHC RBC-ENTMCNC: 31.9 GM/DL — LOW (ref 32–36)
MCHC RBC-ENTMCNC: 32.7 GM/DL — SIGNIFICANT CHANGE UP (ref 32–36)
MCV RBC AUTO: 95.6 FL — SIGNIFICANT CHANGE UP (ref 80–100)
MCV RBC AUTO: 97.3 FL — SIGNIFICANT CHANGE UP (ref 80–100)
MONOCYTES # BLD AUTO: 0.31 K/UL — SIGNIFICANT CHANGE UP (ref 0–0.9)
MONOCYTES NFR BLD AUTO: 4 % — SIGNIFICANT CHANGE UP (ref 2–14)
NEUTROPHILS # BLD AUTO: 6.76 K/UL — SIGNIFICANT CHANGE UP (ref 1.8–7.4)
NEUTROPHILS NFR BLD AUTO: 84 % — HIGH (ref 43–77)
NRBC # BLD: 0 /100 WBCS — SIGNIFICANT CHANGE UP (ref 0–0)
NRBC # BLD: SIGNIFICANT CHANGE UP /100 WBCS (ref 0–0)
PLATELET # BLD AUTO: 218 K/UL — SIGNIFICANT CHANGE UP (ref 150–400)
PLATELET # BLD AUTO: 241 K/UL — SIGNIFICANT CHANGE UP (ref 150–400)
POTASSIUM SERPL-MCNC: 3.4 MMOL/L — LOW (ref 3.5–5.3)
POTASSIUM SERPL-MCNC: 3.9 MMOL/L — SIGNIFICANT CHANGE UP (ref 3.5–5.3)
POTASSIUM SERPL-SCNC: 3.4 MMOL/L — LOW (ref 3.5–5.3)
POTASSIUM SERPL-SCNC: 3.9 MMOL/L — SIGNIFICANT CHANGE UP (ref 3.5–5.3)
PROT SERPL-MCNC: 5.7 G/DL — LOW (ref 6–8.3)
PROTHROM AB SERPL-ACNC: 16.9 SEC — HIGH (ref 9.5–13)
RBC # BLD: 3.17 M/UL — LOW (ref 3.8–5.2)
RBC # BLD: 3.35 M/UL — LOW (ref 3.8–5.2)
RBC # FLD: 14.7 % — HIGH (ref 10.3–14.5)
RBC # FLD: 14.9 % — HIGH (ref 10.3–14.5)
SODIUM SERPL-SCNC: 138 MMOL/L — SIGNIFICANT CHANGE UP (ref 135–145)
SODIUM SERPL-SCNC: 138 MMOL/L — SIGNIFICANT CHANGE UP (ref 135–145)
TROPONIN I, HIGH SENSITIVITY RESULT: 15.3 NG/L — SIGNIFICANT CHANGE UP
WBC # BLD: 7.83 K/UL — SIGNIFICANT CHANGE UP (ref 3.8–10.5)
WBC # BLD: 7.86 K/UL — SIGNIFICANT CHANGE UP (ref 3.8–10.5)
WBC # FLD AUTO: 7.83 K/UL — SIGNIFICANT CHANGE UP (ref 3.8–10.5)
WBC # FLD AUTO: 7.86 K/UL — SIGNIFICANT CHANGE UP (ref 3.8–10.5)

## 2023-09-11 PROCEDURE — 93010 ELECTROCARDIOGRAM REPORT: CPT

## 2023-09-11 PROCEDURE — 72131 CT LUMBAR SPINE W/O DYE: CPT | Mod: 26

## 2023-09-11 PROCEDURE — 99221 1ST HOSP IP/OBS SF/LOW 40: CPT

## 2023-09-11 PROCEDURE — 99232 SBSQ HOSP IP/OBS MODERATE 35: CPT

## 2023-09-11 PROCEDURE — 70498 CT ANGIOGRAPHY NECK: CPT | Mod: 26

## 2023-09-11 PROCEDURE — 70450 CT HEAD/BRAIN W/O DYE: CPT | Mod: 26,XU

## 2023-09-11 PROCEDURE — 70496 CT ANGIOGRAPHY HEAD: CPT | Mod: 26

## 2023-09-11 PROCEDURE — 0042T: CPT

## 2023-09-11 RX ORDER — POTASSIUM CHLORIDE 20 MEQ
20 PACKET (EA) ORAL ONCE
Refills: 0 | Status: COMPLETED | OUTPATIENT
Start: 2023-09-11 | End: 2023-09-11

## 2023-09-11 RX ADMIN — Medication 48 MILLIGRAM(S): at 11:05

## 2023-09-11 RX ADMIN — Medication 200 MILLIGRAM(S): at 18:11

## 2023-09-11 RX ADMIN — Medication 1 TABLET(S): at 11:05

## 2023-09-11 RX ADMIN — Medication 200 MILLIGRAM(S): at 05:20

## 2023-09-11 RX ADMIN — ALBUTEROL 2 PUFF(S): 90 AEROSOL, METERED ORAL at 19:03

## 2023-09-11 RX ADMIN — APIXABAN 5 MILLIGRAM(S): 2.5 TABLET, FILM COATED ORAL at 11:06

## 2023-09-11 RX ADMIN — ALBUTEROL 2 PUFF(S): 90 AEROSOL, METERED ORAL at 05:21

## 2023-09-11 RX ADMIN — TIOTROPIUM BROMIDE 2 PUFF(S): 18 CAPSULE ORAL; RESPIRATORY (INHALATION) at 05:21

## 2023-09-11 RX ADMIN — Medication 20 MILLIGRAM(S): at 05:21

## 2023-09-11 RX ADMIN — FAMOTIDINE 20 MILLIGRAM(S): 10 INJECTION INTRAVENOUS at 11:04

## 2023-09-11 RX ADMIN — Medication 75 MILLIGRAM(S): at 21:48

## 2023-09-11 RX ADMIN — SERTRALINE 25 MILLIGRAM(S): 25 TABLET, FILM COATED ORAL at 11:05

## 2023-09-11 RX ADMIN — ALBUTEROL 2 PUFF(S): 90 AEROSOL, METERED ORAL at 14:18

## 2023-09-11 RX ADMIN — Medication 325 MILLIGRAM(S): at 11:05

## 2023-09-11 RX ADMIN — CARVEDILOL PHOSPHATE 12.5 MILLIGRAM(S): 80 CAPSULE, EXTENDED RELEASE ORAL at 18:11

## 2023-09-11 RX ADMIN — Medication 75 MILLIGRAM(S): at 14:18

## 2023-09-11 RX ADMIN — Medication 0.5 MILLIGRAM(S): at 07:39

## 2023-09-11 RX ADMIN — Medication 40 MILLIGRAM(S): at 05:20

## 2023-09-11 RX ADMIN — CARVEDILOL PHOSPHATE 12.5 MILLIGRAM(S): 80 CAPSULE, EXTENDED RELEASE ORAL at 05:20

## 2023-09-11 RX ADMIN — Medication 81 MILLIGRAM(S): at 11:05

## 2023-09-11 RX ADMIN — Medication 0.5 MILLIGRAM(S): at 20:01

## 2023-09-11 RX ADMIN — Medication 20 MILLIEQUIVALENT(S): at 11:04

## 2023-09-11 RX ADMIN — AMIODARONE HYDROCHLORIDE 200 MILLIGRAM(S): 400 TABLET ORAL at 05:20

## 2023-09-11 RX ADMIN — Medication 75 MILLIGRAM(S): at 05:21

## 2023-09-11 RX ADMIN — APIXABAN 5 MILLIGRAM(S): 2.5 TABLET, FILM COATED ORAL at 21:54

## 2023-09-11 NOTE — CONSULT NOTE ADULT - SUBJECTIVE AND OBJECTIVE BOX
bl foot numbness neuropathy like symptoms  ct lumbar spine  check labs  left arm most probably rotator cuff issues  spoke to dtr Rebeca

## 2023-09-11 NOTE — PROGRESS NOTE ADULT - SUBJECTIVE AND OBJECTIVE BOX
Date of Service 09-11-23 @ 19:48    Patient is a 82y old  Female who presents with a chief complaint of Generalized weakness.  Anemia possibly secondary to GI Bleeding. (11 Sep 2023 15:09)      INTERVAL /OVERNIGHT EVENTS: c/o left arm weakness    MEDICATIONS  (STANDING):  albuterol    90 MICROgram(s) HFA Inhaler 2 Puff(s) Inhalation every 6 hours  aMIOdarone    Tablet 200 milliGRAM(s) Oral daily  apixaban 5 milliGRAM(s) Oral two times a day  aspirin  chewable 81 milliGRAM(s) Oral daily  buDESOnide    Inhalation Suspension 0.5 milliGRAM(s) Inhalation two times a day  carvedilol 12.5 milliGRAM(s) Oral every 12 hours  famotidine    Tablet 20 milliGRAM(s) Oral daily  fenofibrate Tablet 48 milliGRAM(s) Oral daily  ferrous    sulfate 325 milliGRAM(s) Oral daily  furosemide    Tablet 40 milliGRAM(s) Oral daily  hydrALAZINE 75 milliGRAM(s) Oral three times a day  lactobacillus acidophilus 1 Tablet(s) Oral daily  LORazepam   Injectable 1 milliGRAM(s) IV Push once  multivitamin 1 Tablet(s) Oral daily  predniSONE   Tablet 20 milliGRAM(s) Oral daily  sertraline 25 milliGRAM(s) Oral daily  tiotropium 2.5 MICROgram(s) Inhaler 2 Puff(s) Inhalation daily    MEDICATIONS  (PRN):  acetaminophen     Tablet .. 650 milliGRAM(s) Oral every 6 hours PRN Temp greater or equal to 38C (100.4F)  aluminum hydroxide/magnesium hydroxide/simethicone Suspension 30 milliLiter(s) Oral every 4 hours PRN Dyspepsia  melatonin 3 milliGRAM(s) Oral at bedtime PRN Insomnia  ondansetron Injectable 4 milliGRAM(s) IV Push every 8 hours PRN Nausea and/or Vomiting      Allergies    Procardia (Other)  NIFEdipine (Unknown)  fluoxetine (Unknown)    Intolerances    oxycodone (Other)      REVIEW OF SYSTEMS:  CONSTITUTIONAL: + fatigue  EYES: No eye pain, visual disturbances, or discharge  ENMT:  No difficulty hearing, tinnitus, vertigo; No sinus or throat pain  NECK: No pain or stiffness  RESPIRATORY: No cough, wheezing, chills or hemoptysis; No shortness of breath  CARDIOVASCULAR: No chest pain, palpitations, dizziness, or leg swelling  GASTROINTESTINAL: No abdominal or epigastric pain. No nausea, vomiting, or hematemesis; No diarrhea or constipation. No melena or hematochezia.  GENITOURINARY: No dysuria, frequency, hematuria, or incontinence  NEUROLOGICAL: No headaches, memory loss, loss of strength, numbness, or tremors  SKIN: No itching, burning, rashes, or lesions   LYMPH NODES: No enlarged glands  ENDOCRINE: No heat or cold intolerance; No hair loss; No polydipsia or polyuria  MUSCULOSKELETAL: No joint pain or swelling; No muscle, back, or extremity pain  PSYCHIATRIC: No depression, anxiety, mood swings, or difficulty sleeping  HEME/LYMPH: No easy bruising, or bleeding gums  ALLERGY AND IMMUNOLOGIC: No hives or eczema    Vital Signs Last 24 Hrs  T(C): 36.6 (11 Sep 2023 05:00), Max: 36.6 (11 Sep 2023 05:00)  T(F): 97.8 (11 Sep 2023 05:00), Max: 97.8 (11 Sep 2023 05:00)  HR: 64 (11 Sep 2023 18:05) (59 - 64)  BP: 162/70 (11 Sep 2023 18:05) (148/72 - 162/70)  BP(mean): --  RR: 17 (11 Sep 2023 11:48) (17 - 17)  SpO2: 95% (11 Sep 2023 11:48) (93% - 97%)    Parameters below as of 11 Sep 2023 11:48  Patient On (Oxygen Delivery Method): room air        PHYSICAL EXAM:  GENERAL: NAD, well-groomed, well-developed  HEAD:  Atraumatic, Normocephalic  EYES: EOMI, PERRLA, conjunctiva and sclera clear  ENMT: No tonsillar erythema, exudates, or enlargement; Moist mucous membranes, Good dentition, No lesions  NECK: Supple, No JVD, Normal thyroid  NERVOUS SYSTEM:  Alert & Oriented X3, Good concentration; Motor Strength 5/5 B/L upper and lower extremities; DTRs 2+ intact and symmetric  CHEST/LUNG: Clear to auscultation bilaterally; No rales, rhonchi, wheezing, or rubs  HEART: Regular rate and rhythm; No murmurs, rubs, or gallops  ABDOMEN: Soft, Nontender, Nondistended; Bowel sounds present  EXTREMITIES:  2+ Peripheral Pulses, No clubbing, cyanosis, or edema  LYMPH: No lymphadenopathy noted  SKIN: No rashes or lesions    LABS:                        10.4   7.83  )-----------( 241      ( 11 Sep 2023 10:08 )             32.6     11 Sep 2023 10:08    138    |  97     |  49     ----------------------------<  122    3.9     |  34     |  1.50     Ca    8.8        11 Sep 2023 10:08    TPro  5.7    /  Alb  3.0    /  TBili  0.3    /  DBili  x      /  AST  22     /  ALT  28     /  AlkPhos  51     11 Sep 2023 09:15    PT/INR - ( 11 Sep 2023 09:15 )   PT: 16.9 sec;   INR: 1.46 ratio         PTT - ( 11 Sep 2023 09:15 )  PTT:30.1 sec  Urinalysis Basic - ( 11 Sep 2023 10:08 )    Color: x / Appearance: x / SG: x / pH: x  Gluc: 122 mg/dL / Ketone: x  / Bili: x / Urobili: x   Blood: x / Protein: x / Nitrite: x   Leuk Esterase: x / RBC: x / WBC x   Sq Epi: x / Non Sq Epi: x / Bacteria: x      CAPILLARY BLOOD GLUCOSE      POCT Blood Glucose.: 108 mg/dL (11 Sep 2023 08:09)      RADIOLOGY & ADDITIONAL TESTS:    Notes Reviewed:  x] YES  [ ] NO    Care Discussed with Consultants/Other Providers [x ] YES  [ ] NO

## 2023-09-11 NOTE — BH CONSULTATION LIAISON ASSESSMENT NOTE - HPI (INCLUDE ILLNESS QUALITY, SEVERITY, DURATION, TIMING, CONTEXT, MODIFYING FACTORS, ASSOCIATED SIGNS AND SYMPTOMS)
Patient seen, evaluated and chart reviewed. Patient is an 83 y/o WWF, with no prior psychiatric hospitalizations or significant psychiatric treatment who presented to the ED from assisted living facility with generalized weakness.  Present for the past few days.  No nausea or vomiting.  Denies any cough or new shortness of breath.  No fevers.  States that she does have aching pain to her left arm and bilateral legs.  States tonight that she was having trouble getting in and out of bed and felt too weak which is why she came to ED.  States that her stools are always dark because of iron but no changes recently.  Patient with a past medical history of A-fib on Eliquis, COPD now on home 2 to 3 L nasal cannula oxygen, hypertension, hyperlipidemia, chronic anemia, heart failure, CKD not on dialysis. Patient was diagnosed with UTI and initially was confused, agitated and anxious. Currently medically stable, calm and cooperative, no evidence of psychosis, lefty or depression.

## 2023-09-11 NOTE — STROKE CODE NOTE - NIH STROKE SCALE: 5A. MOTOR ARM, LEFT, QM
(2) Some effort against gravity; limb cannot get to or maintain (if cued) 90 (or 45) degrees, drifts down to bed, but has some effort against gravity (0) No drift; limb holds 90 (or 45) degrees for full 10 secs (1) Drift; limb holds 90 (or 45) degrees, but drifts down before full 10 seconds; does not hit bed or other support

## 2023-09-11 NOTE — PROGRESS NOTE ADULT - SUBJECTIVE AND OBJECTIVE BOX
Patient is a 82y old  Female who presents with a chief complaint of Generalized weakness.  Anemia possibly secondary to GI Bleeding. (06 Sep 2023 07:55)    Patient seen in follow up for hyponatremia.        PAST MEDICAL HISTORY:  HTN (hypertension)    HLD (hyperlipidemia)    Anemia    DVT (deep venous thrombosis)    Pneumonia    OA (osteoarthritis)    Afib    Small bowel cancer    Pacemaker    Major depression    Chronic kidney disease (CKD)    COPD (chronic obstructive pulmonary disease)        MEDICATIONS  (STANDING):  albuterol    90 MICROgram(s) HFA Inhaler 2 Puff(s) Inhalation every 6 hours  aMIOdarone    Tablet 200 milliGRAM(s) Oral daily  apixaban 5 milliGRAM(s) Oral two times a day  aspirin  chewable 81 milliGRAM(s) Oral daily  buDESOnide    Inhalation Suspension 0.5 milliGRAM(s) Inhalation two times a day  carvedilol 12.5 milliGRAM(s) Oral every 12 hours  cefpodoxime 200 milliGRAM(s) Oral every 12 hours  famotidine    Tablet 20 milliGRAM(s) Oral daily  fenofibrate Tablet 48 milliGRAM(s) Oral daily  ferrous    sulfate 325 milliGRAM(s) Oral daily  furosemide    Tablet 40 milliGRAM(s) Oral daily  hydrALAZINE 75 milliGRAM(s) Oral three times a day  lactobacillus acidophilus 1 Tablet(s) Oral daily  LORazepam   Injectable 1 milliGRAM(s) IV Push once  multivitamin 1 Tablet(s) Oral daily  predniSONE   Tablet 20 milliGRAM(s) Oral daily  sertraline 25 milliGRAM(s) Oral daily  tiotropium 2.5 MICROgram(s) Inhaler 2 Puff(s) Inhalation daily    MEDICATIONS  (PRN):  acetaminophen     Tablet .. 650 milliGRAM(s) Oral every 6 hours PRN Temp greater or equal to 38C (100.4F)  aluminum hydroxide/magnesium hydroxide/simethicone Suspension 30 milliLiter(s) Oral every 4 hours PRN Dyspepsia  melatonin 3 milliGRAM(s) Oral at bedtime PRN Insomnia  ondansetron Injectable 4 milliGRAM(s) IV Push every 8 hours PRN Nausea and/or Vomiting    T(C): 36.6 (09-11-23 @ 05:00), Max: 36.7 (09-09-23 @ 20:02)  HR: 61 (09-11-23 @ 11:48) (59 - 64)  BP: 148/72 (09-11-23 @ 11:48) (132/75 - 168/67)  RR: 17 (09-11-23 @ 11:48)  SpO2: 95% (09-11-23 @ 11:48)  Wt(kg): --  I&O's Detail    10 Sep 2023 07:01  -  11 Sep 2023 07:00  --------------------------------------------------------  IN:  Total IN: 0 mL    OUT:    Voided (mL): 1200 mL  Total OUT: 1200 mL    Total NET: -1200 mL                PHYSICAL EXAM:  General: No distress  Respiratory: b/l air entry  Cardiovascular: S1 S2  Gastrointestinal: soft  Extremities:  edema better                  LABORATORY:                        10.4   7.83  )-----------( 241      ( 11 Sep 2023 10:08 )             32.6     09-11    138  |  97  |  49<H>  ----------------------------<  122<H>  3.9   |  34<H>  |  1.50<H>    Ca    8.8      11 Sep 2023 10:08    TPro  5.7<L>  /  Alb  3.0<L>  /  TBili  0.3  /  DBili  x   /  AST  22  /  ALT  28  /  AlkPhos  51  09-11    Sodium: 138 mmol/L (09-11 @ 10:08)  Sodium: 138 mmol/L (09-11 @ 09:15)    Potassium: 3.9 mmol/L (09-11 @ 10:08)  Potassium: 3.4 mmol/L (09-11 @ 09:15)    Hemoglobin: 10.4 g/dL (09-11 @ 10:08)  Hemoglobin: 9.9 g/dL (09-11 @ 09:15)  Hemoglobin: 10.3 g/dL (09-09 @ 07:41)    Creatinine, Serum 1.50 (09-11 @ 10:08)  Creatinine, Serum 1.50 (09-11 @ 09:15)  Creatinine, Serum 1.40 (09-09 @ 07:41)    CARDIAC MARKERS ( 11 Sep 2023 09:15 )  x     / x     / 22 U/L / x     / <1.0 ng/mL      LIVER FUNCTIONS - ( 11 Sep 2023 09:15 )  Alb: 3.0 g/dL / Pro: 5.7 g/dL / ALK PHOS: 51 U/L / ALT: 28 U/L / AST: 22 U/L / GGT: x           Urinalysis Basic - ( 11 Sep 2023 10:08 )    Color: x / Appearance: x / SG: x / pH: x  Gluc: 122 mg/dL / Ketone: x  / Bili: x / Urobili: x   Blood: x / Protein: x / Nitrite: x   Leuk Esterase: x / RBC: x / WBC x   Sq Epi: x / Non Sq Epi: x / Bacteria: x

## 2023-09-11 NOTE — CHART NOTE - NSCHARTNOTEFT_GEN_A_CORE
Code Stroke Follow Up Note    Patient is a 82y old  Female         admitted for   Code stroke was called because patient was c/o L sided arm weakness and bilateral foot tingling.   Follow up evaluation of patient 2 hours post code stroke    Patient was seen and examined at the bedside by the rapid response team.    Allergies    Procardia (Other)  NIFEdipine (Unknown)  fluoxetine (Unknown)    Intolerances    oxycodone (Other)      PAST MEDICAL & SURGICAL HISTORY:  HTN (hypertension)      HLD (hyperlipidemia)      Anemia  newly diagnosed      Pneumonia  years ago      OA (osteoarthritis)      Afib      Small bowel cancer      Pacemaker      Major depression      Chronic kidney disease (CKD)      COPD (chronic obstructive pulmonary disease)      Status post total left knee replacement  2007      History of cholecystectomy  open , many years ago      H/O hernia repair  years ago      H/O resection of small bowel          Vital Signs Last 24 Hrs  T(C): 36.6 (11 Sep 2023 05:00), Max: 36.6 (11 Sep 2023 05:00)  T(F): 97.8 (11 Sep 2023 05:00), Max: 97.8 (11 Sep 2023 05:00)  HR: 61 (11 Sep 2023 08:09) (59 - 64)  BP: 154/60 (11 Sep 2023 05:00) (141/68 - 154/60)  BP(mean): --  RR: 17 (11 Sep 2023 05:00) (17 - 18)  SpO2: 94% (11 Sep 2023 08:09) (93% - 97%)    Parameters below as of 11 Sep 2023 08:09  Patient On (Oxygen Delivery Method): room air              GENERAL: The patient is awake and alert in no apparent distress.   HEENT: Head is normocephalic and atraumatic. Extraocular muscles are intact. Mucous membranes are moist. No throat erythema/exudates no lymphadenopathy  NECK: Supple.  LUNGS: Clear to auscultation BL without wheezing, rales or rhonchi; respirations unlabored  HEART: Regular rate and rhythm ,+S1/+S2, no murmurs, rubs, gallops  ABDOMEN: Soft, nontender, and nondistended, no rebound, guarding rigidity, bowel sounds in all 4 quadrants  EXTREMITIES: Without any cyanosis, clubbing, rash, lesions or edema.  SKIN: No new rashes or lesions.  MSK: strength equal BL hands and legs/feet. Patient unable to fully lift arms L>R.   VASCULAR: Radial and Dorsal pedal pulses palpable BL  NEUROLOGIC: AAOx3 to person, place and date/time.   PSYCH: No new changes.    09-10 @ 07:01  -  09-11 @ 07:00  --------------------------------------------------------  IN: 0 mL / OUT: 1200 mL / NET: -1200 mL                              10.4   7.83  )-----------( 241      ( 11 Sep 2023 10:08 )             32.6     09-11    138  |  99  |  49<H>  ----------------------------<  116<H>  3.4<L>   |  31  |  1.50<H>    Ca    8.3<L>      11 Sep 2023 09:15    TPro  5.7<L>  /  Alb  3.0<L>  /  TBili  0.3  /  DBili  x   /  AST  22  /  ALT  28  /  AlkPhos  51  09-11         LIVER FUNCTIONS - ( 11 Sep 2023 09:15 )  Alb: 3.0 g/dL / Pro: 5.7 g/dL / ALK PHOS: 51 U/L / ALT: 28 U/L / AST: 22 U/L / GGT: x         Urinalysis Basic - ( 11 Sep 2023 09:15 )    Color: x / Appearance: x / SG: x / pH: x  Gluc: 116 mg/dL / Ketone: x  / Bili: x / Urobili: x   Blood: x / Protein: x / Nitrite: x   Leuk Esterase: x / RBC: x / WBC x   Sq Epi: x / Non Sq Epi: x / Bacteria: x         PT/INR - ( 11 Sep 2023 09:15 )   PT: 16.9 sec;   INR: 1.46 ratio         PTT - ( 11 Sep 2023 09:15 )  PTT:30.1 sec    MEDICATIONS  (STANDING):  albuterol    90 MICROgram(s) HFA Inhaler 2 Puff(s) Inhalation every 6 hours  aMIOdarone    Tablet 200 milliGRAM(s) Oral daily  apixaban 5 milliGRAM(s) Oral two times a day  aspirin  chewable 81 milliGRAM(s) Oral daily  buDESOnide    Inhalation Suspension 0.5 milliGRAM(s) Inhalation two times a day  carvedilol 12.5 milliGRAM(s) Oral every 12 hours  cefpodoxime 200 milliGRAM(s) Oral every 12 hours  famotidine    Tablet 20 milliGRAM(s) Oral daily  fenofibrate Tablet 48 milliGRAM(s) Oral daily  ferrous    sulfate 325 milliGRAM(s) Oral daily  furosemide    Tablet 40 milliGRAM(s) Oral daily  hydrALAZINE 75 milliGRAM(s) Oral three times a day  lactobacillus acidophilus 1 Tablet(s) Oral daily  multivitamin 1 Tablet(s) Oral daily  potassium chloride    Tablet ER 20 milliEquivalent(s) Oral once  predniSONE   Tablet 20 milliGRAM(s) Oral daily  sertraline 25 milliGRAM(s) Oral daily  tiotropium 2.5 MICROgram(s) Inhaler 2 Puff(s) Inhalation daily    MEDICATIONS  (PRN):  acetaminophen     Tablet .. 650 milliGRAM(s) Oral every 6 hours PRN Temp greater or equal to 38C (100.4F)  aluminum hydroxide/magnesium hydroxide/simethicone Suspension 30 milliLiter(s) Oral every 4 hours PRN Dyspepsia  melatonin 3 milliGRAM(s) Oral at bedtime PRN Insomnia  ondansetron Injectable 4 milliGRAM(s) IV Push every 8 hours PRN Nausea and/or Vomiting      Assessment: Rapid Response called for 82y year old Female with a past medical history of     Plan: Code Stroke Follow Up Note    Patient  is an 81 YO Female presented to the ED from assisted living facility with generalized weakness.  Present for the past few days.  No nausea or vomiting.  Denies any cough or new shortness of breath.  No fevers.  States that she does have aching pain to her left arm and bilateral legs.  States tonight that she was having trouble getting in and out of bed and felt too weak which is why she came to ED. Admitted for generalized weakness.   Code stroke was called because patient was c/o L sided arm weakness and bilateral foot tingling.   Follow up evaluation of patient 2 hours post code stroke    Patient was seen and examined at the bedside by the rapid response team. Patient still feels bilateral foot tingling and L sided arm weakness, also c/o "swelling sensation" of the L cheek. Patient endorsed that she has had issues with her L arm since her Whipple procedure which was done approximately 10 years ago, confirmed via phone with daughter Tanisha.     Allergies    Procardia (Other)  NIFEdipine (Unknown)  fluoxetine (Unknown)    Intolerances    oxycodone (Other)      PAST MEDICAL & SURGICAL HISTORY:  HTN (hypertension)      HLD (hyperlipidemia)      Anemia  newly diagnosed      Pneumonia  years ago      OA (osteoarthritis)      Afib      Small bowel cancer      Pacemaker      Major depression      Chronic kidney disease (CKD)      COPD (chronic obstructive pulmonary disease)      Status post total left knee replacement  2007      History of cholecystectomy  open , many years ago      H/O hernia repair  years ago      H/O resection of small bowel          Vital Signs Last 24 Hrs  T(C): 36.6 (11 Sep 2023 05:00), Max: 36.6 (11 Sep 2023 05:00)  T(F): 97.8 (11 Sep 2023 05:00), Max: 97.8 (11 Sep 2023 05:00)  HR: 61 (11 Sep 2023 08:09) (59 - 64)  BP: 154/60 (11 Sep 2023 05:00) (141/68 - 154/60)  BP(mean): --  RR: 17 (11 Sep 2023 05:00) (17 - 18)  SpO2: 94% (11 Sep 2023 08:09) (93% - 97%)    Parameters below as of 11 Sep 2023 08:09  Patient On (Oxygen Delivery Method): room air              GENERAL: The patient is awake and alert in no apparent distress.   HEENT: Head is normocephalic and atraumatic. Extraocular muscles are intact. Mucous membranes are moist. No throat erythema/exudates no lymphadenopathy  NECK: Supple.  LUNGS: Clear to auscultation BL without wheezing, rales or rhonchi; respirations unlabored  HEART: Regular rate and rhythm ,+S1/+S2, no murmurs, rubs, gallops  ABDOMEN: Soft, nontender, and nondistended, no rebound, guarding rigidity, bowel sounds in all 4 quadrants  EXTREMITIES: Without any cyanosis, clubbing, rash, lesions or edema.  SKIN: No new rashes or lesions.  MSK: strength equal BL hands and legs/feet. Patient unable to fully lift arms L weaker than right.   VASCULAR: Radial and Dorsal pedal pulses palpable BL  NEUROLOGIC: AAOx3 to person, place and date/time. Answers questions, follows commands appropriately. sensation intact in all extremities.   PSYCH: No new changes.    09-10 @ 07:01  -  09-11 @ 07:00  --------------------------------------------------------  IN: 0 mL / OUT: 1200 mL / NET: -1200 mL                              10.4   7.83  )-----------( 241      ( 11 Sep 2023 10:08 )             32.6     09-11    138  |  99  |  49<H>  ----------------------------<  116<H>  3.4<L>   |  31  |  1.50<H>    Ca    8.3<L>      11 Sep 2023 09:15    TPro  5.7<L>  /  Alb  3.0<L>  /  TBili  0.3  /  DBili  x   /  AST  22  /  ALT  28  /  AlkPhos  51  09-11         LIVER FUNCTIONS - ( 11 Sep 2023 09:15 )  Alb: 3.0 g/dL / Pro: 5.7 g/dL / ALK PHOS: 51 U/L / ALT: 28 U/L / AST: 22 U/L / GGT: x         Urinalysis Basic - ( 11 Sep 2023 09:15 )    Color: x / Appearance: x / SG: x / pH: x  Gluc: 116 mg/dL / Ketone: x  / Bili: x / Urobili: x   Blood: x / Protein: x / Nitrite: x   Leuk Esterase: x / RBC: x / WBC x   Sq Epi: x / Non Sq Epi: x / Bacteria: x         PT/INR - ( 11 Sep 2023 09:15 )   PT: 16.9 sec;   INR: 1.46 ratio         PTT - ( 11 Sep 2023 09:15 )  PTT:30.1 sec    MEDICATIONS  (STANDING):  albuterol    90 MICROgram(s) HFA Inhaler 2 Puff(s) Inhalation every 6 hours  aMIOdarone    Tablet 200 milliGRAM(s) Oral daily  apixaban 5 milliGRAM(s) Oral two times a day  aspirin  chewable 81 milliGRAM(s) Oral daily  buDESOnide    Inhalation Suspension 0.5 milliGRAM(s) Inhalation two times a day  carvedilol 12.5 milliGRAM(s) Oral every 12 hours  cefpodoxime 200 milliGRAM(s) Oral every 12 hours  famotidine    Tablet 20 milliGRAM(s) Oral daily  fenofibrate Tablet 48 milliGRAM(s) Oral daily  ferrous    sulfate 325 milliGRAM(s) Oral daily  furosemide    Tablet 40 milliGRAM(s) Oral daily  hydrALAZINE 75 milliGRAM(s) Oral three times a day  lactobacillus acidophilus 1 Tablet(s) Oral daily  multivitamin 1 Tablet(s) Oral daily  potassium chloride    Tablet ER 20 milliEquivalent(s) Oral once  predniSONE   Tablet 20 milliGRAM(s) Oral daily  sertraline 25 milliGRAM(s) Oral daily  tiotropium 2.5 MICROgram(s) Inhaler 2 Puff(s) Inhalation daily    MEDICATIONS  (PRN):  acetaminophen     Tablet .. 650 milliGRAM(s) Oral every 6 hours PRN Temp greater or equal to 38C (100.4F)  aluminum hydroxide/magnesium hydroxide/simethicone Suspension 30 milliLiter(s) Oral every 4 hours PRN Dyspepsia  melatonin 3 milliGRAM(s) Oral at bedtime PRN Insomnia  ondansetron Injectable 4 milliGRAM(s) IV Push every 8 hours PRN Nausea and/or Vomiting      Assessment: Rapid Response called for 82y year old Female with a past medical history of     Plan: Code Stroke Follow Up Note    Patient  is an 81 YO Female presented to the ED from assisted living facility with generalized weakness.  Present for the past few days.  No nausea or vomiting.  Denies any cough or new shortness of breath.  No fevers.  States that she does have aching pain to her left arm and bilateral legs.  States tonight that she was having trouble getting in and out of bed and felt too weak which is why she came to ED. Admitted for generalized weakness.   Code stroke was called because patient was c/o L sided arm weakness and bilateral foot tingling.   Follow up evaluation of patient 2 hours post code stroke    Patient was seen and examined at the bedside by the rapid response team. Patient still feels bilateral foot tingling and L sided arm weakness, also c/o "swelling sensation" of the L cheek. Patient endorsed that she has had issues with her L arm since her Whipple procedure which was done approximately 10 years ago, confirmed via phone with daughter Tanisha.     Allergies    Procardia (Other)  NIFEdipine (Unknown)  fluoxetine (Unknown)    Intolerances    oxycodone (Other)      PAST MEDICAL & SURGICAL HISTORY:  HTN (hypertension)      HLD (hyperlipidemia)      Anemia  newly diagnosed      Pneumonia  years ago      OA (osteoarthritis)      Afib      Small bowel cancer      Pacemaker      Major depression      Chronic kidney disease (CKD)      COPD (chronic obstructive pulmonary disease)      Status post total left knee replacement  2007      History of cholecystectomy  open , many years ago      H/O hernia repair  years ago      H/O resection of small bowel          Vital Signs Last 24 Hrs  T(C): 36.6 (11 Sep 2023 05:00), Max: 36.6 (11 Sep 2023 05:00)  T(F): 97.8 (11 Sep 2023 05:00), Max: 97.8 (11 Sep 2023 05:00)  HR: 61 (11 Sep 2023 08:09) (59 - 64)  BP: 154/60 (11 Sep 2023 05:00) (141/68 - 154/60)  BP(mean): --  RR: 17 (11 Sep 2023 05:00) (17 - 18)  SpO2: 94% (11 Sep 2023 08:09) (93% - 97%)    Parameters below as of 11 Sep 2023 08:09  Patient On (Oxygen Delivery Method): room air              GENERAL: The patient is awake and alert in no apparent distress.   HEENT: Head is normocephalic and atraumatic. Extraocular muscles are intact. Mucous membranes are moist. No throat erythema/exudates no lymphadenopathy  NECK: Supple.  LUNGS: Clear to auscultation BL without wheezing, rales or rhonchi; respirations unlabored  HEART: Regular rate and rhythm ,+S1/+S2, no murmurs, rubs, gallops  ABDOMEN: Soft, nontender, and nondistended, no rebound, guarding rigidity, bowel sounds in all 4 quadrants  EXTREMITIES: Without any cyanosis, clubbing, rash, lesions or edema.  SKIN: No new rashes or lesions.  MSK: strength equal BL hands and legs/feet. Patient unable to fully lift arms L weaker than right.   VASCULAR: Radial and Dorsal pedal pulses palpable BL  NEUROLOGIC: AAOx3 to person, place and date/time. Answers questions, follows commands appropriately. sensation intact in all extremities. No facial asymmetry. No other focal deficits aside from L arm.   PSYCH: No new changes.    09-10 @ 07:01  -  09-11 @ 07:00  --------------------------------------------------------  IN: 0 mL / OUT: 1200 mL / NET: -1200 mL                              10.4   7.83  )-----------( 241      ( 11 Sep 2023 10:08 )             32.6     09-11    138  |  99  |  49<H>  ----------------------------<  116<H>  3.4<L>   |  31  |  1.50<H>    Ca    8.3<L>      11 Sep 2023 09:15    TPro  5.7<L>  /  Alb  3.0<L>  /  TBili  0.3  /  DBili  x   /  AST  22  /  ALT  28  /  AlkPhos  51  09-11         LIVER FUNCTIONS - ( 11 Sep 2023 09:15 )  Alb: 3.0 g/dL / Pro: 5.7 g/dL / ALK PHOS: 51 U/L / ALT: 28 U/L / AST: 22 U/L / GGT: x         Urinalysis Basic - ( 11 Sep 2023 09:15 )    Color: x / Appearance: x / SG: x / pH: x  Gluc: 116 mg/dL / Ketone: x  / Bili: x / Urobili: x   Blood: x / Protein: x / Nitrite: x   Leuk Esterase: x / RBC: x / WBC x   Sq Epi: x / Non Sq Epi: x / Bacteria: x         PT/INR - ( 11 Sep 2023 09:15 )   PT: 16.9 sec;   INR: 1.46 ratio         PTT - ( 11 Sep 2023 09:15 )  PTT:30.1 sec    MEDICATIONS  (STANDING):  albuterol    90 MICROgram(s) HFA Inhaler 2 Puff(s) Inhalation every 6 hours  aMIOdarone    Tablet 200 milliGRAM(s) Oral daily  apixaban 5 milliGRAM(s) Oral two times a day  aspirin  chewable 81 milliGRAM(s) Oral daily  buDESOnide    Inhalation Suspension 0.5 milliGRAM(s) Inhalation two times a day  carvedilol 12.5 milliGRAM(s) Oral every 12 hours  cefpodoxime 200 milliGRAM(s) Oral every 12 hours  famotidine    Tablet 20 milliGRAM(s) Oral daily  fenofibrate Tablet 48 milliGRAM(s) Oral daily  ferrous    sulfate 325 milliGRAM(s) Oral daily  furosemide    Tablet 40 milliGRAM(s) Oral daily  hydrALAZINE 75 milliGRAM(s) Oral three times a day  lactobacillus acidophilus 1 Tablet(s) Oral daily  multivitamin 1 Tablet(s) Oral daily  potassium chloride    Tablet ER 20 milliEquivalent(s) Oral once  predniSONE   Tablet 20 milliGRAM(s) Oral daily  sertraline 25 milliGRAM(s) Oral daily  tiotropium 2.5 MICROgram(s) Inhaler 2 Puff(s) Inhalation daily    MEDICATIONS  (PRN):  acetaminophen     Tablet .. 650 milliGRAM(s) Oral every 6 hours PRN Temp greater or equal to 38C (100.4F)  aluminum hydroxide/magnesium hydroxide/simethicone Suspension 30 milliLiter(s) Oral every 4 hours PRN Dyspepsia  melatonin 3 milliGRAM(s) Oral at bedtime PRN Insomnia  ondansetron Injectable 4 milliGRAM(s) IV Push every 8 hours PRN Nausea and/or Vomiting    Imaging findings:  < from: CT Brain Stroke Protocol (09.11.23 @ 08:47) >    PROCEDURE DATE:  09/11/2023          INTERPRETATION:  HEAD CT, CT PERFUSION, CTA OF THE Pala OF GREGG AND   NECK:    INDICATIONS: Stroke code. Left arm weakness.    TECHNIQUE:    HEAD CT:    Serial axial images were obtained from the skull base to the vertex   without the use of intravenous contrast. RAPID artificial intelligence   was used for perfusion analysis and for preliminary evaluation of   intracranial hemorrhage.    CTA Pala OF GREGG:    After the intravenous power injection of non-ionic contrast material,   serial thin sections were obtained through the intracranial circulation   on a multislice CT scanner.  Images were reformatted using a dedicated 3D   software package and viewed on a dedicated workstation in multiple   planes. MIP image analysis was performed on a separate workstation.    CTA NECK:    After the intravenous power injection of non-ionic contrast material,   serial thinsections were obtained through the cervical circulation on a   multislice CT scanner.  Images were reformatted using a dedicated 3D   software package and viewed on a dedicated workstation in multiple   planes. MIP image analysis was performed on a separate workstation.    CONTRAST: 140 cc Omnipaque 350 was administered. 10 cc was discarded.      COMPARISON EXAMINATION: CT head 11/21/2015    FINDINGS:    VENTRICLES AND SULCI: Ventricles and sulci are unremarkable for patient   age.  INTRA-AXIAL: No intracranial mass, acute hemorrhage, or midline shift is   present. There is non-specific decreased attenuation in the white matter   likely microvascular disease.  EXTRA-AXIAL: No extra-axial fluid collection is present.  INTRACRANIAL HEMORRHAGE: None.    VISUALIZED SINUSES: No air-fluid levels are identified.  VISUALIZED MASTOIDS: Moderate opacification of the left mastoid air cell.  CALVARIUM:  Intact  Prior bilateral cataract surgery.    CT RAPID PERFUSION:  None.    INFARCT CORE: 0 mL.    TISSUE AT RISK: 0 mL.    MISMATCH RATIO: None.      CTA Pala OF GREGG:    ANTERIOR CIRCULATION    ICA  CAVERNOUS, SUPRACLINOID, BIFURCATION SEGMENTS: Patent without flow   limiting stenosis. Right posterior communicating arteries present.    ANTERIOR CEREBRAL ARTERIES: Bilateral A1, anterior communicating and A2   anterior cerebral arteries are unremarkable in course and caliber without   flow limiting stenosis.    MIDDLE CEREBRAL ARTERIES: Moderate to severe focal stenosis involving the   M1 midportion of the right middle cerebral artery. Patent bilateral M1,   M2, and distal MCA branches without flow limiting stenosis.    POSTERIOR CIRCULATION:    VERTEBRAL ARTERIES: Patent without flow limiting stenosis    BASILAR ARTERY: Patent no flow limiting stenosis.    POSTERIOR CEREBRAL ARTERIES: Patent without flow limiting stenosis. Fetal   origin of the right posterior cerebral artery.    CTA NECK:    GREAT VESSELS: Visualized segments are patent, no flow limiting stenosis.    COMMON CAROTID ARTERIES:  RIGHT: Patent without flow limiting stenosis  LEFT: Patent without flow limiting stenosis    INTERNAL CAROTID ARTERIES:  RIGHT: Patent no evidence for any hemodynamically significant stenosis at   the ICA origin by NASCET criteria.  LEFT:Patent no evidence for any hemodynamically significant stenosis at   the ICA origin by NASCET criteria.    VERTEBRAL ARTERIES:    RIGHT: Patent no evidence for any flow limiting stenosis.  LEFT: Patent no evidence for any flow limiting stenosis.      SOFT TISSUES: Unremarkable    BONES: Degenerative changes throughout the cervical spine.    IMPRESSION:    HEAD CT: No acute intracranial hemorrhage or acute territorial infarction.    Notification to clinician of alert:  Dr. LORENA MADRIGAL was notified about the noncontrast head CT findings at   8:50 PM on 9/11/2023 with readback confirmation. The opportunity for   questions was provided and all questions asked were answered.    CT PERFUSION demonstrated: No asymmetric or territorial perfusion   abnormality.    If symptoms persist consider follow-up imaging with MRI of the brain if   no contraindications.    CTA COW: Moderate to severe focal stenosis involving the M1 mid portion   of the right middle cerebral artery. No large vessel occlusion.    CTA NECK: Patent, ECAs, ICAs, no  hemodynamically significant stenosis at    ICA origins by NASCET criteria.  Bilateral vertebral arteries are patent without flow limiting stenosis.    --- End of Report ---            JANIE EMMANUEL MD; Attending Radiologist  This document has been electronically signed. Sep 11 2023  9:16AM    < end of copied text >      Assessment: Code stroke called for 82y year old Female with a past medical history of A-fib on Eliquis, COPD now on home 2 to 3 L nasal cannula oxygen, hypertension, hyperlipidemia, chronic anemia, heart failure, CKD not on dialysis. Admitted for generalized weakness.     Plan:  #Code stroke  - NIHSS performed, score of 1 weakness of L arm -- no change from initial exam  - Technical difficulties with Tele stroke/tele neuro. Neurology consulted.   - Confirmed with daughter Tanisha via phone, patient had L arm weakness in the past after Whipple procedure 10 years ago  - CT findings outlined above, no acute intracranial hemorrhage, no perfusion abnormality  - Potassium supplementation ordered as K 3.4 on labs  - Troponin wnl, CPK elevated <4.5  - Neurology Dr. Hernandez consulted by Dr. Pate, will follow   - Primary physician, Dr. Pate informed, will follow up Code Stroke Follow Up Note    Patient  is an 81 YO Female presented to the ED from assisted living facility with generalized weakness.  Present for the past few days.  No nausea or vomiting.  Denies any cough or new shortness of breath.  No fevers.  States that she does have aching pain to her left arm and bilateral legs.  States tonight that she was having trouble getting in and out of bed and felt too weak which is why she came to ED. Admitted for generalized weakness.   Code stroke was called because patient was c/o L sided arm weakness and bilateral foot tingling.   Follow up evaluation of patient 2 hours post code stroke    Patient was seen and examined at the bedside by the rapid response team. Patient still feels bilateral foot tingling and L sided arm weakness, also c/o "swelling sensation" of the L cheek. Patient endorsed that she has had issues with her L arm since her Whipple procedure which was done approximately 10 years ago, confirmed via phone with daughter Tanisha.     Allergies    Procardia (Other)  NIFEdipine (Unknown)  fluoxetine (Unknown)    Intolerances    oxycodone (Other)      PAST MEDICAL & SURGICAL HISTORY:  HTN (hypertension)      HLD (hyperlipidemia)      Anemia  newly diagnosed      Pneumonia  years ago      OA (osteoarthritis)      Afib      Small bowel cancer      Pacemaker      Major depression      Chronic kidney disease (CKD)      COPD (chronic obstructive pulmonary disease)      Status post total left knee replacement  2007      History of cholecystectomy  open , many years ago      H/O hernia repair  years ago      H/O resection of small bowel          Vital Signs Last 24 Hrs  T(C): 36.6 (11 Sep 2023 05:00), Max: 36.6 (11 Sep 2023 05:00)  T(F): 97.8 (11 Sep 2023 05:00), Max: 97.8 (11 Sep 2023 05:00)  HR: 61 (11 Sep 2023 08:09) (59 - 64)  BP: 154/60 (11 Sep 2023 05:00) (141/68 - 154/60)  BP(mean): --  RR: 17 (11 Sep 2023 05:00) (17 - 18)  SpO2: 94% (11 Sep 2023 08:09) (93% - 97%)    Parameters below as of 11 Sep 2023 08:09  Patient On (Oxygen Delivery Method): room air              GENERAL: The patient is awake and alert in no apparent distress.   HEENT: Head is normocephalic and atraumatic. Extraocular muscles are intact. Mucous membranes are moist. No throat erythema/exudates no lymphadenopathy  NECK: Supple.  LUNGS: Clear to auscultation BL without wheezing, rales or rhonchi; respirations unlabored  HEART: Regular rate and rhythm ,+S1/+S2, no murmurs, rubs, gallops  ABDOMEN: Soft, nontender, and nondistended, no rebound, guarding rigidity, bowel sounds in all 4 quadrants  EXTREMITIES: Without any cyanosis, clubbing, rash, lesions or edema.  SKIN: No new rashes or lesions.  MSK: strength equal BL hands and legs/feet. Patient unable to fully lift arms L weaker than right.   VASCULAR: Radial and Dorsal pedal pulses palpable BL  NEUROLOGIC: AAOx3 to person, place and date/time. Answers questions, follows commands appropriately. sensation intact in all extremities. No facial asymmetry. No other focal deficits aside from L arm.   PSYCH: No new changes.    09-10 @ 07:01  -  09-11 @ 07:00  --------------------------------------------------------  IN: 0 mL / OUT: 1200 mL / NET: -1200 mL                              10.4   7.83  )-----------( 241      ( 11 Sep 2023 10:08 )             32.6     09-11    138  |  99  |  49<H>  ----------------------------<  116<H>  3.4<L>   |  31  |  1.50<H>    Ca    8.3<L>      11 Sep 2023 09:15    TPro  5.7<L>  /  Alb  3.0<L>  /  TBili  0.3  /  DBili  x   /  AST  22  /  ALT  28  /  AlkPhos  51  09-11         LIVER FUNCTIONS - ( 11 Sep 2023 09:15 )  Alb: 3.0 g/dL / Pro: 5.7 g/dL / ALK PHOS: 51 U/L / ALT: 28 U/L / AST: 22 U/L / GGT: x         Urinalysis Basic - ( 11 Sep 2023 09:15 )    Color: x / Appearance: x / SG: x / pH: x  Gluc: 116 mg/dL / Ketone: x  / Bili: x / Urobili: x   Blood: x / Protein: x / Nitrite: x   Leuk Esterase: x / RBC: x / WBC x   Sq Epi: x / Non Sq Epi: x / Bacteria: x         PT/INR - ( 11 Sep 2023 09:15 )   PT: 16.9 sec;   INR: 1.46 ratio         PTT - ( 11 Sep 2023 09:15 )  PTT:30.1 sec    MEDICATIONS  (STANDING):  albuterol    90 MICROgram(s) HFA Inhaler 2 Puff(s) Inhalation every 6 hours  aMIOdarone    Tablet 200 milliGRAM(s) Oral daily  apixaban 5 milliGRAM(s) Oral two times a day  aspirin  chewable 81 milliGRAM(s) Oral daily  buDESOnide    Inhalation Suspension 0.5 milliGRAM(s) Inhalation two times a day  carvedilol 12.5 milliGRAM(s) Oral every 12 hours  cefpodoxime 200 milliGRAM(s) Oral every 12 hours  famotidine    Tablet 20 milliGRAM(s) Oral daily  fenofibrate Tablet 48 milliGRAM(s) Oral daily  ferrous    sulfate 325 milliGRAM(s) Oral daily  furosemide    Tablet 40 milliGRAM(s) Oral daily  hydrALAZINE 75 milliGRAM(s) Oral three times a day  lactobacillus acidophilus 1 Tablet(s) Oral daily  multivitamin 1 Tablet(s) Oral daily  potassium chloride    Tablet ER 20 milliEquivalent(s) Oral once  predniSONE   Tablet 20 milliGRAM(s) Oral daily  sertraline 25 milliGRAM(s) Oral daily  tiotropium 2.5 MICROgram(s) Inhaler 2 Puff(s) Inhalation daily    MEDICATIONS  (PRN):  acetaminophen     Tablet .. 650 milliGRAM(s) Oral every 6 hours PRN Temp greater or equal to 38C (100.4F)  aluminum hydroxide/magnesium hydroxide/simethicone Suspension 30 milliLiter(s) Oral every 4 hours PRN Dyspepsia  melatonin 3 milliGRAM(s) Oral at bedtime PRN Insomnia  ondansetron Injectable 4 milliGRAM(s) IV Push every 8 hours PRN Nausea and/or Vomiting    Imaging findings:  < from: CT Brain Stroke Protocol (09.11.23 @ 08:47) >    PROCEDURE DATE:  09/11/2023          INTERPRETATION:  HEAD CT, CT PERFUSION, CTA OF THE [place] OF GREGG AND   NECK:    INDICATIONS: Stroke code. Left arm weakness.    TECHNIQUE:    HEAD CT:    Serial axial images were obtained from the skull base to the vertex   without the use of intravenous contrast. RAPID artificial intelligence   was used for perfusion analysis and for preliminary evaluation of   intracranial hemorrhage.    CTA [place] OF GREGG:    After the intravenous power injection of non-ionic contrast material,   serial thin sections were obtained through the intracranial circulation   on a multislice CT scanner.  Images were reformatted using a dedicated 3D   software package and viewed on a dedicated workstation in multiple   planes. MIP image analysis was performed on a separate workstation.    CTA NECK:    After the intravenous power injection of non-ionic contrast material,   serial thinsections were obtained through the cervical circulation on a   multislice CT scanner.  Images were reformatted using a dedicated 3D   software package and viewed on a dedicated workstation in multiple   planes. MIP image analysis was performed on a separate workstation.    CONTRAST: 140 cc Omnipaque 350 was administered. 10 cc was discarded.      COMPARISON EXAMINATION: CT head 11/21/2015    FINDINGS:    VENTRICLES AND SULCI: Ventricles and sulci are unremarkable for patient   age.  INTRA-AXIAL: No intracranial mass, acute hemorrhage, or midline shift is   present. There is non-specific decreased attenuation in the white matter   likely microvascular disease.  EXTRA-AXIAL: No extra-axial fluid collection is present.  INTRACRANIAL HEMORRHAGE: None.    VISUALIZED SINUSES: No air-fluid levels are identified.  VISUALIZED MASTOIDS: Moderate opacification of the left mastoid air cell.  CALVARIUM:  Intact  Prior bilateral cataract surgery.    CT RAPID PERFUSION:  None.    INFARCT CORE: 0 mL.    TISSUE AT RISK: 0 mL.    MISMATCH RATIO: None.      CTA [place] OF [place]:    ANTERIOR CIRCULATION    ICA  CAVERNOUS, SUPRACLINOID, BIFURCATION SEGMENTS: Patent without flow   limiting stenosis. Right posterior communicating arteries present.    ANTERIOR CEREBRAL ARTERIES: Bilateral A1, anterior communicating and A2   anterior cerebral arteries are unremarkable in course and caliber without   flow limiting stenosis.    MIDDLE CEREBRAL ARTERIES: Moderate to severe focal stenosis involving the   M1 midportion of the right middle cerebral artery. Patent bilateral M1,   M2, and distal MCA branches without flow limiting stenosis.    POSTERIOR CIRCULATION:    VERTEBRAL ARTERIES: Patent without flow limiting stenosis    BASILAR ARTERY: Patent no flow limiting stenosis.    POSTERIOR CEREBRAL ARTERIES: Patent without flow limiting stenosis. Fetal   origin of the right posterior cerebral artery.    CTA NECK:    GREAT VESSELS: Visualized segments are patent, no flow limiting stenosis.    COMMON CAROTID ARTERIES:  RIGHT: Patent without flow limiting stenosis  LEFT: Patent without flow limiting stenosis    INTERNAL CAROTID ARTERIES:  RIGHT: Patent no evidence for any hemodynamically significant stenosis at   the ICA origin by NASCET criteria.  LEFT:Patent no evidence for any hemodynamically significant stenosis at   the ICA origin by NASCET criteria.    VERTEBRAL ARTERIES:    RIGHT: Patent no evidence for any flow limiting stenosis.  LEFT: Patent no evidence for any flow limiting stenosis.      SOFT TISSUES: Unremarkable    BONES: Degenerative changes throughout the cervical spine.    IMPRESSION:    HEAD CT: No acute intracranial hemorrhage or acute territorial infarction.    Notification to clinician of alert:  Dr. LORENA MADRIGAL was notified about the noncontrast head CT findings at   8:50 PM on 9/11/2023 with readback confirmation. The opportunity for   questions was provided and all questions asked were answered.    CT PERFUSION demonstrated: No asymmetric or territorial perfusion   abnormality.    If symptoms persist consider follow-up imaging with MRI of the brain if   no contraindications.    CTA COW: Moderate to severe focal stenosis involving the M1 mid portion   of the right middle cerebral artery. No large vessel occlusion.    CTA NECK: Patent, ECAs, ICAs, no  hemodynamically significant stenosis at    ICA origins by NASCET criteria.  Bilateral vertebral arteries are patent without flow limiting stenosis.    --- End of Report ---            JANIE EMMANUEL MD; Attending Radiologist  This document has been electronically signed. Sep 11 2023  9:16AM    < end of copied text >      Assessment: Code stroke called for 82y year old Female with a past medical history of A-fib on Eliquis, COPD now on home 2 to 3 L nasal cannula oxygen, hypertension, hyperlipidemia, chronic anemia, heart failure, CKD not on dialysis. Admitted for generalized weakness.     Plan:  #Code stroke  - NIHSS performed, score of 1 weakness of L arm -- no change from initial exam  - Technical difficulties with Tele stroke/tele neuro. Neurology consulted.   - Confirmed with daughter Tanisha via phone, patient had L arm weakness in the past after Whipple procedure 10 years ago  - CT findings outlined above, no acute intracranial hemorrhage, no perfusion abnormality  - Patient on AC Eliquis, NIHSS score 1, not a candidate for TNK  - Code stroke orders, lab results pending  - Potassium supplementation ordered as K 3.4 on labs  - Troponin wnl, CPK elevated <4.5  - Discussed case with attending Dr. Pate  - Neurology Dr. Hernandez consulted by Dr. Pate, will follow   - Primary physician, Dr. Pate informed, will follow up

## 2023-09-11 NOTE — PROGRESS NOTE ADULT - SUBJECTIVE AND OBJECTIVE BOX
[facility] Progress Note [phone] (Dr. Sanchez, Delma, Lela, Jay)    CHIEF COMPLAINT: Patient is a 82y old  Female who presents with a chief complaint of Generalized weakness.  Anemia possibly secondary to GI Bleeding. (10 Sep 2023 17:21)      Follow Up Today: The patient denies any chest discomfort or shortness of breath.    HPI:  JAZ BAIG is an 83 YO Female presented to the ED from assisted living facility with generalized weakness.  Present for the past few days.  No nausea or vomiting.  Denies any cough or new shortness of breath.  No fevers.  States that she does have aching pain to her left arm and bilateral legs.  States tonight that she was having trouble getting in and out of bed and felt too weak which is why she came to ED.  States that her stools are always dark because of iron but no changes recently.  Patient with a past medical history of A-fib on Eliquis, COPD now on home 2 to 3 L nasal cannula oxygen, hypertension, hyperlipidemia, chronic anemia, heart failure, CKD not on dialysis. (05 Sep 2023 06:46)      PAST MEDICAL & SURGICAL HISTORY:  HTN (hypertension)      HLD (hyperlipidemia)      Anemia  newly diagnosed      Pneumonia  years ago      OA (osteoarthritis)      Afib      Small bowel cancer      Pacemaker      Major depression      Chronic kidney disease (CKD)      COPD (chronic obstructive pulmonary disease)      Status post total left knee replacement  2007      History of cholecystectomy  open , many years ago      H/O hernia repair  years ago      H/O resection of small bowel          MEDICATIONS  (STANDING):  albuterol    90 MICROgram(s) HFA Inhaler 2 Puff(s) Inhalation every 6 hours  aMIOdarone    Tablet 200 milliGRAM(s) Oral daily  apixaban 5 milliGRAM(s) Oral two times a day  aspirin  chewable 81 milliGRAM(s) Oral daily  buDESOnide    Inhalation Suspension 0.5 milliGRAM(s) Inhalation two times a day  carvedilol 12.5 milliGRAM(s) Oral every 12 hours  cefpodoxime 200 milliGRAM(s) Oral every 12 hours  famotidine    Tablet 20 milliGRAM(s) Oral daily  fenofibrate Tablet 48 milliGRAM(s) Oral daily  ferrous    sulfate 325 milliGRAM(s) Oral daily  furosemide    Tablet 40 milliGRAM(s) Oral daily  hydrALAZINE 75 milliGRAM(s) Oral three times a day  lactobacillus acidophilus 1 Tablet(s) Oral daily  multivitamin 1 Tablet(s) Oral daily  predniSONE   Tablet 20 milliGRAM(s) Oral daily  sertraline 25 milliGRAM(s) Oral daily  tiotropium 2.5 MICROgram(s) Inhaler 2 Puff(s) Inhalation daily    MEDICATIONS  (PRN):  acetaminophen     Tablet .. 650 milliGRAM(s) Oral every 6 hours PRN Temp greater or equal to 38C (100.4F)  aluminum hydroxide/magnesium hydroxide/simethicone Suspension 30 milliLiter(s) Oral every 4 hours PRN Dyspepsia  melatonin 3 milliGRAM(s) Oral at bedtime PRN Insomnia  ondansetron Injectable 4 milliGRAM(s) IV Push every 8 hours PRN Nausea and/or Vomiting      Allergies    Procardia (Other)  NIFEdipine (Unknown)  fluoxetine (Unknown)    Intolerances    oxycodone (Other)      REVIEW OF SYSTEMS:    All other review of systems is negative unless indicated above    Vital Signs Last 24 Hrs  T(C): 36.6 (11 Sep 2023 05:00), Max: 36.6 (11 Sep 2023 05:00)  T(F): 97.8 (11 Sep 2023 05:00), Max: 97.8 (11 Sep 2023 05:00)  HR: 61 (11 Sep 2023 05:00) (59 - 64)  BP: 154/60 (11 Sep 2023 05:00) (141/68 - 154/60)  BP(mean): --  RR: 17 (11 Sep 2023 05:00) (17 - 18)  SpO2: 93% (11 Sep 2023 05:00) (93% - 97%)    Parameters below as of 11 Sep 2023 05:00  Patient On (Oxygen Delivery Method): room air        I&O's Summary    10 Sep 2023 07:01  -  11 Sep 2023 07:00  --------------------------------------------------------  IN: 0 mL / OUT: 1200 mL / NET: -1200 mL        PHYSICAL EXAM:    Constitutional: NAD, awake and alert, well-developed  Eyes:  EOMI,  Pupils round, No oral cyanosis.  HEENT: No exudate or erythema  Pulmonary: Non-labored, breath sounds are clear bilaterally, No wheezing, rales or rhonchi  Cardiovascular: Regular, S1 and S2, No murmurs  Gastrointestinal: Bowel Sounds present, soft, nontender.   Ext: No significant LE edema  Neurological: Alert, no gross focal motor deficits  Skin: No rashes.  Psych:  Mood & affect appropriate    LABS: All Labs Reviewed:                        10.3   7.33  )-----------( 259      ( 09 Sep 2023 07:41 )             32.1                         10.2   8.95  )-----------( 273      ( 08 Sep 2023 09:42 )             31.0     09 Sep 2023 07:41    138    |  98     |  52     ----------------------------<  91     3.5     |  34     |  1.40   08 Sep 2023 09:42    137    |  95     |  58     ----------------------------<  212    3.7     |  33     |  1.60     Ca    8.7        09 Sep 2023 07:41  Ca    8.8        08 Sep 2023 09:42            Blood Culture: Organism --  Gram Stain Blood -- Gram Stain --  Specimen Source .Blood Blood-Peripheral  Culture-Blood --    Organism --  Gram Stain Blood -- Gram Stain --  Specimen Source .Blood Blood-Peripheral  Culture-Blood --    Organism Blood Culture PCR  Gram Stain Blood -- Gram Stain   Growth in aerobic bottle: Gram Positive Cocci in Clusters  Specimen Source .Blood Blood-Peripheral  Culture-Blood --    Organism --  Gram Stain Blood -- Gram Stain   Growth in anaerobic bottle: Gram Positive Cocci in Clusters  Specimen Source .Blood Blood-Peripheral  Culture-Blood --            RADIOLOGY/EKG:    Attending Attestation:   50 minutes spent on total encounter; more than 50% of the visit was spent counseling and/or coordinating care by the attending physician.     ASSESSMENT AND PLAN

## 2023-09-11 NOTE — PROGRESS NOTE ADULT - SUBJECTIVE AND OBJECTIVE BOX
[facility]  INFECTIOUS DISEASES   [address] [place] [address]  Tel: [phone]     Fax: [phone]  ========================================================  MD Lev Gallardo Kaushal, MD Cho, Michelle, MD Sunjit, Jaspal, MD  ========================================================    [id number]  [facility]     Follow up; UTI and bacteremia     Doing well, complaining of tingling in both feet, also pain in both shoulders. No fever     PAST MEDICAL & SURGICAL HISTORY:  HTN (hypertension)  HLD (hyperlipidemia)  Anemia  newly diagnosed  Pneumonia  OA (osteoarthritis)  Afib  Small bowel cancer  Pacemaker  Major depression  Chronic kidney disease (CKD)  COPD (chronic obstructive pulmonary disease)  Status post total left knee replacement  2007  History of cholecystectomy  open , many years ago  H/O hernia repair  years ago  H/O resection of small bowel    Social Hx: No current smoking, EtOH or drugs     FAMILY HISTORY:  No pertinent family history in first degree relatives    Allergies  Procardia (Other)  NIFEdipine (Unknown)  fluoxetine (Unknown)    Intolerances  oxycodone (Other)    MEDICATIONS  (STANDING):  albuterol    90 MICROgram(s) HFA Inhaler 2 Puff(s) Inhalation every 6 hours  aMIOdarone    Tablet 200 milliGRAM(s) Oral daily  apixaban 5 milliGRAM(s) Oral two times a day  aspirin  chewable 81 milliGRAM(s) Oral daily  buDESOnide    Inhalation Suspension 0.5 milliGRAM(s) Inhalation two times a day  carvedilol 12.5 milliGRAM(s) Oral every 12 hours  cefpodoxime 200 milliGRAM(s) Oral every 12 hours  famotidine    Tablet 20 milliGRAM(s) Oral daily  fenofibrate Tablet 48 milliGRAM(s) Oral daily  ferrous    sulfate 325 milliGRAM(s) Oral daily  furosemide    Tablet 40 milliGRAM(s) Oral daily  hydrALAZINE 75 milliGRAM(s) Oral three times a day  lactobacillus acidophilus 1 Tablet(s) Oral daily  multivitamin 1 Tablet(s) Oral daily  predniSONE   Tablet 20 milliGRAM(s) Oral daily  sertraline 25 milliGRAM(s) Oral daily  tiotropium 2.5 MICROgram(s) Inhaler 2 Puff(s) Inhalation daily    REVIEW OF SYSTEMS:  CONSTITUTIONAL:  No Fever or chills + weakness   HEENT:  No diplopia or blurred vision.  No sore throat or runny nose.  CARDIOVASCULAR:  No chest pain or SOB.  RESPIRATORY:  No cough, shortness of breath, PND or orthopnea.  GASTROINTESTINAL:  No nausea, vomiting or diarrhea.  GENITOURINARY:  No dysuria, frequency or urgency. No Blood in urine  MUSCULOSKELETAL:  no joint aches, no muscle pain  SKIN:  No change in skin, hair or nails.  NEUROLOGIC:  No paresthesias or weakness.  PSYCHIATRIC:  No disorder of thought or mood.  ENDOCRINE:  No heat or cold intolerance, polyuria or polydipsia.  HEMATOLOGICAL:  No easy bruising or bleeding.     Physical Exam:  Vital Signs Last 24 Hrs  T(C): 36.6 (11 Sep 2023 05:00), Max: 36.6 (11 Sep 2023 05:00)  T(F): 97.8 (11 Sep 2023 05:00), Max: 97.8 (11 Sep 2023 05:00)  HR: 61 (11 Sep 2023 08:09) (59 - 64)  BP: 154/60 (11 Sep 2023 05:00) (141/68 - 154/60)  BP(mean): --  RR: 17 (11 Sep 2023 05:00) (17 - 18)  SpO2: 94% (11 Sep 2023 08:09) (93% - 97%)  Parameters below as of 11 Sep 2023 08:09  Patient On (Oxygen Delivery Method): room air  GEN: NAD, obese   HEENT: normocephalic and atraumatic. EOMI. PERRL.    NECK: Supple.  No lymphadenopathy   LUNGS: Clear to auscultation.  HEART: Irregular rate and rhythm   ABDOMEN: Soft, nontender, and nondistended.  Positive bowel sounds.    : No CVA tenderness  EXTREMITIES: + edema.  NEUROLOGIC: grossly intact.  PSYCHIATRIC: Appropriate affect .  SKIN: No rash       Labs:                        10.4   7.83  )-----------( 241      ( 11 Sep 2023 10:08 )             32.6     09-11    138  |  97  |  49<H>  ----------------------------<  122<H>  3.9   |  34<H>  |  1.50<H>    Ca    8.8      11 Sep 2023 10:08    TPro  5.7<L>  /  Alb  3.0<L>  /  TBili  0.3  /  DBili  x   /  AST  22  /  ALT  28  /  AlkPhos  51  09-11      Culture - Blood (collected 09-09-23 @ 21:58)  Source: .Blood Blood-Peripheral    Culture - Blood (collected 09-09-23 @ 21:51)  Source: .Blood Blood-Peripheral    Culture - Blood (collected 09-08-23 @ 11:05)  Source: .Blood Blood-Peripheral  Gram Stain (09-09-23 @ 15:22):    Growth in aerobic bottle: Gram Positive Cocci in Clusters  Final Report (09-11-23 @ 10:33):    Growth in aerobic bottle: Staphylococcus epidermidis "Susceptibilities    not performed"    Growth in aerobic bottle: Staphylococcus hominis Coagulase Negative    Staphylococci isolated from a single blood culture set may represent    contamination.    Contact the Microbiology Department at [phone] if susceptibility    testing is clinically indicated.  Organism: Blood Culture PCR (09-11-23 @ 10:33)  Organism: Blood Culture PCR (09-11-23 @ 10:33)    Sensitivities:      Method Type: PCR      -  Staphylococcus epidermidis, Methicillin resistant: Detec    Culture - Blood (collected 09-08-23 @ 11:00)  Source: .Blood Blood-Peripheral  Gram Stain (09-10-23 @ 11:55):    Growth in anaerobic bottle: Gram Positive Cocci in Clusters    Culture - Urine (collected 09-05-23 @ 03:00)  Source: Clean Catch Clean Catch (Midstream)  Final Report (09-07-23 @ 09:49):    >100,000 CFU/ml Escherichia coli  Organism: Escherichia coli (09-07-23 @ 09:49)  Organism: Escherichia coli (09-07-23 @ 09:49)    Sensitivities:      Method Type: MIKHAIL      -  Amikacin: S <=16      -  Amoxicillin/Clavulanic Acid: S <=8/4      -  Ampicillin: S <=8 These ampicillin results predict results for amoxicillin      -  Ampicillin/Sulbactam: S <=4/2      -  Aztreonam: S <=4      -  Cefazolin: S <=2 For uncomplicated UTI with K. pneumoniae, E. coli, or P. mirablis: MIKHAIL <=16 is sensitive and MIKHAIL >=32 is resistant. This also predicts results for oral agents cefaclor, cefdinir, cefpodoxime, cefprozil, cefuroxime axetil, cephalexin and locarbef for uncomplicated UTI. Note that some isolates may be susceptible to these agents while testing resistant to cefazolin.      -  Cefepime: S <=2      -  Cefoxitin: S <=8      -  Ceftriaxone: S <=1      -  Cefuroxime: S <=4      -  Ciprofloxacin: S <=0.25      -  Ertapenem: S <=0.5      -  Gentamicin: S <=2      -  Imipenem: S <=1      -  Levofloxacin: S <=0.5      -  Meropenem: S <=1      -  Nitrofurantoin: S <=32 Should not be used to treat pyelonephritis      -  Piperacillin/Tazobactam: S <=8      -  Tobramycin: S <=2      -  Trimethoprim/Sulfamethoxazole: S <=0.5/9.5    Culture - Blood (collected 09-05-23 @ 01:35)  Source: .Blood Blood-Peripheral  Final Report (09-10-23 @ 10:00):    No growth at 5 days    Culture - Blood (collected 09-05-23 @ 01:25)  Source: .Blood Blood-Peripheral  Final Report (09-10-23 @ 10:00):    No growth at 5 days    WBC Count: 7.83 K/uL (09-11-23 @ 10:08)  WBC Count: 7.86 K/uL (09-11-23 @ 09:15)  WBC Count: 7.33 K/uL (09-09-23 @ 07:41)  WBC Count: 8.95 K/uL (09-08-23 @ 09:42)  WBC Count: 13.05 K/uL (09-07-23 @ 07:20)    Creatinine: 1.50 mg/dL (09-11-23 @ 10:08)  Creatinine: 1.50 mg/dL (09-11-23 @ 09:15)  Creatinine: 1.40 mg/dL (09-09-23 @ 07:41)  Creatinine: 1.60 mg/dL (09-08-23 @ 09:42)  Creatinine: 1.30 mg/dL (09-07-23 @ 07:20)    Ferritin: 261 ng/mL (09-05-23 @ 01:25)    Procalcitonin, Serum: 0.11 ng/mL (09-05-23 @ 10:10)     SARS-CoV-2: NotDetec (09-05-23 @ 01:35)    All imaging and other data have been reviewed.  < from: US Kidney and Bladder (09.05.23 @ 08:25) >  IMPRESSION:  No evidence of hydronephrosis.  139 cc of post void residual.    < from: CT Chest No Cont (09.05.23 @ 08:02) >  IMPRESSION:  Redemonstrated mucoid impaction of the distal airways.    Assessment and Plan:   83yo woman with PMH of HTN, Afib s/p PPM, COPD on home o2, Anemia, CKD, OA, recent pneumonia was admitted with generalized weakness.  She had that for few days and was feeling shaky inside.  NO fever, no GI or  symptoms but UC is back with Ecoli, she has been on ceftriaxone and also had ABx prior to admission for UTI.   Blood cultures NGTD  UC sensitive Ecoli  WBC and TMax normal   Blood culture 9/8 with Staph epidermidis and hominis one bottles in each set, contamination  Repeat blood culture 9/9 NGTD     Recommendations:  - Continue Vantin 100mg q12 adjusted for renal function, will complete total 7days today  - Seen by neurology, stroke highly unlikely     Will follow PRN.     Sukumar Pacheco MD  Division of Infectious Diseases   Please call ID service at [phone] with any question.    50 minutes spent on total encounter assessing patient, examination, chart review, counseling and coordinating care by the attending physician/nurse/care manager.

## 2023-09-11 NOTE — PROGRESS NOTE ADULT - ASSESSMENT
82F with known AF on Eliquis, HTN, HLD, HFpEF PPM (implanted 7/2021), severe Pulm HTN follwed by Dr. Mahajan, COPD on home O2 a/w worsening weakness. Her BNP is 1091 with significant Na level of 127 consistent with hyponatremia    Suggest:    1. Weakness likely from hyponatremia and possible UTI  - fluid restrict, Na improved 127 up to 133   -repeat labs pending  -abnormal thyroid labs  - Empiric Abx per primary team  - Supplement Mg and K with hyponatremia  -ok to d/c amiodarone given thyroid abnormalities  -Trend K+ daily    2. Edema  - s/p IV lasix, improved edema is all gone.  Now on 40 PO  -Rec cont Lasix     3. PAF  - In atrial paced rhythm with amiodarone  - c/w Eliquis with relative anemia  - monitor CBC    4. HTN  BP elvated  - C/w Coreg 12.5 mg BID  - Hydralazine increased to 75 mg TID  -can increase further as needed     Will follow here prn  -pt sees Lela joseph   82F with known AF on Eliquis, HTN, HLD, HFpEF PPM (implanted 7/2021), severe Pulm HTN follwed by Dr. Maahjan, COPD on home O2 a/w worsening weakness. Her BNP is 1091 with significant Na level of 127 consistent with hyponatremia now with shoulder weakness and bilateral decreased range of motion    Suggest:    1. Weakness likely from hyponatremia and possible UTI  - fluid restrict, Na improved 127 up to 133   -repeat labs pending  -abnormal thyroid labs  - Empiric Abx per primary team  - Supplement Mg and K with hyponatremia  -ok to d/c amiodarone given thyroid abnormalities  -Trend K+ daily    2. Edema  - s/p IV lasix, improved edema is all gone.  Now on 40 PO  -Rec cont Lasix     3. PAF  - In atrial paced rhythm with amiodarone  - c/w Eliquis with relative anemia  - monitor CBC    4. HTN  BP elvated  - C/w Coreg 12.5 mg BID  - Hydralazine increased to 75 mg TID  -can increase further as needed     Will follow here prn  -pt sees [facility]

## 2023-09-11 NOTE — BH CONSULTATION LIAISON ASSESSMENT NOTE - CURRENT MEDICATION
MEDICATIONS  (STANDING):  albuterol    90 MICROgram(s) HFA Inhaler 2 Puff(s) Inhalation every 6 hours  aMIOdarone    Tablet 200 milliGRAM(s) Oral daily  apixaban 5 milliGRAM(s) Oral two times a day  aspirin  chewable 81 milliGRAM(s) Oral daily  buDESOnide    Inhalation Suspension 0.5 milliGRAM(s) Inhalation two times a day  carvedilol 12.5 milliGRAM(s) Oral every 12 hours  cefpodoxime 200 milliGRAM(s) Oral every 12 hours  famotidine    Tablet 20 milliGRAM(s) Oral daily  fenofibrate Tablet 48 milliGRAM(s) Oral daily  ferrous    sulfate 325 milliGRAM(s) Oral daily  furosemide    Tablet 40 milliGRAM(s) Oral daily  hydrALAZINE 75 milliGRAM(s) Oral three times a day  lactobacillus acidophilus 1 Tablet(s) Oral daily  LORazepam   Injectable 1 milliGRAM(s) IV Push once  multivitamin 1 Tablet(s) Oral daily  predniSONE   Tablet 20 milliGRAM(s) Oral daily  sertraline 25 milliGRAM(s) Oral daily  tiotropium 2.5 MICROgram(s) Inhaler 2 Puff(s) Inhalation daily    MEDICATIONS  (PRN):  acetaminophen     Tablet .. 650 milliGRAM(s) Oral every 6 hours PRN Temp greater or equal to 38C (100.4F)  aluminum hydroxide/magnesium hydroxide/simethicone Suspension 30 milliLiter(s) Oral every 4 hours PRN Dyspepsia  melatonin 3 milliGRAM(s) Oral at bedtime PRN Insomnia  ondansetron Injectable 4 milliGRAM(s) IV Push every 8 hours PRN Nausea and/or Vomiting

## 2023-09-11 NOTE — BH CONSULTATION LIAISON ASSESSMENT NOTE - NSBHCHARTREVIEWLAB_PSY_A_CORE FT
10.4   7.83  )-----------( 241      ( 11 Sep 2023 10:08 )             32.6   09-11    138  |  97  |  49<H>  ----------------------------<  122<H>  3.9   |  34<H>  |  1.50<H>    Ca    8.8      11 Sep 2023 10:08    TPro  5.7<L>  /  Alb  3.0<L>  /  TBili  0.3  /  DBili  x   /  AST  22  /  ALT  28  /  AlkPhos  51  09-11

## 2023-09-11 NOTE — PROGRESS NOTE ADULT - PROBLEM SELECTOR PLAN 2
PT eval  OT eval  ? ROSALVA
resolved   supplement potassium  serial BMP  will cont to hold off metolazone
resolved   supplement potassium  serial BMP  will cont to hold off metolazone
PT eval  OT eval  ? ROSALVA

## 2023-09-11 NOTE — BH CONSULTATION LIAISON ASSESSMENT NOTE - NSBHCHARTREVIEWVS_PSY_A_CORE FT
Vital Signs Last 24 Hrs  T(C): 36.6 (11 Sep 2023 05:00), Max: 36.6 (11 Sep 2023 05:00)  T(F): 97.8 (11 Sep 2023 05:00), Max: 97.8 (11 Sep 2023 05:00)  HR: 61 (11 Sep 2023 11:48) (59 - 62)  BP: 148/72 (11 Sep 2023 11:48) (148/72 - 154/60)  BP(mean): --  RR: 17 (11 Sep 2023 11:48) (17 - 17)  SpO2: 95% (11 Sep 2023 11:48) (93% - 97%)    Parameters below as of 11 Sep 2023 11:48  Patient On (Oxygen Delivery Method): room air

## 2023-09-11 NOTE — BH CONSULTATION LIAISON ASSESSMENT NOTE - NS ED BHA REVIEW OF ED CHART VITAL SIGNS REVIEWED
Subjective   Beti Medina is a 36 y.o. female.     History of Present Illness     Long history of tonsil stones  Then on 4/13 she had more pain and her tonsils have been enlarged and lymph nodes are enlarged  Ears are stuffy as well  Fever too      Review of Systems   Constitutional: Positive for fever.   HENT: Positive for sore throat.        Objective   Physical Exam   Constitutional: She appears well-developed and well-nourished.   HENT:   Head: Normocephalic and atraumatic.   Right Ear: Hearing, tympanic membrane, external ear and ear canal normal.   Left Ear: Hearing, tympanic membrane, external ear and ear canal normal.   Nose: Nose normal.   Mouth/Throat: Uvula is midline and mucous membranes are normal. Oropharyngeal exudate present.   Eyes: Conjunctivae and EOM are normal.   Neck: Normal range of motion.   Cardiovascular: Normal rate, regular rhythm and normal heart sounds.   Pulmonary/Chest: Effort normal and breath sounds normal.   Lymphadenopathy:     She has cervical adenopathy.   Psychiatric: She has a normal mood and affect. Her behavior is normal.   Nursing note and vitals reviewed.      Assessment/Plan   Beti was seen today for tonsil stones.    Diagnoses and all orders for this visit:    Strep throat  -     predniSONE (DELTASONE) 20 MG tablet; Take 2 tablets by mouth Daily.  -     amoxicillin (AMOXIL) 500 MG capsule; Take 2 capsules by mouth 2 (Two) Times a Day.  -     lidocaine viscous (XYLOCAINE) 2 % solution; 10-15 mL gargel and spit or swallow every 4 hours as needed    treat with pred and amox.  Pt to call back INB           
Yes

## 2023-09-11 NOTE — PROGRESS NOTE ADULT - PROBLEM SELECTOR PLAN 5
? related to amio  endo eval with dr. perlman
? related to amio  endo eval
Euthyroid sick syndrome   Endo following
Euthyroid sick syndrome   Endo following
? related to amio  endo eval

## 2023-09-11 NOTE — STROKE CODE NOTE - DISPOSITION
low suspicion for stroke with NIHSS 1. Patient c/o of L arm weakness. Patient's daughter confirmed patient has been having chronic shoulder issues after having Whipple procedure 10 years ago./Other

## 2023-09-11 NOTE — PROGRESS NOTE ADULT - ASSESSMENT
KELSEY on CKD 3, CRS  Hyponatremia: On thiazide diuretic  Hypokalemia  COPD    Stable renal indices. On PO diuretics. To continue current meds. Encourage PO intake as tolerated.    Cardiology follow up. Will follow electrolytes and renal function trend. D/w patient regarding need for out patient nephrology follow up.

## 2023-09-11 NOTE — CONSULT NOTE ADULT - REASON FOR ADMISSION
Generalized weakness.  Anemia possibly secondary to GI Bleeding.

## 2023-09-11 NOTE — PROGRESS NOTE ADULT - PROBLEM SELECTOR PLAN 1
PT eval  OT eval  ? ROSALVA    Blood cx Gram positive Cocci   Follow up specification     Updated patients daughter Lela over the phone

## 2023-09-11 NOTE — PROGRESS NOTE ADULT - PROBLEM SELECTOR PLAN 4
cont eliquis, coreg  endo eval re low TSH on amio
cont eliquis, coreg  endo eval re low TSH on amio  cardio eval and follow up
cont eliquis, coreg  endo eval re low TSH on amio

## 2023-09-12 ENCOUNTER — TRANSCRIPTION ENCOUNTER (OUTPATIENT)
Age: 83
End: 2023-09-12

## 2023-09-12 VITALS
OXYGEN SATURATION: 96 % | RESPIRATION RATE: 17 BRPM | TEMPERATURE: 98 F | DIASTOLIC BLOOD PRESSURE: 71 MMHG | HEART RATE: 59 BPM | SYSTOLIC BLOOD PRESSURE: 156 MMHG

## 2023-09-12 LAB
-  AMPICILLIN/SULBACTAM: SIGNIFICANT CHANGE UP
-  CEFAZOLIN: SIGNIFICANT CHANGE UP
-  CLINDAMYCIN: SIGNIFICANT CHANGE UP
-  ERYTHROMYCIN: SIGNIFICANT CHANGE UP
-  GENTAMICIN: SIGNIFICANT CHANGE UP
-  OXACILLIN: SIGNIFICANT CHANGE UP
-  PENICILLIN: SIGNIFICANT CHANGE UP
-  RIFAMPIN: SIGNIFICANT CHANGE UP
-  TETRACYCLINE: SIGNIFICANT CHANGE UP
-  TRIMETHOPRIM/SULFAMETHOXAZOLE: SIGNIFICANT CHANGE UP
-  VANCOMYCIN: SIGNIFICANT CHANGE UP
CULTURE RESULTS: SIGNIFICANT CHANGE UP
FOLATE SERPL-MCNC: 14.3 NG/ML — SIGNIFICANT CHANGE UP
METHOD TYPE: SIGNIFICANT CHANGE UP
ORGANISM # SPEC MICROSCOPIC CNT: SIGNIFICANT CHANGE UP
ORGANISM # SPEC MICROSCOPIC CNT: SIGNIFICANT CHANGE UP
SPECIMEN SOURCE: SIGNIFICANT CHANGE UP
TSH SERPL-MCNC: 0.98 UIU/ML — SIGNIFICANT CHANGE UP (ref 0.36–3.74)
VIT B12 SERPL-MCNC: 497 PG/ML — SIGNIFICANT CHANGE UP (ref 232–1245)

## 2023-09-12 PROCEDURE — 99232 SBSQ HOSP IP/OBS MODERATE 35: CPT

## 2023-09-12 RX ORDER — LACTOBACILLUS ACIDOPHILUS 100MM CELL
1 CAPSULE ORAL
Qty: 30 | Refills: 0
Start: 2023-09-12 | End: 2023-10-11

## 2023-09-12 RX ORDER — LANOLIN ALCOHOL/MO/W.PET/CERES
1 CREAM (GRAM) TOPICAL
Qty: 30 | Refills: 0
Start: 2023-09-12 | End: 2023-10-11

## 2023-09-12 RX ORDER — HYDRALAZINE HCL 50 MG
100 TABLET ORAL
Refills: 0 | Status: DISCONTINUED | OUTPATIENT
Start: 2023-09-12 | End: 2023-09-12

## 2023-09-12 RX ORDER — HYDRALAZINE HCL 50 MG
1 TABLET ORAL
Qty: 60 | Refills: 0
Start: 2023-09-12 | End: 2023-10-11

## 2023-09-12 RX ADMIN — Medication 75 MILLIGRAM(S): at 05:58

## 2023-09-12 RX ADMIN — Medication 81 MILLIGRAM(S): at 11:14

## 2023-09-12 RX ADMIN — APIXABAN 5 MILLIGRAM(S): 2.5 TABLET, FILM COATED ORAL at 11:15

## 2023-09-12 RX ADMIN — Medication 1 TABLET(S): at 11:15

## 2023-09-12 RX ADMIN — FAMOTIDINE 20 MILLIGRAM(S): 10 INJECTION INTRAVENOUS at 11:14

## 2023-09-12 RX ADMIN — CARVEDILOL PHOSPHATE 12.5 MILLIGRAM(S): 80 CAPSULE, EXTENDED RELEASE ORAL at 05:58

## 2023-09-12 RX ADMIN — Medication 325 MILLIGRAM(S): at 11:14

## 2023-09-12 RX ADMIN — Medication 48 MILLIGRAM(S): at 11:15

## 2023-09-12 RX ADMIN — Medication 0.5 MILLIGRAM(S): at 07:46

## 2023-09-12 RX ADMIN — TIOTROPIUM BROMIDE 2 PUFF(S): 18 CAPSULE ORAL; RESPIRATORY (INHALATION) at 05:59

## 2023-09-12 RX ADMIN — ALBUTEROL 2 PUFF(S): 90 AEROSOL, METERED ORAL at 05:59

## 2023-09-12 RX ADMIN — SERTRALINE 25 MILLIGRAM(S): 25 TABLET, FILM COATED ORAL at 11:15

## 2023-09-12 RX ADMIN — Medication 20 MILLIGRAM(S): at 05:58

## 2023-09-12 RX ADMIN — AMIODARONE HYDROCHLORIDE 200 MILLIGRAM(S): 400 TABLET ORAL at 05:58

## 2023-09-12 RX ADMIN — Medication 40 MILLIGRAM(S): at 06:10

## 2023-09-12 RX ADMIN — Medication 1 TABLET(S): at 11:14

## 2023-09-12 RX ADMIN — ALBUTEROL 2 PUFF(S): 90 AEROSOL, METERED ORAL at 02:13

## 2023-09-12 NOTE — PROGRESS NOTE ADULT - ASSESSMENT
82F with known AF on Eliquis, HTN, HLD, HFpEF PPM (implanted 7/2021), severe Pulm HTN follwed by Dr. Mahajan, COPD on home O2 a/w worsening weakness. Her BNP is 1091 with significant Na level of 127 consistent with hyponatremia now with shoulder weakness and bilateral decreased range of motion    Suggest:    1. Weakness likely from hyponatremia and possible UTI  - fluid restrict, Na improved 127 up to 133   -repeat labs pending  -abnormal thyroid labs  - Empiric Abx per primary team  - Supplement Mg and K with hyponatremia  -ok to d/c amiodarone given thyroid abnormalities  -Trend K+ daily    2. Edema  - s/p IV lasix, improved edema is all gone.  Now on 40 PO  -Rec cont Lasix     3. PAF  - In atrial paced rhythm with amiodarone  - c/w Eliquis with relative anemia  - monitor CBC    4. HTN  BP elvated  - C/w Coreg 12.5 mg BID  - Hydralazine increased to 75 mg TID  -can increase further as needed     Will follow here prn  -pt sees [facility] 82F with known AF on Eliquis, HTN, HLD, HFpEF PPM (implanted 7/2021), severe Pulm HTN follwed by Dr. Mahajan, COPD on home O2 a/w worsening weakness. Her BNP is 1091 with significant Na level of 127 consistent with hyponatremia now with shoulder weakness and bilateral decreased range of motion. Sodium level now normal at 138    Suggest:    1. Weakness likely from hyponatremia and possible UTI  - Consider steroid trial if persistent Shoulder weakness  - Empiric Abx per primary team  - Supplement Mg and K with hyponatremia  -ok to d/c amiodarone given thyroid abnormalities  -Trend K+ daily    2. Edema  - s/p IV lasix, improved edema is all gone.    -Rec cont Lasix 40 PO daily    3. PAF  - In atrial paced rhythm with amiodarone  - c/w Eliquis 5 bid despite relative anemia  - monitor CBC    4. HTN with elevated BP  - C/w Coreg 12.5 mg BID  - Hydralazine increased to 75 mg TID  -can increase further as needed  Would rather add Diovan 160 daily if ok with renal     Will follow here  -pt sees [facility]

## 2023-09-12 NOTE — PROGRESS NOTE ADULT - PROVIDER SPECIALTY LIST ADULT
Nephrology
Cardiology
Cardiology
Infectious Disease
Nephrology
Neurology
Pulmonology
Cardiology
Infectious Disease
Nephrology
Nephrology
Cardiology
Cardiology
Infectious Disease
Nephrology
Endocrinology
Family Medicine
Hospitalist
Family Medicine

## 2023-09-12 NOTE — PROGRESS NOTE ADULT - ASSESSMENT
KELSEY on CKD 3, CRS  Hyponatremia: On thiazide diuretic  Hypokalemia  COPD    Stable renal indices. On PO diuretics. To continue current meds. Encourage PO intake as tolerated.  Add low dose ARB.   Monitor BP trend. Titrate BP meds as needed. Salt restriction. Cardiology follow up. Will follow electrolytes and renal function trend.  KELSEY on CKD 3, CRS  Hyponatremia: On thiazide diuretic  Hypokalemia  COPD    Stable renal indices. On PO diuretics. To continue current meds. Encourage PO intake as tolerated.  Can add low dose ARB as out pt.    Monitor BP trend. Titrate BP meds as needed. Salt restriction. Cardiology follow up. Will follow electrolytes and renal function trend.

## 2023-09-12 NOTE — PROGRESS NOTE ADULT - SUBJECTIVE AND OBJECTIVE BOX
[facility]  INFECTIOUS DISEASES   [address] [place] [address]  Tel: [phone]     Fax: [phone]  ========================================================  MD Lev Gallardo Kaushal, MD Cho, Michelle, MD Sunjit, Jaspal, MD  ========================================================    [id number]  [facility]     Follow up; UTI and bacteremia     Doing well, complaining of pain in shoulders. Walking with PT well.   No fever     PAST MEDICAL & SURGICAL HISTORY:  HTN (hypertension)  HLD (hyperlipidemia)  Anemia  newly diagnosed  Pneumonia  OA (osteoarthritis)  Afib  Small bowel cancer  Pacemaker  Major depression  Chronic kidney disease (CKD)  COPD (chronic obstructive pulmonary disease)  Status post total left knee replacement  2007  History of cholecystectomy  open , many years ago  H/O hernia repair  years ago  H/O resection of small bowel    Social Hx: No current smoking, EtOH or drugs     FAMILY HISTORY:  No pertinent family history in first degree relatives    Allergies  Procardia (Other)  NIFEdipine (Unknown)  fluoxetine (Unknown)    Intolerances  oxycodone (Other)    MEDICATIONS  (STANDING):  albuterol    90 MICROgram(s) HFA Inhaler 2 Puff(s) Inhalation every 6 hours  aMIOdarone    Tablet 200 milliGRAM(s) Oral daily  apixaban 5 milliGRAM(s) Oral two times a day  aspirin  chewable 81 milliGRAM(s) Oral daily  buDESOnide    Inhalation Suspension 0.5 milliGRAM(s) Inhalation two times a day  carvedilol 12.5 milliGRAM(s) Oral every 12 hours  cefpodoxime 200 milliGRAM(s) Oral every 12 hours  famotidine    Tablet 20 milliGRAM(s) Oral daily  fenofibrate Tablet 48 milliGRAM(s) Oral daily  ferrous    sulfate 325 milliGRAM(s) Oral daily  furosemide    Tablet 40 milliGRAM(s) Oral daily  hydrALAZINE 75 milliGRAM(s) Oral three times a day  lactobacillus acidophilus 1 Tablet(s) Oral daily  multivitamin 1 Tablet(s) Oral daily  predniSONE   Tablet 20 milliGRAM(s) Oral daily  sertraline 25 milliGRAM(s) Oral daily  tiotropium 2.5 MICROgram(s) Inhaler 2 Puff(s) Inhalation daily    REVIEW OF SYSTEMS:  CONSTITUTIONAL:  No Fever or chills + weakness   HEENT:  No diplopia or blurred vision.  No sore throat or runny nose.  CARDIOVASCULAR:  No chest pain or SOB.  RESPIRATORY:  No cough, shortness of breath, PND or orthopnea.  GASTROINTESTINAL:  No nausea, vomiting or diarrhea.  GENITOURINARY:  No dysuria, frequency or urgency. No Blood in urine  MUSCULOSKELETAL:  no joint aches, no muscle pain  SKIN:  No change in skin, hair or nails.  NEUROLOGIC:  No paresthesias or weakness.  PSYCHIATRIC:  No disorder of thought or mood.  ENDOCRINE:  No heat or cold intolerance, polyuria or polydipsia.  HEMATOLOGICAL:  No easy bruising or bleeding.     Physical Exam:  Vital Signs Last 24 Hrs  T(C): 36.7 (12 Sep 2023 11:49), Max: 36.7 (12 Sep 2023 11:49)  T(F): 98 (12 Sep 2023 11:49), Max: 98 (12 Sep 2023 11:49)  HR: 59 (12 Sep 2023 11:49) (59 - 65)  BP: 156/71 (12 Sep 2023 11:49) (129/75 - 171/72)  BP(mean): --  RR: 17 (12 Sep 2023 11:49) (17 - 17)  SpO2: 96% (12 Sep 2023 11:49) (93% - 98%)  Parameters below as of 12 Sep 2023 11:49  Patient On (Oxygen Delivery Method): room air  GEN: NAD, obese   HEENT: normocephalic and atraumatic. EOMI. PERRL.    NECK: Supple.  No lymphadenopathy   LUNGS: Clear to auscultation.  HEART: Irregular rate and rhythm   ABDOMEN: Soft, nontender, and nondistended.  Positive bowel sounds.    : No CVA tenderness  EXTREMITIES: + edema.  NEUROLOGIC: grossly intact.  PSYCHIATRIC: Appropriate affect .  SKIN: No rash       Labs:                        10.4   7.83  )-----------( 241      ( 11 Sep 2023 10:08 )             32.6     09-11    138  |  97  |  49<H>  ----------------------------<  122<H>  3.9   |  34<H>  |  1.50<H>    Ca    8.8      11 Sep 2023 10:08    TPro  5.7<L>  /  Alb  3.0<L>  /  TBili  0.3  /  DBili  x   /  AST  22  /  ALT  28  /  AlkPhos  51  09-11      Culture - Blood (collected 09-09-23 @ 21:58)  Source: .Blood Blood-Peripheral    Culture - Blood (collected 09-09-23 @ 21:51)  Source: .Blood Blood-Peripheral    Culture - Blood (collected 09-08-23 @ 11:05)  Source: .Blood Blood-Peripheral  Gram Stain (09-09-23 @ 15:22):    Growth in aerobic bottle: Gram Positive Cocci in Clusters  Final Report (09-11-23 @ 10:33):    Growth in aerobic bottle: Staphylococcus epidermidis "Susceptibilities    not performed"    Growth in aerobic bottle: Staphylococcus hominis Coagulase Negative    Staphylococci isolated from a single blood culture set may represent    contamination.    Contact the Microbiology Department at [phone] if susceptibility    testing is clinically indicated.  Organism: Blood Culture PCR (09-11-23 @ 10:33)  Organism: Blood Culture PCR (09-11-23 @ 10:33)    Sensitivities:      Method Type: PCR      -  Staphylococcus epidermidis, Methicillin resistant: Detec    Culture - Blood (collected 09-08-23 @ 11:00)  Source: .Blood Blood-Peripheral  Gram Stain (09-10-23 @ 11:55):    Growth in anaerobic bottle: Gram Positive Cocci in Clusters  Final Report (09-12-23 @ 07:51):    Growth in anaerobic bottle: Staphylococcus epidermidis  Organism: Staphylococcus epidermidis (09-12-23 @ 07:51)  Organism: Staphylococcus epidermidis (09-12-23 @ 07:51)    Sensitivities:      Method Type: MIKHAIL      -  Ampicillin/Sulbactam: R <=8/4      -  Cefazolin: R <=4      -  Clindamycin: S <=0.25      -  Erythromycin: R >4      -  Gentamicin: S <=1 Should not be used as monotherapy      -  Oxacillin: R >2      -  Penicillin: R >8      -  Rifampin: S <=1 Should not be used as monotherapy      -  Tetracycline: R >8      -  Trimethoprim/Sulfamethoxazole: R >2/38      -  Vancomycin: S 2    Culture - Urine (collected 09-05-23 @ 03:00)  Source: Clean Catch Clean Catch (Midstream)  Final Report (09-07-23 @ 09:49):    >100,000 CFU/ml Escherichia coli  Organism: Escherichia coli (09-07-23 @ 09:49)  Organism: Escherichia coli (09-07-23 @ 09:49)    Sensitivities:      Method Type: MIKHAIL      -  Amikacin: S <=16      -  Amoxicillin/Clavulanic Acid: S <=8/4      -  Ampicillin: S <=8 These ampicillin results predict results for amoxicillin      -  Ampicillin/Sulbactam: S <=4/2      -  Aztreonam: S <=4      -  Cefazolin: S <=2 For uncomplicated UTI with K. pneumoniae, E. coli, or P. mirablis: MIKHAIL <=16 is sensitive and MIKHAIL >=32 is resistant. This also predicts results for oral agents cefaclor, cefdinir, cefpodoxime, cefprozil, cefuroxime axetil, cephalexin and locarbef for uncomplicated UTI. Note that some isolates may be susceptible to these agents while testing resistant to cefazolin.      -  Cefepime: S <=2      -  Cefoxitin: S <=8      -  Ceftriaxone: S <=1      -  Cefuroxime: S <=4      -  Ciprofloxacin: S <=0.25      -  Ertapenem: S <=0.5      -  Gentamicin: S <=2      -  Imipenem: S <=1      -  Levofloxacin: S <=0.5      -  Meropenem: S <=1      -  Nitrofurantoin: S <=32 Should not be used to treat pyelonephritis      -  Piperacillin/Tazobactam: S <=8      -  Tobramycin: S <=2      -  Trimethoprim/Sulfamethoxazole: S <=0.5/9.5    Culture - Blood (collected 09-05-23 @ 01:35)  Source: .Blood Blood-Peripheral  Final Report (09-10-23 @ 10:00):    No growth at 5 days    Culture - Blood (collected 09-05-23 @ 01:25)  Source: .Blood Blood-Peripheral  Final Report (09-10-23 @ 10:00):    No growth at 5 days    WBC Count: 7.83 K/uL (09-11-23 @ 10:08)  WBC Count: 7.86 K/uL (09-11-23 @ 09:15)  WBC Count: 7.33 K/uL (09-09-23 @ 07:41)  WBC Count: 8.95 K/uL (09-08-23 @ 09:42)    Creatinine: 1.50 mg/dL (09-11-23 @ 10:08)  Creatinine: 1.50 mg/dL (09-11-23 @ 09:15)  Creatinine: 1.40 mg/dL (09-09-23 @ 07:41)  Creatinine: 1.60 mg/dL (09-08-23 @ 09:42)    Ferritin: 261 ng/mL (09-05-23 @ 01:25)    Procalcitonin, Serum: 0.11 ng/mL (09-05-23 @ 10:10)     SARS-CoV-2: NotDetec (09-05-23 @ 01:35)    All imaging and other data have been reviewed.  < from: US Kidney and Bladder (09.05.23 @ 08:25) >  IMPRESSION:  No evidence of hydronephrosis.  139 cc of post void residual.    < from: CT Chest No Cont (09.05.23 @ 08:02) >  IMPRESSION:  Redemonstrated mucoid impaction of the distal airways.    Assessment and Plan:   83yo woman with PMH of HTN, Afib s/p PPM, COPD on home o2, Anemia, CKD, OA, recent pneumonia was admitted with generalized weakness.  She had that for few days and was feeling shaky inside.  NO fever, no GI or  symptoms but UC is back with Ecoli, she has been on ceftriaxone and also had ABx prior to admission for UTI.   Blood cultures NGTD  UC sensitive Ecoli  WBC and TMax normal   Blood culture 9/8 with Staph epidermidis and hominis one bottles in each set, contamination  Repeat blood culture 9/9 NGTD     Recommendations:  - Completed 7days of treatment for UTI with ceftriaoxne/Vantin   - Seen by neurology  - needs follow up with ortho after discahrge    Will sign off please call with any question.     Sukumar Pacheco MD  Division of Infectious Diseases   Please call ID service at [phone] with any question.    50 minutes spent on total encounter assessing patient, examination, chart review, counseling and coordinating care by the attending physician/nurse/care manager.

## 2023-09-12 NOTE — CASE MANAGEMENT PROGRESS NOTE - NSCMPROGRESSNOTE_GEN_ALL_CORE
Pt cleared to return to [facility] today. [id number] clarified as requested by Andreas in wellness and re-faxed to [phone]. Andreas aware of medrol pack added to med list and Dr. Pate sent to [facility]. As per Andreas, daughter will transport pt back to [facility]. [facility] TLC for RAFAL 9/13. I spoke w/ daughter Lela who is aware and agrees with DC. Lela will  pt at 2pm to transport back to [facility].  CM will remain available

## 2023-09-12 NOTE — CHART NOTE - NSCHARTNOTEFT_GEN_A_CORE
Do you have Advance Directives (HCP / LV / Organ donation / Documentation of oral advance Directive):   (  x  )  yes    (      )    NO                                                                            Do you have LV - Living will :                                                                                                                                             (   x )  yes    (      )   No    Do you have HCP - Health Care Proxy:                                                                                                                            (  x   )  yes   (       ) N0    Do you have DNR- Do Not Resuscitate :                                                                                                                           (   x   )  yes  (        )  No    Do you have DNI- Do Not intubate  :                                                                                                                               (   x   )  yes   (       ) No    Do you have MOLST - Medical orders for Life sustaining treatment  :                                                                    (   x   ) yes    (       ) No    Decision Maker :  (  x   ) Patient     (      )  HCA   (     ) Public Health Law Surrogate     (      ) Surrogate  (       ) Guardian    Goals of Care :  (      )   Complete Care     (       ) No Limitations                              (       )   Comfort Care       (       )  Hospice                               (   x   )   Limited medical Intervention / s    Medical Interventions :   (        )   CPR       (   x     )  DNR                                               (        )  Intubation with MV - Mechanical Ventilation  (      ) BIPAP/CPAP    (     x    )   DNI                                               (         )  Artificial Nutrition -  IVF, TPN / PPN, Tube Feeds             (    x     )   No Feeding Tube                                                (     x   ) Use Antibiotics                         (          ) No Antibiotics                                                (    x     ) Blood and Blood Products     (         )   No Blood or Blood products                                                (     x     )  Dialysis                                    (         )  No Dialysis                                                (          )  Medical Management only  (         )  No Invasive Interventions or Surgery  Time spent :                        (    x   ) upto 30 minutes                       (           )   more than 30 minutes  ACP reviewed and discussed

## 2023-09-12 NOTE — PROGRESS NOTE ADULT - SUBJECTIVE AND OBJECTIVE BOX
Patient is a 82y old  Female who presents with a chief complaint of Generalized weakness.  Anemia possibly secondary to GI Bleeding. (06 Sep 2023 07:55)    Patient seen in follow up for hyponatremia.        PAST MEDICAL HISTORY:  HTN (hypertension)    HLD (hyperlipidemia)    Anemia    DVT (deep venous thrombosis)    Pneumonia    OA (osteoarthritis)    Afib    Small bowel cancer    Pacemaker    Major depression    Chronic kidney disease (CKD)    COPD (chronic obstructive pulmonary disease)        MEDICATIONS  (STANDING):  albuterol    90 MICROgram(s) HFA Inhaler 2 Puff(s) Inhalation every 6 hours  aMIOdarone    Tablet 200 milliGRAM(s) Oral daily  apixaban 5 milliGRAM(s) Oral two times a day  aspirin  chewable 81 milliGRAM(s) Oral daily  buDESOnide    Inhalation Suspension 0.5 milliGRAM(s) Inhalation two times a day  carvedilol 12.5 milliGRAM(s) Oral every 12 hours  famotidine    Tablet 20 milliGRAM(s) Oral daily  fenofibrate Tablet 48 milliGRAM(s) Oral daily  ferrous    sulfate 325 milliGRAM(s) Oral daily  furosemide    Tablet 40 milliGRAM(s) Oral daily  hydrALAZINE 100 milliGRAM(s) Oral two times a day  lactobacillus acidophilus 1 Tablet(s) Oral daily  multivitamin 1 Tablet(s) Oral daily  predniSONE   Tablet 20 milliGRAM(s) Oral daily  sertraline 25 milliGRAM(s) Oral daily  tiotropium 2.5 MICROgram(s) Inhaler 2 Puff(s) Inhalation daily    MEDICATIONS  (PRN):  acetaminophen     Tablet .. 650 milliGRAM(s) Oral every 6 hours PRN Temp greater or equal to 38C (100.4F)  aluminum hydroxide/magnesium hydroxide/simethicone Suspension 30 milliLiter(s) Oral every 4 hours PRN Dyspepsia  melatonin 3 milliGRAM(s) Oral at bedtime PRN Insomnia  ondansetron Injectable 4 milliGRAM(s) IV Push every 8 hours PRN Nausea and/or Vomiting    T(C): 36.7 (09-12-23 @ 11:49), Max: 36.7 (09-12-23 @ 11:49)  HR: 59 (09-12-23 @ 11:49) (59 - 65)  BP: 156/71 (09-12-23 @ 11:49) (129/75 - 171/72)  RR: 17 (09-12-23 @ 11:49)  SpO2: 96% (09-12-23 @ 11:49)  Wt(kg): --  I&O's Detail    11 Sep 2023 07:01  -  12 Sep 2023 07:00  --------------------------------------------------------  IN:  Total IN: 0 mL    OUT:    Voided (mL): 700 mL  Total OUT: 700 mL    Total NET: -700 mL                    PHYSICAL EXAM:  General: No distress  Respiratory: b/l air entry  Cardiovascular: S1 S2  Gastrointestinal: soft  Extremities:  edema better                    LABORATORY:                        10.4   7.83  )-----------( 241      ( 11 Sep 2023 10:08 )             32.6     09-11    138  |  97  |  49<H>  ----------------------------<  122<H>  3.9   |  34<H>  |  1.50<H>    Ca    8.8      11 Sep 2023 10:08    TPro  5.7<L>  /  Alb  3.0<L>  /  TBili  0.3  /  DBili  x   /  AST  22  /  ALT  28  /  AlkPhos  51  09-11    Sodium: 138 mmol/L (09-11 @ 10:08)  Sodium: 138 mmol/L (09-11 @ 09:15)    Potassium: 3.9 mmol/L (09-11 @ 10:08)  Potassium: 3.4 mmol/L (09-11 @ 09:15)    Hemoglobin: 10.4 g/dL (09-11 @ 10:08)  Hemoglobin: 9.9 g/dL (09-11 @ 09:15)    Creatinine, Serum 1.50 (09-11 @ 10:08)  Creatinine, Serum 1.50 (09-11 @ 09:15)    CARDIAC MARKERS ( 11 Sep 2023 09:15 )  x     / x     / 22 U/L / x     / <1.0 ng/mL      LIVER FUNCTIONS - ( 11 Sep 2023 09:15 )  Alb: 3.0 g/dL / Pro: 5.7 g/dL / ALK PHOS: 51 U/L / ALT: 28 U/L / AST: 22 U/L / GGT: x           Urinalysis Basic - ( 11 Sep 2023 10:08 )    Color: x / Appearance: x / SG: x / pH: x  Gluc: 122 mg/dL / Ketone: x  / Bili: x / Urobili: x   Blood: x / Protein: x / Nitrite: x   Leuk Esterase: x / RBC: x / WBC x   Sq Epi: x / Non Sq Epi: x / Bacteria: x

## 2023-09-12 NOTE — DISCHARGE NOTE NURSING/CASE MANAGEMENT/SOCIAL WORK - PATIENT PORTAL LINK FT
You can access the FollowMyHealth Patient Portal offered by [facility] by registering at the following website: [email or web address]. By joining [facility]’s FollowMyHealth portal, you will also be able to view your health information using other applications (apps) compatible with our system.

## 2023-09-12 NOTE — DISCHARGE NOTE NURSING/CASE MANAGEMENT/SOCIAL WORK - NSDCPEFALRISK_GEN_ALL_CORE
For information on Fall & Injury Prevention, visit: [email or web address] OR  [email or web address] OR  https://www.cdc.gov/steadi/patient.html

## 2023-09-12 NOTE — PROGRESS NOTE ADULT - SUBJECTIVE AND OBJECTIVE BOX
[facility] Cardiology Progress Note [phone] (Dr. Sanchez, Delma, Lela, Jay)    CHIEF COMPLAINT: Patient is a 82y old  Female who presents with a chief complaint of Generalized weakness.  Anemia possibly secondary to GI Bleeding. (11 Sep 2023 15:09)      Follow Up Today: The patient denies any chest discomfort or shortness of breath.    HPI:  JAZ BAIG is an 83 YO Female presented to the ED from assisted living facility with generalized weakness.  Present for the past few days.  No nausea or vomiting.  Denies any cough or new shortness of breath.  No fevers.  States that she does have aching pain to her left arm and bilateral legs.  States tonight that she was having trouble getting in and out of bed and felt too weak which is why she came to ED.  States that her stools are always dark because of iron but no changes recently.  Patient with a past medical history of A-fib on Eliquis, COPD now on home 2 to 3 L nasal cannula oxygen, hypertension, hyperlipidemia, chronic anemia, heart failure, CKD not on dialysis. (05 Sep 2023 06:46)      PAST MEDICAL & SURGICAL HISTORY:  HTN (hypertension)      HLD (hyperlipidemia)      Anemia  newly diagnosed      Pneumonia  years ago      OA (osteoarthritis)      Afib      Small bowel cancer      Pacemaker      Major depression      Chronic kidney disease (CKD)      COPD (chronic obstructive pulmonary disease)      Status post total left knee replacement  2007      History of cholecystectomy  open , many years ago      H/O hernia repair  years ago      H/O resection of small bowel          MEDICATIONS  (STANDING):  albuterol    90 MICROgram(s) HFA Inhaler 2 Puff(s) Inhalation every 6 hours  aMIOdarone    Tablet 200 milliGRAM(s) Oral daily  apixaban 5 milliGRAM(s) Oral two times a day  aspirin  chewable 81 milliGRAM(s) Oral daily  buDESOnide    Inhalation Suspension 0.5 milliGRAM(s) Inhalation two times a day  carvedilol 12.5 milliGRAM(s) Oral every 12 hours  famotidine    Tablet 20 milliGRAM(s) Oral daily  fenofibrate Tablet 48 milliGRAM(s) Oral daily  ferrous    sulfate 325 milliGRAM(s) Oral daily  furosemide    Tablet 40 milliGRAM(s) Oral daily  hydrALAZINE 75 milliGRAM(s) Oral three times a day  lactobacillus acidophilus 1 Tablet(s) Oral daily  LORazepam   Injectable 1 milliGRAM(s) IV Push once  multivitamin 1 Tablet(s) Oral daily  predniSONE   Tablet 20 milliGRAM(s) Oral daily  sertraline 25 milliGRAM(s) Oral daily  tiotropium 2.5 MICROgram(s) Inhaler 2 Puff(s) Inhalation daily    MEDICATIONS  (PRN):  acetaminophen     Tablet .. 650 milliGRAM(s) Oral every 6 hours PRN Temp greater or equal to 38C (100.4F)  aluminum hydroxide/magnesium hydroxide/simethicone Suspension 30 milliLiter(s) Oral every 4 hours PRN Dyspepsia  melatonin 3 milliGRAM(s) Oral at bedtime PRN Insomnia  ondansetron Injectable 4 milliGRAM(s) IV Push every 8 hours PRN Nausea and/or Vomiting      Allergies    Procardia (Other)  NIFEdipine (Unknown)  fluoxetine (Unknown)    Intolerances    oxycodone (Other)      REVIEW OF SYSTEMS:    All other review of systems is negative unless indicated above    Vital Signs Last 24 Hrs  T(C): 36.4 (12 Sep 2023 05:00), Max: 36.6 (11 Sep 2023 20:30)  T(F): 97.5 (12 Sep 2023 05:00), Max: 97.8 (11 Sep 2023 20:30)  HR: 60 (12 Sep 2023 05:00) (60 - 64)  BP: 171/72 (12 Sep 2023 05:00) (129/75 - 171/72)  BP(mean): --  RR: 17 (12 Sep 2023 05:00) (17 - 17)  SpO2: 96% (12 Sep 2023 05:00) (93% - 96%)    Parameters below as of 12 Sep 2023 05:00  Patient On (Oxygen Delivery Method): room air        I&O's Summary    11 Sep 2023 07:01  -  12 Sep 2023 07:00  --------------------------------------------------------  IN: 0 mL / OUT: 700 mL / NET: -700 mL        PHYSICAL EXAM:    Constitutional: NAD, awake and alert, well-developed  Eyes:  EOMI,  Pupils round, No oral cyanosis.  HEENT: No exudate or erythema  Pulmonary: Non-labored, breath sounds are clear bilaterally, No wheezing, rales or rhonchi  Cardiovascular: Regular, S1 and S2, No murmurs  Gastrointestinal: Bowel Sounds present, soft, nontender.   Ext: No significant LE edema  Neurological: Alert, no gross focal motor deficits  Skin: No rashes.  Psych:  Mood & affect appropriate    LABS: All Labs Reviewed:                        10.4   7.83  )-----------( 241      ( 11 Sep 2023 10:08 )             32.6                         9.9    7.86  )-----------( 218      ( 11 Sep 2023 09:15 )             30.3                         10.3   7.33  )-----------( 259      ( 09 Sep 2023 07:41 )             32.1     11 Sep 2023 10:08    138    |  97     |  49     ----------------------------<  122    3.9     |  34     |  1.50   11 Sep 2023 09:15    138    |  99     |  49     ----------------------------<  116    3.4     |  31     |  1.50   09 Sep 2023 07:41    138    |  98     |  52     ----------------------------<  91     3.5     |  34     |  1.40     Ca    8.8        11 Sep 2023 10:08  Ca    8.3        11 Sep 2023 09:15  Ca    8.7        09 Sep 2023 07:41    TPro  5.7    /  Alb  3.0    /  TBili  0.3    /  DBili  x      /  AST  22     /  ALT  28     /  AlkPhos  51     11 Sep 2023 09:15    PT/INR - ( 11 Sep 2023 09:15 )   PT: 16.9 sec;   INR: 1.46 ratio         PTT - ( 11 Sep 2023 09:15 )  PTT:30.1 sec  CARDIAC MARKERS ( 11 Sep 2023 09:15 )  x     / x     / 22 U/L / x     / <1.0 ng/mL      Blood Culture: Organism --  Gram Stain Blood -- Gram Stain --  Specimen Source .Blood Blood-Peripheral  Culture-Blood --    Organism --  Gram Stain Blood -- Gram Stain --  Specimen Source .Blood Blood-Peripheral  Culture-Blood --    Organism Blood Culture PCR  Gram Stain Blood -- Gram Stain   Growth in aerobic bottle: Gram Positive Cocci in Clusters  Specimen Source .Blood Blood-Peripheral  Culture-Blood --    Organism --  Gram Stain Blood -- Gram Stain   Growth in anaerobic bottle: Gram Positive Cocci in Clusters  Specimen Source .Blood Blood-Peripheral  Culture-Blood --            RADIOLOGY/EKG:    Attending Attestation:   50 minutes spent on total encounter; more than 50% of the visit was spent counseling and/or coordinating care by the attending physician.     ASSESSMENT AND PLAN [facility] Progress Note [phone] (Dr. Sanchez, Delma, Lela, Jay)    CHIEF COMPLAINT: Patient is a 82y old  Female who presents with a chief complaint of Generalized weakness.  Anemia possibly secondary to GI Bleeding. (11 Sep 2023 15:09)      Follow Up Today: The patient denies any chest discomfort or shortness of breath.  States that she is feeling better and wants to go home    HPI:  JAZ BAIG is an 83 YO Female presented to the ED from assisted living facility with generalized weakness.  Present for the past few days.  No nausea or vomiting.  Denies any cough or new shortness of breath.  No fevers.  States that she does have aching pain to her left arm and bilateral legs.  States tonight that she was having trouble getting in and out of bed and felt too weak which is why she came to ED.  States that her stools are always dark because of iron but no changes recently.  Patient with a past medical history of A-fib on Eliquis, COPD now on home 2 to 3 L nasal cannula oxygen, hypertension, hyperlipidemia, chronic anemia, heart failure, CKD not on dialysis. (05 Sep 2023 06:46)      PAST MEDICAL & SURGICAL HISTORY:  HTN (hypertension)      HLD (hyperlipidemia)      Anemia  newly diagnosed      Pneumonia  years ago      OA (osteoarthritis)      Afib      Small bowel cancer      Pacemaker      Major depression      Chronic kidney disease (CKD)      COPD (chronic obstructive pulmonary disease)      Status post total left knee replacement  2007      History of cholecystectomy  open , many years ago      H/O hernia repair  years ago      H/O resection of small bowel          MEDICATIONS  (STANDING):  albuterol    90 MICROgram(s) HFA Inhaler 2 Puff(s) Inhalation every 6 hours  aMIOdarone    Tablet 200 milliGRAM(s) Oral daily  apixaban 5 milliGRAM(s) Oral two times a day  aspirin  chewable 81 milliGRAM(s) Oral daily  buDESOnide    Inhalation Suspension 0.5 milliGRAM(s) Inhalation two times a day  carvedilol 12.5 milliGRAM(s) Oral every 12 hours  famotidine    Tablet 20 milliGRAM(s) Oral daily  fenofibrate Tablet 48 milliGRAM(s) Oral daily  ferrous    sulfate 325 milliGRAM(s) Oral daily  furosemide    Tablet 40 milliGRAM(s) Oral daily  hydrALAZINE 75 milliGRAM(s) Oral three times a day  lactobacillus acidophilus 1 Tablet(s) Oral daily  LORazepam   Injectable 1 milliGRAM(s) IV Push once  multivitamin 1 Tablet(s) Oral daily  predniSONE   Tablet 20 milliGRAM(s) Oral daily  sertraline 25 milliGRAM(s) Oral daily  tiotropium 2.5 MICROgram(s) Inhaler 2 Puff(s) Inhalation daily    MEDICATIONS  (PRN):  acetaminophen     Tablet .. 650 milliGRAM(s) Oral every 6 hours PRN Temp greater or equal to 38C (100.4F)  aluminum hydroxide/magnesium hydroxide/simethicone Suspension 30 milliLiter(s) Oral every 4 hours PRN Dyspepsia  melatonin 3 milliGRAM(s) Oral at bedtime PRN Insomnia  ondansetron Injectable 4 milliGRAM(s) IV Push every 8 hours PRN Nausea and/or Vomiting      Allergies    Procardia (Other)  NIFEdipine (Unknown)  fluoxetine (Unknown)    Intolerances    oxycodone (Other)      REVIEW OF SYSTEMS:    All other review of systems is negative unless indicated above    Vital Signs Last 24 Hrs  T(C): 36.4 (12 Sep 2023 05:00), Max: 36.6 (11 Sep 2023 20:30)  T(F): 97.5 (12 Sep 2023 05:00), Max: 97.8 (11 Sep 2023 20:30)  HR: 60 (12 Sep 2023 05:00) (60 - 64)  BP: 171/72 (12 Sep 2023 05:00) (129/75 - 171/72)  BP(mean): --  RR: 17 (12 Sep 2023 05:00) (17 - 17)  SpO2: 96% (12 Sep 2023 05:00) (93% - 96%)    Parameters below as of 12 Sep 2023 05:00  Patient On (Oxygen Delivery Method): room air        I&O's Summary    11 Sep 2023 07:01  -  12 Sep 2023 07:00  --------------------------------------------------------  IN: 0 mL / OUT: 700 mL / NET: -700 mL        PHYSICAL EXAM:    Constitutional: NAD, awake and alert, well-developed  Eyes:  EOMI,  Pupils round, No oral cyanosis.  HEENT: No exudate or erythema  Pulmonary: Non-labored, breath sounds are clear bilaterally, No wheezing, rales or rhonchi  Cardiovascular: Regular, S1 and S2, No murmurs  Gastrointestinal: Bowel Sounds present, soft, nontender.   Ext: No significant LE edema  Neurological: Alert, no gross focal motor deficits  Skin: No rashes.  Psych:  Mood & affect appropriate    LABS: All Labs Reviewed:                        10.4   7.83  )-----------( 241      ( 11 Sep 2023 10:08 )             32.6                         9.9    7.86  )-----------( 218      ( 11 Sep 2023 09:15 )             30.3                         10.3   7.33  )-----------( 259      ( 09 Sep 2023 07:41 )             32.1     11 Sep 2023 10:08    138    |  97     |  49     ----------------------------<  122    3.9     |  34     |  1.50   11 Sep 2023 09:15    138    |  99     |  49     ----------------------------<  116    3.4     |  31     |  1.50   09 Sep 2023 07:41    138    |  98     |  52     ----------------------------<  91     3.5     |  34     |  1.40     Ca    8.8        11 Sep 2023 10:08  Ca    8.3        11 Sep 2023 09:15  Ca    8.7        09 Sep 2023 07:41    TPro  5.7    /  Alb  3.0    /  TBili  0.3    /  DBili  x      /  AST  22     /  ALT  28     /  AlkPhos  51     11 Sep 2023 09:15    PT/INR - ( 11 Sep 2023 09:15 )   PT: 16.9 sec;   INR: 1.46 ratio         PTT - ( 11 Sep 2023 09:15 )  PTT:30.1 sec  CARDIAC MARKERS ( 11 Sep 2023 09:15 )  x     / x     / 22 U/L / x     / <1.0 ng/mL      Blood Culture: Organism --  Gram Stain Blood -- Gram Stain --  Specimen Source .Blood Blood-Peripheral  Culture-Blood --    Organism --  Gram Stain Blood -- Gram Stain --  Specimen Source .Blood Blood-Peripheral  Culture-Blood --    Organism Blood Culture PCR  Gram Stain Blood -- Gram Stain   Growth in aerobic bottle: Gram Positive Cocci in Clusters  Specimen Source .Blood Blood-Peripheral  Culture-Blood --    Organism --  Gram Stain Blood -- Gram Stain   Growth in anaerobic bottle: Gram Positive Cocci in Clusters  Specimen Source .Blood Blood-Peripheral  Culture-Blood --            RADIOLOGY/EKG:    Attending Attestation:   50 minutes spent on total encounter; more than 50% of the visit was spent counseling and/or coordinating care by the attending physician.     ASSESSMENT AND PLAN

## 2023-09-12 NOTE — PROGRESS NOTE ADULT - SUBJECTIVE AND OBJECTIVE BOX
Neurology follow up note    JAZ [id number]      Interval History:    Patient feels ok no new complaints.    Allergies    Procardia (Other)  NIFEdipine (Unknown)  fluoxetine (Unknown)    Intolerances    oxycodone (Other)      MEDICATIONS    acetaminophen     Tablet .. 650 milliGRAM(s) Oral every 6 hours PRN  albuterol    90 MICROgram(s) HFA Inhaler 2 Puff(s) Inhalation every 6 hours  aluminum hydroxide/magnesium hydroxide/simethicone Suspension 30 milliLiter(s) Oral every 4 hours PRN  aMIOdarone    Tablet 200 milliGRAM(s) Oral daily  apixaban 5 milliGRAM(s) Oral two times a day  aspirin  chewable 81 milliGRAM(s) Oral daily  buDESOnide    Inhalation Suspension 0.5 milliGRAM(s) Inhalation two times a day  carvedilol 12.5 milliGRAM(s) Oral every 12 hours  famotidine    Tablet 20 milliGRAM(s) Oral daily  fenofibrate Tablet 48 milliGRAM(s) Oral daily  ferrous    sulfate 325 milliGRAM(s) Oral daily  furosemide    Tablet 40 milliGRAM(s) Oral daily  hydrALAZINE 100 milliGRAM(s) Oral two times a day  lactobacillus acidophilus 1 Tablet(s) Oral daily  melatonin 3 milliGRAM(s) Oral at bedtime PRN  multivitamin 1 Tablet(s) Oral daily  ondansetron Injectable 4 milliGRAM(s) IV Push every 8 hours PRN  predniSONE   Tablet 20 milliGRAM(s) Oral daily  sertraline 25 milliGRAM(s) Oral daily  tiotropium 2.5 MICROgram(s) Inhaler 2 Puff(s) Inhalation daily              Vital Signs Last 24 Hrs  T(C): 36.4 (12 Sep 2023 05:00), Max: 36.6 (11 Sep 2023 20:30)  T(F): 97.5 (12 Sep 2023 05:00), Max: 97.8 (11 Sep 2023 20:30)  HR: 63 (12 Sep 2023 07:50) (60 - 65)  BP: 171/72 (12 Sep 2023 05:00) (129/75 - 171/72)  BP(mean): --  RR: 17 (12 Sep 2023 05:00) (17 - 17)  SpO2: 98% (12 Sep 2023 07:50) (93% - 98%)    Parameters below as of 12 Sep 2023 07:50  Patient On (Oxygen Delivery Method): room air      REVIEW OF SYSTEMS:  Constitutional:  The patient denies fever, chills, or night sweats.  Head:  No headache.  Eyes:  No double vision or blurry vision.  Ears:  No ringing in the ears.  Neck:  Occasional neck pain.  Respiratory:  No shortness of breath.  Cardiovascular:  No chest pain.  Abdomen:  No nausea, vomiting, or abdominal pain.  Extremities/Neurological:  Complains of numbness or tingling in her feet.  Musculoskeletal:  Positive history of diffuse joint pain.    PHYSICAL EXAMINATION:HEENT:  Head:  Normocephalic, atraumatic.  Eyes:  No scleral icterus.  Ears:  Hearing bilaterally was intact.  NECK:  Supple.  Just complains of some subtle cervical tenderness.  RESPIRATORY:  Air entry bilaterally.  CARDIOVASCULAR:  S1 and S2 heard.  ABDOMEN:  Soft and nontender.  EXTREMITIES:  No clubbing or cyanosis was noted.      NEUROLOGIC:  The patient is awake and alert.  Able to say correct age and month.  Extraocular movements were intact.  Speech was fluent.  Smile symmetric.  The patient has significantly decreased range of motion of bilateral shoulders.  I elevated the right upper extremity, was able to maintain it in elevated position for about 2 to 3 seconds, then would fall to the ground.  Left upper extremities after elevated in the air, the patient was able to maintain it in the air longer than her right.  Overall strength of biceps, triceps, and hand grasp were 4/5.  Bilateral lower extremities 3+/5.  Sensory:  Bilateral lower intact to light touch.      LABS:  CBC Full  -  ( 11 Sep 2023 10:08 )  WBC Count : 7.83 K/uL  RBC Count : 3.35 M/uL  Hemoglobin : 10.4 g/dL  Hematocrit : 32.6 %  Platelet Count - Automated : 241 K/uL  Mean Cell Volume : 97.3 fl  Mean Cell Hemoglobin : 31.0 pg  Mean Cell Hemoglobin Concentration : 31.9 gm/dL  Auto Neutrophil # : x  Auto Lymphocyte # : x  Auto Monocyte # : x  Auto Eosinophil # : x  Auto Basophil # : x  Auto Neutrophil % : x  Auto Lymphocyte % : x  Auto Monocyte % : x  Auto Eosinophil % : x  Auto Basophil % : x    Urinalysis Basic - ( 11 Sep 2023 10:08 )    Color: x / Appearance: x / SG: x / pH: x  Gluc: 122 mg/dL / Ketone: x  / Bili: x / Urobili: x   Blood: x / Protein: x / Nitrite: x   Leuk Esterase: x / RBC: x / WBC x   Sq Epi: x / Non Sq Epi: x / Bacteria: x      09-11    138  |  97  |  49<H>  ----------------------------<  122<H>  3.9   |  34<H>  |  1.50<H>    Ca    8.8      11 Sep 2023 10:08    TPro  5.7<L>  /  Alb  3.0<L>  /  TBili  0.3  /  DBili  x   /  AST  22  /  ALT  28  /  AlkPhos  51  09-11    Hemoglobin A1C:     LIVER FUNCTIONS - ( 11 Sep 2023 09:15 )  Alb: 3.0 g/dL / Pro: 5.7 g/dL / ALK PHOS: 51 U/L / ALT: 28 U/L / AST: 22 U/L / GGT: x           Vitamin B12   PT/INR - ( 11 Sep 2023 09:15 )   PT: 16.9 sec;   INR: 1.46 ratio         PTT - ( 11 Sep 2023 09:15 )  PTT:30.1 sec      RADIOLOGY    < from: CT Lumbar Spine No Cont (09.11.23 @ 12:21) >  INTERPRETATION:  CLINICAL STATEMENT: Bilateral foot numbness.    TECHNIQUE: Noncontrast CT lumbar spine axial plane. Sagittal and coronal  reformatted images provided. Bone and soft tissue algorithm. Automated   exposure control utilized for dose reduction.  COMPARISON: No similar prior studies for comparison.    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 No gross intrathecal or   paraspinal mass, however suboptimally evaluated on noncontrast CT.   Residual intravenous contrast bilateral renal collecting systems, ureters   and urinary bladder. Calcified aortoiliac plaque, without aneurysm.   Infrarenal IVC filter. 2.2 cm low-attenuation right adrenal nodule,   statistically adenoma however not definitively characterized as such on   this exam. Upper abdominal surgical clips.    Bilateral sacroiliac arthrosis incidentally noted. No aggressive osseous   lesion. No compression fracture. Moderate leftward curvature with apex at   L3. Grade 1 anterolisthesis L4 over L5 multilevel disc space narrowing,   most pronounced at L3-L4, with sclerotic changes in the endplates,   eccentric to the right. Varying degrees of multilevel endplate spurring.   Multilevel facet arthrosis, most pronounced and severe in degree at L5-S1   bilaterally. Fusion of bilateral L4-L5 posterior facet articulations.    L1-L2: Evidence of a disc bulge, without stenosis.  L2-L3: Evidence of a disc bulge, resulting in moderate right and mild   left neural foramen stenosis with facet arthrosis.  L3-L4: Evidence of a disc bulge-spondylitic ridge complex, resulting in   mild-moderate central canal, right greater than left lateral recess,   severe right and mild left neural foramen stenosis with facet arthrosis   and ligamentum flavum hypertrophy, likely impinging upon the right L3   nerve roots.  L4-L5: Aforementioned anterolisthesis with a disc bulge, facet arthrosis   and ligamentum flavum hypertrophy, resulting in moderate central canal,   bilateral lateral recess, mild right and moderate left neural foramen   stenosis, likely impinging upon the left L4 nerve roots.  L5-S1: Evidence of a disc bulge, resulting in mild right greater than   left neural foramen stenosis with facet arthrosis.    IMPRESSION:  1. No compression fracture.  2. Moderate levoscoliosis and grade 1 anterolisthesis L4 over L5.  3. L3-L4 disc bulge-spondylitic ridge complex, resulting in mild-moderate   central canal, right greater than left lateral recess, severe right and   mild left neural foramen stenosis with facet arthrosis and ligamentum   flavum hypertrophy, likely impinging upon the right L3 nerve roots.  4. L4-L5 anterolisthesis with a disc bulge, facet arthrosis and   ligamentum flavum hypertrophy, resulting in moderate central canal,   bilateral lateral recess, mild right and moderate left neural foramen   stenosis, likely impinging upon the left L4 nerve roots.  5. Additional findings, including those degenerative, described in detail   above.      ANALYSIS AND PLAN:  This is an 82-year-old with numbness of both feet and left arm weakness and pain.  For paresthesias of bilateral lower extremities, most likely secondary to peripheral neuropathy, questionably spinal disc related, suspect less likely this is a cerebrovascular accident that affected both feet at the exact same time only in the sensory areas.  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  For decreased range of motion of left upper extremity, most likely secondary to rotator cuff issues.  Once elevated, was able to maintain upper position, suspect less likely this is a cerebrovascular accident.  For history of atrial fibrillation, continue the patient on her anticoagulation.  For history of hyperlipidemia, continue the patient on statin.  For history of hypertension, monitor systolic blood pressure.  Spoke with daughter, Lela, at [phone].   49 minutes of time was spent with the patient, plan of care, reviewing data, with greater than 50% of the visit was spent counseling and/or coordinating care with multidisciplinary healthcare team   Neurology follow up note    JAZ [id number]      Interval History:    Patient feels ok no new complaints.    Allergies    Procardia (Other)  NIFEdipine (Unknown)  fluoxetine (Unknown)    Intolerances    oxycodone (Other)      MEDICATIONS    acetaminophen     Tablet .. 650 milliGRAM(s) Oral every 6 hours PRN  albuterol    90 MICROgram(s) HFA Inhaler 2 Puff(s) Inhalation every 6 hours  aluminum hydroxide/magnesium hydroxide/simethicone Suspension 30 milliLiter(s) Oral every 4 hours PRN  aMIOdarone    Tablet 200 milliGRAM(s) Oral daily  apixaban 5 milliGRAM(s) Oral two times a day  aspirin  chewable 81 milliGRAM(s) Oral daily  buDESOnide    Inhalation Suspension 0.5 milliGRAM(s) Inhalation two times a day  carvedilol 12.5 milliGRAM(s) Oral every 12 hours  famotidine    Tablet 20 milliGRAM(s) Oral daily  fenofibrate Tablet 48 milliGRAM(s) Oral daily  ferrous    sulfate 325 milliGRAM(s) Oral daily  furosemide    Tablet 40 milliGRAM(s) Oral daily  hydrALAZINE 100 milliGRAM(s) Oral two times a day  lactobacillus acidophilus 1 Tablet(s) Oral daily  melatonin 3 milliGRAM(s) Oral at bedtime PRN  multivitamin 1 Tablet(s) Oral daily  ondansetron Injectable 4 milliGRAM(s) IV Push every 8 hours PRN  predniSONE   Tablet 20 milliGRAM(s) Oral daily  sertraline 25 milliGRAM(s) Oral daily  tiotropium 2.5 MICROgram(s) Inhaler 2 Puff(s) Inhalation daily              Vital Signs Last 24 Hrs  T(C): 36.4 (12 Sep 2023 05:00), Max: 36.6 (11 Sep 2023 20:30)  T(F): 97.5 (12 Sep 2023 05:00), Max: 97.8 (11 Sep 2023 20:30)  HR: 63 (12 Sep 2023 07:50) (60 - 65)  BP: 171/72 (12 Sep 2023 05:00) (129/75 - 171/72)  BP(mean): --  RR: 17 (12 Sep 2023 05:00) (17 - 17)  SpO2: 98% (12 Sep 2023 07:50) (93% - 98%)    Parameters below as of 12 Sep 2023 07:50  Patient On (Oxygen Delivery Method): room air      REVIEW OF SYSTEMS:  Constitutional:  The patient denies fever, chills, or night sweats.  Head:  No headache.  Eyes:  No double vision or blurry vision.  Ears:  No ringing in the ears.  Neck:  Occasional neck pain.  Respiratory:  No shortness of breath.  Cardiovascular:  No chest pain.  Abdomen:  No nausea, vomiting, or abdominal pain.  Extremities/Neurological:  Complains of numbness or tingling in her feet.  Musculoskeletal:  Positive history of diffuse joint pain.    PHYSICAL EXAMINATION:HEENT:  Head:  Normocephalic, atraumatic.  Eyes:  No scleral icterus.  Ears:  Hearing bilaterally was intact.  NECK:  Supple.  Just complains of some subtle cervical tenderness.  RESPIRATORY:  Air entry bilaterally.  CARDIOVASCULAR:  S1 and S2 heard.  ABDOMEN:  Soft and nontender.  EXTREMITIES:  No clubbing or cyanosis was noted.      NEUROLOGIC:  The patient is awake and alert.  Able to say correct age and month.  Extraocular movements were intact.  Speech was fluent.  Smile symmetric.  The patient has significantly decreased range of motion of bilateral shoulders.  I elevated the right upper extremity, was able to maintain it in elevated position for about 2 to 3 seconds, then would fall to the ground.  Left upper extremities after elevated in the air, the patient was able to maintain it in the air longer than her right.  Overall strength of biceps, triceps, and hand grasp were 4/5.  Bilateral lower extremities 3+/5.  Sensory:  Bilateral lower intact to light touch.      LABS:  CBC Full  -  ( 11 Sep 2023 10:08 )  WBC Count : 7.83 K/uL  RBC Count : 3.35 M/uL  Hemoglobin : 10.4 g/dL  Hematocrit : 32.6 %  Platelet Count - Automated : 241 K/uL  Mean Cell Volume : 97.3 fl  Mean Cell Hemoglobin : 31.0 pg  Mean Cell Hemoglobin Concentration : 31.9 gm/dL  Auto Neutrophil # : x  Auto Lymphocyte # : x  Auto Monocyte # : x  Auto Eosinophil # : x  Auto Basophil # : x  Auto Neutrophil % : x  Auto Lymphocyte % : x  Auto Monocyte % : x  Auto Eosinophil % : x  Auto Basophil % : x    Urinalysis Basic - ( 11 Sep 2023 10:08 )    Color: x / Appearance: x / SG: x / pH: x  Gluc: 122 mg/dL / Ketone: x  / Bili: x / Urobili: x   Blood: x / Protein: x / Nitrite: x   Leuk Esterase: x / RBC: x / WBC x   Sq Epi: x / Non Sq Epi: x / Bacteria: x      09-11    138  |  97  |  49<H>  ----------------------------<  122<H>  3.9   |  34<H>  |  1.50<H>    Ca    8.8      11 Sep 2023 10:08    TPro  5.7<L>  /  Alb  3.0<L>  /  TBili  0.3  /  DBili  x   /  AST  22  /  ALT  28  /  AlkPhos  51  09-11    Hemoglobin A1C:     LIVER FUNCTIONS - ( 11 Sep 2023 09:15 )  Alb: 3.0 g/dL / Pro: 5.7 g/dL / ALK PHOS: 51 U/L / ALT: 28 U/L / AST: 22 U/L / GGT: x           Vitamin B12   PT/INR - ( 11 Sep 2023 09:15 )   PT: 16.9 sec;   INR: 1.46 ratio         PTT - ( 11 Sep 2023 09:15 )  PTT:30.1 sec      RADIOLOGY    < from: CT Lumbar Spine No Cont (09.11.23 @ 12:21) >  INTERPRETATION:  CLINICAL STATEMENT: Bilateral foot numbness.    TECHNIQUE: Noncontrast CT lumbar spine axial plane. Sagittal and coronal  reformatted images provided. Bone and soft tissue algorithm. Automated   exposure control utilized for dose reduction.  COMPARISON: No similar prior studies for comparison.    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 No gross intrathecal or   paraspinal mass, however suboptimally evaluated on noncontrast CT.   Residual intravenous contrast bilateral renal collecting systems, ureters   and urinary bladder. Calcified aortoiliac plaque, without aneurysm.   Infrarenal IVC filter. 2.2 cm low-attenuation right adrenal nodule,   statistically adenoma however not definitively characterized as such on   this exam. Upper abdominal surgical clips.    Bilateral sacroiliac arthrosis incidentally noted. No aggressive osseous   lesion. No compression fracture. Moderate leftward curvature with apex at   L3. Grade 1 anterolisthesis L4 over L5 multilevel disc space narrowing,   most pronounced at L3-L4, with sclerotic changes in the endplates,   eccentric to the right. Varying degrees of multilevel endplate spurring.   Multilevel facet arthrosis, most pronounced and severe in degree at L5-S1   bilaterally. Fusion of bilateral L4-L5 posterior facet articulations.    L1-L2: Evidence of a disc bulge, without stenosis.  L2-L3: Evidence of a disc bulge, resulting in moderate right and mild   left neural foramen stenosis with facet arthrosis.  L3-L4: Evidence of a disc bulge-spondylitic ridge complex, resulting in   mild-moderate central canal, right greater than left lateral recess,   severe right and mild left neural foramen stenosis with facet arthrosis   and ligamentum flavum hypertrophy, likely impinging upon the right L3   nerve roots.  L4-L5: Aforementioned anterolisthesis with a disc bulge, facet arthrosis   and ligamentum flavum hypertrophy, resulting in moderate central canal,   bilateral lateral recess, mild right and moderate left neural foramen   stenosis, likely impinging upon the left L4 nerve roots.  L5-S1: Evidence of a disc bulge, resulting in mild right greater than   left neural foramen stenosis with facet arthrosis.    IMPRESSION:  1. No compression fracture.  2. Moderate levoscoliosis and grade 1 anterolisthesis L4 over L5.  3. L3-L4 disc bulge-spondylitic ridge complex, resulting in mild-moderate   central canal, right greater than left lateral recess, severe right and   mild left neural foramen stenosis with facet arthrosis and ligamentum   flavum hypertrophy, likely impinging upon the right L3 nerve roots.  4. L4-L5 anterolisthesis with a disc bulge, facet arthrosis and   ligamentum flavum hypertrophy, resulting in moderate central canal,   bilateral lateral recess, mild right and moderate left neural foramen   stenosis, likely impinging upon the left L4 nerve roots.  5. Additional findings, including those degenerative, described in detail   above.      ANALYSIS AND PLAN:  This is an 82-year-old with numbness of both feet and left arm weakness and pain.  For paresthesias of bilateral lower extremities, most likely secondary to peripheral neuropathy, questionably spinal disc related, suspect less likely this is a cerebrovascular accident that affected both feet at the exact same time only in the sensory areas.  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  For decreased range of motion of left upper extremity, most likely secondary to rotator cuff issues.  Once elevated, was able to maintain upper position, suspect less likely this is a cerebrovascular accident.  For history of atrial fibrillation, continue the patient on her anticoagulation.  For history of hyperlipidemia, continue the patient on statin.  For history of hypertension, monitor systolic blood pressure.  spoke to patient and dtr  will follow up with outside doctors in regards to shoulder issues   neuropathy symptoms better consider ortho evaluation ct lumbar spine noted      ok to dc form neurologist standpoint     Spoke with daughter, Lela, at [phone] 9/12     49 minutes of time was spent with the patient, plan of care, reviewing data, with greater than 50% of the visit was spent counseling and/or coordinating care with multidisciplinary healthcare team

## 2023-09-15 LAB
CULTURE RESULTS: SIGNIFICANT CHANGE UP
CULTURE RESULTS: SIGNIFICANT CHANGE UP
SPECIMEN SOURCE: SIGNIFICANT CHANGE UP
SPECIMEN SOURCE: SIGNIFICANT CHANGE UP

## 2023-10-03 ENCOUNTER — EMERGENCY (EMERGENCY)
Facility: HOSPITAL | Age: 83
LOS: 1 days | Discharge: ANOTHER HOSPITAL | End: 2023-10-03
Attending: STUDENT IN AN ORGANIZED HEALTH CARE EDUCATION/TRAINING PROGRAM | Admitting: STUDENT IN AN ORGANIZED HEALTH CARE EDUCATION/TRAINING PROGRAM
Payer: MEDICARE

## 2023-10-03 ENCOUNTER — INPATIENT (INPATIENT)
Facility: HOSPITAL | Age: 83
LOS: 2 days | Discharge: NURSING, REHAB OR LONG-TERM CARE | DRG: 83 | End: 2023-10-06
Attending: STUDENT IN AN ORGANIZED HEALTH CARE EDUCATION/TRAINING PROGRAM | Admitting: STUDENT IN AN ORGANIZED HEALTH CARE EDUCATION/TRAINING PROGRAM
Payer: MEDICARE

## 2023-10-03 VITALS
DIASTOLIC BLOOD PRESSURE: 77 MMHG | OXYGEN SATURATION: 98 % | SYSTOLIC BLOOD PRESSURE: 187 MMHG | WEIGHT: 169.98 LBS | HEART RATE: 60 BPM | RESPIRATION RATE: 18 BRPM | HEIGHT: 63 IN

## 2023-10-03 VITALS
SYSTOLIC BLOOD PRESSURE: 150 MMHG | TEMPERATURE: 98 F | HEIGHT: 63 IN | HEART RATE: 64 BPM | DIASTOLIC BLOOD PRESSURE: 89 MMHG | RESPIRATION RATE: 16 BRPM | OXYGEN SATURATION: 96 % | WEIGHT: 160.06 LBS

## 2023-10-03 VITALS
OXYGEN SATURATION: 96 % | RESPIRATION RATE: 16 BRPM | SYSTOLIC BLOOD PRESSURE: 160 MMHG | TEMPERATURE: 98 F | DIASTOLIC BLOOD PRESSURE: 70 MMHG | HEART RATE: 60 BPM

## 2023-10-03 DIAGNOSIS — Z98.89 OTHER SPECIFIED POSTPROCEDURAL STATES: Chronic | ICD-10-CM

## 2023-10-03 DIAGNOSIS — Z90.49 ACQUIRED ABSENCE OF OTHER SPECIFIED PARTS OF DIGESTIVE TRACT: Chronic | ICD-10-CM

## 2023-10-03 DIAGNOSIS — Z96.652 PRESENCE OF LEFT ARTIFICIAL KNEE JOINT: Chronic | ICD-10-CM

## 2023-10-03 DIAGNOSIS — I60.9 NONTRAUMATIC SUBARACHNOID HEMORRHAGE, UNSPECIFIED: ICD-10-CM

## 2023-10-03 LAB
ALBUMIN SERPL ELPH-MCNC: 3.2 G/DL — LOW (ref 3.3–5)
ALP SERPL-CCNC: 60 U/L — SIGNIFICANT CHANGE UP (ref 40–120)
ALT FLD-CCNC: 34 U/L — SIGNIFICANT CHANGE UP (ref 12–78)
ANION GAP SERPL CALC-SCNC: 11 MMOL/L — SIGNIFICANT CHANGE UP (ref 5–17)
ANION GAP SERPL CALC-SCNC: 6 MMOL/L — SIGNIFICANT CHANGE UP (ref 5–17)
APTT BLD: 30.3 SEC — SIGNIFICANT CHANGE UP (ref 24.5–35.6)
APTT BLD: 32.9 SEC — SIGNIFICANT CHANGE UP (ref 24.5–35.6)
AST SERPL-CCNC: 29 U/L — SIGNIFICANT CHANGE UP (ref 15–37)
BASOPHILS # BLD AUTO: 0.01 K/UL — SIGNIFICANT CHANGE UP (ref 0–0.2)
BASOPHILS NFR BLD AUTO: 0.3 % — SIGNIFICANT CHANGE UP (ref 0–2)
BILIRUB SERPL-MCNC: 0.3 MG/DL — SIGNIFICANT CHANGE UP (ref 0.2–1.2)
BLD GP AB SCN SERPL QL: SIGNIFICANT CHANGE UP
BUN SERPL-MCNC: 20.9 MG/DL — HIGH (ref 8–20)
BUN SERPL-MCNC: 23 MG/DL — SIGNIFICANT CHANGE UP (ref 7–23)
CALCIUM SERPL-MCNC: 8.5 MG/DL — SIGNIFICANT CHANGE UP (ref 8.5–10.1)
CALCIUM SERPL-MCNC: 8.6 MG/DL — SIGNIFICANT CHANGE UP (ref 8.4–10.5)
CHLORIDE SERPL-SCNC: 100 MMOL/L — SIGNIFICANT CHANGE UP (ref 96–108)
CHLORIDE SERPL-SCNC: 105 MMOL/L — SIGNIFICANT CHANGE UP (ref 96–108)
CO2 SERPL-SCNC: 28 MMOL/L — SIGNIFICANT CHANGE UP (ref 22–29)
CO2 SERPL-SCNC: 31 MMOL/L — SIGNIFICANT CHANGE UP (ref 22–31)
CREAT SERPL-MCNC: 1.38 MG/DL — HIGH (ref 0.5–1.3)
CREAT SERPL-MCNC: 1.5 MG/DL — HIGH (ref 0.5–1.3)
EGFR: 34 ML/MIN/1.73M2 — LOW
EGFR: 38 ML/MIN/1.73M2 — LOW
EOSINOPHIL # BLD AUTO: 0.21 K/UL — SIGNIFICANT CHANGE UP (ref 0–0.5)
EOSINOPHIL NFR BLD AUTO: 5.7 % — SIGNIFICANT CHANGE UP (ref 0–6)
ETHANOL SERPL-MCNC: <10 MG/DL — SIGNIFICANT CHANGE UP (ref 0–10)
GLUCOSE BLDC GLUCOMTR-MCNC: 93 MG/DL — SIGNIFICANT CHANGE UP (ref 70–99)
GLUCOSE SERPL-MCNC: 102 MG/DL — HIGH (ref 70–99)
GLUCOSE SERPL-MCNC: 93 MG/DL — SIGNIFICANT CHANGE UP (ref 70–99)
HCT VFR BLD CALC: 24.5 % — LOW (ref 34.5–45)
HCT VFR BLD CALC: 27.5 % — LOW (ref 34.5–45)
HGB BLD-MCNC: 8.1 G/DL — LOW (ref 11.5–15.5)
HGB BLD-MCNC: 8.7 G/DL — LOW (ref 11.5–15.5)
IMM GRANULOCYTES NFR BLD AUTO: 0.3 % — SIGNIFICANT CHANGE UP (ref 0–0.9)
INR BLD: 1.19 RATIO — HIGH (ref 0.85–1.18)
INR BLD: 1.24 RATIO — HIGH (ref 0.85–1.18)
LACTATE SERPL-SCNC: 0.7 MMOL/L — SIGNIFICANT CHANGE UP (ref 0.7–2)
LIDOCAIN IGE QN: 23 U/L — SIGNIFICANT CHANGE UP (ref 13–75)
LMWH PPP CHRO-ACNC: 1.41 IU/ML — HIGH (ref 0.5–1.1)
LYMPHOCYTES # BLD AUTO: 0.74 K/UL — LOW (ref 1–3.3)
LYMPHOCYTES # BLD AUTO: 20.1 % — SIGNIFICANT CHANGE UP (ref 13–44)
MAGNESIUM SERPL-MCNC: 2.2 MG/DL — SIGNIFICANT CHANGE UP (ref 1.6–2.6)
MCHC RBC-ENTMCNC: 31.3 PG — SIGNIFICANT CHANGE UP (ref 27–34)
MCHC RBC-ENTMCNC: 31.6 GM/DL — LOW (ref 32–36)
MCHC RBC-ENTMCNC: 32 PG — SIGNIFICANT CHANGE UP (ref 27–34)
MCHC RBC-ENTMCNC: 33.1 GM/DL — SIGNIFICANT CHANGE UP (ref 32–36)
MCV RBC AUTO: 96.8 FL — SIGNIFICANT CHANGE UP (ref 80–100)
MCV RBC AUTO: 98.9 FL — SIGNIFICANT CHANGE UP (ref 80–100)
MONOCYTES # BLD AUTO: 0.4 K/UL — SIGNIFICANT CHANGE UP (ref 0–0.9)
MONOCYTES NFR BLD AUTO: 10.9 % — SIGNIFICANT CHANGE UP (ref 2–14)
NEUTROPHILS # BLD AUTO: 2.31 K/UL — SIGNIFICANT CHANGE UP (ref 1.8–7.4)
NEUTROPHILS NFR BLD AUTO: 62.7 % — SIGNIFICANT CHANGE UP (ref 43–77)
NRBC # BLD: 0 /100 WBCS — SIGNIFICANT CHANGE UP (ref 0–0)
PHOSPHATE SERPL-MCNC: 3.2 MG/DL — SIGNIFICANT CHANGE UP (ref 2.4–4.7)
PLATELET # BLD AUTO: 180 K/UL — SIGNIFICANT CHANGE UP (ref 150–400)
PLATELET # BLD AUTO: 198 K/UL — SIGNIFICANT CHANGE UP (ref 150–400)
POTASSIUM SERPL-MCNC: 3.9 MMOL/L — SIGNIFICANT CHANGE UP (ref 3.5–5.3)
POTASSIUM SERPL-MCNC: 4 MMOL/L — SIGNIFICANT CHANGE UP (ref 3.5–5.3)
POTASSIUM SERPL-SCNC: 3.9 MMOL/L — SIGNIFICANT CHANGE UP (ref 3.5–5.3)
POTASSIUM SERPL-SCNC: 4 MMOL/L — SIGNIFICANT CHANGE UP (ref 3.5–5.3)
PROT SERPL-MCNC: 6.2 G/DL — SIGNIFICANT CHANGE UP (ref 6–8.3)
PROTHROM AB SERPL-ACNC: 13.1 SEC — HIGH (ref 9.5–13)
PROTHROM AB SERPL-ACNC: 14.4 SEC — HIGH (ref 9.5–13)
RBC # BLD: 2.53 M/UL — LOW (ref 3.8–5.2)
RBC # BLD: 2.78 M/UL — LOW (ref 3.8–5.2)
RBC # FLD: 15.7 % — HIGH (ref 10.3–14.5)
RBC # FLD: 15.7 % — HIGH (ref 10.3–14.5)
SODIUM SERPL-SCNC: 139 MMOL/L — SIGNIFICANT CHANGE UP (ref 135–145)
SODIUM SERPL-SCNC: 142 MMOL/L — SIGNIFICANT CHANGE UP (ref 135–145)
TROPONIN I, HIGH SENSITIVITY RESULT: 19.4 NG/L — SIGNIFICANT CHANGE UP
UFH PPP CHRO-ACNC: 1.44 IU/ML — CRITICAL HIGH (ref 0.3–0.7)
WBC # BLD: 3.68 K/UL — LOW (ref 3.8–10.5)
WBC # BLD: 4.5 K/UL — SIGNIFICANT CHANGE UP (ref 3.8–10.5)
WBC # FLD AUTO: 3.68 K/UL — LOW (ref 3.8–10.5)
WBC # FLD AUTO: 4.5 K/UL — SIGNIFICANT CHANGE UP (ref 3.8–10.5)

## 2023-10-03 PROCEDURE — 86850 RBC ANTIBODY SCREEN: CPT

## 2023-10-03 PROCEDURE — 73552 X-RAY EXAM OF FEMUR 2/>: CPT | Mod: 26,50

## 2023-10-03 PROCEDURE — 90471 IMMUNIZATION ADMIN: CPT | Mod: XU

## 2023-10-03 PROCEDURE — 85025 COMPLETE CBC W/AUTO DIFF WBC: CPT

## 2023-10-03 PROCEDURE — 73590 X-RAY EXAM OF LOWER LEG: CPT | Mod: 26,50

## 2023-10-03 PROCEDURE — 85610 PROTHROMBIN TIME: CPT

## 2023-10-03 PROCEDURE — 36415 COLL VENOUS BLD VENIPUNCTURE: CPT

## 2023-10-03 PROCEDURE — 99291 CRITICAL CARE FIRST HOUR: CPT

## 2023-10-03 PROCEDURE — 99291 CRITICAL CARE FIRST HOUR: CPT | Mod: 25

## 2023-10-03 PROCEDURE — 80307 DRUG TEST PRSMV CHEM ANLYZR: CPT

## 2023-10-03 PROCEDURE — 70450 CT HEAD/BRAIN W/O DYE: CPT | Mod: MA

## 2023-10-03 PROCEDURE — 70486 CT MAXILLOFACIAL W/O DYE: CPT | Mod: 26,MA

## 2023-10-03 PROCEDURE — 99284 EMERGENCY DEPT VISIT MOD MDM: CPT | Mod: FS

## 2023-10-03 PROCEDURE — 70450 CT HEAD/BRAIN W/O DYE: CPT | Mod: 26,77

## 2023-10-03 PROCEDURE — 73130 X-RAY EXAM OF HAND: CPT | Mod: 26,50

## 2023-10-03 PROCEDURE — 96375 TX/PRO/DX INJ NEW DRUG ADDON: CPT | Mod: XU

## 2023-10-03 PROCEDURE — 70450 CT HEAD/BRAIN W/O DYE: CPT | Mod: 26,MA

## 2023-10-03 PROCEDURE — 74177 CT ABD & PELVIS W/CONTRAST: CPT | Mod: 26,MA

## 2023-10-03 PROCEDURE — 70486 CT MAXILLOFACIAL W/O DYE: CPT | Mod: MA

## 2023-10-03 PROCEDURE — 93005 ELECTROCARDIOGRAM TRACING: CPT

## 2023-10-03 PROCEDURE — 99292 CRITICAL CARE ADDL 30 MIN: CPT | Mod: GC

## 2023-10-03 PROCEDURE — 99291 CRITICAL CARE FIRST HOUR: CPT | Mod: GC

## 2023-10-03 PROCEDURE — 85730 THROMBOPLASTIN TIME PARTIAL: CPT

## 2023-10-03 PROCEDURE — 83690 ASSAY OF LIPASE: CPT

## 2023-10-03 PROCEDURE — 86900 BLOOD TYPING SEROLOGIC ABO: CPT

## 2023-10-03 PROCEDURE — 90715 TDAP VACCINE 7 YRS/> IM: CPT

## 2023-10-03 PROCEDURE — 99223 1ST HOSP IP/OBS HIGH 75: CPT | Mod: FS

## 2023-10-03 PROCEDURE — 84484 ASSAY OF TROPONIN QUANT: CPT

## 2023-10-03 PROCEDURE — 72125 CT NECK SPINE W/O DYE: CPT | Mod: MA

## 2023-10-03 PROCEDURE — 72125 CT NECK SPINE W/O DYE: CPT | Mod: 26,MA

## 2023-10-03 PROCEDURE — 83605 ASSAY OF LACTIC ACID: CPT

## 2023-10-03 PROCEDURE — 71260 CT THORAX DX C+: CPT | Mod: 26,MA

## 2023-10-03 PROCEDURE — 73030 X-RAY EXAM OF SHOULDER: CPT | Mod: 26,50

## 2023-10-03 PROCEDURE — 93010 ELECTROCARDIOGRAM REPORT: CPT

## 2023-10-03 PROCEDURE — 80053 COMPREHEN METABOLIC PANEL: CPT

## 2023-10-03 PROCEDURE — 96374 THER/PROPH/DIAG INJ IV PUSH: CPT | Mod: XU

## 2023-10-03 PROCEDURE — 86901 BLOOD TYPING SEROLOGIC RH(D): CPT

## 2023-10-03 PROCEDURE — 71260 CT THORAX DX C+: CPT | Mod: MA

## 2023-10-03 PROCEDURE — 73060 X-RAY EXAM OF HUMERUS: CPT | Mod: 26

## 2023-10-03 PROCEDURE — 74177 CT ABD & PELVIS W/CONTRAST: CPT | Mod: MA

## 2023-10-03 PROCEDURE — 73090 X-RAY EXAM OF FOREARM: CPT | Mod: 26,50

## 2023-10-03 RX ORDER — SIMVASTATIN 20 MG/1
10 TABLET, FILM COATED ORAL AT BEDTIME
Refills: 0 | Status: DISCONTINUED | OUTPATIENT
Start: 2023-10-03 | End: 2023-10-06

## 2023-10-03 RX ORDER — LANOLIN ALCOHOL/MO/W.PET/CERES
3 CREAM (GRAM) TOPICAL AT BEDTIME
Refills: 0 | Status: DISCONTINUED | OUTPATIENT
Start: 2023-10-03 | End: 2023-10-06

## 2023-10-03 RX ORDER — FAMOTIDINE 10 MG/ML
20 INJECTION INTRAVENOUS DAILY
Refills: 0 | Status: DISCONTINUED | OUTPATIENT
Start: 2023-10-03 | End: 2023-10-06

## 2023-10-03 RX ORDER — HYDRALAZINE HCL 50 MG
10 TABLET ORAL ONCE
Refills: 0 | Status: COMPLETED | OUTPATIENT
Start: 2023-10-03 | End: 2023-10-03

## 2023-10-03 RX ORDER — LEVETIRACETAM 250 MG/1
500 TABLET, FILM COATED ORAL EVERY 12 HOURS
Refills: 0 | Status: DISCONTINUED | OUTPATIENT
Start: 2023-10-03 | End: 2023-10-04

## 2023-10-03 RX ORDER — CARVEDILOL PHOSPHATE 80 MG/1
12.5 CAPSULE, EXTENDED RELEASE ORAL EVERY 12 HOURS
Refills: 0 | Status: DISCONTINUED | OUTPATIENT
Start: 2023-10-03 | End: 2023-10-06

## 2023-10-03 RX ORDER — LEVETIRACETAM 250 MG/1
500 TABLET, FILM COATED ORAL ONCE
Refills: 0 | Status: COMPLETED | OUTPATIENT
Start: 2023-10-03 | End: 2023-10-03

## 2023-10-03 RX ORDER — BACITRACIN ZINC 500 UNIT/G
1 OINTMENT IN PACKET (EA) TOPICAL EVERY 12 HOURS
Refills: 0 | Status: DISCONTINUED | OUTPATIENT
Start: 2023-10-03 | End: 2023-10-06

## 2023-10-03 RX ORDER — DEXTROSE 50 % IN WATER 50 %
25 SYRINGE (ML) INTRAVENOUS ONCE
Refills: 0 | Status: DISCONTINUED | OUTPATIENT
Start: 2023-10-03 | End: 2023-10-04

## 2023-10-03 RX ORDER — IPRATROPIUM/ALBUTEROL SULFATE 18-103MCG
3 AEROSOL WITH ADAPTER (GRAM) INHALATION EVERY 6 HOURS
Refills: 0 | Status: DISCONTINUED | OUTPATIENT
Start: 2023-10-03 | End: 2023-10-06

## 2023-10-03 RX ORDER — SODIUM CHLORIDE 9 MG/ML
1000 INJECTION, SOLUTION INTRAVENOUS
Refills: 0 | Status: DISCONTINUED | OUTPATIENT
Start: 2023-10-03 | End: 2023-10-04

## 2023-10-03 RX ORDER — INFLUENZA VIRUS VACCINE 15; 15; 15; 15 UG/.5ML; UG/.5ML; UG/.5ML; UG/.5ML
0.7 SUSPENSION INTRAMUSCULAR ONCE
Refills: 0 | Status: DISCONTINUED | OUTPATIENT
Start: 2023-10-03 | End: 2023-10-06

## 2023-10-03 RX ORDER — DEXTROSE 50 % IN WATER 50 %
15 SYRINGE (ML) INTRAVENOUS ONCE
Refills: 0 | Status: DISCONTINUED | OUTPATIENT
Start: 2023-10-03 | End: 2023-10-04

## 2023-10-03 RX ORDER — CHLORHEXIDINE GLUCONATE 213 G/1000ML
1 SOLUTION TOPICAL
Refills: 0 | Status: DISCONTINUED | OUTPATIENT
Start: 2023-10-03 | End: 2023-10-05

## 2023-10-03 RX ORDER — GLUCAGON INJECTION, SOLUTION 0.5 MG/.1ML
1 INJECTION, SOLUTION SUBCUTANEOUS ONCE
Refills: 0 | Status: DISCONTINUED | OUTPATIENT
Start: 2023-10-03 | End: 2023-10-04

## 2023-10-03 RX ORDER — ACETAMINOPHEN 500 MG
1000 TABLET ORAL EVERY 6 HOURS
Refills: 0 | Status: COMPLETED | OUTPATIENT
Start: 2023-10-03 | End: 2023-10-04

## 2023-10-03 RX ORDER — BUDESONIDE AND FORMOTEROL FUMARATE DIHYDRATE 160; 4.5 UG/1; UG/1
2 AEROSOL RESPIRATORY (INHALATION)
Refills: 0 | Status: DISCONTINUED | OUTPATIENT
Start: 2023-10-03 | End: 2023-10-06

## 2023-10-03 RX ORDER — ACETAMINOPHEN 500 MG
1000 TABLET ORAL ONCE
Refills: 0 | Status: COMPLETED | OUTPATIENT
Start: 2023-10-03 | End: 2023-10-03

## 2023-10-03 RX ORDER — TETANUS TOXOID, REDUCED DIPHTHERIA TOXOID AND ACELLULAR PERTUSSIS VACCINE, ADSORBED 5; 2.5; 8; 8; 2.5 [IU]/.5ML; [IU]/.5ML; UG/.5ML; UG/.5ML; UG/.5ML
0.5 SUSPENSION INTRAMUSCULAR ONCE
Refills: 0 | Status: COMPLETED | OUTPATIENT
Start: 2023-10-03 | End: 2023-10-03

## 2023-10-03 RX ORDER — AMIODARONE HYDROCHLORIDE 400 MG/1
200 TABLET ORAL DAILY
Refills: 0 | Status: DISCONTINUED | OUTPATIENT
Start: 2023-10-03 | End: 2023-10-06

## 2023-10-03 RX ORDER — HYDRALAZINE HCL 50 MG
5 TABLET ORAL EVERY 6 HOURS
Refills: 0 | Status: DISCONTINUED | OUTPATIENT
Start: 2023-10-03 | End: 2023-10-03

## 2023-10-03 RX ORDER — PROTHROMBIN COMPLEX CONCENTRATE (HUMAN) 25.5; 16.5; 24; 22; 22; 26 [IU]/ML; [IU]/ML; [IU]/ML; [IU]/ML; [IU]/ML; [IU]/ML
2000 POWDER, FOR SOLUTION INTRAVENOUS ONCE
Refills: 0 | Status: COMPLETED | OUTPATIENT
Start: 2023-10-03 | End: 2023-10-03

## 2023-10-03 RX ORDER — SODIUM CHLORIDE 9 MG/ML
250 INJECTION INTRAMUSCULAR; INTRAVENOUS; SUBCUTANEOUS ONCE
Refills: 0 | Status: COMPLETED | OUTPATIENT
Start: 2023-10-03 | End: 2023-10-03

## 2023-10-03 RX ORDER — INSULIN LISPRO 100/ML
VIAL (ML) SUBCUTANEOUS EVERY 6 HOURS
Refills: 0 | Status: DISCONTINUED | OUTPATIENT
Start: 2023-10-03 | End: 2023-10-04

## 2023-10-03 RX ORDER — DEXTROSE 50 % IN WATER 50 %
12.5 SYRINGE (ML) INTRAVENOUS ONCE
Refills: 0 | Status: DISCONTINUED | OUTPATIENT
Start: 2023-10-03 | End: 2023-10-04

## 2023-10-03 RX ADMIN — SODIUM CHLORIDE 250 MILLILITER(S): 9 INJECTION INTRAMUSCULAR; INTRAVENOUS; SUBCUTANEOUS at 05:12

## 2023-10-03 RX ADMIN — LEVETIRACETAM 400 MILLIGRAM(S): 250 TABLET, FILM COATED ORAL at 10:13

## 2023-10-03 RX ADMIN — Medication 3 MILLIGRAM(S): at 23:27

## 2023-10-03 RX ADMIN — Medication 1000 MILLIGRAM(S): at 05:28

## 2023-10-03 RX ADMIN — Medication 400 MILLIGRAM(S): at 05:13

## 2023-10-03 RX ADMIN — SIMVASTATIN 10 MILLIGRAM(S): 20 TABLET, FILM COATED ORAL at 23:38

## 2023-10-03 RX ADMIN — Medication 400 MILLIGRAM(S): at 23:39

## 2023-10-03 RX ADMIN — LEVETIRACETAM 500 MILLIGRAM(S): 250 TABLET, FILM COATED ORAL at 06:58

## 2023-10-03 RX ADMIN — Medication 400 MILLIGRAM(S): at 11:14

## 2023-10-03 RX ADMIN — Medication 5 MILLIGRAM(S): at 15:40

## 2023-10-03 RX ADMIN — CHLORHEXIDINE GLUCONATE 1 APPLICATION(S): 213 SOLUTION TOPICAL at 17:40

## 2023-10-03 RX ADMIN — Medication 1000 MILLIGRAM(S): at 05:43

## 2023-10-03 RX ADMIN — Medication 400 MILLIGRAM(S): at 17:38

## 2023-10-03 RX ADMIN — LEVETIRACETAM 420 MILLIGRAM(S): 250 TABLET, FILM COATED ORAL at 06:48

## 2023-10-03 RX ADMIN — PROTHROMBIN COMPLEX CONCENTRATE (HUMAN) 400 INTERNATIONAL UNIT(S): 25.5; 16.5; 24; 22; 22; 26 POWDER, FOR SOLUTION INTRAVENOUS at 07:00

## 2023-10-03 RX ADMIN — TETANUS TOXOID, REDUCED DIPHTHERIA TOXOID AND ACELLULAR PERTUSSIS VACCINE, ADSORBED 0.5 MILLILITER(S): 5; 2.5; 8; 8; 2.5 SUSPENSION INTRAMUSCULAR at 05:13

## 2023-10-03 RX ADMIN — SODIUM CHLORIDE 250 MILLILITER(S): 9 INJECTION INTRAMUSCULAR; INTRAVENOUS; SUBCUTANEOUS at 05:51

## 2023-10-03 RX ADMIN — Medication 1000 MILLIGRAM(S): at 18:17

## 2023-10-03 RX ADMIN — Medication 1000 MILLIGRAM(S): at 11:44

## 2023-10-03 RX ADMIN — LEVETIRACETAM 400 MILLIGRAM(S): 250 TABLET, FILM COATED ORAL at 17:38

## 2023-10-03 RX ADMIN — PROTHROMBIN COMPLEX CONCENTRATE (HUMAN) 400 INTERNATIONAL UNIT(S): 25.5; 16.5; 24; 22; 22; 26 POWDER, FOR SOLUTION INTRAVENOUS at 17:38

## 2023-10-03 RX ADMIN — Medication 10 MILLIGRAM(S): at 06:48

## 2023-10-03 NOTE — PATIENT PROFILE ADULT - FALL HARM RISK - HARM RISK INTERVENTIONS

## 2023-10-03 NOTE — ED PROVIDER NOTE - PROGRESS NOTE DETAILS
pt found to have ICH, pt notified, [facility] ordered, transfer accepted by dr guerrero to [place] pt found to have ICH, pt notified, [facility] ordered, transfer accepted by dr guerrero to [place]  pt jos parker notidied

## 2023-10-03 NOTE — CONSULT NOTE ADULT - SUBJECTIVE AND OBJECTIVE BOX
Patient is a 82 year old Female who presents with a chief complaint of mechanical fall    HISTORY OF PRESENT ILLNESS:   82F w/ PMHX of HTN, HLD, OA, afib on Eliquis, HFpEF PPM 7/2021, pulmonary HTN, small bowel ca s/p whipple 9 years ago, COPD on home O2 trf PLV after presenting from fall off couch while drowsy, +HS, on Eliquis. CTH found B/L SAH, R orbital floor fx, and non displaced R anterior maxillary sinus fx - reversed w/ Kcentra because Andexxa unavailable and trf to [facility]. Patient seen and examined in ED w/ daughter bedside.      PAST MEDICAL & SURGICAL HISTORY:  HTN (hypertension)  HLD (hyperlipidemia)  Anemia  newly diagnosed  Pneumonia  years ago  OA (osteoarthritis)  Afib  Small bowel cancer  Pacemaker  Major depression  Chronic kidney disease (CKD)  COPD (chronic obstructive pulmonary disease)  Status post total left knee replacement  2007  History of cholecystectomy  open , many years ago  H/O hernia repair  years ago  H/O resection of small bowel    SOCIAL HISTORY:  Tobacco Use: Denies  EtOH use: Social    Allergies  Procardia (Other)  NIFEdipine (Unknown)  fluoxetine (Unknown)    Intolerances  oxycodone (Other)    REVIEW OF SYSTEMS  Negative except as noted in HPI    HOME MEDICATIONS:  To be obtained by primary team    MEDICATIONS:  Antibiotics:  Neuro:  acetaminophen   IVPB .. 1000 milliGRAM(s) IV Intermittent every 6 hours  levETIRAcetam  IVPB 500 milliGRAM(s) IV Intermittent every 12 hours  Anticoagulation:  OTHER:  chlorhexidine 2% Cloths 1 Application(s) Topical <User Schedule>  IVF:    Vital Signs Last 24 Hrs  T(C): 36.7 (03 Oct 2023 07:06), Max: 36.9 (03 Oct 2023 04:20)  T(F): 98.1 (03 Oct 2023 07:06), Max: 98.4 (03 Oct 2023 04:20)  HR: 60 (03 Oct 2023 11:20) (60 - 64)  BP: 161/83 (03 Oct 2023 11:20) (150/89 - 189/78)  BP(mean): 100 (03 Oct 2023 09:07) (100 - 100)  RR: 19 (03 Oct 2023 11:20) (16 - 20)  SpO2: 97% (03 Oct 2023 11:20) (96% - 98%)  Parameters below as of 03 Oct 2023 11:20  Patient On (Oxygen Delivery Method): room air    PHYSICAL EXAM:  GENERAL: NAD, well-groomed, well-developed  HEAD:  + traumatic, R eye ecchymosis/periorbital swelling, R buccal ecchymosis, normocephalic  MONALISA COMA SCORE: E-4 V-5 M-6 = 15       E: 4= opens eyes spontaneously 3= to voice 2= to noxious 1= no opening       V: 5= oriented 4= confused 3= inappropriate words 2= incomprehensible sounds 1= nonverbal 1T= intubated       M: 6= follows commands 5= localizes 4= withdraws 3= flexor posturing 2= extensor posturing 1= no movement  MENTAL STATUS: AAO x3; Awake; Opens eyes spontaneously; Appropriately conversant without aphasia; following simple commands  CRANIAL NERVES: Visual acuity normal for age,PERRL. EOMI without nystagmus. Facial sensation intact V1-3 distribution b/l. Face symmetric w/ normal eye closure and smile, tongue midline. Hearing grossly intact. Speech clear.   MOTOR: strength 5/5 b/l upper and lower extremities  SENSATION: grossly intact to light touch all extremities  SKIN: Warm, dry    LABS:             8.1    4.50  )-----------( 180      ( 03 Oct 2023 10:11 )             24.5     10-03    139  |  100  |  20.9<H>  ----------------------------<  93  4.0   |  28.0  |  1.38<H>    Ca    8.6      03 Oct 2023 10:11  Phos  3.2     10-03  Mg     2.2     10-03    TPro  6.2  /  Alb  3.2<L>  /  TBili  0.3  /  DBili  x   /  AST  29  /  ALT  34  /  AlkPhos  60  10-03    PT/INR - ( 03 Oct 2023 10:11 )   PT: 13.1 sec;   INR: 1.19 ratio         PTT - ( 03 Oct 2023 10:11 )  PTT:32.9 sec  Urinalysis Basic - ( 03 Oct 2023 10:11 )    Color: x / Appearance: x / SG: x / pH: x  Gluc: 93 mg/dL / Ketone: x  / Bili: x / Urobili: x   Blood: x / Protein: x / Nitrite: x   Leuk Esterase: x / RBC: x / WBC x   Sq Epi: x / Non Sq Epi: x / Bacteria: x      CULTURES:        RADIOLOGY & ADDITIONAL STUDIES:  CT Head No Cont (10.03.23 @ 06:06)  IMPRESSION:  1. Head CT: Bilateral subarachnoid and parenchymal hemorrhages without   midline shift. Right frontal scalp hematoma. No calvarial fracture.  2. Cervical spine CT: No acute displaced fracture or traumatic   malalignment. Cervical degenerative spondylosis, as described above.  3. Maxillofacial CT: Minimally displaced the right orbital floor fracture   with herniation of intraorbital fat. No entrapment of the inferior rectus.  Nondisplaced fractures of the anterior and lateral wall of the right   maxillary sinus.     Patient is a 82 year old Female who presents with a chief complaint of mechanical fall    HISTORY OF PRESENT ILLNESS:   82F w/ PMHX of HTN, HLD, OA, afib on Eliquis, HFpEF PPM 7/2021, pulmonary HTN, small bowel ca s/p whipple 9 years ago, COPD on home O2 trf PLV after presenting from fall off couch while drowsy, +HS, on Eliquis. CTH found B/L SAH/IPH, R orbital floor fx, and non displaced R anterior maxillary sinus fx - reversed w/ Kcentra because Andexxa unavailable and trf to [facility]. Patient seen and examined in ED w/ daughter bedside.      PAST MEDICAL & SURGICAL HISTORY:  HTN (hypertension)  HLD (hyperlipidemia)  Anemia  newly diagnosed  Pneumonia  years ago  OA (osteoarthritis)  Afib  Small bowel cancer  Pacemaker  Major depression  Chronic kidney disease (CKD)  COPD (chronic obstructive pulmonary disease)  Status post total left knee replacement  2007  History of cholecystectomy  open , many years ago  H/O hernia repair  years ago  H/O resection of small bowel    SOCIAL HISTORY:  Tobacco Use: Denies  EtOH use: Social    Allergies  Procardia (Other)  NIFEdipine (Unknown)  fluoxetine (Unknown)    Intolerances  oxycodone (Other)    REVIEW OF SYSTEMS  Negative except as noted in HPI    HOME MEDICATIONS:  To be obtained by primary team    MEDICATIONS:  Antibiotics:  Neuro:  acetaminophen   IVPB .. 1000 milliGRAM(s) IV Intermittent every 6 hours  levETIRAcetam  IVPB 500 milliGRAM(s) IV Intermittent every 12 hours  Anticoagulation:  OTHER:  chlorhexidine 2% Cloths 1 Application(s) Topical <User Schedule>  IVF:    Vital Signs Last 24 Hrs  T(C): 36.7 (03 Oct 2023 07:06), Max: 36.9 (03 Oct 2023 04:20)  T(F): 98.1 (03 Oct 2023 07:06), Max: 98.4 (03 Oct 2023 04:20)  HR: 60 (03 Oct 2023 11:20) (60 - 64)  BP: 161/83 (03 Oct 2023 11:20) (150/89 - 189/78)  BP(mean): 100 (03 Oct 2023 09:07) (100 - 100)  RR: 19 (03 Oct 2023 11:20) (16 - 20)  SpO2: 97% (03 Oct 2023 11:20) (96% - 98%)  Parameters below as of 03 Oct 2023 11:20  Patient On (Oxygen Delivery Method): room air    PHYSICAL EXAM:  GENERAL: NAD, well-groomed, well-developed  HEAD:  + traumatic, R eye ecchymosis/periorbital swelling, R buccal ecchymosis, normocephalic  MONALISA COMA SCORE: E-4 V-5 M-6 = 15       E: 4= opens eyes spontaneously 3= to voice 2= to noxious 1= no opening       V: 5= oriented 4= confused 3= inappropriate words 2= incomprehensible sounds 1= nonverbal 1T= intubated       M: 6= follows commands 5= localizes 4= withdraws 3= flexor posturing 2= extensor posturing 1= no movement  MENTAL STATUS: AAO x3; Awake; Opens eyes spontaneously; Appropriately conversant without aphasia; following simple commands  CRANIAL NERVES: Visual acuity normal for age,PERRL. EOMI without nystagmus. Facial sensation intact V1-3 distribution b/l. Face symmetric w/ normal eye closure and smile, tongue midline. Hearing grossly intact. Speech clear.   MOTOR: strength 5/5 b/l upper and lower extremities  SENSATION: grossly intact to light touch all extremities  SKIN: Warm, dry    LABS:             8.1    4.50  )-----------( 180      ( 03 Oct 2023 10:11 )             24.5     10-03    139  |  100  |  20.9<H>  ----------------------------<  93  4.0   |  28.0  |  1.38<H>    Ca    8.6      03 Oct 2023 10:11  Phos  3.2     10-03  Mg     2.2     10-03    TPro  6.2  /  Alb  3.2<L>  /  TBili  0.3  /  DBili  x   /  AST  29  /  ALT  34  /  AlkPhos  60  10-03    PT/INR - ( 03 Oct 2023 10:11 )   PT: 13.1 sec;   INR: 1.19 ratio         PTT - ( 03 Oct 2023 10:11 )  PTT:32.9 sec  Urinalysis Basic - ( 03 Oct 2023 10:11 )    Color: x / Appearance: x / SG: x / pH: x  Gluc: 93 mg/dL / Ketone: x  / Bili: x / Urobili: x   Blood: x / Protein: x / Nitrite: x   Leuk Esterase: x / RBC: x / WBC x   Sq Epi: x / Non Sq Epi: x / Bacteria: x      CULTURES:        RADIOLOGY & ADDITIONAL STUDIES:  CT Head No Cont (10.03.23 @ 06:06)  IMPRESSION:  1. Head CT: Bilateral subarachnoid and parenchymal hemorrhages without   midline shift. Right frontal scalp hematoma. No calvarial fracture.  2. Cervical spine CT: No acute displaced fracture or traumatic   malalignment. Cervical degenerative spondylosis, as described above.  3. Maxillofacial CT: Minimally displaced the right orbital floor fracture   with herniation of intraorbital fat. No entrapment of the inferior rectus.  Nondisplaced fractures of the anterior and lateral wall of the right   maxillary sinus.

## 2023-10-03 NOTE — ED ADULT TRIAGE NOTE - CHIEF COMPLAINT QUOTE
Patient brought by ambulance from [facility] tried to get up the bed fell face down noted on eliquis

## 2023-10-03 NOTE — ED ADULT TRIAGE NOTE - MEANS OF ARRIVAL
Catherine physician immediately available                    stretcher

## 2023-10-03 NOTE — ED ADULT NURSE REASSESSMENT NOTE - NS ED NURSE REASSESS COMMENT FT1
SICU team at bedside, aware pts BP has been ranging from 190s-150s SBP, stated they are okay with BP being higher, and to alert them if something becomes concerning. BP will continued to be monitored hourly.

## 2023-10-03 NOTE — ED ADULT NURSE NOTE - ED STAT RN HANDOFF TIME
Ok to refill tramadol   PDMP reviewed; no aberrant behavior identified, prescription authorized.     07:10

## 2023-10-03 NOTE — ED ADULT NURSE NOTE - TEMPLATE LIST FOR HEAD TO TOE ASSESSMENT
Neuro Helical Rim Advancement Flap Text: The defect edges were debeveled with a #15 blade scalpel.  Given the location of the defect and the proximity to free margins (helical rim) a double helical rim advancement flap was deemed most appropriate.  Using a sterile surgical marker, the appropriate advancement flaps were drawn incorporating the defect and placing the expected incisions between the helical rim and antihelix where possible.  The area thus outlined was incised through and through with a #15 scalpel blade.  With a skin hook and iris scissors, the flaps were gently and sharply undermined and freed up.

## 2023-10-03 NOTE — ED PROVIDER NOTE - PHYSICAL EXAMINATION
Gen: right eye swollen shut  Head: NC/AT, right eye swollen shut, vision intact when eye pried open, eomi wihtout pain, tender surrounding eye   Neck: trachea midline,   no neck tenderness, tender in t1-t2 region  cv: rrr  Resp:  No distress, lungs clear  abd tender with ecchymosis in epigastrium  Ext: no deformities, from   Neuro:  A&O appears non focal  Skin:  Warm and dry as visualized  Psych:  Normal affect and mood

## 2023-10-03 NOTE — ED ADULT NURSE NOTE - NSFALLHARMRISKINTERV_ED_ALL_ED
Assistance OOB with selected safe patient handling equipment if applicable/Communicate risk of Fall with Harm to all staff, patient, and family/Monitor gait and stability/Provide patient with walking aids/Provide visual cue: red socks, yellow wristband, yellow gown, etc/Reinforce activity limits and safety measures with patient and family/Bed in lowest position, wheels locked, appropriate side rails in place/Call bell, personal items and telephone in reach/Instruct patient to call for assistance before getting out of bed/chair/stretcher/Non-slip footwear applied when patient is off stretcher/[place] to call system/Physically safe environment - no spills, clutter or unnecessary equipment/Purposeful Proactive Rounding/Room/bathroom lighting operational, light cord in reach

## 2023-10-03 NOTE — CHART NOTE - NSCHARTNOTEFT_GEN_A_CORE
Patient got repeat CT Scan at 12:30PM. Demonstrated slightly worsening of SAH. Anti-Xa level was still therapeutic s/p 2g Kcentra given this AM. Discussed with with Neurosurgery and decided to give another 2g Kcentra. Will follow up repeat scan this PM. Patient got repeat CT Head at 12:30PM. Demonstrated slightly worsening of SAH. Anti-Xa level was still therapeutic s/p 2g Kcentra given this AM. Discussed with with Neurosurgery and decided to give another 2g Kcentra. Will follow up repeat scan this PM. Patient got repeat CT Head at 12:30PM. Demonstrated slightly worsening of SAH. Anti-Xa level was still therapeutic s/p 2000U Kcentra given this AM. Discussed with with Neurosurgery and decided to give another 2000U Kcentra. Will follow up repeat scan and repeat anti-Xa level this PM.

## 2023-10-03 NOTE — ED PROVIDER NOTE - CARE PLAN
1 Principal Discharge DX:	Subarachnoid hemorrhage  Secondary Diagnosis:	Intraparenchymal hemorrhage of brain

## 2023-10-03 NOTE — ED PROVIDER NOTE - CLINICAL SUMMARY MEDICAL DECISION MAKING FREE TEXT BOX
82F with known AF on Eliquis, HTN, HLD, HFpEF PPM (implanted 7/2021), severe Pulm HTN follwed by Dr. Mahajan, COPD on home O2 pw fall. pt states she fell asleep on couch and was walking to her bed while half asleep and fell. pt fell hitting her face. pt has pain in her right eye and eye is swollen shut. no neck or back pain.  pt on eliquis, bruising on face and abdomen, will  pan scan, l;abs, pain control  likely mechanical fall

## 2023-10-03 NOTE — ED PROVIDER NOTE - OBJECTIVE STATEMENT
82F hx AF on Eliquis, CHF, COPD on 1L O2 presents as transfer from [place] for SAH and parenchymal hemorrhages seen on CT imaging after pt lost her balance and struck her head after getting up half asleep from the couch. Pt presently states she feels sore otherwise feels well. Denies HA, double vision, N/V, CP, SOB, dizziness. Pt was given hydralazine and KCentra PTA

## 2023-10-03 NOTE — ED ADULT NURSE NOTE - NSFALLHARMRISKINTERV_ED_ALL_ED
Assistance OOB with selected safe patient handling equipment if applicable/Communicate risk of Fall with Harm to all staff, patient, and family/Monitor gait and stability/Provide patient with walking aids/Provide visual cue: red socks, yellow wristband, yellow gown, etc/Reinforce activity limits and safety measures with patient and family/Bed in lowest position, wheels locked, appropriate side rails in place/Call bell, personal items and telephone in reach/Instruct patient to call for assistance before getting out of bed/chair/stretcher/Non-slip footwear applied when patient is off stretcher/[place] to call system/Physically safe environment - no spills, clutter or unnecessary equipment/Purposeful Proactive Rounding/Room/bathroom lighting operational, light cord in reach

## 2023-10-03 NOTE — CONSULT NOTE ADULT - NS ATTEND AMEND GEN_ALL_CORE FT
I agree with the above. I personally examined and saw the patient. She is fortunately neurologically intact. Kcentra given, repeat head CT planned. If stable then 24 hour head CT. Keep NPO and hold DVT ppx until 24 hour head CT. Discussed with patient and her daughter at bedside.

## 2023-10-03 NOTE — ED ADULT NURSE NOTE - NIH STROKE SCALE: 5A. MOTOR ARM, LEFT, QM
Goal Outcome Evaluation:      New admission, care plan initiated.             (0) No drift; limb holds 90 (or 45) degrees for full 10 secs

## 2023-10-03 NOTE — ED PROVIDER NOTE - BIRTH SEX
DOR[facility]  GROUP DOCUMENTATION INDIVIDUAL                                                                          Group Therapy Note    Date: 4/29/2022    Group Start Time: 0900  Group End Time: 1000  Group Topic: Topic Group    [facility] 3 ACUTE BEH[facility]    Stephan Bustamante [id number] GROUP    Group Therapy Note    Attendees: 7         Attendance: Did not attend    Patient's Goal:      Interventions/techniques:   [place] Female

## 2023-10-03 NOTE — H&P ADULT - NS ATTEND AMEND GEN_ALL_CORE FT
I have seen and examined this patient with the surgical team.    This is an 82-year-old female w/ PMH A.fib, CKD, COPD, HLD, HTN, MD, OA, pacemaker, and hx small bowel ca s/p whipple ~ 9 years ago who presents after falling at home.  She endorses walking with a walker, but was not using it and tripped over her rug.  She endorses head strike but denies LOC.  She is on Eliquis at home and received K-Centra at OSH.  CTH shows acute SAH and IPH.  She will require NSx follow-up, admission to the SICU, repeat CTH, and holding her home Eliquis.

## 2023-10-03 NOTE — PHARMACOTHERAPY INTERVENTION NOTE - COMMENTS
Referenced facility paperwork to obtain medication list     Outpatient Medication Review updated.    HOME MEDICATIONS:  acetaminophen 325 mg oral tablet: 2 tab(s) orally every 6 hours as needed for  mild pain (03 Oct 2023 13:52)  albuterol 90 mcg/inh inhalation aerosol: 2 puff(s) inhaled every 6 hours (03 Oct 2023 13:52)  amiodarone 200 mg oral tablet: 1 tab(s) orally once a day (03 Oct 2023 13:52)  apixaban 5 mg oral tablet: 1 tab(s) orally 2 times a day (03 Oct 2023 13:52)  aspirin 81 mg oral tablet, chewable: 1 tab(s) orally once a day (03 Oct 2023 12:06)  Breo Ellipta 100 mcg-25 mcg/inh inhalation powder: 1 puff(s) inhaled once a day (03 Oct 2023 13:52)  carvedilol 12.5 mg oral tablet: 1 tab(s) orally 2 times a day (03 Oct 2023 13:52)  famotidine 20 mg oral tablet: 1 tab(s) orally once a day (03 Oct 2023 13:52)  fenofibrate 48 mg oral tablet: 1 tab(s) orally once a day (03 Oct 2023 13:52)  ferrous sulfate 325 mg (65 mg elemental iron) oral tablet: 1 tab(s) orally once a day (03 Oct 2023 13:52)  furosemide 40 mg oral tablet: 1 tab(s) orally once a day (03 Oct 2023 13:52)  hydrALAZINE 100 mg oral tablet: 1 tab(s) orally 2 times a day (03 Oct 2023 13:52)  ipratropium-albuterol 0.5 mg-2.5 mg/3 mL inhalation solution: 3 milliliter(s) by nebulizer every 12 hours as needed (03 Oct 2023 13:51)  ipratropium-albuterol 0.5 mg-2.5 mg/3 mL inhalation solution: 3 milliliter(s) by nebulizer every 4 hours as needed (03 Oct 2023 13:52)  lactobacillus acidophilus oral capsule: 1 cap(s) orally once a day (03 Oct 2023 13:52)  Melatonin 3 mg oral tablet: 1 tab(s) orally once a day (03 Oct 2023 13:52)  Multiple Vitamins oral tablet: 1 tab(s) orally once a day (03 Oct 2023 13:52)  pravastatin 20 mg oral tablet: 1 tab(s) orally once a day (03 Oct 2023 13:52)  sertraline 25 mg oral tablet: 1 tab(s) orally once a day (03 Oct 2023 13:52)  tiotropium 2.5 mcg/inh inhalation aerosol: 2 puff(s) inhaled once a day (03 Oct 2023 13:52)   Spoke with Andreas () at [facility] to obtain medication list.    Outpatient Medication Review updated.    HOME MEDICATIONS:  acetaminophen 325 mg oral tablet: 2 tab(s) orally every 6 hours as needed for  mild pain (03 Oct 2023 13:52)  albuterol 90 mcg/inh inhalation aerosol: 2 puff(s) inhaled every 6 hours (03 Oct 2023 13:52)  amiodarone 200 mg oral tablet: 1 tab(s) orally once a day (03 Oct 2023 13:52)  apixaban 5 mg oral tablet: 1 tab(s) orally 2 times a day (03 Oct 2023 13:52)  aspirin 81 mg oral tablet, chewable: 1 tab(s) orally once a day (03 Oct 2023 12:06)  Breo Ellipta 100 mcg-25 mcg/inh inhalation powder: 1 puff(s) inhaled once a day (03 Oct 2023 13:52)  carvedilol 12.5 mg oral tablet: 1 tab(s) orally 2 times a day (03 Oct 2023 13:52)  famotidine 20 mg oral tablet: 1 tab(s) orally once a day (03 Oct 2023 13:52)  fenofibrate 48 mg oral tablet: 1 tab(s) orally once a day (03 Oct 2023 13:52)  ferrous sulfate 325 mg (65 mg elemental iron) oral tablet: 1 tab(s) orally once a day (03 Oct 2023 13:52)  furosemide 40 mg oral tablet: 1 tab(s) orally once a day (03 Oct 2023 13:52)  hydrALAZINE 100 mg oral tablet: 1 tab(s) orally 2 times a day (03 Oct 2023 13:52)  ipratropium-albuterol 0.5 mg-2.5 mg/3 mL inhalation solution: 3 milliliter(s) by nebulizer every 12 hours as needed (03 Oct 2023 13:51)  ipratropium-albuterol 0.5 mg-2.5 mg/3 mL inhalation solution: 3 milliliter(s) by nebulizer every 4 hours as needed (03 Oct 2023 13:52)  lactobacillus acidophilus oral capsule: 1 cap(s) orally once a day (03 Oct 2023 13:52)  Melatonin 3 mg oral tablet: 1 tab(s) orally once a day (03 Oct 2023 13:52)  Multiple Vitamins oral tablet: 1 tab(s) orally once a day (03 Oct 2023 13:52)  pravastatin 20 mg oral tablet: 1 tab(s) orally once a day (03 Oct 2023 13:52)  sertraline 25 mg oral tablet: 1 tab(s) orally once a day (03 Oct 2023 13:52)  tiotropium 2.5 mcg/inh inhalation aerosol: 2 puff(s) inhaled once a day (03 Oct 2023 13:52)

## 2023-10-03 NOTE — ED PROVIDER NOTE - CLINICAL SUMMARY MEDICAL DECISION MAKING FREE TEXT BOX
82F presents as transfer for intracerebral hemorrhage. At this time neurologically intact. Also with R inferior orbital wall fx without extraocular muscle entrapment. Will consult neurosurg and trauma. Pain control, BP control and neuro checks in interim.

## 2023-10-03 NOTE — ED PROVIDER NOTE - PHYSICAL EXAMINATION
General: Awake, alert, lying in bed in NAD  HEENT: R periorbital ecchymosis and swelling. No scleral icterus or conjunctival injection. EOMI. Moist mucous membranes. Oropharynx clear.   Neck:. Soft and supple. No C-spine tenderness  Cardiac: RRR, Peripheral pulses 2+ and symmetric. No LE edema.  Resp: Lungs CTAB. No accessory muscle use  Abd: Soft, non-tender, non-distended. No guarding, rebound, or rigidity.  MSK: Extremities full ROM without pain. No rib tenderness. Pelvis stable.  Back: Spine midline and non-tender.   Skin: No rashes, abrasions, or lacerations.  Neuro: AO x 4. Moves all extremities symmetrically. Motor strength and sensation grossly intact.  Psych: Appropriate mood and affect

## 2023-10-03 NOTE — CONSULT NOTE ADULT - ASSESSMENT
82F w/ PMHX of HTN, HLD, OA, afib on Eliquis, HFpEF PPM 7/2021, pulmonary HTN, small bowel ca s/p whipple 9 years ago, COPD on home O2 trf PLV after presenting from fall off couch while drowsy, +HS, on Eliquis. CTH found B/L SAH, R orbital floor fx, and non displaced R anterior maxillary sinus fx and trf to [facility]      Plan  - Imaging reviewed.   - Eliquis reversed w/ Kcentra  - Repeat CTH in 6 hours ~12PM or sooner if deterioration in mental status  - If bleed worsened on repeat exam, will order Andexxa followed by repeat CTH in 6 hours   - Q1 neuro checks  - Pain control PRN; avoid oversedation  - Keppra 500 BID x 7 days  - Normotension   - NPO until stability scan  - b/l SCDs; hold AC/AP/chemical DVT prophylaxis at this time  - Trauma eval  - Medical management/supportive care per primary team  - No acute neurosurgical intervention at this time  - D/w Dr. Cherelle Knapp  82F w/ PMHX of HTN, HLD, OA, afib on Eliquis, HFpEF PPM 7/2021, pulmonary HTN, small bowel ca s/p whipple 9 years ago, COPD on home O2 trf PLV after presenting from fall off couch while drowsy, +HS, on Eliquis. CTH found B/L SAH/IPH, R orbital floor fx, and non displaced R anterior maxillary sinus fx and trf to [facility]      Plan  - Imaging reviewed.   - Eliquis reversed w/ Kcentra  - Repeat CTH in 6 hours ~12PM or sooner if deterioration in mental status  - If bleed worsened on repeat exam, will order Andexxa followed by repeat CTH in 6 hours   - Q1 neuro checks  - Pain control PRN; avoid oversedation  - Keppra 500 BID x 7 days  - Normotension   - NPO until stability scan  - b/l SCDs; hold AC/AP/chemical DVT prophylaxis at this time  - Trauma eval  - Medical management/supportive care per primary team  - No acute neurosurgical intervention at this time  - D/w Dr. Cherelle Knapp  82F w/ PMHX of HTN, HLD, OA, afib on Eliquis, HFpEF PPM 7/2021, pulmonary HTN, small bowel ca s/p whipple 9 years ago, COPD on home O2 trf PLV after presenting from fall off couch while drowsy, +HS, on Eliquis. CTH found B/L SAH/IPH, R orbital floor fx, and non displaced R anterior maxillary sinus fx and trf to [facility]    Plan  - Imaging reviewed.   - Eliquis reversed w/ Kcentra  - Repeat CTH in 6 hours ~12PM or sooner if deterioration in mental status  - If bleed worsened on repeat exam, will order Andexxa followed by repeat CTH in 6 hours   - Q1 neuro checks  - Pain control PRN; avoid oversedation  - Keppra 500 BID x 7 days  - Normotension   - NPO until stability scan  - b/l SCDs; hold AC/AP/chemical DVT prophylaxis at this time  - Trauma eval  - Medical management/supportive care per primary team  - No acute neurosurgical intervention at this time  - D/w Dr. Cherelle Knapp

## 2023-10-03 NOTE — ED PROVIDER NOTE - OBJECTIVE STATEMENT
82F with known AF on Eliquis, HTN, HLD, HFpEF PPM (implanted 7/2021), severe Pulm HTN follwed by Dr. Mahajan, COPD on home O2 pw fall. pt states she fell asleep on couch and was walking to her bed while half asleep and fell. pt fell hitting her face. pt has pain in her right eye and eye is swollen shut. no neck or back pain.

## 2023-10-03 NOTE — ED ADULT NURSE NOTE - OBJECTIVE STATEMENT
Patient brought in by ambulance from [facility] reports she fell while going back to bed face down. On assessment noted with swelling and hematoma on the right eye/ orbital area. On further assessment noted bruising on the mid abdomen and both knee. Patient is on eliquis for afib. Patient denies nausea/ vomiting/ LOC. Patient brought in by ambulance from [facility] reports she fell while going back to bed face down. On assessment noted with swelling and hematoma on the right eye/ orbital area. On further assessment noted bruising on the right maxillary area, mid abdomen and both knee. Patient is on eliquis for afib. Patient denies nausea/ vomiting/ LOC.

## 2023-10-03 NOTE — ED ADULT NURSE NOTE - OBJECTIVE STATEMENT
pt transfer from [place] for bilateral subarachnoid and parenchymal hemorrhages and minimally displaced right orbital floor fracture with herniation of intraorbital fat. Pt stated she was sleeping on the couch when she got up to go to bed and fell and hit her head. PMH of afib s/p pacemaker on eliquis, HTN, and COPD on 1L NC home O2. PT received hydralazine, k centra, and tetanus PTA. Pt AOx4, speaking coherently, sensory intact, no ataxia or drifts, PERRLA 2mm, respirations even and unlabored on RA, skin warm and dry. CM and  in place, bed locked in the lowest position, side rails sup/. pt transfer from [place] for bilateral subarachnoid and parenchymal hemorrhages and minimally displaced right orbital floor fracture with herniation of intraorbital fat. Pt stated she was sleeping on the couch when she got up to go to bed and fell and hit her head. PMH of afib s/p pacemaker on eliquis, HTN, and COPD on 1L NC home O2, small bowel cancer s/p whipple procedure. PT received hydralazine, k centra, and tetanus PTA. Pt AOx4, speaking coherently, sensory intact, no ataxia or drifts, PERRLA 2mm, respirations even and unlabored on RA, skin warm and dry. CM and  in place, bed locked in the lowest position, side rails up.

## 2023-10-03 NOTE — ED ADULT NURSE NOTE - CHIEF COMPLAINT QUOTE
Patient brought by ambulance from [facility] tried to get up the bed fell face down noted on eliquis

## 2023-10-03 NOTE — H&P ADULT - HISTORY OF PRESENT ILLNESS
HPI: 82 y.o female w/ PMH A.fib, CKD, COPD, HLD, HTN, MD, OA, pacemaker, and hx small bowel ca s/p whipple ~ 9 years ago transferred from [place] after a ground level fall, found to have b/l SAH, R frontal scalp hematoma, R orbital floor fx, and R anterior wall max sinus fx. Pt on eliquis, recieved K centra at [facility]. Pt HD stable on arrival, GCS 15.     PAST MEDICAL & SURGICAL HISTORY:  HTN (hypertension)    HLD (hyperlipidemia)    Anemia  newly diagnosed    Pneumonia  years ago    OA (osteoarthritis)    Afib    Small bowel cancer    Pacemaker    Major depression    Chronic kidney disease (CKD)    COPD (chronic obstructive pulmonary disease)    Status post total left knee replacement  2007    History of cholecystectomy  open , many years ago    H/O hernia repair  years ago    H/O resection of small bowel    Home Medications:    Review of Systems: All negative except where noted in HPI    MEDICATIONS  (STANDING):  acetaminophen   IVPB .. 1000 milliGRAM(s) IV Intermittent every 6 hours  chlorhexidine 2% Cloths 1 Application(s) Topical <User Schedule>  levETIRAcetam  IVPB 500 milliGRAM(s) IV Intermittent every 12 hours    MEDICATIONS  (PRN):    Vital Signs Last 24 Hrs  T(C): 36.7 (03 Oct 2023 07:06), Max: 36.9 (03 Oct 2023 04:20)  T(F): 98.1 (03 Oct 2023 07:06), Max: 98.4 (03 Oct 2023 04:20)  HR: 60 (03 Oct 2023 08:25) (60 - 64)  BP: 156/70 (03 Oct 2023 09:07) (150/89 - 189/78)  BP(mean): 100 (03 Oct 2023 09:07) (100 - 100)  RR: 20 (03 Oct 2023 09:07) (16 - 20)  SpO2: 98% (03 Oct 2023 09:07) (96% - 98%)    Parameters below as of 03 Oct 2023 09:07  Patient On (Oxygen Delivery Method): room air    Physical Exam:    Gen: Resting comfortably in bed  HEENT: PERRL, EOMI, R periorbital ecchymosis R subconjunctival hemorrhage   Neurological: Alert and oriented x3 without focal deficit,  strength 3/5 b/l LE, reports this is her baseline as well. Daughter at bedside confirms, 5/5 b/l UE  Neck: Trachea midline, no evidence of JVD, FROM without pain, neck symmetric  Pulmonary: CTAB with decreased breath sounds at the bases  Cardiovascular: regular rate and rhythm  Gastrointestinal: Soft, non-tender, non-distended  : unremarkable  Extremities: 3+ pitting edema b/l  Skin: Intact  Musculoskeletal: limited ROM to b/l UE (pt states this is her baseline ever since positioning in the OR during her whipple ~ 9 years ago),  LABS:                        8.7    3.68  )-----------( 198      ( 03 Oct 2023 05:00 )             27.5     10-03    142  |  105  |  23  ----------------------------<  102<H>  3.9   |  31  |  1.50<H>    Ca    8.5      03 Oct 2023 05:00    TPro  6.2  /  Alb  3.2<L>  /  TBili  0.3  /  DBili  x   /  AST  29  /  ALT  34  /  AlkPhos  60  10-03    PT/INR - ( 03 Oct 2023 05:00 )   PT: 14.4 sec;   INR: 1.24 ratio         PTT - ( 03 Oct 2023 05:00 )  PTT:30.3 sec  Urinalysis Basic - ( 03 Oct 2023 05:00 )    Color: x / Appearance: x / SG: x / pH: x  Gluc: 102 mg/dL / Ketone: x  / Bili: x / Urobili: x   Blood: x / Protein: x / Nitrite: x   Leuk Esterase: x / RBC: x / WBC x   Sq Epi: x / Non Sq Epi: x / Bacteria: x    Impression and Plan:  82 y.o female w/ multiple medical comorbidities admitted w/ B/L SAH, R orbital floor fx, and non displaced R anterior maxillary sinus fx after a mechanical fall at her assisted nursing facility,     - Admit to SICU  - q1 neurochecks  - repeat CT scan @ 12  - s/p K centra at [facility]  - repeat labs, CBC, BMP, mg, phos, Anti XA level  - keppra for seizure ppx  - SCDs only, no chemical ppx in the setting of head bleed  - NPO for now, start diet this afternoon if repeat CTH stable  - plastic surgery consulted for facial fractures: recommend ice, elevate HOB and follow up outpatient    Plan to be discussed with Dr. Mercedes

## 2023-10-03 NOTE — ED PROVIDER NOTE - CRITICAL CARE ATTENDING CONTRIBUTION TO CARE
Upon my evaluation, this patient had a high probability of imminent or life-threatening deterioration due to __subarachnoid hemorrhage and intraparenchymal hemorrhage _______, which required my direct attention, intervention, and personal management.  The patient has a  medical condition that impairs one or more vital organ systems.  Frequent personal assessment and adjustment of medical interventions was performed.      I have personally provided _65__ minutes of critical care time exclusive of time spent on separately billable procedures. Time includes review of laboratory data, radiology results, discussion with consultants, patient and family; monitoring for potential decompensation, as well as time spent retrieving data and reviewing the chart and documenting the visit. Interventions were performed as documented above.

## 2023-10-04 LAB
ANION GAP SERPL CALC-SCNC: 11 MMOL/L — SIGNIFICANT CHANGE UP (ref 5–17)
APPEARANCE UR: CLEAR — SIGNIFICANT CHANGE UP
BACTERIA # UR AUTO: ABNORMAL
BASOPHILS # BLD AUTO: 0 K/UL — SIGNIFICANT CHANGE UP (ref 0–0.2)
BASOPHILS NFR BLD AUTO: 0 % — SIGNIFICANT CHANGE UP (ref 0–2)
BILIRUB UR-MCNC: NEGATIVE — SIGNIFICANT CHANGE UP
BUN SERPL-MCNC: 18.5 MG/DL — SIGNIFICANT CHANGE UP (ref 8–20)
CALCIUM SERPL-MCNC: 8.1 MG/DL — LOW (ref 8.4–10.5)
CHLORIDE SERPL-SCNC: 101 MMOL/L — SIGNIFICANT CHANGE UP (ref 96–108)
CO2 SERPL-SCNC: 27 MMOL/L — SIGNIFICANT CHANGE UP (ref 22–29)
COLOR SPEC: YELLOW — SIGNIFICANT CHANGE UP
CREAT SERPL-MCNC: 1.42 MG/DL — HIGH (ref 0.5–1.3)
DIFF PNL FLD: ABNORMAL
EGFR: 36 ML/MIN/1.73M2 — LOW
EOSINOPHIL # BLD AUTO: 0.15 K/UL — SIGNIFICANT CHANGE UP (ref 0–0.5)
EOSINOPHIL NFR BLD AUTO: 5 % — SIGNIFICANT CHANGE UP (ref 0–6)
EPI CELLS # UR: SIGNIFICANT CHANGE UP
GLUCOSE BLDC GLUCOMTR-MCNC: 100 MG/DL — HIGH (ref 70–99)
GLUCOSE BLDC GLUCOMTR-MCNC: 113 MG/DL — HIGH (ref 70–99)
GLUCOSE BLDC GLUCOMTR-MCNC: 96 MG/DL — SIGNIFICANT CHANGE UP (ref 70–99)
GLUCOSE SERPL-MCNC: 81 MG/DL — SIGNIFICANT CHANGE UP (ref 70–99)
GLUCOSE UR QL: NEGATIVE MG/DL — SIGNIFICANT CHANGE UP
HCT VFR BLD CALC: 26.1 % — LOW (ref 34.5–45)
HGB BLD-MCNC: 8.3 G/DL — LOW (ref 11.5–15.5)
IMM GRANULOCYTES NFR BLD AUTO: 0.3 % — SIGNIFICANT CHANGE UP (ref 0–0.9)
KETONES UR-MCNC: NEGATIVE — SIGNIFICANT CHANGE UP
LEUKOCYTE ESTERASE UR-ACNC: ABNORMAL
LMWH PPP CHRO-ACNC: 0.52 IU/ML — SIGNIFICANT CHANGE UP (ref 0.5–1.1)
LYMPHOCYTES # BLD AUTO: 0.69 K/UL — LOW (ref 1–3.3)
LYMPHOCYTES # BLD AUTO: 22.9 % — SIGNIFICANT CHANGE UP (ref 13–44)
MAGNESIUM SERPL-MCNC: 2.1 MG/DL — SIGNIFICANT CHANGE UP (ref 1.6–2.6)
MCHC RBC-ENTMCNC: 31.3 PG — SIGNIFICANT CHANGE UP (ref 27–34)
MCHC RBC-ENTMCNC: 31.8 GM/DL — LOW (ref 32–36)
MCV RBC AUTO: 98.5 FL — SIGNIFICANT CHANGE UP (ref 80–100)
MONOCYTES # BLD AUTO: 0.36 K/UL — SIGNIFICANT CHANGE UP (ref 0–0.9)
MONOCYTES NFR BLD AUTO: 12 % — SIGNIFICANT CHANGE UP (ref 2–14)
MRSA PCR RESULT.: SIGNIFICANT CHANGE UP
NEUTROPHILS # BLD AUTO: 1.8 K/UL — SIGNIFICANT CHANGE UP (ref 1.8–7.4)
NEUTROPHILS NFR BLD AUTO: 59.8 % — SIGNIFICANT CHANGE UP (ref 43–77)
NITRITE UR-MCNC: NEGATIVE — SIGNIFICANT CHANGE UP
PH UR: 6.5 — SIGNIFICANT CHANGE UP (ref 5–8)
PLATELET # BLD AUTO: 183 K/UL — SIGNIFICANT CHANGE UP (ref 150–400)
POTASSIUM SERPL-MCNC: 3.9 MMOL/L — SIGNIFICANT CHANGE UP (ref 3.5–5.3)
POTASSIUM SERPL-SCNC: 3.9 MMOL/L — SIGNIFICANT CHANGE UP (ref 3.5–5.3)
PROT UR-MCNC: NEGATIVE — SIGNIFICANT CHANGE UP
RBC # BLD: 2.65 M/UL — LOW (ref 3.8–5.2)
RBC # FLD: 15.7 % — HIGH (ref 10.3–14.5)
RBC CASTS # UR COMP ASSIST: ABNORMAL /HPF (ref 0–4)
S AUREUS DNA NOSE QL NAA+PROBE: SIGNIFICANT CHANGE UP
SODIUM SERPL-SCNC: 139 MMOL/L — SIGNIFICANT CHANGE UP (ref 135–145)
SP GR SPEC: 1.01 — SIGNIFICANT CHANGE UP (ref 1.01–1.02)
UROBILINOGEN FLD QL: NEGATIVE MG/DL — SIGNIFICANT CHANGE UP
WBC # BLD: 3.01 K/UL — LOW (ref 3.8–10.5)
WBC # FLD AUTO: 3.01 K/UL — LOW (ref 3.8–10.5)
WBC UR QL: ABNORMAL /HPF (ref 0–5)

## 2023-10-04 PROCEDURE — 99232 SBSQ HOSP IP/OBS MODERATE 35: CPT | Mod: FS

## 2023-10-04 PROCEDURE — 99233 SBSQ HOSP IP/OBS HIGH 50: CPT

## 2023-10-04 RX ORDER — LEVETIRACETAM 250 MG/1
500 TABLET, FILM COATED ORAL
Refills: 0 | Status: DISCONTINUED | OUTPATIENT
Start: 2023-10-04 | End: 2023-10-05

## 2023-10-04 RX ORDER — ACETAMINOPHEN 500 MG
1000 TABLET ORAL ONCE
Refills: 0 | Status: COMPLETED | OUTPATIENT
Start: 2023-10-04 | End: 2023-10-04

## 2023-10-04 RX ORDER — HYDRALAZINE HCL 50 MG
5 TABLET ORAL ONCE
Refills: 0 | Status: COMPLETED | OUTPATIENT
Start: 2023-10-04 | End: 2023-10-04

## 2023-10-04 RX ORDER — ACETAMINOPHEN 500 MG
650 TABLET ORAL EVERY 6 HOURS
Refills: 0 | Status: DISCONTINUED | OUTPATIENT
Start: 2023-10-04 | End: 2023-10-06

## 2023-10-04 RX ORDER — FUROSEMIDE 40 MG
40 TABLET ORAL DAILY
Refills: 0 | Status: DISCONTINUED | OUTPATIENT
Start: 2023-10-04 | End: 2023-10-06

## 2023-10-04 RX ADMIN — BUDESONIDE AND FORMOTEROL FUMARATE DIHYDRATE 2 PUFF(S): 160; 4.5 AEROSOL RESPIRATORY (INHALATION) at 20:51

## 2023-10-04 RX ADMIN — Medication 1000 MILLIGRAM(S): at 22:30

## 2023-10-04 RX ADMIN — Medication 1 DROP(S): at 17:17

## 2023-10-04 RX ADMIN — Medication 1 APPLICATION(S): at 05:31

## 2023-10-04 RX ADMIN — Medication 3 MILLILITER(S): at 08:29

## 2023-10-04 RX ADMIN — Medication 5 MILLIGRAM(S): at 22:14

## 2023-10-04 RX ADMIN — Medication 3 MILLIGRAM(S): at 22:14

## 2023-10-04 RX ADMIN — Medication 400 MILLIGRAM(S): at 22:15

## 2023-10-04 RX ADMIN — FAMOTIDINE 20 MILLIGRAM(S): 10 INJECTION INTRAVENOUS at 13:19

## 2023-10-04 RX ADMIN — AMIODARONE HYDROCHLORIDE 200 MILLIGRAM(S): 400 TABLET ORAL at 05:31

## 2023-10-04 RX ADMIN — Medication 3 MILLILITER(S): at 20:50

## 2023-10-04 RX ADMIN — Medication 1000 MILLIGRAM(S): at 06:01

## 2023-10-04 RX ADMIN — Medication 400 MILLIGRAM(S): at 05:31

## 2023-10-04 RX ADMIN — CARVEDILOL PHOSPHATE 12.5 MILLIGRAM(S): 80 CAPSULE, EXTENDED RELEASE ORAL at 05:31

## 2023-10-04 RX ADMIN — LEVETIRACETAM 400 MILLIGRAM(S): 250 TABLET, FILM COATED ORAL at 05:30

## 2023-10-04 RX ADMIN — Medication 1 APPLICATION(S): at 17:15

## 2023-10-04 RX ADMIN — SIMVASTATIN 10 MILLIGRAM(S): 20 TABLET, FILM COATED ORAL at 22:14

## 2023-10-04 RX ADMIN — CARVEDILOL PHOSPHATE 12.5 MILLIGRAM(S): 80 CAPSULE, EXTENDED RELEASE ORAL at 17:13

## 2023-10-04 RX ADMIN — Medication 3 MILLILITER(S): at 16:07

## 2023-10-04 RX ADMIN — Medication 1000 MILLIGRAM(S): at 00:09

## 2023-10-04 RX ADMIN — LEVETIRACETAM 500 MILLIGRAM(S): 250 TABLET, FILM COATED ORAL at 17:13

## 2023-10-04 RX ADMIN — BUDESONIDE AND FORMOTEROL FUMARATE DIHYDRATE 2 PUFF(S): 160; 4.5 AEROSOL RESPIRATORY (INHALATION) at 08:29

## 2023-10-04 RX ADMIN — CHLORHEXIDINE GLUCONATE 1 APPLICATION(S): 213 SOLUTION TOPICAL at 05:32

## 2023-10-04 NOTE — PROGRESS NOTE ADULT - ASSESSMENT
82y Female PMH HTN, HLD, anemia, OA, afib on Eliquis, HFpEF PPM 7/2021, pulmonary HTN, small bowel ca s/p Whipple procedure 9 years ago, COPD on home O2, presented to [facility] s/p fall off couch while drowsy, found with multicompartmental ICH, reversed with KCentra, transferred to [facility] for tertiary care.    PLAN:  - Will d/w attending  - Continue Q1 neuro checks for now  - Recommend repeat CTH 24 hours from last scan ~ 21:00 today 10/4/23 to ensure stability in the setting of previously worsened ICH and history of active ACT use, STAT with any change in neurologic status  - Recommend Keppra 500 Q12 x 7 days  - Pain control PRN, avoid oversedation  - Hold ACT/APT for now  - Supportive care/further medical management per primary team  - Final recommendations and plan pending AM rounds and discussion with attending  82y Female PMH HTN, HLD, anemia, OA, afib on Eliquis, HFpEF PPM 7/2021, pulmonary HTN, small bowel ca s/p Whipple procedure 9 years ago, COPD on home O2, presented to [facility] s/p fall off couch while drowsy, found with multicompartmental ICH, reversed with KCentra, transferred to [facility] for tertiary care.    PLAN:  - Will d/w attending  - ok for q4 neurochecks, telemetry, diet  - Recommend repeat CTH 24 hours from last scan ~ 21:00 today 10/4/23 to ensure stability in the setting of previously worsened ICH and history of active ACT use, STAT with any change in neurologic status  - Recommend Keppra 500 Q12 x 7 days  - Pain control PRN, avoid oversedation  - Hold ACT/APT for now,  hold DVT ppx until 24 hour scan  - Supportive care/further medical management per primary team  - Final recommendations and plan pending AM rounds and discussion with attending

## 2023-10-04 NOTE — PROGRESS NOTE ADULT - SUBJECTIVE AND OBJECTIVE BOX
HISTORY  82y Female with PMH HTN, HLD, anemia, OA, afib on eliquis, HFpEF w/ PPM, pulm HTN, s/p Whipple, COPD on home O2 presented to [place] s/p fall. Found to have b/l SAH and parenchymal hemorrhages.    24 HOUR EVENTS: Repeat CTH read as stable. Patient has received additional dosing of Kcentra. NSGY recommending repeat CTH 10/4 at 21:00 for 24hr stability. NAOE.    SUBJECTIVE/ROS:  [x] A ten-point review of systems was otherwise negative except as noted.  [ ] Due to altered mental status/intubation, subjective information were not able to be obtained from the patient. History was obtained, to the extent possible, from review of the chart and collateral sources of information.    Constitutional: NAD  HEENT: PERRLA, ecchymosis and swelling to R face  Respiratory: respirations even, unlabored   Cardiovascular: (paced), normotensive  Gastrointestinal: Soft, non-tender, non-distended  Extremities: No peripheral edema, No cyanosis, clubbing; scattered ecchymoses  Neurological: A&O x 3; no gross deficits  Psychiatric: Normal mood, normal affect  Musculoskeletal: No joint pain, swelling or deformity; no limitation of movement  Skin: No rashes    NEURO  RASS: 0    GCS: 15    CAM ICU: neg  Exam: alert, oriented; pain well controlled  Meds: acetaminophen   IVPB .. 1000 milliGRAM(s) IV Intermittent every 6 hours  levETIRAcetam  IVPB 500 milliGRAM(s) IV Intermittent every 12 hours  melatonin 3 milliGRAM(s) Oral at bedtime    [x] Adequacy of sedation and pain control has been assessed and adjusted      RESPIRATORY  RR: 19 (10-04-23 @ 02:00) (13 - 21)  SpO2: 98% (10-04-23 @ 02:00) (94% - 99%)  Wt(kg): --  Exam: simin, unlabored without accessory muscle use  Mechanical Ventilation: n/a  ABG - ( 03 Oct 2023 05:00 )  pH: x     /  pCO2: x     /  pO2: x     / HCO3: x     / Base Excess: x     /  SaO2: x       Lactate: 0.7              [ ] Extubation Readiness Assessed  Meds: albuterol/ipratropium for Nebulization 3 milliLiter(s) Nebulizer every 6 hours  budesonide  80 MICROgram(s)/formoterol 4.5 MICROgram(s) Inhaler 2 Puff(s) Inhalation two times a day        CARDIOVASCULAR  HR: 60 (10-04-23 @ 02:00) (60 - 64)  BP: 174/53 (10-04-23 @ 02:00) (130/52 - 207/83)  BP(mean): 89 (10-04-23 @ 02:00) (76 - 133)  ABP: --  ABP(mean): --  Wt(kg): --  CVP(cm H2O): --    Lactate, Blood: 0.7 mmol/L (10-03 @ 05:00)    Exam:  Cardiac Rhythm: paced  Perfusion     [x]Adequate   [ ]Inadequate  Mentation   [x]Normal       [ ]Reduced  Extremities  [x]Warm         [ ]Cool  Volume Status [ ]Hypervolemic [x]Euvolemic [ ]Hypovolemic  Meds: aMIOdarone    Tablet 200 milliGRAM(s) Oral daily  carvedilol 12.5 milliGRAM(s) Oral every 12 hours        GI/NUTRITION  Exam: soft, nontender  Diet: regular  Meds: famotidine    Tablet 20 milliGRAM(s) Oral daily      GENITOURINARY  I&O's Detail    10-03 @ 07:01  -  10-04 @ 02:49  --------------------------------------------------------  IN:    IV PiggyBack: 80 mL    IV PiggyBack: 100 mL    IV PiggyBack: 100 mL  Total IN: 280 mL    OUT:    Voided (mL): 350 mL  Total OUT: 350 mL    Total NET: -70 mL        Weight (kg): 77.1 (10-03 @ 07:43), 72.6 (10-03 @ 04:20)  10-03    139  |  100  |  20.9<H>  ----------------------------<  93  4.0   |  28.0  |  1.38<H>    Ca    8.6      03 Oct 2023 10:11  Phos  3.2     10-03  Mg     2.2     10-03    TPro  6.2  /  Alb  3.2<L>  /  TBili  0.3  /  DBili  x   /  AST  29  /  ALT  34  /  AlkPhos  60  10-03    [ ] Danielson catheter, indication: N/A  Meds: dextrose 5%. 1000 milliLiter(s) IV Continuous <Continuous>  dextrose 5%. 1000 milliLiter(s) IV Continuous <Continuous>        HEMATOLOGIC  Meds:   [x] VTE Prophylaxis                        8.1    4.50  )-----------( 180      ( 03 Oct 2023 10:11 )             24.5     PT/INR - ( 03 Oct 2023 10:11 )   PT: 13.1 sec;   INR: 1.19 ratio         PTT - ( 03 Oct 2023 10:11 )  PTT:32.9 sec  Transfusion     [ ] PRBC   [ ] Platelets   [ ] FFP   [ ] Cryoprecipitate      INFECTIOUS DISEASES  T(C): 37 (10-04-23 @ 00:02), Max: 37 (10-04-23 @ 00:02)  Wt(kg): --  WBC Count: 4.50 K/uL (10-03 @ 10:11)  WBC Count: 3.68 K/uL (10-03 @ 05:00)    Recent Cultures:    Meds: influenza  Vaccine (HIGH DOSE) 0.7 milliLiter(s) IntraMuscular once        ENDOCRINE  Capillary Blood Glucose    Meds: dextrose 50% Injectable 25 Gram(s) IV Push once  dextrose 50% Injectable 12.5 Gram(s) IV Push once  dextrose 50% Injectable 25 Gram(s) IV Push once  dextrose Oral Gel 15 Gram(s) Oral once PRN  glucagon  Injectable 1 milliGRAM(s) IntraMuscular once  insulin lispro (ADMELOG) corrective regimen sliding scale   SubCutaneous every 6 hours  simvastatin 10 milliGRAM(s) Oral at bedtime        ACCESS DEVICES:  [x] Peripheral IV  [ ] Central Venous Line	[ ] R	[ ] L	[ ] IJ	[ ] Fem	[ ] SC	Placed:   [ ] Arterial Line		[ ] R	[ ] L	[ ] Fem	[ ] Rad	[ ] Ax	Placed:   [ ] PICC:					[ ] Mediport  [ ] Urinary Catheter, Date Placed:   [x] Necessity of urinary, arterial, and venous catheters discussed    OTHER MEDICATIONS:  bacitracin   Ointment 1 Application(s) Topical every 12 hours  chlorhexidine 2% Cloths 1 Application(s) Topical <User Schedule>      CODE STATUS:     IMAGING:  < from: CT Head No Cont (10.03.23 @ 20:54) >  IMPRESSION:    Pattern of subarachnoid hemorrhage unchanged compared to prior exam from   10/3/2023 at 12:28 PM. No new interval hemorrhage. No significant mass   effect.    Previously seen lucency in the left occipital lobe is not seen on current   exam and was likely artifactual on prior exam.    < end of copied text >

## 2023-10-04 NOTE — CHART NOTE - NSCHARTNOTEFT_GEN_A_CORE
SICU TRANSFER NOTE  -----------------------------  ICU Admission Date: 10/3/2023  Transfer Date: 10-04-23 @ 16:44    Admission Diagnosis: traumatic brain injury    Active Problems/injuries: B/L SAH, R orbital floor fx, and interior R maxillary sinus fracture    Procedures: none    Consultants:  NSRG    Medications  acetaminophen     Tablet .. 650 milliGRAM(s) Oral every 6 hours PRN  albuterol/ipratropium for Nebulization 3 milliLiter(s) Nebulizer every 6 hours  aMIOdarone    Tablet 200 milliGRAM(s) Oral daily  artificial tears (preservative free) Ophthalmic Solution 1 Drop(s) Left EYE two times a day  bacitracin   Ointment 1 Application(s) Topical every 12 hours  budesonide  80 MICROgram(s)/formoterol 4.5 MICROgram(s) Inhaler 2 Puff(s) Inhalation two times a day  carvedilol 12.5 milliGRAM(s) Oral every 12 hours  chlorhexidine 2% Cloths 1 Application(s) Topical <User Schedule>  famotidine    Tablet 20 milliGRAM(s) Oral daily  furosemide    Tablet 40 milliGRAM(s) Oral daily  influenza  Vaccine (HIGH DOSE) 0.7 milliLiter(s) IntraMuscular once  levETIRAcetam 500 milliGRAM(s) Oral two times a day  melatonin 3 milliGRAM(s) Oral at bedtime  simvastatin 10 milliGRAM(s) Oral at bedtime    [x] I attest I have reviewed and reconciled all medications prior to transfer    I have discussed this case with AVERY Walker upon transfer and all questions regarding ICU course were answered.  The following items are to be followed up:  - repeat CTH this evening at 9 PM  - holding full AC, okay to start DVT ppx tonight if CTH stable  - plastics consulted (Dr. Rodríguez), recommended non-op, elevate HOB, ice packs, likely will need f/u out pt - has not left a formal note yet, still awaiting final recommendations  - DNR/DNI

## 2023-10-04 NOTE — PROGRESS NOTE ADULT - SUBJECTIVE AND OBJECTIVE BOX
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 Repeat CTH originally worsened, repeat CTH overnight stable. Patient seen earlier today, feeling okay, c/o LE pain    Vital Signs Last 24 Hrs  T(C): 37 (04 Oct 2023 00:02), Max: 37 (04 Oct 2023 00:02)  T(F): 98.6 (04 Oct 2023 00:02), Max: 98.6 (04 Oct 2023 00:02)  HR: 60 (03 Oct 2023 23:00) (60 - 64)  BP: 177/59 (03 Oct 2023 23:00) (130/52 - 207/83)  BP(mean): 92 (03 Oct 2023 23:00) (76 - 133)  RR: 13 (03 Oct 2023 23:00) (13 - 21)  SpO2: 95% (03 Oct 2023 23:00) (95% - 99%)    Parameters below as of 03 Oct 2023 20:00  Patient On (Oxygen Delivery Method): room air    PHYSICAL EXAM:  GENERAL: NAD, well-groomed  HEAD: +traumatic, multiple facial ecchymoses and edema  MONALISA COMA SCORE: E- 4 V- 5 M- 6=15  MENTAL STATUS: AAO x3; Appropriately conversant without aphasia; following commands  CRANIAL NERVES: PERRL. EOMI without nystagmus. Facial sensation intact V1-3 distribution b/l. Face symmetric w/ normal eye closure and smile, tongue midline. Hearing grossly intact. Speech clear  MOTOR: strength 5/5 b/l upper extremities; LE limited by pain but at least 4/5 throughout  SENSATION: grossly intact to light touch all extremities    LABS:                        8.1    4.50  )-----------( 180      ( 03 Oct 2023 10:11 )             24.5     10-03    139  |  100  |  20.9<H>  ----------------------------<  93  4.0   |  28.0  |  1.38<H>    Ca    8.6      03 Oct 2023 10:11  Phos  3.2     10-03  Mg     2.2     10-03    TPro  6.2  /  Alb  3.2<L>  /  TBili  0.3  /  DBili  x   /  AST  29  /  ALT  34  /  AlkPhos  60  10-03    PT/INR - ( 03 Oct 2023 10:11 )   PT: 13.1 sec;   INR: 1.19 ratio      PTT - ( 03 Oct 2023 10:11 )  PTT:32.9 sec  Urinalysis Basic - ( 03 Oct 2023 10:11 )    Color: x / Appearance: x / SG: x / pH: x  Gluc: 93 mg/dL / Ketone: x  / Bili: x / Urobili: x   Blood: x / Protein: x / Nitrite: x   Leuk Esterase: x / RBC: x / WBC x   Sq Epi: x / Non Sq Epi: x / Bacteria: x    10-03 @ 07:01  -  10-04 @ 01:24  --------------------------------------------------------  IN: 280 mL / OUT: 350 mL / NET: -70 mL    RADIOLOGY & ADDITIONAL TESTS:  CT Head No Cont (10.03.23 @ 20:54)  IMPRESSION:  Pattern of subarachnoid hemorrhage unchanged compared to prior exam from   10/3/2023 at 12:28 PM. No new interval hemorrhage. No significant mass   effect.  Previously seen lucency in the left occipital lobe is not seen on current   exam and was likely artifactual on prior exam.    CT Head No Cont (10.03.23 @ 12:37)  IMPRESSION: Subarachnoid hemorrhage again seen and slightly more   conspicuous increased in size  Suspected acute infarct described above. This finding can be further   evaluated with MRI if there are no contraindications.    CT Head No Cont (10.03.23 @ 06:06)  IMPRESSION:  1. Head CT: Bilateral subarachnoid and parenchymal hemorrhages without   midline shift. Right frontal scalp hematoma. No calvarial fracture.  2. Cervical spine CT: No acute displaced fracture or traumatic   malalignment. Cervical degenerative spondylosis, as described above.  3. Maxillofacial CT: Minimally displaced the right orbital floor fracture   with herniation of intraorbital fat. No entrapment of the inferior rectus.  Nondisplaced fractures of the anterior and lateral wall of the right   maxillary sinus. 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 Repeat CTH originally worsened, repeat CTH overnight stable. Patient seen earlier today, feeling okay, c/o LE pain    Vital Signs Last 24 Hrs  T(C): 37 (04 Oct 2023 00:02), Max: 37 (04 Oct 2023 00:02)  T(F): 98.6 (04 Oct 2023 00:02), Max: 98.6 (04 Oct 2023 00:02)  HR: 60 (03 Oct 2023 23:00) (60 - 64)  BP: 177/59 (03 Oct 2023 23:00) (130/52 - 207/83)  BP(mean): 92 (03 Oct 2023 23:00) (76 - 133)  RR: 13 (03 Oct 2023 23:00) (13 - 21)  SpO2: 95% (03 Oct 2023 23:00) (95% - 99%)    Parameters below as of 03 Oct 2023 20:00  Patient On (Oxygen Delivery Method): room air    PHYSICAL EXAM:  GENERAL: NAD, well-groomed  HEAD: +traumatic, multiple facial ecchymoses and edema  MONALISA COMA SCORE: E- 4 V- 5 M- 6=15  MENTAL STATUS: AAO x3; Appropriately conversant without aphasia; following commands  CRANIAL NERVES: PERRL. EOM grossly intact  MOTOR: no drift, following commands in all four, symmetric and antigravity  SENSATION: grossly intact to light touch all extremities    LABS:                        8.1    4.50  )-----------( 180      ( 03 Oct 2023 10:11 )             24.5     10-03    139  |  100  |  20.9<H>  ----------------------------<  93  4.0   |  28.0  |  1.38<H>    Ca    8.6      03 Oct 2023 10:11  Phos  3.2     10-03  Mg     2.2     10-03    TPro  6.2  /  Alb  3.2<L>  /  TBili  0.3  /  DBili  x   /  AST  29  /  ALT  34  /  AlkPhos  60  10-03    PT/INR - ( 03 Oct 2023 10:11 )   PT: 13.1 sec;   INR: 1.19 ratio      PTT - ( 03 Oct 2023 10:11 )  PTT:32.9 sec  Urinalysis Basic - ( 03 Oct 2023 10:11 )    Color: x / Appearance: x / SG: x / pH: x  Gluc: 93 mg/dL / Ketone: x  / Bili: x / Urobili: x   Blood: x / Protein: x / Nitrite: x   Leuk Esterase: x / RBC: x / WBC x   Sq Epi: x / Non Sq Epi: x / Bacteria: x    10-03 @ 07:01  -  10-04 @ 01:24  --------------------------------------------------------  IN: 280 mL / OUT: 350 mL / NET: -70 mL    RADIOLOGY & ADDITIONAL TESTS:  CT Head No Cont (10.03.23 @ 20:54)  IMPRESSION:  Pattern of subarachnoid hemorrhage unchanged compared to prior exam from   10/3/2023 at 12:28 PM. No new interval hemorrhage. No significant mass   effect.  Previously seen lucency in the left occipital lobe is not seen on current   exam and was likely artifactual on prior exam.    CT Head No Cont (10.03.23 @ 12:37)  IMPRESSION: Subarachnoid hemorrhage again seen and slightly more   conspicuous increased in size  Suspected acute infarct described above. This finding can be further   evaluated with MRI if there are no contraindications.    CT Head No Cont (10.03.23 @ 06:06)  IMPRESSION:  1. Head CT: Bilateral subarachnoid and parenchymal hemorrhages without   midline shift. Right frontal scalp hematoma. No calvarial fracture.  2. Cervical spine CT: No acute displaced fracture or traumatic   malalignment. Cervical degenerative spondylosis, as described above.  3. Maxillofacial CT: Minimally displaced the right orbital floor fracture   with herniation of intraorbital fat. No entrapment of the inferior rectus.  Nondisplaced fractures of the anterior and lateral wall of the right   maxillary sinus.

## 2023-10-04 NOTE — CHART NOTE - NSCHARTNOTEFT_GEN_A_CORE
Tertiary Trauma Survey (TTS)    Date of TTS: 10-04-23 @ 16:47                             Admit Date: 10-03-23 @ 09:50      Trauma Activation: Consult    List Injuries Identified to Date:        List Operative and Interventional Radiological Procedures:           Physical Exam:    Neuro:     HEENT:     Pulm/Chest:     Cardiac:     GI / Abdomen:     Musculoskeletal / Extremities:    Integumentary:       RADIOLOGICAL FINDINGS REVIEW:    INCIDENTAL FINDINGS:    [ ] No    [ ] Yes, Findings are:        [ ] Tertiary exam complete, there are no new injuries identified    [ ] Tertiary exam done, new injuries identified are:                [ ] Imaging ordered: Tertiary Trauma Survey (TTS)    Date of TTS: 10-04-23 @ 16:47                             Admit Date: 10-03-23 @ 09:50      Trauma Activation: Consult    List Injuries Identified to Date:  1) B/L SAH  2) R anterior maxillary sinus fracture  3) R orbital floor fracture       List Operative and Interventional Radiological Procedures:   1) None    Physical Exam:  Neuro: A and O x 3, NAD, Nonfocal. UE/LE motor and sensory intact B/L. PERRL and EOMI. Vision intact for baseline.  HEENT: Scattered abrasions to the face. R periorbital hematoma. R sided facial ecchymosis with extension to the submandibular region. Atraumatic nose with some ecchymosis. Trachea is supple and midline. No obvious periorbital trauma.    Pulm/Chest: CTA B/L, unlabored. Equal inspiratory rise.   Cardiac: S1/S2  GI / Abdomen: NT/ND, previous whipple/hernia repair incision appreciated.  Musculoskeletal / Extremities: No obvious deformities. AROM in UE/LE.  Integumentary: Scattered ecchymosis and swelling on the B/L UE and B/L LE in the setting of full dose AC outpatient. Worse in         RADIOLOGICAL FINDINGS REVIEW:    INCIDENTAL FINDINGS:    [ ] No    [ ] Yes, Findings are:        [ ] Tertiary exam complete, there are no new injuries identified    [ ] Tertiary exam done, new injuries identified are:                [ ] Imaging ordered: Tertiary Trauma Survey (TTS)    Date of TTS: 10-04-23 @ 16:47                             Admit Date: 10-03-23 @ 09:50      Trauma Activation: Consult    List Injuries Identified to Date:  1) B/L SAH  2) R anterior maxillary sinus fracture  3) R orbital floor fracture       List Operative and Interventional Radiological Procedures:   1) None    Physical Exam:  Neuro: A and O x 3, NAD, Nonfocal. UE/LE motor and sensory intact B/L. PERRL and EOMI. Vision intact for baseline.  HEENT: Scattered abrasions to the face. R periorbital hematoma. R sided facial ecchymosis with extension to the submandibular region. Atraumatic nose with some ecchymosis. Trachea is supple and midline. No obvious periorbital trauma.    Pulm/Chest: CTA B/L, unlabored. Equal inspiratory rise.   Cardiac: S1/S2  GI / Abdomen: NT/ND, previous whipple/hernia repair incision appreciated.  Musculoskeletal / Extremities: No obvious deformities. AROM in UE/LE. NTTP down the midline of the cervical/thoracic/lumbar spine.   Integumentary: Scattered ecchymosis and swelling on the B/L UE and B/L LE.    RADIOLOGICAL FINDINGS REVIEW:  10/3/2023  1. Head CT: Bilateral subarachnoid and parenchymal hemorrhages without midline shift. Right frontal scalp hematoma. No calvarial fracture.  2. Cervical spine CT: No acute displaced fracture or traumatic malalignment. Cervical degenerative spondylosis, as described above.  3. Maxillofacial CT: Minimally displaced the right orbital floor fracture with herniation of intraorbital fat. No entrapment of the inferior rectus.  Nondisplaced fractures of the anterior and lateral wall of the right maxillary sinus.    10/3/2023 CT Chest/Abdomen/Pelvis  IMPRESSION:  No evidence of acute traumatic injury of the chest, abdomen, or pelvis.  Redemonstrated mucoid impaction of the distal airways.  Nonobstructive stones in the inferior pole of the right kidney measuring up to 9 mm.    10/3/2023 CT Head  IMPRESSION: Subarachnoid hemorrhage again seen and slightly more conspicuous increased in size    10/3/2023 XR Femur, humerus, shoulder, forearm, hand, tib/fib  IMPRESSION:  No fracture, admitted for subarachnoid hemorrhage    10/3/2023  Pattern of subarachnoid hemorrhage unchanged compared to prior exam from 10/3/2023 at 12:28 PM. No new interval hemorrhage. No significant mass effect.    INCIDENTAL FINDINGS:    [ ] No    [X] Yes, Findings are:  1) Stable 6 mm lingular and 7 mm left lower lobe pulmonary nodules.  2) Nonobstructive stones in the inferior pole of the right kidney measuring up to 9 mm.      [X] Tertiary exam complete, there are no new injuries identified    [ ] Tertiary exam done, new injuries identified are:                [ ] Imaging ordered:

## 2023-10-04 NOTE — PROGRESS NOTE ADULT - CRITICAL CARE ATTENDING COMMENT
x EXT:  LLE w/chronic healing lower leg wound        Fall       -HD stable, now on home antihypertensive regimen, OK to give prn antihypertensives for SBP >180.  -Breathing comfortably on RA  -Repeat CT last pm was stable (after 2nd dose of Kcentra given).  Last Xa level is no longer therapeutic.  Keprra in place for 7 days in total.  -Diet as tolerated  -Voids, Cr slightly increased - follow trend  -SCDs, chemical prophylaxis to start 24 hours after last stable scan (would be 2100 this evening)  -PIV  -PT/OT  -DNR/DNI -w/MOLST in chart  -No acute critical care issues.  Will transfer from critical care setting. Feeling well.    GEN:  Awake, alert, conversive, appropriate  HEAD:  Right face w/ecchymosis extending from right eye down along face to neck, EOMI, mild swelling at eye  ABD:  Soft, non tender, non distended  EXT:  B/L knees w/ecchymosis w/mild tenderness, LLE w/chronic healing lower leg wound    Fall       -HD stable, now on home antihypertensive regimen, OK to give prn antihypertensives for SBP >180.  -Breathing comfortably on RA  -Repeat CT last pm was stable (after 2nd dose of Kcentra given).  Last Xa level is no longer therapeutic.  Keprra in place for 7 days in total.  -Diet as tolerated  -Voids, Cr slightly increased - follow trend  -SCDs, chemical prophylaxis to start 24 hours after last stable scan (would be 2100 this evening)  -PIV  -PT/OT  -DNR/DNI -w/MOLST in chart  -No acute critical care issues.  Will transfer from critical care setting. Feeling well.    GEN:  Awake, alert, conversive, appropriate  HEAD:  Right face w/ecchymosis extending from right eye down along face to neck, EOMI, mild swelling at eye  ABD:  Soft, non tender, non distended  EXT:  B/L knees w/ecchymosis w/mild tenderness, LLE w/chronic healing lower leg wound    Fall   Right orbital floor fracture  Maxillary sinus fracture  Cheek abrasion    -HD stable, now on home antihypertensive regimen, OK to give prn antihypertensives for SBP >180.  -Breathing comfortably on RA  -Repeat CT last pm was stable (after 2nd dose of Kcentra given).  Last Xa level is no longer therapeutic.  Keppra in place for 7 days in total.  -Diet as tolerated  -Voids, Cr slightly increased - follow trend  -SCDs, chemical prophylaxis to start 24 hours after last stable scan (would be 2100 this evening)  -PIV  -PT/OT  -DNR/DNI -w/MOLST in chart.  Spoke with patient and her two daughters at bedside yesterday.  They were all clear, especially the patient, that the patient wants to be a DNR/DNI.  MOLST form filled out and signed in presence of patient.  -No acute critical care issues.  Will transfer from critical care setting.

## 2023-10-04 NOTE — PROGRESS NOTE ADULT - ASSESSMENT
82F w/ PMHX of HTN, HLD, OA, afib on Eliquis, HFpEF PPM 7/2021, pulmonary HTN, small bowel ca s/p whipple 9 years ago, COPD on home O2 trf PLV after presenting from fall off couch while drowsy, +HS, on Eliquis. CTH found B/L SAH/IPH, R orbital floor fx, and non displaced R anterior maxillary sinus fx.    Neuro:  -q1 neuro checks  -repeat CT head for 21:00 today  -c/w keppra  -appreciate recs per neurosurgery    Pulm:  -hx COPD  -O2 as needed    CV:  -holding eliquis in setting of ICH  -PPM, paced  -Coreg restarted yesterday evening  -restart remainder of home meds as able    GI:  -c/w regular diet  -bowel regimen    :  -voiding  -no active issues    Endo:  -POC glucose  -ISS    Heme/DVT  -holding chem dvt ppx  -SCDs    ID:  -no active issues    Lines/Tubes:  -PIV    Skin:  -facial abrasions, bacitracin

## 2023-10-05 DIAGNOSIS — S06.36AA TRAUMATIC HEMORRHAGE OF CEREBRUM, UNSPECIFIED, WITH LOSS OF CONSCIOUSNESS STATUS UNKNOWN, INITIAL ENCOUNTER: ICD-10-CM

## 2023-10-05 LAB
ANION GAP SERPL CALC-SCNC: 11 MMOL/L — SIGNIFICANT CHANGE UP (ref 5–17)
BUN SERPL-MCNC: 21 MG/DL — HIGH (ref 8–20)
CALCIUM SERPL-MCNC: 8.6 MG/DL — SIGNIFICANT CHANGE UP (ref 8.4–10.5)
CHLORIDE SERPL-SCNC: 104 MMOL/L — SIGNIFICANT CHANGE UP (ref 96–108)
CO2 SERPL-SCNC: 26 MMOL/L — SIGNIFICANT CHANGE UP (ref 22–29)
CREAT SERPL-MCNC: 1.27 MG/DL — SIGNIFICANT CHANGE UP (ref 0.5–1.3)
EGFR: 42 ML/MIN/1.73M2 — LOW
GLUCOSE BLDC GLUCOMTR-MCNC: 100 MG/DL — HIGH (ref 70–99)
GLUCOSE BLDC GLUCOMTR-MCNC: 130 MG/DL — HIGH (ref 70–99)
GLUCOSE SERPL-MCNC: 80 MG/DL — SIGNIFICANT CHANGE UP (ref 70–99)
HCT VFR BLD CALC: 25.2 % — LOW (ref 34.5–45)
HGB BLD-MCNC: 8 G/DL — LOW (ref 11.5–15.5)
MAGNESIUM SERPL-MCNC: 2.3 MG/DL — SIGNIFICANT CHANGE UP (ref 1.6–2.6)
MCHC RBC-ENTMCNC: 31.4 PG — SIGNIFICANT CHANGE UP (ref 27–34)
MCHC RBC-ENTMCNC: 31.7 GM/DL — LOW (ref 32–36)
MCV RBC AUTO: 98.8 FL — SIGNIFICANT CHANGE UP (ref 80–100)
PHOSPHATE SERPL-MCNC: 3.2 MG/DL — SIGNIFICANT CHANGE UP (ref 2.4–4.7)
PLATELET # BLD AUTO: 182 K/UL — SIGNIFICANT CHANGE UP (ref 150–400)
POTASSIUM SERPL-MCNC: 4 MMOL/L — SIGNIFICANT CHANGE UP (ref 3.5–5.3)
POTASSIUM SERPL-SCNC: 4 MMOL/L — SIGNIFICANT CHANGE UP (ref 3.5–5.3)
RBC # BLD: 2.55 M/UL — LOW (ref 3.8–5.2)
RBC # FLD: 15.7 % — HIGH (ref 10.3–14.5)
SODIUM SERPL-SCNC: 141 MMOL/L — SIGNIFICANT CHANGE UP (ref 135–145)
WBC # BLD: 2.88 K/UL — LOW (ref 3.8–10.5)
WBC # FLD AUTO: 2.88 K/UL — LOW (ref 3.8–10.5)

## 2023-10-05 PROCEDURE — 70450 CT HEAD/BRAIN W/O DYE: CPT | Mod: 26

## 2023-10-05 PROCEDURE — 99233 SBSQ HOSP IP/OBS HIGH 50: CPT | Mod: FS

## 2023-10-05 PROCEDURE — 99232 SBSQ HOSP IP/OBS MODERATE 35: CPT | Mod: FS

## 2023-10-05 PROCEDURE — 99232 SBSQ HOSP IP/OBS MODERATE 35: CPT

## 2023-10-05 RX ORDER — ACETAMINOPHEN 500 MG
1000 TABLET ORAL ONCE
Refills: 0 | Status: COMPLETED | OUTPATIENT
Start: 2023-10-05 | End: 2023-10-05

## 2023-10-05 RX ORDER — HYDRALAZINE HCL 50 MG
100 TABLET ORAL EVERY 12 HOURS
Refills: 0 | Status: DISCONTINUED | OUTPATIENT
Start: 2023-10-05 | End: 2023-10-06

## 2023-10-05 RX ORDER — LEVETIRACETAM 250 MG/1
1 TABLET, FILM COATED ORAL
Qty: 60 | Refills: 0
Start: 2023-10-05 | End: 2023-11-03

## 2023-10-05 RX ORDER — LEVETIRACETAM 250 MG/1
500 TABLET, FILM COATED ORAL
Refills: 0 | Status: DISCONTINUED | OUTPATIENT
Start: 2023-10-05 | End: 2023-10-06

## 2023-10-05 RX ORDER — ENOXAPARIN SODIUM 100 MG/ML
30 INJECTION SUBCUTANEOUS EVERY 12 HOURS
Refills: 0 | Status: DISCONTINUED | OUTPATIENT
Start: 2023-10-05 | End: 2023-10-06

## 2023-10-05 RX ADMIN — AMIODARONE HYDROCHLORIDE 200 MILLIGRAM(S): 400 TABLET ORAL at 05:46

## 2023-10-05 RX ADMIN — Medication 3 MILLILITER(S): at 09:15

## 2023-10-05 RX ADMIN — BUDESONIDE AND FORMOTEROL FUMARATE DIHYDRATE 2 PUFF(S): 160; 4.5 AEROSOL RESPIRATORY (INHALATION) at 09:19

## 2023-10-05 RX ADMIN — Medication 1000 MILLIGRAM(S): at 10:14

## 2023-10-05 RX ADMIN — FAMOTIDINE 20 MILLIGRAM(S): 10 INJECTION INTRAVENOUS at 12:54

## 2023-10-05 RX ADMIN — ENOXAPARIN SODIUM 30 MILLIGRAM(S): 100 INJECTION SUBCUTANEOUS at 16:37

## 2023-10-05 RX ADMIN — Medication 3 MILLIGRAM(S): at 23:53

## 2023-10-05 RX ADMIN — Medication 3 MILLILITER(S): at 03:33

## 2023-10-05 RX ADMIN — Medication 400 MILLIGRAM(S): at 09:02

## 2023-10-05 RX ADMIN — BUDESONIDE AND FORMOTEROL FUMARATE DIHYDRATE 2 PUFF(S): 160; 4.5 AEROSOL RESPIRATORY (INHALATION) at 20:18

## 2023-10-05 RX ADMIN — Medication 1 DROP(S): at 05:48

## 2023-10-05 RX ADMIN — Medication 1 DROP(S): at 12:54

## 2023-10-05 RX ADMIN — SIMVASTATIN 10 MILLIGRAM(S): 20 TABLET, FILM COATED ORAL at 23:53

## 2023-10-05 RX ADMIN — Medication 100 MILLIGRAM(S): at 16:37

## 2023-10-05 RX ADMIN — Medication 3 MILLILITER(S): at 14:14

## 2023-10-05 RX ADMIN — Medication 1 DROP(S): at 12:53

## 2023-10-05 RX ADMIN — Medication 1 DROP(S): at 23:53

## 2023-10-05 RX ADMIN — Medication 3 MILLILITER(S): at 20:17

## 2023-10-05 RX ADMIN — CHLORHEXIDINE GLUCONATE 1 APPLICATION(S): 213 SOLUTION TOPICAL at 05:47

## 2023-10-05 RX ADMIN — Medication 1 DROP(S): at 16:38

## 2023-10-05 RX ADMIN — LEVETIRACETAM 500 MILLIGRAM(S): 250 TABLET, FILM COATED ORAL at 05:47

## 2023-10-05 RX ADMIN — Medication 1 APPLICATION(S): at 05:47

## 2023-10-05 RX ADMIN — Medication 40 MILLIGRAM(S): at 05:47

## 2023-10-05 RX ADMIN — Medication 1 APPLICATION(S): at 16:38

## 2023-10-05 RX ADMIN — Medication 100 MILLIGRAM(S): at 09:02

## 2023-10-05 RX ADMIN — LEVETIRACETAM 500 MILLIGRAM(S): 250 TABLET, FILM COATED ORAL at 16:37

## 2023-10-05 NOTE — PROGRESS NOTE ADULT - ASSESSMENT
82F on eliquis s/p fall with b/l SAH, ICH    Plan   - q4 neuro checks   - Continue Keppra 500 BID until outpt follow up   - HOLD Eliquis until outpt follow up. Given hx of DVT/PE would continue DVT ppx at discharge as well. Would recommend LED given hx of dvts   - Pt to follow up with Dr. Knapp in 2 weeks [phone]  - NSK signing off please follow up as needed  82F on eliquis s/p fall with b/l SAH, ICH    Plan   - q4 neuro checks   - Continue Keppra 500 BID until outpt follow up   - HOLD Eliquis until outpt follow up. Given hx of DVT/PE would continue DVT ppx at discharge as well. Would recommend LED given hx of dvts; if DVTs then consider IVC filter (although patient may already have them bilaterally?)  - Pt to follow up with Dr. Knapp in 2 weeks [phone]  - NSG signing off please follow up as needed

## 2023-10-05 NOTE — PROGRESS NOTE ADULT - SUBJECTIVE AND OBJECTIVE BOX
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 Repeat CTH originally worsened, repeat CTH overnight stable x2    Interval History: patient remains stable, denies headache, nausea, vomiting.     Vital Signs Last 24 Hrs  T(C): 36.4 (05 Oct 2023 08:24), Max: 37.1 (04 Oct 2023 16:08)  T(F): 97.5 (05 Oct 2023 08:24), Max: 98.8 (04 Oct 2023 16:08)  HR: 61 (05 Oct 2023 08:24) (60 - 61)  BP: 182/78 (05 Oct 2023 08:24) (138/68 - 182/78)  BP(mean): 83 (04 Oct 2023 20:30) (80 - 98)  RR: 19 (05 Oct 2023 08:24) (14 - 26)  SpO2: 94% (05 Oct 2023 08:24) (91% - 99%)    Parameters below as of 05 Oct 2023 08:24  Patient On (Oxygen Delivery Method): room air    Physical Exam:   Gen: NAD   HEENT: swelling and ecchymosis to right side face, anterior neck, + abrasion and discharge from eye   Neuro: A+Ox3, CN 2-12 grossly intact, 5/5 throughout, sensation intact throughout

## 2023-10-05 NOTE — PHYSICAL THERAPY INITIAL EVALUATION ADULT - ADDITIONAL COMMENTS
pt reports living in assisted living with her , no stairs to negotiate. Pt uses rollator to ambulate, owns shower chair and raised toilet seat. Pt independent prior to admission.

## 2023-10-05 NOTE — PROGRESS NOTE ADULT - SUBJECTIVE AND OBJECTIVE BOX
INTERVAL HPI/OVERNIGHT EVENTS:    Patient evaluated at bedside. No acute distress. No acute events overnight.  PAtient reports diffuse pain everywhere.,  R knee pain reported this morning, X-ray reviewed withuot fractures.   Remains hemodynamically stable and afebrile.     MEDICATIONS  (STANDING):  albuterol/ipratropium for Nebulization 3 milliLiter(s) Nebulizer every 6 hours  aMIOdarone    Tablet 200 milliGRAM(s) Oral daily  artificial tears (preservative free) Ophthalmic Solution 1 Drop(s) Both EYES every 4 hours  bacitracin   Ointment 1 Application(s) Topical every 12 hours  budesonide  80 MICROgram(s)/formoterol 4.5 MICROgram(s) Inhaler 2 Puff(s) Inhalation two times a day  carvedilol 12.5 milliGRAM(s) Oral every 12 hours  enoxaparin Injectable 30 milliGRAM(s) SubCutaneous every 12 hours  famotidine    Tablet 20 milliGRAM(s) Oral daily  furosemide    Tablet 40 milliGRAM(s) Oral daily  hydrALAZINE 100 milliGRAM(s) Oral every 12 hours  influenza  Vaccine (HIGH DOSE) 0.7 milliLiter(s) IntraMuscular once  levETIRAcetam 500 milliGRAM(s) Oral two times a day  melatonin 3 milliGRAM(s) Oral at bedtime  simvastatin 10 milliGRAM(s) Oral at bedtime    MEDICATIONS  (PRN):  acetaminophen     Tablet .. 650 milliGRAM(s) Oral every 6 hours PRN Mild Pain (1 - 3)      Vital Signs Last 24 Hrs  T(C): 36.4 (05 Oct 2023 08:24), Max: 37.1 (04 Oct 2023 16:08)  T(F): 97.5 (05 Oct 2023 08:24), Max: 98.8 (04 Oct 2023 16:08)  HR: 62 (05 Oct 2023 09:40) (60 - 62)  BP: 182/78 (05 Oct 2023 08:24) (138/68 - 182/78)  BP(mean): 83 (04 Oct 2023 20:30) (80 - 94)  RR: 19 (05 Oct 2023 08:24) (14 - 26)  SpO2: 95% (05 Oct 2023 09:40) (94% - 99%)    Parameters below as of 05 Oct 2023 09:40  Patient On (Oxygen Delivery Method): room air        Neuro: A and O x 3, NAD, Nonfocal. UE/LE motor and sensory intact B/L. PERRL and EOMI. Vision intact for baseline.  HEENT: Scattered abrasions to the face. R periorbital hematoma. R sided facial ecchymosis with extension to the submandibular region. Atraumatic nose with some ecchymosis. Trachea is supple and midline. No obvious periorbital trauma.    Pulm/Chest: CTA B/L, unlabored. Equal inspiratory rise.   Cardiac: S1/S2  GI / Abdomen: NT/ND, previous whipple/hernia repair incision appreciated.  Musculoskeletal / Extremities: No obvious deformities. AROM in UE/LE. NTTP down the midline of the cervical/thoracic/lumbar spine.   Integumentary: Scattered ecchymosis and swelling on the B/L UE and B/L LE.      I&O's Detail    04 Oct 2023 07:01  -  05 Oct 2023 07:00  --------------------------------------------------------  IN:  Total IN: 0 mL    OUT:    Voided (mL): 1600 mL  Total OUT: 1600 mL    Total NET: -1600 mL          LABS:                        8.0    2.88  )-----------( 182      ( 05 Oct 2023 06:25 )             25.2     10-05    141  |  104  |  21.0<H>  ----------------------------<  80  4.0   |  26.0  |  1.27    Ca    8.6      05 Oct 2023 06:25  Phos  3.2     10-05  Mg     2.3     10-05        Urinalysis Basic - ( 05 Oct 2023 06:25 )    Color: x / Appearance: x / SG: x / pH: x  Gluc: 80 mg/dL / Ketone: x  / Bili: x / Urobili: x   Blood: x / Protein: x / Nitrite: x   Leuk Esterase: x / RBC: x / WBC x   Sq Epi: x / Non Sq Epi: x / Bacteria: x        RADIOLOGY & ADDITIONAL STUDIES:

## 2023-10-05 NOTE — PROGRESS NOTE ADULT - ASSESSMENT
82F w/ PMHX of HTN, HLD, OA, afib on Eliquis, HFpEF PPM 7/2021, pulmonary HTN, small bowel ca s/p whipple 9 years ago, COPD on home O2 trf PLV after presenting from fall off couch while drowsy, +HS, on Eliquis. CTH found B/L SAH/IPH, R orbital floor fx, and non displaced R anterior maxillary sinus fx.  CT head is stable.  NSx okay with DVT ppx, rec holding Eliquis and continue keppra until F/U    Plan  Q4 neurocheck  Pain control  DVT ppx  F/U Plastics evaluation, pending formal note  F/U NSx recs  PT/OT  Dispo planning

## 2023-10-05 NOTE — PHYSICAL THERAPY INITIAL EVALUATION ADULT - PERTINENT HX OF CURRENT PROBLEM, REHAB EVAL
82F w/ PMHX of HTN, HLD, OA, afib on Eliquis, HFpEF PPM 7/2021, pulmonary HTN, small bowel ca s/p whipple 9 years ago, COPD on home O2 trf PLV after presenting from fall off couch while drowsy, +HS, on Eliquis. CTH found B/L SAH/IPH, R orbital floor fx, and non displaced R anterior maxillary sinus fx.

## 2023-10-06 ENCOUNTER — TRANSCRIPTION ENCOUNTER (OUTPATIENT)
Age: 83
End: 2023-10-06

## 2023-10-06 VITALS — DIASTOLIC BLOOD PRESSURE: 70 MMHG | SYSTOLIC BLOOD PRESSURE: 145 MMHG

## 2023-10-06 LAB — GLUCOSE BLDC GLUCOMTR-MCNC: 99 MG/DL — SIGNIFICANT CHANGE UP (ref 70–99)

## 2023-10-06 PROCEDURE — 94640 AIRWAY INHALATION TREATMENT: CPT

## 2023-10-06 PROCEDURE — 85730 THROMBOPLASTIN TIME PARTIAL: CPT

## 2023-10-06 PROCEDURE — 73552 X-RAY EXAM OF FEMUR 2/>: CPT

## 2023-10-06 PROCEDURE — 85027 COMPLETE CBC AUTOMATED: CPT

## 2023-10-06 PROCEDURE — 87640 STAPH A DNA AMP PROBE: CPT

## 2023-10-06 PROCEDURE — 73130 X-RAY EXAM OF HAND: CPT

## 2023-10-06 PROCEDURE — 85025 COMPLETE CBC W/AUTO DIFF WBC: CPT

## 2023-10-06 PROCEDURE — 73590 X-RAY EXAM OF LOWER LEG: CPT

## 2023-10-06 PROCEDURE — 73090 X-RAY EXAM OF FOREARM: CPT

## 2023-10-06 PROCEDURE — 36415 COLL VENOUS BLD VENIPUNCTURE: CPT

## 2023-10-06 PROCEDURE — 70450 CT HEAD/BRAIN W/O DYE: CPT | Mod: MA

## 2023-10-06 PROCEDURE — 82962 GLUCOSE BLOOD TEST: CPT

## 2023-10-06 PROCEDURE — 73060 X-RAY EXAM OF HUMERUS: CPT

## 2023-10-06 PROCEDURE — 81001 URINALYSIS AUTO W/SCOPE: CPT

## 2023-10-06 PROCEDURE — 85610 PROTHROMBIN TIME: CPT

## 2023-10-06 PROCEDURE — 87641 MR-STAPH DNA AMP PROBE: CPT

## 2023-10-06 PROCEDURE — 83735 ASSAY OF MAGNESIUM: CPT

## 2023-10-06 PROCEDURE — 94760 N-INVAS EAR/PLS OXIMETRY 1: CPT

## 2023-10-06 PROCEDURE — 85520 HEPARIN ASSAY: CPT

## 2023-10-06 PROCEDURE — 84100 ASSAY OF PHOSPHORUS: CPT

## 2023-10-06 PROCEDURE — 99233 SBSQ HOSP IP/OBS HIGH 50: CPT | Mod: FS

## 2023-10-06 PROCEDURE — 73030 X-RAY EXAM OF SHOULDER: CPT

## 2023-10-06 PROCEDURE — 80048 BASIC METABOLIC PNL TOTAL CA: CPT

## 2023-10-06 RX ORDER — CARVEDILOL PHOSPHATE 80 MG/1
12.5 CAPSULE, EXTENDED RELEASE ORAL EVERY 12 HOURS
Refills: 0 | Status: DISCONTINUED | OUTPATIENT
Start: 2023-10-06 | End: 2023-10-06

## 2023-10-06 RX ADMIN — Medication 1 DROP(S): at 02:05

## 2023-10-06 RX ADMIN — Medication 1 DROP(S): at 16:50

## 2023-10-06 RX ADMIN — ENOXAPARIN SODIUM 30 MILLIGRAM(S): 100 INJECTION SUBCUTANEOUS at 05:15

## 2023-10-06 RX ADMIN — ENOXAPARIN SODIUM 30 MILLIGRAM(S): 100 INJECTION SUBCUTANEOUS at 16:50

## 2023-10-06 RX ADMIN — BUDESONIDE AND FORMOTEROL FUMARATE DIHYDRATE 2 PUFF(S): 160; 4.5 AEROSOL RESPIRATORY (INHALATION) at 08:40

## 2023-10-06 RX ADMIN — AMIODARONE HYDROCHLORIDE 200 MILLIGRAM(S): 400 TABLET ORAL at 08:20

## 2023-10-06 RX ADMIN — CARVEDILOL PHOSPHATE 12.5 MILLIGRAM(S): 80 CAPSULE, EXTENDED RELEASE ORAL at 16:51

## 2023-10-06 RX ADMIN — Medication 3 MILLILITER(S): at 03:55

## 2023-10-06 RX ADMIN — LEVETIRACETAM 500 MILLIGRAM(S): 250 TABLET, FILM COATED ORAL at 16:50

## 2023-10-06 RX ADMIN — Medication 650 MILLIGRAM(S): at 02:05

## 2023-10-06 RX ADMIN — Medication 650 MILLIGRAM(S): at 03:00

## 2023-10-06 RX ADMIN — Medication 3 MILLILITER(S): at 08:39

## 2023-10-06 RX ADMIN — Medication 3 MILLILITER(S): at 15:16

## 2023-10-06 RX ADMIN — Medication 100 MILLIGRAM(S): at 08:20

## 2023-10-06 RX ADMIN — Medication 1 DROP(S): at 05:15

## 2023-10-06 RX ADMIN — Medication 100 MILLIGRAM(S): at 16:51

## 2023-10-06 RX ADMIN — Medication 1 APPLICATION(S): at 16:50

## 2023-10-06 RX ADMIN — Medication 1 APPLICATION(S): at 05:15

## 2023-10-06 RX ADMIN — LEVETIRACETAM 500 MILLIGRAM(S): 250 TABLET, FILM COATED ORAL at 05:14

## 2023-10-06 RX ADMIN — FAMOTIDINE 20 MILLIGRAM(S): 10 INJECTION INTRAVENOUS at 11:02

## 2023-10-06 RX ADMIN — Medication 40 MILLIGRAM(S): at 05:31

## 2023-10-06 RX ADMIN — Medication 1 DROP(S): at 11:02

## 2023-10-06 NOTE — DISCHARGE NOTE NURSING/CASE MANAGEMENT/SOCIAL WORK - NSDCVIVACCINE_GEN_ALL_CORE_FT
Tdap; 03-Oct-2023 05:13; Uriel Brannon (RN); [employer]; [id number] (Exp. Date: 06-Jun-2025); IntraMuscular; Deltoid Right.; 0.5 milliLiter(s); VIS (VIS Published: 09-May-2013, VIS Presented: 03-Oct-2023);

## 2023-10-06 NOTE — DISCHARGE NOTE NURSING/CASE MANAGEMENT/SOCIAL WORK - PATIENT PORTAL LINK FT
You can access the FollowMyHealth Patient Portal offered by [facility] by registering at the following website: [email or web address]. By joining [employer]’s FollowMyHealth portal, you will also be able to view your health information using other applications (apps) compatible with our system.

## 2023-10-06 NOTE — PROGRESS NOTE ADULT - ASSESSMENT
82F w/ PMHX of HTN, HLD, OA, afib on Eliquis, HFpEF PPM 7/2021, pulmonary HTN, small bowel ca s/p whipple 9 years ago, COPD on home O2 trf PLV after presenting from fall off couch while drowsy, +HS, on Eliquis. CTH found B/L SAH/IPH, R orbital floor fx, and non displaced R anterior maxillary sinus fx.  CT head is stable.  NSx okay with DVT ppx, rec holding Eliquis and continue keppra until F/U  No new complaints  Pending dispo    Plan  Q4 neurocheck  Pain control  DVT ppx  F/U Plastics evaluation, pending formal note  F/U NSx recs  PT/OT  Dispo planning

## 2023-10-06 NOTE — DISCHARGE NOTE NURSING/CASE MANAGEMENT/SOCIAL WORK - NSDCPEFALRISK_GEN_ALL_CORE
For information on Fall & Injury Prevention, visit: [email or web address] OR  [email or web address] OR  https://www.cdc.gov/steadi/patient.html

## 2023-10-06 NOTE — PROGRESS NOTE ADULT - NS ATTEND AMEND GEN_ALL_CORE FT
I have seen and examined this patient with the surgical team.  No acute events overnight.  Patient appears well this morning.  Endorses some pain.  Denies neurological symptoms.  Will need PT/Rehab consult.  DCP.
I agree with the above. I personally examined and saw the patient. Ms. Harper remains bright and neurologically intact. Repeat head CT stable. Hold eliquis, NSAIDs, aspirin, AC/AP, and supplements. Consider repeating dopplers to assess for DVTs and need for IVC filter. Follow-up in 2 weeks. I discussed all with the patient and all her questions were answered.
I have seen and examined this patient with the surgical team.  No acute events overnight.  Patient appears well this morning.  Denies pain or headache.  No neurological symptoms.  No visionary changes.  Eyes mobile without pain on exam.  Rehab/PT consultation needed.  Will need plastic surgery f/u.  DCP.
I agree with the above. I personally examined and saw the patient. Ms. Harper remains neurologically intact, extremely sharp and bright, following commands with good strength in all fours. Ok to transfer to floor with diet. Continue keppra. Hold AC/dvt ppx until 24 hour head CT tonight. I discussed the plan with Ms. Harper who agrees.

## 2023-10-06 NOTE — DISCHARGE NOTE PROVIDER - NSDCCPCAREPLAN_GEN_ALL_CORE_FT
PRINCIPAL DISCHARGE DIAGNOSIS  Diagnosis: Subarachnoid hemorrhage  Assessment and Plan of Treatment: Follow Up: Please call to make an appointment w/ neurosurgery 2-3 weeks after discharge. Also, please call to make an appointment with your primary care physician as per your usual schedule.   Activity: May return to normal activities as tolerated.  Diet: May continue regular diet.  Medications: Please DO NOT take Eliquis and Plavix until see in the office by Neurosurgery and cleared to restart. You are encouraged to take over-the-counter tylenol for pain relief.  Patient is advised to RETURN TO THE EMERGENCY DEPARTMENT for any of the following - worsening pain, fever/chills, nausea/vomiting, alterned mental status, chest pain, shortness of breath, or any other new/worsening symptoms.      SECONDARY DISCHARGE DIAGNOSES  Diagnosis: Fracture, facial bones  Assessment and Plan of Treatment: Follow Up: Please call to make an appointment w/ Dr. Webber from Plastic Surgery 2 weeks after discharge.  Activity: May return to normal activities as tolerated.  Wound Care: Please, keep wound site clean and dry. You may shower, but do not bathe. May use bacitracin over the counter on wounds twice a day for 7 days.     PRINCIPAL DISCHARGE DIAGNOSIS  Diagnosis: Subarachnoid hemorrhage  Assessment and Plan of Treatment: Follow Up: Please call to make an appointment w/ neurosurgery 2-3 weeks after discharge. Also, please call to make an appointment with your primary care physician as per your usual schedule.   Activity: May return to normal activities as tolerated.  Diet: May continue regular diet.  Medications: Please DO NOT take Eliquis and Aspirin until see in the office by Neurosurgery and cleared to restart. You are encouraged to take over-the-counter tylenol for pain relief.  Patient is advised to RETURN TO THE EMERGENCY DEPARTMENT for any of the following - worsening pain, fever/chills, nausea/vomiting, alterned mental status, chest pain, shortness of breath, or any other new/worsening symptoms.      SECONDARY DISCHARGE DIAGNOSES  Diagnosis: Fracture, facial bones  Assessment and Plan of Treatment: Follow Up: Please call to make an appointment w/ Dr. Webber from Plastic Surgery 2 weeks after discharge.  Activity: May return to normal activities as tolerated.  Wound Care: Please, keep wound site clean and dry. You may shower, but do not bathe. May use bacitracin over the counter on wounds twice a day for 7 days.

## 2023-10-06 NOTE — PROGRESS NOTE ADULT - SUBJECTIVE AND OBJECTIVE BOX
INTERVAL HPI/OVERNIGHT EVENTS:    Patient evaluated at bedside. No acute distress. No acute events overnight.  Patient reports feeling well without visual disturbances.  Pain is improved  Complains of itchiness in the face.     MEDICATIONS  (STANDING):  albuterol/ipratropium for Nebulization 3 milliLiter(s) Nebulizer every 6 hours  aMIOdarone    Tablet 200 milliGRAM(s) Oral daily  artificial tears (preservative free) Ophthalmic Solution 1 Drop(s) Both EYES every 4 hours  bacitracin   Ointment 1 Application(s) Topical every 12 hours  budesonide  80 MICROgram(s)/formoterol 4.5 MICROgram(s) Inhaler 2 Puff(s) Inhalation two times a day  carvedilol 12.5 milliGRAM(s) Oral every 12 hours  enoxaparin Injectable 30 milliGRAM(s) SubCutaneous every 12 hours  famotidine    Tablet 20 milliGRAM(s) Oral daily  furosemide    Tablet 40 milliGRAM(s) Oral daily  hydrALAZINE 100 milliGRAM(s) Oral every 12 hours  influenza  Vaccine (HIGH DOSE) 0.7 milliLiter(s) IntraMuscular once  levETIRAcetam 500 milliGRAM(s) Oral two times a day  melatonin 3 milliGRAM(s) Oral at bedtime  simvastatin 10 milliGRAM(s) Oral at bedtime    MEDICATIONS  (PRN):  acetaminophen     Tablet .. 650 milliGRAM(s) Oral every 6 hours PRN Mild Pain (1 - 3)      Vital Signs Last 24 Hrs  T(C): 36.3 (06 Oct 2023 04:54), Max: 37.1 (05 Oct 2023 16:05)  T(F): 97.4 (06 Oct 2023 04:54), Max: 98.7 (05 Oct 2023 16:05)  HR: 72 (06 Oct 2023 04:54) (61 - 96)  BP: 164/52 (06 Oct 2023 04:00) (149/70 - 182/78)  BP(mean): --  RR: 18 (06 Oct 2023 04:54) (18 - 19)  SpO2: 95% (06 Oct 2023 04:54) (94% - 98%)    Parameters below as of 06 Oct 2023 04:54  Patient On (Oxygen Delivery Method): room air          Neuro: A and O x 3, NAD, Nonfocal. UE/LE motor and sensory intact B/L. PERRL and EOMI. Vision intact for baseline.  HEENT: Scattered abrasions to the face. R periorbital hematoma. R sided facial ecchymosis with extension to the submandibular region. Atraumatic nose with some ecchymosis. Trachea is supple and midline. No obvious periorbital trauma.    Pulm/Chest: CTA B/L, unlabored. Equal inspiratory rise.   Cardiac: S1/S2  GI / Abdomen: NT/ND, previous whipple/hernia repair incision appreciated.  Musculoskeletal / Extremities: No obvious deformities. AROM in UE/LE. NTTP down the midline of the cervical/thoracic/lumbar spine.   Integumentary: Scattered ecchymosis and swelling on the B/L UE and B/L LE.      I&O's Detail    05 Oct 2023 07:01  -  06 Oct 2023 07:00  --------------------------------------------------------  IN:  Total IN: 0 mL    OUT:    Voided (mL): 700 mL  Total OUT: 700 mL    Total NET: -700 mL          LABS:                        8.0    2.88  )-----------( 182      ( 05 Oct 2023 06:25 )             25.2     10-05    141  |  104  |  21.0<H>  ----------------------------<  80  4.0   |  26.0  |  1.27    Ca    8.6      05 Oct 2023 06:25  Phos  3.2     10-05  Mg     2.3     10-05        Urinalysis Basic - ( 05 Oct 2023 06:25 )    Color: x / Appearance: x / SG: x / pH: x  Gluc: 80 mg/dL / Ketone: x  / Bili: x / Urobili: x   Blood: x / Protein: x / Nitrite: x   Leuk Esterase: x / RBC: x / WBC x   Sq Epi: x / Non Sq Epi: x / Bacteria: x        RADIOLOGY & ADDITIONAL STUDIES:

## 2023-10-06 NOTE — DISCHARGE NOTE PROVIDER - NSDCMRMEDTOKEN_GEN_ALL_CORE_FT
acetaminophen 325 mg oral tablet: 2 tab(s) orally every 6 hours as needed for  mild pain  albuterol 90 mcg/inh inhalation aerosol: 2 puff(s) inhaled every 6 hours  amiodarone 200 mg oral tablet: 1 tab(s) orally once a day  Breo Ellipta 100 mcg-25 mcg/inh inhalation powder: 1 puff(s) inhaled once a day  carvedilol 12.5 mg oral tablet: 1 tab(s) orally 2 times a day  famotidine 20 mg oral tablet: 1 tab(s) orally once a day  fenofibrate 48 mg oral tablet: 1 tab(s) orally once a day  ferrous sulfate 325 mg (65 mg elemental iron) oral tablet: 1 tab(s) orally once a day  furosemide 40 mg oral tablet: 1 tab(s) orally once a day  Home pt: For home physical therapy and improvement from deconditioning/balance  hydrALAZINE 100 mg oral tablet: 1 tab(s) orally 2 times a day  ipratropium-albuterol 0.5 mg-2.5 mg/3 mL inhalation solution: 3 milliliter(s) by nebulizer every 12 hours as needed  ipratropium-albuterol 0.5 mg-2.5 mg/3 mL inhalation solution: 3 milliliter(s) by nebulizer every 4 hours as needed  Keppra 500 mg oral tablet: 1 tab(s) orally every 12 hours  lactobacillus acidophilus oral capsule: 1 cap(s) orally once a day  Melatonin 3 mg oral tablet: 1 tab(s) orally once a day  Multiple Vitamins oral tablet: 1 tab(s) orally once a day  pravastatin 20 mg oral tablet: 1 tab(s) orally once a day  sertraline 25 mg oral tablet: 1 tab(s) orally once a day  tiotropium 2.5 mcg/inh inhalation aerosol: 2 puff(s) inhaled once a day

## 2023-10-06 NOTE — DISCHARGE NOTE PROVIDER - HOSPITAL COURSE
HPI: 82 y.o female w/ PMH A.fib, CKD, COPD, HLD, HTN, MD, OA, pacemaker, and hx small bowel ca s/p whipple ~ 9 years ago transferred from [place] after a ground level fall, found to have b/l SAH, R frontal scalp hematoma, R orbital floor fx, and R anterior wall max sinus fx. Pt on Eliquis, received K centra at [facility]. Pt HD stable on arrival, GCS 15.     Hospital Course: Patient was found to have traumatic injuries consistent with b/l SAH, right orbit fx and max sinus fx. Patient was admitted to the SICU service under Dr. Mercedes. Neurosurgery was consulted and started on q1h neuro checks and Keppra 500 BID for seizure prophylaxis. Rpt CTH showed slight worsening of SAH. Anti-Xa level was still therapeutic. Patient received 2nd dose of KCentra. 3rd CTH remained stable. Patient remained neurologically stable throughout hospital stay. ENT was consulted and recommended non-operative management. Tolerating diet well. Voiding spontaneously. Pain was well controlled at time of discharge. PT evaluated patient and recommended DC w/ home PT. Patient lives at facility and is stable for return back to facility.

## 2023-10-06 NOTE — DISCHARGE NOTE PROVIDER - CARE PROVIDER_API CALL
Cherelle Knapp  Neurosurgery  [address] [place] [address]  Phone: [phone]  Fax: [phone]  Follow Up Time:     Te Webber  Plastic Surgery  [address] [place] [id number]  Phone: [phone]  Fax: [phone]  Follow Up Time:

## 2023-10-06 NOTE — DISCHARGE NOTE PROVIDER - NSFTFHOMEHTHYNRD_GEN_ALL_CORE
CC:  Glaucoma check, blurred vision os    HISTORY OF PRESENT ILLNESS:  I reviewed and appreciate the technicians history and test results as outlined above.  During the evaluation, additional symptoms were mentioned and  discussed including: s/p procedure  Dr Greco at :  Glaucoma surgery with Express P-50  implant  With maricel-c and phaco os 1/22/2018.  Off glaucoma drops now os.  od taking drops as listed.   More blurry os, od clear.  But background yellowed od  Pmh, sh, fh and past ocular history:  Reviewed:  No changes, agree with above  REVIEW OF SYSTEMS:  No eye pain, diplopia, ptosis, field cuts or blackouts of vision.  No rash, fever or chills    Pupils:   4.5/5..0-5.5,  2+ apd os    Ta: 14-15/10  Brow ptosis od   Dermatochalasis od, ptosis os  Left exotropia, eom full  SLIT LAMP EXAM:       LIDS, LASHES AND LACRIMAL:  OD (right eye): normal //OS (left eye): mild ptosis, involutional       CONJUNCTIVAE AND SCLERAE:  OD (right eye): clear  //OS (left eye): superior implant         CORNEA:  OD (right eye): clear  //OS (left eye):  clear       ANTERIOR CHAMBER:  OD (right eye): deep and quiet  // OS (left eye): deep and quiet       IRIS:  OD (right eye): round and regular without neovascularization   //OS (left eye): round and regular without neovascularization        LENS:  OD (right eye): 1-2+ nuclear sclerosis  //OS (left eye): PC IOL, well centered, stable     C/d:  0.35/0.75 hard call with tilt    ASSESSMENT/PLAN:  1. 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  od controlled well on 3 meds, cpm,     2. Pseudophakia os with mild pc opacity follow    3. Early cataract od       OFFICE VISIT IN ABOUT 3 MONTHS FOR GLAUCOMA CHECK OR IMMEDIATLY PRN (AS NEEDED) AS INSTRUCTED.     Yes

## 2023-10-09 ENCOUNTER — NON-APPOINTMENT (OUTPATIENT)
Age: 83
End: 2023-10-09

## 2023-10-09 DIAGNOSIS — S06.6XAD TRAUMATIC SUBARACHNOID HEMORRHAGE WITH LOSS OF CONSCIOUSNESS STATUS UNKNOWN, SUBSEQUENT ENCOUNTER: ICD-10-CM

## 2023-10-19 NOTE — ASSESSMENT
Problem: Seizures  Goal: Seizure activity controlled  Outcome: Outcome Met, Complete Goal  Goal: Protected from injury if a seizure occurs  Outcome: Outcome Met, Complete Goal  Goal: Verbalizes understanding of seizures and treatment plan  Description: Document education using the patient education activity.   Outcome: Outcome Met, Complete Goal     Problem: Pain  Goal: #Acceptable pain level achieved/maintained at rest using NRS/Faces  Description: This goal is used for patients who can self-report.  Acceptable means the level is at or below the identified comfort/function goal.  Outcome: Outcome Met, Complete Goal  Goal: # Acceptable pain level achieved/maintained at rest using NRS/Faces without oversedation (opioid naive or PCA/Epidural infusion)  Description: This goal is used if Opioid-naïve or on PCA/Epidural Infusion.  Outcome: Outcome Met, Complete Goal  Goal: # Acceptable pain level achieved/maintained with activity using NRS/Faces  Description: This goal is used for patients who can self-report and are not achieving acceptable pain control during activity.  Outcome: Outcome Met, Complete Goal  Goal: Acceptable pain/comfort level is achieved/maintained at rest (based on Pain Behaviors Scale)  Description: This goal is used for patients who are not able to self-report pain and are assessed for pain using the Pain Behaviors Scale  Outcome: Outcome Met, Complete Goal  Goal: Acceptable pain/comfort level is achieved/maintained at rest based on PAINAID scale (Dementia)  Description: This goal is used for patients who are not able to self-report pain, have dementia, and assessed using the PAINAD scale.  Outcome: Outcome Met, Complete Goal  Goal: Acceptable pain/comfort level is achieved/maintained at rest (based on pediatric behavior tool: NIPS, NPASS, or FLACC)  Description: This goal is used for pediatric patients who are not able to self report pain.  Outcome: Outcome Met, Complete Goal  Goal: # Verbalizes  [Palliative Care Plan] : not applicable at this time [FreeTextEntry1] : Pt will do sonogram for swelling in left biceps muscle. Pt has LUQ intermittent pain which is mild. Pt to repeat CT scans in Aug 2020.Pt to continue with Medical, GI and GYN examinations and followup. Pt to see Cardiology and Pulmonary re SOB as needed. Pt to RTO in 6 months or sooner if needed. understanding of pain management  Description: Documented in Patient Education Activity  Outcome: Outcome Met, Complete Goal  Goal: Verbalizes understanding and effective use of Patient Controlled Analgesia (PCA)  Description: Documented in Patient Education Activity  This goal is used for patients with PCA  Outcome: Outcome Met, Complete Goal  Goal: Maximum comfort achieved/maintained at end of life (Hospice)  Outcome: Outcome Met, Complete Goal      [Curative] : Goals of care discussed with patient: Curative

## 2023-10-30 ENCOUNTER — APPOINTMENT (OUTPATIENT)
Dept: CT IMAGING | Facility: CLINIC | Age: 83
End: 2023-10-30
Payer: MEDICARE

## 2023-10-30 PROCEDURE — 70450 CT HEAD/BRAIN W/O DYE: CPT

## 2023-11-03 ENCOUNTER — APPOINTMENT (OUTPATIENT)
Dept: NEUROSURGERY | Facility: CLINIC | Age: 83
End: 2023-11-03
Payer: MEDICARE

## 2023-11-03 ENCOUNTER — NON-APPOINTMENT (OUTPATIENT)
Age: 83
End: 2023-11-03

## 2023-11-03 VITALS
WEIGHT: 177 LBS | SYSTOLIC BLOOD PRESSURE: 160 MMHG | HEART RATE: 59 BPM | OXYGEN SATURATION: 95 % | BODY MASS INDEX: 34.75 KG/M2 | HEIGHT: 60 IN | DIASTOLIC BLOOD PRESSURE: 75 MMHG

## 2023-11-03 DIAGNOSIS — M79.89 OTHER SPECIFIED SOFT TISSUE DISORDERS: ICD-10-CM

## 2023-11-03 DIAGNOSIS — S06.6XAA TRAUMATIC SUBARACHNOID HEMORRHAGE WITH LOSS OF CONSCIOUSNESS STATUS UNKNOWN, INITIAL ENCOUNTER: ICD-10-CM

## 2023-11-03 DIAGNOSIS — S06.5XAA TRAUMATIC SUBDURAL HEMORRHAGE WITH LOSS OF CONSCIOUSNESS STATUS UNKNOWN, INITIAL ENCOUNTER: ICD-10-CM

## 2023-11-03 PROCEDURE — 99214 OFFICE O/P EST MOD 30 MIN: CPT

## 2023-11-08 ENCOUNTER — APPOINTMENT (OUTPATIENT)
Dept: NEUROSURGERY | Facility: CLINIC | Age: 83
End: 2023-11-08

## 2023-11-16 ENCOUNTER — APPOINTMENT (OUTPATIENT)
Dept: CT IMAGING | Facility: CLINIC | Age: 83
End: 2023-11-16
Payer: MEDICARE

## 2023-11-16 PROCEDURE — 70450 CT HEAD/BRAIN W/O DYE: CPT

## 2023-11-27 ENCOUNTER — APPOINTMENT (OUTPATIENT)
Dept: NEUROSURGERY | Facility: CLINIC | Age: 83
End: 2023-11-27
Payer: MEDICARE

## 2023-11-27 VITALS
OXYGEN SATURATION: 96 % | SYSTOLIC BLOOD PRESSURE: 181 MMHG | DIASTOLIC BLOOD PRESSURE: 78 MMHG | HEIGHT: 60 IN | HEART RATE: 60 BPM | WEIGHT: 177 LBS | BODY MASS INDEX: 34.75 KG/M2

## 2023-11-27 PROCEDURE — 99213 OFFICE O/P EST LOW 20 MIN: CPT

## 2023-12-13 PROCEDURE — 87040 BLOOD CULTURE FOR BACTERIA: CPT

## 2023-12-13 PROCEDURE — 71250 CT THORAX DX C-: CPT

## 2023-12-13 PROCEDURE — 94640 AIRWAY INHALATION TREATMENT: CPT

## 2023-12-13 PROCEDURE — 84480 ASSAY TRIIODOTHYRONINE (T3): CPT

## 2023-12-13 PROCEDURE — 85730 THROMBOPLASTIN TIME PARTIAL: CPT

## 2023-12-13 PROCEDURE — 76770 US EXAM ABDO BACK WALL COMP: CPT

## 2023-12-13 PROCEDURE — 83880 ASSAY OF NATRIURETIC PEPTIDE: CPT

## 2023-12-13 PROCEDURE — 80053 COMPREHEN METABOLIC PANEL: CPT

## 2023-12-13 PROCEDURE — 0042T: CPT

## 2023-12-13 PROCEDURE — 93005 ELECTROCARDIOGRAM TRACING: CPT

## 2023-12-13 PROCEDURE — 83036 HEMOGLOBIN GLYCOSYLATED A1C: CPT

## 2023-12-13 PROCEDURE — 97116 GAIT TRAINING THERAPY: CPT

## 2023-12-13 PROCEDURE — 96374 THER/PROPH/DIAG INJ IV PUSH: CPT

## 2023-12-13 PROCEDURE — 84443 ASSAY THYROID STIM HORMONE: CPT

## 2023-12-13 PROCEDURE — 82553 CREATINE MB FRACTION: CPT

## 2023-12-13 PROCEDURE — 82607 VITAMIN B-12: CPT

## 2023-12-13 PROCEDURE — 83540 ASSAY OF IRON: CPT

## 2023-12-13 PROCEDURE — 82728 ASSAY OF FERRITIN: CPT

## 2023-12-13 PROCEDURE — 96375 TX/PRO/DX INJ NEW DRUG ADDON: CPT

## 2023-12-13 PROCEDURE — 87086 URINE CULTURE/COLONY COUNT: CPT

## 2023-12-13 PROCEDURE — 84145 PROCALCITONIN (PCT): CPT

## 2023-12-13 PROCEDURE — 99285 EMERGENCY DEPT VISIT HI MDM: CPT | Mod: 25

## 2023-12-13 PROCEDURE — 0225U NFCT DS DNA&RNA 21 SARSCOV2: CPT

## 2023-12-13 PROCEDURE — 96376 TX/PRO/DX INJ SAME DRUG ADON: CPT

## 2023-12-13 PROCEDURE — 83605 ASSAY OF LACTIC ACID: CPT

## 2023-12-13 PROCEDURE — 84484 ASSAY OF TROPONIN QUANT: CPT

## 2023-12-13 PROCEDURE — 82550 ASSAY OF CK (CPK): CPT

## 2023-12-13 PROCEDURE — 87186 SC STD MICRODIL/AGAR DIL: CPT

## 2023-12-13 PROCEDURE — 81001 URINALYSIS AUTO W/SCOPE: CPT

## 2023-12-13 PROCEDURE — 97162 PT EVAL MOD COMPLEX 30 MIN: CPT

## 2023-12-13 PROCEDURE — 97165 OT EVAL LOW COMPLEX 30 MIN: CPT

## 2023-12-13 PROCEDURE — 85025 COMPLETE CBC W/AUTO DIFF WBC: CPT

## 2023-12-13 PROCEDURE — 83735 ASSAY OF MAGNESIUM: CPT

## 2023-12-13 PROCEDURE — 87077 CULTURE AEROBIC IDENTIFY: CPT

## 2023-12-13 PROCEDURE — 80048 BASIC METABOLIC PNL TOTAL CA: CPT

## 2023-12-13 PROCEDURE — 72131 CT LUMBAR SPINE W/O DYE: CPT

## 2023-12-13 PROCEDURE — 85027 COMPLETE CBC AUTOMATED: CPT

## 2023-12-13 PROCEDURE — 83550 IRON BINDING TEST: CPT

## 2023-12-13 PROCEDURE — 70496 CT ANGIOGRAPHY HEAD: CPT

## 2023-12-13 PROCEDURE — 84132 ASSAY OF SERUM POTASSIUM: CPT

## 2023-12-13 PROCEDURE — 97530 THERAPEUTIC ACTIVITIES: CPT

## 2023-12-13 PROCEDURE — 36415 COLL VENOUS BLD VENIPUNCTURE: CPT

## 2023-12-13 PROCEDURE — 70498 CT ANGIOGRAPHY NECK: CPT

## 2023-12-13 PROCEDURE — 85610 PROTHROMBIN TIME: CPT

## 2023-12-13 PROCEDURE — 82746 ASSAY OF FOLIC ACID SERUM: CPT

## 2023-12-13 PROCEDURE — 84436 ASSAY OF TOTAL THYROXINE: CPT

## 2023-12-13 PROCEDURE — 70450 CT HEAD/BRAIN W/O DYE: CPT

## 2023-12-13 PROCEDURE — 87150 DNA/RNA AMPLIFIED PROBE: CPT

## 2023-12-13 PROCEDURE — 82962 GLUCOSE BLOOD TEST: CPT

## 2023-12-13 PROCEDURE — 71045 X-RAY EXAM CHEST 1 VIEW: CPT

## 2023-12-13 PROCEDURE — 94760 N-INVAS EAR/PLS OXIMETRY 1: CPT

## 2023-12-16 NOTE — REASON FOR VISIT
Detail Level: Detailed Quality 110: Preventive Care And Screening: Influenza Immunization: Influenza Immunization Administered during Influenza season [FreeTextEntry1] : SDH

## 2023-12-16 NOTE — ASSESSMENT
[FreeTextEntry1] : IMPRESSION 82 y.o female w/ PMH A.fib (Eliquis), CKD, COPD, HLD, HTN, MD, OA, pacemaker, and hx small bowel ca s/p whipple 9 years ago transferred from [place] after a ground level fall on 10/3/23 and found to have b/l SAH, R frontal scalp hematoma, R orbital floor fx, and R anterior wall max sinus fx. Pt was on Eliquis, received K centra at [facility]. Keppra 500 BID for seizure prophylaxis. Pt discharged on 10/6/23. Pt repeated CT head on 10/30/23 with resolution of SAH but has capacious subarachnoid spaces as well as subdural in areas that look mostly stable from 10/5, but newer since 10/3 and 9/11/23 (reviewed with neuroradiologist). Pt with no c/o neurological symptoms. She walks with walker support. Imaging from 10/30 with resolution of SDH. Pt was cleared for Eliquis.   Pt with no neurological symptoms. She walks with walker support. Pt has minimum left arm swelling now, as per family Doppler study at the facility showed no clots. New CT head with no new hemorrhage.    PLAN Precautions given.  Continue PT as planned.  F/U as needed.

## 2023-12-16 NOTE — PHYSICAL EXAM
[FreeTextEntry5] : [place] on left [FreeTextEntry8] : no drift, walk with walker support  [FreeTextEntry1] : left upper extremity swelling compared to right omayra forearm and hand, right arm/hand no swelling, + bruising in forearm

## 2023-12-16 NOTE — HISTORY OF PRESENT ILLNESS
[FreeTextEntry1] : 82 y.o female w/ PMH A.fib (Eliquis), CKD, COPD, HLD, HTN, MD, OA, pacemaker, and hx small bowel ca s/p whipple 9 years ago transferred from [facility] after a ground level fall ON 10/3/23 and found to have b/l SAH, R frontal scalp hematoma, R orbital floor fx, and R anterior wall max sinus fx, no LOC. Pt on Eliquis, received K centra at [facility].  Keppra 500 BID for seizure prophylaxis. Pt discharged on 10/6/23. Pt has been off Eliquis since fall. Pt was seen on 11/3 and was cleared for Eliquis and advised repeat a CT head in 2 weeks.  Today Mrs. Harper  present in wheelchair, believes that he improved overall. Denies headache, speech impairment, vision problems or seizures.  Pt with no c/o neurological symptoms. She walks with walker support. She started on Eliquis and back with a new scan. Pt has minimum left arm swelling now, as per family Doppler study at the facility showed no clots.

## 2023-12-19 NOTE — DIETITIAN INITIAL EVALUATION ADULT - WEIGHT FOR BMI (LBS)
Patient: Beatriz Lopez    Procedure Summary       Date: 12/19/23 Room / Location: [facility] 02 / [facility]    Anesthesia Start: 0754 Anesthesia Stop:     Procedure: Cholecystectomy Laparoscopy (Abdomen) Diagnosis:       Gallbladder polyp      (Gallbladder polyp [K82.4])    Surgeons: Laurence Man MD Responsible Provider: Fuentes Zaragoza MD    Anesthesia Type: general ASA Status: 2            Anesthesia Type: general    Vitals Value Taken Time   /75 12/19/23 0915   Temp 36.3 12/19/23 0919   Pulse 71 12/19/23 0918   Resp 25 12/19/23 0918   SpO2 96 % 12/19/23 0918   Vitals shown include unvalidated device data.    Anesthesia Post Evaluation    Patient location during evaluation: PACU  Patient participation: complete - patient participated  Level of consciousness: awake and alert  Pain score: 5  Pain management: satisfactory to patient  Airway patency: patent  Cardiovascular status: hemodynamically stable  Respiratory status: acceptable  Hydration status: acceptable  Postoperative Nausea and Vomiting: none  Comments: PONV absent        There were no known notable events for this encounter.     179

## 2024-01-10 NOTE — PROGRESS NOTE ADULT - PROBLEM SELECTOR PLAN 6
Problem: Discharge Planning  Goal: Discharge to home or other facility with appropriate resources  1/9/2024 2020 by Sheldon Pfeiffer RN  Outcome: Progressing  1/9/2024 2019 by Sheldon Pfeiffer RN  Outcome: Progressing     Problem: Pain  Goal: Verbalizes/displays adequate comfort level or baseline comfort level  1/9/2024 2020 by Sheldon Pfeiffer RN  Outcome: Progressing  1/9/2024 2019 by Sheldon Pfeiffer RN  Outcome: Progressing     Problem: Cardiovascular - Adult  Goal: Absence of cardiac dysrhythmias or at baseline  1/9/2024 2020 by Sheldon Pfeiffer RN  Outcome: Progressing  1/9/2024 2019 by Sheldon Pfeiffer RN  Outcome: Progressing     Problem: Safety - Adult  Goal: Free from fall injury  Outcome: Progressing     Problem: Skin/Tissue Integrity  Goal: Absence of new skin breakdown  Description: 1.  Monitor for areas of redness and/or skin breakdown  2.  Assess vascular access sites hourly  3.  Every 4-6 hours minimum:  Change oxygen saturation probe site  4.  Every 4-6 hours:  If on nasal continuous positive airway pressure, respiratory therapy assess nares and determine need for appliance change or resting period.  Outcome: Progressing     
S/P IV ABX - rocephin  ID azeb with dr. omalley
S/P IV ABX - rocephin  ID azeb with dr. shahram gleason
IV ABX   ID eval
S/P IV ABX - rocephin  ID azeb with dr. omalley
S/P IV ABX - rocephin  ID azeb with dr. shahram gleason

## 2024-01-31 ENCOUNTER — NON-APPOINTMENT (OUTPATIENT)
Age: 84
End: 2024-01-31

## 2024-01-31 DIAGNOSIS — Z08 ENCOUNTER FOR FOLLOW-UP EXAMINATION AFTER COMPLETED TREATMENT FOR MALIGNANT NEOPLASM: ICD-10-CM

## 2024-01-31 DIAGNOSIS — Z85.038 ENCOUNTER FOR FOLLOW-UP EXAMINATION AFTER COMPLETED TREATMENT FOR MALIGNANT NEOPLASM: ICD-10-CM

## 2024-02-01 NOTE — ASSESSMENT
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 Findings are suggestive of a marginal type ulcer. Correlation with endoscopy is recommended. Small adjacent mesenteric nodes and trace fluid likely reactive.  Plan: RTO 1 yearly  Bloodwork and CT scans per Dr. Haskins EGD ???

## 2024-02-01 NOTE — HISTORY OF PRESENT ILLNESS
[de-identified] : Yandy is an 83 year old female presents for a duodenal cancer follow up visit.   Past Hx: Duodenal CA with positive nodes; s/p Whipple Procedure in 11/2015; completed Xeloda regimen in 7/2016 under the supervision of Dr. Ventura ([facility]).     F/U CT C/A/P 8/2/19- No evidence of abdominopelvic disease  Bloodwork 1/2020:  CEA: 1.8 ng/mL LFTs WNL CA 19-9: 10 U/mL  Endoscopy by Dr. Jha on 2/14/18 which was without any signs of carcinoma.    Patient states she had an endoscopy/colonoscopy with Dr. Velarde on 3/15/19.  Patient states she was told she no longer requires colonoscopies however she should have yearly endoscopies.   EGD 3/2019- Moderate gastritis, mild reflux esophagitis, s/p gastrojejunostomy, small hiatal hernia.    Labs 4/21/21: CEA- 1.6 Ca19-9- 7  CT abd/pelvis 5/21/21: Short segment jejunal thickening at the proximal ascending right limb of the gastrojejunostomy. Findings are suggestive of a marginal type ulcer. Correlation with endoscopy is recommended. Small adjacent mesenteric nodes and trace fluid likely reactive.  RECENT imaging???   Denies abdominal pain, nausea, vomiting, changes in bowel habits. Good appetite.   Internist: Dr. SEBAS Vazquez ([place], [place]) GI: Dr. Velarde

## 2024-02-08 ENCOUNTER — APPOINTMENT (OUTPATIENT)
Dept: SURGICAL ONCOLOGY | Facility: CLINIC | Age: 84
End: 2024-02-08

## 2024-02-11 ENCOUNTER — INPATIENT (INPATIENT)
Facility: HOSPITAL | Age: 84
LOS: 4 days | Discharge: NURSING, REHAB OR LONG-TERM CARE | DRG: 193 | End: 2024-02-16
Attending: FAMILY MEDICINE | Admitting: FAMILY MEDICINE
Payer: MEDICARE

## 2024-02-11 VITALS
SYSTOLIC BLOOD PRESSURE: 145 MMHG | DIASTOLIC BLOOD PRESSURE: 62 MMHG | HEART RATE: 82 BPM | RESPIRATION RATE: 16 BRPM | OXYGEN SATURATION: 98 % | TEMPERATURE: 99 F | WEIGHT: 169.98 LBS

## 2024-02-11 DIAGNOSIS — N39.0 URINARY TRACT INFECTION, SITE NOT SPECIFIED: ICD-10-CM

## 2024-02-11 DIAGNOSIS — Z96.652 PRESENCE OF LEFT ARTIFICIAL KNEE JOINT: Chronic | ICD-10-CM

## 2024-02-11 DIAGNOSIS — Z90.49 ACQUIRED ABSENCE OF OTHER SPECIFIED PARTS OF DIGESTIVE TRACT: Chronic | ICD-10-CM

## 2024-02-11 DIAGNOSIS — Z98.89 OTHER SPECIFIED POSTPROCEDURAL STATES: Chronic | ICD-10-CM

## 2024-02-11 LAB
ALBUMIN SERPL ELPH-MCNC: 3.6 G/DL — SIGNIFICANT CHANGE UP (ref 3.3–5)
ALP SERPL-CCNC: 53 U/L — SIGNIFICANT CHANGE UP (ref 40–120)
ALT FLD-CCNC: 43 U/L — SIGNIFICANT CHANGE UP (ref 12–78)
ANION GAP SERPL CALC-SCNC: 7 MMOL/L — SIGNIFICANT CHANGE UP (ref 5–17)
APPEARANCE UR: CLEAR — SIGNIFICANT CHANGE UP
APTT BLD: 34.9 SEC — SIGNIFICANT CHANGE UP (ref 24.5–35.6)
AST SERPL-CCNC: 64 U/L — HIGH (ref 15–37)
BACTERIA # UR AUTO: ABNORMAL /HPF
BASOPHILS # BLD AUTO: 0.01 K/UL — SIGNIFICANT CHANGE UP (ref 0–0.2)
BASOPHILS NFR BLD AUTO: 0.2 % — SIGNIFICANT CHANGE UP (ref 0–2)
BILIRUB SERPL-MCNC: 0.5 MG/DL — SIGNIFICANT CHANGE UP (ref 0.2–1.2)
BILIRUB UR-MCNC: NEGATIVE — SIGNIFICANT CHANGE UP
BUN SERPL-MCNC: 24 MG/DL — HIGH (ref 7–23)
CALCIUM SERPL-MCNC: 9.1 MG/DL — SIGNIFICANT CHANGE UP (ref 8.5–10.1)
CHLORIDE SERPL-SCNC: 104 MMOL/L — SIGNIFICANT CHANGE UP (ref 96–108)
CO2 SERPL-SCNC: 29 MMOL/L — SIGNIFICANT CHANGE UP (ref 22–31)
COLOR SPEC: YELLOW — SIGNIFICANT CHANGE UP
CREAT SERPL-MCNC: 1.5 MG/DL — HIGH (ref 0.5–1.3)
DIFF PNL FLD: ABNORMAL
EGFR: 34 ML/MIN/1.73M2 — LOW
EOSINOPHIL # BLD AUTO: 0.02 K/UL — SIGNIFICANT CHANGE UP (ref 0–0.5)
EOSINOPHIL NFR BLD AUTO: 0.3 % — SIGNIFICANT CHANGE UP (ref 0–6)
EPI CELLS # UR: PRESENT
FLUAV AG NPH QL: SIGNIFICANT CHANGE UP
FLUBV AG NPH QL: SIGNIFICANT CHANGE UP
GLUCOSE SERPL-MCNC: 95 MG/DL — SIGNIFICANT CHANGE UP (ref 70–99)
GLUCOSE UR QL: NEGATIVE MG/DL — SIGNIFICANT CHANGE UP
HCT VFR BLD CALC: 30.9 % — LOW (ref 34.5–45)
HGB BLD-MCNC: 10.1 G/DL — LOW (ref 11.5–15.5)
IMM GRANULOCYTES NFR BLD AUTO: 0.6 % — SIGNIFICANT CHANGE UP (ref 0–0.9)
INR BLD: 1.76 RATIO — HIGH (ref 0.85–1.18)
KETONES UR-MCNC: NEGATIVE MG/DL — SIGNIFICANT CHANGE UP
LACTATE SERPL-SCNC: 0.6 MMOL/L — LOW (ref 0.7–2)
LEUKOCYTE ESTERASE UR-ACNC: ABNORMAL
LYMPHOCYTES # BLD AUTO: 0.58 K/UL — LOW (ref 1–3.3)
LYMPHOCYTES # BLD AUTO: 8.7 % — LOW (ref 13–44)
MCHC RBC-ENTMCNC: 30.9 PG — SIGNIFICANT CHANGE UP (ref 27–34)
MCHC RBC-ENTMCNC: 32.7 GM/DL — SIGNIFICANT CHANGE UP (ref 32–36)
MCV RBC AUTO: 94.5 FL — SIGNIFICANT CHANGE UP (ref 80–100)
MONOCYTES # BLD AUTO: 0.66 K/UL — SIGNIFICANT CHANGE UP (ref 0–0.9)
MONOCYTES NFR BLD AUTO: 9.9 % — SIGNIFICANT CHANGE UP (ref 2–14)
NEUTROPHILS # BLD AUTO: 5.34 K/UL — SIGNIFICANT CHANGE UP (ref 1.8–7.4)
NEUTROPHILS NFR BLD AUTO: 80.3 % — HIGH (ref 43–77)
NITRITE UR-MCNC: POSITIVE
NRBC # BLD: 0 /100 WBCS — SIGNIFICANT CHANGE UP (ref 0–0)
PH UR: 7 — SIGNIFICANT CHANGE UP (ref 5–8)
PLATELET # BLD AUTO: 192 K/UL — SIGNIFICANT CHANGE UP (ref 150–400)
POTASSIUM SERPL-MCNC: 3.9 MMOL/L — SIGNIFICANT CHANGE UP (ref 3.5–5.3)
POTASSIUM SERPL-SCNC: 3.9 MMOL/L — SIGNIFICANT CHANGE UP (ref 3.5–5.3)
PROT SERPL-MCNC: 7.3 G/DL — SIGNIFICANT CHANGE UP (ref 6–8.3)
PROT UR-MCNC: NEGATIVE MG/DL — SIGNIFICANT CHANGE UP
PROTHROM AB SERPL-ACNC: 20.2 SEC — HIGH (ref 9.5–13)
RBC # BLD: 3.27 M/UL — LOW (ref 3.8–5.2)
RBC # FLD: 15.3 % — HIGH (ref 10.3–14.5)
RBC CASTS # UR COMP ASSIST: 9 /HPF — HIGH (ref 0–4)
RSV RNA NPH QL NAA+NON-PROBE: SIGNIFICANT CHANGE UP
SARS-COV-2 RNA SPEC QL NAA+PROBE: SIGNIFICANT CHANGE UP
SODIUM SERPL-SCNC: 140 MMOL/L — SIGNIFICANT CHANGE UP (ref 135–145)
SP GR SPEC: 1.01 — SIGNIFICANT CHANGE UP (ref 1–1.03)
UROBILINOGEN FLD QL: 0.2 MG/DL — SIGNIFICANT CHANGE UP (ref 0.2–1)
WBC # BLD: 6.65 K/UL — SIGNIFICANT CHANGE UP (ref 3.8–10.5)
WBC # FLD AUTO: 6.65 K/UL — SIGNIFICANT CHANGE UP (ref 3.8–10.5)
WBC UR QL: 12 /HPF — HIGH (ref 0–5)

## 2024-02-11 PROCEDURE — 71250 CT THORAX DX C-: CPT | Mod: 26,MA

## 2024-02-11 PROCEDURE — 74176 CT ABD & PELVIS W/O CONTRAST: CPT | Mod: 26,MA

## 2024-02-11 PROCEDURE — 70450 CT HEAD/BRAIN W/O DYE: CPT | Mod: 26,MA

## 2024-02-11 PROCEDURE — 71045 X-RAY EXAM CHEST 1 VIEW: CPT | Mod: 26

## 2024-02-11 PROCEDURE — 99285 EMERGENCY DEPT VISIT HI MDM: CPT

## 2024-02-11 PROCEDURE — 93010 ELECTROCARDIOGRAM REPORT: CPT

## 2024-02-11 RX ORDER — ONDANSETRON 8 MG/1
4 TABLET, FILM COATED ORAL EVERY 8 HOURS
Refills: 0 | Status: DISCONTINUED | OUTPATIENT
Start: 2024-02-11 | End: 2024-02-16

## 2024-02-11 RX ORDER — FAMOTIDINE 10 MG/ML
20 INJECTION INTRAVENOUS DAILY
Refills: 0 | Status: DISCONTINUED | OUTPATIENT
Start: 2024-02-11 | End: 2024-02-16

## 2024-02-11 RX ORDER — LACTOBACILLUS ACIDOPHILUS 100MM CELL
1 CAPSULE ORAL DAILY
Refills: 0 | Status: DISCONTINUED | OUTPATIENT
Start: 2024-02-11 | End: 2024-02-16

## 2024-02-11 RX ORDER — AMIODARONE HYDROCHLORIDE 400 MG/1
200 TABLET ORAL DAILY
Refills: 0 | Status: DISCONTINUED | OUTPATIENT
Start: 2024-02-11 | End: 2024-02-16

## 2024-02-11 RX ORDER — FUROSEMIDE 40 MG
40 TABLET ORAL DAILY
Refills: 0 | Status: DISCONTINUED | OUTPATIENT
Start: 2024-02-11 | End: 2024-02-12

## 2024-02-11 RX ORDER — LANOLIN ALCOHOL/MO/W.PET/CERES
3 CREAM (GRAM) TOPICAL AT BEDTIME
Refills: 0 | Status: DISCONTINUED | OUTPATIENT
Start: 2024-02-11 | End: 2024-02-16

## 2024-02-11 RX ORDER — CARVEDILOL PHOSPHATE 80 MG/1
12.5 CAPSULE, EXTENDED RELEASE ORAL EVERY 12 HOURS
Refills: 0 | Status: DISCONTINUED | OUTPATIENT
Start: 2024-02-11 | End: 2024-02-13

## 2024-02-11 RX ORDER — HEPARIN SODIUM 5000 [USP'U]/ML
5000 INJECTION INTRAVENOUS; SUBCUTANEOUS EVERY 12 HOURS
Refills: 0 | Status: DISCONTINUED | OUTPATIENT
Start: 2024-02-11 | End: 2024-02-12

## 2024-02-11 RX ORDER — HYDRALAZINE HCL 50 MG
50 TABLET ORAL THREE TIMES A DAY
Refills: 0 | Status: DISCONTINUED | OUTPATIENT
Start: 2024-02-11 | End: 2024-02-13

## 2024-02-11 RX ORDER — TIOTROPIUM BROMIDE 18 UG/1
2 CAPSULE ORAL; RESPIRATORY (INHALATION) DAILY
Refills: 0 | Status: DISCONTINUED | OUTPATIENT
Start: 2024-02-11 | End: 2024-02-16

## 2024-02-11 RX ORDER — SERTRALINE 25 MG/1
25 TABLET, FILM COATED ORAL DAILY
Refills: 0 | Status: DISCONTINUED | OUTPATIENT
Start: 2024-02-11 | End: 2024-02-16

## 2024-02-11 RX ORDER — FERROUS SULFATE 325(65) MG
325 TABLET ORAL DAILY
Refills: 0 | Status: DISCONTINUED | OUTPATIENT
Start: 2024-02-11 | End: 2024-02-16

## 2024-02-11 RX ORDER — IPRATROPIUM/ALBUTEROL SULFATE 18-103MCG
3 AEROSOL WITH ADAPTER (GRAM) INHALATION EVERY 6 HOURS
Refills: 0 | Status: DISCONTINUED | OUTPATIENT
Start: 2024-02-11 | End: 2024-02-16

## 2024-02-11 RX ORDER — CEFTRIAXONE 500 MG/1
1000 INJECTION, POWDER, FOR SOLUTION INTRAMUSCULAR; INTRAVENOUS ONCE
Refills: 0 | Status: COMPLETED | OUTPATIENT
Start: 2024-02-11 | End: 2024-02-11

## 2024-02-11 RX ORDER — LEVETIRACETAM 250 MG/1
500 TABLET, FILM COATED ORAL
Refills: 0 | Status: DISCONTINUED | OUTPATIENT
Start: 2024-02-11 | End: 2024-02-14

## 2024-02-11 RX ORDER — SODIUM CHLORIDE 9 MG/ML
1000 INJECTION INTRAMUSCULAR; INTRAVENOUS; SUBCUTANEOUS ONCE
Refills: 0 | Status: COMPLETED | OUTPATIENT
Start: 2024-02-11 | End: 2024-02-11

## 2024-02-11 RX ORDER — CEFTRIAXONE 500 MG/1
1000 INJECTION, POWDER, FOR SOLUTION INTRAMUSCULAR; INTRAVENOUS EVERY 24 HOURS
Refills: 0 | Status: DISCONTINUED | OUTPATIENT
Start: 2024-02-12 | End: 2024-02-16

## 2024-02-11 RX ORDER — KETOROLAC TROMETHAMINE 30 MG/ML
15 SYRINGE (ML) INJECTION ONCE
Refills: 0 | Status: DISCONTINUED | OUTPATIENT
Start: 2024-02-11 | End: 2024-02-11

## 2024-02-11 RX ORDER — FENOFIBRATE,MICRONIZED 130 MG
48 CAPSULE ORAL DAILY
Refills: 0 | Status: DISCONTINUED | OUTPATIENT
Start: 2024-02-11 | End: 2024-02-16

## 2024-02-11 RX ORDER — ALBUTEROL 90 UG/1
2 AEROSOL, METERED ORAL EVERY 6 HOURS
Refills: 0 | Status: DISCONTINUED | OUTPATIENT
Start: 2024-02-11 | End: 2024-02-16

## 2024-02-11 RX ORDER — ACETAMINOPHEN 500 MG
650 TABLET ORAL EVERY 6 HOURS
Refills: 0 | Status: DISCONTINUED | OUTPATIENT
Start: 2024-02-11 | End: 2024-02-14

## 2024-02-11 RX ADMIN — Medication 100 MILLIGRAM(S): at 22:28

## 2024-02-11 RX ADMIN — CEFTRIAXONE 100 MILLIGRAM(S): 500 INJECTION, POWDER, FOR SOLUTION INTRAMUSCULAR; INTRAVENOUS at 21:07

## 2024-02-11 RX ADMIN — SODIUM CHLORIDE 1000 MILLILITER(S): 9 INJECTION INTRAMUSCULAR; INTRAVENOUS; SUBCUTANEOUS at 19:40

## 2024-02-11 RX ADMIN — CEFTRIAXONE 1000 MILLIGRAM(S): 500 INJECTION, POWDER, FOR SOLUTION INTRAMUSCULAR; INTRAVENOUS at 21:42

## 2024-02-11 RX ADMIN — Medication 15 MILLIGRAM(S): at 20:10

## 2024-02-11 RX ADMIN — Medication 100 MILLIGRAM(S): at 23:48

## 2024-02-11 RX ADMIN — Medication 15 MILLIGRAM(S): at 19:40

## 2024-02-11 NOTE — ED PROVIDER NOTE - CARE PLAN
1 Principal Discharge DX:	Acute UTI  Secondary Diagnosis:	PNA (pneumonia)  Secondary Diagnosis:	Acute respiratory failure with hypoxia

## 2024-02-11 NOTE — CONSULT NOTE ADULT - TIME BILLING
in direct care of patient , reviewing labs and  other results and adjusting medications and in discussion with other consultants , RN and PMD

## 2024-02-11 NOTE — ED PROVIDER NOTE - OBJECTIVE STATEMENT
84 yo F w/ PMH per EMR of A.fib, CKD, COPD, HLD, HTN, MD, OA, pacemaker, and hx small bowel ca s/p whipple here complaining of 1 days of fever, body aches, cough. patient uses 1.5 to 3 LPM O2 PRN. Patient from [facility], PMD listed as Mateo Yadav.

## 2024-02-11 NOTE — ED PROVIDER NOTE - CLINICAL SUMMARY MEDICAL DECISION MAKING FREE TEXT BOX
here complaining of fevers, chills, body aches, cough. check labs, XR, swab, treat symptoms, re-evaluate.

## 2024-02-11 NOTE — ED ADULT NURSE NOTE - NSFALLRISKINTERV_ED_ALL_ED

## 2024-02-11 NOTE — ED ADULT NURSE NOTE - OBJECTIVE STATEMENT
Pt presents to the ED s/p fever, body aches, weakness, cough. Pt placed on cardiac monitor, continuous pulse ox, EKG done., Pt has bilat lower extremty swelling.

## 2024-02-11 NOTE — ED PROVIDER NOTE - PHYSICAL EXAMINATION
Vital signs as available reviewed.  General:  No acute distress.  Head:  Normocephalic, atraumatic.  Eyes:  Conjunctiva pink, no icterus.  Cardiovascular:  Regular rate, no obvious murmur.  Respiratory:  left sided rales.  Abdomen:  Soft, non-tender.  Musculoskeletal:  No obvious deformity.  Neurologic: Alert and oriented, moving all extremities.  Skin:  Warm and dry.

## 2024-02-11 NOTE — CONSULT NOTE ADULT - SUBJECTIVE AND OBJECTIVE BOX
[facility]     Patient is a 83y old  Female who presents with a chief complaint of     HPI:      HPI:    83y Female for Cardiology Consult    PAST MEDICAL & SURGICAL HISTORY:  HTN (hypertension)      HLD (hyperlipidemia)      Anemia  newly diagnosed      Pneumonia  years ago      OA (osteoarthritis)      Afib      Small bowel cancer      Pacemaker      Major depression      Chronic kidney disease (CKD)      COPD (chronic obstructive pulmonary disease)      Status post total left knee replacement        History of cholecystectomy  open , many years ago      H/O hernia repair  years ago      H/O resection of small bowel          FAMILY HISTORY:      SOCIAL HISTORY:   Alcohol:  Smoking:    Allergies    NIFEdipine (Unknown)  Procardia (Other)  fluoxetine (Unknown)    Intolerances    oxycodone (Other)      MEDICATIONS  (STANDING):  albuterol/ipratropium for Nebulization.. 3 milliLiter(s) Inhalation every 6 hours  aMIOdarone    Tablet 200 milliGRAM(s) Oral daily  carvedilol 12.5 milliGRAM(s) Oral every 12 hours  cefTRIAXone   IVPB 1000 milliGRAM(s) IV Intermittent every 24 hours  famotidine    Tablet 20 milliGRAM(s) Oral daily  fenofibrate Tablet 48 milliGRAM(s) Oral daily  ferrous    sulfate 325 milliGRAM(s) Oral daily  furosemide    Tablet 40 milliGRAM(s) Oral daily  heparin   Injectable 5000 Unit(s) SubCutaneous every 12 hours  hydrALAZINE 50 milliGRAM(s) Oral three times a day  lactobacillus acidophilus 1 Tablet(s) Oral daily  levETIRAcetam 500 milliGRAM(s) Oral two times a day  melatonin 3 milliGRAM(s) Oral at bedtime  multivitamin 1 Tablet(s) Oral daily  sertraline 25 milliGRAM(s) Oral daily  tiotropium 2.5 MICROgram(s) Inhaler 2 Puff(s) Inhalation daily    MEDICATIONS  (PRN):  acetaminophen     Tablet .. 650 milliGRAM(s) Oral every 6 hours PRN Temp greater or equal to 38C (100.4F)  albuterol    90 MICROgram(s) HFA Inhaler 2 Puff(s) Inhalation every 6 hours PRN Shortness of Breath and/or Wheezing  aluminum hydroxide/magnesium hydroxide/simethicone Suspension 30 milliLiter(s) Oral every 4 hours PRN Dyspepsia  guaiFENesin Oral Liquid (Sugar-Free) 100 milliGRAM(s) Oral every 6 hours PRN Cough  ondansetron Injectable 4 milliGRAM(s) IV Push every 8 hours PRN Nausea and/or Vomiting      REVIEW OF SYSTEMS:  CONSTITUTIONAL: No fever, weight loss, chills, shakes, or fat  RESPIRATORY: No cough, wheezing, hemoptysis, or shortness of breath  CARDIOVASCULAR: No chest pain, dyspnea, palpitations, dizziness, syncope, paroxysmal nocturnal dyspnea, orthopnea, or arm or leg swelling  GASTROINTESTINAL: No abdominal  or epigastric pain, nausea, vomiting, hematemesis, diarrhea, constipation, melena or bright red bloo  NEUROLOGICAL: No headaches, memory loss, slurred speech, limb weakness, loss of strength, numbness, or tremors  SKIN: No itching, burning, rashes, or lesions  ENDOCRINE: No heat or cold intolerance, or hair loss  MUSCULOSKELETAL: No joint pain or swelling, muscle, back, or extremity pain  HEME/LYMPH: No easy bruising or bleeding gums  ALLERY AND IMMUNOLOGIC: No hives or rash.    Vital Signs Last 24 Hrs  T(C): 39.1 (2024 19:00), Max: 39.1 (2024 19:00)  T(F): 102.4 (2024 19:00), Max: 102.4 (2024 19:00)  HR: 62 (2024 22:35) (62 - 82)  BP: 125/60 (2024 22:35) (125/60 - 172/83)  BP(mean): --  RR: 18 (2024 22:35) (16 - 18)  SpO2: 95% (2024 22:35) (95% - 98%)    Parameters below as of 2024 22:35  Patient On (Oxygen Delivery Method): nasal cannula  O2 Flow (L/min): 2      PHYSICAL EXAM:  HEAD:  Atraumatic, Normocephalic  EYES: EOMI, PERRLA, conjunctiva and sclera clear  NECK: Supple and normal thyroid.  No JVD or carotid bruit.   HEART: S1, S2 regular , 1/6 soft ejection systolic murmur in mitral area , no thrill and no gallops .  PULMONARY: Bilateral vesicular breathing , few scattered ronchi and few scattered rales are present .  ABDOMEN: Soft nontender and positive bowl sounds   EXTREMITIES:  No clubbing, cyanosis, or pedal  edema  NEUROLOGICAL: AAOX3 , no focal deficit .    LABS:                        10.1   6.65  )-----------( 192      ( 2024 18:50 )             30.9     02-11    140  |  104  |  24<H>  ----------------------------<  95  3.9   |  29  |  1.50<H>    Ca    9.1      2024 18:50    TPro  7.3  /  Alb  3.6  /  TBili  0.5  /  DBili  x   /  AST  64<H>  /  ALT  43  /  AlkPhos  53  02-11        PT/INR - ( 2024 18:50 )   PT: 20.2 sec;   INR: 1.76 ratio         PTT - ( 2024 18:50 )  PTT:34.9 sec  Urinalysis Basic - ( 2024 18:50 )    Color: Yellow / Appearance: Clear / S.009 / pH: x  Gluc: 95 mg/dL / Ketone: Negative mg/dL  / Bili: Negative / Urobili: 0.2 mg/dL   Blood: x / Protein: Negative mg/dL / Nitrite: Positive   Leuk Esterase: Moderate / RBC: 9 /HPF / WBC 12 /HPF   Sq Epi: x / Non Sq Epi: x / Bacteria: Many /HPF      BNP      EKG:  ECHO:  IMAGING:    Assessment and Plan :     Will continue to follow during hospital course and give further recommendations as needed . Thanks for your referral . if any questions please contact me at office ([phone] [phone])  MIKAL ROSE MD [place] [id number]  [address]  OFFICE : [phone]  CELL : [phone]  CARDIOLOGY CONSULT :     · Chief Complaint: The patient is a 83y Female complaining of fever.  · HPI Objective Statement: 82 yo F w/ PMH per EMR of A.fib, CKD, COPD, HLD, HTN, MD, OA, pacemaker, and hx small bowel ca s/p whipple here complaining of 1 days of fever, body aches, cough. patient uses 1.5 to 3 LPM O2 PRN. Patient from [facility], PMD listed as Mateo Yadav.    HPI:      HPI:    83y Female for Cardiology Consult    PAST MEDICAL & SURGICAL HISTORY:  HTN (hypertension)      HLD (hyperlipidemia)      Anemia  newly diagnosed      Pneumonia  years ago      OA (osteoarthritis)      Afib      Small bowel cancer      Pacemaker      Major depression      Chronic kidney disease (CKD)      COPD (chronic obstructive pulmonary disease)      Status post total left knee replacement        History of cholecystectomy  open , many years ago      H/O hernia repair  years ago      H/O resection of small bowel          FAMILY HISTORY:      SOCIAL HISTORY:   Alcohol:  Smoking:    Allergies    NIFEdipine (Unknown)  Procardia (Other)  fluoxetine (Unknown)    Intolerances    oxycodone (Other)      MEDICATIONS  (STANDING):  albuterol/ipratropium for Nebulization.. 3 milliLiter(s) Inhalation every 6 hours  aMIOdarone    Tablet 200 milliGRAM(s) Oral daily  carvedilol 12.5 milliGRAM(s) Oral every 12 hours  cefTRIAXone   IVPB 1000 milliGRAM(s) IV Intermittent every 24 hours  famotidine    Tablet 20 milliGRAM(s) Oral daily  fenofibrate Tablet 48 milliGRAM(s) Oral daily  ferrous    sulfate 325 milliGRAM(s) Oral daily  furosemide    Tablet 40 milliGRAM(s) Oral daily  heparin   Injectable 5000 Unit(s) SubCutaneous every 12 hours  hydrALAZINE 50 milliGRAM(s) Oral three times a day  lactobacillus acidophilus 1 Tablet(s) Oral daily  levETIRAcetam 500 milliGRAM(s) Oral two times a day  melatonin 3 milliGRAM(s) Oral at bedtime  multivitamin 1 Tablet(s) Oral daily  sertraline 25 milliGRAM(s) Oral daily  tiotropium 2.5 MICROgram(s) Inhaler 2 Puff(s) Inhalation daily    MEDICATIONS  (PRN):  acetaminophen     Tablet .. 650 milliGRAM(s) Oral every 6 hours PRN Temp greater or equal to 38C (100.4F)  albuterol    90 MICROgram(s) HFA Inhaler 2 Puff(s) Inhalation every 6 hours PRN Shortness of Breath and/or Wheezing  aluminum hydroxide/magnesium hydroxide/simethicone Suspension 30 milliLiter(s) Oral every 4 hours PRN Dyspepsia  guaiFENesin Oral Liquid (Sugar-Free) 100 milliGRAM(s) Oral every 6 hours PRN Cough  ondansetron Injectable 4 milliGRAM(s) IV Push every 8 hours PRN Nausea and/or Vomiting      Vital Signs Last 24 Hrs  T(C): 39.1 (2024 19:00), Max: 39.1 (2024 19:00)  T(F): 102.4 (2024 19:00), Max: 102.4 (2024 19:00)  HR: 62 (2024 22:35) (62 - 82)  BP: 125/60 (2024 22:35) (125/60 - 172/83)  BP(mean): --  RR: 18 (2024 22:35) (16 - 18)  SpO2: 95% (2024 22:35) (95% - 98%)    Parameters below as of 2024 22:35  Patient On (Oxygen Delivery Method): nasal cannula  O2 Flow (L/min): 2    LABS:                        10.1   6.65  )-----------( 192      ( 2024 18:50 )             30.9     02-11    140  |  104  |  24<H>  ----------------------------<  95  3.9   |  29  |  1.50<H>    Ca    9.1      2024 18:50    TPro  7.3  /  Alb  3.6  /  TBili  0.5  /  DBili  x   /  AST  64<H>  /  ALT  43  /  AlkPhos  53  02-11        PT/INR - ( 2024 18:50 )   PT: 20.2 sec;   INR: 1.76 ratio         PTT - ( 2024 18:50 )  PTT:34.9 sec  Urinalysis Basic - ( 2024 18:50 )    Color: Yellow / Appearance: Clear / S.009 / pH: x  Gluc: 95 mg/dL / Ketone: Negative mg/dL  / Bili: Negative / Urobili: 0.2 mg/dL   Blood: x / Protein: Negative mg/dL / Nitrite: Positive   Leuk Esterase: Moderate / RBC: 9 /HPF / WBC 12 /HPF   Sq Epi: x / Non Sq Epi: x / Bacteria: Many /HPF    Assessment and Plan :   FULL CONSULT DICTATED :   82 yo F w/ PMH per EMR of ABIGAILfib, CKD, COPD, HLD, HTN, MD, OA, pacemaker, and hx small bowel ca s/p whipple here complaining of 1 days of fever, body aches, cough. patient uses 1.5 to 3 LPM O2 PRN. Patient has possible underlying pneumonia and excerbation of COPD . Continue I/V antibiotics and bronchodilators as ordered . Patient has mild chronic diastolic heart failure also . Patient has H/O hypertension and paroxysmal atrial fibrillation and both under control so far . Monitor hemoglobin and electrolytes . Continue O=I/V lasix 20 mg twice a  day . Patient has mild anemia also Monitor hemoglobin and electrolytes . Prognosis guarded . Rest of medications as such .   Will continue to follow during hospital course and give further recommendations as needed . Thanks for your referral . if any questions please contact me at office ([phone] cell [phone])

## 2024-02-11 NOTE — ED PROVIDER NOTE - PROGRESS NOTE DETAILS
patient feels better. + uti with hematuria. patient reports low back pain, will get CT for kidney stone. will also get CT brain as patient reported headache and has history of ICH and will get CT chest to better visualize PNA. Ct with PNA, no kidney stone. Admitting attending Dr Pate made aware of admission.

## 2024-02-12 DIAGNOSIS — R50.9 FEVER, UNSPECIFIED: ICD-10-CM

## 2024-02-12 DIAGNOSIS — R05.9 COUGH, UNSPECIFIED: ICD-10-CM

## 2024-02-12 DIAGNOSIS — N39.0 URINARY TRACT INFECTION, SITE NOT SPECIFIED: ICD-10-CM

## 2024-02-12 LAB
ANION GAP SERPL CALC-SCNC: 5 MMOL/L — SIGNIFICANT CHANGE UP (ref 5–17)
BUN SERPL-MCNC: 25 MG/DL — HIGH (ref 7–23)
CALCIUM SERPL-MCNC: 8.2 MG/DL — LOW (ref 8.5–10.1)
CHLORIDE SERPL-SCNC: 107 MMOL/L — SIGNIFICANT CHANGE UP (ref 96–108)
CHOLEST SERPL-MCNC: 130 MG/DL — SIGNIFICANT CHANGE UP
CO2 SERPL-SCNC: 31 MMOL/L — SIGNIFICANT CHANGE UP (ref 22–31)
CREAT SERPL-MCNC: 1.5 MG/DL — HIGH (ref 0.5–1.3)
EGFR: 34 ML/MIN/1.73M2 — LOW
GLUCOSE SERPL-MCNC: 80 MG/DL — SIGNIFICANT CHANGE UP (ref 70–99)
HDLC SERPL-MCNC: 48 MG/DL — LOW
LIPID PNL WITH DIRECT LDL SERPL: 68 MG/DL — SIGNIFICANT CHANGE UP
MAGNESIUM SERPL-MCNC: 2.1 MG/DL — SIGNIFICANT CHANGE UP (ref 1.6–2.6)
NON HDL CHOLESTEROL: 83 MG/DL — SIGNIFICANT CHANGE UP
NT-PROBNP SERPL-SCNC: 5185 PG/ML — HIGH (ref 0–450)
NT-PROBNP SERPL-SCNC: 7164 PG/ML — HIGH (ref 0–450)
POTASSIUM SERPL-MCNC: 3.4 MMOL/L — LOW (ref 3.5–5.3)
POTASSIUM SERPL-SCNC: 3.4 MMOL/L — LOW (ref 3.5–5.3)
SODIUM SERPL-SCNC: 143 MMOL/L — SIGNIFICANT CHANGE UP (ref 135–145)
TRIGL SERPL-MCNC: 69 MG/DL — SIGNIFICANT CHANGE UP
TROPONIN I, HIGH SENSITIVITY RESULT: 113.4 NG/L — HIGH
TROPONIN I, HIGH SENSITIVITY RESULT: 159.1 NG/L — HIGH
TSH SERPL-MCNC: 0.64 UIU/ML — SIGNIFICANT CHANGE UP (ref 0.36–3.74)

## 2024-02-12 PROCEDURE — 99222 1ST HOSP IP/OBS MODERATE 55: CPT

## 2024-02-12 RX ORDER — AZITHROMYCIN 500 MG/1
500 TABLET, FILM COATED ORAL EVERY 24 HOURS
Refills: 0 | Status: DISCONTINUED | OUTPATIENT
Start: 2024-02-13 | End: 2024-02-13

## 2024-02-12 RX ORDER — IPRATROPIUM/ALBUTEROL SULFATE 18-103MCG
3 AEROSOL WITH ADAPTER (GRAM) INHALATION
Refills: 0 | DISCHARGE

## 2024-02-12 RX ORDER — APIXABAN 2.5 MG/1
2.5 TABLET, FILM COATED ORAL EVERY 12 HOURS
Refills: 0 | Status: DISCONTINUED | OUTPATIENT
Start: 2024-02-12 | End: 2024-02-16

## 2024-02-12 RX ORDER — FUROSEMIDE 40 MG
20 TABLET ORAL
Refills: 0 | Status: DISCONTINUED | OUTPATIENT
Start: 2024-02-12 | End: 2024-02-13

## 2024-02-12 RX ORDER — AZITHROMYCIN 500 MG/1
500 TABLET, FILM COATED ORAL ONCE
Refills: 0 | Status: COMPLETED | OUTPATIENT
Start: 2024-02-12 | End: 2024-02-12

## 2024-02-12 RX ORDER — AZITHROMYCIN 500 MG/1
TABLET, FILM COATED ORAL
Refills: 0 | Status: DISCONTINUED | OUTPATIENT
Start: 2024-02-12 | End: 2024-02-13

## 2024-02-12 RX ORDER — ASPIRIN/CALCIUM CARB/MAGNESIUM 324 MG
81 TABLET ORAL DAILY
Refills: 0 | Status: DISCONTINUED | OUTPATIENT
Start: 2024-02-12 | End: 2024-02-16

## 2024-02-12 RX ORDER — SOD,AMMONIUM,POTASSIUM LACTATE
1 CREAM (GRAM) TOPICAL
Refills: 0 | Status: DISCONTINUED | OUTPATIENT
Start: 2024-02-12 | End: 2024-02-16

## 2024-02-12 RX ORDER — POTASSIUM CHLORIDE 20 MEQ
20 PACKET (EA) ORAL DAILY
Refills: 0 | Status: DISCONTINUED | OUTPATIENT
Start: 2024-02-12 | End: 2024-02-15

## 2024-02-12 RX ORDER — SODIUM CHLORIDE 9 MG/ML
1000 INJECTION, SOLUTION INTRAVENOUS
Refills: 0 | Status: DISCONTINUED | OUTPATIENT
Start: 2024-02-12 | End: 2024-02-13

## 2024-02-12 RX ORDER — BUDESONIDE, MICRONIZED 100 %
0.5 POWDER (GRAM) MISCELLANEOUS
Refills: 0 | Status: DISCONTINUED | OUTPATIENT
Start: 2024-02-12 | End: 2024-02-16

## 2024-02-12 RX ORDER — POTASSIUM CHLORIDE 20 MEQ
40 PACKET (EA) ORAL ONCE
Refills: 0 | Status: COMPLETED | OUTPATIENT
Start: 2024-02-12 | End: 2024-02-12

## 2024-02-12 RX ORDER — LACTOBACILLUS ACIDOPHILUS 100MM CELL
1 CAPSULE ORAL
Refills: 0 | DISCHARGE

## 2024-02-12 RX ADMIN — AMIODARONE HYDROCHLORIDE 200 MILLIGRAM(S): 400 TABLET ORAL at 05:27

## 2024-02-12 RX ADMIN — Medication 81 MILLIGRAM(S): at 13:29

## 2024-02-12 RX ADMIN — Medication 20 MILLIGRAM(S): at 13:29

## 2024-02-12 RX ADMIN — Medication 40 MILLIGRAM(S): at 18:37

## 2024-02-12 RX ADMIN — AZITHROMYCIN 500 MILLIGRAM(S): 500 TABLET, FILM COATED ORAL at 13:25

## 2024-02-12 RX ADMIN — Medication 3 MILLILITER(S): at 20:13

## 2024-02-12 RX ADMIN — HEPARIN SODIUM 5000 UNIT(S): 5000 INJECTION INTRAVENOUS; SUBCUTANEOUS at 05:28

## 2024-02-12 RX ADMIN — Medication 20 MILLIGRAM(S): at 05:06

## 2024-02-12 RX ADMIN — LEVETIRACETAM 500 MILLIGRAM(S): 250 TABLET, FILM COATED ORAL at 05:27

## 2024-02-12 RX ADMIN — Medication 325 MILLIGRAM(S): at 13:29

## 2024-02-12 RX ADMIN — Medication 0.5 MILLIGRAM(S): at 18:43

## 2024-02-12 RX ADMIN — Medication 1 APPLICATION(S): at 18:37

## 2024-02-12 RX ADMIN — Medication 50 MILLIGRAM(S): at 21:46

## 2024-02-12 RX ADMIN — Medication 3 MILLILITER(S): at 08:46

## 2024-02-12 RX ADMIN — CARVEDILOL PHOSPHATE 12.5 MILLIGRAM(S): 80 CAPSULE, EXTENDED RELEASE ORAL at 18:37

## 2024-02-12 RX ADMIN — Medication 48 MILLIGRAM(S): at 13:28

## 2024-02-12 RX ADMIN — Medication 1 TABLET(S): at 13:28

## 2024-02-12 RX ADMIN — Medication 3 MILLILITER(S): at 13:44

## 2024-02-12 RX ADMIN — SODIUM CHLORIDE 50 MILLILITER(S): 9 INJECTION, SOLUTION INTRAVENOUS at 05:59

## 2024-02-12 RX ADMIN — TIOTROPIUM BROMIDE 2 PUFF(S): 18 CAPSULE ORAL; RESPIRATORY (INHALATION) at 08:09

## 2024-02-12 RX ADMIN — Medication 40 MILLIEQUIVALENT(S): at 07:44

## 2024-02-12 RX ADMIN — SODIUM CHLORIDE 50 MILLILITER(S): 9 INJECTION, SOLUTION INTRAVENOUS at 13:25

## 2024-02-12 RX ADMIN — Medication 50 MILLIGRAM(S): at 05:27

## 2024-02-12 RX ADMIN — SERTRALINE 25 MILLIGRAM(S): 25 TABLET, FILM COATED ORAL at 13:27

## 2024-02-12 RX ADMIN — Medication 3 MILLIGRAM(S): at 21:46

## 2024-02-12 RX ADMIN — CEFTRIAXONE 100 MILLIGRAM(S): 500 INJECTION, POWDER, FOR SOLUTION INTRAMUSCULAR; INTRAVENOUS at 21:46

## 2024-02-12 RX ADMIN — APIXABAN 2.5 MILLIGRAM(S): 2.5 TABLET, FILM COATED ORAL at 18:37

## 2024-02-12 RX ADMIN — CARVEDILOL PHOSPHATE 12.5 MILLIGRAM(S): 80 CAPSULE, EXTENDED RELEASE ORAL at 05:27

## 2024-02-12 RX ADMIN — Medication 50 MILLIGRAM(S): at 13:29

## 2024-02-12 RX ADMIN — LEVETIRACETAM 500 MILLIGRAM(S): 250 TABLET, FILM COATED ORAL at 18:36

## 2024-02-12 RX ADMIN — Medication 3 MILLILITER(S): at 01:10

## 2024-02-12 RX ADMIN — FAMOTIDINE 20 MILLIGRAM(S): 10 INJECTION INTRAVENOUS at 13:28

## 2024-02-12 NOTE — PHYSICAL THERAPY INITIAL EVALUATION ADULT - RANGE OF MOTION EXAMINATION, REHAB EVAL
except for B/L shoulder decreased by 75%/bilateral upper extremity ROM was WFL (within functional limits)/bilateral lower extremity ROM was WFL (within functional limits)

## 2024-02-12 NOTE — H&P ADULT - NSHPLABSRESULTS_GEN_ALL_CORE
02-12    143  |  107  |  25<H>  ----------------------------<  80  3.4<L>   |  31  |  1.50<H>    Ca    8.2<L>      12 Feb 2024 05:21  Mg     2.1     02-12    TPro  7.3  /  Alb  3.6  /  TBili  0.5  /  DBili  x   /  AST  64<H>  /  ALT  43  /  AlkPhos  53  02-11                            10.1   6.65  )-----------( 192      ( 11 Feb 2024 18:50 )             30.9             LIVER FUNCTIONS - ( 11 Feb 2024 18:50 )  Alb: 3.6 g/dL / Pro: 7.3 g/dL / ALK PHOS: 53 U/L / ALT: 43 U/L / AST: 64 U/L / GGT: x             PT/INR - ( 11 Feb 2024 18:50 )   PT: 20.2 sec;   INR: 1.76 ratio         PTT - ( 11 Feb 2024 18:50 )  PTT:34.9 sec    Urinalysis Basic - ( 12 Feb 2024 05:21 )    Color: x / Appearance: x / SG: x / pH: x  Gluc: 80 mg/dL / Ketone: x  / Bili: x / Urobili: x   Blood: x / Protein: x / Nitrite: x   Leuk Esterase: x / RBC: x / WBC x   Sq Epi: x / Non Sq Epi: x / Bacteria: x

## 2024-02-12 NOTE — H&P ADULT - HISTORY OF PRESENT ILLNESS
84 yo F w/ PMH per EMR of A.fib, CKD, COPD, HLD, HTN, MD, OA, pacemaker, and hx small bowel ca s/p whipple here complaining of 1 days of fever, body aches, cough. patient uses 1.5 to 3 LPM O2 PRN.  In ER patient was found to have UTI.  patient is being admitted for further work up and treatment

## 2024-02-12 NOTE — OCCUPATIONAL THERAPY INITIAL EVALUATION ADULT - ADDITIONAL COMMENTS
Pt lives with her spouse at [facility]. Pt has a stall shower with grab bars and shower chair. Pt ambulates using a rollator and 3 lpm O2. Pt owns a rollator, shower chair and has home O2. Pt reports that she wears compression stockings with zippers and was independent in ADLs and ambulation using rollator. Pt reports that her spouse provides supervision during bathing. Pt is right hand dominant. Pt performed sit to stand with close supervision and ambulated 100' using RW and 3 lpm O2 with CG/close supervision. Pt reports that she was receiving home OT/PT services at [facility]; home OT to address decreased AROM bilateral shoulders.

## 2024-02-12 NOTE — CARE COORDINATION ASSESSMENT. - PRO ARRIVE FROM
[facility] [phone]/fax [phone]. [facility] [phone]. Garden/assisted living facility

## 2024-02-12 NOTE — CHART NOTE - NSCHARTNOTEFT_GEN_A_CORE
consult dictated    Impression:      COPD with exacerbation  Possible superimposed pneumonia  Former smoker  Hx Pulmonary emboli/DVT  Peripheral arterial disease    Plan:    IV solumedrol  Antibiotics pending cultures  Urinary antigen for legionella  Bronchodilators  Supplemental oxygen  DVT prophylaxis

## 2024-02-12 NOTE — PHYSICAL THERAPY INITIAL EVALUATION ADULT - ADDITIONAL COMMENTS
pt lives in Assisted Living Facility w/ .  Pt report able to ambulate to dining room and does go out into the community/garden

## 2024-02-12 NOTE — PATIENT PROFILE ADULT - FUNCTIONAL ASSESSMENT - DAILY ACTIVITY SECTION LABEL
Mode: repeat paint Comments: Patient states that she tolerated her last treatment well. Treatment Number: 122 Device Serial Number (Optional): [id number] Spot Size: 2 x 2 cm Total Pulses (Optional): 114 Consent: Written consent obtained, risks reviewed including but not limited to crusting, scabbing, blistering, scarring, darker or lighter pigmentary change, incidental hair removal, bruising, and/or incomplete removal. Post-Care Instructions: I reviewed with the patient in detail post-care instructions. Patient should stay away from the sun and wear sun protection until treated areas are fully healed. Location #1: face, lips, ears Total Square Area In Cm2 (Required For Proper Billing- Whole Numbers Only Please): 164 Fluence #1 (J/Cm2 Or Mj/Cm2): 700 Fluence Units: J/cm2 Detail Level: Zone .

## 2024-02-12 NOTE — ED ADULT NURSE REASSESSMENT NOTE - NS ED NURSE REASSESS COMMENT FT1
Pt received AOX3, resting on stretcher. No c/o respiratory distress. O2 via nasal canula at 3L- sat 95% Awaiting bed

## 2024-02-12 NOTE — OCCUPATIONAL THERAPY INITIAL EVALUATION ADULT - PERTINENT HX OF CURRENT PROBLEM, REHAB EVAL
83 year old female with PMH per EMR of Afib, CKD, COPD, HLD, HTN, MD, OA, pacemaker, and hx small bowel ca s/p whipple here complaining of 1 day of fever, body aches, cough. Patient uses 1.5 to 3 LPM O2 PRN. Patient from [facility]. Patient admitted 2/11/24 with UTI and pneumonia.

## 2024-02-12 NOTE — PATIENT PROFILE ADULT - FALL HARM RISK - HARM RISK INTERVENTIONS

## 2024-02-12 NOTE — PHYSICAL THERAPY INITIAL EVALUATION ADULT - PERTINENT HX OF CURRENT PROBLEM, REHAB EVAL
82 yo woman with PMH of HTN, Afib, S/P PPM, CKD, COPD stopped smoking 30 years ago on PRN o2 at home 1.5-3L, h/o small bowel ca s/p whipple was admitted with fever and cough for 2-3 days. She also had incontinence for one day. In ED CT showed bibasilar opacities with mucus inpaction and also UA showed elevated WBC and a positive nitrate. Her WBC was low but the flu/RSV/COVID all negative. Tmax 102.4 84 yo woman with PMH of HTN, Afib, S/P PPM, CKD, COPD stopped smoking 30 years ago on PRN o2 at home 1.5-3L, h/o small bowel ca s/p whipple was admitted with fever and cough for 2-3 days. She also had incontinence for one day. In ED CT showed bibasilar opacities with mucus impaction and also UA showed elevated WBC and a positive nitrate. Her WBC was low but the flu/RSV/COVID all negative. Tmax 102.4

## 2024-02-12 NOTE — CHART NOTE - NSCHARTNOTEFT_GEN_A_CORE
Called by RN for elevated troponin, 159.1  - Trop repeated for 08:30 Called by RN for elevated troponin, 159.1  - Trop repeated for 08:30    ---------------------------  ADDENDUM  Trop downtrended at 113.4. Will not repeat further.

## 2024-02-12 NOTE — CONSULT NOTE ADULT - SUBJECTIVE AND OBJECTIVE BOX
[facility] Cardiology Consult - Laura Lela Nguyễn [phone]    CHIEF COMPLAINT: Patient is a 83y old  Female who presents with a chief complaint of sepsis (11 Feb 2024 23:50)      HPI:      PAST MEDICAL & SURGICAL HISTORY:  HTN (hypertension)      HLD (hyperlipidemia)      Anemia  newly diagnosed      Pneumonia  years ago      OA (osteoarthritis)      Afib      Small bowel cancer      Pacemaker      Major depression      Chronic kidney disease (CKD)      COPD (chronic obstructive pulmonary disease)      Status post total left knee replacement  2007      History of cholecystectomy  open , many years ago      H/O hernia repair  years ago      H/O resection of small bowel          SOCIAL HISTORY: Alochol: Denied  Smoking: Nonsmoker  Drug Use: Denied  Marital Status:         FAMILY HISTORY: FAMILY HISTORY:      MEDICATIONS  (STANDING):  albuterol/ipratropium for Nebulization.. 3 milliLiter(s) Inhalation every 6 hours  aMIOdarone    Tablet 200 milliGRAM(s) Oral daily  aspirin enteric coated 81 milliGRAM(s) Oral daily  carvedilol 12.5 milliGRAM(s) Oral every 12 hours  cefTRIAXone   IVPB 1000 milliGRAM(s) IV Intermittent every 24 hours  dextrose 5% + sodium chloride 0.45%. 1000 milliLiter(s) (50 mL/Hr) IV Continuous <Continuous>  famotidine    Tablet 20 milliGRAM(s) Oral daily  fenofibrate Tablet 48 milliGRAM(s) Oral daily  ferrous    sulfate 325 milliGRAM(s) Oral daily  furosemide   Injectable 20 milliGRAM(s) IV Push two times a day  heparin   Injectable 5000 Unit(s) SubCutaneous every 12 hours  hydrALAZINE 50 milliGRAM(s) Oral three times a day  lactobacillus acidophilus 1 Tablet(s) Oral daily  levETIRAcetam 500 milliGRAM(s) Oral two times a day  melatonin 3 milliGRAM(s) Oral at bedtime  multivitamin 1 Tablet(s) Oral daily  potassium chloride    Tablet ER 40 milliEquivalent(s) Oral once  sertraline 25 milliGRAM(s) Oral daily  tiotropium 2.5 MICROgram(s) Inhaler 2 Puff(s) Inhalation daily    MEDICATIONS  (PRN):  acetaminophen     Tablet .. 650 milliGRAM(s) Oral every 6 hours PRN Temp greater or equal to 38C (100.4F)  albuterol    90 MICROgram(s) HFA Inhaler 2 Puff(s) Inhalation every 6 hours PRN Shortness of Breath and/or Wheezing  aluminum hydroxide/magnesium hydroxide/simethicone Suspension 30 milliLiter(s) Oral every 4 hours PRN Dyspepsia  cloNIDine 0.2 milliGRAM(s) Oral three times a day PRN systolic BP>170 or equal to 170  guaiFENesin Oral Liquid (Sugar-Free) 100 milliGRAM(s) Oral every 6 hours PRN Cough  ondansetron Injectable 4 milliGRAM(s) IV Push every 8 hours PRN Nausea and/or Vomiting      Allergies    NIFEdipine (Unknown)  Procardia (Other)  fluoxetine (Unknown)    Intolerances    oxycodone (Other)      REVIEW OF SYSTEMS:  CONSTITUTIONAL: No weakness, fevers or chills  EYES/ENT: No visual changes;  No vertigo or throat pain   NECK: No pain or stiffness  RESPIRATORY: No cough, wheezing, hemoptysis; No shortness of breath  CARDIOVASCULAR: No chest pain or palpitations  GASTROINTESTINAL: No abdominal pain. No nausea, vomiting, or hematemesis; No diarrhea or constipation. No melena or hematochezia.  GENITOURINARY: No dysuria, frequency or hematuria  NEUROLOGICAL: No numbness or weakness  SKIN: No itching or rash  All other review of systems is negative unless indicated above    VITAL SIGNS:   Vital Signs Last 24 Hrs  T(C): 36.4 (12 Feb 2024 05:10), Max: 39.1 (11 Feb 2024 19:00)  T(F): 97.5 (12 Feb 2024 05:10), Max: 102.4 (11 Feb 2024 19:00)  HR: 62 (12 Feb 2024 05:10) (62 - 82)  BP: 144/69 (12 Feb 2024 05:10) (125/60 - 172/83)  BP(mean): --  RR: 16 (12 Feb 2024 05:10) (16 - 18)  SpO2: 97% (12 Feb 2024 05:10) (95% - 98%)    Parameters below as of 12 Feb 2024 05:10  Patient On (Oxygen Delivery Method): room air        I&O's Summary      PHYSICAL EXAM:  Constitutional: Awake in NAD  HEENT:  MAAME, EOMI  Neck: No JVD  Pulmonary: CTA B/L No R/R/W  Cardiovascular: PMI not palpable non-displaced Regular S1 and S2, no murmurs  Gastrointestinal: Bowel Sounds present, soft, nontender.   Extremities: Trace peripheral edema.   Neuro: No gross focal deficits    LABS: All Labs Reviewed:                        10.1   6.65  )-----------( 192      ( 11 Feb 2024 18:50 )             30.9     12 Feb 2024 05:21    143    |  107    |  25     ----------------------------<  80     3.4     |  31     |  1.50   11 Feb 2024 18:50    140    |  104    |  24     ----------------------------<  95     3.9     |  29     |  1.50     Ca    8.2        12 Feb 2024 05:21  Ca    9.1        11 Feb 2024 18:50  Mg     2.1       12 Feb 2024 05:21    TPro  7.3    /  Alb  3.6    /  TBili  0.5    /  DBili  x      /  AST  64     /  ALT  43     /  AlkPhos  53     11 Feb 2024 18:50    PT/INR - ( 11 Feb 2024 18:50 )   PT: 20.2 sec;   INR: 1.76 ratio         PTT - ( 11 Feb 2024 18:50 )  PTT:34.9 sec      Blood Culture:     02-12 @ 05:21  TSH: 0.64      RADIOLOGY/EKG:     [facility] Cardiology Consult - Laura Lela Nguyễn [phone]    CHIEF COMPLAINT: Patient is a 83y old  Female who presents with a chief complaint of sepsis (11 Feb 2024 23:50)      HPI:  84 yo woman with PMH of HTN, Afib, S/P PPM, CKD, COPD stopped smoking 30 years ago on PRN o2 at home 1.5-3L, h/o small bowel ca s/p whipple was admitted with fever and cough for 2-3 days. She also had incontinence for one day. In ED CT showed bibasilar opacities with mucus inpaction and also UA showed elevated WBC and a positive nitrate. Her WBC was low but the flu/RSV/COVID all negative.       PAST MEDICAL & SURGICAL HISTORY:  HTN (hypertension)      HLD (hyperlipidemia)      Anemia  newly diagnosed      Pneumonia  years ago      OA (osteoarthritis)      Afib      Small bowel cancer      Pacemaker      Major depression      Chronic kidney disease (CKD)      COPD (chronic obstructive pulmonary disease)      Status post total left knee replacement  2007      History of cholecystectomy  open , many years ago      H/O hernia repair  years ago      H/O resection of small bowel          SOCIAL HISTORY: Alochol: Denied  Smoking: Nonsmoker  Drug Use: Denied  Marital Status:         FAMILY HISTORY: FAMILY HISTORY:      MEDICATIONS  (STANDING):  albuterol/ipratropium for Nebulization.. 3 milliLiter(s) Inhalation every 6 hours  aMIOdarone    Tablet 200 milliGRAM(s) Oral daily  aspirin enteric coated 81 milliGRAM(s) Oral daily  carvedilol 12.5 milliGRAM(s) Oral every 12 hours  cefTRIAXone   IVPB 1000 milliGRAM(s) IV Intermittent every 24 hours  dextrose 5% + sodium chloride 0.45%. 1000 milliLiter(s) (50 mL/Hr) IV Continuous <Continuous>  famotidine    Tablet 20 milliGRAM(s) Oral daily  fenofibrate Tablet 48 milliGRAM(s) Oral daily  ferrous    sulfate 325 milliGRAM(s) Oral daily  furosemide   Injectable 20 milliGRAM(s) IV Push two times a day  heparin   Injectable 5000 Unit(s) SubCutaneous every 12 hours  hydrALAZINE 50 milliGRAM(s) Oral three times a day  lactobacillus acidophilus 1 Tablet(s) Oral daily  levETIRAcetam 500 milliGRAM(s) Oral two times a day  melatonin 3 milliGRAM(s) Oral at bedtime  multivitamin 1 Tablet(s) Oral daily  potassium chloride    Tablet ER 40 milliEquivalent(s) Oral once  sertraline 25 milliGRAM(s) Oral daily  tiotropium 2.5 MICROgram(s) Inhaler 2 Puff(s) Inhalation daily    MEDICATIONS  (PRN):  acetaminophen     Tablet .. 650 milliGRAM(s) Oral every 6 hours PRN Temp greater or equal to 38C (100.4F)  albuterol    90 MICROgram(s) HFA Inhaler 2 Puff(s) Inhalation every 6 hours PRN Shortness of Breath and/or Wheezing  aluminum hydroxide/magnesium hydroxide/simethicone Suspension 30 milliLiter(s) Oral every 4 hours PRN Dyspepsia  cloNIDine 0.2 milliGRAM(s) Oral three times a day PRN systolic BP>170 or equal to 170  guaiFENesin Oral Liquid (Sugar-Free) 100 milliGRAM(s) Oral every 6 hours PRN Cough  ondansetron Injectable 4 milliGRAM(s) IV Push every 8 hours PRN Nausea and/or Vomiting      Allergies    NIFEdipine (Unknown)  Procardia (Other)  fluoxetine (Unknown)    Intolerances    oxycodone (Other)      REVIEW OF SYSTEMS:  CONSTITUTIONAL: No weakness, fevers or chills  EYES/ENT: No visual changes;  No vertigo or throat pain   NECK: No pain or stiffness  RESPIRATORY: No cough, wheezing, hemoptysis; No shortness of breath  CARDIOVASCULAR: No chest pain or palpitations  GASTROINTESTINAL: No abdominal pain. No nausea, vomiting, or hematemesis; No diarrhea or constipation. No melena or hematochezia.  GENITOURINARY: No dysuria, frequency or hematuria  NEUROLOGICAL: No numbness or weakness  SKIN: No itching or rash  All other review of systems is negative unless indicated above    VITAL SIGNS:   Vital Signs Last 24 Hrs  T(C): 36.4 (12 Feb 2024 05:10), Max: 39.1 (11 Feb 2024 19:00)  T(F): 97.5 (12 Feb 2024 05:10), Max: 102.4 (11 Feb 2024 19:00)  HR: 62 (12 Feb 2024 05:10) (62 - 82)  BP: 144/69 (12 Feb 2024 05:10) (125/60 - 172/83)  BP(mean): --  RR: 16 (12 Feb 2024 05:10) (16 - 18)  SpO2: 97% (12 Feb 2024 05:10) (95% - 98%)    Parameters below as of 12 Feb 2024 05:10  Patient On (Oxygen Delivery Method): room air        I&O's Summary      PHYSICAL EXAM:  Constitutional: Awake in NAD  HEENT:  MAAME, EOMI  Neck: No JVD  Pulmonary: CTA B/L No R/R/W  Cardiovascular: PMI not palpable non-displaced Regular S1 and S2, no murmurs  Gastrointestinal: Bowel Sounds present, soft, nontender.   Extremities: Trace peripheral edema.   Neuro: No gross focal deficits    LABS: All Labs Reviewed:                        10.1 6.65  )-----------( 192      ( 11 Feb 2024 18:50 )             30.9     12 Feb 2024 05:21    143    |  107    |  25     ----------------------------<  80     3.4     |  31     |  1.50   11 Feb 2024 18:50    140    |  104    |  24     ----------------------------<  95     3.9     |  29     |  1.50     Ca    8.2        12 Feb 2024 05:21  Ca    9.1        11 Feb 2024 18:50  Mg     2.1       12 Feb 2024 05:21    TPro  7.3    /  Alb  3.6    /  TBili  0.5    /  DBili  x      /  AST  64     /  ALT  43     /  AlkPhos  53     11 Feb 2024 18:50    PT/INR - ( 11 Feb 2024 18:50 )   PT: 20.2 sec;   INR: 1.76 ratio         PTT - ( 11 Feb 2024 18:50 )  PTT:34.9 sec      Blood Culture:     02-12 @ 05:21  TSH: 0.64      RADIOLOGY/EKG:

## 2024-02-12 NOTE — GOALS OF CARE CONVERSATION - ADVANCED CARE PLANNING - CONVERSATION DETAILS
Palliative care SW met with patient at bedside to discuss advanced care planning. Reviewed patient's medical and social history as well as events leading to patient's hospitalization. Writer discussed patient's current diagnosis (Acute UTI; PNA; HX of A.fib, CKD, COPD, HLD, HTN, MD, OA, pacemaker, and hx small bowel ca s/p whipple ), medical condition and management. Inquired about advanced directives. Patient reports she has a HCP with daughter Lela Haney as health care agent and daughter Tanisha Dominguez as alternate agent. Copy of MOLST form obtained from patient window. Patient reports that her wishes for DNR/DNI remain the same. Copy placed on chart. Patient showed insight into medical condition. All questions answered.  Psychosocial support provided.

## 2024-02-12 NOTE — H&P ADULT - NSHPPHYSICALEXAM_GEN_ALL_CORE
· Physical Examination: Vital signs as available reviewed.  	General:  No acute distress.  	Head:  Normocephalic, atraumatic.  	Eyes:  Conjunctiva pink, no icterus.  	Cardiovascular:  Regular rate, no obvious murmur.  	Respiratory:  left sided rales.  	Abdomen:  Soft, non-tender.  	Musculoskeletal:  No obvious deformity.  	Neurologic: Alert and oriented, moving all extremities.  Skin:  Warm and dry.

## 2024-02-12 NOTE — CONSULT NOTE ADULT - SUBJECTIVE AND OBJECTIVE BOX
[facility]  INFECTIOUS DISEASES   [address] [place] [address]  Tel: [phone]     Fax: [phone]  ========================================================  MD Lev Gallardo Kaushal, MD Cho, Michelle, MD Sunjit, Jaspal, MD  ========================================================    [id number]  JAZ BAIG     CC: Patient is a 83y old  Female who presents with a chief complaint of sepsis (11 Feb 2024 23:50)    HPI:  82 yo woman with PMH of HTN, Afib, S/P PPM, CKD, COPD stopped smoking 30 years ago on PRN o2 at home 1.5-3L, h/o small bowel ca s/p whipple was admitted with fever and cough for 2-3 days. She also had incontinence for one day. In ED CT showed bibasilar opacities with mucus inpaction and also UA showed elevated WBC and a positive nitrate. Her WBC was low but the flu/RSV/COVID all negative.     PAST MEDICAL & SURGICAL HISTORY:  HTN (hypertension)  HLD (hyperlipidemia)  Anemia  newly diagnosed  Pneumonia  years ago  OA (osteoarthritis)  Afib  Small bowel cancer  Pacemaker  Major depression  Chronic kidney disease (CKD)  COPD (chronic obstructive pulmonary disease)  Status post total left knee replacement  2007  History of cholecystectomy  open , many years ago  H/O hernia repair  years ago  H/O resection of small bowel    Social Hx: No current smoking, EtOH or drugs     FAMILY HISTORY:  Noncontributory     Allergies  NIFEdipine (Unknown)  Procardia (Other)  fluoxetine (Unknown)    Intolerances  oxycodone (Other)      MEDICATIONS  (STANDING):  albuterol/ipratropium for Nebulization.. 3 milliLiter(s) Inhalation every 6 hours  aMIOdarone    Tablet 200 milliGRAM(s) Oral daily  aspirin enteric coated 81 milliGRAM(s) Oral daily  carvedilol 12.5 milliGRAM(s) Oral every 12 hours  cefTRIAXone   IVPB 1000 milliGRAM(s) IV Intermittent every 24 hours  dextrose 5% + sodium chloride 0.45%. 1000 milliLiter(s) (50 mL/Hr) IV Continuous <Continuous>  famotidine    Tablet 20 milliGRAM(s) Oral daily  fenofibrate Tablet 48 milliGRAM(s) Oral daily  ferrous    sulfate 325 milliGRAM(s) Oral daily  furosemide   Injectable 20 milliGRAM(s) IV Push two times a day  heparin   Injectable 5000 Unit(s) SubCutaneous every 12 hours  hydrALAZINE 50 milliGRAM(s) Oral three times a day  lactobacillus acidophilus 1 Tablet(s) Oral daily  levETIRAcetam 500 milliGRAM(s) Oral two times a day  melatonin 3 milliGRAM(s) Oral at bedtime  multivitamin 1 Tablet(s) Oral daily  sertraline 25 milliGRAM(s) Oral daily  tiotropium 2.5 MICROgram(s) Inhaler 2 Puff(s) Inhalation daily    MEDICATIONS  (PRN):  acetaminophen     Tablet .. 650 milliGRAM(s) Oral every 6 hours PRN Temp greater or equal to 38C (100.4F)  albuterol    90 MICROgram(s) HFA Inhaler 2 Puff(s) Inhalation every 6 hours PRN Shortness of Breath and/or Wheezing  aluminum hydroxide/magnesium hydroxide/simethicone Suspension 30 milliLiter(s) Oral every 4 hours PRN Dyspepsia  cloNIDine 0.2 milliGRAM(s) Oral three times a day PRN systolic BP>170 or equal to 170  guaiFENesin Oral Liquid (Sugar-Free) 100 milliGRAM(s) Oral every 6 hours PRN Cough  ondansetron Injectable 4 milliGRAM(s) IV Push every 8 hours PRN Nausea and/or Vomiting     REVIEW OF SYSTEMS:  CONSTITUTIONAL:  No Fever or chills  HEENT:  No diplopia or blurred vision.  No sore throat or runny nose.  CARDIOVASCULAR:  No chest pain   RESPIRATORY:  No cough, shortness of breath, PND or orthopnea.  GASTROINTESTINAL:  No nausea, vomiting or diarrhea.  GENITOURINARY:  No dysuria, frequency or urgency. No Blood in urine  MUSCULOSKELETAL:  no joint aches, no muscle pain  SKIN:  No change in skin, hair or nails.    Physical Exam:  Vital Signs Last 24 Hrs  T(C): 36.6 (12 Feb 2024 08:54), Max: 39.1 (11 Feb 2024 19:00)  T(F): 97.9 (12 Feb 2024 08:54), Max: 102.4 (11 Feb 2024 19:00)  HR: 61 (12 Feb 2024 08:54) (61 - 82)  BP: 148/69 (12 Feb 2024 08:54) (125/60 - 172/83)  BP(mean): --  RR: 17 (12 Feb 2024 08:54) (16 - 18)  SpO2: 96% (12 Feb 2024 08:54) (95% - 98%)  Parameters below as of 12 Feb 2024 08:54  Patient On (Oxygen Delivery Method): nasal cannula  O2 Flow (L/min): 2  Weight (kg): 77.1 (02-11 @ 18:13)  GEN: NAD  HEENT: normocephalic and atraumatic. EOMI. PERRL.    NECK: Supple.  No lymphadenopathy   LUNGS: crackles in both bases   HEART: Irregular rate and rhythm   ABDOMEN: Soft, nontender, and nondistended.  Positive bowel sounds.    : No CVA tenderness  EXTREMITIES: + edema.  NEUROLOGIC: grossly intact.  PSYCHIATRIC: Appropriate affect .  SKIN: No rash     Labs:  02-12    143  |  107  |  25<H>  ----------------------------<  80  3.4<L>   |  31  |  1.50<H>    Ca    8.2<L>      12 Feb 2024 05:21  Mg     2.1     02-12    TPro  7.3  /  Alb  3.6  /  TBili  0.5  /  DBili  x   /  AST  64<H>  /  ALT  43  /  AlkPhos  53  02-11                        10.1   6.65  )-----------( 192      ( 11 Feb 2024 18:50 )             30.9     PT/INR - ( 11 Feb 2024 18:50 )   PT: 20.2 sec;   INR: 1.76 ratio    PTT - ( 11 Feb 2024 18:50 )  PTT:34.9 sec  Urinalysis Basic - ( 12 Feb 2024 05:21 )    Color: x / Appearance: x / SG: x / pH: x  Gluc: 80 mg/dL / Ketone: x  / Bili: x / Urobili: x   Blood: x / Protein: x / Nitrite: x   Leuk Esterase: x / RBC: x / WBC x   Sq Epi: x / Non Sq Epi: x / Bacteria: x    LIVER FUNCTIONS - ( 11 Feb 2024 18:50 )  Alb: 3.6 g/dL / Pro: 7.3 g/dL / ALK PHOS: 53 U/L / ALT: 43 U/L / AST: 64 U/L / GGT: x           SARS-CoV-2 Result: NotDetec (02-11-24 @ 18:50)    All imaging and other data have been reviewed.  < from: CT Abdomen and Pelvis No Cont (02.11.24 @ 22:01) >  IMPRESSION:  Redemonstrated bibasilar mucoid impaction with patchy consolidation at   the bilateral bases, likely representing aspiration pneumonia/pneumonitis.  Unchanged nonobstructive right lower pole renal cysts measuring up to 9   mm.      Assessment and Plan:   82 yo woman with PMH of HTN, Afib, S/P PPM, CKD, COPD stopped smoking 30 years ago on PRN o2 at home 1.5-3L, h/o small bowel ca s/p whipple was admitted with fever and cough for 2-3 days. She also had incontinence for one day. In ED CT showed bibasilar opacities with mucus inpaction and also UA showed elevated WBC and a positive nitrate. Her WBC was low but the flu/RSV/COVID all negative. Tmax 102.4    # Pneumonia  # UTI  # COPD on home o2    - WIll follow Blood cultures   - Follow UC  - Monitor Tmax and WBC  - Will send full RVP  - Agree with ceftriaxone to cover pneumoina and UTI  - Will add azithromycin 500mg green for now   - Will send Legionella U ag and MRSA PCR    Thank you for courtesy of this consult.     Will follow.  Discussed with the primary team.     Sukumar Pacheco MD  Division of Infectious Diseases   Please call ID service at [phone] with any question.    75 minutes spent on total encounter assessing patient, examination, chart review, counseling and coordinating care by the attending physician/nurse/care manager.

## 2024-02-12 NOTE — OCCUPATIONAL THERAPY INITIAL EVALUATION ADULT - ADL RETRAINING, OT EVAL
Patient will dress upper body with set-up in 2-4 sessions. Patient will dress lower body with supervision/no assistance, AE as needed in 2-4 sessions.

## 2024-02-12 NOTE — OCCUPATIONAL THERAPY INITIAL EVALUATION ADULT - RANGE OF MOTION EXAMINATION, UPPER EXTREMITY
AROM bilateral shd flexion 0-10 degrees. Fine motor skills: WFL's/bilateral UE Active Assistive ROM was WFL  (within functional limits)

## 2024-02-12 NOTE — CARE COORDINATION ASSESSMENT. - NSCAREPROVIDERS_GEN_ALL_CORE_FT
CARE PROVIDERS:  Accepting Physician: Deshaun Pate  Access Services: Jarocho Welch  Administration: Alejandro Johnson  Administration: Priya Edouard  Administration: Blair Bills  Admitting: Deshaun Pate  Attending: Deshaun Pate  Cardiology Technician: Roxana Lebron  Case Management: Mariano Mays  Case Management: Stephani Goodwin  Consultant: Laurent Callejas  Consultant: Sukumar Pacheco  Consultant: Vidya Schwartz  Consultant: Kyle Allen  ED Attending: Letty Mcghee  ED Nurse: Riddhi Dial  Nurse: Riddhi Dial  Nurse: Trevor Pompa  Occupational Therapy: Yeimy Trujillo  Ordered: ADM, User  Ordered: ServiceAccount, SCMMLM  Outpatient Provider: Enrique Yun  Outpatient Provider: Joseph Sanchez  Outpatient Provider: Rk Hernandez  Outpatient Provider: Laurent Callejas  Override: Trevor Pompa  Override: Jeremy Byrnes  Override: Isabella Arroyo  Override: Riddhi Dial  PCA/Nursing Assistant: Lela Shah  PCA/Nursing Assistant: Jessica Priest  Primary Team: Jass Holguin  Primary Team: Madhuri Chacon  Primary Team: Alyssa Levy  Respiratory Therapy: Travis Keenan  Respiratory Therapy: Antonio Hammond  : Ashlyn Mas  Student: Lauryn Rai  Team: [facility], Team  UR// Supp. Assoc.: Nakia Rodriguez  UR// Supp. Assoc.: Margie Do

## 2024-02-12 NOTE — PATIENT PROFILE ADULT - NSTRANSFERBELONGINGSDISPO_GEN_A_NUR
Latex:  Patient reports allergy to latex.  Medications reviewed with patient.  Tobacco use verified.  Allergies verified.    REFERRING MD:  Best Quiros MD  Primary Medical Doctor: Best Quiros MD   DATE OF INJURY/SURGERY:  DOS 6/16/20  WORK RELATED: No      Patient is here for R elbow follow up.  XRs today. She states the right elbow is doing better. She is wearing the carlos brace. She states the pain is most painful after doing exercises.  She is taking Advil and Percocet PRN for pain.      with patient

## 2024-02-12 NOTE — PATIENT CHOICE NOTE. - NSPTCHOICESTATE_GEN_ALL_CORE

## 2024-02-12 NOTE — CARE COORDINATION ASSESSMENT. - OTHER PERTINENT DISCHARGE PLANNING INFORMATION:
met with patient at bedside to introduce self. Role of case management and transition explained. Patient verbalized understanding. Patient is from [facility] [phone]/fax [phone] with her spouse. Admitted for  Admitted for # Pneumonia # UTI. on Zithro and  Rocephin. Patient has home O2 at the facility. She uses a Rolator. She was receiving PT/OT at the facility. on .  Choice and transition  resources explained and given with CM contact information.  spoke patient's daughter Hannah on the phone. Patient and family/caregiver understand that  will remain available.

## 2024-02-12 NOTE — PHARMACOTHERAPY INTERVENTION NOTE - COMMENTS
Medication reconciliation was completed by pharmacy representative and reviewed by clinical pharmacist. Several discrepancies were discussed with Dr. Pate. MD aware and will adjust medications per clinical discretion.     Emilia Mahmood, PharmD  Clinical Pharmacy Specialist [phone]  Available on [employer] Teams

## 2024-02-13 LAB
ANION GAP SERPL CALC-SCNC: 4 MMOL/L — LOW (ref 5–17)
BUN SERPL-MCNC: 29 MG/DL — HIGH (ref 7–23)
CALCIUM SERPL-MCNC: 8.6 MG/DL — SIGNIFICANT CHANGE UP (ref 8.5–10.1)
CHLORIDE SERPL-SCNC: 104 MMOL/L — SIGNIFICANT CHANGE UP (ref 96–108)
CO2 SERPL-SCNC: 30 MMOL/L — SIGNIFICANT CHANGE UP (ref 22–31)
CREAT SERPL-MCNC: 1.4 MG/DL — HIGH (ref 0.5–1.3)
EGFR: 37 ML/MIN/1.73M2 — LOW
GLUCOSE SERPL-MCNC: 155 MG/DL — HIGH (ref 70–99)
HCT VFR BLD CALC: 27 % — LOW (ref 34.5–45)
HCT VFR BLD CALC: 27.9 % — LOW (ref 34.5–45)
HGB BLD-MCNC: 8.6 G/DL — LOW (ref 11.5–15.5)
HGB BLD-MCNC: 8.9 G/DL — LOW (ref 11.5–15.5)
LEGIONELLA AG UR QL: NEGATIVE — SIGNIFICANT CHANGE UP
MCHC RBC-ENTMCNC: 31 PG — SIGNIFICANT CHANGE UP (ref 27–34)
MCHC RBC-ENTMCNC: 31.9 GM/DL — LOW (ref 32–36)
MCV RBC AUTO: 97.2 FL — SIGNIFICANT CHANGE UP (ref 80–100)
MRSA PCR RESULT.: SIGNIFICANT CHANGE UP
NRBC # BLD: 0 /100 WBCS — SIGNIFICANT CHANGE UP (ref 0–0)
PLATELET # BLD AUTO: 141 K/UL — LOW (ref 150–400)
POTASSIUM SERPL-MCNC: 4.2 MMOL/L — SIGNIFICANT CHANGE UP (ref 3.5–5.3)
POTASSIUM SERPL-SCNC: 4.2 MMOL/L — SIGNIFICANT CHANGE UP (ref 3.5–5.3)
RBC # BLD: 2.87 M/UL — LOW (ref 3.8–5.2)
RBC # FLD: 14.8 % — HIGH (ref 10.3–14.5)
S AUREUS DNA NOSE QL NAA+PROBE: SIGNIFICANT CHANGE UP
SODIUM SERPL-SCNC: 138 MMOL/L — SIGNIFICANT CHANGE UP (ref 135–145)
WBC # BLD: 2.53 K/UL — LOW (ref 3.8–10.5)
WBC # FLD AUTO: 2.53 K/UL — LOW (ref 3.8–10.5)

## 2024-02-13 PROCEDURE — 99232 SBSQ HOSP IP/OBS MODERATE 35: CPT

## 2024-02-13 RX ORDER — FUROSEMIDE 40 MG
20 TABLET ORAL THREE TIMES A DAY
Refills: 0 | Status: DISCONTINUED | OUTPATIENT
Start: 2024-02-13 | End: 2024-02-16

## 2024-02-13 RX ORDER — CARVEDILOL PHOSPHATE 80 MG/1
12.5 CAPSULE, EXTENDED RELEASE ORAL EVERY 12 HOURS
Refills: 0 | Status: DISCONTINUED | OUTPATIENT
Start: 2024-02-13 | End: 2024-02-16

## 2024-02-13 RX ORDER — HYDRALAZINE HCL 50 MG
100 TABLET ORAL EVERY 12 HOURS
Refills: 0 | Status: DISCONTINUED | OUTPATIENT
Start: 2024-02-13 | End: 2024-02-14

## 2024-02-13 RX ADMIN — Medication 20 MILLIGRAM(S): at 05:15

## 2024-02-13 RX ADMIN — Medication 3 MILLIGRAM(S): at 21:32

## 2024-02-13 RX ADMIN — Medication 40 MILLIGRAM(S): at 17:56

## 2024-02-13 RX ADMIN — Medication 3 MILLILITER(S): at 14:06

## 2024-02-13 RX ADMIN — LEVETIRACETAM 500 MILLIGRAM(S): 250 TABLET, FILM COATED ORAL at 05:14

## 2024-02-13 RX ADMIN — Medication 650 MILLIGRAM(S): at 18:01

## 2024-02-13 RX ADMIN — Medication 81 MILLIGRAM(S): at 11:29

## 2024-02-13 RX ADMIN — Medication 20 MILLIEQUIVALENT(S): at 11:30

## 2024-02-13 RX ADMIN — FAMOTIDINE 20 MILLIGRAM(S): 10 INJECTION INTRAVENOUS at 11:29

## 2024-02-13 RX ADMIN — CARVEDILOL PHOSPHATE 12.5 MILLIGRAM(S): 80 CAPSULE, EXTENDED RELEASE ORAL at 05:14

## 2024-02-13 RX ADMIN — LEVETIRACETAM 500 MILLIGRAM(S): 250 TABLET, FILM COATED ORAL at 17:57

## 2024-02-13 RX ADMIN — Medication 20 MILLIGRAM(S): at 15:00

## 2024-02-13 RX ADMIN — Medication 1 APPLICATION(S): at 18:01

## 2024-02-13 RX ADMIN — Medication 3 MILLILITER(S): at 19:54

## 2024-02-13 RX ADMIN — Medication 1 TABLET(S): at 11:30

## 2024-02-13 RX ADMIN — TIOTROPIUM BROMIDE 2 PUFF(S): 18 CAPSULE ORAL; RESPIRATORY (INHALATION) at 08:07

## 2024-02-13 RX ADMIN — Medication 50 MILLIGRAM(S): at 15:00

## 2024-02-13 RX ADMIN — Medication 0.5 MILLIGRAM(S): at 08:55

## 2024-02-13 RX ADMIN — Medication 0.5 MILLIGRAM(S): at 19:54

## 2024-02-13 RX ADMIN — Medication 3 MILLILITER(S): at 01:02

## 2024-02-13 RX ADMIN — Medication 1 APPLICATION(S): at 05:15

## 2024-02-13 RX ADMIN — AMIODARONE HYDROCHLORIDE 200 MILLIGRAM(S): 400 TABLET ORAL at 05:14

## 2024-02-13 RX ADMIN — Medication 48 MILLIGRAM(S): at 11:30

## 2024-02-13 RX ADMIN — CARVEDILOL PHOSPHATE 12.5 MILLIGRAM(S): 80 CAPSULE, EXTENDED RELEASE ORAL at 21:32

## 2024-02-13 RX ADMIN — APIXABAN 2.5 MILLIGRAM(S): 2.5 TABLET, FILM COATED ORAL at 05:14

## 2024-02-13 RX ADMIN — Medication 650 MILLIGRAM(S): at 18:31

## 2024-02-13 RX ADMIN — APIXABAN 2.5 MILLIGRAM(S): 2.5 TABLET, FILM COATED ORAL at 17:57

## 2024-02-13 RX ADMIN — CARVEDILOL PHOSPHATE 12.5 MILLIGRAM(S): 80 CAPSULE, EXTENDED RELEASE ORAL at 17:57

## 2024-02-13 RX ADMIN — CEFTRIAXONE 100 MILLIGRAM(S): 500 INJECTION, POWDER, FOR SOLUTION INTRAMUSCULAR; INTRAVENOUS at 21:32

## 2024-02-13 RX ADMIN — Medication 20 MILLIGRAM(S): at 21:33

## 2024-02-13 RX ADMIN — Medication 0.2 MILLIGRAM(S): at 19:14

## 2024-02-13 RX ADMIN — Medication 325 MILLIGRAM(S): at 11:29

## 2024-02-13 RX ADMIN — Medication 3 MILLILITER(S): at 08:56

## 2024-02-13 RX ADMIN — Medication 100 MILLIGRAM(S): at 21:32

## 2024-02-13 RX ADMIN — SODIUM CHLORIDE 50 MILLILITER(S): 9 INJECTION, SOLUTION INTRAVENOUS at 11:30

## 2024-02-13 RX ADMIN — Medication 0.2 MILLIGRAM(S): at 12:10

## 2024-02-13 RX ADMIN — SERTRALINE 25 MILLIGRAM(S): 25 TABLET, FILM COATED ORAL at 11:29

## 2024-02-13 RX ADMIN — Medication 50 MILLIGRAM(S): at 05:14

## 2024-02-13 RX ADMIN — AZITHROMYCIN 255 MILLIGRAM(S): 500 TABLET, FILM COATED ORAL at 08:06

## 2024-02-13 RX ADMIN — Medication 40 MILLIGRAM(S): at 05:15

## 2024-02-13 NOTE — ED ADULT NURSE NOTE - STROKE SCALE - SEDATED
Occupational Therapy    MOCA Completed    Patient Name: [facility]  MRN: [id number]  Today's Date: 2/13/2024  Time Calculation  Start Time: 1545  Stop Time: 1602  Time Calculation (min): 17 min    Subjective   Current Problem:  1. Generalized abdominal pain        2. Perforation bowel (CMS/HCC)        3. Perforated abdominal viscus  Tissue/Wound Culture/Smear    Tissue/Wound Culture/Smear        Outcome Measures: MoCA  Visuospatial/Executive: 1  Naming: 3  Memory (Score '0' as this is an Unscored Section): 0  Attention: Read List of Digits: 2  Attention: Read List of Letters: 0  Attention: Serial Sevens: 0  Language: Repeat: 1  Language: Fluency: 0  Abstraction: 1  Delayed Recall: 0  Orientation: 2  Add 1 Point if </=12 yr Education: 0  MOCA Total Score: 10  A MOCA score of 26-30 indicates normal cognition. A MOCA score of 18-25 indicates mild cognitive impairment. A MOCA score of 10-17 indicates a moderate cognitive impairment. A MOCA score <10 indicates a severe cognitive impairment.          No

## 2024-02-13 NOTE — DIETITIAN INITIAL EVALUATION ADULT - PERTINENT MEDS FT
MEDICATIONS  (STANDING):  albuterol/ipratropium for Nebulization.. 3 milliLiter(s) Inhalation every 6 hours  aMIOdarone    Tablet 200 milliGRAM(s) Oral daily  ammonium lactate 12% Lotion 1 Application(s) Topical two times a day  apixaban 2.5 milliGRAM(s) Oral every 12 hours  aspirin enteric coated 81 milliGRAM(s) Oral daily  azithromycin  IVPB      azithromycin  IVPB 500 milliGRAM(s) IV Intermittent every 24 hours  buDESOnide    Inhalation Suspension 0.5 milliGRAM(s) Inhalation two times a day  carvedilol 12.5 milliGRAM(s) Oral every 12 hours  cefTRIAXone   IVPB 1000 milliGRAM(s) IV Intermittent every 24 hours  dextrose 5% + sodium chloride 0.45%. 1000 milliLiter(s) (50 mL/Hr) IV Continuous <Continuous>  famotidine    Tablet 20 milliGRAM(s) Oral daily  fenofibrate Tablet 48 milliGRAM(s) Oral daily  ferrous    sulfate 325 milliGRAM(s) Oral daily  furosemide   Injectable 20 milliGRAM(s) IV Push two times a day  hydrALAZINE 50 milliGRAM(s) Oral three times a day  lactobacillus acidophilus 1 Tablet(s) Oral daily  levETIRAcetam 500 milliGRAM(s) Oral two times a day  melatonin 3 milliGRAM(s) Oral at bedtime  methylPREDNISolone sodium succinate Injectable 40 milliGRAM(s) IV Push two times a day  multivitamin 1 Tablet(s) Oral daily  potassium chloride    Tablet ER 20 milliEquivalent(s) Oral daily  sertraline 25 milliGRAM(s) Oral daily  tiotropium 2.5 MICROgram(s) Inhaler 2 Puff(s) Inhalation daily    MEDICATIONS  (PRN):  acetaminophen     Tablet .. 650 milliGRAM(s) Oral every 6 hours PRN Temp greater or equal to 38C (100.4F)  albuterol    90 MICROgram(s) HFA Inhaler 2 Puff(s) Inhalation every 6 hours PRN Shortness of Breath and/or Wheezing  aluminum hydroxide/magnesium hydroxide/simethicone Suspension 30 milliLiter(s) Oral every 4 hours PRN Dyspepsia  cloNIDine 0.2 milliGRAM(s) Oral three times a day PRN systolic BP>170 or equal to 170  guaiFENesin Oral Liquid (Sugar-Free) 100 milliGRAM(s) Oral every 6 hours PRN Cough  ondansetron Injectable 4 milliGRAM(s) IV Push every 8 hours PRN Nausea and/or Vomiting

## 2024-02-13 NOTE — PROGRESS NOTE ADULT - ASSESSMENT
83F with h/o HTN, HLD, EF 50% with HFpEF with chronic Torsemide 40mg daily, AF s/p PPM 7/2021, PAD, COPD, CKD and h/o Whipple, Cholecystectomy who presents with fever, body aches and cough found with PNA on CT chest and positive trop 165 now trending down to 113.      Suggest:  1. COPD/PNA on abx  - ID following  CT Chest No Cont 2.11.24   IMPRESSION:  Redemonstrated bibasilar mucoid impaction with patchy consolidation at   the bilateral bases, likely representing aspiration pneumonia/pneumonitis.  Unchanged nonobstructive right lower pole renal cysts measuring up to 9   mm.  Additional incidental and chronic findings as above.    2. PAF in SR on Amio  Hb good 10.1    3. HTN  - c/w Carvedilol 12.5 twice daily    4. Chronic HFpEF without pleural effusions on CT  - c/w outpatient torsemide 20 daily.  Will stop IV     83F with h/o HTN, HLD, EF 50% with HFpEF with chronic Torsemide 40mg daily, AF s/p PPM 7/2021, PAD, COPD, CKD and h/o Whipple, Cholecystectomy who presents with fever, body aches and cough found with PNA on CT chest and positive trop 165 now trending down to 113.      Suggest:  1. COPD/PNA on abx  - ID following  CT Chest No Cont 2.11.24   IMPRESSION:  Redemonstrated bibasilar mucoid impaction with patchy consolidation at   the bilateral bases, likely representing aspiration pneumonia/pneumonitis.  Unchanged nonobstructive right lower pole renal cysts measuring up to 9   mm.  Additional incidental and chronic findings as above.    2. PAF in SR on Amio and ELiquis 2.5mg bid  Hb good 10.1    3. HTN  - c/w Carvedilol 12.5 twice daily  - c/w Hydralazine 50mg 3 x day    4. Chronic HFpEF without pleural effusions on CT  - c/w outpatient torsemide 20 daily.  Will stop IV

## 2024-02-13 NOTE — DIETITIAN INITIAL EVALUATION ADULT - PERTINENT LABORATORY DATA
02-13    138  |  104  |  29<H>  ----------------------------<  155<H>  4.2   |  30  |  1.40<H>    Ca    8.6      13 Feb 2024 12:15  Mg     2.1     02-12    TPro  7.3  /  Alb  3.6  /  TBili  0.5  /  DBili  x   /  AST  64<H>  /  ALT  43  /  AlkPhos  53  02-11  A1C with Estimated Average Glucose Result: 5.0 % (09-05-23 @ 11:10)  A1C with Estimated Average Glucose Result: 5.2 % (07-27-23 @ 05:42)

## 2024-02-13 NOTE — DIETITIAN INITIAL EVALUATION ADULT - SIGNS/SYMPTOMS
as evidenced by decreased appetite/po intake in setting of acute illness. as evidenced by hx HTN/HF/CKD on lasix.

## 2024-02-13 NOTE — DIETITIAN INITIAL EVALUATION ADULT - OTHER INFO
84 yo F w/ PMH per EMR of A.fib, CKD, COPD, HLD, HTN, MD, OA, pacemaker, and hx small bowel ca s/p whipple here complaining of 1 days of fever, body aches, cough. Patient uses 1.5 to 3 LPM O2 PRN. In ER patient was found to have UTI.    Pt A+Ox4 during visit. Dash/TLC diet rx. Per reports lack of appetite past couple days in setting of acute illness. No report difficulty chewing/swallowing. No N/V. BM 2/11. Pta regular diet, states she feels the food at [facility] is very salty. Hx HTN, CKD. On lasix. Current diet appropriate. Verbal Low Na nutrition therapy provided. Will send Mrs. Bhardwaj with all meals. Pt requesting ensure once daily due to decreased appetite past couple days. Will provide Ensure plus high protein 8oz daily (chocolate). Food preferences/meal alternatives obtained.

## 2024-02-13 NOTE — PHARMACOTHERAPY INTERVENTION NOTE - COMMENTS
Patient is an 82 yo female being treated for pneumonia/UTI, currently ordered for IV ceftriaxone + azithromycin for coverage. Discussed with ID Dr. Pacheco and recommended discontinuing azithromycin since urine legionella resulted negative. Order was DC'd per discussion.

## 2024-02-14 ENCOUNTER — RESULT REVIEW (OUTPATIENT)
Age: 84
End: 2024-02-14

## 2024-02-14 ENCOUNTER — TRANSCRIPTION ENCOUNTER (OUTPATIENT)
Age: 84
End: 2024-02-14

## 2024-02-14 DIAGNOSIS — J18.9 PNEUMONIA, UNSPECIFIED ORGANISM: ICD-10-CM

## 2024-02-14 LAB
ANION GAP SERPL CALC-SCNC: 3 MMOL/L — LOW (ref 5–17)
BUN SERPL-MCNC: 35 MG/DL — HIGH (ref 7–23)
CALCIUM SERPL-MCNC: 9 MG/DL — SIGNIFICANT CHANGE UP (ref 8.5–10.1)
CHLORIDE SERPL-SCNC: 104 MMOL/L — SIGNIFICANT CHANGE UP (ref 96–108)
CO2 SERPL-SCNC: 33 MMOL/L — HIGH (ref 22–31)
CREAT SERPL-MCNC: 1.2 MG/DL — SIGNIFICANT CHANGE UP (ref 0.5–1.3)
EGFR: 44 ML/MIN/1.73M2 — LOW
GLUCOSE SERPL-MCNC: 133 MG/DL — HIGH (ref 70–99)
HCT VFR BLD CALC: 28.8 % — LOW (ref 34.5–45)
HGB BLD-MCNC: 9.1 G/DL — LOW (ref 11.5–15.5)
MCHC RBC-ENTMCNC: 30.4 PG — SIGNIFICANT CHANGE UP (ref 27–34)
MCHC RBC-ENTMCNC: 31.6 GM/DL — LOW (ref 32–36)
MCV RBC AUTO: 96.3 FL — SIGNIFICANT CHANGE UP (ref 80–100)
NRBC # BLD: 0 /100 WBCS — SIGNIFICANT CHANGE UP (ref 0–0)
PLATELET # BLD AUTO: 145 K/UL — LOW (ref 150–400)
POTASSIUM SERPL-MCNC: 4.3 MMOL/L — SIGNIFICANT CHANGE UP (ref 3.5–5.3)
POTASSIUM SERPL-SCNC: 4.3 MMOL/L — SIGNIFICANT CHANGE UP (ref 3.5–5.3)
RBC # BLD: 2.99 M/UL — LOW (ref 3.8–5.2)
RBC # FLD: 14.6 % — HIGH (ref 10.3–14.5)
SODIUM SERPL-SCNC: 140 MMOL/L — SIGNIFICANT CHANGE UP (ref 135–145)
WBC # BLD: 3.06 K/UL — LOW (ref 3.8–10.5)
WBC # FLD AUTO: 3.06 K/UL — LOW (ref 3.8–10.5)

## 2024-02-14 PROCEDURE — 99232 SBSQ HOSP IP/OBS MODERATE 35: CPT

## 2024-02-14 RX ORDER — ACETAMINOPHEN 500 MG
650 TABLET ORAL EVERY 6 HOURS
Refills: 0 | Status: DISCONTINUED | OUTPATIENT
Start: 2024-02-14 | End: 2024-02-16

## 2024-02-14 RX ORDER — HYDRALAZINE HCL 50 MG
100 TABLET ORAL EVERY 8 HOURS
Refills: 0 | Status: DISCONTINUED | OUTPATIENT
Start: 2024-02-14 | End: 2024-02-16

## 2024-02-14 RX ORDER — LACTOBACILLUS ACIDOPHILUS 100MM CELL
1 CAPSULE ORAL
Qty: 0 | Refills: 0 | DISCHARGE
Start: 2024-02-14

## 2024-02-14 RX ORDER — IPRATROPIUM/ALBUTEROL SULFATE 18-103MCG
3 AEROSOL WITH ADAPTER (GRAM) INHALATION ONCE
Refills: 0 | Status: DISCONTINUED | OUTPATIENT
Start: 2024-02-14 | End: 2024-02-16

## 2024-02-14 RX ORDER — FAMOTIDINE 10 MG/ML
1 INJECTION INTRAVENOUS
Qty: 0 | Refills: 0 | DISCHARGE
Start: 2024-02-14

## 2024-02-14 RX ORDER — HYDRALAZINE HCL 50 MG
5 TABLET ORAL ONCE
Refills: 0 | Status: COMPLETED | OUTPATIENT
Start: 2024-02-14 | End: 2024-02-14

## 2024-02-14 RX ORDER — ASPIRIN/CALCIUM CARB/MAGNESIUM 324 MG
1 TABLET ORAL
Qty: 0 | Refills: 0 | DISCHARGE
Start: 2024-02-14

## 2024-02-14 RX ADMIN — Medication 40 MILLIGRAM(S): at 18:02

## 2024-02-14 RX ADMIN — TIOTROPIUM BROMIDE 2 PUFF(S): 18 CAPSULE ORAL; RESPIRATORY (INHALATION) at 10:56

## 2024-02-14 RX ADMIN — APIXABAN 2.5 MILLIGRAM(S): 2.5 TABLET, FILM COATED ORAL at 06:19

## 2024-02-14 RX ADMIN — Medication 3 MILLIGRAM(S): at 21:04

## 2024-02-14 RX ADMIN — Medication 3 MILLILITER(S): at 20:02

## 2024-02-14 RX ADMIN — Medication 1 TABLET(S): at 11:19

## 2024-02-14 RX ADMIN — AMIODARONE HYDROCHLORIDE 200 MILLIGRAM(S): 400 TABLET ORAL at 06:20

## 2024-02-14 RX ADMIN — Medication 20 MILLIEQUIVALENT(S): at 11:19

## 2024-02-14 RX ADMIN — Medication 48 MILLIGRAM(S): at 11:19

## 2024-02-14 RX ADMIN — APIXABAN 2.5 MILLIGRAM(S): 2.5 TABLET, FILM COATED ORAL at 18:02

## 2024-02-14 RX ADMIN — Medication 100 MILLIGRAM(S): at 14:16

## 2024-02-14 RX ADMIN — FAMOTIDINE 20 MILLIGRAM(S): 10 INJECTION INTRAVENOUS at 11:19

## 2024-02-14 RX ADMIN — Medication 0.2 MILLIGRAM(S): at 00:22

## 2024-02-14 RX ADMIN — Medication 0.2 MILLIGRAM(S): at 11:41

## 2024-02-14 RX ADMIN — Medication 20 MILLIGRAM(S): at 06:19

## 2024-02-14 RX ADMIN — CEFTRIAXONE 100 MILLIGRAM(S): 500 INJECTION, POWDER, FOR SOLUTION INTRAMUSCULAR; INTRAVENOUS at 21:04

## 2024-02-14 RX ADMIN — Medication 325 MILLIGRAM(S): at 11:19

## 2024-02-14 RX ADMIN — CARVEDILOL PHOSPHATE 12.5 MILLIGRAM(S): 80 CAPSULE, EXTENDED RELEASE ORAL at 18:02

## 2024-02-14 RX ADMIN — SERTRALINE 25 MILLIGRAM(S): 25 TABLET, FILM COATED ORAL at 11:19

## 2024-02-14 RX ADMIN — Medication 3 MILLILITER(S): at 12:45

## 2024-02-14 RX ADMIN — Medication 5 MILLIGRAM(S): at 02:21

## 2024-02-14 RX ADMIN — Medication 81 MILLIGRAM(S): at 11:19

## 2024-02-14 RX ADMIN — Medication 40 MILLIGRAM(S): at 06:20

## 2024-02-14 RX ADMIN — Medication 1 APPLICATION(S): at 18:01

## 2024-02-14 RX ADMIN — Medication 20 MILLIGRAM(S): at 14:16

## 2024-02-14 RX ADMIN — CARVEDILOL PHOSPHATE 12.5 MILLIGRAM(S): 80 CAPSULE, EXTENDED RELEASE ORAL at 06:19

## 2024-02-14 RX ADMIN — LEVETIRACETAM 500 MILLIGRAM(S): 250 TABLET, FILM COATED ORAL at 06:19

## 2024-02-14 RX ADMIN — Medication 0.5 MILLIGRAM(S): at 20:02

## 2024-02-14 RX ADMIN — Medication 20 MILLIGRAM(S): at 21:03

## 2024-02-14 RX ADMIN — Medication 100 MILLIGRAM(S): at 06:19

## 2024-02-14 RX ADMIN — Medication 100 MILLIGRAM(S): at 21:04

## 2024-02-14 RX ADMIN — Medication 3 MILLILITER(S): at 01:21

## 2024-02-14 NOTE — CONSULT NOTE ADULT - CONSULT REASON
Pneumonia
Positive Trop
cardiac arrythmias
D61.81 Pancytopenia  J18.9 Pneumonia bilateral  N39.0 UTI  B96.20 E. coli infection

## 2024-02-14 NOTE — DISCHARGE NOTE PROVIDER - NSDCMRMEDTOKEN_GEN_ALL_CORE_FT
acetaminophen 325 mg oral tablet: 2 tab(s) orally every 6 hours as needed for  mild pain  Albuterol (Eqv-ProAir HFA) 90 mcg/inh inhalation aerosol: 2 puff(s) inhaled 2 times a day  Albuterol (Eqv-ProAir HFA) 90 mcg/inh inhalation aerosol: 2 puff(s) inhaled every 6 hours as needed for  shortness of breath and/or wheezing  amiodarone 200 mg oral tablet: 1 tab(s) orally once a day  ammonium lactate 12% topical lotion: Apply topically to affected area 2 times a day as needed  aspirin 81 mg oral delayed release tablet: 1 tab(s) orally once a day  Breo Ellipta 100 mcg-25 mcg/inh inhalation powder: 1 puff(s) inhaled once a day  budesonide 0.5 mg/2 mL inhalation suspension: 2 milliliter(s) by nebulizer 2 times a day Mix With Duoneb (As Per Pulm)  carvedilol 12.5 mg oral tablet: 1 tab(s) orally 2 times a day  Eliquis 5 mg oral tablet: 1 tab(s) orally 2 times a day  famotidine 20 mg oral tablet: 1 tab(s) orally once a day  fenofibrate 48 mg oral tablet: 1 tab(s) orally once a day  ferrous sulfate 325 mg (65 mg elemental iron) oral tablet: 1 tab(s) orally once a day  furosemide 40 mg oral tablet: 1.5 tab(s) orally once a day  guaiFENesin 400 mg oral tablet: 1 tab(s) orally 3 times a day for 5 days  hydrALAZINE 100 mg oral tablet: 1 tab(s) orally 2 times a day  ipratropium-albuterol 0.5 mg-2.5 mg/3 mL inhalation solution: 3 milliliter(s) by nebulizer every 12 hours mix with Budesonide  ipratropium-albuterol 0.5 mg-2.5 mg/3 mL inhalation solution: 3 milliliter(s) by nebulizer every 4 hours as needed for  shortness of breath and/or wheezing  lactobacillus acidophilus oral capsule: 1 cap(s) orally once a day  Multiple Vitamins oral tablet: 1 tab(s) orally once a day  Potassium Chloride (Eqv-Klor-Con M20) 20 mEq oral tablet, extended release: 1 tab(s) orally once a day  pravastatin 20 mg oral tablet: 1 tab(s) orally once a day  sertraline 25 mg oral tablet: 1 tab(s) orally once a day  tiotropium 2.5 mcg/inh inhalation aerosol: 2 puff(s) inhaled once a day   acetaminophen 325 mg oral tablet: 2 tab(s) orally every 6 hours as needed for  mild pain  Albuterol (Eqv-ProAir HFA) 90 mcg/inh inhalation aerosol: 2 puff(s) inhaled every 6 hours as needed for  shortness of breath and/or wheezing  amiodarone 200 mg oral tablet: 1 tab(s) orally once a day  ammonium lactate 12% topical lotion: Apply topically to affected area 2 times a day as needed  aspirin 81 mg oral delayed release tablet: 1 tab(s) orally once a day  Breo Ellipta 100 mcg-25 mcg/inh inhalation powder: 1 puff(s) inhaled once a day  carvedilol 12.5 mg oral tablet: 1 tab(s) orally 2 times a day  Eliquis 5 mg oral tablet: 1 tab(s) orally 2 times a day  famotidine 20 mg oral tablet: 1 tab(s) orally once a day  fenofibrate 48 mg oral tablet: 1 tab(s) orally once a day  ferrous sulfate 325 mg (65 mg elemental iron) oral tablet: 1 tab(s) orally once a day  furosemide 40 mg oral tablet: 1.5 tab(s) orally once a day  hydrALAZINE 100 mg oral tablet: 1 tab(s) orally 2 times a day  ipratropium-albuterol 0.5 mg-2.5 mg/3 mL inhalation solution: 3 milliliter(s) by nebulizer every 4 hours as needed for  shortness of breath and/or wheezing  Multiple Vitamins oral tablet: 1 tab(s) orally once a day  Potassium Chloride (Eqv-Klor-Con M20) 20 mEq oral tablet, extended release: 1 tab(s) orally once a day  pravastatin 20 mg oral tablet: 1 tab(s) orally once a day  sertraline 25 mg oral tablet: 1 tab(s) orally once a day  tiotropium 2.5 mcg/inh inhalation aerosol: 2 puff(s) inhaled once a day

## 2024-02-14 NOTE — CHART NOTE - NSCHARTNOTEFT_GEN_A_CORE
Called by RN for HTN and SOB. Patient has PMHx HTN, HFpEF, COPD admitted for UTI. Patient with worsening HTN throughout the day, now with BP in 180s/ 70s despite Lasix, Clonidine, Carvedilol, Hydralazine. Patient seen and examined at bedside. She reports vague SOB and mild anxiety. Denies CP, abd pain, dizziness, abd pain.     Vital Signs Last 24 Hrs  T(C): 36.6 (13 Feb 2024 18:53), Max: 36.6 (13 Feb 2024 18:53)  T(F): 97.8 (13 Feb 2024 18:53), Max: 97.8 (13 Feb 2024 18:53)  HR: 60 (14 Feb 2024 01:39) (60 - 66)  BP: 183/80 (14 Feb 2024 00:11) (147/64 - 183/80)  BP(mean): --  RR: 18 (14 Feb 2024 00:11) (17 - 19)  SpO2: 96% (14 Feb 2024 01:39) (94% - 99%)    Parameters below as of 14 Feb 2024 01:39  Patient On (Oxygen Delivery Method): nasal cannula, 3 L      CV: RRR, no murmurs  Pulm: +wheezing and decreased BS bilaterally, no crackles or rhonchi     A/P  - Uncontrolled HTN and wheezing  - Ordered Clonidine x1 and Duoneb  - Will f/u      **Addendum**    BP 30min s/p Clonidine unchanged. SOB resolved, improved wheeze.     - Order Hydralazine 5mg IV   - RN to call with any changes Called by RN for HTN and SOB. Patient has PMHx HTN, HFpEF, COPD admitted for UTI. Patient with worsening HTN throughout the day, now with BP in 180s/ 70s despite Lasix, Clonidine, Carvedilol, Hydralazine. Patient seen and examined at bedside. She reports vague SOB and mild anxiety. Denies CP, abd pain, dizziness, abd pain.     Vital Signs Last 24 Hrs  T(C): 36.6 (13 Feb 2024 18:53), Max: 36.6 (13 Feb 2024 18:53)  T(F): 97.8 (13 Feb 2024 18:53), Max: 97.8 (13 Feb 2024 18:53)  HR: 60 (14 Feb 2024 01:39) (60 - 66)  BP: 183/80 (14 Feb 2024 00:11) (147/64 - 183/80)  BP(mean): --  RR: 18 (14 Feb 2024 00:11) (17 - 19)  SpO2: 96% (14 Feb 2024 01:39) (94% - 99%)    Parameters below as of 14 Feb 2024 01:39  Patient On (Oxygen Delivery Method): nasal cannula, 3 L      CV: RRR, no murmurs, euvolemic  Pulm: +wheezing and decreased BS bilaterally, no crackles or rhonchi     A/P  - Uncontrolled HTN and wheezing  - Ordered Clonidine x1 and Duoneb  - Will f/u      **Addendum**    BP 30min s/p Clonidine unchanged. SOB resolved, improved wheeze.     - Order Hydralazine 5mg IV   - RN to call with any changes Called by RN for HTN and SOB. Patient has PMHx HTN, HFpEF, COPD admitted for UTI. Patient with worsening HTN throughout the day, now with BP in 180s/ 70s despite Lasix, Clonidine, Carvedilol, Hydralazine. Patient seen and examined at bedside. She reports vague SOB and mild anxiety. Denies CP, abd pain, dizziness, abd pain.     Vital Signs Last 24 Hrs  T(C): 36.6 (13 Feb 2024 18:53), Max: 36.6 (13 Feb 2024 18:53)  T(F): 97.8 (13 Feb 2024 18:53), Max: 97.8 (13 Feb 2024 18:53)  HR: 60 (14 Feb 2024 01:39) (60 - 66)  BP: 183/80 (14 Feb 2024 00:11) (147/64 - 183/80)  BP(mean): --  RR: 18 (14 Feb 2024 00:11) (17 - 19)  SpO2: 96% (14 Feb 2024 01:39) (94% - 99%)    Parameters below as of 14 Feb 2024 01:39  Patient On (Oxygen Delivery Method): nasal cannula, 3 L      CV: RRR, no murmurs, euvolemic  Pulm: +wheezing and decreased BS bilaterally, no crackles or rhonchi     A/P  - Uncontrolled HTN and wheezing  - Recommend prn Clonidine x1 and Duoneb  - Will f/u      **Addendum**    BP 30min s/p Clonidine unchanged. SOB resolved, improved wheeze.     - Order Hydralazine 5mg IV   - RN to call with any changes Called by RN for HTN and SOB. Patient has PMHx HTN, HFpEF, COPD admitted for UTI. Patient with worsening HTN throughout the day, now with BP in 180s/ 70s despite Lasix, Clonidine, Carvedilol, Hydralazine. Patient seen and examined at bedside. She reports vague SOB and mild anxiety. Denies CP, abd pain, dizziness, abd pain.     Vital Signs Last 24 Hrs  T(C): 36.6 (13 Feb 2024 18:53), Max: 36.6 (13 Feb 2024 18:53)  T(F): 97.8 (13 Feb 2024 18:53), Max: 97.8 (13 Feb 2024 18:53)  HR: 60 (14 Feb 2024 01:39) (60 - 66)  BP: 183/80 (14 Feb 2024 00:11) (147/64 - 183/80)  BP(mean): --  RR: 18 (14 Feb 2024 00:11) (17 - 19)  SpO2: 96% (14 Feb 2024 01:39) (94% - 99%)    Parameters below as of 14 Feb 2024 01:39  Patient On (Oxygen Delivery Method): nasal cannula, 3 L      CV: RRR, no murmurs, euvolemic  Pulm: +wheezing and decreased BS bilaterally, no crackles or rhonchi     A/P  - Uncontrolled HTN and wheezing  - Recommend prn Clonidine x1 and Duoneb  - Will f/u      **Addendum**    BP 30min s/p Clonidine unchanged. SOB resolved, improved wheeze.     - Order Hydralazine 5mg IV   - RN to call with any changes    **Addendum**  - Repeat BP improved  - RN to call with any changes Called by RN for HTN and SOB. Patient has PMHx HTN, HFpEF, COPD admitted for UTI. Patient with worsening HTN throughout the day, now with BP in 180s/ 70s despite PO Lasix, Clonidine, Carvedilol, Hydralazine. Patient seen and examined at bedside. She reports vague SOB and mild anxiety. Denies CP, abd pain, dizziness, abd pain.     Vital Signs Last 24 Hrs  T(C): 36.6 (13 Feb 2024 18:53), Max: 36.6 (13 Feb 2024 18:53)  T(F): 97.8 (13 Feb 2024 18:53), Max: 97.8 (13 Feb 2024 18:53)  HR: 60 (14 Feb 2024 01:39) (60 - 66)  BP: 183/80 (14 Feb 2024 00:11) (147/64 - 183/80)  BP(mean): --  RR: 18 (14 Feb 2024 00:11) (17 - 19)  SpO2: 96% (14 Feb 2024 01:39) (94% - 99%)    Parameters below as of 14 Feb 2024 01:39  Patient On (Oxygen Delivery Method): nasal cannula, 3 L      CV: RRR, no murmurs, euvolemic  Pulm: +wheezing and decreased BS bilaterally, no crackles or rhonchi     A/P  - Uncontrolled HTN and wheezing  - Recommend prn Clonidine x1 and Duoneb  - Will f/u      **Addendum**    BP 30min s/p Clonidine unchanged. SOB resolved, improved wheeze.     - Order Hydralazine 5mg IV   - RN to call with any changes    **Addendum**  - Repeat BP improved s/p Hydralazine IV  - RN to call with any changes

## 2024-02-14 NOTE — CONSULT NOTE ADULT - SUBJECTIVE AND OBJECTIVE BOX
INCOMPLETE NOTE.  Documentation in Progress  PT SEEN AND EVALUATED.   FULL/ADDITIONAL RECOMMENDATIONS TO FOLLOW   ***************************************************************    Patient is a 83y old  Female who presents with a chief complaint of UTI (2024 12:40)      HPI:   84 yo F w/ PMH per EMR of A.fib, CKD, COPD, HLD, HTN, MD, OA, pacemaker, and hx small bowel ca s/p whipple here complaining of 1 days of fever, body aches, cough. patient uses 1.5 to 3 LPM O2 PRN.  In ER patient was found to have UTI.  patient is being admitted for further work up and treatment (2024 11:04)      PAST MEDICAL & SURGICAL HISTORY:  HTN (hypertension)      HLD (hyperlipidemia)      Anemia  newly diagnosed      Pneumonia  years ago      OA (osteoarthritis)      Afib      Small bowel cancer      Pacemaker      Major depression      Chronic kidney disease (CKD)      COPD (chronic obstructive pulmonary disease)      Status post total left knee replacement        History of cholecystectomy  open , many years ago      H/O hernia repair  years ago      H/O resection of small bowel         HEALTH ISSUES - PROBLEM Dx:  Fever    Acute UTI    Cough    Pneumonia          Urinary tract infection [N39.0]    HTN (hypertension) [I10]    HLD (hyperlipidemia) [E78.5]    Anemia [D64.9]    DVT (deep venous thrombosis) [I82.409]    Pneumonia [J18.9]    OA (osteoarthritis) [M19.90]    Afib [I48.91]    Small bowel cancer [C17.9]    Pacemaker [Z95.0]    Major depression [F32.9]    Chronic kidney disease (CKD) [N18.9]    COPD (chronic obstructive pulmonary disease) [J44.9]    Status post total left knee replacement [Z96.652]    History of cholecystectomy [Z98.89]    H/O hernia repair [Z98.89]    H/O resection of small bowel [Z90.49]    PNA (pneumonia) [J18.9]    Acute respiratory failure with hypoxia [J96.01]      FAMILY HISTORY:      [SOCIAL HISTORY: ]     smoking:  none currently     EtOH:  none currently     illicit drugs:  none currently     occupation:  Retired     marital status:       Other:       [ALLERGIES/INTOLERANCES:]  Allergies    NIFEdipine (Unknown)  Procardia (Other)  fluoxetine (Unknown)    Intolerances    oxycodone (Other)      [MEDICATIONS]  MEDICATIONS  (STANDING):  albuterol/ipratropium for Nebulization. 3 milliLiter(s) Nebulizer once  albuterol/ipratropium for Nebulization.. 3 milliLiter(s) Inhalation every 6 hours  aMIOdarone    Tablet 200 milliGRAM(s) Oral daily  ammonium lactate 12% Lotion 1 Application(s) Topical two times a day  apixaban 2.5 milliGRAM(s) Oral every 12 hours  aspirin enteric coated 81 milliGRAM(s) Oral daily  buDESOnide    Inhalation Suspension 0.5 milliGRAM(s) Inhalation two times a day  carvedilol 12.5 milliGRAM(s) Oral every 12 hours  cefTRIAXone   IVPB 1000 milliGRAM(s) IV Intermittent every 24 hours  famotidine    Tablet 20 milliGRAM(s) Oral daily  fenofibrate Tablet 48 milliGRAM(s) Oral daily  ferrous    sulfate 325 milliGRAM(s) Oral daily  furosemide    Tablet 20 milliGRAM(s) Oral three times a day  hydrALAZINE 100 milliGRAM(s) Oral every 8 hours  lactobacillus acidophilus 1 Tablet(s) Oral daily  melatonin 3 milliGRAM(s) Oral at bedtime  methylPREDNISolone sodium succinate Injectable 40 milliGRAM(s) IV Push two times a day  multivitamin 1 Tablet(s) Oral daily  potassium chloride    Tablet ER 20 milliEquivalent(s) Oral daily  sertraline 25 milliGRAM(s) Oral daily  tiotropium 2.5 MICROgram(s) Inhaler 2 Puff(s) Inhalation daily    MEDICATIONS  (PRN):  acetaminophen     Tablet .. 650 milliGRAM(s) Oral every 6 hours PRN Temp greater or equal to 38C (100.4F), Mild Pain (1 - 3)  albuterol    90 MICROgram(s) HFA Inhaler 2 Puff(s) Inhalation every 6 hours PRN Shortness of Breath and/or Wheezing  aluminum hydroxide/magnesium hydroxide/simethicone Suspension 30 milliLiter(s) Oral every 4 hours PRN Dyspepsia  cloNIDine 0.2 milliGRAM(s) Oral three times a day PRN systolic BP>170 or equal to 170  guaiFENesin Oral Liquid (Sugar-Free) 100 milliGRAM(s) Oral every 6 hours PRN Cough  ondansetron Injectable 4 milliGRAM(s) IV Push every 8 hours PRN Nausea and/or Vomiting      [REVIEW OF SYSTEMS: ]  CONSTITUTIONAL: normal, no fever, no shakes, no chills   EYES: No eye pain, no visual disturbances, no discharge  ENMT:  no discharge  NECK: No pain, no stiffness  BREASTS: No pain, no masses, no nipple discharge  RESPIRATORY: No cough, no wheezing, no chills, no hemoptysis; No shortness of breath  CARDIOVASCULAR: No chest pain, no palpitations, no dizziness, no leg swelling  GASTROINTESTINAL: No abdominal, no epigastric pain. No nausea, no vomiting, no hematemesis; No diarrhea , no constipation. No melena, no hematochezia.  GENITOURINARY: No dysuria, no frequency, no hematuria, no incontinence  NEUROLOGICAL: No headaches, no memory loss, no loss of strength, no numbness, no tremors  SKIN: No itching, no burning, no rashes, no lesions   LYMPH NODES: No enlarged glands  ENDOCRINE: No heat or cold intolerance; No hair loss  MUSCULOSKELETAL: No joint pain or swelling; No muscle, no back, no extremity pain  PSYCHIATRIC: No depression, no anxiety, no mood swings, no difficulty sleeping  HEME/LYMPH: No easy bruising, no bleeding gums    [VITALS SIGNS 24hrs]  Vital Signs Last 24 Hrs  T(C): 34.6 (2024 11:26), Max: 36.6 (2024 18:53)  T(F): 94.2 (2024 11:26), Max: 97.8 (2024 18:53)  HR: 60 (2024 11:26) (59 - 82)  BP: 170/71 (2024 11:26) (161/75 - 184/76)  BP(mean): 104 (2024 03:08) (104 - 112)  RR: 18 (2024 11:26) (17 - 18)  SpO2: 97% (2024 11:26) (94% - 99%)    Parameters below as of 2024 11:26  Patient On (Oxygen Delivery Method): nasal cannula      Daily     Daily Weight in k.8 (2024 18:58)    I&O's Summary    2024 07:01  -  2024 07:00  --------------------------------------------------------  IN: 0 mL / OUT: 500 mL / NET: -500 mL        [PHYSICAL EXAM]  GEN:   HEENT: normocephalic and atraumatic. EOMI. PERRL.    NECK: Supple.  No lymphadenopathy   LUNGS: Clear to auscultation.  HEART: S1S2 Regular rate and rhythm, no MRG  ABDOMEN: Soft, nontender, and nondistended.  Positive bowel sounds.    : No CVA tenderness  EXTREMITIES: Without edema.  NEUROLOGIC: grossly intact.  PSYCHIATRIC: Appropriate affect .  SKIN: No rash     [LABS: ]                        9.1    3.06  )-----------( 145      ( 2024 07:40 )             28.8     CBC Full  -  ( 2024 07:40 )  WBC Count : 3.06 K/uL  RBC Count : 2.99 M/uL  Hemoglobin : 9.1 g/dL  Hematocrit : 28.8 %  Platelet Count - Automated : 145 K/uL  Mean Cell Volume : 96.3 fl  Mean Cell Hemoglobin : 30.4 pg  Mean Cell Hemoglobin Concentration : 31.6 gm/dL  Auto Neutrophil # : x  Auto Lymphocyte # : x  Auto Monocyte # : x  Auto Eosinophil # : x  Auto Basophil # : x  Auto Neutrophil % : x  Auto Lymphocyte % : x  Auto Monocyte % : x  Auto Eosinophil % : x  Auto Basophil % : x    -14    140  |  104  |  35<H>  ----------------------------<  133<H>  4.3   |  33<H>  |  1.20    Ca    9.0      2024 07:40              Urinalysis Basic - ( 2024 07:40 )    Color: x / Appearance: x / SG: x / pH: x  Gluc: 133 mg/dL / Ketone: x  / Bili: x / Urobili: x   Blood: x / Protein: x / Nitrite: x   Leuk Esterase: x / RBC: x / WBC x   Sq Epi: x / Non Sq Epi: x / Bacteria: x          CBC TREND (5 Days)  WBC Count: 3.06 K/uL ( @ 07:40)  WBC Count: 2.53 K/uL ( 12:15)    Hemoglobin: 9.1 g/dL ( 07:40)  Hemoglobin: 8.6 g/dL ( 15:25)  Hemoglobin: 8.9 g/dL (:15)    Hematocrit: 28.8 % (:40)  Hematocrit: 27.0 % ( 15:25)  Hematocrit: 27.9 % (:15)    Platelet Count - Automated: 145 K/uL ( 07:40)  Platelet Count - Automated: 141 K/uL ( 12:15)        Ferritin: 261 ng/mL ( @ 01:25)  Ferritin, Serum: 144 ng/mL ( @ 11:25)    Iron Total: 53 ug/dL ( 01:25)     Vitamin B12, Serum: 497 pg/mL ( @ 06:43)     Folate, Serum: 14.3 ng/mL ( @ 06:43)                  [MICROBIOLOGY /  VIROLOGY:]  SARS-CoV-2: NotDetec (05 Sep 2023 01:35)    Culture - Urine (collected 2024 18:55)  Source: Catheterized Catheterized  Preliminary Report (2024 07:08):    >100,000 CFU/ml Escherichia coli    Culture - Blood (collected 2024 18:55)  Source: .Blood Blood-Peripheral  Preliminary Report (2024 01:01):    No growth at 48 Hours    Culture - Blood (collected 2024 18:50)  Source: .Blood Blood-Peripheral  Preliminary Report (2024 01:01):    No growth at 48 Hours      [PATHOLOGY]       [RADIOLOGY & ADDITIONAL STUDIES:]        83y old  Female who presents with a chief complaint of UTI (2024 12:40)      HPI:   84 yo F w/ PMH per EMR of A.fib, CKD, COPD, HLD, HTN, MD, OA, pacemaker, and hx small bowel ca s/p whipple here complaining of 1 days of fever, body aches, cough. patient uses 1.5 to 3 LPM O2 PRN.  In ER patient was found to have UTI.  patient is being admitted for further work up and treatment (2024 11:04)      PAST MEDICAL & SURGICAL HISTORY:  HTN (hypertension)  HLD (hyperlipidemia)  Anemia, newly diagnosed  Pneumonia, years ago  OA (osteoarthritis)  Afib  Small bowel cancer  Pacemaker  Major depression  Chronic kidney disease (CKD)  COPD (chronic obstructive pulmonary disease)  Status post total left knee replacement   History of cholecystectomy open , many years ago  H/O hernia repair years ago  H/O resection of small bowel       HEALTH ISSUES - PROBLEM Dx:  Fever  Acute UTI  Cough  Pneumonia    Urinary tract infection [N39.0]  HTN (hypertension) [I10]  HLD (hyperlipidemia) [E78.5]  Anemia [D64.9]  DVT (deep venous thrombosis) [I82.409]  Pneumonia [J18.9]  OA (osteoarthritis) [M19.90]  Afib [I48.91]  Small bowel cancer [C17.9]  Pacemaker [Z95.0]  Major depression [F32.9]  Chronic kidney disease (CKD) [N18.9]  COPD (chronic obstructive pulmonary disease) [J44.9]  Status post total left knee replacement [Z96.652]  History of cholecystectomy [Z98.89]  H/O hernia repair [Z98.89]  H/O resection of small bowel [Z90.49]  PNA (pneumonia) [J18.9]  Acute respiratory failure with hypoxia [J96.01]      FAMILY HISTORY:      [SOCIAL HISTORY: ]     smoking:  none currently     EtOH:  none currently     illicit drugs:  none currently     occupation:  Retired     marital status:       Other:       [ALLERGIES/INTOLERANCES:]  Allergies     Nifedipine(Unknown)     Procardia (Other)     fluoxetine (Unknown)  Intolerances     oxycodone (Other)      [MEDICATIONS]  MEDICATIONS  (STANDING):  albuterol/ipratropium for Nebulization. 3 milliLiter(s) Nebulizer once  albuterol/ipratropium for Nebulization.. 3 milliLiter(s) Inhalation every 6 hours  aMIOdarone    Tablet 200 milliGRAM(s) Oral daily  ammonium lactate 12% Lotion 1 Application(s) Topical two times a day  apixaban 2.5 milliGRAM(s) Oral every 12 hours  aspirin enteric coated 81 milliGRAM(s) Oral daily  buDESOnide    Inhalation Suspension 0.5 milliGRAM(s) Inhalation two times a day  carvedilol 12.5 milliGRAM(s) Oral every 12 hours  cefTRIAXone   IVPB 1000 milliGRAM(s) IV Intermittent every 24 hours  famotidine    Tablet 20 milliGRAM(s) Oral daily  fenofibrate Tablet 48 milliGRAM(s) Oral daily  ferrous    sulfate 325 milliGRAM(s) Oral daily  furosemide    Tablet 20 milliGRAM(s) Oral three times a day  hydrALAZINE 100 milliGRAM(s) Oral every 8 hours  lactobacillus acidophilus 1 Tablet(s) Oral daily  melatonin 3 milliGRAM(s) Oral at bedtime  methylPREDNISolone sodium succinate Injectable 40 milliGRAM(s) IV Push two times a day  multivitamin 1 Tablet(s) Oral daily  potassium chloride    Tablet ER 20 milliEquivalent(s) Oral daily  sertraline 25 milliGRAM(s) Oral daily  tiotropium 2.5 MICROgram(s) Inhaler 2 Puff(s) Inhalation daily      MEDICATIONS  (PRN):  acetaminophen     Tablet .. 650 milliGRAM(s) Oral every 6 hours PRN Temp greater or equal to 38C (100.4F), Mild Pain (1 - 3)  albuterol    90 MICROgram(s) HFA Inhaler 2 Puff(s) Inhalation every 6 hours PRN Shortness of Breath and/or Wheezing  aluminum hydroxide/magnesium hydroxide/simethicone Suspension 30 milliLiter(s) Oral every 4 hours PRN Dyspepsia  cloNIDine 0.2 milliGRAM(s) Oral three times a day PRN systolic BP>170 or equal to 170  guaiFENesin Oral Liquid (Sugar-Free) 100 milliGRAM(s) Oral every 6 hours PRN Cough  ondansetron Injectable 4 milliGRAM(s) IV Push every 8 hours PRN Nausea and/or Vomiting      [REVIEW OF SYSTEMS: ]  CONSTITUTIONAL: normal, no fever, no shakes, no chills   EYES: No eye pain, no visual disturbances, no discharge  ENMT:  no discharge  NECK: No pain, no stiffness  BREASTS: No pain, no masses, no nipple discharge  RESPIRATORY: No cough, no wheezing, no chills, no hemoptysis; No shortness of breath  CARDIOVASCULAR: No chest pain, no palpitations, no dizziness, no leg swelling  GASTROINTESTINAL: No abdominal, no epigastric pain. No nausea, no vomiting, no hematemesis; No diarrhea , no constipation. No melena, no hematochezia.  GENITOURINARY: No dysuria, no frequency, no hematuria, no incontinence  NEUROLOGICAL: No headaches, no memory loss, no loss of strength, no numbness, no tremors  SKIN: No itching, no burning, no rashes, no lesions   LYMPH NODES: No enlarged glands  ENDOCRINE: No heat or cold intolerance; No hair loss  MUSCULOSKELETAL: No joint pain or swelling; No muscle, no back, no extremity pain  PSYCHIATRIC: No depression, no anxiety, no mood swings, no difficulty sleeping  HEME/LYMPH: No easy bruising, no bleeding gums    [VITALS SIGNS 24hrs]  Vital Signs Last 24 Hrs  T(C): 34.6 (2024 11:26), Max: 36.6 (2024 18:53)  T(F): 94.2 (2024 11:26), Max: 97.8 (2024 18:53)  HR: 60 (:) (59 - 82)  BP: 170/71 (2024 11:26) (161/75 - 184/76)  BP(mean): 104 (2024 03:08) (104 - 112)  RR: 18 (:) (17 - 18)  SpO2: 97% (2024 11:26) (94% - 99%)    Parameters below as of 2024 11:26  Patient On (Oxygen Delivery Method): nasal cannula      Daily     Daily Weight in k.8 (2024 18:58)    I&O's Summary    2024 07:01  -  2024 07:00  --------------------------------------------------------  IN: 0 mL / OUT: 500 mL / NET: -500 mL      [PHYSICAL EXAM]  GEN:   HEENT: normocephalic and atraumatic. EOMI. PERRL.    NECK: Supple.  No lymphadenopathy   LUNGS: Clear to auscultation.  HEART: S1S2 Regular rate and rhythm, no MRG  ABDOMEN: Soft, nontender, and nondistended.  Positive bowel sounds.    : No CVA tenderness  EXTREMITIES: Without edema.  NEUROLOGIC: grossly intact.  PSYCHIATRIC: Appropriate affect .  SKIN: No rash     [LABS: ]                        9.1    3.06  )-----------( 145      ( 2024 07:40 )             28.8     CBC Full  -  ( 2024 07:40 )  WBC Count : 3.06 K/uL  RBC Count : 2.99 M/uL  Hemoglobin : 9.1 g/dL  Hematocrit : 28.8 %  Platelet Count - Automated : 145 K/uL  Mean Cell Volume : 96.3 fl  Mean Cell Hemoglobin : 30.4 pg  Mean Cell Hemoglobin Concentration : 31.6 gm/dL  Auto Neutrophil # : x  Auto Lymphocyte # : x  Auto Monocyte # : x  Auto Eosinophil # : x  Auto Basophil # : x  Auto Neutrophil % : x  Auto Lymphocyte % : x  Auto Monocyte % : x  Auto Eosinophil % : x  Auto Basophil % : x        140  |  104  |  35<H>  ----------------------------<  133<H>  4.3   |  33<H>  |  1.20    Ca    9.0      2024 07:40      Urinalysis Basic - ( 2024 07:40 )  Color: x / Appearance: x / SG: x / pH: x  Gluc: 133 mg/dL / Ketone: x  / Bili: x / Urobili: x   Blood: x / Protein: x / Nitrite: x   Leuk Esterase: x / RBC: x / WBC x   Sq Epi: x / Non Sq Epi: x / Bacteria: x      CBC TREND (5 Days)  WBC Count: 3.06 K/uL ( @ 07:40)  WBC Count: 2.53 K/uL ( @ 12:15)  Hemoglobin: 9.1 g/dL ( @ 07:40)  Hemoglobin: 8.6 g/dL ( 15:25)  Hemoglobin: 8.9 g/dL ( 12:15)  Hematocrit: 28.8 % ( 07:40)  Hematocrit: 27.0 % ( 15:25)  Hematocrit: 27.9 % ( 12:15)  Platelet Count - Automated: 145 K/uL ( @ 07:40)  Platelet Count - Automated: 141 K/uL ( 12:15)       Ferritin: 261 ng/mL ( @ 01:25)  Ferritin, Serum: 144 ng/mL ( @ 11:25)    Iron Total: 53 ug/dL ( 01:25)  Vitamin B12, Serum: 497 pg/mL ( @ 06:43)  Folate, Serum: 14.3 ng/mL ( @ 06:43)          [MICROBIOLOGY /  VIROLOGY:]  SARS-CoV-2: NotDetec (05 Sep 2023 01:35)    Culture - Urine (collected 2024 18:55)  Source: Catheterized Catheterized  Preliminary Report (2024 07:08):    >100,000 CFU/ml Escherichia coli    Culture - Blood (collected 2024 18:55)  Source: .Blood Blood-Peripheral  Preliminary Report (2024 01:01):    No growth at 48 Hours    Culture - Blood (collected 2024 18:50)  Source: .Blood Blood-Peripheral  Preliminary Report (2024 01:01):    No growth at 48 Hours      [PATHOLOGY]       [RADIOLOGY & ADDITIONAL STUDIES:]

## 2024-02-14 NOTE — PROGRESS NOTE ADULT - ASSESSMENT
83F with h/o HTN, HLD, EF 50% with HFpEF with chronic Torsemide 40mg daily, AF s/p PPM 7/2021, PAD, COPD, CKD and h/o Whipple, Cholecystectomy who presents with fever, body aches and cough found with PNA on CT chest and positive trop 165 now trending down to 113.    Pt feeling improved today.  Had some chest discomfort since admission, less today.  Trops slightly up but flat.  Probable demand ischemia.  EKG is paced.     Suggest:  1. COPD/PNA on abx  - ID following  CT Chest No Cont 2.11.24   IMPRESSION:  Redemonstrated bibasilar mucoid impaction with patchy consolidation at   the bilateral bases, likely representing aspiration pneumonia/pneumonitis.  Unchanged nonobstructive right lower pole renal cysts measuring up to 9   mm.  Additional incidental and chronic findings as above.    2. PAF in SR on Amio and ELiquis 2.5mg bid  Hb good 10.1    3. HTN  - c/w Carvedilol 12.5 twice daily  - c/w Hydralazine 50mg 3 x day    4. Chronic HFpEF without pleural effusions on CT  - c/w outpatient torsemide 20 daily.  Will stop IV        Will follow

## 2024-02-14 NOTE — DISCHARGE NOTE PROVIDER - CARE PROVIDERS DIRECT ADDRESSES
[email or web address]

## 2024-02-14 NOTE — CONSULT NOTE ADULT - ASSESSMENT
83F with h/o HTN, HLD, EF 50% with HFpEF with chronic Torsemide 40mg daily, AF s/p PPM 7/2021, PAD, COPD, CKD and h/o Whipple, Cholecystectomy who presents with fever, body aches and cough found with PNA on CT chest and positive trop 165 now trending down to 113.      Suggest:  1. COPD/PNA on abx  - ID following  CT Chest No Cont 2.11.24   IMPRESSION:  Redemonstrated bibasilar mucoid impaction with patchy consolidation at   the bilateral bases, likely representing aspiration pneumonia/pneumonitis.  Unchanged nonobstructive right lower pole renal cysts measuring up to 9   mm.  Additional incidental and chronic findings as above.    2. PAF in SR on Amio  Hb good 10.1    3. HTN  - c/w Carvedilol 12.5 twice daily    4. Chronic HFpEF without pleural effusions on CT  - c/w outpatient torsemide 20 daily.  Will stop IV  
[ASSESSMENT and  PLAN]  Pancytopenia  Pneumonia  UTI  E. coli infection    84yo F with PMH HTN, COPD, A-fib, CKD, PPM, ex-smoker, O2 via NC 1.5 L - 3 L, small bowel cancer status post Whipple procedure in past.    Presents with fever cough for 2 to 3 days.  CT scan bibasilar opacities with mucus impaction.  UA also + nitrate, + elevated WBC.  Rapid viral panel negative.  Tmax 102.4    Patient admitted for management of pneumonia given advanced age high risk for complications.  Also possible UTI.    Hematology asked to evaluate for pancytopenia.  Given infection and fever, likely pancytopenia secondary to acute infection.  However given advanced age, underlying MDS and bone marrow disorder are not excluded.      RECOMMENDATIONS    Pneumonia/UTI.  IV antibiotics per infectious diseases.  Urine cultures with E. coli.  Currently on ceftriaxone.  Legionella antigen negative.    Anemia  Monitor for now.  Transfuse PRBC as clinically indicated.   Transfuse PRBC if Hgb <7.0 or if symptomatic.   Follow CBC    Check Anemia studies.      Ferritin, Iron studies     B12, Folate     ESR, CRP    Follow cytopenias to resolution posttreatment of infection.    DVT Prophylaxis  SQ Lovenox or SQ heparin    Discussed plan of care with patient and or family in detail.   Pt/Family expressed understanding of the treatment plan.   Risks, benefits and alternatives discussed in detail.   Opportunity given for questions and discussion.   Questions or concerns all addressed and answered to their satisfaction, and in lay terms.     Discussed with Dr Perlman.    Thank you for consulting us.     > 55 minutes spent in direct patient care, examining and counseling patient,  reviewing  the notes, lab data/ imaging , discussion with multidisciplinary team.

## 2024-02-14 NOTE — DISCHARGE NOTE PROVIDER - NSFTFHOMEHTHYNRD_GEN_ALL_CORE
Yes Consent: Prior to the procedure, written consent was obtained and risks were reviewed, including but not limited to: redness, peeling, blistering, pigmentary change, scarring, infection, and pain. Frost (0,1+,2+,3+,4+): 2+ Chemical Peel: 10% TCA Erythema: mild Treatment Number: 0 Post Peel Care: After the procedure, the treatment area was washed with soap and water, and a post-peel cream was applied. Sun protection and post-care instructions were reviewed with the patient. Number Of Coats: 2 Detail Level: Zone Time (Mins): 3 Prep: The treated area was degreased with pre-peel cleanser, and vaseline was applied for protection of mucous membranes. Post-Care Instructions: I reviewed with the patient in detail post-care instructions. Patient should avoid sun exposure and wear sun protection.

## 2024-02-14 NOTE — DISCHARGE NOTE PROVIDER - HOSPITAL COURSE
admitted for acute febrile illness  found to have UTI  ABX per ID  PT and OT for ambulation  DC after ID and PT clearance

## 2024-02-14 NOTE — DISCHARGE NOTE PROVIDER - PROVIDER TOKENS
PROVIDER:[TOKEN:[[id number]]],PROVIDER:[TOKEN:[[id number]]],PROVIDER:[TOKEN:[[id number]]]

## 2024-02-14 NOTE — DISCHARGE NOTE PROVIDER - NSDCCPCAREPLAN_GEN_ALL_CORE_FT
PRINCIPAL DISCHARGE DIAGNOSIS  Diagnosis: Acute UTI  Assessment and Plan of Treatment: follow up with PMD Dr. laughlin      SECONDARY DISCHARGE DIAGNOSES  Diagnosis: PNA (pneumonia)  Assessment and Plan of Treatment:     Diagnosis: Acute respiratory failure with hypoxia  Assessment and Plan of Treatment:

## 2024-02-14 NOTE — DISCHARGE NOTE PROVIDER - CARE PROVIDER_API CALL
PANCHITO SKY  [address] [place] [id number]  Phone: [phone]  Fax: [phone]  Follow Up Time:     Kam Vogel  Cardiovascular Disease  [address] [place] [address]  Phone: [phone]  Fax: [phone]  Follow Up Time:     Sukumar Pacheco  Infectious Disease  [address] [place] [id number]  Phone: [phone]  Fax: [phone]  Follow Up Time:

## 2024-02-15 ENCOUNTER — TRANSCRIPTION ENCOUNTER (OUTPATIENT)
Age: 84
End: 2024-02-15

## 2024-02-15 LAB
-  AMOXICILLIN/CLAVULANIC ACID: SIGNIFICANT CHANGE UP
-  AMPICILLIN/SULBACTAM: SIGNIFICANT CHANGE UP
-  AMPICILLIN: SIGNIFICANT CHANGE UP
-  AZTREONAM: SIGNIFICANT CHANGE UP
-  CEFAZOLIN: SIGNIFICANT CHANGE UP
-  CEFEPIME: SIGNIFICANT CHANGE UP
-  CEFOXITIN: SIGNIFICANT CHANGE UP
-  CEFTRIAXONE: SIGNIFICANT CHANGE UP
-  CEFUROXIME: SIGNIFICANT CHANGE UP
-  CIPROFLOXACIN: SIGNIFICANT CHANGE UP
-  ERTAPENEM: SIGNIFICANT CHANGE UP
-  GENTAMICIN: SIGNIFICANT CHANGE UP
-  IMIPENEM: SIGNIFICANT CHANGE UP
-  LEVOFLOXACIN: SIGNIFICANT CHANGE UP
-  MEROPENEM: SIGNIFICANT CHANGE UP
-  NITROFURANTOIN: SIGNIFICANT CHANGE UP
-  PIPERACILLIN/TAZOBACTAM: SIGNIFICANT CHANGE UP
-  TOBRAMYCIN: SIGNIFICANT CHANGE UP
-  TRIMETHOPRIM/SULFAMETHOXAZOLE: SIGNIFICANT CHANGE UP
CRP SERPL-MCNC: 13 MG/L — HIGH
CULTURE RESULTS: ABNORMAL
ERYTHROCYTE [SEDIMENTATION RATE] IN BLOOD: 27 MM/HR — HIGH (ref 0–20)
FERRITIN SERPL-MCNC: 154 NG/ML — SIGNIFICANT CHANGE UP (ref 13–330)
FOLATE SERPL-MCNC: 9.1 NG/ML — SIGNIFICANT CHANGE UP
IRON SATN MFR SERPL: 21 % — SIGNIFICANT CHANGE UP (ref 14–50)
IRON SATN MFR SERPL: 52 UG/DL — SIGNIFICANT CHANGE UP (ref 30–160)
METHOD TYPE: SIGNIFICANT CHANGE UP
ORGANISM # SPEC MICROSCOPIC CNT: ABNORMAL
ORGANISM # SPEC MICROSCOPIC CNT: SIGNIFICANT CHANGE UP
RBC # BLD: 3.2 M/UL — LOW (ref 3.8–5.2)
RETICS #: 59.2 K/UL — SIGNIFICANT CHANGE UP (ref 25–125)
RETICS/RBC NFR: 1.9 % — SIGNIFICANT CHANGE UP (ref 0.5–2.5)
SPECIMEN SOURCE: SIGNIFICANT CHANGE UP
TIBC SERPL-MCNC: 254 UG/DL — SIGNIFICANT CHANGE UP (ref 220–430)
UIBC SERPL-MCNC: 202 UG/DL — SIGNIFICANT CHANGE UP (ref 110–370)
VIT B12 SERPL-MCNC: 481 PG/ML — SIGNIFICANT CHANGE UP (ref 232–1245)

## 2024-02-15 PROCEDURE — 99232 SBSQ HOSP IP/OBS MODERATE 35: CPT

## 2024-02-15 RX ORDER — POTASSIUM CHLORIDE 20 MEQ
10 PACKET (EA) ORAL
Refills: 0 | Status: DISCONTINUED | OUTPATIENT
Start: 2024-02-15 | End: 2024-02-16

## 2024-02-15 RX ADMIN — Medication 20 MILLIEQUIVALENT(S): at 11:55

## 2024-02-15 RX ADMIN — TIOTROPIUM BROMIDE 2 PUFF(S): 18 CAPSULE ORAL; RESPIRATORY (INHALATION) at 05:08

## 2024-02-15 RX ADMIN — Medication 3 MILLILITER(S): at 01:36

## 2024-02-15 RX ADMIN — CEFTRIAXONE 100 MILLIGRAM(S): 500 INJECTION, POWDER, FOR SOLUTION INTRAMUSCULAR; INTRAVENOUS at 21:06

## 2024-02-15 RX ADMIN — AMIODARONE HYDROCHLORIDE 200 MILLIGRAM(S): 400 TABLET ORAL at 05:07

## 2024-02-15 RX ADMIN — Medication 0.2 MILLIGRAM(S): at 20:23

## 2024-02-15 RX ADMIN — Medication 1 APPLICATION(S): at 05:08

## 2024-02-15 RX ADMIN — Medication 48 MILLIGRAM(S): at 11:55

## 2024-02-15 RX ADMIN — Medication 20 MILLIGRAM(S): at 21:07

## 2024-02-15 RX ADMIN — FAMOTIDINE 20 MILLIGRAM(S): 10 INJECTION INTRAVENOUS at 11:55

## 2024-02-15 RX ADMIN — APIXABAN 2.5 MILLIGRAM(S): 2.5 TABLET, FILM COATED ORAL at 05:08

## 2024-02-15 RX ADMIN — Medication 81 MILLIGRAM(S): at 11:56

## 2024-02-15 RX ADMIN — Medication 325 MILLIGRAM(S): at 11:55

## 2024-02-15 RX ADMIN — Medication 3 MILLILITER(S): at 20:16

## 2024-02-15 RX ADMIN — CARVEDILOL PHOSPHATE 12.5 MILLIGRAM(S): 80 CAPSULE, EXTENDED RELEASE ORAL at 05:07

## 2024-02-15 RX ADMIN — Medication 10 MILLIEQUIVALENT(S): at 17:18

## 2024-02-15 RX ADMIN — Medication 20 MILLIGRAM(S): at 13:59

## 2024-02-15 RX ADMIN — Medication 3 MILLILITER(S): at 08:37

## 2024-02-15 RX ADMIN — Medication 100 MILLIGRAM(S): at 05:07

## 2024-02-15 RX ADMIN — Medication 0.5 MILLIGRAM(S): at 08:38

## 2024-02-15 RX ADMIN — Medication 3 MILLILITER(S): at 14:44

## 2024-02-15 RX ADMIN — Medication 20 MILLIGRAM(S): at 05:07

## 2024-02-15 RX ADMIN — Medication 1 TABLET(S): at 11:55

## 2024-02-15 RX ADMIN — CARVEDILOL PHOSPHATE 12.5 MILLIGRAM(S): 80 CAPSULE, EXTENDED RELEASE ORAL at 17:19

## 2024-02-15 RX ADMIN — Medication 0.5 MILLIGRAM(S): at 20:17

## 2024-02-15 RX ADMIN — Medication 40 MILLIGRAM(S): at 17:18

## 2024-02-15 RX ADMIN — Medication 100 MILLIGRAM(S): at 13:59

## 2024-02-15 RX ADMIN — Medication 1 APPLICATION(S): at 17:18

## 2024-02-15 RX ADMIN — Medication 100 MILLIGRAM(S): at 21:06

## 2024-02-15 RX ADMIN — Medication 40 MILLIGRAM(S): at 05:08

## 2024-02-15 RX ADMIN — Medication 3 MILLIGRAM(S): at 21:07

## 2024-02-15 RX ADMIN — SERTRALINE 25 MILLIGRAM(S): 25 TABLET, FILM COATED ORAL at 11:55

## 2024-02-15 RX ADMIN — APIXABAN 2.5 MILLIGRAM(S): 2.5 TABLET, FILM COATED ORAL at 17:18

## 2024-02-15 NOTE — PROGRESS NOTE ADULT - ASSESSMENT
[ASSESSMENT and  PLAN]  Pancytopenia  Pneumonia  UTI  E. coli infection    82yo F with PMH HTN, COPD, A-fib, CKD, PPM, ex-smoker, O2 via NC 1.5 L - 3 L, small bowel cancer status post Whipple procedure in past.    Presents with fever cough for 2 to 3 days.  CT scan bibasilar opacities with mucus impaction.  UA also + nitrate, + elevated WBC.  Rapid viral panel negative.  Tmax 102.4    Patient admitted for management of pneumonia given advanced age high risk for complications.  Also possible UTI.    Given infection and fever, likely pancytopenia secondary to acute infection.  However given advanced age, underlying MDS and bone marrow disorder are not excluded.      RECOMMENDATIONS    Pneumonia/UTI.  IV antibiotics per infectious diseases.  Urine cultures with E. coli.  Currently on ceftriaxone.  Legionella antigen negative.    Transfuse PRBC as clinically indicated.   Transfuse PRBC if Hgb <7.0 or if symptomatic.   Follow CBC    Follow cytopenias to resolution posttreatment of infection.  CBC in am

## 2024-02-15 NOTE — CASE MANAGEMENT PROGRESS NOTE - NSCMPROGRESSNOTE_GEN_ALL_CORE
Discussed pt on rounds, on IV Ceftriaxone, IV Solumedrol. PT recommended home PT. Patient is identified as a CMS STAR patient. Transition care management program explained and yellow contact card has been provided. CM left message for wellness department at [facility] [phone]/fax [phone] with CM call back information.  CM will continue to collaborate with interdisciplinary team and remain available to assist.  Discussed pt on rounds, on IV Ceftriaxone, IV Solumedrol. PT recommended home PT. Patient is identified as a CMS STAR patient. Transition care management program explained and yellow contact card has been provided. CM left message for Andreas in wellness department at [facility] [phone]/fax [phone] with CM call back information.  CM will continue to collaborate with interdisciplinary team and remain available to assist.

## 2024-02-15 NOTE — PROGRESS NOTE ADULT - PROBLEM SELECTOR PLAN 2
IV ABX - rocephin  ID eval with dr. omalley
IV ABX - rocephin  ID eval with dr. shahram gleason
IV ABX - rocephin  ID eval with dr. omalley

## 2024-02-15 NOTE — PROGRESS NOTE ADULT - PROBLEM SELECTOR PLAN 1
pan culture  tylenol  empiric abx  ID eval with Dr. shahram gleason
pan culture  tylenol  empiric abx  ID eval with Dr. omalley
pan culture  tylenol  empiric abx  ID eval with Dr. omalley

## 2024-02-15 NOTE — CASE MANAGEMENT PROGRESS NOTE - NSCMPROGRESSNOTE_GEN_ALL_CORE
CORA spoke with Andreas from wellness at [facility] [phone]. When pt is medically ready for discharge facility requires [phone] faxed to [phone]. Per Andreas all meds must be sent to [facility] [phone]. Patient is identified as a CMS STAR patient. Transition care management program explained and yellow contact card has been provided. Pt agreeable to [facility] [phone]. CORA to send referrals accordingly.  CORA spoke with Andreas from wellness at [facility] [phone]. When pt is medically ready for discharge facility requires 3120 faxed to Attn Andreas at [phone] and emailed to [email or web address] for review. Per Andreas all meds must be sent to [facility] [phone]. Patient is identified as a CMS STAR patient. Transition care management program explained and yellow contact card has been provided. Pt agreeable to [facility] [phone]. CORA to send referrals accordingly.

## 2024-02-15 NOTE — DISCHARGE NOTE NURSING/CASE MANAGEMENT/SOCIAL WORK - PATIENT PORTAL LINK FT
You can access the FollowMyHealth Patient Portal offered by [facility] by registering at the following website: [email or web address]. By joining [facility]’s FollowMyHealth portal, you will also be able to view your health information using other applications (apps) compatible with our system.

## 2024-02-15 NOTE — DISCHARGE NOTE NURSING/CASE MANAGEMENT/SOCIAL WORK - NSDCVIVACCINE_GEN_ALL_CORE_FT
Tdap; 03-Oct-2023 05:13; Uriel Brannon (RN); [employer]; [id number] (Exp. Date: 06-Jun-2025); IntraMuscular; Deltoid Right.; 0.5 milliLiter(s); VIS (VIS Published: 09-May-2013, VIS Presented: 03-Oct-2023);

## 2024-02-15 NOTE — DISCHARGE NOTE NURSING/CASE MANAGEMENT/SOCIAL WORK - NSSCNAMETXT_GEN_ALL_CORE
[facility] - [phone] or [phone]  Nurse to visit 24-48 hrs after hospital discharge; physical therapist to follow. Please contact the home care agency if you have not heard from them by 12 noon on the day after your hospital discharge.

## 2024-02-15 NOTE — PHARMACOTHERAPY INTERVENTION NOTE - COMMENTS
Patient is an 82 yo female with past medical history of afib, currently ordered for Eliquis 2.5 mg twice daily. Home dose is 5 mg twice daily. Patient does not meet 2/3 criteria for Eliquis dose reduction (age > 81 yo, SCr > 1.5, weight < 60 kg). Discussed with cardiologist Dr. Walls and recommended increasing Eliquis dose to 5 mg BID as patient is on 5 mg BID at home and she doesn't currently meet dose reduction criteria. MD would like to continue with current dose per clinical judgement since patient is anemic (Hgb- 9.1, Hct- 28.8).

## 2024-02-16 VITALS — OXYGEN SATURATION: 98 %

## 2024-02-16 LAB
HCT VFR BLD CALC: 30 % — LOW (ref 34.5–45)
HGB BLD-MCNC: 9.8 G/DL — LOW (ref 11.5–15.5)
MCHC RBC-ENTMCNC: 31.1 PG — SIGNIFICANT CHANGE UP (ref 27–34)
MCHC RBC-ENTMCNC: 32.7 GM/DL — SIGNIFICANT CHANGE UP (ref 32–36)
MCV RBC AUTO: 95.2 FL — SIGNIFICANT CHANGE UP (ref 80–100)
NRBC # BLD: 0 /100 WBCS — SIGNIFICANT CHANGE UP (ref 0–0)
NT-PROBNP SERPL-SCNC: 2346 PG/ML — HIGH (ref 0–450)
PLATELET # BLD AUTO: 162 K/UL — SIGNIFICANT CHANGE UP (ref 150–400)
RBC # BLD: 3.15 M/UL — LOW (ref 3.8–5.2)
RBC # FLD: 14.3 % — SIGNIFICANT CHANGE UP (ref 10.3–14.5)
WBC # BLD: 5.22 K/UL — SIGNIFICANT CHANGE UP (ref 3.8–10.5)
WBC # FLD AUTO: 5.22 K/UL — SIGNIFICANT CHANGE UP (ref 3.8–10.5)

## 2024-02-16 PROCEDURE — 36415 COLL VENOUS BLD VENIPUNCTURE: CPT

## 2024-02-16 PROCEDURE — 74176 CT ABD & PELVIS W/O CONTRAST: CPT | Mod: MA

## 2024-02-16 PROCEDURE — 93005 ELECTROCARDIOGRAM TRACING: CPT

## 2024-02-16 PROCEDURE — 80048 BASIC METABOLIC PNL TOTAL CA: CPT

## 2024-02-16 PROCEDURE — 82728 ASSAY OF FERRITIN: CPT

## 2024-02-16 PROCEDURE — 83735 ASSAY OF MAGNESIUM: CPT

## 2024-02-16 PROCEDURE — 87640 STAPH A DNA AMP PROBE: CPT

## 2024-02-16 PROCEDURE — 71045 X-RAY EXAM CHEST 1 VIEW: CPT | Mod: 26

## 2024-02-16 PROCEDURE — 97116 GAIT TRAINING THERAPY: CPT

## 2024-02-16 PROCEDURE — 94640 AIRWAY INHALATION TREATMENT: CPT

## 2024-02-16 PROCEDURE — 82746 ASSAY OF FOLIC ACID SERUM: CPT

## 2024-02-16 PROCEDURE — 83605 ASSAY OF LACTIC ACID: CPT

## 2024-02-16 PROCEDURE — 85045 AUTOMATED RETICULOCYTE COUNT: CPT

## 2024-02-16 PROCEDURE — 83540 ASSAY OF IRON: CPT

## 2024-02-16 PROCEDURE — 83880 ASSAY OF NATRIURETIC PEPTIDE: CPT

## 2024-02-16 PROCEDURE — 71250 CT THORAX DX C-: CPT | Mod: MA

## 2024-02-16 PROCEDURE — 87641 MR-STAPH DNA AMP PROBE: CPT

## 2024-02-16 PROCEDURE — 70450 CT HEAD/BRAIN W/O DYE: CPT | Mod: MA

## 2024-02-16 PROCEDURE — 80053 COMPREHEN METABOLIC PANEL: CPT

## 2024-02-16 PROCEDURE — 96365 THER/PROPH/DIAG IV INF INIT: CPT

## 2024-02-16 PROCEDURE — 85652 RBC SED RATE AUTOMATED: CPT

## 2024-02-16 PROCEDURE — 81001 URINALYSIS AUTO W/SCOPE: CPT

## 2024-02-16 PROCEDURE — 71045 X-RAY EXAM CHEST 1 VIEW: CPT

## 2024-02-16 PROCEDURE — 97530 THERAPEUTIC ACTIVITIES: CPT

## 2024-02-16 PROCEDURE — 87449 NOS EACH ORGANISM AG IA: CPT

## 2024-02-16 PROCEDURE — 83550 IRON BINDING TEST: CPT

## 2024-02-16 PROCEDURE — 85018 HEMOGLOBIN: CPT

## 2024-02-16 PROCEDURE — 85730 THROMBOPLASTIN TIME PARTIAL: CPT

## 2024-02-16 PROCEDURE — 84484 ASSAY OF TROPONIN QUANT: CPT

## 2024-02-16 PROCEDURE — 94760 N-INVAS EAR/PLS OXIMETRY 1: CPT

## 2024-02-16 PROCEDURE — 82607 VITAMIN B-12: CPT

## 2024-02-16 PROCEDURE — 86140 C-REACTIVE PROTEIN: CPT

## 2024-02-16 PROCEDURE — 84443 ASSAY THYROID STIM HORMONE: CPT

## 2024-02-16 PROCEDURE — 97535 SELF CARE MNGMENT TRAINING: CPT

## 2024-02-16 PROCEDURE — 93306 TTE W/DOPPLER COMPLETE: CPT

## 2024-02-16 PROCEDURE — 87637 SARSCOV2&INF A&B&RSV AMP PRB: CPT

## 2024-02-16 PROCEDURE — 85014 HEMATOCRIT: CPT

## 2024-02-16 PROCEDURE — 97166 OT EVAL MOD COMPLEX 45 MIN: CPT

## 2024-02-16 PROCEDURE — 99285 EMERGENCY DEPT VISIT HI MDM: CPT | Mod: 25

## 2024-02-16 PROCEDURE — 80061 LIPID PANEL: CPT

## 2024-02-16 PROCEDURE — 87040 BLOOD CULTURE FOR BACTERIA: CPT

## 2024-02-16 PROCEDURE — 85027 COMPLETE CBC AUTOMATED: CPT

## 2024-02-16 PROCEDURE — 85610 PROTHROMBIN TIME: CPT

## 2024-02-16 PROCEDURE — 97162 PT EVAL MOD COMPLEX 30 MIN: CPT

## 2024-02-16 PROCEDURE — 87186 SC STD MICRODIL/AGAR DIL: CPT

## 2024-02-16 PROCEDURE — 85025 COMPLETE CBC W/AUTO DIFF WBC: CPT

## 2024-02-16 PROCEDURE — 87086 URINE CULTURE/COLONY COUNT: CPT

## 2024-02-16 RX ORDER — SOD,AMMONIUM,POTASSIUM LACTATE
1 CREAM (GRAM) TOPICAL
Qty: 1 | Refills: 0
Start: 2024-02-16 | End: 2024-03-16

## 2024-02-16 RX ORDER — CARVEDILOL PHOSPHATE 80 MG/1
1 CAPSULE, EXTENDED RELEASE ORAL
Refills: 0 | DISCHARGE

## 2024-02-16 RX ORDER — FUROSEMIDE 40 MG
1.5 TABLET ORAL
Qty: 45 | Refills: 0
Start: 2024-02-16 | End: 2024-03-16

## 2024-02-16 RX ORDER — FENOFIBRATE,MICRONIZED 130 MG
1 CAPSULE ORAL
Refills: 0 | DISCHARGE

## 2024-02-16 RX ORDER — TIOTROPIUM BROMIDE 18 UG/1
2 CAPSULE ORAL; RESPIRATORY (INHALATION)
Refills: 0 | DISCHARGE

## 2024-02-16 RX ORDER — FLUTICASONE FUROATE AND VILANTEROL TRIFENATATE 100; 25 UG/1; UG/1
1 POWDER RESPIRATORY (INHALATION)
Refills: 0 | DISCHARGE

## 2024-02-16 RX ORDER — ALBUTEROL 90 UG/1
2 AEROSOL, METERED ORAL
Refills: 0 | DISCHARGE

## 2024-02-16 RX ORDER — IPRATROPIUM/ALBUTEROL SULFATE 18-103MCG
3 AEROSOL WITH ADAPTER (GRAM) INHALATION
Refills: 0 | DISCHARGE

## 2024-02-16 RX ORDER — BUDESONIDE, MICRONIZED 100 %
2 POWDER (GRAM) MISCELLANEOUS
Refills: 0 | DISCHARGE

## 2024-02-16 RX ORDER — SOD,AMMONIUM,POTASSIUM LACTATE
1 CREAM (GRAM) TOPICAL
Refills: 0 | DISCHARGE

## 2024-02-16 RX ORDER — ASPIRIN/CALCIUM CARB/MAGNESIUM 324 MG
1 TABLET ORAL
Qty: 30 | Refills: 0
Start: 2024-02-16 | End: 2024-03-16

## 2024-02-16 RX ORDER — FERROUS SULFATE 325(65) MG
1 TABLET ORAL
Qty: 30 | Refills: 0
Start: 2024-02-16 | End: 2024-03-16

## 2024-02-16 RX ORDER — FERROUS SULFATE 325(65) MG
1 TABLET ORAL
Refills: 0 | DISCHARGE

## 2024-02-16 RX ORDER — ACETAMINOPHEN 500 MG
2 TABLET ORAL
Refills: 0 | DISCHARGE

## 2024-02-16 RX ORDER — FENOFIBRATE,MICRONIZED 130 MG
1 CAPSULE ORAL
Qty: 30 | Refills: 0
Start: 2024-02-16 | End: 2024-03-16

## 2024-02-16 RX ORDER — LACTOBACILLUS ACIDOPHILUS 100MM CELL
1 CAPSULE ORAL
Qty: 30 | Refills: 0
Start: 2024-02-16 | End: 2024-03-16

## 2024-02-16 RX ORDER — APIXABAN 2.5 MG/1
1 TABLET, FILM COATED ORAL
Qty: 60 | Refills: 0
Start: 2024-02-16 | End: 2024-03-16

## 2024-02-16 RX ORDER — HYDRALAZINE HCL 50 MG
1 TABLET ORAL
Refills: 0 | DISCHARGE

## 2024-02-16 RX ORDER — POTASSIUM CHLORIDE 20 MEQ
1 PACKET (EA) ORAL
Refills: 0 | DISCHARGE

## 2024-02-16 RX ORDER — CARVEDILOL PHOSPHATE 80 MG/1
1 CAPSULE, EXTENDED RELEASE ORAL
Qty: 60 | Refills: 0
Start: 2024-02-16 | End: 2024-03-16

## 2024-02-16 RX ORDER — FAMOTIDINE 10 MG/ML
1 INJECTION INTRAVENOUS
Qty: 30 | Refills: 0
Start: 2024-02-16 | End: 2024-03-16

## 2024-02-16 RX ORDER — FLUTICASONE FUROATE AND VILANTEROL TRIFENATATE 100; 25 UG/1; UG/1
1 POWDER RESPIRATORY (INHALATION)
Qty: 30 | Refills: 0
Start: 2024-02-16 | End: 2024-03-16

## 2024-02-16 RX ORDER — FUROSEMIDE 40 MG
1.5 TABLET ORAL
Refills: 0 | DISCHARGE

## 2024-02-16 RX ORDER — SERTRALINE 25 MG/1
1 TABLET, FILM COATED ORAL
Qty: 30 | Refills: 0
Start: 2024-02-16 | End: 2024-03-16

## 2024-02-16 RX ORDER — AMIODARONE HYDROCHLORIDE 400 MG/1
1 TABLET ORAL
Qty: 30 | Refills: 0
Start: 2024-02-16 | End: 2024-03-16

## 2024-02-16 RX ORDER — ALBUTEROL 90 UG/1
2 AEROSOL, METERED ORAL
Qty: 1 | Refills: 0
Start: 2024-02-16

## 2024-02-16 RX ORDER — APIXABAN 2.5 MG/1
1 TABLET, FILM COATED ORAL
Refills: 0 | DISCHARGE

## 2024-02-16 RX ORDER — POTASSIUM CHLORIDE 20 MEQ
1 PACKET (EA) ORAL
Qty: 30 | Refills: 0
Start: 2024-02-16 | End: 2024-03-16

## 2024-02-16 RX ORDER — ACETAMINOPHEN 500 MG
2 TABLET ORAL
Qty: 56 | Refills: 0
Start: 2024-02-16 | End: 2024-02-22

## 2024-02-16 RX ORDER — HYDRALAZINE HCL 50 MG
1 TABLET ORAL
Qty: 60 | Refills: 0
Start: 2024-02-16 | End: 2024-03-16

## 2024-02-16 RX ORDER — IPRATROPIUM/ALBUTEROL SULFATE 18-103MCG
3 AEROSOL WITH ADAPTER (GRAM) INHALATION
Qty: 540 | Refills: 0
Start: 2024-02-16 | End: 2024-03-16

## 2024-02-16 RX ORDER — TIOTROPIUM BROMIDE 18 UG/1
2 CAPSULE ORAL; RESPIRATORY (INHALATION)
Qty: 30 | Refills: 0
Start: 2024-02-16 | End: 2024-03-16

## 2024-02-16 RX ORDER — AMIODARONE HYDROCHLORIDE 400 MG/1
1 TABLET ORAL
Refills: 0 | DISCHARGE

## 2024-02-16 RX ADMIN — Medication 1 APPLICATION(S): at 05:38

## 2024-02-16 RX ADMIN — Medication 0.5 MILLIGRAM(S): at 08:11

## 2024-02-16 RX ADMIN — CARVEDILOL PHOSPHATE 12.5 MILLIGRAM(S): 80 CAPSULE, EXTENDED RELEASE ORAL at 05:39

## 2024-02-16 RX ADMIN — Medication 10 MILLIEQUIVALENT(S): at 05:39

## 2024-02-16 RX ADMIN — Medication 3 MILLILITER(S): at 08:11

## 2024-02-16 RX ADMIN — Medication 40 MILLIGRAM(S): at 05:38

## 2024-02-16 RX ADMIN — Medication 20 MILLIGRAM(S): at 13:10

## 2024-02-16 RX ADMIN — SERTRALINE 25 MILLIGRAM(S): 25 TABLET, FILM COATED ORAL at 12:56

## 2024-02-16 RX ADMIN — AMIODARONE HYDROCHLORIDE 200 MILLIGRAM(S): 400 TABLET ORAL at 05:39

## 2024-02-16 RX ADMIN — TIOTROPIUM BROMIDE 2 PUFF(S): 18 CAPSULE ORAL; RESPIRATORY (INHALATION) at 05:39

## 2024-02-16 RX ADMIN — APIXABAN 2.5 MILLIGRAM(S): 2.5 TABLET, FILM COATED ORAL at 05:41

## 2024-02-16 RX ADMIN — Medication 48 MILLIGRAM(S): at 12:55

## 2024-02-16 RX ADMIN — Medication 325 MILLIGRAM(S): at 12:54

## 2024-02-16 RX ADMIN — Medication 3 MILLILITER(S): at 13:58

## 2024-02-16 RX ADMIN — FAMOTIDINE 20 MILLIGRAM(S): 10 INJECTION INTRAVENOUS at 12:54

## 2024-02-16 RX ADMIN — Medication 1 TABLET(S): at 12:54

## 2024-02-16 RX ADMIN — Medication 81 MILLIGRAM(S): at 12:55

## 2024-02-16 RX ADMIN — Medication 1 TABLET(S): at 12:55

## 2024-02-16 RX ADMIN — Medication 20 MILLIGRAM(S): at 05:39

## 2024-02-16 RX ADMIN — Medication 100 MILLIGRAM(S): at 13:10

## 2024-02-16 RX ADMIN — Medication 100 MILLIGRAM(S): at 05:40

## 2024-02-16 NOTE — CASE MANAGEMENT PROGRESS NOTE - NSCMPROGRESSNOTE_GEN_ALL_CORE
CM met with pt. Pt is to be transitioned to assisted living facility [facility] [phone]/fax [phone]. [facility] [phone]. [phone] sent over to Marilyn hodge confirmation of forms and medication delivery to assisted living facility. CM confirmed with [facility] [phone]  pt case is being accepted and was made aware of DC plan today 2/16/24. Pt and family aware of plan and in agreement.  Pt community MD is Dr Carlson. Pt daughter stated she would make pt follow up appointments. Pt has DME including walker, oxygen. Pt stated her daughter Lela will pick her up about 4 pm. CM discussed plan with MD and RN. CM to remain available

## 2024-02-16 NOTE — CASE MANAGEMENT PROGRESS NOTE - NSCMPROGRESSNOTE_GEN_ALL_CORE
CM consult for o2 at home noted and reviewed. CM to remain available and monitor for home care needs

## 2024-02-16 NOTE — CAREGIVER ENGAGEMENT NOTE - CAREGIVER OUTREACH NOTES - FREE TEXT
CM called daughter Lela Haney with pt consent to discuss transition of care. Lela stated she will  pt when medically ready for dc to bring pt home

## 2024-02-16 NOTE — CHART NOTE - NSCHARTNOTEFT_GEN_A_CORE
Do you have Advance Directives (HCP / LV / Organ donation / Documentation of oral advance Directive):   (   x )  yes    (      )    NO                                                                            Do you have LV - Living will :                                                                                                                                             (  x  )  yes    (      )   No    Do you have HCP - Health Care Proxy:                                                                                                                            x)  yes   (       ) N0    Do you have DNR- Do Not Resuscitate :                                                                                                                           (   x   )  yes  (        )  No    Do you have DNI- Do Not intubate  :                                                                                                                               (   x   )  yes   (       ) No    Do you have MOLST - Medical orders for Life sustaining treatment  :                                                                    (    x  ) yes    (       ) No    Decision Maker :  (  x   ) Patient     (      )  HCA   (     ) Public Health Law Surrogate     (      ) Surrogate  (       ) Guardian    Goals of Care :  (   x   )   Complete Care     (       ) No Limitations                              (       )   Comfort Care       (       )  Hospice                               (      )   Limited medical Intervention / s    Medical Interventions :   (        )   CPR       (  x      )  DNR                                               (        )  Intubation with MV - Mechanical Ventilation  (      ) BIPAP/CPAP    (      x   )   DNI                                               (         )  Artificial Nutrition -  IVF, TPN / PPN, Tube Feeds             (     x    )   No Feeding Tube                                                (    x    ) Use Antibiotics                         (          ) No Antibiotics                                                (     x    ) Blood and Blood Products     (         )   No Blood or Blood products                                                (      x    )  Dialysis                                    (         )  No Dialysis                                                (          )  Medical Management only  (         )  No Invasive Interventions or Surgery  Time spent :                        (       ) upto 30 minutes                       (     x      )   more than 30 minutes  ACP and GOC reviewed and discussed

## 2024-02-16 NOTE — PROGRESS NOTE ADULT - SUBJECTIVE AND OBJECTIVE BOX
Date of Service 02-13-24 @ 18:12    Patient is a 83y old  Female who presents with a chief complaint of Urinary tract infection     (13 Feb 2024 14:19)      INTERVAL /OVERNIGHT EVENTS: feels ok    MEDICATIONS  (STANDING):  albuterol/ipratropium for Nebulization.. 3 milliLiter(s) Inhalation every 6 hours  aMIOdarone    Tablet 200 milliGRAM(s) Oral daily  ammonium lactate 12% Lotion 1 Application(s) Topical two times a day  apixaban 2.5 milliGRAM(s) Oral every 12 hours  aspirin enteric coated 81 milliGRAM(s) Oral daily  buDESOnide    Inhalation Suspension 0.5 milliGRAM(s) Inhalation two times a day  carvedilol 12.5 milliGRAM(s) Oral every 12 hours  cefTRIAXone   IVPB 1000 milliGRAM(s) IV Intermittent every 24 hours  dextrose 5% + sodium chloride 0.45%. 1000 milliLiter(s) (50 mL/Hr) IV Continuous <Continuous>  famotidine    Tablet 20 milliGRAM(s) Oral daily  fenofibrate Tablet 48 milliGRAM(s) Oral daily  ferrous    sulfate 325 milliGRAM(s) Oral daily  furosemide   Injectable 20 milliGRAM(s) IV Push two times a day  hydrALAZINE 50 milliGRAM(s) Oral three times a day  lactobacillus acidophilus 1 Tablet(s) Oral daily  levETIRAcetam 500 milliGRAM(s) Oral two times a day  melatonin 3 milliGRAM(s) Oral at bedtime  methylPREDNISolone sodium succinate Injectable 40 milliGRAM(s) IV Push two times a day  multivitamin 1 Tablet(s) Oral daily  potassium chloride    Tablet ER 20 milliEquivalent(s) Oral daily  sertraline 25 milliGRAM(s) Oral daily  tiotropium 2.5 MICROgram(s) Inhaler 2 Puff(s) Inhalation daily    MEDICATIONS  (PRN):  acetaminophen     Tablet .. 650 milliGRAM(s) Oral every 6 hours PRN Temp greater or equal to 38C (100.4F)  albuterol    90 MICROgram(s) HFA Inhaler 2 Puff(s) Inhalation every 6 hours PRN Shortness of Breath and/or Wheezing  aluminum hydroxide/magnesium hydroxide/simethicone Suspension 30 milliLiter(s) Oral every 4 hours PRN Dyspepsia  cloNIDine 0.2 milliGRAM(s) Oral three times a day PRN systolic BP>170 or equal to 170  guaiFENesin Oral Liquid (Sugar-Free) 100 milliGRAM(s) Oral every 6 hours PRN Cough  ondansetron Injectable 4 milliGRAM(s) IV Push every 8 hours PRN Nausea and/or Vomiting      Allergies    NIFEdipine (Unknown)  Procardia (Other)  fluoxetine (Unknown)    Intolerances    oxycodone (Other)      REVIEW OF SYSTEMS:  CONSTITUTIONAL: No fever, weight loss, or fatigue  EYES: No eye pain, visual disturbances, or discharge  ENMT:  + difficulty hearing, tinnitus, vertigo; No sinus or throat pain  NECK: No pain or stiffness  RESPIRATORY: No cough, wheezing, chills or hemoptysis; No shortness of breath  CARDIOVASCULAR: No chest pain, palpitations, dizziness, or leg swelling  GASTROINTESTINAL: No abdominal or epigastric pain. No nausea, vomiting, or hematemesis; No diarrhea or constipation. No melena or hematochezia.  GENITOURINARY: No dysuria, frequency, hematuria, or incontinence  NEUROLOGICAL: No headaches, memory loss, loss of strength, numbness, or tremors  SKIN: No itching, burning, rashes, or lesions   LYMPH NODES: No enlarged glands  ENDOCRINE: No heat or cold intolerance; No hair loss; No polydipsia or polyuria  MUSCULOSKELETAL: No joint pain or swelling; No muscle, back, or extremity pain  PSYCHIATRIC: No depression, anxiety, mood swings, or difficulty sleeping  HEME/LYMPH: No easy bruising, or bleeding gums  ALLERGY AND IMMUNOLOGIC: No hives or eczema    Vital Signs Last 24 Hrs  T(C): 36.4 (13 Feb 2024 12:40), Max: 36.6 (12 Feb 2024 20:33)  T(F): 97.6 (13 Feb 2024 12:40), Max: 97.8 (12 Feb 2024 20:33)  HR: 66 (13 Feb 2024 18:01) (60 - 71)  BP: 170/78 (13 Feb 2024 18:01) (147/64 - 180/71)  BP(mean): --  RR: 19 (13 Feb 2024 12:40) (18 - 19)  SpO2: 94% (13 Feb 2024 14:52) (94% - 99%)    Parameters below as of 13 Feb 2024 14:52  Patient On (Oxygen Delivery Method): nasal cannula        PHYSICAL EXAM:  GENERAL: NAD, well-groomed, well-developed  HEAD:  Atraumatic, Normocephalic  EYES: EOMI, PERRLA, conjunctiva and sclera clear  ENMT: No tonsillar erythema, exudates, or enlargement; Moist mucous membranes, Good dentition, No lesions  NECK: Supple, No JVD, Normal thyroid  NERVOUS SYSTEM:  Alert & Oriented X3, Good concentration; Motor Strength 5/5 B/L upper and lower extremities; DTRs 2+ intact and symmetric  CHEST/LUNG: Clear to auscultation bilaterally; No rales, rhonchi, wheezing, or rubs  HEART: Regular rate and rhythm; No murmurs, rubs, or gallops  ABDOMEN: Soft, Nontender, Nondistended; Bowel sounds present  EXTREMITIES:  2+ Peripheral Pulses, No clubbing, cyanosis, or edema  LYMPH: No lymphadenopathy noted  SKIN: No rashes or lesions    LABS:                        8.6    x     )-----------( x        ( 13 Feb 2024 15:25 )             27.0     13 Feb 2024 12:15    138    |  104    |  29     ----------------------------<  155    4.2     |  30     |  1.40     Ca    8.6        13 Feb 2024 12:15      PT/INR - ( 11 Feb 2024 18:50 )   PT: 20.2 sec;   INR: 1.76 ratio         PTT - ( 11 Feb 2024 18:50 )  PTT:34.9 sec  Urinalysis Basic - ( 13 Feb 2024 12:15 )    Color: x / Appearance: x / SG: x / pH: x  Gluc: 155 mg/dL / Ketone: x  / Bili: x / Urobili: x   Blood: x / Protein: x / Nitrite: x   Leuk Esterase: x / RBC: x / WBC x   Sq Epi: x / Non Sq Epi: x / Bacteria: x      CAPILLARY BLOOD GLUCOSE          RADIOLOGY & ADDITIONAL TESTS:    Notes Reviewed:  [x ] YES  [ ] NO    Care Discussed with Consultants/Other Providers [x ] YES  [ ] NO
[facility]  INFECTIOUS DISEASES   [address] [place] [address]  Tel: [phone]     Fax: [phone]  ========================================================  MD Lev Gallardo Kaushal, MD Cho, Michelle, MD Sunjit, Jaspal, MD  ========================================================    [id number]  [facility]     Follow up: UTI and Pneumonia     Doing better, no new complaint except for weakness, on o2 with NC.  No fever.     PAST MEDICAL & SURGICAL HISTORY:  HTN (hypertension)  HLD (hyperlipidemia)  Anemia  newly diagnosed  Pneumonia  years ago  OA (osteoarthritis)  Afib  Small bowel cancer  Pacemaker  Major depression  Chronic kidney disease (CKD)  COPD (chronic obstructive pulmonary disease)  Status post total left knee replacement  2007  History of cholecystectomy  open , many years ago  H/O hernia repair  years ago  H/O resection of small bowel    Social Hx: No current smoking, EtOH or drugs     FAMILY HISTORY:  Noncontributory     Allergies  NIFEdipine (Unknown)  Procardia (Other)  fluoxetine (Unknown)    Intolerances  oxycodone (Other)    MEDICATIONS  (STANDING):  albuterol/ipratropium for Nebulization. 3 milliLiter(s) Nebulizer once  albuterol/ipratropium for Nebulization.. 3 milliLiter(s) Inhalation every 6 hours  aMIOdarone    Tablet 200 milliGRAM(s) Oral daily  ammonium lactate 12% Lotion 1 Application(s) Topical two times a day  apixaban 2.5 milliGRAM(s) Oral every 12 hours  aspirin enteric coated 81 milliGRAM(s) Oral daily  buDESOnide    Inhalation Suspension 0.5 milliGRAM(s) Inhalation two times a day  carvedilol 12.5 milliGRAM(s) Oral every 12 hours  cefTRIAXone   IVPB 1000 milliGRAM(s) IV Intermittent every 24 hours  famotidine    Tablet 20 milliGRAM(s) Oral daily  fenofibrate Tablet 48 milliGRAM(s) Oral daily  ferrous    sulfate 325 milliGRAM(s) Oral daily  furosemide    Tablet 20 milliGRAM(s) Oral three times a day  hydrALAZINE 100 milliGRAM(s) Oral every 8 hours  lactobacillus acidophilus 1 Tablet(s) Oral daily  melatonin 3 milliGRAM(s) Oral at bedtime  methylPREDNISolone sodium succinate Injectable 40 milliGRAM(s) IV Push two times a day  multivitamin 1 Tablet(s) Oral daily  potassium chloride    Tablet ER 20 milliEquivalent(s) Oral daily  sertraline 25 milliGRAM(s) Oral daily  tiotropium 2.5 MICROgram(s) Inhaler 2 Puff(s) Inhalation daily     REVIEW OF SYSTEMS:  CONSTITUTIONAL:  No Fever or chills, + weakness   HEENT:  No diplopia or blurred vision.  No sore throat or runny nose.  CARDIOVASCULAR:  No chest pain   RESPIRATORY:  No cough, shortness of breath, PND or orthopnea.  GASTROINTESTINAL:  No nausea, vomiting or diarrhea.  GENITOURINARY:  No dysuria, frequency or urgency. No Blood in urine  MUSCULOSKELETAL:  no joint aches, no muscle pain  SKIN:  No change in skin, hair or nails.    Physical Exam:  Vital Signs Last 24 Hrs  T(C): 34.6 (14 Feb 2024 11:26), Max: 36.6 (13 Feb 2024 18:53)  T(F): 94.2 (14 Feb 2024 11:26), Max: 97.8 (13 Feb 2024 18:53)  HR: 60 (14 Feb 2024 11:26) (59 - 82)  BP: 170/71 (14 Feb 2024 11:26) (161/75 - 184/76)  BP(mean): 104 (14 Feb 2024 03:08) (104 - 112)  RR: 18 (14 Feb 2024 11:26) (17 - 18)  SpO2: 97% (14 Feb 2024 11:26) (94% - 99%)  Parameters below as of 14 Feb 2024 11:26  Patient On (Oxygen Delivery Method): nasal cannula  GEN: NAD  HEENT: normocephalic and atraumatic. EOMI. PERRL.    NECK: Supple.  No lymphadenopathy   LUNGS: crackles in both bases   HEART: Irregular rate and rhythm   ABDOMEN: Soft, nontender, and nondistended.  Positive bowel sounds.    : No CVA tenderness  EXTREMITIES: + edema.  NEUROLOGIC: grossly intact.  PSYCHIATRIC: Appropriate affect .  SKIN: No rash         Labs:                        9.1    3.06  )-----------( 145      ( 14 Feb 2024 07:40 )             28.8     02-14    140  |  104  |  35<H>  ----------------------------<  133<H>  4.3   |  33<H>  |  1.20    Ca    9.0      14 Feb 2024 07:40    Culture - Urine (collected 02-11-24 @ 18:55)  Source: Catheterized Catheterized  Preliminary Report (02-13-24 @ 07:08):    >100,000 CFU/ml Escherichia coli    Culture - Blood (collected 02-11-24 @ 18:55)  Source: .Blood Blood-Peripheral  Preliminary Report (02-14-24 @ 01:01):    No growth at 48 Hours    Culture - Blood (collected 02-11-24 @ 18:50)  Source: .Blood Blood-Peripheral  Preliminary Report (02-14-24 @ 01:01):    No growth at 48 Hours    WBC Count: 3.06 K/uL (02-14-24 @ 07:40)  WBC Count: 2.53 K/uL (02-13-24 @ 12:15)  WBC Count: 6.65 K/uL (02-11-24 @ 18:50)    Creatinine: 1.20 mg/dL (02-14-24 @ 07:40)  Creatinine: 1.40 mg/dL (02-13-24 @ 12:15)  Creatinine: 1.50 mg/dL (02-12-24 @ 05:21)  Creatinine: 1.50 mg/dL (02-11-24 @ 18:50)     SARS-CoV-2 Result: NotDetec (02-11-24 @ 18:50)    All imaging and other data have been reviewed.  < from: CT Abdomen and Pelvis No Cont (02.11.24 @ 22:01) >  IMPRESSION:  Redemonstrated bibasilar mucoid impaction with patchy consolidation at   the bilateral bases, likely representing aspiration pneumonia/pneumonitis.  Unchanged nonobstructive right lower pole renal cysts measuring up to 9   mm.    Assessment and Plan:   84 yo woman with PMH of HTN, Afib, S/P PPM, CKD, COPD stopped smoking 30 years ago on PRN o2 at home 1.5-3L, h/o small bowel ca s/p whipple was admitted with fever and cough for 2-3 days. She also had incontinence for one day. In ED CT showed bibasilar opacities with mucus inpaction and also UA showed elevated WBC and a positive nitrate. Her WBC was low but the flu/RSV/COVID all negative. Tmax 102.4 on admission.     Doing better, on o2 with NC, no complaint, no fever today.     # Pneumonia  # UTI  # COPD on home o2    - Blood cultures NGTD  - UC with Ecoli will follow sensitivity   - Monitor Tmax and WBC  - Continue ceftriaxone to cover pneumonia and UTI  - Total 5days treatment, today is day 4/5  - Legionella U ag is negative     Will follow PRN.    Sukumar Pacheco MD  Division of Infectious Diseases   Please call ID service at [phone] with any question.    50 minutes spent on total encounter assessing patient, examination, chart review, counseling and coordinating care by the attending physician/nurse/care manager.      
[facility]    CHIEF COMPLAINT: Patient is a 83y old  Female who presents with a chief complaint of UTI (14 Feb 2024 17:00)      Follow Up: [ ] Chest Pain      [ ] Dyspnea     [ ] Palpitations    [ ] Atrial Fibrillation     [ ] Ventricular Dysrhythmia    [ ] Abnormal EKG                      [ ] Abnormal Cardiac Enzymes     [ ] Valvular Disease    HPI:   82 yo F w/ PMH per EMR of A.fib, CKD, COPD, HLD, HTN, MD, OA, pacemaker, and hx small bowel ca s/p whipple here complaining of 1 days of fever, body aches, cough. patient uses 1.5 to 3 LPM O2 PRN.  In ER patient was found to have UTI.  patient is being admitted for further work up and treatment (12 Feb 2024 11:04)    PAST MEDICAL & SURGICAL HISTORY:  HTN (hypertension)      HLD (hyperlipidemia)      Anemia  newly diagnosed      Pneumonia  years ago      OA (osteoarthritis)      Afib      Small bowel cancer      Pacemaker      Major depression      Chronic kidney disease (CKD)      COPD (chronic obstructive pulmonary disease)      Status post total left knee replacement  2007      History of cholecystectomy  open , many years ago      H/O hernia repair  years ago      H/O resection of small bowel        MEDICATIONS  (STANDING):  albuterol/ipratropium for Nebulization. 3 milliLiter(s) Nebulizer once  albuterol/ipratropium for Nebulization.. 3 milliLiter(s) Inhalation every 6 hours  aMIOdarone    Tablet 200 milliGRAM(s) Oral daily  ammonium lactate 12% Lotion 1 Application(s) Topical two times a day  apixaban 2.5 milliGRAM(s) Oral every 12 hours  aspirin enteric coated 81 milliGRAM(s) Oral daily  buDESOnide    Inhalation Suspension 0.5 milliGRAM(s) Inhalation two times a day  carvedilol 12.5 milliGRAM(s) Oral every 12 hours  cefTRIAXone   IVPB 1000 milliGRAM(s) IV Intermittent every 24 hours  famotidine    Tablet 20 milliGRAM(s) Oral daily  fenofibrate Tablet 48 milliGRAM(s) Oral daily  ferrous    sulfate 325 milliGRAM(s) Oral daily  furosemide    Tablet 20 milliGRAM(s) Oral three times a day  hydrALAZINE 100 milliGRAM(s) Oral every 8 hours  lactobacillus acidophilus 1 Tablet(s) Oral daily  melatonin 3 milliGRAM(s) Oral at bedtime  methylPREDNISolone sodium succinate Injectable 40 milliGRAM(s) IV Push two times a day  multivitamin 1 Tablet(s) Oral daily  potassium chloride    Tablet ER 20 milliEquivalent(s) Oral daily  sertraline 25 milliGRAM(s) Oral daily  tiotropium 2.5 MICROgram(s) Inhaler 2 Puff(s) Inhalation daily    MEDICATIONS  (PRN):  acetaminophen     Tablet .. 650 milliGRAM(s) Oral every 6 hours PRN Temp greater or equal to 38C (100.4F), Mild Pain (1 - 3)  albuterol    90 MICROgram(s) HFA Inhaler 2 Puff(s) Inhalation every 6 hours PRN Shortness of Breath and/or Wheezing  aluminum hydroxide/magnesium hydroxide/simethicone Suspension 30 milliLiter(s) Oral every 4 hours PRN Dyspepsia  cloNIDine 0.2 milliGRAM(s) Oral three times a day PRN systolic BP>170 or equal to 170  guaiFENesin Oral Liquid (Sugar-Free) 100 milliGRAM(s) Oral every 6 hours PRN Cough  ondansetron Injectable 4 milliGRAM(s) IV Push every 8 hours PRN Nausea and/or Vomiting    Allergies    NIFEdipine (Unknown)  Procardia (Other)  fluoxetine (Unknown)    Intolerances    oxycodone (Other)      REVIEW OF SYSTEMS:    CONSTITUTIONAL: No weakness, fevers or chills.   EYES/ENT: No visual changes;    NECK: No pain or stiffness  RESPIRATORY: No cough, wheezing, No shortness of breath  CARDIOVASCULAR: No chest pain or palpitations  GASTROINTESTINAL: No abdominal pain, or hematochezia.  GENITOURINARY: No dysuria orhematuria  NEUROLOGICAL: No numbness or weakness  SKIN: No itching, burning, rashes  All other review of systems is negative unless indicated above    Vital Signs Last 24 Hrs  T(C): 36.3 (15 Feb 2024 11:43), Max: 36.6 (14 Feb 2024 18:01)  T(F): 97.4 (15 Feb 2024 11:43), Max: 97.8 (14 Feb 2024 18:01)  HR: 60 (15 Feb 2024 11:43) (58 - 71)  BP: 147/64 (15 Feb 2024 11:43) (128/55 - 177/73)  BP(mean): --  RR: 18 (15 Feb 2024 11:43) (18 - 18)  SpO2: 95% (15 Feb 2024 11:43) (95% - 99%)    Parameters below as of 15 Feb 2024 11:43  Patient On (Oxygen Delivery Method): nasal cannula      I&O's Summary    14 Feb 2024 07:01  -  15 Feb 2024 07:00  --------------------------------------------------------  IN: 0 mL / OUT: 1500 mL / NET: -1500 mL      PHYSICAL EXAM:  Constitutional: NAD  Neurological: Alert, no focal deficits  HEENT: no JVD, EOMI  Cardiovascular: Regular, S1 and S2, no murmur  Pulmonary: breath sounds bilaterally  Gastrointestinal: Bowel Sounds present, soft, nontender  EXT:  no peripheral edema  Skin: No rashes.  Psych:  Mood calm  LABS: All Labs Reviewed:                          9.1    3.06  )-----------( 145      ( 14 Feb 2024 07:40 )             28.8     02-14    140  |  104  |  35<H>  ----------------------------<  133<H>  4.3   |  33<H>  |  1.20    Ca    9.0      14 Feb 2024 07:40        Assessment and Plan:   · Assessment    83F with h/o HTN, HLD, EF 50% with HFpEF with chronic Torsemide 40mg daily, AF s/p PPM 7/2021, PAD, COPD, CKD and h/o Whipple, Cholecystectomy who presents with fever, body aches and cough found with PNA on CT chest and positive trop 165 now trending down to 113.    Pt feeling improved today.  Had some chest discomfort since admission, less today.  Trops slightly up but flat.  Probable demand ischemia.  EKG is paced.     Suggest:  1. COPD/PNA on abx  - ID following  CT Chest No Cont 2.11.24   IMPRESSION:  Redemonstrated bibasilar mucoid impaction with patchy consolidation at   the bilateral bases, likely representing aspiration pneumonia/pneumonitis.  Unchanged nonobstructive right lower pole renal cysts measuring up to 9   mm.  Additional incidental and chronic findings as above.    2. PAF in SR on Amio and ELiquis 2.5mg bid  Hb good 10.1  9.1    3. HTN  - c/w Carvedilol 12.5 twice daily  - c/w Hydralazine 50mg 3 x day    4. Chronic HFpEF without pleural effusions on CT  - c/w lasix 20 TID, s/p IV diuretics      Will follow             
[facility]    CHIEF COMPLAINT: Patient is a 83y old  Female who presents with a chief complaint of UTI (15 Feb 2024 20:21)      Follow Up: [ ] Chest Pain      [ ] Dyspnea     [ ] Palpitations    [ ] Atrial Fibrillation     [ ] Ventricular Dysrhythmia    [ ] Abnormal EKG                      [ ] Abnormal Cardiac Enzymes     [ ] Valvular Disease    HPI:   82 yo F w/ PMH per EMR of A.fib, CKD, COPD, HLD, HTN, MD, OA, pacemaker, and hx small bowel ca s/p whipple here complaining of 1 days of fever, body aches, cough. patient uses 1.5 to 3 LPM O2 PRN.  In ER patient was found to have UTI.  patient is being admitted for further work up and treatment (12 Feb 2024 11:04)    PAST MEDICAL & SURGICAL HISTORY:  HTN (hypertension)      HLD (hyperlipidemia)      Anemia  newly diagnosed      Pneumonia  years ago      OA (osteoarthritis)      Afib      Small bowel cancer      Pacemaker      Major depression      Chronic kidney disease (CKD)      COPD (chronic obstructive pulmonary disease)      Status post total left knee replacement  2007      History of cholecystectomy  open , many years ago      H/O hernia repair  years ago      H/O resection of small bowel        MEDICATIONS  (STANDING):  albuterol/ipratropium for Nebulization. 3 milliLiter(s) Nebulizer once  albuterol/ipratropium for Nebulization.. 3 milliLiter(s) Inhalation every 6 hours  aMIOdarone    Tablet 200 milliGRAM(s) Oral daily  ammonium lactate 12% Lotion 1 Application(s) Topical two times a day  apixaban 2.5 milliGRAM(s) Oral every 12 hours  aspirin enteric coated 81 milliGRAM(s) Oral daily  buDESOnide    Inhalation Suspension 0.5 milliGRAM(s) Inhalation two times a day  carvedilol 12.5 milliGRAM(s) Oral every 12 hours  cefTRIAXone   IVPB 1000 milliGRAM(s) IV Intermittent every 24 hours  famotidine    Tablet 20 milliGRAM(s) Oral daily  fenofibrate Tablet 48 milliGRAM(s) Oral daily  ferrous    sulfate 325 milliGRAM(s) Oral daily  furosemide    Tablet 20 milliGRAM(s) Oral three times a day  hydrALAZINE 100 milliGRAM(s) Oral every 8 hours  lactobacillus acidophilus 1 Tablet(s) Oral daily  melatonin 3 milliGRAM(s) Oral at bedtime  methylPREDNISolone sodium succinate Injectable 40 milliGRAM(s) IV Push two times a day  multivitamin 1 Tablet(s) Oral daily  potassium chloride    Tablet ER 10 milliEquivalent(s) Oral two times a day  sertraline 25 milliGRAM(s) Oral daily  tiotropium 2.5 MICROgram(s) Inhaler 2 Puff(s) Inhalation daily    MEDICATIONS  (PRN):  acetaminophen     Tablet .. 650 milliGRAM(s) Oral every 6 hours PRN Temp greater or equal to 38C (100.4F), Mild Pain (1 - 3)  albuterol    90 MICROgram(s) HFA Inhaler 2 Puff(s) Inhalation every 6 hours PRN Shortness of Breath and/or Wheezing  aluminum hydroxide/magnesium hydroxide/simethicone Suspension 30 milliLiter(s) Oral every 4 hours PRN Dyspepsia  cloNIDine 0.2 milliGRAM(s) Oral three times a day PRN systolic BP>170 or equal to 170  guaiFENesin Oral Liquid (Sugar-Free) 100 milliGRAM(s) Oral every 6 hours PRN Cough  ondansetron Injectable 4 milliGRAM(s) IV Push every 8 hours PRN Nausea and/or Vomiting    Allergies    NIFEdipine (Unknown)  Procardia (Other)  fluoxetine (Unknown)    Intolerances    oxycodone (Other)      REVIEW OF SYSTEMS:    CONSTITUTIONAL: No weakness, fevers or chills.   EYES/ENT: No visual changes;    NECK: No pain or stiffness  RESPIRATORY: No cough, wheezing, No shortness of breath  CARDIOVASCULAR: No chest pain or palpitations  GASTROINTESTINAL: No abdominal pain, or hematochezia.  GENITOURINARY: No dysuria orhematuria  NEUROLOGICAL: No numbness or weakness  SKIN: No itching, burning, rashes  All other review of systems is negative unless indicated above    Vital Signs Last 24 Hrs  T(C): 36.5 (16 Feb 2024 04:51), Max: 36.6 (15 Feb 2024 20:05)  T(F): 97.7 (16 Feb 2024 04:51), Max: 97.8 (15 Feb 2024 20:05)  HR: 84 (16 Feb 2024 08:44) (59 - 93)  BP: 150/77 (16 Feb 2024 04:51) (134/69 - 180/76)  BP(mean): --  RR: 18 (16 Feb 2024 04:51) (18 - 18)  SpO2: 97% (16 Feb 2024 08:44) (95% - 98%)    Parameters below as of 16 Feb 2024 08:44  Patient On (Oxygen Delivery Method): nasal cannula, pt on 3lpm nc      I&O's Summary    15 Feb 2024 07:01  -  16 Feb 2024 07:00  --------------------------------------------------------  IN: 0 mL / OUT: 1600 mL / NET: -1600 mL        PHYSICAL EXAM:  Constitutional: NAD  Neurological: Alert, no focal deficits  HEENT: no JVD, EOMI  Cardiovascular: Regular, S1 and S2, no murmur  Pulmonary: breath sounds bilaterally wheeze b/l  Gastrointestinal: Bowel Sounds present, soft, nontender  EXT:  no peripheral edema  Skin: No rashes.  Psych:  Mood calm  LABS: All Labs Reviewed:                          9.8    5.22  )-----------( 162      ( 16 Feb 2024 08:30 )             30.0             Assessment and Plan:   · Assessment    83F with h/o HTN, HLD, EF 50% with HFpEF with chronic Torsemide 40mg daily, AF s/p PPM 7/2021, PAD, COPD, CKD and h/o Whipple, Cholecystectomy who presents with fever, body aches and cough found with PNA on CT chest and positive trop 165 now trending down to 113.    Pt feeling improved today.  Had some chest discomfort since admission, less today.  Trops slightly up but flat.  Probable demand ischemia.  EKG is paced.     Suggest:  1. COPD/PNA on abx  - ID following  CT Chest No Cont 2.11.24   IMPRESSION:  Redemonstrated bibasilar mucoid impaction with patchy consolidation at   the bilateral bases, likely representing aspiration pneumonia/pneumonitis.  Unchanged nonobstructive right lower pole renal cysts measuring up to 9   mm.  Additional incidental and chronic findings as above.    2. PAF in SR on Amio and ELiquis 2.5mg bid  Hb good 10.1, 9.1, 9.8    3. HTN  - c/w Carvedilol 12.5 twice daily  - c/w Hydralazine 50mg 3 x day    4. Chronic HFpEF without pleural effusions on CT  - c/w lasix 20 TID, s/p IV diuretics  -+wheeze, BNP/CXR ordered      Will follow                       
Interval History:  no new complaints. states feeling a little better  Chart reviewed and events noted;   Overnight events:    MEDICATIONS  (STANDING):  albuterol/ipratropium for Nebulization. 3 milliLiter(s) Nebulizer once  albuterol/ipratropium for Nebulization.. 3 milliLiter(s) Inhalation every 6 hours  aMIOdarone    Tablet 200 milliGRAM(s) Oral daily  ammonium lactate 12% Lotion 1 Application(s) Topical two times a day  apixaban 2.5 milliGRAM(s) Oral every 12 hours  aspirin enteric coated 81 milliGRAM(s) Oral daily  buDESOnide    Inhalation Suspension 0.5 milliGRAM(s) Inhalation two times a day  carvedilol 12.5 milliGRAM(s) Oral every 12 hours  cefTRIAXone   IVPB 1000 milliGRAM(s) IV Intermittent every 24 hours  famotidine    Tablet 20 milliGRAM(s) Oral daily  fenofibrate Tablet 48 milliGRAM(s) Oral daily  ferrous    sulfate 325 milliGRAM(s) Oral daily  furosemide    Tablet 20 milliGRAM(s) Oral three times a day  hydrALAZINE 100 milliGRAM(s) Oral every 8 hours  lactobacillus acidophilus 1 Tablet(s) Oral daily  melatonin 3 milliGRAM(s) Oral at bedtime  methylPREDNISolone sodium succinate Injectable 40 milliGRAM(s) IV Push two times a day  multivitamin 1 Tablet(s) Oral daily  potassium chloride    Tablet ER 10 milliEquivalent(s) Oral two times a day  sertraline 25 milliGRAM(s) Oral daily  tiotropium 2.5 MICROgram(s) Inhaler 2 Puff(s) Inhalation daily    MEDICATIONS  (PRN):  acetaminophen     Tablet .. 650 milliGRAM(s) Oral every 6 hours PRN Temp greater or equal to 38C (100.4F), Mild Pain (1 - 3)  albuterol    90 MICROgram(s) HFA Inhaler 2 Puff(s) Inhalation every 6 hours PRN Shortness of Breath and/or Wheezing  aluminum hydroxide/magnesium hydroxide/simethicone Suspension 30 milliLiter(s) Oral every 4 hours PRN Dyspepsia  cloNIDine 0.2 milliGRAM(s) Oral three times a day PRN systolic BP>170 or equal to 170  guaiFENesin Oral Liquid (Sugar-Free) 100 milliGRAM(s) Oral every 6 hours PRN Cough  ondansetron Injectable 4 milliGRAM(s) IV Push every 8 hours PRN Nausea and/or Vomiting      Vital Signs Last 24 Hrs  T(C): 36.6 (15 Feb 2024 20:05), Max: 36.6 (15 Feb 2024 20:05)  T(F): 97.8 (15 Feb 2024 20:05), Max: 97.8 (15 Feb 2024 20:05)  HR: 60 (15 Feb 2024 20:05) (60 - 69)  BP: 176/82 (15 Feb 2024 20:20) (134/69 - 180/76)  BP(mean): --  RR: 18 (15 Feb 2024 20:05) (18 - 18)  SpO2: 97% (15 Feb 2024 20:05) (95% - 98%)    Parameters below as of 15 Feb 2024 20:05  Patient On (Oxygen Delivery Method): nasal cannula  O2 Flow (L/min): 2      PHYSICAL EXAM  General: adult in NAD, chronically ill appearing  HEENT: clear oropharynx, anicteric sclera, pink conjunctivae  Neck: supple  CV: normal S1S2 with no murmur rubs or gallops  Lungs: clear to auscultation, no wheezes, no rhales  Abdomen: soft non-tender non-distended, no hepato/splenomegaly  Ext: no clubbing cyanosis or edema  Skin: no rashes and no petichiae  Neuro: alert and oriented X3 no focal deficits      LABS:  CBC Full  -  ( 15 Feb 2024 08:52 )  WBC Count : x  RBC Count : 3.20 M/uL  Hemoglobin : x  Hematocrit : x  Platelet Count - Automated : x  Mean Cell Volume : x  Mean Cell Hemoglobin : x  Mean Cell Hemoglobin Concentration : x  Auto Neutrophil # : x  Auto Lymphocyte # : x  Auto Monocyte # : x  Auto Eosinophil # : x  Auto Basophil # : x  Auto Neutrophil % : x  Auto Lymphocyte % : x  Auto Monocyte % : x  Auto Eosinophil % : x  Auto Basophil % : x    02-14    140  |  104  |  35<H>  ----------------------------<  133<H>  4.3   |  33<H>  |  1.20    Ca    9.0      14 Feb 2024 07:40          fe studies  Iron - Total Binding Capacity.: 254 ug/dL (02-15 @ 08:52)  Ferritin: 154 ng/mL (02-15 @ 08:52)      WBC trend  3.06 K/uL (02-14-24 @ 07:40)  2.53 K/uL (02-13-24 @ 12:15)      Hgb trend  9.1 g/dL (02-14-24 @ 07:40)  8.6 g/dL (02-13-24 @ 15:25)  8.9 g/dL (02-13-24 @ 12:15)      plt trend  145 K/uL (02-14-24 @ 07:40)  141 K/uL (02-13-24 @ 12:15)        RADIOLOGY & ADDITIONAL STUDIES:    
[facility]  INFECTIOUS DISEASES   [address] [place] [address]  Tel: [phone]     Fax: [phone]  ========================================================  MD Lev Gallardo Kaushal, MD Cho, Michelle, MD Sunjit, Jaspal, MD  ========================================================    [facility]-[id number]  [facility]     Follow up: UTI and Pneumonia     Doing better, no new complaint, on o2 with NC.  No fever.     PAST MEDICAL & SURGICAL HISTORY:  HTN (hypertension)  HLD (hyperlipidemia)  Anemia  newly diagnosed  Pneumonia  years ago  OA (osteoarthritis)  Afib  Small bowel cancer  Pacemaker  Major depression  Chronic kidney disease (CKD)  COPD (chronic obstructive pulmonary disease)  Status post total left knee replacement  2007  History of cholecystectomy  open , many years ago  H/O hernia repair  years ago  H/O resection of small bowel    Social Hx: No current smoking, EtOH or drugs     FAMILY HISTORY:  Noncontributory     Allergies  NIFEdipine (Unknown)  Procardia (Other)  fluoxetine (Unknown)    Intolerances  oxycodone (Other)      MEDICATIONS  (STANDING):  albuterol/ipratropium for Nebulization.. 3 milliLiter(s) Inhalation every 6 hours  aMIOdarone    Tablet 200 milliGRAM(s) Oral daily  aspirin enteric coated 81 milliGRAM(s) Oral daily  carvedilol 12.5 milliGRAM(s) Oral every 12 hours  cefTRIAXone   IVPB 1000 milliGRAM(s) IV Intermittent every 24 hours  dextrose 5% + sodium chloride 0.45%. 1000 milliLiter(s) (50 mL/Hr) IV Continuous <Continuous>  famotidine    Tablet 20 milliGRAM(s) Oral daily  fenofibrate Tablet 48 milliGRAM(s) Oral daily  ferrous    sulfate 325 milliGRAM(s) Oral daily  furosemide   Injectable 20 milliGRAM(s) IV Push two times a day  heparin   Injectable 5000 Unit(s) SubCutaneous every 12 hours  hydrALAZINE 50 milliGRAM(s) Oral three times a day  lactobacillus acidophilus 1 Tablet(s) Oral daily  levETIRAcetam 500 milliGRAM(s) Oral two times a day  melatonin 3 milliGRAM(s) Oral at bedtime  multivitamin 1 Tablet(s) Oral daily  sertraline 25 milliGRAM(s) Oral daily  tiotropium 2.5 MICROgram(s) Inhaler 2 Puff(s) Inhalation daily    MEDICATIONS  (PRN):  acetaminophen     Tablet .. 650 milliGRAM(s) Oral every 6 hours PRN Temp greater or equal to 38C (100.4F)  albuterol    90 MICROgram(s) HFA Inhaler 2 Puff(s) Inhalation every 6 hours PRN Shortness of Breath and/or Wheezing  aluminum hydroxide/magnesium hydroxide/simethicone Suspension 30 milliLiter(s) Oral every 4 hours PRN Dyspepsia  cloNIDine 0.2 milliGRAM(s) Oral three times a day PRN systolic BP>170 or equal to 170  guaiFENesin Oral Liquid (Sugar-Free) 100 milliGRAM(s) Oral every 6 hours PRN Cough  ondansetron Injectable 4 milliGRAM(s) IV Push every 8 hours PRN Nausea and/or Vomiting     REVIEW OF SYSTEMS:  CONSTITUTIONAL:  No Fever or chills  HEENT:  No diplopia or blurred vision.  No sore throat or runny nose.  CARDIOVASCULAR:  No chest pain   RESPIRATORY:  No cough, shortness of breath, PND or orthopnea.  GASTROINTESTINAL:  No nausea, vomiting or diarrhea.  GENITOURINARY:  No dysuria, frequency or urgency. No Blood in urine  MUSCULOSKELETAL:  no joint aches, no muscle pain  SKIN:  No change in skin, hair or nails.    Physical Exam:  Vital Signs Last 24 Hrs  T(C): 36.5 (13 Feb 2024 04:48), Max: 36.6 (12 Feb 2024 14:07)  T(F): 97.7 (13 Feb 2024 04:48), Max: 97.9 (12 Feb 2024 14:07)  HR: 61 (13 Feb 2024 08:55) (60 - 71)  BP: 147/64 (13 Feb 2024 04:48) (124/71 - 158/69)  BP(mean): --  RR: 19 (13 Feb 2024 04:48) (17 - 19)  SpO2: 97% (13 Feb 2024 04:48) (94% - 98%)  Parameters below as of 13 Feb 2024 08:00  Patient On (Oxygen Delivery Method): nasal cannula  GEN: NAD  HEENT: normocephalic and atraumatic. EOMI. PERRL.    NECK: Supple.  No lymphadenopathy   LUNGS: crackles in both bases   HEART: Irregular rate and rhythm   ABDOMEN: Soft, nontender, and nondistended.  Positive bowel sounds.    : No CVA tenderness  EXTREMITIES: + edema.  NEUROLOGIC: grossly intact.  PSYCHIATRIC: Appropriate affect .  SKIN: No rash       Labs:                        10.1   6.65  )-----------( 192      ( 11 Feb 2024 18:50 )             30.9     02-12    143  |  107  |  25<H>  ----------------------------<  80  3.4<L>   |  31  |  1.50<H>    Ca    8.2<L>      12 Feb 2024 05:21  Mg     2.1     02-12    TPro  7.3  /  Alb  3.6  /  TBili  0.5  /  DBili  x   /  AST  64<H>  /  ALT  43  /  AlkPhos  53  02-11    Culture - Urine (collected 02-11-24 @ 18:55)  Source: Catheterized Catheterized  Preliminary Report (02-13-24 @ 07:08):    >100,000 CFU/ml Escherichia coli    Culture - Blood (collected 02-11-24 @ 18:55)  Source: .Blood Blood-Peripheral  Preliminary Report (02-13-24 @ 01:03):    No growth at 24 hours    Culture - Blood (collected 02-11-24 @ 18:50)  Source: .Blood Blood-Peripheral  Preliminary Report (02-13-24 @ 01:03):    No growth at 24 hours    WBC Count: 6.65 K/uL (02-11-24 @ 18:50)    Creatinine: 1.50 mg/dL (02-12-24 @ 05:21)  Creatinine: 1.50 mg/dL (02-11-24 @ 18:50)    SARS-CoV-2 Result: NotDetec (02-11-24 @ 18:50)      All imaging and other data have been reviewed.  < from: CT Abdomen and Pelvis No Cont (02.11.24 @ 22:01) >  IMPRESSION:  Redemonstrated bibasilar mucoid impaction with patchy consolidation at   the bilateral bases, likely representing aspiration pneumonia/pneumonitis.  Unchanged nonobstructive right lower pole renal cysts measuring up to 9   mm.      Assessment and Plan:   82 yo woman with PMH of HTN, Afib, S/P PPM, CKD, COPD stopped smoking 30 years ago on PRN o2 at home 1.5-3L, h/o small bowel ca s/p whipple was admitted with fever and cough for 2-3 days. She also had incontinence for one day. In ED CT showed bibasilar opacities with mucus inpaction and also UA showed elevated WBC and a positive nitrate. Her WBC was low but the flu/RSV/COVID all negative. Tmax 102.4    Doing better, on o2 with NC, no complaint, no fever today.     # Pneumonia  # UTI  # COPD on home o2    - Blood cultures NGTD  - UC with Ecoli will follow sensitivity   - Monitor Tmax and WBC  - Continue ceftriaxone to cover pneumonia and UTI  - Total 5days treatment   - Legionella U ag is negative   - Stop Azithromycin     Will follow.    Sukumar Pacheco MD  Division of Infectious Diseases   Please call ID service at [phone] with any question.    50 minutes spent on total encounter assessing patient, examination, chart review, counseling and coordinating care by the attending physician/nurse/care manager.  
Patient is a 83y Female with a known history of :  Fever [R50.9]    Acute UTI [N39.0]    Cough [R05.9]    Pneumonia [J18.9]      HPI:   84 yo F w/ PMH per EMR of A.fib, CKD, COPD, HLD, HTN, MD, OA, pacemaker, and hx small bowel ca s/p whipple here complaining of 1 days of fever, body aches, cough. patient uses 1.5 to 3 LPM O2 PRN.  In ER patient was found to have UTI.  patient is being admitted for further work up and treatment (12 Feb 2024 11:04)      REVIEW OF SYSTEMS:    All other review of systems is negative unless indicated above        MEDICATIONS  (STANDING):  albuterol/ipratropium for Nebulization. 3 milliLiter(s) Nebulizer once  albuterol/ipratropium for Nebulization.. 3 milliLiter(s) Inhalation every 6 hours  aMIOdarone    Tablet 200 milliGRAM(s) Oral daily  ammonium lactate 12% Lotion 1 Application(s) Topical two times a day  apixaban 2.5 milliGRAM(s) Oral every 12 hours  aspirin enteric coated 81 milliGRAM(s) Oral daily  buDESOnide    Inhalation Suspension 0.5 milliGRAM(s) Inhalation two times a day  carvedilol 12.5 milliGRAM(s) Oral every 12 hours  cefTRIAXone   IVPB 1000 milliGRAM(s) IV Intermittent every 24 hours  famotidine    Tablet 20 milliGRAM(s) Oral daily  fenofibrate Tablet 48 milliGRAM(s) Oral daily  ferrous    sulfate 325 milliGRAM(s) Oral daily  furosemide    Tablet 20 milliGRAM(s) Oral three times a day  hydrALAZINE 100 milliGRAM(s) Oral every 8 hours  lactobacillus acidophilus 1 Tablet(s) Oral daily  melatonin 3 milliGRAM(s) Oral at bedtime  methylPREDNISolone sodium succinate Injectable 40 milliGRAM(s) IV Push two times a day  multivitamin 1 Tablet(s) Oral daily  potassium chloride    Tablet ER 20 milliEquivalent(s) Oral daily  sertraline 25 milliGRAM(s) Oral daily  tiotropium 2.5 MICROgram(s) Inhaler 2 Puff(s) Inhalation daily    MEDICATIONS  (PRN):  acetaminophen     Tablet .. 650 milliGRAM(s) Oral every 6 hours PRN Temp greater or equal to 38C (100.4F), Mild Pain (1 - 3)  albuterol    90 MICROgram(s) HFA Inhaler 2 Puff(s) Inhalation every 6 hours PRN Shortness of Breath and/or Wheezing  aluminum hydroxide/magnesium hydroxide/simethicone Suspension 30 milliLiter(s) Oral every 4 hours PRN Dyspepsia  cloNIDine 0.2 milliGRAM(s) Oral three times a day PRN systolic BP>170 or equal to 170  guaiFENesin Oral Liquid (Sugar-Free) 100 milliGRAM(s) Oral every 6 hours PRN Cough  ondansetron Injectable 4 milliGRAM(s) IV Push every 8 hours PRN Nausea and/or Vomiting      ALLERGIES: NIFEdipine (Unknown)  Procardia (Other)  fluoxetine (Unknown)      FAMILY HISTORY:      PHYSICAL EXAMINATION:  -----------------------------  T(C): 36.2 (02-14-24 @ 16:18), Max: 36.6 (02-13-24 @ 18:53)  HR: 70 (02-14-24 @ 12:45) (59 - 82)  BP: 170/71 (02-14-24 @ 11:26) (161/75 - 184/76)  RR: 18 (02-14-24 @ 11:26) (17 - 18)  SpO2: 99% (02-14-24 @ 12:45) (96% - 99%)  Wt(kg): --    02-13 @ 07:01  -  02-14 @ 07:00  --------------------------------------------------------  IN:  Total IN: 0 mL    OUT:    Voided (mL): 500 mL  Total OUT: 500 mL    Total NET: -500 mL          Weight (kg): 77.2 (02-14 @ 06:03)      PHYSICAL EXAM:    Constitutional: NAD, awake and alert, well-developed  Eyes:  EOMI,  Pupils round, No oral cyanosis.  HEENT: No exudate or erythema  Pulmonary: Non-labored, breath sounds are clear bilaterally, No wheezing, rales or rhonchi  Cardiovascular: Regular, S1 and S2, No murmurs  Gastrointestinal: Bowel Sounds present, soft, nontender.   Ext: No significant LE edema  Neurological: Alert, no gross focal motor deficits  Skin: No rashes.  Psych:  Mood & affect appropriate    LABS: All Labs Reviewed:      LABS:   --------  02-14    140  |  104  |  35<H>  ----------------------------<  133<H>  4.3   |  33<H>  |  1.20    Ca    9.0      14 Feb 2024 07:40                           9.1    3.06  )-----------( 145      ( 14 Feb 2024 07:40 )             28.8                 
Patient is a 83y old  Female who presents with a chief complaint of Urinary tract infection     (13 Feb 2024 14:19)      INTERVAL HPI/OVERNIGHT EVENTS:    No change in cough and chest congestion    MEDICATIONS  (STANDING):  albuterol/ipratropium for Nebulization.. 3 milliLiter(s) Inhalation every 6 hours  aMIOdarone    Tablet 200 milliGRAM(s) Oral daily  ammonium lactate 12% Lotion 1 Application(s) Topical two times a day  apixaban 2.5 milliGRAM(s) Oral every 12 hours  aspirin enteric coated 81 milliGRAM(s) Oral daily  buDESOnide    Inhalation Suspension 0.5 milliGRAM(s) Inhalation two times a day  carvedilol 12.5 milliGRAM(s) Oral every 12 hours  cefTRIAXone   IVPB 1000 milliGRAM(s) IV Intermittent every 24 hours  famotidine    Tablet 20 milliGRAM(s) Oral daily  fenofibrate Tablet 48 milliGRAM(s) Oral daily  ferrous    sulfate 325 milliGRAM(s) Oral daily  furosemide    Tablet 20 milliGRAM(s) Oral three times a day  hydrALAZINE 100 milliGRAM(s) Oral every 12 hours  lactobacillus acidophilus 1 Tablet(s) Oral daily  levETIRAcetam 500 milliGRAM(s) Oral two times a day  melatonin 3 milliGRAM(s) Oral at bedtime  methylPREDNISolone sodium succinate Injectable 40 milliGRAM(s) IV Push two times a day  multivitamin 1 Tablet(s) Oral daily  potassium chloride    Tablet ER 20 milliEquivalent(s) Oral daily  sertraline 25 milliGRAM(s) Oral daily  tiotropium 2.5 MICROgram(s) Inhaler 2 Puff(s) Inhalation daily      MEDICATIONS  (PRN):  acetaminophen     Tablet .. 650 milliGRAM(s) Oral every 6 hours PRN Temp greater or equal to 38C (100.4F)  albuterol    90 MICROgram(s) HFA Inhaler 2 Puff(s) Inhalation every 6 hours PRN Shortness of Breath and/or Wheezing  aluminum hydroxide/magnesium hydroxide/simethicone Suspension 30 milliLiter(s) Oral every 4 hours PRN Dyspepsia  cloNIDine 0.2 milliGRAM(s) Oral three times a day PRN systolic BP>170 or equal to 170  guaiFENesin Oral Liquid (Sugar-Free) 100 milliGRAM(s) Oral every 6 hours PRN Cough  ondansetron Injectable 4 milliGRAM(s) IV Push every 8 hours PRN Nausea and/or Vomiting      Allergies    NIFEdipine (Unknown)  Procardia (Other)  fluoxetine (Unknown)    Intolerances    oxycodone (Other)      PAST MEDICAL & SURGICAL HISTORY:  HTN (hypertension)      HLD (hyperlipidemia)      Anemia  newly diagnosed      Pneumonia  years ago      OA (osteoarthritis)      Afib      Small bowel cancer      Pacemaker      Major depression      Chronic kidney disease (CKD)      COPD (chronic obstructive pulmonary disease)      Status post total left knee replacement  2007      History of cholecystectomy  open , many years ago      H/O hernia repair  years ago      H/O resection of small bowel          Vital Signs Last 24 Hrs  T(C): 36.6 (13 Feb 2024 18:53), Max: 36.6 (13 Feb 2024 18:53)  T(F): 97.8 (13 Feb 2024 18:53), Max: 97.8 (13 Feb 2024 18:53)  HR: 60 (13 Feb 2024 20:05) (60 - 66)  BP: 178/73 (13 Feb 2024 18:53) (147/64 - 180/71)  BP(mean): --  RR: 17 (13 Feb 2024 18:53) (17 - 19)  SpO2: 98% (13 Feb 2024 20:05) (94% - 99%)    Parameters below as of 13 Feb 2024 20:05  Patient On (Oxygen Delivery Method): nasal cannula, 2 L        PHYSICAL EXAMINATION:    GENERAL: The patient is awake and alert in no apparent distress.     HEENT: Head is normocephalic and atraumatic.     NECK: no JVD    LUNGS: diminished air entry bilateral    HEART: Regular rate and rhythm without murmur.    ABDOMEN: Soft, nontender, and nondistended.      EXTREMITIES: Without any cyanosis, clubbing, rash, lesions or edema.    NEUROLOGIC: Grossly intact.    SKIN: No ulceration or induration present.      LABS:                        8.6    x     )-----------( x        ( 13 Feb 2024 15:25 )             27.0     02-13    138  |  104  |  29<H>  ----------------------------<  155<H>  4.2   |  30  |  1.40<H>    Ca    8.6      13 Feb 2024 12:15  Mg     2.1     02-12        Urinalysis Basic - ( 13 Feb 2024 12:15 )    Color: x / Appearance: x / SG: x / pH: x  Gluc: 155 mg/dL / Ketone: x  / Bili: x / Urobili: x   Blood: x / Protein: x / Nitrite: x   Leuk Esterase: x / RBC: x / WBC x   Sq Epi: x / Non Sq Epi: x / Bacteria: x                      MICROBIOLOGY:  Culture Results:   No growth at 24 hours (02-11 @ 18:55)  Culture Results:   >100,000 CFU/ml Escherichia coli (02-11 @ 18:55)  Culture Results:   No growth at 24 hours (02-11 @ 18:50)          Assessment:    COPD with exacerbation  Acute on Chronic Hypoxic respiratory failure  Possible Pneumonia  UTI  Hx Pulmonary emboli  Peripheral arterial disease    Plan:    Supplemental oxygen and monitor pulse oximetry  Taper Solumedrol  IV Rocephin  Duoneb + Budesonide via nebulizer  Anticoagulation with Eliquis        
[facility]  INFECTIOUS DISEASES   [address] [place] [address]  Tel: [phone]     Fax: [phone]  ========================================================  MD Lev Gallardo Kaushal, MD Cho, Michelle, MD Sunjit, Jaspal, MD  ========================================================    [facility]-[id number]  [facility]     Follow up: UTI and Pneumonia     Doing significantly better, no new complaint except for weakness, on o2 with NC.  No fever.     PAST MEDICAL & SURGICAL HISTORY:  HTN (hypertension)  HLD (hyperlipidemia)  Anemia  newly diagnosed  Pneumonia  years ago  OA (osteoarthritis)  Afib  Small bowel cancer  Pacemaker  Major depression  Chronic kidney disease (CKD)  COPD (chronic obstructive pulmonary disease)  Status post total left knee replacement  2007  History of cholecystectomy  open , many years ago  H/O hernia repair  years ago  H/O resection of small bowel    Social Hx: No current smoking, EtOH or drugs     FAMILY HISTORY:  Noncontributory     Allergies  NIFEdipine (Unknown)  Procardia (Other)  fluoxetine (Unknown)    Intolerances  oxycodone (Other)    MEDICATIONS  (STANDING):  albuterol/ipratropium for Nebulization. 3 milliLiter(s) Nebulizer once  albuterol/ipratropium for Nebulization.. 3 milliLiter(s) Inhalation every 6 hours  aMIOdarone    Tablet 200 milliGRAM(s) Oral daily  ammonium lactate 12% Lotion 1 Application(s) Topical two times a day  apixaban 2.5 milliGRAM(s) Oral every 12 hours  aspirin enteric coated 81 milliGRAM(s) Oral daily  buDESOnide    Inhalation Suspension 0.5 milliGRAM(s) Inhalation two times a day  carvedilol 12.5 milliGRAM(s) Oral every 12 hours  cefTRIAXone   IVPB 1000 milliGRAM(s) IV Intermittent every 24 hours  famotidine    Tablet 20 milliGRAM(s) Oral daily  fenofibrate Tablet 48 milliGRAM(s) Oral daily  ferrous    sulfate 325 milliGRAM(s) Oral daily  furosemide    Tablet 20 milliGRAM(s) Oral three times a day  hydrALAZINE 100 milliGRAM(s) Oral every 8 hours  lactobacillus acidophilus 1 Tablet(s) Oral daily  melatonin 3 milliGRAM(s) Oral at bedtime  methylPREDNISolone sodium succinate Injectable 40 milliGRAM(s) IV Push two times a day  multivitamin 1 Tablet(s) Oral daily  potassium chloride    Tablet ER 20 milliEquivalent(s) Oral daily  sertraline 25 milliGRAM(s) Oral daily  tiotropium 2.5 MICROgram(s) Inhaler 2 Puff(s) Inhalation daily     REVIEW OF SYSTEMS:  CONSTITUTIONAL:  No Fever or chills, + weakness   HEENT:  No diplopia or blurred vision.  No sore throat or runny nose.  CARDIOVASCULAR:  No chest pain   RESPIRATORY:  No cough, shortness of breath, PND or orthopnea.  GASTROINTESTINAL:  No nausea, vomiting or diarrhea.  GENITOURINARY:  No dysuria, frequency or urgency. No Blood in urine  MUSCULOSKELETAL:  no joint aches, no muscle pain  SKIN:  No change in skin, hair or nails.    Physical Exam:  Vital Signs Last 24 Hrs  T(C): 36.3 (15 Feb 2024 11:43), Max: 36.6 (14 Feb 2024 18:01)  T(F): 97.4 (15 Feb 2024 11:43), Max: 97.8 (14 Feb 2024 18:01)  HR: 60 (15 Feb 2024 11:43) (58 - 69)  BP: 147/64 (15 Feb 2024 11:43) (128/55 - 177/73)  BP(mean): --  RR: 18 (15 Feb 2024 11:43) (18 - 18)  SpO2: 95% (15 Feb 2024 11:43) (95% - 98%)  Parameters below as of 15 Feb 2024 11:43  Patient On (Oxygen Delivery Method): nasal cannula  GEN: NAD  HEENT: normocephalic and atraumatic. EOMI. PERRL.    NECK: Supple.  No lymphadenopathy   LUNGS: crackles in both bases   HEART: Irregular rate and rhythm   ABDOMEN: Soft, nontender, and nondistended.  Positive bowel sounds.    : No CVA tenderness  EXTREMITIES: + edema.  NEUROLOGIC: grossly intact.  PSYCHIATRIC: Appropriate affect .  SKIN: No rash     Labs:                        9.1    3.06  )-----------( 145      ( 14 Feb 2024 07:40 )             28.8     02-14    140  |  104  |  35<H>  ----------------------------<  133<H>  4.3   |  33<H>  |  1.20    Ca    9.0      14 Feb 2024 07:40    Culture - Urine (collected 02-11-24 @ 18:55)  Source: Catheterized Catheterized  Final Report (02-15-24 @ 08:03):    >100,000 CFU/ml Escherichia coli  Organism: Escherichia coli (02-15-24 @ 08:03)  Organism: Escherichia coli (02-15-24 @ 08:03)    Sensitivities:      Method Type: MIKHAIL      -  Amoxicillin/Clavulanic Acid: S <=8/4      -  Ampicillin: S <=8 These ampicillin results predict results for amoxicillin      -  Ampicillin/Sulbactam: S <=4/2      -  Aztreonam: S <=4      -  Cefazolin: S <=2 For uncomplicated UTI with K. pneumoniae, E. coli, or P. mirablis: MIKHAIL <=16 is sensitive and MIKHAIL >=32 is resistant. This also predicts results for oral agents cefaclor, cefdinir, cefpodoxime, cefprozil, cefuroxime axetil, cephalexin and locarbef for uncomplicated UTI. Note that some isolates may be susceptible to these agents while testing resistant to cefazolin.      -  Cefepime: S <=2      -  Cefoxitin: S <=8      -  Ceftriaxone: S <=1      -  Cefuroxime: S <=4      -  Ciprofloxacin: S <=0.25      -  Ertapenem: S <=0.5      -  Gentamicin: S <=2      -  Imipenem: S <=1      -  Levofloxacin: S <=0.5      -  Meropenem: S <=1      -  Nitrofurantoin: S <=32 Should not be used to treat pyelonephritis      -  Piperacillin/Tazobactam: S <=8      -  Tobramycin: S <=2      -  Trimethoprim/Sulfamethoxazole: S <=0.5/9.5    Culture - Blood (collected 02-11-24 @ 18:55)  Source: .Blood Blood-Peripheral  Preliminary Report (02-15-24 @ 01:02):    No growth at 72 Hours    Culture - Blood (collected 02-11-24 @ 18:50)  Source: .Blood Blood-Peripheral  Preliminary Report (02-15-24 @ 01:02):    No growth at 72 Hours    WBC Count: 3.06 K/uL (02-14-24 @ 07:40)  WBC Count: 2.53 K/uL (02-13-24 @ 12:15)  WBC Count: 6.65 K/uL (02-11-24 @ 18:50)    Creatinine: 1.20 mg/dL (02-14-24 @ 07:40)  Creatinine: 1.40 mg/dL (02-13-24 @ 12:15)  Creatinine: 1.50 mg/dL (02-12-24 @ 05:21)  Creatinine: 1.50 mg/dL (02-11-24 @ 18:50)    Sedimentation Rate, Erythrocyte: 27 mm/hr (02-15-24 @ 08:52)     SARS-CoV-2 Result: NotDetec (02-11-24 @ 18:50)    All imaging and other data have been reviewed.  < from: CT Abdomen and Pelvis No Cont (02.11.24 @ 22:01) >  IMPRESSION:  Redemonstrated bibasilar mucoid impaction with patchy consolidation at   the bilateral bases, likely representing aspiration pneumonia/pneumonitis.  Unchanged nonobstructive right lower pole renal cysts measuring up to 9   mm.    Assessment and Plan:   82 yo woman with PMH of HTN, Afib, S/P PPM, CKD, COPD stopped smoking 30 years ago on PRN o2 at home 1.5-3L, h/o small bowel ca s/p whipple was admitted with fever and cough for 2-3 days. She also had incontinence for one day. In ED CT showed bibasilar opacities with mucus inpaction and also UA showed elevated WBC and a positive nitrate. Her WBC was low but the flu/RSV/COVID all negative. Tmax 102.4 on admission.     Doing better, on o2 with NC, no complaint, no fever.     # Pneumonia  # UTI  # COPD on home o2    - Blood cultures NGTD  - UC with Ecoli pansensitive   - Continue ceftriaxone to cover pneumonia and UTI  - Total 5days treatment, today is the last day   - Legionella U ag is negative     Will sign off please call with any question.     Sukumar Pacheco MD  Division of Infectious Diseases   Please call ID service at [phone] with any question.    50 minutes spent on total encounter assessing patient, examination, chart review, counseling and coordinating care by the attending physician/nurse/care manager.      
[facility] Progress Note [phone] (Dr. Sanchez, Delma, Lela, Jay)    CHIEF COMPLAINT: Patient is a 83y old  Female who presents with a chief complaint of UTI (12 Feb 2024 11:04)      Follow Up Today: The patient denies any chest discomfort or shortness of breath.    HPI:   84 yo F w/ PMH per EMR of A.fib, CKD, COPD, HLD, HTN, MD, OA, pacemaker, and hx small bowel ca s/p whipple here complaining of 1 days of fever, body aches, cough. patient uses 1.5 to 3 LPM O2 PRN.  In ER patient was found to have UTI.  patient is being admitted for further work up and treatment (12 Feb 2024 11:04)      PAST MEDICAL & SURGICAL HISTORY:  HTN (hypertension)      HLD (hyperlipidemia)      Anemia  newly diagnosed      Pneumonia  years ago      OA (osteoarthritis)      Afib      Small bowel cancer      Pacemaker      Major depression      Chronic kidney disease (CKD)      COPD (chronic obstructive pulmonary disease)      Status post total left knee replacement  2007      History of cholecystectomy  open , many years ago      H/O hernia repair  years ago      H/O resection of small bowel          MEDICATIONS  (STANDING):  albuterol/ipratropium for Nebulization.. 3 milliLiter(s) Inhalation every 6 hours  aMIOdarone    Tablet 200 milliGRAM(s) Oral daily  ammonium lactate 12% Lotion 1 Application(s) Topical two times a day  apixaban 2.5 milliGRAM(s) Oral every 12 hours  aspirin enteric coated 81 milliGRAM(s) Oral daily  azithromycin  IVPB 500 milliGRAM(s) IV Intermittent every 24 hours  azithromycin  IVPB      buDESOnide    Inhalation Suspension 0.5 milliGRAM(s) Inhalation two times a day  carvedilol 12.5 milliGRAM(s) Oral every 12 hours  cefTRIAXone   IVPB 1000 milliGRAM(s) IV Intermittent every 24 hours  dextrose 5% + sodium chloride 0.45%. 1000 milliLiter(s) (50 mL/Hr) IV Continuous <Continuous>  famotidine    Tablet 20 milliGRAM(s) Oral daily  fenofibrate Tablet 48 milliGRAM(s) Oral daily  ferrous    sulfate 325 milliGRAM(s) Oral daily  furosemide   Injectable 20 milliGRAM(s) IV Push two times a day  hydrALAZINE 50 milliGRAM(s) Oral three times a day  lactobacillus acidophilus 1 Tablet(s) Oral daily  levETIRAcetam 500 milliGRAM(s) Oral two times a day  melatonin 3 milliGRAM(s) Oral at bedtime  methylPREDNISolone sodium succinate Injectable 40 milliGRAM(s) IV Push two times a day  multivitamin 1 Tablet(s) Oral daily  potassium chloride    Tablet ER 20 milliEquivalent(s) Oral daily  sertraline 25 milliGRAM(s) Oral daily  tiotropium 2.5 MICROgram(s) Inhaler 2 Puff(s) Inhalation daily    MEDICATIONS  (PRN):  acetaminophen     Tablet .. 650 milliGRAM(s) Oral every 6 hours PRN Temp greater or equal to 38C (100.4F)  albuterol    90 MICROgram(s) HFA Inhaler 2 Puff(s) Inhalation every 6 hours PRN Shortness of Breath and/or Wheezing  aluminum hydroxide/magnesium hydroxide/simethicone Suspension 30 milliLiter(s) Oral every 4 hours PRN Dyspepsia  cloNIDine 0.2 milliGRAM(s) Oral three times a day PRN systolic BP>170 or equal to 170  guaiFENesin Oral Liquid (Sugar-Free) 100 milliGRAM(s) Oral every 6 hours PRN Cough  ondansetron Injectable 4 milliGRAM(s) IV Push every 8 hours PRN Nausea and/or Vomiting      Allergies    NIFEdipine (Unknown)  Procardia (Other)  fluoxetine (Unknown)    Intolerances    oxycodone (Other)      REVIEW OF SYSTEMS:    All other review of systems is negative unless indicated above    Vital Signs Last 24 Hrs  T(C): 36.5 (13 Feb 2024 04:48), Max: 36.7 (12 Feb 2024 08:13)  T(F): 97.7 (13 Feb 2024 04:48), Max: 98.1 (12 Feb 2024 08:13)  HR: 60 (13 Feb 2024 04:48) (60 - 74)  BP: 147/64 (13 Feb 2024 04:48) (124/71 - 158/69)  BP(mean): --  RR: 19 (13 Feb 2024 04:48) (17 - 19)  SpO2: 97% (13 Feb 2024 04:48) (94% - 98%)    Parameters below as of 13 Feb 2024 04:48  Patient On (Oxygen Delivery Method): nasal cannula        I&O's Summary    12 Feb 2024 07:01  -  13 Feb 2024 07:00  --------------------------------------------------------  IN: 900 mL / OUT: 0 mL / NET: 900 mL        PHYSICAL EXAM:    Constitutional: NAD, awake and alert, well-developed  Eyes:  EOMI,  Pupils round, No oral cyanosis.  HEENT: No exudate or erythema  Pulmonary: Non-labored, breath sounds are clear bilaterally, No wheezing, rales or rhonchi  Cardiovascular: Regular, S1 and S2, No murmurs  Gastrointestinal: Bowel Sounds present, soft, nontender.   Ext: No significant LE edema  Neurological: Alert, no gross focal motor deficits  Skin: No rashes.  Psych:  Mood & affect appropriate    LABS: All Labs Reviewed:                        10.1 6.65  )-----------( 192      ( 11 Feb 2024 18:50 )             30.9     12 Feb 2024 05:21    143    |  107    |  25     ----------------------------<  80     3.4     |  31     |  1.50   11 Feb 2024 18:50    140    |  104    |  24     ----------------------------<  95     3.9     |  29     |  1.50     Ca    8.2        12 Feb 2024 05:21  Ca    9.1        11 Feb 2024 18:50  Mg     2.1       12 Feb 2024 05:21    TPro  7.3    /  Alb  3.6    /  TBili  0.5    /  DBili  x      /  AST  64     /  ALT  43     /  AlkPhos  53     11 Feb 2024 18:50    PT/INR - ( 11 Feb 2024 18:50 )   PT: 20.2 sec;   INR: 1.76 ratio         PTT - ( 11 Feb 2024 18:50 )  PTT:34.9 sec      Blood Culture: Organism --  Gram Stain Blood -- Gram Stain --  Specimen Source .Blood Blood-Peripheral  Culture-Blood --    Organism --  Gram Stain Blood -- Gram Stain --  Specimen Source .Blood Blood-Peripheral  Culture-Blood --        02-12 @ 05:21  TSH: 0.64      RADIOLOGY/EKG:    Attending Attestation:   50 minutes spent on total encounter; more than 50% of the visit was spent counseling and/or coordinating care by the attending physician.     ASSESSMENT AND PLAN
Date of Service 02-15-24 @ 20:18    Patient is a 83y old  Female who presents with a chief complaint of UTI (15 Feb 2024 12:59)      INTERVAL /OVERNIGHT EVENTS: feeling better    MEDICATIONS  (STANDING):  albuterol/ipratropium for Nebulization. 3 milliLiter(s) Nebulizer once  albuterol/ipratropium for Nebulization.. 3 milliLiter(s) Inhalation every 6 hours  aMIOdarone    Tablet 200 milliGRAM(s) Oral daily  ammonium lactate 12% Lotion 1 Application(s) Topical two times a day  apixaban 2.5 milliGRAM(s) Oral every 12 hours  aspirin enteric coated 81 milliGRAM(s) Oral daily  buDESOnide    Inhalation Suspension 0.5 milliGRAM(s) Inhalation two times a day  carvedilol 12.5 milliGRAM(s) Oral every 12 hours  cefTRIAXone   IVPB 1000 milliGRAM(s) IV Intermittent every 24 hours  famotidine    Tablet 20 milliGRAM(s) Oral daily  fenofibrate Tablet 48 milliGRAM(s) Oral daily  ferrous    sulfate 325 milliGRAM(s) Oral daily  furosemide    Tablet 20 milliGRAM(s) Oral three times a day  hydrALAZINE 100 milliGRAM(s) Oral every 8 hours  lactobacillus acidophilus 1 Tablet(s) Oral daily  melatonin 3 milliGRAM(s) Oral at bedtime  methylPREDNISolone sodium succinate Injectable 40 milliGRAM(s) IV Push two times a day  multivitamin 1 Tablet(s) Oral daily  potassium chloride    Tablet ER 10 milliEquivalent(s) Oral two times a day  sertraline 25 milliGRAM(s) Oral daily  tiotropium 2.5 MICROgram(s) Inhaler 2 Puff(s) Inhalation daily    MEDICATIONS  (PRN):  acetaminophen     Tablet .. 650 milliGRAM(s) Oral every 6 hours PRN Temp greater or equal to 38C (100.4F), Mild Pain (1 - 3)  albuterol    90 MICROgram(s) HFA Inhaler 2 Puff(s) Inhalation every 6 hours PRN Shortness of Breath and/or Wheezing  aluminum hydroxide/magnesium hydroxide/simethicone Suspension 30 milliLiter(s) Oral every 4 hours PRN Dyspepsia  cloNIDine 0.2 milliGRAM(s) Oral three times a day PRN systolic BP>170 or equal to 170  guaiFENesin Oral Liquid (Sugar-Free) 100 milliGRAM(s) Oral every 6 hours PRN Cough  ondansetron Injectable 4 milliGRAM(s) IV Push every 8 hours PRN Nausea and/or Vomiting      Allergies    NIFEdipine (Unknown)  Procardia (Other)  fluoxetine (Unknown)    Intolerances    oxycodone (Other)      REVIEW OF SYSTEMS:  CONSTITUTIONAL: No fever, weight loss, or fatigue  EYES: No eye pain, visual disturbances, or discharge  ENMT:  No difficulty hearing, tinnitus, vertigo; No sinus or throat pain  NECK: No pain or stiffness  RESPIRATORY: No cough, wheezing, chills or hemoptysis; No shortness of breath  CARDIOVASCULAR: No chest pain, palpitations, dizziness, or leg swelling  GASTROINTESTINAL: No abdominal or epigastric pain. No nausea, vomiting, or hematemesis; No diarrhea or constipation. No melena or hematochezia.  GENITOURINARY: No dysuria, frequency, hematuria, or incontinence  NEUROLOGICAL: No headaches, memory loss, loss of strength, numbness, or tremors  SKIN: No itching, burning, rashes, or lesions   LYMPH NODES: No enlarged glands  ENDOCRINE: No heat or cold intolerance; No hair loss; No polydipsia or polyuria  MUSCULOSKELETAL: No joint pain or swelling; No muscle, back, or extremity pain  PSYCHIATRIC: No depression, anxiety, mood swings, or difficulty sleeping  HEME/LYMPH: No easy bruising, or bleeding gums  ALLERGY AND IMMUNOLOGIC: No hives or eczema    Vital Signs Last 24 Hrs  T(C): 36.6 (15 Feb 2024 20:05), Max: 36.6 (15 Feb 2024 20:05)  T(F): 97.8 (15 Feb 2024 20:05), Max: 97.8 (15 Feb 2024 20:05)  HR: 60 (15 Feb 2024 20:05) (60 - 69)  BP: 180/76 (15 Feb 2024 20:05) (134/69 - 180/76)  BP(mean): --  RR: 18 (15 Feb 2024 20:05) (18 - 18)  SpO2: 97% (15 Feb 2024 20:05) (95% - 98%)    Parameters below as of 15 Feb 2024 20:05  Patient On (Oxygen Delivery Method): nasal cannula        PHYSICAL EXAM:  GENERAL: NAD, well-groomed, well-developed  HEAD:  Atraumatic, Normocephalic  EYES: EOMI, PERRLA, conjunctiva and sclera clear  ENMT: No tonsillar erythema, exudates, or enlargement; Moist mucous membranes, Good dentition, No lesions  NECK: Supple, No JVD, Normal thyroid  NERVOUS SYSTEM:  Alert & Oriented X3, Good concentration; Motor Strength 5/5 B/L upper and lower extremities; DTRs 2+ intact and symmetric  CHEST/LUNG: Clear to auscultation bilaterally; No rales, rhonchi, wheezing, or rubs  HEART: Regular rate and rhythm; No murmurs, rubs, or gallops  ABDOMEN: Soft, Nontender, Nondistended; Bowel sounds present  EXTREMITIES:  2+ Peripheral Pulses, No clubbing, cyanosis, or edema  LYMPH: No lymphadenopathy noted  SKIN: No rashes or lesions    LABS:      Ca    9.0        14 Feb 2024 07:40        Urinalysis Basic - ( 14 Feb 2024 07:40 )    Color: x / Appearance: x / SG: x / pH: x  Gluc: 133 mg/dL / Ketone: x  / Bili: x / Urobili: x   Blood: x / Protein: x / Nitrite: x   Leuk Esterase: x / RBC: x / WBC x   Sq Epi: x / Non Sq Epi: x / Bacteria: x      CAPILLARY BLOOD GLUCOSE          RADIOLOGY & ADDITIONAL TESTS:    Notes Reviewed:  [x ] YES  [ ] NO    Care Discussed with Consultants/Other Providers [x ] YES  [ ] NO
Patient is a 83y old  Female who presents with a chief complaint of UTI and pneumonia      INTERVAL HPI/OVERNIGHT EVENTS:  T(C): 34.6 (02-14-24 @ 11:26), Max: 36.6 (02-13-24 @ 18:53)  HR: 60 (02-14-24 @ 11:26) (59 - 82)  BP: 170/71 (02-14-24 @ 11:26) (161/75 - 184/76)  RR: 18 (02-14-24 @ 11:26) (17 - 19)  SpO2: 97% (02-14-24 @ 11:26) (94% - 99%)  Wt(kg): --  I&O's Summary    13 Feb 2024 07:01  -  14 Feb 2024 07:00  --------------------------------------------------------  IN: 0 mL / OUT: 500 mL / NET: -500 mL        LABS:                        9.1    3.06  )-----------( 145      ( 14 Feb 2024 07:40 )             28.8     02-14    140  |  104  |  35<H>  ----------------------------<  133<H>  4.3   |  33<H>  |  1.20    Ca    9.0      14 Feb 2024 07:40        Urinalysis Basic - ( 14 Feb 2024 07:40 )    Color: x / Appearance: x / SG: x / pH: x  Gluc: 133 mg/dL / Ketone: x  / Bili: x / Urobili: x   Blood: x / Protein: x / Nitrite: x   Leuk Esterase: x / RBC: x / WBC x   Sq Epi: x / Non Sq Epi: x / Bacteria: x      CAPILLARY BLOOD GLUCOSE            Urinalysis Basic - ( 14 Feb 2024 07:40 )    Color: x / Appearance: x / SG: x / pH: x  Gluc: 133 mg/dL / Ketone: x  / Bili: x / Urobili: x   Blood: x / Protein: x / Nitrite: x   Leuk Esterase: x / RBC: x / WBC x   Sq Epi: x / Non Sq Epi: x / Bacteria: x        MEDICATIONS  (STANDING):  albuterol/ipratropium for Nebulization. 3 milliLiter(s) Nebulizer once  albuterol/ipratropium for Nebulization.. 3 milliLiter(s) Inhalation every 6 hours  aMIOdarone    Tablet 200 milliGRAM(s) Oral daily  ammonium lactate 12% Lotion 1 Application(s) Topical two times a day  apixaban 2.5 milliGRAM(s) Oral every 12 hours  aspirin enteric coated 81 milliGRAM(s) Oral daily  buDESOnide    Inhalation Suspension 0.5 milliGRAM(s) Inhalation two times a day  carvedilol 12.5 milliGRAM(s) Oral every 12 hours  cefTRIAXone   IVPB 1000 milliGRAM(s) IV Intermittent every 24 hours  famotidine    Tablet 20 milliGRAM(s) Oral daily  fenofibrate Tablet 48 milliGRAM(s) Oral daily  ferrous    sulfate 325 milliGRAM(s) Oral daily  furosemide    Tablet 20 milliGRAM(s) Oral three times a day  hydrALAZINE 100 milliGRAM(s) Oral every 8 hours  lactobacillus acidophilus 1 Tablet(s) Oral daily  melatonin 3 milliGRAM(s) Oral at bedtime  methylPREDNISolone sodium succinate Injectable 40 milliGRAM(s) IV Push two times a day  multivitamin 1 Tablet(s) Oral daily  potassium chloride    Tablet ER 20 milliEquivalent(s) Oral daily  sertraline 25 milliGRAM(s) Oral daily  tiotropium 2.5 MICROgram(s) Inhaler 2 Puff(s) Inhalation daily    MEDICATIONS  (PRN):  acetaminophen     Tablet .. 650 milliGRAM(s) Oral every 6 hours PRN Temp greater or equal to 38C (100.4F), Mild Pain (1 - 3)  albuterol    90 MICROgram(s) HFA Inhaler 2 Puff(s) Inhalation every 6 hours PRN Shortness of Breath and/or Wheezing  aluminum hydroxide/magnesium hydroxide/simethicone Suspension 30 milliLiter(s) Oral every 4 hours PRN Dyspepsia  cloNIDine 0.2 milliGRAM(s) Oral three times a day PRN systolic BP>170 or equal to 170  guaiFENesin Oral Liquid (Sugar-Free) 100 milliGRAM(s) Oral every 6 hours PRN Cough  ondansetron Injectable 4 milliGRAM(s) IV Push every 8 hours PRN Nausea and/or Vomiting      REVIEW OF SYSTEMS:  CONSTITUTIONAL: No fever, weight loss, or fatigue  EYES: No eye pain, visual disturbances, or discharge  ENMT:  No difficulty hearing, tinnitus, vertigo; No sinus or throat pain  NECK: No pain or stiffness  RESPIRATORY: No cough, wheezing, chills or hemoptysis; No shortness of breath  CARDIOVASCULAR: No chest pain, palpitations, dizziness, or leg swelling  GASTROINTESTINAL: No abdominal or epigastric pain. No nausea, vomiting, or hematemesis; No diarrhea or constipation. No melena or hematochezia.  GENITOURINARY: No dysuria, frequency, hematuria, or incontinence  NEUROLOGICAL: No headaches, memory loss, loss of strength, numbness, or tremors  SKIN: No itching, burning, rashes, or lesions   LYMPH NODES: No enlarged glands  ENDOCRINE: No heat or cold intolerance; No hair loss  MUSCULOSKELETAL: No joint pain or swelling; No muscle, back, or extremity pain  PSYCHIATRIC: No depression, anxiety, mood swings, or difficulty sleeping  HEME/LYMPH: No easy bruising, or bleeding gums  ALLERY AND IMMUNOLOGIC: No hives or eczema    RADIOLOGY & ADDITIONAL TESTS:    Imaging Personally Reviewed:  [ x] YES  [ ] NO    Consultant(s) Notes Reviewed:  [ x] YES  [ ] NO    PHYSICAL EXAM:  GENERAL: NAD, well-groomed, well-developed  HEAD:  Atraumatic, Normocephalic  EYES: EOMI, PERRLA, conjunctiva and sclera clear  ENMT: No tonsillar erythema, exudates, or enlargement; Moist mucous membranes, Good dentition, No lesions  NECK: Supple, No JVD, Normal thyroid  NERVOUS SYSTEM:  Alert & Oriented X3, Good concentration; Motor Strength 5/5 B/L upper and lower extremities; DTRs 2+ intact and symmetric  CHEST/LUNG: Clear to percussion bilaterally; No rales, rhonchi, wheezing, or rubs  HEART: Regular rate and rhythm; No murmurs, rubs, or gallops  ABDOMEN: Soft, Nontender, Nondistended; Bowel sounds present  EXTREMITIES:  2+ Peripheral Pulses, No clubbing, cyanosis, or edema  LYMPH: No lymphadenopathy noted  SKIN: No rashes or lesions    Care Discussed with Consultants/Other Providers [x ] YES  [ ] NO    advance care planning and advance directives discussed, including but not limited to long term care planning, and all forms reviewed [x]YES  [ ]NO

## 2024-02-16 NOTE — PROGRESS NOTE ADULT - PROVIDER SPECIALTY LIST ADULT
Family Medicine
Cardiology
Heme/Onc
Infectious Disease
Infectious Disease
Cardiology
Cardiology
Infectious Disease
Pulmonology
Cardiology
Hospitalist
Family Medicine

## 2024-02-20 ENCOUNTER — NON-APPOINTMENT (OUTPATIENT)
Age: 84
End: 2024-02-20

## 2024-03-07 NOTE — PHYSICAL THERAPY INITIAL EVALUATION ADULT - PHYSICAL ASSIST/NONPHYSICAL ASSIST: SIT/STAND, REHAB EVAL
Last labs were done on 11/28/23 . Do you want more labs?     Future Appointments   Date Time Provider Department Center   4/23/2024  8:40 AM Fatou Landaverde MD [facility]        set-up required/supervision/verbal cues

## 2024-03-11 ENCOUNTER — TRANSCRIPTION ENCOUNTER (OUTPATIENT)
Age: 84
End: 2024-03-11

## 2024-03-14 ENCOUNTER — TRANSCRIPTION ENCOUNTER (OUTPATIENT)
Age: 84
End: 2024-03-14

## 2024-03-14 ENCOUNTER — APPOINTMENT (OUTPATIENT)
Dept: CARE COORDINATION | Facility: HOME HEALTH | Age: 84
End: 2024-03-14
Payer: MEDICARE

## 2024-03-14 VITALS
OXYGEN SATURATION: 98 % | RESPIRATION RATE: 17 BRPM | WEIGHT: 154.13 LBS | HEART RATE: 60 BPM | BODY MASS INDEX: 30.1 KG/M2 | DIASTOLIC BLOOD PRESSURE: 68 MMHG | SYSTOLIC BLOOD PRESSURE: 150 MMHG

## 2024-03-14 DIAGNOSIS — I50.9 HEART FAILURE, UNSPECIFIED: ICD-10-CM

## 2024-03-14 DIAGNOSIS — J18.9 PNEUMONIA, UNSPECIFIED ORGANISM: ICD-10-CM

## 2024-03-14 DIAGNOSIS — I48.19 OTHER PERSISTENT ATRIAL FIBRILLATION: ICD-10-CM

## 2024-03-14 DIAGNOSIS — J44.9 CHRONIC OBSTRUCTIVE PULMONARY DISEASE, UNSPECIFIED: ICD-10-CM

## 2024-03-14 PROCEDURE — 99349 HOME/RES VST EST MOD MDM 40: CPT

## 2024-03-14 RX ORDER — TIOTROPIUM BROMIDE INHALATION SPRAY 3.12 UG/1
2.5 SPRAY, METERED RESPIRATORY (INHALATION) DAILY
Qty: 1 | Refills: 0 | Status: ACTIVE | COMMUNITY
Start: 2024-03-14

## 2024-03-14 RX ORDER — PREDNISONE 10 MG/1
10 TABLET ORAL
Refills: 0 | Status: DISCONTINUED | COMMUNITY
Start: 2023-08-07 | End: 2024-03-14

## 2024-03-14 RX ORDER — HYDRALAZINE HYDROCHLORIDE 50 MG/1
50 TABLET ORAL TWICE DAILY
Refills: 0 | Status: ACTIVE | COMMUNITY
Start: 2023-08-07

## 2024-03-14 NOTE — HISTORY OF PRESENT ILLNESS
[Post-hospitalization from ___ Hospital] : Post-hospitalization from [unfilled] Hospital [Admitted on: ___] : The patient was admitted on [unfilled] [Discharged on ___] : discharged on [unfilled] [Discharge Summary] : discharge summary [Discharge Med List] : discharge medication list [Med Reconciliation] : medication reconciliation has been completed [Patient Contacted By: ____] : and contacted by [unfilled] [FreeTextEntry3] : Patient was contacted by TCM RN on 3/6/24 for 24/48 hour call and documentation is present in the Clinical Viewer  [FreeTextEntry2] :  82 yo F w/ PMH per EMR of A.fib, CKD, COPD, HLD, HTN, MD, OA, pacemaker, and hx small bowel ca s/p whipple,  complaining of 1 days of fever, body aches, cough. patient uses 1.5 to 3 LPM O2 PRN.  In ER patient was found to have UTI.  Found to have pneumonia.    Since dc denies cp/sob/pnd/orthopnea.  Has SINGH now and at baseline secondary to COPD.  Has not been using home oxygen since discharge, o2 sat 98%.  Pt has been monitoring weight and in the past 48 hours has went from 161lbs to 164.2lbs, LLL crackles.  Escalated findings to [facility] and let them know I will call cardio Dr. Walls to schedule an appointment.  Dr. Nguyễn was on call for Dr. Walls and increased Lasix to 80mg x 2 days and then return to lasix 60mg daily, they will send script to [facility], and the office will call patient to schedule an appt for early next week.  Pt provided best call back number.  [facility] will call house MD to make aware.  Pt denies any UTI symptoms  TCM numbers provided to pt for any questions/concerns.

## 2024-03-15 ENCOUNTER — TRANSCRIPTION ENCOUNTER (OUTPATIENT)
Age: 84
End: 2024-03-15

## 2024-03-25 NOTE — PROGRESS NOTE ADULT - REASON FOR ADMISSION
Generalized weakness.  Anemia possibly secondary to GI Bleeding.
Detail Level: Detailed
Generalized weakness.  Anemia possibly secondary to GI Bleeding.

## 2024-06-03 NOTE — PROGRESS NOTE ADULT - PROBLEM SELECTOR PROBLEM 9
-- DO NOT REPLY / DO NOT REPLY ALL --  -- This inbox is not monitored  -- Message is from [facility]agement Center [employer] ([facility]) --patient is scheduled for vv 7.11.24 at 8a and is requesting refills before 7.5.24      Message Type:  Refill Medication   Refill request for Pended medication named:     clonazePAM (KlonoPIN) 1 MG tablet     Preferred pharmacy verified, and selected.   [facility] [address] [place] [address] BERYL GIBSON  [phone]   Is the patient OUT of Medication?  No        Message: yes voice mail                    Need for prophylactic measure

## 2024-06-28 NOTE — PROGRESS NOTE ADULT - PROBLEM SELECTOR PROBLEM 7
June 28, 2024       Eagle Latif MD  [address]  [facility] [address]  [facility] [address]  Via In Basket      Patient: Alfredo Romeo   YOB: 1983   Date of Visit: 6/28/2024       Dear Dr. Latif:    Thank you for referring Alfredo Romeo to me for evaluation. Below are my notes for this visit with him.    If you have questions, please do not hesitate to call me. I look forward to following your patient along with you.      Sincerely,        Dino Parham MD        CC: No Recipients    Dino Parham MD  6/28/2024  5:01 PM  Signed  Subjective  Alfredo is a 41 year old male who presents with LEFT knee pain  PCP: Eagle Latif MD  Patient referred for consultation by Eagle Latif MD  Type of Consult: Verbal      Chief Complaint:   Chief Complaint   Patient presents with   • Left Knee - Pain   • Office Visit        HPI: 41 year old male patient presents today for LEFT knee pain. He states the pain has been going on for about 3 weeks. Pain is located on the medial and lateral joint line of the LEFT knee.  He describes the pain as aching and worsens with using stairs and walking. He was prescribed Naproxen by his PCP two days ago. He denies any recent physical therapy. He stated that he had 2 prior surgeries to the LEFT knee- 2000 and 2002. First surgery was a \"clean up\" of the knee. The second surgery was ACL reconstruction using patellar tendon. He has had LEFT knee aspirations but does not recall any injections.  He has continued to have instability since the time of surgery, but that is not different.        Date of Injury: None  Work Related: no  Current Position:    Employer: Not disclosed   Job Description: Walking, standing    Patient's medications, allergies, past medical, surgical, social and family histories were reviewed and updated as appropriate.    Review of Systems   Constitutional: Negative.    HENT: Negative.    Eyes: Negative.    Respiratory:  Negative.    Cardiovascular: Negative.    Gastrointestinal: Negative.    Endocrine: Negative.    Genitourinary: Negative.    Musculoskeletal: Positive for arthralgias.   Skin: Negative.    Allergic/Immunologic: Negative.    Neurological: Negative.    Hematological: Negative.    Psychiatric/Behavioral: Negative.    All other systems reviewed and are negative.    Objective    Physical Examination:     Constitutional:  Well-developed, well-nourished male in no acute distress.  Neuro: Alert and oriented  Psychiatric:  Normal affect  Skin: Warm, dry, intact without rash or lesion.  Neck: Normal appearing neck  Pulmonary: No labored respirations  CV: normal rate  Musculoskeletal:    LEFT knee:   Gait: Normal    Soft tissue: Incisions are healed.  The skin about the knee is intact. There is no ecchymosis. There is no effusion.     Patella: The patella tracked centrally and has roughly 2 quadrants of lateral translation with appropriate restraints. There is no tenderness to palpation at the medial patellar facet and lateral patellar facet.      Range of motion:   PROM: 0-130° without pain symmetric to the contralateral knee    Ligament Exam:  At 30° varus laxity: negative  At 30° valgus laxity: negative  Lachman's: did not assess  Anterior drawer: did not assess   Posterior drawer: did not assess    Tenderness:   There is tenderness to palpation in the medial joint line and lateral joint line.    Barbara's: did not assess  Dial test: Unable to be performed due to guarding  Pivot shift test: Unable to be performed due to guarding    Sensation intact to light touch in the DP/SP/sural/saphenous/tibial nerve distribution, EHL/TA/GS 5/5, DP 2+.     Imaging Reading/Results:  XR KNEE 1 OR 2 VIEWS LEFT  Narrative: EXAM: XR KNEE 1 OR 2 VIEWS LEFT    CLINICAL INDICATION: Left knee pain. No injury.    COMPARISON:  None.   Impression: FINDINGS/IMPRESSION: Mild tricompartmental degenerative joint disease,  joint space narrowing and  small marginal osteophytes.    No acute fracture or dislocation. No bone lesion..    Electronically Signed by: SHANNAN LACY M.D.   Signed on: 6/26/2024 4:40 PM   Workstation ID: [id number]    XR KNEE 1 OR 2 VIEWS LEFT  BILATERAL standing PA and BILATERAL sunrise radiographs of the knees were   obtained in our office dated 6/28/2024 and reviewed by myself: There is no   fracture or dislocation, mild medial compartment degenerative joint   disease, no lateral compartment arthritis, normal patellofemoral tracking    Assessment  Problem List Items Addressed This Visit    None  Visit Diagnoses       Primary osteoarthritis of left knee    -  Primary    Relevant Orders    XR KNEE 1 OR 2 VIEWS LEFT (Completed)            Plan:  X-rays were obtained in the office  and reviewed with the patient which show osteoarthritis of the LEFT knee.   Non-operative treatment options include rest, ice, anti-inflammatory medication, physical therapy, and cortisone injection.  Even having instability in the knee, he  does not want anything to be done about that  Patient would like to continue the Naproxen medication. Will provide patient with at home exercises to help stretch and strengthen his knee.   Patient will follow-up on an as needed basis. If pain does not improve may return to the office for a cortisone injection.     All questions were answered.  The patient understands the plan and wishes to proceed as such.    -------------------------------------------------------------------------------------------------------------------------------------------------------------------------------------     Charlotte ALEJANDRO PA, scribed the services personally performed by Dr. Parham.  -----------------------------------------------------------------------------------------------------------------------------------------------------------------------------------------------------------  SCRIBE ATTESTATION      Portions of this note were transcribed by Love Mckeon MA and CONCHITA Marie. I, Dr. Parham personally performed the history, physical exam and medical decision making; and confirmed the accuracy of the information in the transcribed note.    All questions were answered.  The patient understands the plan and wishes to proceed as such.    Dino Parham MD  Orthopaedic Surgeon, Adult & Pediatric Sports Medicine  [facility]  P: [phone]  F: [phone]            Cc: Eagle Latif MD     Anemia

## 2024-08-20 NOTE — PROGRESS NOTE ADULT - NS_MD_PANP_GEN_ALL_CORE
Attending Only
Attending and PA/NP shared services statement (NON-critical care):

## 2024-09-04 NOTE — BH CONSULTATION LIAISON ASSESSMENT NOTE - CURRENT ACTIVE IDEATION
Colazal 750 mg oral capsule: 2 cap(s) orally 2 times a day DC&#x27;d by pt&#x27;s GI 2 days ago to prevent extra dizziness/nausea  Crestor 5 mg oral tablet: 1 tab(s) orally once a day  escitalopram 5 mg oral tablet: 1 tab(s) orally once a day for chronic dizziness  Humira 40 mg/0.8 mL subcutaneous kit: 40 milligram(s) subcutaneously every 2 weeks  metoclopramide 5 mg oral tablet: 1 tab(s) orally 1 to 3 times a day as needed for  nausea  NIFEdipine 30 mg oral tablet, extended release: 1 tab(s) orally once a day  pantoprazole 40 mg oral delayed release tablet: 1 tab(s) orally 2 times a day   No amLODIPine 5 mg oral tablet: 1 tab(s) orally once a day  Crestor 5 mg oral tablet: 1 tab(s) orally once a day  Humira 40 mg/0.8 mL subcutaneous kit: 40 milligram(s) subcutaneously every 2 weeks  meclizine 12.5 mg oral tablet: 1 tab(s) orally 2 times a day  metoclopramide 5 mg oral tablet: 1 tab(s) orally 1 to 3 times a day as needed for  nausea  mirtazapine 7.5 mg oral tablet: 0.5 tab(s) orally once a day (at bedtime)  pantoprazole 40 mg oral delayed release tablet: 1 tab(s) orally 2 times a day

## 2025-01-02 NOTE — PROGRESS NOTE ADULT - SUBJECTIVE AND OBJECTIVE BOX
CAPILLARY BLOOD GLUCOSE          Vital Signs Last 24 Hrs  T(C): 36.5 (08 Sep 2023 20:04), Max: 36.7 (08 Sep 2023 14:45)  T(F): 97.7 (08 Sep 2023 20:04), Max: 98 (08 Sep 2023 14:45)  HR: 60 (08 Sep 2023 20:31) (60 - 77)  BP: 159/60 (08 Sep 2023 20:04) (112/67 - 175/67)  BP(mean): --  RR: 18 (08 Sep 2023 20:04) (17 - 18)  SpO2: 99% (08 Sep 2023 20:31) (93% - 99%)    Parameters below as of 08 Sep 2023 20:31  Patient On (Oxygen Delivery Method): room air        General: WN/WD NAD  Respiratory: CTA B/L  CV: RRR, S1S2, no murmurs, rubs or gallops  Abdominal: Soft, NT, ND +BS, Last BM  Extremities: No edema, + peripheral pulses     09-08    137  |  95<L>  |  58<H>  ----------------------------<  212<H>  3.7   |  33<H>  |  1.60<H>    Ca    8.8      08 Sep 2023 09:42        fenofibrate Tablet 48 milliGRAM(s) Oral daily  predniSONE   Tablet 20 milliGRAM(s) Oral daily   abdominal pain

## 2025-01-16 NOTE — DISCHARGE NOTE NURSING/CASE MANAGEMENT/SOCIAL WORK - NSDCPEELIQUIS_GEN_ALL_CORE
Apixaban/Eliquis - Compliance/Apixaban/Eliquis - Dietary Advice/Apixaban/Eliquis - Follow up monitoring/Apixaban/Eliquis - Potential for adverse drug reactions and interactions [Negative] : Heme/Lymph

## 2025-03-07 NOTE — H&P ADULT - HIV OFFER
Group Topic: BH Process Group     Date: 3/7/2025  Start Time: 10:00 AM  End Time: 11:00 AM  Facilitators: Anna Rogel LPC    Focus: Process  Number in attendance: 6    Patients were given the opportunity to share individually about goals, current stressors and therapeutic assignments. Group and therapist feedback is welcomed and maladaptive skills are challenged with coping options.     Method: Group  Attendance: Present  Participation: Active  Patient Response: Appropriate feedback, Attentive, Awareness of social/physical boundaries, Good eye contact, and Interactive  Mood: Anxious  Affect: Type: Anxious   Range: Full (normal)   Congruency: Congruent   Stability: Stable  Behavior/Socialization: Appropriate to group, Engaged, and Supportive  Thought Process: Organized  Task Performance: Follows directions  Patient Evaluation: Independent - full participation    Pt shared she is thinking about blocking some of the people from her job. Pt shared they say are just checking but it still makes her anxious. Pt shared she is trying not to be rude to them. Pt shared she wishes everyone would leave her alone for a bit. Writer offered validation for wanting to focus on herself. Pt shared she does feel stressed about going back to work. Pt shared she does not miss the long shifts but misses the pay. PT shared she is not worried about the stress of the job but that is where she had her panic attack and she doesn't know what the responses will be. Pt shared she may now also have a case with internal affairs. Writer talked about boundaries.   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 Pt shared she also wants some me time, possibly getting a massage.     Anna Rogel LPC-IT   Opt out

## 2025-03-14 NOTE — CHART NOTE - NSCHARTNOTEFT_GEN_A_CORE
03/14/25  Needs refill of Hydralazine 25mg    Pt just noticed that he has only enough through [facility].   consult dictated    Impression:    COPD with exacerbation  Viral Bronchitis  Hx Pulmonary emboli/DVT  Acute Hypoxic respiratory failure  PAF - S/P PPM  Hx Small bowel carcinoma - S/P resection  Hx Hypertension  Hx Hyperlipidemia    Plan:    IV solumedrol  Supplemental oxygen  Nebulizer treatments  Anticoagulation with Eliquis consult dictated    Impression:    COPD with exacerbation  Viral Bronchitis - no definite radiographic evidence of pneumonia  Hx Pulmonary emboli/DVT  Acute Hypoxic respiratory failure  PAF - S/P PPM  Hx Small bowel carcinoma - S/P resection  Hx Hypertension  Hx Hyperlipidemia    Plan:    Course of Prednisone  Supplemental oxygen  Nebulizer treatments  Anticoagulation with Eliquis  CT chest for further evaluation of left lower lobe volume loss and left pleural effusion  Continue antibiotics pending CT results

## 2025-05-30 NOTE — STROKE CODE NOTE - NS SC NIH INTUBATED_GEN_A_CORE
Specialty Pharmacy Patient Management Program       I have been following pt for Verzenio restart. Pt has been held since February 2025. She was seen by Dr Huang today and will continue to hold. She will return to the office in August and I will follow after that visit.    Arminda Baumann, Pharmacy Technician  05/30/25  11:06 EDT    
No